# Patient Record
Sex: FEMALE | Race: WHITE | Employment: OTHER | URBAN - METROPOLITAN AREA
[De-identification: names, ages, dates, MRNs, and addresses within clinical notes are randomized per-mention and may not be internally consistent; named-entity substitution may affect disease eponyms.]

---

## 2017-01-26 ENCOUNTER — TRANSFERRED RECORDS (OUTPATIENT)
Dept: HEALTH INFORMATION MANAGEMENT | Facility: CLINIC | Age: 77
End: 2017-01-26

## 2017-02-06 ENCOUNTER — OFFICE VISIT (OUTPATIENT)
Dept: FAMILY MEDICINE | Facility: CLINIC | Age: 77
End: 2017-02-06
Payer: MEDICARE

## 2017-02-06 VITALS
TEMPERATURE: 97.8 F | BODY MASS INDEX: 27.99 KG/M2 | DIASTOLIC BLOOD PRESSURE: 66 MMHG | HEIGHT: 65 IN | WEIGHT: 168 LBS | SYSTOLIC BLOOD PRESSURE: 118 MMHG | HEART RATE: 60 BPM

## 2017-02-06 DIAGNOSIS — J31.0 CHRONIC RHINITIS: ICD-10-CM

## 2017-02-06 DIAGNOSIS — M17.11 PRIMARY LOCALIZED OSTEOARTHROSIS, LOWER LEG, RIGHT: ICD-10-CM

## 2017-02-06 DIAGNOSIS — Z01.818 PREOP GENERAL PHYSICAL EXAM: Primary | ICD-10-CM

## 2017-02-06 LAB
ALBUMIN SERPL-MCNC: 3.8 G/DL (ref 3.4–5)
ALP SERPL-CCNC: 92 U/L (ref 40–150)
ALT SERPL W P-5'-P-CCNC: 17 U/L (ref 0–50)
ANION GAP SERPL CALCULATED.3IONS-SCNC: 5 MMOL/L (ref 3–14)
AST SERPL W P-5'-P-CCNC: 19 U/L (ref 0–45)
BILIRUB SERPL-MCNC: 0.9 MG/DL (ref 0.2–1.3)
BUN SERPL-MCNC: 13 MG/DL (ref 7–30)
CALCIUM SERPL-MCNC: 9.2 MG/DL (ref 8.5–10.1)
CHLORIDE SERPL-SCNC: 103 MMOL/L (ref 94–109)
CO2 SERPL-SCNC: 31 MMOL/L (ref 20–32)
CREAT SERPL-MCNC: 0.77 MG/DL (ref 0.52–1.04)
ERYTHROCYTE [DISTWIDTH] IN BLOOD BY AUTOMATED COUNT: 13.1 % (ref 10–15)
GFR SERPL CREATININE-BSD FRML MDRD: 73 ML/MIN/1.7M2
GLUCOSE SERPL-MCNC: 93 MG/DL (ref 70–99)
HCT VFR BLD AUTO: 40.4 % (ref 35–47)
HGB BLD-MCNC: 12.9 G/DL (ref 11.7–15.7)
MCH RBC QN AUTO: 31.6 PG (ref 26.5–33)
MCHC RBC AUTO-ENTMCNC: 31.9 G/DL (ref 31.5–36.5)
MCV RBC AUTO: 99 FL (ref 78–100)
PLATELET # BLD AUTO: 235 10E9/L (ref 150–450)
POTASSIUM SERPL-SCNC: 4.3 MMOL/L (ref 3.4–5.3)
PROT SERPL-MCNC: 7.3 G/DL (ref 6.8–8.8)
RBC # BLD AUTO: 4.08 10E12/L (ref 3.8–5.2)
SODIUM SERPL-SCNC: 139 MMOL/L (ref 133–144)
WBC # BLD AUTO: 6.2 10E9/L (ref 4–11)

## 2017-02-06 PROCEDURE — 80053 COMPREHEN METABOLIC PANEL: CPT | Performed by: NURSE PRACTITIONER

## 2017-02-06 PROCEDURE — 93000 ELECTROCARDIOGRAM COMPLETE: CPT | Performed by: NURSE PRACTITIONER

## 2017-02-06 PROCEDURE — 36415 COLL VENOUS BLD VENIPUNCTURE: CPT | Performed by: NURSE PRACTITIONER

## 2017-02-06 PROCEDURE — 85027 COMPLETE CBC AUTOMATED: CPT | Performed by: NURSE PRACTITIONER

## 2017-02-06 PROCEDURE — 99214 OFFICE O/P EST MOD 30 MIN: CPT | Performed by: NURSE PRACTITIONER

## 2017-02-06 RX ORDER — FLUTICASONE PROPIONATE 50 MCG
2 SPRAY, SUSPENSION (ML) NASAL DAILY
Qty: 16 G | Refills: 3 | Status: SHIPPED | OUTPATIENT
Start: 2017-02-06 | End: 2018-02-23

## 2017-02-06 NOTE — MR AVS SNAPSHOT
After Visit Summary   2/6/2017    Lavonne Tidwell    MRN: [id number]           Patient Information     Date Of Birth          1940        Visit Information        Provider Department      2/6/2017 9:20 AM Simin Gaines APRN CNP [facility]        Today's Diagnoses     Preop general physical exam    -  1     Chronic rhinitis           Care Instructions      Before Your Surgery      Call your surgeon if there is any change in your health. This includes signs of a cold or flu (such as a sore throat, runny nose, cough, rash or fever).    Do not smoke, drink alcohol or take over the counter medicine (unless your surgeon or primary care doctor tells you to) for the 24 hours before and after surgery.    If you take prescribed drugs: Follow your doctor s orders about which medicines to take and which to stop until after surgery.    Eating and drinking prior to surgery: follow the instructions from your surgeon    Take a shower or bath the night before surgery. Use the soap your surgeon gave you to gently clean your skin. If you do not have soap from your surgeon, use your regular soap. Do not shave or scrub the surgery site.  Wear clean pajamas and have clean sheets on your bed.         Follow-ups after your visit        Your next 10 appointments already scheduled     Feb 23, 2017   Procedure with Wilfredo Thompson MD   [facility] (--)    [address] [address] [phone]           The medical center is located at [address] (between [address] in [place], [place] north of [place]).              Who to contact     If you have questions or need follow up information about today's clinic visit or your schedule please contact [facility] directly at [phone].  Normal or non-critical lab and imaging results will be communicated to you by MyChart, letter or phone within 4 business days after the  "clinic has received the results. If you do not hear from us within 7 days, please contact the clinic through myOrder or phone. If you have a critical or abnormal lab result, we will notify you by phone as soon as possible.  Submit refill requests through myOrder or call your pharmacy and they will forward the refill request to us. Please allow 3 business days for your refill to be completed.          Additional Information About Your Visit        [employer] Information     myOrder gives you secure access to your electronic health record. If you see a primary care provider, you can also send messages to your care team and make appointments. If you have questions, please call your primary care clinic.  If you do not have a primary care provider, please call [phone] and they will assist you.        Care EveryWhere ID     This is your Care EveryWhere ID. This could be used by other organizations to access your [facility] medical records  [id number]        Your Vitals Were     Pulse Temperature Height BMI (Body Mass Index)          60 97.8  F (36.6  C) (Tympanic) 5' 5\" (1.651 m) 27.96 kg/m2         Blood Pressure from Last 3 Encounters:   02/06/17 118/66   07/19/16 136/68   01/10/16 114/71    Weight from Last 3 Encounters:   02/06/17 168 lb (76.204 kg)   07/19/16 168 lb 6.4 oz (76.386 kg)   01/06/16 167 lb (75.751 kg)              We Performed the Following     CBC with platelets     Comprehensive metabolic panel (BMP + Alb, Alk Phos, ALT, AST, Total. Bili, TP)     EKG 12-lead complete w/read - Clinics          Where to get your medicines      These medications were sent to [facility] [place] [id number]     Phone:  [phone]    - fluticasone 50 MCG/ACT spray       Primary Care Provider Office Phone # Fax #    Elvira Kowalski [phone]       [address] [address]      "   Thank you!     Thank you for choosing [facility]  for your care. Our goal is always to provide you with excellent care. Hearing back from our patients is one way we can continue to improve our services. Please take a few minutes to complete the written survey that you may receive in the mail after your visit with us. Thank you!             Your Updated Medication List - Protect others around you: Learn how to safely use, store and throw away your medicines at www.disposemymeds.org.          This list is accurate as of: 2/6/17  9:56 AM.  Always use your most recent med list.                   Brand Name Dispense Instructions for use    acetaminophen 325 MG tablet    TYLENOL    100 tablet    Take 3 tablets (975 mg) by mouth every 8 hours       BIOTIN PO      Take 500 mg by mouth daily       CALCIUM + D PO      2 TABLET DAILY       CENTRUM SILVER PO      1 TABLET DAILY       docusate calcium 240 MG capsule    SURFAK     Take 240 mg by mouth 2 times daily as needed for constipation       FIBER THERAPY 500 MG Tabs tablet   Generic drug:  methylcellulose      Take 500 mg by mouth 4 times daily as needed       fluticasone 50 MCG/ACT spray    FLONASE    16 g    Spray 2 sprays into both nostrils daily As needed       Garlic 1000 MG Caps      Take 1,000 mg by mouth daily       Grape Seed 50 MG Tabs      4 capsules daily       METAMUCIL 0.52 G capsule   Generic drug:  psyllium      Take 4 capsules by mouth daily as needed       vitamin E 400 UNIT capsule      Take 400 Units by mouth daily       ZANTAC 150 MG tablet   Generic drug:  ranitidine      Take 150 mg by mouth daily

## 2017-02-06 NOTE — NURSING NOTE
"Chief Complaint   Patient presents with     Pre-Op Exam     2/23/2017 - Dr. Thompson       Initial /66 mmHg  Pulse 60  Temp(Src) 97.8  F (36.6  C) (Tympanic)  Ht 5' 5\" (1.651 m)  Wt 168 lb (76.204 kg)  BMI 27.96 kg/m2 Estimated body mass index is 27.96 kg/(m^2) as calculated from the following:    Height as of this encounter: 5' 5\" (1.651 m).    Weight as of this encounter: 168 lb (76.204 kg).  Medication Reconciliation: complete    "

## 2017-02-06 NOTE — PROGRESS NOTES
[facility]  [address] [place] [address]  [phone]  Dept: [phone]    PRE-OP EVALUATION:  Today's date: 2017    Lavonne Tidwell (: 1940) presents for pre-operative evaluation assessment as requested by Dr. Thompson.  She requires evaluation and anesthesia risk assessment prior to undergoing surgery/procedure for treatment of rt knn .  Proposed procedure: Arthroplasty of Rt knee    Date of Surgery/ Procedure: 2017  Time of Surgery/ Procedure:   Hospital/Surgical Facility: [facility]    Primary Physician: Elvira Kowalski  Type of Anesthesia Anticipated: General    Patient has a Health Care Directive or Living Will:  NO    1. NO - Do you have a history of heart attack, stroke, stent, bypass or surgery on an artery in the head, neck, heart or legs?  2. NO - Do you ever have any pain or discomfort in your chest?  3. NO - Do you have a history of  Heart Failure?  4. NO - Are you troubled by shortness of breath when: walking on the level, up a slight hill or at night?  5. NO - Do you currently have a cold, bronchitis or other respiratory infection?  6. NO - Do you have a cough, shortness of breath or wheezing?  7. NO - Do you sometimes get pains in the calves of your legs when you walk?  8. YES - Do you or anyone in your family have previous history of blood clots? Dad with DVT, no known clotting disorder  9. NO - Do you or does anyone in your family have a serious bleeding problem such as prolonged bleeding following surgeries or cuts?  10. YES - Have you ever had problems with anemia or been told to take iron pills?  11. NO - Have you had any abnormal blood loss such as black, tarry or bloody stools, or abnormal vaginal bleeding?  12. YES - Have you ever had a blood transfusion? With surgery and miscarriage   13. NO - Have you or any of your relatives ever had problems with anesthesia?  14. NO - Do you have sleep apnea, excessive snoring or daytime  drowsiness?  15. NO - Do you have any prosthetic heart valves?  16. YES - Do you have prosthetic joints? Left knee  17. NO - Is there any chance that you may be pregnant?      HPI:                                                      Brief HPI related to upcoming procedure: Right knee pain with degenerative joint disease in need of arthroplasty of right knee      See problem list for active medical problems.  Problems all longstanding and stable, except as noted/documented.  See ROS for pertinent symptoms related to these conditions.                                                                                                  .    MEDICAL HISTORY:                                                      Patient Active Problem List    Diagnosis Date Noted     Status post total left knee replacement 01/06/2016     Priority: Medium     Left knee DJD 01/06/2016     Priority: Medium     Senile nuclear sclerosis 08/18/2015     Priority: Medium     Onychomycosis 01/30/2015     Priority: Medium     Advanced directives, counseling/discussion 02/10/2012     Priority: Medium     Advance Directive Problem List Overview:   Name Relationship Phone    Primary Health Care Agent            Alternative Health Care Agent          Discussed advance care planning with patient; information given to patient to review. 2/10/2012          HYPERLIPIDEMIA LDL GOAL <130 10/31/2010     Priority: Medium     Urinary incontinence 12/31/2008     Priority: Medium     On Ditropan for 2 years--modest improvement, initially; s/p TOT--good improvement in control       Atrophic vaginitis 12/31/2008     Priority: Medium     Menopausal syndrome (hot flashes) 12/02/2008     Priority: Medium     On Premarin--decreasing dose for 4 years--now takes twice a week       Esophageal reflux 11/10/2006     Priority: Medium     Injury of sigmoid colon 09/13/2005     Priority: Medium     Sigmoid polyp  Problem list name updated by automated process. Provider to review        DJD Knee 09/13/2005     Priority: Medium      Past Medical History   Diagnosis Date     Ovarian cysts 1974     s/p BSO     History of blood transfusion 1961     Miscarriage     Arthritis 2012     knes and hips     Past Surgical History   Procedure Laterality Date     Surgical history of -   07/30/1998     Colonoscopy     Hysterectomy, pauline  1974     Surgical history of -   1974     Bilateral salpingoopherectomy     Surgical history of -   3/09     TOT Midurethral sling     Abdomen surgery  1973 & 1974     Ovarian cyst/Hysterectomy     Appendectomy  1952     Biopsy       colonoscopy/polyps     Colonoscopy       every 10 years     Orthopedic surgery  2007 & 2009     [place]  both feet     Phacoemulsification with standard intraocular lens implant Left 8/20/2015     Procedure: PHACOEMULSIFICATION WITH STANDARD INTRAOCULAR LENS IMPLANT;  Surgeon: Fernando Jeffrey MD;  Location: [facility] OR     Phacoemulsification with standard intraocular lens implant Right 9/17/2015     Procedure: PHACOEMULSIFICATION WITH STANDARD INTRAOCULAR LENS IMPLANT;  Surgeon: Fernando Jeffrey MD;  Location: [facility] OR     Arthroplasty knee Left 1/6/2016     Procedure: ARTHROPLASTY KNEE;  Surgeon: Wilfredo Thompson MD;  Location: [facility] OR     Genitourinary surgery  2008     bladder     Current Outpatient Prescriptions   Medication Sig Dispense Refill     fluticasone (FLONASE) 50 MCG/ACT spray Spray 2 sprays into both nostrils daily As needed 16 g 3     acetaminophen (TYLENOL) 325 MG tablet Take 3 tablets (975 mg) by mouth every 8 hours 100 tablet 0     vitamin E 400 UNIT capsule Take 400 Units by mouth daily       Garlic 1000 MG CAPS Take 1,000 mg by mouth daily       BIOTIN PO Take 500 mg by mouth daily       ranitidine (ZANTAC) 150 MG tablet Take 150 mg by mouth daily        docusate calcium (SURFAK) 240 MG capsule Take 240 mg by mouth 2 times daily as needed for constipation       Grape Seed 50 MG TABS 4 capsules daily       methylcellulose (FIBER  "THERAPY) 500 MG TABS Take 500 mg by mouth 4 times daily as needed        psyllium (METAMUCIL) 0.52 G capsule Take 4 capsules by mouth daily as needed        CENTRUM SILVER OR 1 TABLET DAILY       CALCIUM + D OR 2 TABLET DAILY       [DISCONTINUED] fluticasone (FLONASE) 50 MCG/ACT nasal spray Spray 2 sprays into both nostrils daily As needed 1 Package 9     OTC products: None, except as noted above    Allergies   Allergen Reactions     Codeine GI Disturbance      Latex Allergy: NO    Social History   Substance Use Topics     Smoking status: Former Smoker -- 0.50 packs/day for 5 years     Types: Cigarettes     Start date: 09/20/1968     Quit date: 02/15/1972     Smokeless tobacco: Never Used      Comment: stopped in her 20's     Alcohol Use: 0.0 oz/week      Comment: Occasional     History   Drug Use No       REVIEW OF SYSTEMS:                                                    C: NEGATIVE for fever, chills, change in weight  I: NEGATIVE for worrisome rashes, moles or lesions  E: NEGATIVE for vision changes or irritation  E/M: NEGATIVE for ear, mouth and throat problems  R: NEGATIVE for significant cough or SOB  B: NEGATIVE for masses, tenderness or discharge  CV: NEGATIVE for chest pain, palpitations or peripheral edema  GI: NEGATIVE for nausea, abdominal pain, heartburn, or change in bowel habits  : NEGATIVE for frequency, dysuria, or hematuria  MUSCULOSKELETAL:POSITIVE  for joint pain right knee  N: NEGATIVE for weakness, dizziness or paresthesias  E: NEGATIVE for temperature intolerance, skin/hair changes  H: NEGATIVE for bleeding problems  P: NEGATIVE for changes in mood or affect    EXAM:                                                    /66 mmHg  Pulse 60  Temp(Src) 97.8  F (36.6  C) (Tympanic)  Ht 5' 5\" (1.651 m)  Wt 168 lb (76.204 kg)  BMI 27.96 kg/m2    GENERAL APPEARANCE: healthy, alert and no distress     EYES: EOMI,- PERRL     HENT: ear canals and TM's normal and nose and mouth without ulcers " or lesions     NECK: no adenopathy, no asymmetry, masses, or scars and thyroid normal to palpation     RESP: lungs clear to auscultation - no rales, rhonchi or wheezes     CV: regular rates and rhythm, normal S1 S2, no S3 or S4 and no murmur, click or rub -     ABDOMEN:  soft, nontender, no HSM or masses and bowel sounds normal     MS: extremities normal- no gross deformities noted, no evidence of inflammation in joints, FROM in all extremities.     SKIN: no suspicious lesions or rashes     NEURO: Normal strength and tone, sensory exam grossly normal, mentation intact and speech normal     PSYCH: mentation appears normal. and affect normal/bright    DIAGNOSTICS:                                                    EKG: appears normal, NSR, normal axis, normal intervals, no acute ST/T changes c/w ischemia, no LVH by voltage criteria  Results for orders placed or performed in visit on 02/06/17   CBC with platelets   Result Value Ref Range    WBC 6.2 4.0 - 11.0 10e9/L    RBC Count 4.08 3.8 - 5.2 10e12/L    Hemoglobin 12.9 11.7 - 15.7 g/dL    Hematocrit 40.4 35.0 - 47.0 %    MCV 99 78 - 100 fl    MCH 31.6 26.5 - 33.0 pg    MCHC 31.9 31.5 - 36.5 g/dL    RDW 13.1 10.0 - 15.0 %    Platelet Count 235 150 - 450 10e9/L   Comprehensive metabolic panel (BMP + Alb, Alk Phos, ALT, AST, Total. Bili, TP)   Result Value Ref Range    Sodium 139 133 - 144 mmol/L    Potassium 4.3 3.4 - 5.3 mmol/L    Chloride 103 94 - 109 mmol/L    Carbon Dioxide 31 20 - 32 mmol/L    Anion Gap 5 3 - 14 mmol/L    Glucose 93 70 - 99 mg/dL    Urea Nitrogen 13 7 - 30 mg/dL    Creatinine 0.77 0.52 - 1.04 mg/dL    GFR Estimate 73 >60 mL/min/1.7m2    GFR Estimate If Black 88 >60 mL/min/1.7m2    Calcium 9.2 8.5 - 10.1 mg/dL    Bilirubin Total 0.9 0.2 - 1.3 mg/dL    Albumin 3.8 3.4 - 5.0 g/dL    Protein Total 7.3 6.8 - 8.8 g/dL    Alkaline Phosphatase 92 40 - 150 U/L    ALT 17 0 - 50 U/L    AST 19 0 - 45 U/L     IMPRESSION:                                                     Reason for surgery/procedure: Right knee pain with degenerative joint disease in need of arthroplasty of right knee    The proposed surgical procedure is considered INTERMEDIATE risk.    REVISED CARDIAC RISK INDEX  The patient has the following serious cardiovascular risks for perioperative complications such as (MI, PE, VFib and 3  AV Block):  No serious cardiac risks  INTERPRETATION: 0 risks: Class I (very low risk - 0.4% complication rate)    The patient has the following additional risks for perioperative complications:  No identified additional risks      ICD-10-CM    1. Preop general physical exam Z01.818 EKG 12-lead complete w/read - Clinics     CBC with platelets     Comprehensive metabolic panel (BMP + Alb, Alk Phos, ALT, AST, Total. Bili, TP)   2. Primary localized osteoarthrosis, lower leg, right M17.11    3. Chronic rhinitis J31.0 fluticasone (FLONASE) 50 MCG/ACT spray       RECOMMENDATIONS:                                                      --Patient is to take all scheduled medications on the day of surgery EXCEPT for modifications listed below.  Stop dietary supplements until after surgery.    APPROVAL GIVEN to proceed with proposed procedure, without further diagnostic evaluation       Signed Electronically by: ENA Henning CNP    Copy of this evaluation report is provided to requesting physician.    [facility] Preop Guidelines

## 2017-02-22 ENCOUNTER — ANESTHESIA EVENT (OUTPATIENT)
Dept: SURGERY | Facility: CLINIC | Age: 77
DRG: 470 | End: 2017-02-22
Payer: MEDICARE

## 2017-02-23 ENCOUNTER — ANESTHESIA (OUTPATIENT)
Dept: SURGERY | Facility: CLINIC | Age: 77
DRG: 470 | End: 2017-02-23
Payer: MEDICARE

## 2017-02-23 ENCOUNTER — APPOINTMENT (OUTPATIENT)
Dept: GENERAL RADIOLOGY | Facility: CLINIC | Age: 77
DRG: 470 | End: 2017-02-23
Attending: ORTHOPAEDIC SURGERY
Payer: MEDICARE

## 2017-02-23 ENCOUNTER — HOSPITAL ENCOUNTER (INPATIENT)
Facility: CLINIC | Age: 77
LOS: 3 days | Discharge: NURSING, REHAB OR LONG-TERM CARE | DRG: 470 | End: 2017-02-26
Attending: ORTHOPAEDIC SURGERY | Admitting: ORTHOPAEDIC SURGERY
Payer: MEDICARE

## 2017-02-23 DIAGNOSIS — Z96.651 STATUS POST TOTAL RIGHT KNEE REPLACEMENT: Primary | ICD-10-CM

## 2017-02-23 PROBLEM — M17.10 PRIMARY LOCALIZED OSTEOARTHROSIS, LOWER LEG: Status: ACTIVE | Noted: 2017-02-23

## 2017-02-23 LAB
BASOPHILS # BLD AUTO: 0 10E9/L (ref 0–0.2)
BASOPHILS NFR BLD AUTO: 0.3 %
DIFFERENTIAL METHOD BLD: NORMAL
EOSINOPHIL # BLD AUTO: 0.1 10E9/L (ref 0–0.7)
EOSINOPHIL NFR BLD AUTO: 1 %
ERYTHROCYTE [DISTWIDTH] IN BLOOD BY AUTOMATED COUNT: 13.4 % (ref 10–15)
HCT VFR BLD AUTO: 39.9 % (ref 35–47)
HGB BLD-MCNC: 12.7 G/DL (ref 11.7–15.7)
IMM GRANULOCYTES # BLD: 0 10E9/L (ref 0–0.4)
IMM GRANULOCYTES NFR BLD: 0.2 %
INR PPP: 0.96 (ref 0.86–1.14)
LYMPHOCYTES # BLD AUTO: 2 10E9/L (ref 0.8–5.3)
LYMPHOCYTES NFR BLD AUTO: 34.2 %
MCH RBC QN AUTO: 31.1 PG (ref 26.5–33)
MCHC RBC AUTO-ENTMCNC: 31.8 G/DL (ref 31.5–36.5)
MCV RBC AUTO: 98 FL (ref 78–100)
MONOCYTES # BLD AUTO: 0.4 10E9/L (ref 0–1.3)
MONOCYTES NFR BLD AUTO: 6.8 %
NEUTROPHILS # BLD AUTO: 3.4 10E9/L (ref 1.6–8.3)
NEUTROPHILS NFR BLD AUTO: 57.5 %
PLATELET # BLD AUTO: 223 10E9/L (ref 150–450)
RBC # BLD AUTO: 4.09 10E12/L (ref 3.8–5.2)
WBC # BLD AUTO: 5.9 10E9/L (ref 4–11)

## 2017-02-23 PROCEDURE — 27810169 ZZH OR IMPLANT GENERAL: Performed by: ORTHOPAEDIC SURGERY

## 2017-02-23 PROCEDURE — 25000128 H RX IP 250 OP 636: Performed by: ORTHOPAEDIC SURGERY

## 2017-02-23 PROCEDURE — 85610 PROTHROMBIN TIME: CPT | Performed by: PHYSICIAN ASSISTANT

## 2017-02-23 PROCEDURE — 36000063 ZZH SURGERY LEVEL 4 EA 15 ADDTL MIN: Performed by: ORTHOPAEDIC SURGERY

## 2017-02-23 PROCEDURE — 36415 COLL VENOUS BLD VENIPUNCTURE: CPT | Performed by: PHYSICIAN ASSISTANT

## 2017-02-23 PROCEDURE — 25000125 ZZHC RX 250: Performed by: PHYSICIAN ASSISTANT

## 2017-02-23 PROCEDURE — 25000128 H RX IP 250 OP 636: Performed by: NURSE ANESTHETIST, CERTIFIED REGISTERED

## 2017-02-23 PROCEDURE — 27110028 ZZH OR GENERAL SUPPLY NON-STERILE: Performed by: ORTHOPAEDIC SURGERY

## 2017-02-23 PROCEDURE — 12000007 ZZH R&B INTERMEDIATE

## 2017-02-23 PROCEDURE — 27210794 ZZH OR GENERAL SUPPLY STERILE: Performed by: ORTHOPAEDIC SURGERY

## 2017-02-23 PROCEDURE — 25000125 ZZHC RX 250: Performed by: NURSE ANESTHETIST, CERTIFIED REGISTERED

## 2017-02-23 PROCEDURE — A9270 NON-COVERED ITEM OR SERVICE: HCPCS | Mod: GY | Performed by: ORTHOPAEDIC SURGERY

## 2017-02-23 PROCEDURE — 40000940 XR KNEE PORT RT 1/2 VW: Mod: TC,RT

## 2017-02-23 PROCEDURE — 25000132 ZZH RX MED GY IP 250 OP 250 PS 637: Mod: GY | Performed by: ORTHOPAEDIC SURGERY

## 2017-02-23 PROCEDURE — C1776 JOINT DEVICE (IMPLANTABLE): HCPCS | Performed by: ORTHOPAEDIC SURGERY

## 2017-02-23 PROCEDURE — 25000128 H RX IP 250 OP 636: Performed by: PHYSICIAN ASSISTANT

## 2017-02-23 PROCEDURE — 71000014 ZZH RECOVERY PHASE 1 LEVEL 2 FIRST HR: Performed by: ORTHOPAEDIC SURGERY

## 2017-02-23 PROCEDURE — 36000093 ZZH SURGERY LEVEL 4 1ST 30 MIN: Performed by: ORTHOPAEDIC SURGERY

## 2017-02-23 PROCEDURE — 85025 COMPLETE CBC W/AUTO DIFF WBC: CPT | Performed by: PHYSICIAN ASSISTANT

## 2017-02-23 PROCEDURE — 37000008 ZZH ANESTHESIA TECHNICAL FEE, 1ST 30 MIN: Performed by: ORTHOPAEDIC SURGERY

## 2017-02-23 PROCEDURE — 37000009 ZZH ANESTHESIA TECHNICAL FEE, EACH ADDTL 15 MIN: Performed by: ORTHOPAEDIC SURGERY

## 2017-02-23 PROCEDURE — 25800025 ZZH RX 258: Performed by: NURSE ANESTHETIST, CERTIFIED REGISTERED

## 2017-02-23 PROCEDURE — 0SRC0J9 REPLACEMENT OF RIGHT KNEE JOINT WITH SYNTHETIC SUBSTITUTE, CEMENTED, OPEN APPROACH: ICD-10-PCS | Performed by: ORTHOPAEDIC SURGERY

## 2017-02-23 PROCEDURE — 25800025 ZZH RX 258: Performed by: ORTHOPAEDIC SURGERY

## 2017-02-23 PROCEDURE — 40000305 ZZH STATISTIC PRE PROC ASSESS I: Performed by: ORTHOPAEDIC SURGERY

## 2017-02-23 DEVICE — IMP COMP PATELLA HOWM TRI ASYM 35X10MM 5551-G-350: Type: IMPLANTABLE DEVICE | Site: KNEE | Status: FUNCTIONAL

## 2017-02-23 DEVICE — IMP BASEPLATE TIBIAL HOWM TRI 4 5520-B-400: Type: IMPLANTABLE DEVICE | Site: KNEE | Status: FUNCTIONAL

## 2017-02-23 DEVICE — IMP INSERT TIBIAL HOWM TRI 4X11MM 5530-G-411: Type: IMPLANTABLE DEVICE | Site: KNEE | Status: FUNCTIONAL

## 2017-02-23 DEVICE — IMP COMP FEM STRK TRIATHLN CR RT 4 5510-F-402: Type: IMPLANTABLE DEVICE | Site: KNEE | Status: FUNCTIONAL

## 2017-02-23 DEVICE — BONE CEMENT PALACOS 00-1112-140-01: Type: IMPLANTABLE DEVICE | Site: KNEE | Status: FUNCTIONAL

## 2017-02-23 RX ORDER — ACETAMINOPHEN 500 MG
1000 TABLET ORAL 2 TIMES DAILY PRN
Status: DISCONTINUED | OUTPATIENT
Start: 2017-02-23 | End: 2017-02-26 | Stop reason: HOSPADM

## 2017-02-23 RX ORDER — ONDANSETRON 2 MG/ML
4 INJECTION INTRAMUSCULAR; INTRAVENOUS EVERY 6 HOURS PRN
Status: DISCONTINUED | OUTPATIENT
Start: 2017-02-23 | End: 2017-02-26 | Stop reason: HOSPADM

## 2017-02-23 RX ORDER — HYDROMORPHONE HYDROCHLORIDE 1 MG/ML
.3-.5 INJECTION, SOLUTION INTRAMUSCULAR; INTRAVENOUS; SUBCUTANEOUS
Status: DISCONTINUED | OUTPATIENT
Start: 2017-02-23 | End: 2017-02-26 | Stop reason: HOSPADM

## 2017-02-23 RX ORDER — DOCUSATE CALCIUM 240 MG
240 CAPSULE ORAL 2 TIMES DAILY PRN
Status: DISCONTINUED | OUTPATIENT
Start: 2017-02-23 | End: 2017-02-23 | Stop reason: CLARIF

## 2017-02-23 RX ORDER — BUPIVACAINE HYDROCHLORIDE 7.5 MG/ML
INJECTION, SOLUTION INTRASPINAL PRN
Status: DISCONTINUED | OUTPATIENT
Start: 2017-02-23 | End: 2017-02-23

## 2017-02-23 RX ORDER — MEPERIDINE HYDROCHLORIDE 25 MG/ML
12.5 INJECTION INTRAMUSCULAR; INTRAVENOUS; SUBCUTANEOUS EVERY 5 MIN PRN
Status: DISCONTINUED | OUTPATIENT
Start: 2017-02-23 | End: 2017-02-23 | Stop reason: HOSPADM

## 2017-02-23 RX ORDER — LIDOCAINE 40 MG/G
CREAM TOPICAL
Status: DISCONTINUED | OUTPATIENT
Start: 2017-02-23 | End: 2017-02-23 | Stop reason: HOSPADM

## 2017-02-23 RX ORDER — GUAIFENESIN 600 MG/1
1200 TABLET, EXTENDED RELEASE ORAL 2 TIMES DAILY PRN
COMMUNITY
End: 2017-03-27

## 2017-02-23 RX ORDER — ONDANSETRON 4 MG/1
4 TABLET, ORALLY DISINTEGRATING ORAL EVERY 30 MIN PRN
Status: DISCONTINUED | OUTPATIENT
Start: 2017-02-23 | End: 2017-02-23 | Stop reason: HOSPADM

## 2017-02-23 RX ORDER — ONDANSETRON 4 MG/1
4 TABLET, ORALLY DISINTEGRATING ORAL EVERY 6 HOURS PRN
Status: DISCONTINUED | OUTPATIENT
Start: 2017-02-23 | End: 2017-02-26 | Stop reason: HOSPADM

## 2017-02-23 RX ORDER — FENTANYL CITRATE 50 UG/ML
INJECTION, SOLUTION INTRAMUSCULAR; INTRAVENOUS PRN
Status: DISCONTINUED | OUTPATIENT
Start: 2017-02-23 | End: 2017-02-23

## 2017-02-23 RX ORDER — CEFAZOLIN SODIUM 1 G/3ML
1 INJECTION, POWDER, FOR SOLUTION INTRAMUSCULAR; INTRAVENOUS SEE ADMIN INSTRUCTIONS
Status: DISCONTINUED | OUTPATIENT
Start: 2017-02-23 | End: 2017-02-23 | Stop reason: HOSPADM

## 2017-02-23 RX ORDER — SODIUM CHLORIDE 9 MG/ML
INJECTION, SOLUTION INTRAVENOUS CONTINUOUS
Status: DISCONTINUED | OUTPATIENT
Start: 2017-02-23 | End: 2017-02-24

## 2017-02-23 RX ORDER — HYDROMORPHONE HYDROCHLORIDE 1 MG/ML
.3-.5 INJECTION, SOLUTION INTRAMUSCULAR; INTRAVENOUS; SUBCUTANEOUS EVERY 5 MIN PRN
Status: DISCONTINUED | OUTPATIENT
Start: 2017-02-23 | End: 2017-02-23 | Stop reason: HOSPADM

## 2017-02-23 RX ORDER — NALOXONE HYDROCHLORIDE 0.4 MG/ML
.1-.4 INJECTION, SOLUTION INTRAMUSCULAR; INTRAVENOUS; SUBCUTANEOUS
Status: DISCONTINUED | OUTPATIENT
Start: 2017-02-23 | End: 2017-02-26 | Stop reason: HOSPADM

## 2017-02-23 RX ORDER — ONDANSETRON 2 MG/ML
4 INJECTION INTRAMUSCULAR; INTRAVENOUS EVERY 30 MIN PRN
Status: DISCONTINUED | OUTPATIENT
Start: 2017-02-23 | End: 2017-02-23 | Stop reason: HOSPADM

## 2017-02-23 RX ORDER — FENTANYL CITRATE 50 UG/ML
25-50 INJECTION, SOLUTION INTRAMUSCULAR; INTRAVENOUS
Status: DISCONTINUED | OUTPATIENT
Start: 2017-02-23 | End: 2017-02-23 | Stop reason: HOSPADM

## 2017-02-23 RX ORDER — PROPOFOL 10 MG/ML
INJECTION, EMULSION INTRAVENOUS CONTINUOUS PRN
Status: DISCONTINUED | OUTPATIENT
Start: 2017-02-23 | End: 2017-02-23

## 2017-02-23 RX ORDER — HYDROCODONE BITARTRATE AND ACETAMINOPHEN 5; 325 MG/1; MG/1
1-2 TABLET ORAL EVERY 4 HOURS PRN
Status: DISCONTINUED | OUTPATIENT
Start: 2017-02-23 | End: 2017-02-26 | Stop reason: HOSPADM

## 2017-02-23 RX ORDER — CEFAZOLIN SODIUM 2 G/100ML
2 INJECTION, SOLUTION INTRAVENOUS
Status: COMPLETED | OUTPATIENT
Start: 2017-02-23 | End: 2017-02-23

## 2017-02-23 RX ORDER — ONDANSETRON 2 MG/ML
INJECTION INTRAMUSCULAR; INTRAVENOUS PRN
Status: DISCONTINUED | OUTPATIENT
Start: 2017-02-23 | End: 2017-02-23

## 2017-02-23 RX ORDER — HYDROXYZINE HYDROCHLORIDE 10 MG/1
10 TABLET, FILM COATED ORAL EVERY 6 HOURS PRN
Status: DISCONTINUED | OUTPATIENT
Start: 2017-02-23 | End: 2017-02-26 | Stop reason: HOSPADM

## 2017-02-23 RX ORDER — LIDOCAINE 40 MG/G
CREAM TOPICAL
Status: DISCONTINUED | OUTPATIENT
Start: 2017-02-23 | End: 2017-02-26 | Stop reason: HOSPADM

## 2017-02-23 RX ORDER — SODIUM CHLORIDE, SODIUM LACTATE, POTASSIUM CHLORIDE, CALCIUM CHLORIDE 600; 310; 30; 20 MG/100ML; MG/100ML; MG/100ML; MG/100ML
INJECTION, SOLUTION INTRAVENOUS CONTINUOUS
Status: DISCONTINUED | OUTPATIENT
Start: 2017-02-23 | End: 2017-02-23 | Stop reason: HOSPADM

## 2017-02-23 RX ORDER — DOCUSATE SODIUM 100 MG/1
200 CAPSULE, LIQUID FILLED ORAL 2 TIMES DAILY PRN
Status: DISCONTINUED | OUTPATIENT
Start: 2017-02-23 | End: 2017-02-26 | Stop reason: HOSPADM

## 2017-02-23 RX ORDER — FLUTICASONE PROPIONATE 50 MCG
2 SPRAY, SUSPENSION (ML) NASAL DAILY PRN
Status: DISCONTINUED | OUTPATIENT
Start: 2017-02-23 | End: 2017-02-26 | Stop reason: HOSPADM

## 2017-02-23 RX ORDER — PROCHLORPERAZINE MALEATE 5 MG
5 TABLET ORAL EVERY 6 HOURS PRN
Status: DISCONTINUED | OUTPATIENT
Start: 2017-02-23 | End: 2017-02-26 | Stop reason: HOSPADM

## 2017-02-23 RX ORDER — LIDOCAINE HYDROCHLORIDE 10 MG/ML
INJECTION, SOLUTION INFILTRATION; PERINEURAL PRN
Status: DISCONTINUED | OUTPATIENT
Start: 2017-02-23 | End: 2017-02-23

## 2017-02-23 RX ORDER — AMOXICILLIN 250 MG
1-2 CAPSULE ORAL 2 TIMES DAILY
Status: DISCONTINUED | OUTPATIENT
Start: 2017-02-23 | End: 2017-02-26 | Stop reason: HOSPADM

## 2017-02-23 RX ORDER — DEXAMETHASONE SODIUM PHOSPHATE 4 MG/ML
INJECTION, SOLUTION INTRA-ARTICULAR; INTRALESIONAL; INTRAMUSCULAR; INTRAVENOUS; SOFT TISSUE PRN
Status: DISCONTINUED | OUTPATIENT
Start: 2017-02-23 | End: 2017-02-23

## 2017-02-23 RX ORDER — GABAPENTIN 100 MG/1
100 CAPSULE ORAL 3 TIMES DAILY
Status: COMPLETED | OUTPATIENT
Start: 2017-02-23 | End: 2017-02-26

## 2017-02-23 RX ADMIN — RANITIDINE HYDROCHLORIDE 150 MG: 150 TABLET, FILM COATED ORAL at 16:40

## 2017-02-23 RX ADMIN — PSYLLIUM 1.04 G: 0.52 CAPSULE ORAL at 19:45

## 2017-02-23 RX ADMIN — SODIUM CHLORIDE, POTASSIUM CHLORIDE, SODIUM LACTATE AND CALCIUM CHLORIDE: 600; 310; 30; 20 INJECTION, SOLUTION INTRAVENOUS at 13:04

## 2017-02-23 RX ADMIN — CEFAZOLIN SODIUM 1 G: 1 INJECTION, SOLUTION INTRAVENOUS at 19:45

## 2017-02-23 RX ADMIN — PROPOFOL 75 MCG/KG/MIN: 10 INJECTION, EMULSION INTRAVENOUS at 12:26

## 2017-02-23 RX ADMIN — CEFAZOLIN SODIUM 2 G: 2 INJECTION, SOLUTION INTRAVENOUS at 12:10

## 2017-02-23 RX ADMIN — FENTANYL CITRATE 50 MCG: 50 INJECTION, SOLUTION INTRAMUSCULAR; INTRAVENOUS at 13:56

## 2017-02-23 RX ADMIN — SODIUM CHLORIDE: 9 INJECTION, SOLUTION INTRAVENOUS at 15:55

## 2017-02-23 RX ADMIN — GABAPENTIN 100 MG: 100 CAPSULE ORAL at 16:40

## 2017-02-23 RX ADMIN — FENTANYL CITRATE 100 MCG: 50 INJECTION, SOLUTION INTRAMUSCULAR; INTRAVENOUS at 12:14

## 2017-02-23 RX ADMIN — SENNOSIDES AND DOCUSATE SODIUM 2 TABLET: 8.6; 5 TABLET ORAL at 19:45

## 2017-02-23 RX ADMIN — HYDROMORPHONE HYDROCHLORIDE 0.5 MG: 1 INJECTION, SOLUTION INTRAMUSCULAR; INTRAVENOUS; SUBCUTANEOUS at 14:31

## 2017-02-23 RX ADMIN — SODIUM CHLORIDE, POTASSIUM CHLORIDE, SODIUM LACTATE AND CALCIUM CHLORIDE: 600; 310; 30; 20 INJECTION, SOLUTION INTRAVENOUS at 09:24

## 2017-02-23 RX ADMIN — FENTANYL CITRATE 50 MCG: 50 INJECTION, SOLUTION INTRAMUSCULAR; INTRAVENOUS at 12:49

## 2017-02-23 RX ADMIN — ONDANSETRON 4 MG: 2 INJECTION INTRAMUSCULAR; INTRAVENOUS at 13:06

## 2017-02-23 RX ADMIN — DEXAMETHASONE SODIUM PHOSPHATE 4 MG: 4 INJECTION, SOLUTION INTRAMUSCULAR; INTRAVENOUS at 12:35

## 2017-02-23 RX ADMIN — BUPIVACAINE HYDROCHLORIDE IN DEXTROSE 1.6 ML: 7.5 INJECTION, SOLUTION SUBARACHNOID at 12:20

## 2017-02-23 RX ADMIN — LIDOCAINE HYDROCHLORIDE 3 ML: 10 INJECTION, SOLUTION INFILTRATION; PERINEURAL at 12:18

## 2017-02-23 RX ADMIN — LIDOCAINE HYDROCHLORIDE 1 ML: 10 INJECTION, SOLUTION INFILTRATION; PERINEURAL at 09:23

## 2017-02-23 RX ADMIN — TRANEXAMIC ACID 1 G: 100 INJECTION, SOLUTION INTRAVENOUS at 12:27

## 2017-02-23 RX ADMIN — GABAPENTIN 100 MG: 100 CAPSULE ORAL at 22:25

## 2017-02-23 RX ADMIN — MIDAZOLAM HYDROCHLORIDE 2 MG: 1 INJECTION, SOLUTION INTRAMUSCULAR; INTRAVENOUS at 12:12

## 2017-02-23 RX ADMIN — EPINEPHRINE 0.1 MG: 1 INJECTION, SOLUTION INTRAMUSCULAR; SUBCUTANEOUS at 12:20

## 2017-02-23 ASSESSMENT — LIFESTYLE VARIABLES: TOBACCO_USE: 1

## 2017-02-23 NOTE — H&P (VIEW-ONLY)
[facility]  [address] [place] [address]  [phone]  Dept: [phone]    PRE-OP EVALUATION:  Today's date: 2017    Lavonne Tidwell (: 1940) presents for pre-operative evaluation assessment as requested by Dr. Thompson.  She requires evaluation and anesthesia risk assessment prior to undergoing surgery/procedure for treatment of rt knn .  Proposed procedure: Arthroplasty of Rt knee    Date of Surgery/ Procedure: 2017  Time of Surgery/ Procedure:   Hospital/Surgical Facility: [facility]    Primary Physician: Elvira Kowalski  Type of Anesthesia Anticipated: General    Patient has a Health Care Directive or Living Will:  NO    1. NO - Do you have a history of heart attack, stroke, stent, bypass or surgery on an artery in the head, neck, heart or legs?  2. NO - Do you ever have any pain or discomfort in your chest?  3. NO - Do you have a history of  Heart Failure?  4. NO - Are you troubled by shortness of breath when: walking on the level, up a slight hill or at night?  5. NO - Do you currently have a cold, bronchitis or other respiratory infection?  6. NO - Do you have a cough, shortness of breath or wheezing?  7. NO - Do you sometimes get pains in the calves of your legs when you walk?  8. YES - Do you or anyone in your family have previous history of blood clots? Dad with DVT, no known clotting disorder  9. NO - Do you or does anyone in your family have a serious bleeding problem such as prolonged bleeding following surgeries or cuts?  10. YES - Have you ever had problems with anemia or been told to take iron pills?  11. NO - Have you had any abnormal blood loss such as black, tarry or bloody stools, or abnormal vaginal bleeding?  12. YES - Have you ever had a blood transfusion? With surgery and miscarriage   13. NO - Have you or any of your relatives ever had problems with anesthesia?  14. NO - Do you have sleep apnea, excessive snoring or daytime  drowsiness?  15. NO - Do you have any prosthetic heart valves?  16. YES - Do you have prosthetic joints? Left knee  17. NO - Is there any chance that you may be pregnant?      HPI:                                                      Brief HPI related to upcoming procedure: Right knee pain with degenerative joint disease in need of arthroplasty of right knee      See problem list for active medical problems.  Problems all longstanding and stable, except as noted/documented.  See ROS for pertinent symptoms related to these conditions.                                                                                                  .    MEDICAL HISTORY:                                                      Patient Active Problem List    Diagnosis Date Noted     Status post total left knee replacement 01/06/2016     Priority: Medium     Left knee DJD 01/06/2016     Priority: Medium     Senile nuclear sclerosis 08/18/2015     Priority: Medium     Onychomycosis 01/30/2015     Priority: Medium     Advanced directives, counseling/discussion 02/10/2012     Priority: Medium     Advance Directive Problem List Overview:   Name Relationship Phone    Primary Health Care Agent            Alternative Health Care Agent          Discussed advance care planning with patient; information given to patient to review. 2/10/2012          HYPERLIPIDEMIA LDL GOAL <130 10/31/2010     Priority: Medium     Urinary incontinence 12/31/2008     Priority: Medium     On Ditropan for 2 years--modest improvement, initially; s/p TOT--good improvement in control       Atrophic vaginitis 12/31/2008     Priority: Medium     Menopausal syndrome (hot flashes) 12/02/2008     Priority: Medium     On Premarin--decreasing dose for 4 years--now takes twice a week       Esophageal reflux 11/10/2006     Priority: Medium     Injury of sigmoid colon 09/13/2005     Priority: Medium     Sigmoid polyp  Problem list name updated by automated process. Provider to review        DJD Knee 09/13/2005     Priority: Medium      Past Medical History   Diagnosis Date     Ovarian cysts 1974     s/p BSO     History of blood transfusion 1961     Miscarriage     Arthritis 2012     knes and hips     Past Surgical History   Procedure Laterality Date     Surgical history of -   07/30/1998     Colonoscopy     Hysterectomy, pauline  1974     Surgical history of -   1974     Bilateral salpingoopherectomy     Surgical history of -   3/09     TOT Midurethral sling     Abdomen surgery  1973 & 1974     Ovarian cyst/Hysterectomy     Appendectomy  1952     Biopsy       colonoscopy/polyps     Colonoscopy       every 10 years     Orthopedic surgery  2007 & 2009     [place]  both feet     Phacoemulsification with standard intraocular lens implant Left 8/20/2015     Procedure: PHACOEMULSIFICATION WITH STANDARD INTRAOCULAR LENS IMPLANT;  Surgeon: Fernando Jeffrey MD;  Location: [facility] OR     Phacoemulsification with standard intraocular lens implant Right 9/17/2015     Procedure: PHACOEMULSIFICATION WITH STANDARD INTRAOCULAR LENS IMPLANT;  Surgeon: Fernando Jeffrey MD;  Location: [facility] OR     Arthroplasty knee Left 1/6/2016     Procedure: ARTHROPLASTY KNEE;  Surgeon: Wilfredo Thompson MD;  Location: [facility] OR     Genitourinary surgery  2008     bladder     Current Outpatient Prescriptions   Medication Sig Dispense Refill     fluticasone (FLONASE) 50 MCG/ACT spray Spray 2 sprays into both nostrils daily As needed 16 g 3     acetaminophen (TYLENOL) 325 MG tablet Take 3 tablets (975 mg) by mouth every 8 hours 100 tablet 0     vitamin E 400 UNIT capsule Take 400 Units by mouth daily       Garlic 1000 MG CAPS Take 1,000 mg by mouth daily       BIOTIN PO Take 500 mg by mouth daily       ranitidine (ZANTAC) 150 MG tablet Take 150 mg by mouth daily        docusate calcium (SURFAK) 240 MG capsule Take 240 mg by mouth 2 times daily as needed for constipation       Grape Seed 50 MG TABS 4 capsules daily       methylcellulose (FIBER  "THERAPY) 500 MG TABS Take 500 mg by mouth 4 times daily as needed        psyllium (METAMUCIL) 0.52 G capsule Take 4 capsules by mouth daily as needed        CENTRUM SILVER OR 1 TABLET DAILY       CALCIUM + D OR 2 TABLET DAILY       [DISCONTINUED] fluticasone (FLONASE) 50 MCG/ACT nasal spray Spray 2 sprays into both nostrils daily As needed 1 Package 9     OTC products: None, except as noted above    Allergies   Allergen Reactions     Codeine GI Disturbance      Latex Allergy: NO    Social History   Substance Use Topics     Smoking status: Former Smoker -- 0.50 packs/day for 5 years     Types: Cigarettes     Start date: 09/20/1968     Quit date: 02/15/1972     Smokeless tobacco: Never Used      Comment: stopped in her 20's     Alcohol Use: 0.0 oz/week      Comment: Occasional     History   Drug Use No       REVIEW OF SYSTEMS:                                                    C: NEGATIVE for fever, chills, change in weight  I: NEGATIVE for worrisome rashes, moles or lesions  E: NEGATIVE for vision changes or irritation  E/M: NEGATIVE for ear, mouth and throat problems  R: NEGATIVE for significant cough or SOB  B: NEGATIVE for masses, tenderness or discharge  CV: NEGATIVE for chest pain, palpitations or peripheral edema  GI: NEGATIVE for nausea, abdominal pain, heartburn, or change in bowel habits  : NEGATIVE for frequency, dysuria, or hematuria  MUSCULOSKELETAL:POSITIVE  for joint pain right knee  N: NEGATIVE for weakness, dizziness or paresthesias  E: NEGATIVE for temperature intolerance, skin/hair changes  H: NEGATIVE for bleeding problems  P: NEGATIVE for changes in mood or affect    EXAM:                                                    /66 mmHg  Pulse 60  Temp(Src) 97.8  F (36.6  C) (Tympanic)  Ht 5' 5\" (1.651 m)  Wt 168 lb (76.204 kg)  BMI 27.96 kg/m2    GENERAL APPEARANCE: healthy, alert and no distress     EYES: EOMI,- PERRL     HENT: ear canals and TM's normal and nose and mouth without ulcers " or lesions     NECK: no adenopathy, no asymmetry, masses, or scars and thyroid normal to palpation     RESP: lungs clear to auscultation - no rales, rhonchi or wheezes     CV: regular rates and rhythm, normal S1 S2, no S3 or S4 and no murmur, click or rub -     ABDOMEN:  soft, nontender, no HSM or masses and bowel sounds normal     MS: extremities normal- no gross deformities noted, no evidence of inflammation in joints, FROM in all extremities.     SKIN: no suspicious lesions or rashes     NEURO: Normal strength and tone, sensory exam grossly normal, mentation intact and speech normal     PSYCH: mentation appears normal. and affect normal/bright    DIAGNOSTICS:                                                    EKG: appears normal, NSR, normal axis, normal intervals, no acute ST/T changes c/w ischemia, no LVH by voltage criteria  Results for orders placed or performed in visit on 02/06/17   CBC with platelets   Result Value Ref Range    WBC 6.2 4.0 - 11.0 10e9/L    RBC Count 4.08 3.8 - 5.2 10e12/L    Hemoglobin 12.9 11.7 - 15.7 g/dL    Hematocrit 40.4 35.0 - 47.0 %    MCV 99 78 - 100 fl    MCH 31.6 26.5 - 33.0 pg    MCHC 31.9 31.5 - 36.5 g/dL    RDW 13.1 10.0 - 15.0 %    Platelet Count 235 150 - 450 10e9/L   Comprehensive metabolic panel (BMP + Alb, Alk Phos, ALT, AST, Total. Bili, TP)   Result Value Ref Range    Sodium 139 133 - 144 mmol/L    Potassium 4.3 3.4 - 5.3 mmol/L    Chloride 103 94 - 109 mmol/L    Carbon Dioxide 31 20 - 32 mmol/L    Anion Gap 5 3 - 14 mmol/L    Glucose 93 70 - 99 mg/dL    Urea Nitrogen 13 7 - 30 mg/dL    Creatinine 0.77 0.52 - 1.04 mg/dL    GFR Estimate 73 >60 mL/min/1.7m2    GFR Estimate If Black 88 >60 mL/min/1.7m2    Calcium 9.2 8.5 - 10.1 mg/dL    Bilirubin Total 0.9 0.2 - 1.3 mg/dL    Albumin 3.8 3.4 - 5.0 g/dL    Protein Total 7.3 6.8 - 8.8 g/dL    Alkaline Phosphatase 92 40 - 150 U/L    ALT 17 0 - 50 U/L    AST 19 0 - 45 U/L     IMPRESSION:                                                     Reason for surgery/procedure: Right knee pain with degenerative joint disease in need of arthroplasty of right knee    The proposed surgical procedure is considered INTERMEDIATE risk.    REVISED CARDIAC RISK INDEX  The patient has the following serious cardiovascular risks for perioperative complications such as (MI, PE, VFib and 3  AV Block):  No serious cardiac risks  INTERPRETATION: 0 risks: Class I (very low risk - 0.4% complication rate)    The patient has the following additional risks for perioperative complications:  No identified additional risks      ICD-10-CM    1. Preop general physical exam Z01.818 EKG 12-lead complete w/read - Clinics     CBC with platelets     Comprehensive metabolic panel (BMP + Alb, Alk Phos, ALT, AST, Total. Bili, TP)   2. Primary localized osteoarthrosis, lower leg, right M17.11    3. Chronic rhinitis J31.0 fluticasone (FLONASE) 50 MCG/ACT spray       RECOMMENDATIONS:                                                      --Patient is to take all scheduled medications on the day of surgery EXCEPT for modifications listed below.  Stop dietary supplements until after surgery.    APPROVAL GIVEN to proceed with proposed procedure, without further diagnostic evaluation       Signed Electronically by: ENA Henning CNP    Copy of this evaluation report is provided to requesting physician.    [facility] Preop Guidelines

## 2017-02-23 NOTE — ANESTHESIA PROCEDURE NOTES
Peripheral nerve/Neuraxial procedure note : intrathecal  Pre-Procedure  Performed by  JAHAIRA WAITE   Location: OR      Pre-Anesthestic Checklist: patient identified, IV checked, site marked, risks and benefits discussed, informed consent, monitors and equipment checked, pre-op evaluation and at physician/surgeon's request    Timeout  Correct Patient: Yes   Correct Procedure: Yes   Correct Site: Yes   Correct Laterality: N/A   Correct Position: Yes   Site Marked: N/A   .   Procedure Documentation  ASA 2  .    Procedure:    Intrathecal.  Insertion Site:L3-4  (midline approach)      Patient Prep;mask, sterile gloves, povidone-iodine 7.5% surgical scrub, patient draped.  .  Needle: (). # of attempts: 1. # of redirects:. Spinal Needle: Triston tip 25 G. 3.5 in.  Introducer used. Introducer: 20 G. .     Assessment/Narrative  Paresthesias: No.  .  .  clear CSF fluid removed . Time Injected: 12:20  Sensory Level: T6 Comments:  VAS pain score prior to injection:    VAS pain score after injection:    FHR stable, pt. Tolerated well.

## 2017-02-23 NOTE — IP AVS SNAPSHOT
` ` Patient Information     Patient Name Sex     Lavonne Viera ([id number]) Female 1940       Room Bed    2400 [address]1258      Patient Demographics     Address Phone E-mail Address    [address] [facility] [address] [phone] (Home) *Preferred*  NONE (Work)  NONE (Mobile) [email or web address]      Patient Ethnicity & Race     Ethnic Group Patient Race    American White      Emergency Contact(s)     Name Relation Home Work Mobile    DINAH VIERA/DERIC Son [phone]    PAMELA VIERA Daughter [phone] none [phone]      Documents on File        Status Date Received Description       Documents for the Patient    Privacy Notice - [place] Received 11     Face Sheet  () 08     Consent Form  10/18/07     Consent Form  08     Consent Form  08     Consent Form  08     Consent Form  07/10/08     Face Sheet Received () 09     Face Sheet Received () 07/22/10     External Medication Information Consent Accepted () 11     Patient ID       Consent for Services - Hospital/Clinic Received () 11     Consent for Services - Hospital/Clinic Received () 11     Consent for Services - Hospital/Clinic Received () 12     Consent for Services - Hospital/Clinic  ()      External Medication Information Consent Accepted 12     Other Patient Refused 13 Patient Bill of Rights    Physical Therapy Certification Received 13 Medicare cert (3/5/13-13) signed electronically    Consent for EHR Access  13 Copied from existing Consent for services - C/HOD collected on 2012    [facility] Specified Other       Physical Therapy Certification Received 13 Medicare recert (13-13) signed electronically by MD    Consent for Services - Hospital/Clinic Received () 14     Consent for Services - Hospital/Clinic Received () 05/19/15      Insurance Card Received 16 MEDICARE    Insurance Card Received 16 AARP    Consent for Services - Geriatrics Received 16     Consent for Services/Privacy Notice - Hospital/Clinic Received () 16     Consent for Services/Privacy Notice - Hospital/Clinic Received 17     Insurance Card  (Deleted) 05     Insurance Card  (Deleted) 07     Insurance Card  (Deleted) 08     Insurance Card Received (Deleted) 13     Insurance Card  (Deleted)      Consent for Services - Hospital/Clinic  (Deleted)      Insurance Card Received (Deleted) 04/10/14 MEDICARE    Insurance Card Received (Deleted) 04/10/14 AARP    Patient Photo   Photo of Patient       Documents for the Encounter    CMS IM for Patient Signature Received 17       Admission Information     Attending Provider Admitting Provider Admission Type Admission Date/Time    Wilfredo Thompson MD Comfort, Thomas K, MD Elective 17  0833    Discharge Date Hospital Service Auth/Cert Status Service Area     Hospitalist [facility] SERVICES    Unit Room/Bed Admission Status       [facility] 2400/2400 Admission (Confirmed)       Admission     Complaint    degenerative joint disease, Primary localized osteoarthrosis, lower leg      Hospital Account     Name Acct ID Class Status Primary Coverage    Lavonne Tidwell [id number] Inpatient Open MEDICARE - MEDICARE            Guarantor Account (for Hospital Account #[id number])     Name Relation to Pt Service Area Active? Acct Type    Lavonne Tidwell  FCS Yes Personal/Family    Address Phone          [address] [place] [address] [phone](H)              Coverage Information (for Hospital Account #[id number])     1. MEDICARE/MEDICARE     F/O Payor/Plan Precert #    MEDICARE/MEDICARE     Subscriber Subscriber #    Lavonne Tidwell [id number]    Address Phone    ATT[employer] CLAIMS  [address]  [facility] [place] [address] [phone]           2. COMMERCIAL/AARP     F/O Payor/Plan Precert #    COMMERCIAL/AARP     Subscriber Subscriber #    Lavonne Tidwell [id number]    Address Phone    [facility] CLAIM DIV  [address]  [place] [address] [phone]

## 2017-02-23 NOTE — IP AVS SNAPSHOT
MRN:[id number]                      After Visit Summary   2/23/2017    Lavonne Tidwell    MRN: [id number]           Thank you!     Thank you for choosing [facility] for your care. Our goal is always to provide you with excellent care. Hearing back from our patients is one way we can continue to improve our services. Please take a few minutes to complete the written survey that you may receive in the mail after you visit with us. Thank you!        Patient Information     Date Of Birth          1940        About your hospital stay     You were admitted on:  February 23, 2017 You last received care in the:  [facility]    You were discharged on:  February 26, 2017        Reason for your hospital stay       Right total knee replacement                  Who to Call     For medical emergencies, please call 911.  For non-urgent questions about your medical care, please call your primary care provider or clinic, [phone]  For questions related to your surgery, please call your surgery clinic        Attending Provider     Provider Wilfredo Herzog MD Orthopedics       Primary Care Provider Office Phone # Fax #    Elvira Kowalksi [phone]       [facility] [address] [place] [id number]        After Care Instructions     Activity - Ambulate in hallway       Every shift            Activity - Up ad mitchel           Activity - Up with assistive device           Advance Diet as Tolerated       Follow this diet upon discharge: Orders Placed This Encounter      Advance Diet as Tolerated: Regular Diet Adult            General info for SNF       Length of Stay Estimate: Short Term Care: Estimated # of Days <30  Condition at Discharge: Improving  Level of care:skilled   Rehabilitation Potential: Good  Admission H&P remains valid and up-to-date: Yes  Recent Chemotherapy: N/A  Use Nursing Home Standing Orders: Yes            Nilsa  "instructions       Give two-step Mantoux (PPD) Per Facility Policy Yes            Weight bearing status       WBAT            Wound care (specify)       Site:   Right knee  Instructions:  Dermabond glue in place, May shower, may get wet, dressings as needed, may leave open to air                  Follow-up Appointments     Follow Up and recommended labs and tests       Follow up with Dr. Thompson in 2 weeks.                  Your next 10 appointments already scheduled     Mar 06, 2017 10:00 AM CST   Evaluation with Francheska Reilly PT   [facility] ([facility])    [address] [place] [id number]   [phone]              Additional Services     Occupational Therapy Adult Consult       Evaluate and treat as clinically indicated.    Reason:  Right total knee replacement            Physical Therapy Adult Consult       Evaluate and treat as clinically indicated.    Reason:  Right total knee replacement                  Pending Results     No orders found from 2/21/2017 to 2/24/2017.            Statement of Approval     Ordered          02/26/17 5453  I have reviewed and agree with all the recommendations and orders detailed in this document.  EFFECTIVE NOW     Approved and electronically signed by:  Jerry Ya MD           02/26/17 1916  I have reviewed and agree with all the recommendations and orders detailed in this document.  EFFECTIVE NOW     Approved and electronically signed by:  Wilfredo Thompson MD             Admission Information     Date & Time Provider Department Dept. Phone    2/23/2017 Wilfredo Thompson MD [facility] [phone]      Your Vitals Were     Blood Pressure Pulse Temperature Respirations Height Weight    126/76 (BP Location: Right arm) 81 97.8  F (36.6  C) (Oral) 16 1.651 m (5' 5\") 78 kg (171 lb 15.3 oz)    Pulse Oximetry BMI (Body Mass Index)                97% 28.62 kg/m2          MyChart Information     " [facility] gives you secure access to your electronic health record. If you see a primary care provider, you can also send messages to your care team and make appointments. If you have questions, please call your primary care clinic.  If you do not have a primary care provider, please call [phone] and they will assist you.        Care EveryWhere ID     This is your Care EveryWhere ID. This could be used by other organizations to access your [facility] medical records  [id number]           Review of your medicines      START taking        Dose / Directions    HYDROcodone-acetaminophen 5-325 MG per tablet   Commonly known as:  NORCO        Dose:  1-2 tablet   Take 1-2 tablets by mouth every 4 hours as needed for moderate to severe pain   Quantity:  50 tablet   Refills:  0       hydrOXYzine 10 MG tablet   Commonly known as:  ATARAX        Dose:  10 mg   Take 1 tablet (10 mg) by mouth every 6 hours as needed for itching   Quantity:  120 tablet   Refills:  0       rivaroxaban ANTICOAGULANT 10 MG Tabs tablet   Commonly known as:  XARELTO        Dose:  10 mg   Take 1 tablet (10 mg) by mouth daily (with dinner)   Quantity:  11 tablet   Refills:  0       senna-docusate 8.6-50 MG per tablet   Commonly known as:  SENOKOT-S;PERICOLACE        Dose:  1-2 tablet   Take 1-2 tablets by mouth 2 times daily   Quantity:  100 tablet   Refills:  0         CONTINUE these medicines which have NOT CHANGED        Dose / Directions    BIOTIN PO        Dose:  500 mg   Take 500 mg by mouth daily   Refills:  0       CALCIUM + D PO        2 TABLET DAILY   Refills:  0       CENTRUM SILVER PO   Used for:  Routine general medical examination at a health care facility        1 TABLET DAILY   Refills:  0       docusate calcium 240 MG capsule   Commonly known as:  SURFAK        Dose:  240 mg   Take 240 mg by mouth 2 times daily as needed for constipation   Refills:  0       fluticasone 50 MCG/ACT spray   Commonly known as:  FLONASE   Used for:   Chronic rhinitis        Dose:  2 spray   Spray 2 sprays into both nostrils daily As needed   Quantity:  16 g   Refills:  3       Garlic 1000 MG Caps        Dose:  1000 mg   Take 1,000 mg by mouth daily   Refills:  0       Grape Seed 50 MG Tabs        4 capsules daily   Refills:  0       guaiFENesin 600 MG 12 hr tablet   Commonly known as:  MUCINEX        Dose:  1200 mg   Take 1,200 mg by mouth 2 times daily as needed for congestion   Refills:  0       METAMUCIL 0.52 G capsule   Generic drug:  psyllium        Dose:  2 capsule   Take 2 capsules by mouth 2 times daily   Refills:  0       TYLENOL PO        Dose:  1000 mg   Take 1,000 mg by mouth 2 times daily as needed for mild pain or fever   Refills:  0       vitamin E 400 UNIT capsule        Dose:  400 Units   Take 400 Units by mouth daily   Refills:  0       ZANTAC 150 MG tablet   Generic drug:  ranitidine        Dose:  150 mg   Take 150 mg by mouth daily   Refills:  0            Where to get your medicines      Some of these will need a paper prescription and others can be bought over the counter. Ask your nurse if you have questions.     You don't need a prescription for these medications     hydrOXYzine 10 MG tablet    rivaroxaban ANTICOAGULANT 10 MG Tabs tablet    senna-docusate 8.6-50 MG per tablet         Information about where to get these medications is not yet available     ! Ask your nurse or doctor about these medications     HYDROcodone-acetaminophen 5-325 MG per tablet                Protect others around you: Learn how to safely use, store and throw away your medicines at www.disposemymeds.org.             Medication List: This is a list of all your medications and when to take them. Check marks below indicate your daily home schedule. Keep this list as a reference.      Medications           Morning Afternoon Evening Bedtime As Needed    BIOTIN PO   Take 500 mg by mouth daily                                CALCIUM + D PO   2 TABLET DAILY                                 CENTRUM SILVER PO   1 TABLET DAILY                                docusate calcium 240 MG capsule   Commonly known as:  SURFAK   Take 240 mg by mouth 2 times daily as needed for constipation                                fluticasone 50 MCG/ACT spray   Commonly known as:  FLONASE   Spray 2 sprays into both nostrils daily As needed                                Garlic 1000 MG Caps   Take 1,000 mg by mouth daily                                Grape Seed 50 MG Tabs   4 capsules daily                                guaiFENesin 600 MG 12 hr tablet   Commonly known as:  MUCINEX   Take 1,200 mg by mouth 2 times daily as needed for congestion                                HYDROcodone-acetaminophen 5-325 MG per tablet   Commonly known as:  NORCO   Take 1-2 tablets by mouth every 4 hours as needed for moderate to severe pain   Last time this was given:  2 tablets on 2/24/2017  4:55 PM                                hydrOXYzine 10 MG tablet   Commonly known as:  ATARAX   Take 1 tablet (10 mg) by mouth every 6 hours as needed for itching   Last time this was given:  10 mg on 2/24/2017 10:26 AM                                METAMUCIL 0.52 G capsule   Take 2 capsules by mouth 2 times daily   Last time this was given:  1.04 g on 2/26/2017  7:28 AM   Generic drug:  psyllium                                rivaroxaban ANTICOAGULANT 10 MG Tabs tablet   Commonly known as:  XARELTO   Take 1 tablet (10 mg) by mouth daily (with dinner)                                senna-docusate 8.6-50 MG per tablet   Commonly known as:  SENOKOT-S;PERICOLACE   Take 1-2 tablets by mouth 2 times daily   Last time this was given:  1 tablet on 2/26/2017  7:28 AM                                TYLENOL PO   Take 1,000 mg by mouth 2 times daily as needed for mild pain or fever   Last time this was given:  1,000 mg on 2/25/2017  2:39 PM                                vitamin E 400 UNIT capsule   Take 400 Units by mouth daily                                 ZANTAC 150 MG tablet   Take 150 mg by mouth daily   Last time this was given:  150 mg on 2/26/2017  7:28 AM   Generic drug:  ranitidine

## 2017-02-23 NOTE — IP AVS SNAPSHOT
` `     [facility] SURGICAL: [phone]                 INTERAGENCY TRANSFER FORM - NOTES (H&P, Discharge Summary, Consults, Procedures, Therapies)   2017                    Hospital Admission Date: 2017  LAVONNE K MAXIMILIANO   : 1940  Sex: Female        Patient PCP Information     Provider PCP Type    Elvira Kowalski MD General         History & Physicals      Interval H&P Note by Praveen Yan APRN CRNA at 2017  9:39 AM     Author:  Praveen Yan APRN CRNA Service:  Anesthesiology Author Type:  Nurse Anesthetist    Filed:  2017  9:40 AM Date of Service:  2017  9:39 AM Note Created:  2017  9:39 AM    Related:  Original note: H&P (View-Only) by Simin Gaines APRN CNP filed at 2017  1:21 PM Status:  Signed :  Praveen Yan APRN CRNA (Nurse Anesthetist)         This H&P has been reviewed and there are no clinically significant changes in the patient s condition.  The Patient is approved for surgery.[CP1.1]         Revision History        User Key Date/Time User Provider Type Action    > CP1.1 2017  9:40 AM Praveen Yan APRN CRNA Nurse Anesthetist Sign            H&P (View-Only) by Simin aGines APRN CNP at 2017  9:29 AM     Author:  Simin Gaines APRN CNP Service:  (none) Author Type:  Nurse Practitioner    Filed:  2017  1:21 PM Date of Service:  2017  9:29 AM Note Created:  2017  9:40 AM    Status:  Signed :  Simin Gaines APRN CNP (Nurse Practitioner)           [place] [id number] [place] [id number]  [phone]  Dept: [phone]    PRE-OP EVALUATION:  Today's date: 2017    Lavonne Tidwell (: 1940) presents for pre-operative evaluation assessment as requested by Dr. Thompson.  She requires evaluation and anesthesia risk assessment prior to undergoing surgery/procedure for treatment of rt knn .  Proposed procedure:  Arthroplasty of Rt knee    Date of Surgery/ Procedure: 2/23/2017  Time of Surgery/ Procedure:   Hospital/Surgical Facility[facility]    Primary Physician: Elvira Kowalski  Type of Anesthesia Anticipated: General    Patient has a Health Care Directive or Living Will:  NO    1. NO - Do you have a history of heart attack, stroke, stent, bypass or surgery on an artery in the head, neck, heart or legs?  2. NO - Do you ever have any pain or discomfort in your chest?  3. NO - Do you have a history of  Heart Failure?  4. NO - Are you troubled by shortness of breath when: walking on the level, up a slight hill or at night?  5. NO - Do you currently have a cold, bronchitis or other respiratory infection?  6. NO - Do you have a cough, shortness of breath or wheezing?  7. NO - Do you sometimes get pains in the calves of your legs when you walk?  8. YES - Do you or anyone in your family have previous history of blood clots? Dad with DVT, no known clotting disorder  9. NO - Do you or does anyone in your family have a serious bleeding problem such as prolonged bleeding following surgeries or cuts?  10. YES - Have you ever had problems with anemia or been told to take iron pills?  11. NO - Have you had any abnormal blood loss such as black, tarry or bloody stools, or abnormal vaginal bleeding?  12. YES - Have you ever had a blood transfusion? With surgery and miscarriage   13. NO - Have you or any of your relatives ever had problems with anesthesia?  14. NO - Do you have sleep apnea, excessive snoring or daytime drowsiness?  15. NO - Do you have any prosthetic heart valves?  16. YES - Do you have prosthetic joints? Left knee  17. NO - Is there any chance that you may be pregnant?      HPI:                                                      Brief HPI related to upcoming procedure: Right knee pain with degenerative joint disease in need of arthroplasty of right knee      See problem list for active medical problems.  Problems  all longstanding and stable, except as noted/documented.  See ROS for pertinent symptoms related to these conditions.                                                                                                  .    MEDICAL HISTORY:                                                      Patient Active Problem List    Diagnosis Date Noted     Status post total left knee replacement 01/06/2016     Priority: Medium     Left knee DJD 01/06/2016     Priority: Medium     Senile nuclear sclerosis 08/18/2015     Priority: Medium     Onychomycosis 01/30/2015     Priority: Medium     Advanced directives, counseling/discussion 02/10/2012     Priority: Medium     Advance Directive Problem List Overview:   Name Relationship Phone    Primary Health Care Agent            Alternative Health Care Agent          Discussed advance care planning with patient; information given to patient to review. 2/10/2012          HYPERLIPIDEMIA LDL GOAL <130 10/31/2010     Priority: Medium     Urinary incontinence 12/31/2008     Priority: Medium     On Ditropan for 2 years--modest improvement, initially; s/p TOT--good improvement in control       Atrophic vaginitis 12/31/2008     Priority: Medium     Menopausal syndrome (hot flashes) 12/02/2008     Priority: Medium     On Premarin--decreasing dose for 4 years--now takes twice a week       Esophageal reflux 11/10/2006     Priority: Medium     Injury of sigmoid colon 09/13/2005     Priority: Medium     Sigmoid polyp  Problem list name updated by automated process. Provider to review       DJD Knee 09/13/2005     Priority: Medium      Past Medical History   Diagnosis Date     Ovarian cysts 1974     s/p BSO     History of blood transfusion 1961     Miscarriage     Arthritis 2012     knes and hips     Past Surgical History   Procedure Laterality Date     Surgical history of -   07/30/1998     Colonoscopy     Hysterectomy, pauline  1974     Surgical history of -   1974     Bilateral salpingoopherectomy      Surgical history of -   3/09     TOT Midurethral sling     Abdomen surgery  1973 & 1974     Ovarian cyst/Hysterectomy     Appendectomy  1952     Biopsy       colonoscopy/polyps     Colonoscopy       every 10 years     Orthopedic surgery  2007 & 2009     [place]  both feet     Phacoemulsification with standard intraocular lens implant Left 8/20/2015     Procedure: PHACOEMULSIFICATION WITH STANDARD INTRAOCULAR LENS IMPLANT;  Surgeon: Fernando Jeffrey MD;  Location: [facility]     Phacoemulsification with standard intraocular lens implant Right 9/17/2015     Procedure: PHACOEMULSIFICATION WITH STANDARD INTRAOCULAR LENS IMPLANT;  Surgeon: Fernando Jeffrey MD;  Location: [facility] OR     Arthroplasty knee Left 1/6/2016     Procedure: ARTHROPLASTY KNEE;  Surgeon: Wilfredo Thompson MD;  Location: [facility] OR     Genitourinary surgery  2008     bladder     Current Outpatient Prescriptions   Medication Sig Dispense Refill     fluticasone (FLONASE) 50 MCG/ACT spray Spray 2 sprays into both nostrils daily As needed 16 g 3     acetaminophen (TYLENOL) 325 MG tablet Take 3 tablets (975 mg) by mouth every 8 hours 100 tablet 0     vitamin E 400 UNIT capsule Take 400 Units by mouth daily       Garlic 1000 MG CAPS Take 1,000 mg by mouth daily       BIOTIN PO Take 500 mg by mouth daily       ranitidine (ZANTAC) 150 MG tablet Take 150 mg by mouth daily        docusate calcium (SURFAK) 240 MG capsule Take 240 mg by mouth 2 times daily as needed for constipation       Grape Seed 50 MG TABS 4 capsules daily       methylcellulose (FIBER THERAPY) 500 MG TABS Take 500 mg by mouth 4 times daily as needed        psyllium (METAMUCIL) 0.52 G capsule Take 4 capsules by mouth daily as needed        CENTRUM SILVER OR 1 TABLET DAILY       CALCIUM + D OR 2 TABLET DAILY       [DISCONTINUED] fluticasone (FLONASE) 50 MCG/ACT nasal spray Spray 2 sprays into both nostrils daily As needed 1 Package 9     OTC products: None, except as noted above    Allergies  "  Allergen Reactions     Codeine GI Disturbance      Latex Allergy: NO    Social History   Substance Use Topics     Smoking status: Former Smoker -- 0.50 packs/day for 5 years     Types: Cigarettes     Start date: 09/20/1968     Quit date: 02/15/1972     Smokeless tobacco: Never Used      Comment: stopped in her 20's     Alcohol Use: 0.0 oz/week      Comment: Occasional     History   Drug Use No       REVIEW OF SYSTEMS:                                                    C: NEGATIVE for fever, chills, change in weight  I: NEGATIVE for worrisome rashes, moles or lesions  E: NEGATIVE for vision changes or irritation  E/M: NEGATIVE for ear, mouth and throat problems  R: NEGATIVE for significant cough or SOB  B: NEGATIVE for masses, tenderness or discharge  CV: NEGATIVE for chest pain, palpitations or peripheral edema  GI: NEGATIVE for nausea, abdominal pain, heartburn, or change in bowel habits  : NEGATIVE for frequency, dysuria, or hematuria  MUSCULOSKELETAL:POSITIVE  for joint pain right knee  N: NEGATIVE for weakness, dizziness or paresthesias  E: NEGATIVE for temperature intolerance, skin/hair changes  H: NEGATIVE for bleeding problems  P: NEGATIVE for changes in mood or affect    EXAM:                                                    /66 mmHg  Pulse 60  Temp(Src) 97.8  F (36.6  C) (Tympanic)  Ht 5' 5\" (1.651 m)  Wt 168 lb (76.204 kg)  BMI 27.96 kg/m2    GENERAL APPEARANCE: healthy, alert and no distress     EYES: EOMI,- PERRL     HENT: ear canals and TM's normal and nose and mouth without ulcers or lesions     NECK: no adenopathy, no asymmetry, masses, or scars and thyroid normal to palpation     RESP: lungs clear to auscultation - no rales, rhonchi or wheezes     CV: regular rates and rhythm, normal S1 S2, no S3 or S4 and no murmur, click or rub -     ABDOMEN:  soft, nontender, no HSM or masses and bowel sounds normal     MS: extremities normal- no gross deformities noted, no evidence of " inflammation in joints, FROM in all extremities.     SKIN: no suspicious lesions or rashes     NEURO: Normal strength and tone, sensory exam grossly normal, mentation intact and speech normal     PSYCH: mentation appears normal. and affect normal/bright    DIAGNOSTICS:                                                    EKG: appears normal, NSR, normal axis, normal intervals, no acute ST/T changes c/w ischemia, no LVH by voltage criteria  Results for orders placed or performed in visit on 02/06/17   CBC with platelets   Result Value Ref Range    WBC 6.2 4.0 - 11.0 10e9/L    RBC Count 4.08 3.8 - 5.2 10e12/L    Hemoglobin 12.9 11.7 - 15.7 g/dL    Hematocrit 40.4 35.0 - 47.0 %    MCV 99 78 - 100 fl    MCH 31.6 26.5 - 33.0 pg    MCHC 31.9 31.5 - 36.5 g/dL    RDW 13.1 10.0 - 15.0 %    Platelet Count 235 150 - 450 10e9/L   Comprehensive metabolic panel (BMP + Alb, Alk Phos, ALT, AST, Total. Bili, TP)   Result Value Ref Range    Sodium 139 133 - 144 mmol/L    Potassium 4.3 3.4 - 5.3 mmol/L    Chloride 103 94 - 109 mmol/L    Carbon Dioxide 31 20 - 32 mmol/L    Anion Gap 5 3 - 14 mmol/L    Glucose 93 70 - 99 mg/dL    Urea Nitrogen 13 7 - 30 mg/dL    Creatinine 0.77 0.52 - 1.04 mg/dL    GFR Estimate 73 >60 mL/min/1.7m2    GFR Estimate If Black 88 >60 mL/min/1.7m2    Calcium 9.2 8.5 - 10.1 mg/dL    Bilirubin Total 0.9 0.2 - 1.3 mg/dL    Albumin 3.8 3.4 - 5.0 g/dL    Protein Total 7.3 6.8 - 8.8 g/dL    Alkaline Phosphatase 92 40 - 150 U/L    ALT 17 0 - 50 U/L    AST 19 0 - 45 U/L     IMPRESSION:                                                    Reason for surgery/procedure: Right knee pain with degenerative joint disease in need of arthroplasty of right knee    The proposed surgical procedure is considered INTERMEDIATE risk.    REVISED CARDIAC RISK INDEX  The patient has the following serious cardiovascular risks for perioperative complications such as (MI, PE, VFib and 3  AV Block):  No serious cardiac risks  INTERPRETATION: 0  risks: Class I (very low risk - 0.4% complication rate)    The patient has the following additional risks for perioperative complications:  No identified additional risks      ICD-10-CM    1. Preop general physical exam Z01.818 EKG 12-lead complete w/read - Clinics     CBC with platelets     Comprehensive metabolic panel (BMP + Alb, Alk Phos, ALT, AST, Total. Bili, TP)   2. Primary localized osteoarthrosis, lower leg, right M17.11    3. Chronic rhinitis J31.0 fluticasone (FLONASE) 50 MCG/ACT spray       RECOMMENDATIONS:                                                      --Patient is to take all scheduled medications on the day of surgery EXCEPT for modifications listed below.  Stop dietary supplements until after surgery.    APPROVAL GIVEN to proceed with proposed procedure, without further diagnostic evaluation       Signed Electronically by: ENA Henning CNP    Copy of this evaluation report is provided to requesting physician.    [place] Preop Guidelines     Revision History        User Key Date/Time User Provider Type Action    > [N/A] 2/23/2017  9:40 AM Simin Gaines APRN CNP Nurse Practitioner Sign                     Discharge Summaries      Discharge Summaries by Zander Marquez PA-C at 2/26/2017 10:56 AM     Author:  Zander Marquez PA-C Service:  (none) Author Type:  Physician Assistant    Filed:  2/26/2017 10:56 AM Date of Service:  2/26/2017 10:56 AM Note Created:  2/26/2017 10:55 AM    Status:  Cosign Needed :  Zander Marquez PA-C (Physician Assistant)    Cosign Required:  Yes               [facility] Discharge Summary                                  [facility]     BHUPENDRA VIERA [id number]   Age: 76 year old  PCP: Elvira Kowalski, [phone] 1940     Date of Admission:  2/23/2017  Date of Discharge::  2/26/2017  Discharge Physician:  Zander Marquez    Code status:  Prior    Admission Information:  Admission Diagnosis:   degenerative joint disease  Primary localized osteoarthrosis, lower leg    Post-Operative Day: 3 Days Post-Op     Reason for admission:  The patient was admitted for the following:Procedure(s) (LRB):  ARTHROPLASTY KNEE (Right)    Principal Problem:    Status post total right knee replacement  Active Problems:    Primary localized osteoarthrosis, lower leg      Allergies:  Codeine    Following the procedure noted above the patient was transferred to the post-op floor and started on:    Therapy:  physical therapy and occupational therapy  Anticoagulation Plan: Lovenox inpatient and then Xarelto at discharge  for 14 days  Pain Management: norco, tylenol and vistaril  Weight bearing status: Weight bearing as tolerated     The patient was followed and co-managed by the hospitalist service during the inpatient treatment course  Complications:  None  Consultations:  None     Pertinent Labs   Lab Results: personally reviewed.     Recent Labs   Lab Test  02/26/17   0702  02/25/17   0630  02/24/17   0610  02/23/17   0855  02/06/17   0953  07/19/16   1011   12/22/15   0944   INR   --    --    --   0.96   --    --    --    --    HGB   --   10.6*  10.8*  12.7  12.9  13.3   < >  12.0   HCT   --    --    --   39.9  40.4  40.9   --   38.3   MCV   --    --    --   98  99  97   --   100   PLT  199   --    --   223  235  217   < >  349   NA   --    --    --    --   139  141   --   140    < > = values in this interval not displayed.          Discharge Information:  Condition at discharge: Stable  Discharge destination:  Discharged to rehabilitation facility     Medications at discharge:  Current Discharge Medication List      START taking these medications    Details   hydrOXYzine (ATARAX) 10 MG tablet Take 1 tablet (10 mg) by mouth every 6 hours as needed for itching  Qty: 120 tablet    Associated Diagnoses: Status post total right knee replacement      HYDROcodone-acetaminophen (NORCO) 5-325 MG per tablet Take 1-2 tablets by mouth  every 4 hours as needed for moderate to severe pain  Qty: 50 tablet, Refills: 0    Associated Diagnoses: Status post total right knee replacement      senna-docusate (SENOKOT-S;PERICOLACE) 8.6-50 MG per tablet Take 1-2 tablets by mouth 2 times daily  Qty: 100 tablet    Associated Diagnoses: Status post total right knee replacement      rivaroxaban ANTICOAGULANT (XARELTO) 10 MG TABS tablet Take 1 tablet (10 mg) by mouth daily (with dinner)  Qty: 11 tablet    Associated Diagnoses: Status post total right knee replacement         CONTINUE these medications which have NOT CHANGED    Details   Acetaminophen (TYLENOL PO) Take 1,000 mg by mouth 2 times daily as needed for mild pain or fever      guaiFENesin (MUCINEX) 600 MG 12 hr tablet Take 1,200 mg by mouth 2 times daily as needed for congestion      fluticasone (FLONASE) 50 MCG/ACT spray Spray 2 sprays into both nostrils daily As needed  Qty: 16 g, Refills: 3    Associated Diagnoses: Chronic rhinitis      vitamin E 400 UNIT capsule Take 400 Units by mouth daily      Garlic 1000 MG CAPS Take 1,000 mg by mouth daily      BIOTIN PO Take 500 mg by mouth daily      ranitidine (ZANTAC) 150 MG tablet Take 150 mg by mouth daily       docusate calcium (SURFAK) 240 MG capsule Take 240 mg by mouth 2 times daily as needed for constipation      Grape Seed 50 MG TABS 4 capsules daily      psyllium (METAMUCIL) 0.52 G capsule Take 2 capsules by mouth 2 times daily       CENTRUM SILVER OR 1 TABLET DAILY    Associated Diagnoses: Routine general medical examination at a health care facility      CALCIUM + D OR 2 TABLET DAILY                        Follow-Up Care:  Patient should be seen in the office in 14 days by the Orthopedic Surgeon/Physician Assistant.  Call [phone] for appointment or questions.    Zander Marquez[SL1.1]     Revision History        User Key Date/Time User Provider Type Action    > SL1.1 2/26/2017 10:56 AM Zander Marquez PA-C Physician Assistant Sign                      Consult Notes      Consults by Ashanti Duncan LICSW at 2/24/2017  2:01 PM     Author:  Ashanti Duncan LICSW Service:  (none) Author Type:      Filed:  2/24/2017  2:01 PM Date of Service:  2/24/2017  2:01 PM Note Created:  2/24/2017  1:55 PM    Status:  Signed :  Ashanti Duncan LICSW ()     Consult Orders:    1. Care Transition RN/SW IP Consult [[id number]] ordered by Wilfredo Thompson MD at 02/24/17 0903                    Reason for Referral: DC planning    Presenting Problem: TKA    Cognitive Behavioral Status: awake, alert and oriented    Support system: family    Current Living Situation:   [place]   Living Situation: Alone   Function Status / Assistive Device: no assistive devices    Employment/ Financial/ Insurance Concerns: retired          No Insurance issues identified      Housing Concerns: No    Health Care Directives:  Copy in Chart      Assessment: This writer met with pt introduced self and role. Pt is a [facility] pt and is managed by care coordinator Bharti Martino [phone].  At this time pt has been accepted at [facility] (Phone: [phone] Fax: [phone]) for as early as Saturday-if she leaves Saturday she will be private pay, if she leaves Sunday she will be covered under Medicare. Bharti did contact pt and discussed home with home care vs TCU. [facility] (phone: [phone] Fax: [phone]) would be able to see pt daily starting Monday. Bharti to follow up with pt on Saturday and will let CTS know the plan.       Plan: ant Duncan MSW, MELANIE, [phone][AK1.1]                       Revision History        User Key Date/Time User Provider Type Action    > AK1.1 2/24/2017  2:01 PM Ashanti Duncan LICSW  Sign                     Progress Notes - Physician (Notes from 02/23/17 through 02/26/17)      Progress Notes by Zander Marquez PA-C at  "2/26/2017 10:54 AM     Author:  Zander Marquez PA-C Service:  (none) Author Type:  Physician Assistant    Filed:  2/26/2017 10:55 AM Date of Service:  2/26/2017 10:54 AM Note Created:  2/26/2017 10:54 AM    Status:  Signed :  Zander Marquez PA-C (Physician Assistant)         [facility] Orthopa[facility]ics Progress Note      Post-operative Day: 3 Days Post-Op    Procedure(s):  Right Total Knee Arthroplasty - Wound Class: I-Clean      Subjective:    Pain: minimal  Chest pain, SOB:  No      Objective:  Blood pressure 132/75, pulse 81, temperature 97.8  F (36.6  C), temperature source Oral, resp. rate 16, height 1.651 m (5' 5\"), weight 78 kg (171 lb 15.3 oz), SpO2 95 %, not currently breastfeeding.    Patient Vitals for the past 24 hrs:   BP Temp Temp src Pulse Heart Rate Resp SpO2   02/26/17 0727 132/75 97.8  F (36.6  C) Oral - 71 16 95 %   02/25/17 2348 134/66 99.5  F (37.5  C) Oral 81 - 16 92 %   02/25/17 1955 111/62 98.5  F (36.9  C) Oral 78 - 18 92 %   02/25/17 1537 135/71 99.4  F (37.4  C) Oral 73 - 16 94 %   02/25/17 1055 127/67 98.7  F (37.1  C) Oral 76 - 16 93 %       Wt Readings from Last 4 Encounters:   02/23/17 78 kg (171 lb 15.3 oz)   02/06/17 76.2 kg (168 lb)   07/19/16 76.4 kg (168 lb 6.4 oz)   01/06/16 75.8 kg (167 lb)         Motor function, sensation, and circulation intact   Yes  Wound status: incisions are clean dry and intact. Yes  Calf tenderness: Bilateral  No    Pertinent Labs   Lab Results: personally reviewed.     Recent Labs   Lab Test  02/26/17   0702  02/25/17   0630  02/24/17   0610  02/23/17   0855  02/06/17   0953  07/19/16   1011   12/22/15   0944   INR   --    --    --   0.96   --    --    --    --    HGB   --   10.6*  10.8*  12.7  12.9  13.3   < >  12.0   HCT   --    --    --   39.9  40.4  40.9   --   38.3   MCV   --    --    --   98  99  97   --   100   PLT  199   --    --   223  235  217   < >  349   NA   --    --    --    --   139  141   --   140    < > = values in this " interval not displayed.       Plan: Anticoagulation protocol: Lovenox inpatient and then Xarelto at discharge  x 14  days            Pain medications:  norco, tylenol and vistaril            Weight bearing status:  WBAT            Disposition:  TCU today             Continue cares and rehabilitation     Report completed by:  Zander Marquez PA-C  Date: 2/26/2017  Time: 10:54 AM[SL1.1]     Revision History        User Key Date/Time User Provider Type Action    > SL1.1 2/26/2017 10:55 AM Zander Marquez PA-C Physician Assistant Sign            Progress Notes by Ashanti Duncan LICSW at 2/26/2017 10:34 AM     Author:  Ashanti Duncan LICSW Service:  (none) Author Type:      Filed:  2/26/2017 10:40 AM Date of Service:  2/26/2017 10:34 AM Note Created:  2/26/2017 10:34 AM    Status:  Signed :  Ashanti Duncan LICSW ()         Name: Lavonne Tidwell    MRN#: [id number]    Reason for Hospitalization: degenerative joint disease  Primary localized osteoarthrosis, lower leg    Discharge Date: 2/26/2017    Patient / Family response to discharge plan: [facility] been [facility] (Phone: [phone] Fax: [phone]). Pt will need transport on dc.     PAS-RR    Per DHS regulation, CTS team completed and submitted PAS-RR to [place] Board on Aging Direct Connect via the Senior LinkAge Line. CTS team advised SNF and they are aware a PAS-RR has been submitted.     CTS team reviewed with pt or health care agent that they may be contacted for a follow up appointment within 10 days of hospital discharge if SNF stay is <30 days. Contact information for [facility] LinkAge Line was also provided.     Pt or health care agent verbalized understanding.     PAS-RR #  [id number]    Follow-Up Appt: Future Appointments  Date Time Provider Department Center   2/26/2017 2:30 PM Sara Cruz OT [facility]   3/6/2017 10:00 AM Francheska Reilly [facility]       Other  "Providers (Care Coordinator, County Services, PCA services etc): No    Discharge Disposition: transitional care unit    Ashanti Dakota MSW, MELANIE, [facility] [phone][AK1.1]           Revision History        User Key Date/Time User Provider Type Action    > AK1.1 2/26/2017 10:40 AM Ashanti Duncan LICSW  Sign            Progress Notes by Abhi Luis PA-C at 2/25/2017  8:28 AM     Author:  Abhi Luis PA-C Service:  Orthopedics Author Type:  Physician Assistant - RADHA    Filed:  2/25/2017  4:59 PM Date of Service:  2/25/2017  8:28 AM Note Created:  2/25/2017  8:28 AM    Status:  Addendum :  Abhi Luis PA-C (Physician Assistant - RADHA)         [facility] Progress Note      Post-operative Day: 2 Days Post-Op    Procedure(s):  Right Total Knee Arthroplasty - Wound Class: I-Clean      Subjective:    Pain: minimal  Chest pain, SOB:  No      Objective:  Blood pressure 132/52, pulse 81, temperature 98.4  F (36.9  C), temperature source Oral, resp. rate 16, height 1.651 m (5' 5\"), weight 78 kg (171 lb 15.3 oz), SpO2 94 %, not currently breastfeeding.    Patient Vitals for the past 24 hrs:   BP Temp Temp src Pulse Heart Rate Resp SpO2   02/25/17 0700 132/52 98.4  F (36.9  C) Oral - 72 16 94 %   02/25/17 0600 - 98.4  F (36.9  C) Oral - - - -   02/24/17 2322 130/69 99.6  F (37.6  C) Oral 81 - 16 95 %   02/24/17 1914 135/65 98.3  F (36.8  C) Oral 83 - 18 92 %   02/24/17 1532 121/57 99.5  F (37.5  C) Oral 79 - 18 94 %   02/24/17 1117 115/59 98.2  F (36.8  C) Oral 76 - 18 94 %       Wt Readings from Last 4 Encounters:   02/23/17 78 kg (171 lb 15.3 oz)   02/06/17 76.2 kg (168 lb)   07/19/16 76.4 kg (168 lb 6.4 oz)   01/06/16 75.8 kg (167 lb)         Motor function, sensation, and circulation intact   Yes  Wound status: incisions are clean dry and intact. Yes  Calf tenderness: Right  No    Pertinent Labs   Lab Results: personally reviewed.     Recent Labs   Lab Test  02/25/17   0630  " 02/24/17   0610  02/23/17   0855  02/06/17   0953  07/19/16   1011   12/22/15   0944   INR   --    --   0.96   --    --    --    --    HGB  10.6*  10.8*  12.7  12.9  13.3   < >  12.0   HCT   --    --   39.9  40.4  40.9   --   38.3   MCV   --    --   98  99  97   --   100   PLT   --    --   223  235  217   < >  349   NA   --    --    --   139  141   --   140    < > = values in this interval not displayed.       Plan: Anticoagulation protocol:[NW1.1] Lovenox inpatient and then Xarelto at discharge x 14 days[DL1.1]            Pain medications:[NW1.1]  Tylenol or norco for breakthrough pain[DL1.1]            Weight bearing status:  WBAT            Disposition:  TCU[NW1.1] ([facility]) tomorrow.  Patient is BPCI[DL1.1]            Continue cares and rehabilitation     Report completed by:  Joseph Chen MD  Date: 2/25/2017  Time: 8:28 AM[NW1.1]     Revision History        User Key Date/Time User Provider Type Action    > DL1.1 2/25/2017  4:59 PM Abhi Luis PA-C Physician Assistant - C Addend     NW1.1 2/25/2017  8:30 AM Joseph Chen MD Physician Sign            Progress Notes by Jerry Ya MD at 2/25/2017 10:48 AM     Author:  Jerry Ya MD Service:  Hospitalist Author Type:  Physician    Filed:  2/25/2017 10:55 AM Date of Service:  2/25/2017 10:48 AM Note Created:  2/25/2017 10:48 AM    Status:  Signed :  Jerry Ya MD (Physician)         [facility]      Subjective:[JE1.1]  Less pain  Feels good  Wants to go to [facility][JE1.2]    Review of Systems:[JE1.1]  C: NEGATIVE for fever, chills, change in weight  E/M: NEGATIVE for ear, mouth and throat problems  R: NEGATIVE for significant cough or SOB  CV: NEGATIVE for chest pain, palpitations or peripheral edema    P[JE1.2]hysical Exam:  Vitals Were Reviewed    Patient Vitals for the past 16 hrs:   BP Temp Temp src Pulse Heart Rate Resp SpO2   02/25/17 0700 132/52 98.4  F (36.9  C) Oral - 72 16 94 %    02/25/17 0600 - 98.4  F (36.9  C) Oral - - - -   02/24/17 2322 130/69 99.6  F (37.6  C) Oral 81 - 16 95 %   02/24/17 1914 135/65 98.3  F (36.8  C) Oral 83 - 18 92 %         Intake/Output Summary (Last 24 hours) at 02/25/17 1048  Last data filed at 02/25/17 0800   Gross per 24 hour   Intake              900 ml   Output              105 ml   Net              795 ml[JE1.1]       GENERAL APPEARANCE: healthy, alert and no distress  EYES: conjunctiva clear, eyes grossly normal  RESP: lungs clear to auscultation - no rales, rhonchi or wheezes  CV: regular rate and rhythm, normal S1 S2, no S3 or S4 and no murmur, click or rub   ABDOMEN: soft, nontender, no HSM or masses and bowel sounds normal  MS: no clubbing, cyanosis; no edema  SKIN: clear without significant rashes or lesions    Lab:[JE1.2]  Recent Labs   Lab Test  02/24/17   0610  02/06/17   0953  07/19/16   1011   NA   --   139  141   POTASSIUM   --   4.3  4.4   CHLORIDE   --   103  105   CO2   --   31  28   ANIONGAP   --   5  8   GLC   --   93  89   BUN   --   13  14   CR  0.73  0.77  0.73   MANI   --   9.2  9.4     CBC RESULTS:   Recent Labs   Lab Test  02/25/17   0630  02/24/17   0610  02/23/17   0855  02/06/17   0953   WBC   --    --   5.9  6.2   RBC   --    --   4.09  4.08   HGB  10.6*  10.8*  12.7  12.9   HCT   --    --   39.9  40.4   PLT   --    --   223  235       Results for orders placed or performed during the hospital encounter of 02/23/17 (from the past 24 hour(s))   Hemoglobin   Result Value Ref Range    Hemoglobin 10.6 (L) 11.7 - 15.7 g/dL       Assessment and Plan:[JE1.1]        S/p Procedure(s):  ARTHROPLASTY KNEE  2nd Day Post-Op        GERD  Stable.   -continue PTA Zantac     Allergic Rhinitis  Stable. Retains some sinus congestions  -continue PTA Flonase and Mucinex     Urinary Incontinence  s/p bladder sling.   -continue to monitor clinically     Symptomatic Menopause  Has hotflashes, controlled on Premarin. No longer taking.     DVT Prophylaxis:  as per orthopedic surgery service   Code Status: Full Code     Lines: peripheral IV       Disposition  To [facility] TCU[JE1.3] upon dc[JE1.2]     Revision History        User Key Date/Time User Provider Type Action    > JE1.2 2/25/2017 10:55 AM Jerry Ya MD Physician Sign     JE1.3 2/25/2017 10:49 AM Jerry Ya MD Physician      JE1.1 2/25/2017 10:48 AM Jerry Ya MD Physician             Progress Notes by Capo Rubalcava at 2/24/2017 12:50 PM     Author:  Capo Rubalcava Service:  Spiritual Health Author Type:      Filed:  2/24/2017 12:55 PM Date of Service:  2/24/2017 12:50 PM Note Created:  2/24/2017 12:50 PM    Status:  Signed :  Capo Rbualcava ()         SPIRITUAL HEALTH SERVICES  SPIRITUAL ASSESSMENT [place]  [facility] [place]    PRIMARY FOCUS:     Emotional/spiritual/Hoahaoism distress    Support for coping    ILLNESS CIRCUMSTANCES:   Reviewed documentation. Reflective conversation shared with Lavonne which integrated elements of illness and family narratives.     Context of Serious Illness/Symptom(s) - Lavonne stated she had her first TKA a year ago; now this is her second    Resources for Support -   In previous note from first TKA Lavonne stated her family is supportive but busy.    DISTRESS:     Emotional/Existential/Relational Distress - None identified    Spiritual/Mandaeism Distress - None    Social/Cultural/Economic Distress - Lavonne is hoping to go to TCU at [facility] again if possible    SPIRITUAL/Baptism COPING:     Sabianism/Tonya - Janie Ryder in [place]    Spiritual Practice(s) - Prayer was requested    Emotional/Existential/Relational Connections - Lavonne's cousin was visiting who stated she had been the flowergirl in Lavonne's wedding.  She brought her flowers today.     GOALS OF CARE:    Goals of Care - Lavonne stated she is hoping for TCU on discharge before going home    Meaning/Sense-Making - Family, friends and tonya were all  identified as important in this shortened visit    PLAN: No follow visit planned at this time    Capo Rubalcava M.A, [facility]  Staff   Pager [phone][MC1.1]       Revision History        User Key Date/Time User Provider Type Action    > MC1.1 2/24/2017 12:55 PM Capo Rubalcava Sign            Progress Notes by Reanna Bedoya OT at 2/24/2017 12:11 PM     Author:  Reanna Bedoya OT Service:  (none) Author Type:  Occupational Therapist    Filed:  2/24/2017 12:11 PM Date of Service:  2/24/2017 12:11 PM Note Created:  2/24/2017 12:11 PM    Status:  Signed :  Reanna Bedoya OT (Occupational Therapist)          02/24/17 1000   Quick Adds   Type of Visit Initial Occupational Therapy Evaluation   Living Environment   Lives With alone   Home Accessibility stairs to enter home   Number of Stairs to Enter Home 1   Stair Railings at Home outside, present on right side   Functional Level Prior   Ambulation 0-->independent   Transferring 0-->independent   Toileting 0-->independent   Bathing 0-->independent   Dressing 0-->independent   Eating 0-->independent   Communication 0-->understands/communicates without difficulty   Swallowing 0-->swallows foods/liquids without difficulty   Cognition 0 - no cognition issues reported   Fall history within last six months yes   Number of times patient has fallen within last six months 1  (tripped in dark room)   General Information   Onset of Illness/Injury or Date of Surgery - Date 02/23/17   Referring Physician Wilfredo Thompson MD   Patient/Family Goals Statement Pt wishes to go to TCU   Additional Occupational Profile Info/Pertinent History of Current Problem Lavonne Tidwell is a 76 year old female who presented on 2/23/2017 for scheduled Procedure(s):   Precautions/Limitations other (see comments)  (knee precautions)   Cognitive Status Examination   Orientation orientation to person, place and time   Level of Consciousness alert   Able to Follow Commands  WNL/WFL   Personal Safety (Cognitive) WNL/WFL   Attention No deficits were identified   Pain Assessment   Patient Currently in Pain No   Range of Motion (ROM)   ROM Quick Adds No deficits were identified   Transfer Skill: Sit to Stand   Level of Gregory: Sit/Stand stand-by assist   Physical Assist/Nonphysical Assist: Sit/Stand supervision   Transfer Skill: Sit to Stand full weight-bearing   Assistive Device for Transfer: Sit/Stand standard walker   Transfer Skill: Toilet Transfer   Level of Gregory: Toilet stand-by assist   Physical Assist/Nonphysical Assist: Toilet supervision   Assistive Device standard walker   Bathing   Level of Gregory stand-by assist   Upper Body Dressing   Level of Gregory: Dress Upper Body independent   Lower Body Dressing   Level of Gregory: Dress Lower Body stand-by assist   Toileting   Level of Gregory: Toilet independent   Assistive Device (Pt reports she has a raised toilet seat at home)   Grooming   Level of Gregory: Grooming stand-by assist   Eating/Self Feeding   Level of Gregory: Eating independent   Instrumental Activities of Daily Living (IADL)   Previous Responsibilities meal prep;housekeeping;laundry;shopping;medication management;finances;driving   Activities of Daily Living Analysis   Impairments Contributing to Impaired Activities of Daily Living pain;post surgical precautions   General Therapy Interventions   Planned Therapy Interventions ADL retraining   Clinical Impression   Criteria for Skilled Therapeutic Interventions Met yes, treatment indicated   OT Diagnosis impaired ADL- dressing   Influenced by the following impairments R TKA- percautions and pain tolerance and    Assessment of Occupational Performance 1-3 Performance Deficits   Identified Performance Deficits LB dressing, activity tolerance   Clinical Decision Making (Complexity) Low complexity   Therapy Frequency daily   Predicted Duration of Therapy Intervention (days/wks)  "1-2 days   Anticipated Equipment Needs at Discharge shower chair   Anticipated Discharge Disposition Home with Assist   Risks and Benefits of Treatment have been explained. Yes   Patient, Family & other staff in agreement with plan of care Yes   [facility] AM-PAC  \"6 Clicks\" Daily Activity Inpatient Short Form   1. Putting on and taking off regular lower body clothing? 3 - A Little   2. Bathing (including washing, rinsing, drying)? 3 - A Little   3. Toileting, which includes using toilet, bedpan or urinal? 3 - A Little   4. Putting on and taking off regular upper body clothing? 4 - None   5. Taking care of personal grooming such as brushing teeth? 4 - None   6. Eating meals? 4 - None   Daily Activity Raw Score (Score out of 24.Lower scores equate to lower levels of function) 21   Total Evaluation Time   Total Evaluation Time (Minutes) 20[LC1.1]          Revision History        User Key Date/Time User Provider Type Action    > LC1.1 2/24/2017 12:11 PM Reanna Bedoya OT Occupational Therapist Sign            Progress Notes by Janay Galaviz PT at 2/24/2017 12:06 PM     Author:  Janay Galaviz PT Service:  (none) Author Type:  Physical Therapist    Filed:  2/24/2017 12:10 PM Date of Service:  2/24/2017 12:06 PM Note Created:  2/24/2017 12:06 PM    Status:  Signed :  Janay Galaviz PT (Physical Therapist)          02/24/17 0800   Quick Adds   Type of Visit Initial PT Evaluation   Living Environment   Lives With alone   Living Arrangements other (see comments)  ([facility])   Home Accessibility stairs to enter home   Number of Stairs to Enter Home 1   Stair Railings at Home outside, present on right side   Self-Care   Regular Exercise yes   Functional Level Prior   Ambulation 0-->independent   Transferring 0-->independent   Toileting 0-->independent   Bathing 0-->independent   Dressing 0-->independent   Eating 0-->independent   Communication 0-->understands/communicates without difficulty   Swallowing " 0-->swallows foods/liquids without difficulty   Cognition 0 - no cognition issues reported   Fall history within last six months yes   Number of times patient has fallen within last six months 1   Prior Functional Level Comment PLOf-  Pt indep. with ambulation w/ no device.  R knee pain.Tripped over chair in dark room    General Information   Onset of Illness/Injury or Date of Surgery - Date 02/23/17   Referring Physician Comfort   Patient/Family Goals Statement S-   Pt  w/ eventula goal of Dc to home; planning on a TCU stay first   Pertinent History of Current Problem (include personal factors and/or comorbidities that impact the POC) degenerative joint disease.  s/p Total knee arthoplasty (Right)   Weight-Bearing Status - RLE weight-bearing as tolerated   General Observations Pt alert, reporting pain t 3-4/10   Cognitive Status Examination   Orientation orientation to person, place and time   Level of Consciousness alert   Follows Commands and Answers Questions able to follow multistep instructions   Personal Safety and Judgment intact   Pain Assessment   Patient Currently in Pain Yes, see Vital Sign flowsheet   Integumentary/Edema   Integumentary/Edema Comments NT- compression wrap in place   Right Knee Extension/Flexion ROM   Right Knee Extension/Flexion AROM - degrees (15-   degrees)   Strength   Strength Comments Pt able to perform SLR on Right indep   MMT: Knee, Rehab Eval   Knee Flexion - Right Side (3/5) fair, right   Knee Extension - Right Side (3/5) fair, right   Bed Mobility   Bed Mobility Comments SBA supine> sitting   Transfer Skills   Transfer Comments SBA sit ,<> stand w/ walker    Gait   Gait Comments P[CS1.1]t[CS1.2] instructed on WBAT, gait sequence.   Amb.  60  feet x1 with RW, SBA. steady gait    Gait Skills   Level of Cole: Gait stand-by assist   Physical Assist/Nonphysical Assist: Gait verbal cues;1 person assist   Weight-Bearing Restrictions: Gait weight-bearing as tolerated  "  Assistive Device for Transfer: Gait rolling walker   Gait Distance (60 feet)   Gait Analysis   Gait Pattern Used swing-to gait   Gait Deviations Noted decreased step length   Impairments Contributing to Gait Deviations pain;decreased strength   Balance   Balance Comments Good dynamic standing balance    Sensory Examination   Sensory Perception no deficits were identified   Modality Interventions   Planned Modality Interventions Cryotherapy   General Therapy Interventions   Planned Therapy Interventions gait training;ROM;strengthening;progressive activity/exercise;home program guidelines   Clinical Impression   Criteria for Skilled Therapeutic Intervention yes, treatment indicated   PT Diagnosis Right   TKA   Influenced by the following impairments Pain, decreased ROM/ strength   Functional limitations due to impairments Altered mobility   Clinical Presentation Stable/Uncomplicated   Clinical Presentation Rationale Pt a good rehab. canidate; appears motived to return to PLOF   Clinical Decision Making (Complexity) Low complexity   Therapy Frequency` 2 times/day   Predicted Duration of Therapy Intervention (days/wks) 2 days   Anticipated Equipment Needs at Discharge (Pt has a walker, SE[CS1.1]C[CS1.2] for home use)   Anticipated Discharge Disposition Transitional Care Facility;Home with Home Therapy   Risk & Benefits of therapy have been explained Yes   Patient, Family & other staff in agreement with plan of care Yes   [facility] AM-PAC  \"6 Clicks\" V.2 Basic Mobility Inpatient Short Form   1. Turning from your back to your side while in a flat bed without using bedrails? 4 - None   2. Moving from lying on your back to sitting on the side of a flat bed without using bedrails? 3 - A Little   3. Moving to and from a bed to a chair (including a wheelchair)? 3 - A Little   4. Standing up from a chair using your arms (e.g., wheelchair, or bedside chair)? 3 - A Little   5. To walk in hospital room? 3 - A Little   6. " "Climbing 3-5 steps with a railing? 3 - A Little   Basic Mobility Raw Score (Score out of 24.Lower scores equate to lower levels of function) 19[CS1.1]        Revision History        User Key Date/Time User Provider Type Action    > CS1.1 2/24/2017 12:10 PM Janay Galaviz, PT Physical Therapist Sign     CS1.2 2/24/2017 12:06 PM Janay Galaviz, PT Physical Therapist             Progress Notes by Dulce Maria Toth PA-C at 2/24/2017  8:02 AM     Author:  Dulce Maria Toth PA-C Service:  Orthopedics Author Type:  Physician Assistant    Filed:  2/24/2017  8:52 AM Date of Service:  2/24/2017  8:02 AM Note Created:  2/24/2017  8:02 AM    Status:  Signed :  Dulce Maria Toth PA-C (Physician Assistant)         [facility] Orthopaedics Progress Note      Post-operative Day: 1 Day Post-Op    Procedure(s):  Right Total Knee Arthroplasty - Wound Class: I-Clean      Subjective:    Pain:[AG1.1] minimal[AG1.2]  Chest pain, SOB:[AG1.1]  No[AG1.2]      Objective:  Blood pressure 115/62, pulse 69, temperature 98.1  F (36.7  C), temperature source Oral, resp. rate 16, height 1.651 m (5' 5\"), weight 78 kg (171 lb 15.3 oz), SpO2 95 %, not currently breastfeeding.    Patient Vitals for the past 24 hrs:   BP Temp Temp src Pulse Heart Rate Resp SpO2 Height Weight   02/24/17 0709 - - - - - - 95 % - -   02/24/17 0705 - - - - - - 90 % - -   02/24/17 0623 - - - - - - 97 % - -   02/24/17 0352 115/62 98.1  F (36.7  C) Oral 69 - 16 97 % - -   02/24/17 0157 - - - - - - 95 % - -   02/23/17 2335 120/51 98.6  F (37  C) Oral 75 - 18 97 % - -   02/23/17 2045 - - - - - - 94 % - -   02/23/17 1952 - - - - - - 96 % - -   02/23/17 1922 104/49 98.3  F (36.8  C) Oral 67 - 18 96 % - -   02/23/17 1757 122/69 - - 80 - - 95 % - -   02/23/17 1737 138/70 - - 80 - - - - -   02/23/17 1707 141/66 - - 74 - - - - -   02/23/17 1637 140/77 - - 85 - - 98 % - -   02/23/17 1607 137/70 - - 85 - 18 97 % - -   02/23/17 1540 - - - - - - 94 % - -   02/23/17 " "1537 142/80 - - 81 - 16 93 % - -   02/23/17 1507 145/77 97.6  F (36.4  C) Oral 88 - 18 93 % 1.651 m (5' 5\") 78 kg (171 lb 15.3 oz)   02/23/17 1430 127/64 - - 89 - 18 95 % - -   02/23/17 1415 115/62 - - 89 - 18 98 % - -   02/23/17 1406 124/61 97.6  F (36.4  C) Oral - 90 20 100 % - -   02/23/17 0849 148/75 97.8  F (36.6  C) Oral - 75 18 98 % 1.651 m (5' 5\") 76.2 kg (168 lb)       Wt Readings from Last 4 Encounters:   02/23/17 78 kg (171 lb 15.3 oz)   02/06/17 76.2 kg (168 lb)   07/19/16 76.4 kg (168 lb 6.4 oz)   01/06/16 75.8 kg (167 lb)         Motor function, sensation, and circulation intact[AG1.1]   Yes[AG1.2]  Wound status: incisions are clean dry and intact.[AG1.1] Yes[AG1.2]  Calf tenderness:[AG1.1] Bilateral  No[AG1.2]    Pertinent Labs   Lab Results: personally reviewed.     Recent Labs   Lab Test  02/24/17   0610  02/23/17   0855  02/06/17   0953  07/19/16   1011   12/22/15   0944   INR   --   0.96   --    --    --    --    HGB  10.8*  12.7  12.9  13.3   < >  12.0   HCT   --   39.9  40.4  40.9   --   38.3   MCV   --   98  99  97   --   100   PLT   --   223  235  217   < >  349   NA   --    --   139  141   --   140    < > = values in this interval not displayed.       Plan: Anticoagulation protocol:[AG1.1] Lovenox inpatient and then Xarelto at discharge[AG1.2]  x[AG1.1] 14[AG1.2]  days            Pain medications:[AG1.1]  oxycodone and tylenol[AG1.2]            Weight bearing status:[AG1.1]  WBAT[AG1.2]            Disposition:[AG1.1]  TCU ([facility] in 1-2 days[AG1.2]             Continue cares and rehabilitation     Report completed by:  Dulce Maria Toth PA-C, KAJAL  Date: 2/24/2017  Time: 8:02 AM[AG1.1]       Revision History        User Key Date/Time User Provider Type Action    > AG1.2 2/24/2017  8:52 AM Dulce Maria Toth PA-C Physician Assistant Sign     AG1.1 2/24/2017  8:02 AM Dulce Maria Toth PA-C Physician Assistant             Progress Notes by Ameena Hartmann PA-C " at 2/24/2017  8:30 AM     Author:  Ameena Hartmann PA-C Service:  Internal Medicine Author Type:  Physician Assistant - C    Filed:  2/24/2017  8:51 AM Date of Service:  2/24/2017  8:30 AM Note Created:  2/24/2017  8:30 AM    Status:  Signed :  Ameena Hartmann PA-C (Physician Assistant - C)         [facility]    Hospital Medicine Progress Note  Date of Service: 02/24/2017    Assessment & Plan   Lavonne Tidwell is a 76 year old female who presented on 2/23/2017 for scheduled Procedure(s):  ARTHROPLASTY KNEE by Wilfredo Thompson MD and is being followed by the hospital medicine service for co-management of acute and/or chronic perioperative medical problems.      S/p Procedure(s):  ARTHROPLASTY KNEE   1 Day Post-Op    Pain[CL1.1] tolerable[CL1.2]. Hg 10.8, 12.9 prior to surgery   - pain control, wound cares, physical therapy, occupational therapy and DVT prophylaxis per orthopedic surgery service  - Hg in AM    GERD   Stable.   -continue PTA Zantac    Allergic Rhinitis   Stable.[CL1.1] Retains some sinus congestions[CL1.2]  -continue PTA Flonase[CL1.1] and Mucinex[CL1.2]    Urinary Incontinence[CL1.1]   s/p bladder sling.   -continue to monitor clinically[CL1.2]    Symptomatic Menopause   Has hotflashes, controlled on Premari[CL1.1]n. No longer taking.[CL1.2]    DVT Prophylaxis: as per orthopedic surgery service - Enoxaparin (Lovenox) SQ as IP with Xarelto on discharge  Code Status: Full Code    Lines: peripheral IV  Preciado catheter: none, d/c'd last night     Discussion: Medically,[CL1.1] monitoring Hg in AM and needs to void[CL1.2]. VSS[CL1.1].[CL1.2]    Disposition: Anticipate discharge[CL1.1] 1-2 days pending Hg and urination per medicine and pending pain control and PT per ortho.[CL1.2]    Attestation:  I have reviewed today's vital signs, notes, medications, labs and imaging.  Total time: 30 minutes    I have discussed patient with Dr. Ya. Assessment and  plan as above.    Ameena Hartmann PA-C      Interval History   Patient was seen[CL1.1] in her room this AM. Pain is tolerable, only on Tylenol currently. Discussed taking strong med prior to therapy. Will work with therapy today. Eating and sleeping well. No BM and without flatus. Ilana was d/c'd without void yet.    Previously went to TCU following left knee replacement.[CL1.2]    Denies fever, chills, CP, palpitations, SOB, cough, wheezes, abdominal pain, N/V/D, numbness or tingling in[CL1.1] lower extremities. Denies calf pain.[CL1.2]    Physical Exam   Temp:  [97.6  F (36.4  C)-98.6  F (37  C)] 98.2  F (36.8  C)  Pulse:  [67-89] 77  Heart Rate:  [75-90] 90  Resp:  [16-20] 16  BP: (104-148)/(49-80) 108/52  SpO2:  [90 %-100 %] 93 %    Weights:   Vitals:    02/23/17 0849 02/23/17 1507   Weight: 76.2 kg (168 lb) 78 kg (171 lb 15.3 oz)    Body mass index is 28.62 kg/(m^2).    General: Appears[CL1.1] comfortable sitting up in bed eating breakfast[CL1.2]. Alert and orientated. Pleasant and cooperative. NAD. Non-toxic. Appears stated age.   CV: RRR. Radial pulses are 2+ bilaterally. Distal pulses[CL1.1] intact bilaterally[CL1.2].[CL1.1] No l[CL1.2]ower extremity edema  Respiratory: No accessory muscle usage. CTA bilaterally without wheezes, crackles or rhonchi.   GI: Soft, non-tender, non-distended. Bowel sounds are normoactive  Skin: Warm, dry, intact.[CL1.1] Lower extremities are[CL1.2] warm to the touch without skin mottling. Did not assess incision, dressing appear clean and dry.  Musculoskeletal: Muscle tone is appropriate.[CL1.1] SCDs in place with right lower extremity wrapped.[CL1.2]  Neuro: Sensation to light touch of[CL1.1] lower extremities[CL1.2] is grossly intact.     Data     Recent Labs  Lab 02/24/17  0610 02/23/17  0855   WBC  --  5.9   HGB 10.8* 12.7   MCV  --  98   PLT  --  223   INR  --  0.96   CR 0.73  --        Recent Labs  Lab 02/24/17  0633   BGM 78      Unresulted Labs Ordered in the Past  "30 Days of this Admission     No orders found for last 61 day(s).         Imaging  Recent Results (from the past 24 hour(s))   XR Knee Port Right 1/2 Views    Narrative    This exam was marked as non-reportable because it will not be read by a   radiologist or a [place] non-radiologist provider.                I reviewed all new labs and imaging results over the last 24 hours. I personally reviewed no images or EKG's today.    Medications     NaCl 100 mL/hr at 02/24/17 0224       psyllium  2 capsule Oral BID     ranitidine  150 mg Oral Daily     sodium chloride (PF)  3 mL Intracatheter Q8H     enoxaparin  40 mg Subcutaneous Q24H     gabapentin  100 mg Oral TID     senna-docusate  1-2 tablet Oral BID     I have discussed patient with Dr. Ya. Assessment and plan as above.    Ameena Hartmann PA-C[CL1.1]            Revision History        User Key Date/Time User Provider Type Action    > CL1.2 2/24/2017  8:51 AM Ameena Hartmann PA-C Physician Assistant - [facility] Sign     CL1.1 2/24/2017  8:30 AM Ameena Hartmann PA-C Physician Assistant - C             Progress Notes by Merissa Weiner RN at 2/24/2017  4:30 AM     Author:  Merissa Weiner RN Service:  (none) Author Type:  Registered Nurse    Filed:  2/24/2017  5:15 AM Date of Service:  2/24/2017  4:30 AM Note Created:  2/24/2017  5:13 AM    Status:  Signed :  Merissa Weiner RN (Registered Nurse)         IV saline locked, tolerating PO intake well. Urine clear yellow, hernandez catheter dc'd. Patient able to stand at bedside with walker, briefs placed as patient states she has incontinence. CPM off. Ice over knee, dressing CDI. Patient does not have pain, does not want to take strong pain medications as they make her unsteady and \"foggy\" but does agree to take Tylenol this morning.[BO1.1]      Revision History        User Key Date/Time User Provider Type Action    > BO1.1 2/24/2017  5:15 AM Merissa Weiner RN Registered Nurse Sign            Progress " Notes by Merissa Weiner RN at 2/23/2017 10:30 PM     Author:  Merissa Weiner RN Service:  (none) Author Type:  Registered Nurse    Filed:  2/24/2017  1:53 AM Date of Service:  2/23/2017 10:30 PM Note Created:  2/24/2017  1:52 AM    Status:  Signed :  Merissa Weiner RN (Registered Nurse)         Patient denies tingling/numbness in RLE, assisted to dangle and was able to stand at bedside with walker and assist x1. Returned to bed, ice pack to knee, CPM initiated.[BO1.1]     Revision History        User Key Date/Time User Provider Type Action    > BO1.1 2/24/2017  1:53 AM Merissa Weiner RN Registered Nurse Sign                  Procedure Notes     No notes of this type exist for this encounter.         Progress Notes - Therapies (Notes from 02/23/17 through 02/26/17)      Progress Notes by Reanna Bedoya OT at 2/24/2017 12:11 PM     Author:  Reanna Bedoya OT Service:  (none) Author Type:  Occupational Therapist    Filed:  2/24/2017 12:11 PM Date of Service:  2/24/2017 12:11 PM Note Created:  2/24/2017 12:11 PM    Status:  Signed :  Reanna Bedoya OT (Occupational Therapist)          02/24/17 1000   Quick Adds   Type of Visit Initial Occupational Therapy Evaluation   Living Environment   Lives With alone   Home Accessibility stairs to enter home   Number of Stairs to Enter Home 1   Stair Railings at Home outside, present on right side   Functional Level Prior   Ambulation 0-->independent   Transferring 0-->independent   Toileting 0-->independent   Bathing 0-->independent   Dressing 0-->independent   Eating 0-->independent   Communication 0-->understands/communicates without difficulty   Swallowing 0-->swallows foods/liquids without difficulty   Cognition 0 - no cognition issues reported   Fall history within last six months yes   Number of times patient has fallen within last six months 1  (tripped in dark room)   General Information   Onset of Illness/Injury or Date of Surgery - Date 02/23/17    Referring Physician Wilfredo hTompson MD   Patient/Family Goals Statement Pt wishes to go to TCU   Additional Occupational Profile Info/Pertinent History of Current Problem Lavonne Tidwell is a 76 year old female who presented on 2/23/2017 for scheduled Procedure(s):   Precautions/Limitations other (see comments)  (knee precautions)   Cognitive Status Examination   Orientation orientation to person, place and time   Level of Consciousness alert   Able to Follow Commands WNL/WFL   Personal Safety (Cognitive) WNL/WFL   Attention No deficits were identified   Pain Assessment   Patient Currently in Pain No   Range of Motion (ROM)   ROM Quick Adds No deficits were identified   Transfer Skill: Sit to Stand   Level of [place]: Sit/Stand stand-by assist   Physical Assist/Nonphysical Assist: Sit/Stand supervision   Transfer Skill: Sit to Stand full weight-bearing   Assistive Device for Transfer: Sit/Stand standard walker   Transfer Skill: Toilet Transfer   Level of [place]: Toilet stand-by assist   Physical Assist/Nonphysical Assist: Toilet supervision   Assistive Device standard walker   Bathing   Level of [place] stand-by assist   Upper Body Dressing   Level of [place]: Dress Upper Body independent   Lower Body Dressing   Level of [place]: Dress Lower Body stand-by assist   Toileting   Level of [place]: Toilet independent   Assistive Device (Pt reports she has a raised toilet seat at home)   Grooming   Level of [place]: Grooming stand-by assist   Eating/Self Feeding   Level of [place]: Eating independent   Instrumental Activities of Daily Living (IADL)   Previous Responsibilities meal prep;housekeeping;laundry;shopping;medication management;finances;driving   Activities of Daily Living Analysis   Impairments Contributing to Impaired Activities of Daily Living pain;post surgical precautions   General Therapy Interventions   Planned Therapy Interventions ADL retraining   Clinical  "Impression   Criteria for Skilled Therapeutic Interventions Met yes, treatment indicated   OT Diagnosis impaired ADL- dressing   Influenced by the following impairments R TKA- percautions and pain tolerance and    Assessment of Occupational Performance 1-3 Performance Deficits   Identified Performance Deficits LB dressing, activity tolerance   Clinical Decision Making (Complexity) Low complexity   Therapy Frequency daily   Predicted Duration of Therapy Intervention (days/wks) 1-2 days   Anticipated Equipment Needs at Discharge shower chair   Anticipated Discharge Disposition Home with Assist   Risks and Benefits of Treatment have been explained. Yes   Patient, Family & other staff in agreement with plan of care Yes   [facility] AM-PAC  \"6 Clicks\" Daily Activity Inpatient Short Form   1. Putting on and taking off regular lower body clothing? 3 - A Little   2. Bathing (including washing, rinsing, drying)? 3 - A Little   3. Toileting, which includes using toilet, bedpan or urinal? 3 - A Little   4. Putting on and taking off regular upper body clothing? 4 - None   5. Taking care of personal grooming such as brushing teeth? 4 - None   6. Eating meals? 4 - None   Daily Activity Raw Score (Score out of 24.Lower scores equate to lower levels of function) 21   Total Evaluation Time   Total Evaluation Time (Minutes) 20[LC1.1]          Revision History        User Key Date/Time User Provider Type Action    > LC1.1 2/24/2017 12:11 PM Reanna Bedoya OT Occupational Therapist Sign            Progress Notes by Janay Galaviz PT at 2/24/2017 12:06 PM     Author:  Janay Galaviz PT Service:  (none) Author Type:  Physical Therapist    Filed:  2/24/2017 12:10 PM Date of Service:  2/24/2017 12:06 PM Note Created:  2/24/2017 12:06 PM    Status:  Signed :  Janay Galaviz PT (Physical Therapist)          02/24/17 0800   Quick Adds   Type of Visit Initial PT Evaluation   Living Environment   Lives With alone   Living " Arrangements other (see comments)  ([facility])   Home Accessibility stairs to enter home   Number of Stairs to Enter Home 1   Stair Railings at Home outside, present on right side   Self-Care   Regular Exercise yes   Functional Level Prior   Ambulation 0-->independent   Transferring 0-->independent   Toileting 0-->independent   Bathing 0-->independent   Dressing 0-->independent   Eating 0-->independent   Communication 0-->understands/communicates without difficulty   Swallowing 0-->swallows foods/liquids without difficulty   Cognition 0 - no cognition issues reported   Fall history within last six months yes   Number of times patient has fallen within last six months 1   Prior Functional Level Comment PLOf-  Pt indep. with ambulation w/ no device.  R knee pain.Tripped over chair in dark room    General Information   Onset of Illness/Injury or Date of Surgery - Date 02/23/17   Referring Physician Comfort   Patient/Family Goals Statement S-   Pt  w/ event[place] goal of Dc to home; planning on a TCU stay first   Pertinent History of Current Problem (include personal factors and/or comorbidities that impact the POC) degenerative joint disease.  s/p Total knee arthoplasty (Right)   Weight-Bearing Status - RLE weight-bearing as tolerated   General Observations Pt alert, reporting pain t 3-4/10   Cognitive Status Examination   Orientation orientation to person, place and time   Level of Consciousness alert   Follows Commands and Answers Questions able to follow multistep instructions   Personal Safety and Judgment intact   Pain Assessment   Patient Currently in Pain Yes, see Vital Sign flowsheet   Integumentary/Edema   Integumentary/Edema Comments NT- compression wrap in place   Right Knee Extension/Flexion ROM   Right Knee Extension/Flexion AROM - degrees (15-   degrees)   Strength   Strength Comments Pt able to perform SLR on Right indep   MMT: Knee, Rehab Eval   Knee Flexion - Right Side (3/5) fair, right   Knee Extension -  "Right Side (3/5) fair, right   Bed Mobility   Bed Mobility Comments SBA supine> sitting   Transfer Skills   Transfer Comments SBA sit ,<> stand w/ walker    Gait   Gait Comments P[CS1.1]t[CS1.2] instructed on WBAT, gait sequence.   Amb.  60  feet x1 with RW, SBA. steady gait    Gait Skills   Level of [place]: Gait stand-by assist   Physical Assist/Nonphysical Assist: Gait verbal cues;1 person assist   Weight-Bearing Restrictions: Gait weight-bearing as tolerated   Assistive Device for Transfer: Gait rolling walker   Gait Distance (60 feet)   Gait Analysis   Gait Pattern Used swing-to gait   Gait Deviations Noted decreased step length   Impairments Contributing to Gait Deviations pain;decreased strength   Balance   Balance Comments Good dynamic standing balance    Sensory Examination   Sensory Perception no deficits were identified   Modality Interventions   Planned Modality Interventions Cryotherapy   General Therapy Interventions   Planned Therapy Interventions gait training;ROM;strengthening;progressive activity/exercise;home program guidelines   Clinical Impression   Criteria for Skilled Therapeutic Intervention yes, treatment indicated   PT Diagnosis Right   TKA   Influenced by the following impairments Pain, decreased ROM/ strength   Functional limitations due to impairments Altered mobility   Clinical Presentation Stable/Uncomplicated   Clinical Presentation Rationale Pt a good rehab. canidate; appears motived to return to [facility]   Clinical Decision Making (Complexity) Low complexity   Therapy Frequency` 2 times/day   Predicted Duration of Therapy Intervention (days/wks) 2 days   Anticipated Equipment Needs at Discharge (Pt has a walker, SE[CS1.1]C[CS1.2] for home use)   Anticipated Discharge Disposition Transitional Care Facility;Home with Home Therapy   Risk & Benefits of therapy have been explained Yes   Patient, Family & other staff in agreement with plan of care Yes   [facility] AM-PAC  \"6 " "Clicks\" V.2 Basic Mobility Inpatient Short Form   1. Turning from your back to your side while in a flat bed without using bedrails? 4 - None   2. Moving from lying on your back to sitting on the side of a flat bed without using bedrails? 3 - A Little   3. Moving to and from a bed to a chair (including a wheelchair)? 3 - A Little   4. Standing up from a chair using your arms (e.g., wheelchair, or bedside chair)? 3 - A Little   5. To walk in hospital room? 3 - A Little   6. Climbing 3-5 steps with a railing? 3 - A Little   Basic Mobility Raw Score (Score out of 24.Lower scores equate to lower levels of function) 19[CS1.1]        Revision History        User Key Date/Time User Provider Type Action    > CS1.1 2/24/2017 12:10 PM Janay Galaviz, PT Physical Therapist Sign     CS1.2 2/24/2017 12:06 PM Janay Galaviz, PT Physical Therapist             "

## 2017-02-23 NOTE — PLAN OF CARE
"Problem: Goal Outcome Summary  Goal: Goal Outcome Summary  Outcome: No Change  [place] [facility] ADMISSION NOTE     Patient admitted to room 2400 at approximately 1500 via cart from surgery. Patient was accompanied by transport tech.      Verbal SBAR report received from Lilian MATHIAS prior to patient arrival.      Patient trasferred to bed via air enoch. Patient alert and oriented X 3. The patient is not having any pain.  . Admission vital signs: Blood pressure 145/77, pulse 88, temperature 97.6  F (36.4  C), temperature source Oral, resp. rate 18, height 1.651 m (5' 5\"), weight 78 kg (171 lb 15.3 oz), SpO2 94 %, not currently breastfeeding. Patient was oriented to plan of care, call light, bed controls, tv, telephone, bathroom and visiting hours.      The following safety risks were identified during admission: fall. Yellow risk band applied: YES.      Bonita Padilla          "

## 2017-02-23 NOTE — IP AVS SNAPSHOT
"` `           [facility] SURGICAL: [phone]                                              INTERAGENCY TRANSFER FORM - NURSING   2017                    Hospital Admission Date: 2017  BHUPENDRA VIERA   : 1940  Sex: Female        Attending Provider: Wilfredo Thompson MD     Allergies:  Codeine    Infection:  None   Service:  HOSPITALIST    Ht:  1.651 m (5' 5\")   Wt:  78 kg (171 lb 15.3 oz)   Admission Wt:  76.2 kg (168 lb)    BMI:  28.62 kg/m 2   BSA:  1.89 m 2            Patient PCP Information     Provider PCP Type    Elvira Kowalski MD General      Current Code Status     Date Active Code Status Order ID Comments User Context       Prior      Code Status History     Date Active Date Inactive Code Status Order ID Comments User Context    2017 10:53 AM  Full Code [id number]  Zander Marquez PA-C Outpatient    2017  3:12 PM 2017 10:53 AM Full Code [id number]  Wilfredo Thompson MD Inpatient    2016  9:09 AM 2017  3:12 PM Full Code [id number]  Wilfredo Thompson MD Outpatient    2016  4:30 PM 2016  9:09 AM Full Code [id number]  Wilfredo Thompson MD Inpatient      Advance Directives        Does patient have a scanned Advance Directive/ACP document in EPIC?           No        Hospital Problems as of 2017              Priority Class Noted POA    * (Principal)Status post total right knee replacement Medium  2017 Yes    Primary localized osteoarthrosis, lower leg Medium  2017 Yes      Non-Hospital Problems as of 2017              Priority Class Noted    Injury of sigmoid colon   2005    Esophageal reflux   11/10/2006    Menopausal syndrome (hot flashes)   2008    Urinary incontinence   2008    Atrophic vaginitis   2008    Hyperlipidemia LDL goal <130   10/31/2010    Advanced directives, counseling/discussion   2/10/2012    Onychomycosis   2015    Senile nuclear sclerosis Medium  2015    Status post " total left knee replacement Medium  1/6/2016      Immunizations     Name Date      HERPES ZOSTER 11/03/16     Hepatitis B 10/14/92     Hepatitis B 05/11/92     Hepatitis B 04/07/92     Influenza (H1N1) 12/28/09     Influenza (High Dose) 3 valent vaccine 11/03/16     Influenza (High Dose) 3 valent vaccine 09/25/15     Influenza (High Dose) 3 valent vaccine 12/12/14     Influenza (High Dose) 3 valent vaccine 12/03/13     Influenza (IIV3) 12/07/12     Influenza (IIV3) 12/02/11     Influenza (IIV3) 01/07/11     Influenza (IIV3) 12/28/09     Influenza (IIV3) 12/02/08     Influenza (IIV3) 11/02/07     Influenza (IIV3) 11/10/06     Influenza (IIV3) 11/12/02     Pneumococcal (PCV 13) 05/19/15     Pneumococcal 23 valent 10/21/05     TD (ADULT, 7+) 12/02/08          END      ASSESSMENT     Discharge Profile Flowsheet     EXPECTED DISCHARGE     Patient's communication style  spoken language (English or Bilingual) 02/20/17 1935    Expected Discharge Date  02/26/17 02/24/17 1355   SKIN      DISCHARGE NEEDS ASSESSMENT     Inspection  Full 02/26/17 0902    Patient/family verbalizes understanding of discharge plan recommendations?  Yes 02/24/17 1355   Procedural focused assessment (identify areas inspected)   Occiput;Nose;Cheek, left;Cheek, right;Ear, left;Ear, right;Scapula, left;Scapula, right;Elbow, left;Elbow, right;Spine;Knee, left;Knee, right;Ankle, left;Ankle, right;Heel, left;Heel, right;Foot, left;Foot, right 02/23/17 0936    Medical Team notified of plan?  yes 02/24/17 1355   Skin WDL  WDL 02/26/17 0902    Readmission Within The Last 30 Days  no previous admission in last 30 days 02/24/17 1355   Skin Color/Characteristics  bruised (ecchymotic) 02/25/17 0107    Transportation Available  family or friend will provide 01/07/16 1221   Skin Temperature  warm 02/25/17 0107    # of Referrals Placed by CTS  Post Acute Facilities 01/07/16 1221   Skin Moisture  dry 02/25/17 0107    GASTROINTESTINAL (ADULT,PEDIATRIC,OB)     Skin  "Integrity  drain/device(s);incision(s) 02/25/17 0107    GI WDL  WDL 02/26/17 0902   Additional Documentation  Incision (LDA) 02/23/17 1626    All Quadrants Bowel Sounds  audible and active in all quadrants 02/26/17 0902   SAFETY      Last Bowel Movement  02/21/17 02/24/17 1014   Safety WDL  WDL 02/26/17 0902    Passing flatus  yes 02/25/17 0107   Safety Factors  call light in reach;ID band on;wheels locked;bed in low position 02/25/17 0107    COMMUNICATION ASSESSMENT                        Assessment WDL (Within Defined Limits) Definitions           Safety WDL     Effective: 09/28/15    Row Information: <b>WDL Definition:</b> Bed in low position, wheels locked; call light in reach; upper side rails up x 2; ID band on<br> <font color=\"gray\"><i>Item=AS safety wdl>>List=AS safety wdl>>Version=F14</i></font>      Skin WDL     Effective: 09/28/15    Row Information: <b>WDL Definition:</b> Warm; dry; intact; elastic; without discoloration; pressure points without redness<br> <font color=\"gray\"><i>Item=AS skin wdl>>List=AS skin wdl>>Version=F14</i></font>      Vitals     Vital Signs Flowsheet     VITAL SIGNS     Change in Pain  getting better 02/26/17 0425    Temp  97.8  F (36.6  C) 02/26/17 1109   Pain Control  partially effective 02/26/17 0425    Temp src  Oral 02/26/17 1109   Functioning  can do most things, but pain gets in the way of some 02/26/17 0425    Resp  16 02/26/17 1109   Sleep  normal sleep 02/26/17 0425    Pulse  81 02/25/17 2349   ANALGESIA SIDE EFFECTS MONITORING      Heart Rate  72 02/26/17 1109   Side Effects Monitoring: Respiratory Quality  R 02/24/17 0229    Pulse/Heart Rate Source  Monitor 02/25/17 2349   Side Effects Monitoring: Respiratory Depth  N 02/24/17 0229    BP  126/76 02/26/17 1109   Side Effects Monitoring: Sedation Level  1 02/24/17 0229    BP Location  Right arm 02/26/17 1109   HEIGHT AND WEIGHT      Patient Position  Lying 02/23/17 1435   Height  1.651 m (5' 5\") 02/23/17 1509    OXYGEN " THERAPY     Weight  78 kg (171 lb 15.3 oz) 02/23/17 1509    SpO2  97 % 02/26/17 1109   BSA (Calculated - sq m)  1.89 02/23/17 1509    O2 Device  None (Room air) 02/26/17 1109   BMI (Calculated)  28.68 02/23/17 1509    Oxygen Delivery  1/2 LPM 02/24/17 0806   POSITIONING      RESPIRATORY MONITORING     Body Position  independently positioning 02/26/17 0902    Respiratory Monitoring (EtCO2)  40 mmHg 02/24/17 0709   Head of Bed (HOB)  HOB at 20-30 degrees 02/26/17 0902    Integrated Pulmonary Index (IPI)  8-9 02/24/17 0709   Chair  Upright in chair 02/25/17 1316    PAIN/COMFORT     DAILY CARE      Patient Currently in Pain  denies 02/26/17 0425   Activity Type  ambulated to bathroom 02/26/17 0902    Preferred Pain Scale  CAPA (Clinically Aligned Pain Assessment) ([facility] Adults Only) 02/25/17 1612   Activity Level of Assistance  assistance, stand-by 02/26/17 0902    Pain Location  Knee 02/25/17 1612   Activity Assistive Device  walker 02/26/17 0902    Pain Orientation  Right 02/25/17 1612   Ambulation Distance (Feet)  75 02/25/17 1317    Pain Descriptors  Aching 02/25/17 1612   ECG      Pain Intervention(s)  Medication (See eMAR) 02/25/17 0108   ECG Rhythm  Normal sinus rhythm 02/23/17 1435    Additional Documentation  CAPA (Group) 02/23/17 0922   Ectopy  None 02/23/17 1435    CLINICALLY ALIGNED PAIN ASSESSMENT (CAPA) ([facility] ADULTS ONLY)     Lead Monitored  Lead II 02/23/17 1435    Comfort  comfortably manageable 02/26/17 0425                 Patient Lines/Drains/Airways Status    Active LINES/DRAINS/AIRWAYS     Name: Placement date: Placement time: Site: Days: Last dressing change:    Peripheral IV 01/06/16 Right Hand 01/06/16   1007   Hand   417     Incision/Surgical Site 09/17/15 Right Eye 09/17/15   0748    528     Incision/Surgical Site 01/06/16 Left Knee 01/06/16   1334    416     Incision/Surgical Site 02/23/17 Right Knee 02/23/17   1257    2             Patient  Lines/Drains/Airways Status    Active PICC/CVC     None            Intake/Output Detail Report     Date Intake     Output   Net    Shift P.O. I.V. IV Piggyback Total Urine Drains Total       Noc 02/24/17 2300 - 02/25/17 0659 -- -- -- -- -- 30 30 -30    Day 02/25/17 0700 - 02/25/17 1459 820 -- -- 820 -- 15 15 805    Jennifer 02/25/17 1500 - 02/25/17 2259 -- -- -- -- -- -- -- 0    Noc 02/25/17 2300 - 02/26/17 0659 -- -- -- -- -- -- -- 0    Day 02/26/17 0700 - 02/26/17 1459 460 -- -- 460 -- -- -- 460      Last Void/BM       Most Recent Value    Urine Occurrence 1 at 02/26/2017 0800    Stool Occurrence 1 at 02/26/2017 0900      Case Management/Discharge Planning     Case Management/Discharge Planning Flowsheet     REFERRAL INFORMATION     EXPECTED DISCHARGE      Did the Initial Social Work Assessment result in a Social Work Case?  Yes 02/24/17 1355   Expected Discharge Date  02/26/17 02/24/17 1355    Admission Type  inpatient 02/24/17 1355   DISCHARGE PLANNING      Arrived From  home or self-care 02/24/17 1355   Patient/family verbalizes understanding of discharge plan recommendations?  Yes 02/24/17 1355    Referral Source  nursing 02/24/17 1355   Medical Team notified of plan?  yes 02/24/17 1355    # of Referrals Placed by [facility]  Post Acute Facilities 01/07/16 1221   Readmission Within The Last 30 Days  no previous admission in last 30 days 02/24/17 1355    Primary Care Clinic Name  -- ([facility]) 02/24/17 1355   Transportation Available  family or friend will provide 01/07/16 1221    Primary Care MD Name  -- ([facility]) 02/24/17 1355   ABUSE RISK SCREEN      LIVING ENVIRONMENT     QUESTION TO PATIENT:  Has a member of your family or a partner(now or in the past) intimidated, hurt, manipulated, or controlled you in any way?  no 02/23/17 0919    Lives With  alone 02/24/17 1209   QUESTION TO PATIENT: Do you feel safe going back to the place where you are living?  yes 02/23/17 0919    Living Arrangements  other (see comments)  ([facility]) 02/24/17 0910   OBSERVATION: Is there reason to believe there has been maltreatment of a vulnerable adult (ie. Physical/Sexual/Emotional abuse, self neglect, lack of adequate food, shelter, medical care, or financial exploitation)?  no 02/23/17 0919    Provides Primary Care For  no one 02/23/17 1527   (R) MENTAL HEALTH SUICIDE RISK      COPING/STRESS     Are you depressed or being treated for depression?  No 02/23/17 1527    Major Change/Loss/Stressor  surgery/procedure 02/20/17 1935   HOMICIDE RISK      Patient Personal Strengths  expressive of needs;expressive of emotions;positive attitude 01/07/16 1221   Homicidal Ideation  no 02/23/17 1527

## 2017-02-23 NOTE — ANESTHESIA PREPROCEDURE EVALUATION
Anesthesia Evaluation     . Pt has had prior anesthetic. Type: General, MAC and Regional      ROS/MED HX    ENT/Pulmonary:     (+)tobacco use, Past use , . .    Neurologic:  - neg neurologic ROS     Cardiovascular:     (+) Dyslipidemia, ----. : . . . :. .       METS/Exercise Tolerance:     Hematologic:  - neg hematologic  ROS       Musculoskeletal:   (+) arthritis, , , -       GI/Hepatic:     (+) GERD Asymptomatic on medication,       Renal/Genitourinary:  - ROS Renal section negative       Endo:  - neg endo ROS       Psychiatric:  - neg psychiatric ROS       Infectious Disease:  - neg infectious disease ROS       Malignancy:      - no malignancy   Other:    - neg other ROS           Physical Exam  Normal systems: cardiovascular and pulmonary    Airway   Mallampati: II  TM distance: >3 FB  Neck ROM: full    Dental     Cardiovascular       Pulmonary                     Anesthesia Plan      History & Physical Review  History and physical reviewed and following examination; no interval change.    ASA Status:  2 .    NPO Status:  > 6 hours    Plan for Spinal   PONV prophylaxis:  Ondansetron (or other 5HT-3) and Dexamethasone or Solumedrol       Postoperative Care  Postoperative pain management:  IV analgesics and Oral pain medications.      Consents  Anesthetic plan, risks, benefits and alternatives discussed with:  Patient..                          .

## 2017-02-23 NOTE — ANESTHESIA POSTPROCEDURE EVALUATION
Patient: Lavonne Tidwell    Procedure(s):  Right Total Knee Arthroplasty - Wound Class: I-Clean    Diagnosis:degenerative joint disease  Diagnosis Additional Information: No value filed.    Anesthesia Type:  Spinal    Note:  Anesthesia Post Evaluation    Patient location during evaluation: PACU  Patient participation: Able to fully participate in evaluation  Level of consciousness: awake and alert  Pain management: satisfactory to patient  Airway patency: patent  Cardiovascular status: acceptable and hemodynamically stable  Respiratory status: acceptable, spontaneous ventilation and nasal cannula  Hydration status: acceptable  PONV: none     Anesthetic complications: None          Last vitals:  Vitals:    02/23/17 1430 02/23/17 1507 02/23/17 1540   BP: 127/64 145/77    Pulse: 89 88    Resp: 18 18    Temp:  36.4  C (97.6  F)    SpO2: 95% 93% 94%         Electronically Signed By: ENA Lagunas CRNA  February 23, 2017  4:13 PM

## 2017-02-23 NOTE — ANESTHESIA CARE TRANSFER NOTE
Patient: Lavonne Tidwell    Procedure(s):  Right Total Knee Arthroplasty - Wound Class: I-Clean    Diagnosis: degenerative joint disease  Diagnosis Additional Information: No value filed.    Anesthesia Type:   Spinal     Note:  Airway :Face Mask  Patient transferred to:PACU        Vitals: (Last set prior to Anesthesia Care Transfer)    CRNA VITALS  2/23/2017 1337 - 2/23/2017 1412      2/23/2017             Pulse: 88    SpO2: 96 %                Electronically Signed By: ENA Moon CRNA  February 23, 2017  2:12 PM

## 2017-02-23 NOTE — IP AVS SNAPSHOT
` `     [facility] SURGICAL: [phone]            Medication Administration Report for Lavonne Tidwell as of 02/26/17 1119   Legend:    Given Hold Not Given Due Canceled Entry Other Actions    Time Time (Time) Time  Time-Action       Inactive    Active    Linked        Medications 02/20/17 02/21/17 02/22/17 02/23/17 02/24/17 02/25/17 02/26/17    acetaminophen (TYLENOL) tablet 1,000 mg  Dose: 1,000 mg Freq: 2 TIMES DAILY PRN Route: PO  PRN Reasons: mild pain,fever  Start: 02/23/17 1512   Admin Instructions: Maximum acetaminophen dose from all sources = 75 mg/kg/day not to exceed 4 gram         0621 (1,000 mg)-Given        0017 (1,000 mg)-Given       1439 (1,000 mg)-Given            benzocaine-menthol (CEPACOL) 15-3.6 MG lozenge 1-2 lozenge  Dose: 1-2 lozenge Freq: EVERY 1 HOUR PRN Route: BU  PRN Reason: sore throat  PRN Comment: sore throat without fever  Start: 02/23/17 1511              docusate sodium (COLACE) capsule 200 mg  Dose: 200 mg Freq: 2 TIMES DAILY PRN Route: PO  PRN Reason: constipation  Start: 02/23/17 1522   Admin Instructions: Formulary substitution for Surfak               enoxaparin (LOVENOX) injection 40 mg  Dose: 40 mg Freq: EVERY 24 HOURS Route: SC  Start: 02/24/17 1215   Admin Instructions: Check to make sure start date/time is 12-24 hours post op unless documented complication, AND no sooner than 22 hours post op if spinal anesthesia used.   Continue until discharge to home. HOLD if platelet count falls below 50% of baseline or less than 100,000/ L and notify provider.         1253 (40 mg)-Given        1203 (40 mg)-Given        [ ] 1215           fluticasone (FLONASE) 50 MCG/ACT spray 2 spray  Dose: 2 spray Freq: DAILY PRN Route: BOTH NOSTRIL  PRN Reason: allergies  Start: 02/23/17 1527              guaiFENesin (MUCINEX) 12 hr tablet 600 mg  Dose: 600 mg Freq: 2 TIMES DAILY PRN Route: PO  PRN Reason: congestion  Start: 02/24/17 0843   Admin Instructions: DO NOT CRUSH.           "     HYDROcodone-acetaminophen (NORCO) 5-325 MG per tablet 1-2 tablet  Dose: 1-2 tablet Freq: EVERY 4 HOURS PRN Route: PO  PRN Reason: moderate to severe pain  Start: 02/23/17 1511   Admin Instructions: Hold while on PCA or with regular IV opioid dosing.  Maximum acetaminophen dose from all sources= 75 mg/kg/day not to exceed 4 grams         1258 (1 tablet)-Given       1655 (2 tablet)-Given             HYDROmorphone (PF) (DILAUDID) injection 0.3-0.5 mg  Dose: 0.3-0.5 mg Freq: EVERY 2 HOURS PRN Route: IV  PRN Reason: severe pain  PRN Comment: or if patient unable to take PO  Start: 02/23/17 1511   Admin Instructions: Hold while on PCA.               hydrOXYzine (ATARAX) tablet 10 mg  Dose: 10 mg Freq: EVERY 6 HOURS PRN Route: PO  PRN Reason: itching  Start: 02/23/17 1511   Admin Instructions: Caution to be used when administering multiple CNS depressing meds within a short time frame.         1026 (10 mg)-Given             lidocaine (LMX4) kit  Freq: EVERY 1 HOUR PRN Route: Top  PRN Reason: pain  PRN Comment: with VAD insertion or accessing implanted port.  Start: 02/23/17 1511   Admin Instructions: Do NOT give if patient has a history of allergy to any local anesthetic or any \"tessie\" product.   Apply 30 minutes prior to VAD insertion or port access.  MAX Dose:  2.5 g (  of 5 g tube)               lidocaine 1 % 1 mL  Dose: 1 mL Freq: EVERY 1 HOUR PRN Route: OTHER  PRN Comment: mild pain with VAD insertion or accessing implanted port  Start: 02/23/17 1511   Admin Instructions: Do NOT give if patient has a history of allergy to any local anesthetic or any \"tessie\" product. MAX dose 1 mL subcutaneous OR intradermal in divided doses.               naloxone (NARCAN) injection 0.1-0.4 mg  Dose: 0.1-0.4 mg Freq: EVERY 2 MIN PRN Route: IV  PRN Reason: opioid reversal  Start: 02/23/17 1511   Admin Instructions: For respiratory rate LESS than or EQUAL to 8.  Partial reversal dose:  0.1 mg titrated q 2 minutes for Analgesia Side " Effects Monitoring Sedation Level of 3 (frequently drowsy, arousable, drifts to sleep during conversation).Full reversal dose:  0.4 mg bolus for Analgesia Side Effects Monitoring Sedation Level of 4 (somnolent, minimal or no response to stimulation).               ondansetron (ZOFRAN-ODT) ODT tab 4 mg  Dose: 4 mg Freq: EVERY 6 HOURS PRN Route: PO  PRN Reason: nausea  Start: 02/23/17 1511   Admin Instructions: This is Step 1 of nausea and vomiting management.  If nausea not resolved in 15 minutes, go to Step 2 prochlorperazine (COMPAZINE). Do not push through foil backing. Peel back foil and gently remove. Place on tongue immediately. Administration with liquid unnecessary              Or  ondansetron (ZOFRAN) injection 4 mg  Dose: 4 mg Freq: EVERY 6 HOURS PRN Route: IV  PRN Reasons: nausea,vomiting  Start: 02/23/17 1511   Admin Instructions: This is Step 1 of nausea and vomiting management.  If nausea not resolved in 15 minutes, go to Step 2 prochlorperazine (COMPAZINE).  Irritant.               prochlorperazine (COMPAZINE) injection 5 mg  Dose: 5 mg Freq: EVERY 6 HOURS PRN Route: IV  PRN Reasons: nausea,vomiting  Start: 02/23/17 1511   Admin Instructions: This is Step 2 of nausea and vomiting management.   If nausea not resolved in 15 minutes, give metoclopramide (REGLAN) if ordered (step 3 of nausea and vomiting management)              Or  prochlorperazine (COMPAZINE) tablet 5 mg  Dose: 5 mg Freq: EVERY 6 HOURS PRN Route: PO  PRN Reasons: nausea,vomiting  Start: 02/23/17 1511   Admin Instructions: This is Step 2 of nausea and vomiting management.   If nausea not resolved in 15 minutes, give metoclopramide (REGLAN) if ordered (step 3 of nausea and vomiting management)               psyllium capsule 1.04 g  Dose: 2 capsule Freq: 2 TIMES DAILY Route: PO  Start: 02/23/17 2000   Admin Instructions: Each capsule should be administered individually with 8 ounces of fluid.        1945 (1.04 g)-Given        0831 (1.04  g)-Given       1933 (1.04 g)-Given        0802 (1.04 g)-Given       1933 (1.04 g)-Given        0728 (1.04 g)-Given       [ ] 2000           ranitidine (ZANTAC) tablet 150 mg  Dose: 150 mg Freq: DAILY Route: PO  Start: 02/23/17 1515       1640 (150 mg)-Given        0831 (150 mg)-Given        0755 (150 mg)-Given        0728 (150 mg)-Given           senna-docusate (SENOKOT-S;PERICOLACE) 8.6-50 MG per tablet 1-2 tablet  Dose: 1-2 tablet Freq: 2 TIMES DAILY Route: PO  Start: 02/23/17 2000   Admin Instructions: Start with 1 tablet PO BID, If no bowel movement in 24 hours, increase to 2 tablets PO BID.  Hold for loose stools.        1945 (2 tablet)-Given        0831 (1 tablet)-Given       1933 (1 tablet)-Given        0755 (2 tablet)-Given       1933 (2 tablet)-Given        0728 (1 tablet)-Given       [ ] 2000           sodium chloride (PF) 0.9% PF flush 3 mL  Dose: 3 mL Freq: EVERY 8 HOURS Route: IK  Start: 02/23/17 1515   Admin Instructions: And Q1H PRN, to lock peripheral IV dormant line.        (1541)-Not Given       (2237)-Not Given        0832 (3 mL)-Given       1633 (3 mL)-Given        0306 (3 mL)-Given       (0921)-Not Given       (1608)-Not Given        (0152)-Not Given       0729-Hold       [ ] 1600           sodium chloride (PF) 0.9% PF flush 3 mL  Dose: 3 mL Freq: EVERY 1 HOUR PRN Route: IK  PRN Reason: line flush  PRN Comment: for peripheral IV flush post IV meds  Start: 02/23/17 1511              zolpidem (AMBIEN) half-tab 2.5 mg  Dose: 2.5 mg Freq: AT BEDTIME PRN Route: PO  PRN Reason: sleep  Start: 02/24/17 2000   Admin Instructions: POD 1.  Do not give unless at least 6 hours of uninterrupted sleep is expected.              Completed Medications  Medications 02/20/17 02/21/17 02/22/17 02/23/17 02/24/17 02/25/17 02/26/17         Dose: 1 g Freq: EVERY 8 HOURS Route: IV  Indications of Use: SURGICAL PROPHYLAXIS  Start: 02/23/17 2000   End: 02/24/17 0416   Admin Instructions: First post-op dose due 8 hours  after intra-op dose, see eMAR.        1945 (1 g)-New Bag        0346 (1 g)-New Bag               Dose: 2 g Freq: PRE-OP/PRE-PROCEDURE Route: IV  Indications of Use: SURGICAL PROPHYLAXIS  Start: 02/23/17 0841   End: 02/23/17 1210   Admin Instructions: Give first dose within 1 hour PRIOR to incision. If patient weight is greater than or equal to 120 kg increase dose to 3 g.        1210 (2 g)-Given                Dose: 100 mg Freq: 3 TIMES DAILY Route: PO  Start: 02/23/17 1515   End: 02/26/17 0727       1640 (100 mg)-Given       2225 (100 mg)-Given [C]        0831 (100 mg)-Given       1425 (100 mg)-Given       1933 (100 mg)-Given        0755 (100 mg)-Given       1440 (100 mg)-Given       1933 (100 mg)-Given        0727 (100 mg)-Given             Dose: 1 g Freq: ONCE Route: IV  Start: 02/23/17 0845   End: 02/23/17 1227   Admin Instructions: Each 1 gram to be infused over 10 minutes.        1227 (1 g)-New Bag             Discontinued Medications  Medications 02/20/17 02/21/17 02/22/17 02/23/17 02/24/17 02/25/17 02/26/17         Rate: 100 mL/hr Freq: CONTINUOUS Route: IV  Start: 02/23/17 1515   End: 02/24/17 0843   Admin Instructions: Change to saline lock when PO well tolerated.        1555 ( )-New Bag        0224 ( )-New Bag       0843-Med Discontinued           Dose: 1 g Freq: SEE ADMIN INSTRUCTIONS Route: IV  Indications of Use: SURGICAL PROPHYLAXIS  Start: 02/23/17 0841   End: 02/23/17 1417   Admin Instructions: Intra-Op Dose.  Give every 2 hours while patient in surgery, starting 2 hours after pre-op dose.  DO NOT GIVE intra-op dose if CrCl less than 10 mL/min (on dialysis).  If CrCL less than 50 mL/min, double the time interval between doses.        1417-Med Discontinued            Dose: 240 mg Freq: 2 TIMES DAILY PRN Route: PO  PRN Reason: constipation  Start: 02/23/17 1512   End: 02/23/17 1522       1522-Med Discontinued            Dose: 25-50 mcg Freq: EVERY 2 MIN PRN Route: IV  PRN Reason: other  PRN Comment:  "acute pain  Start: 02/23/17 1418   End: 02/23/17 1458   Admin Instructions: MAX cumulative dose = 250 mcg.    Use Fentanyl initially, as a short acting agent for acute pain control.  If insufficient, or a longer acting agent is needed, begin Morphine or Hydromorphone if ordered.        1458-Med Discontinued            Dose: 0.3-0.5 mg Freq: EVERY 5 MIN PRN Route: IV  PRN Reason: other  PRN Comment: acute pain.  May administer if Respiratory Rate is greater than 10  Start: 02/23/17 1418   End: 02/23/17 1458   Admin Instructions: If fentanyl is also ordered, use HYDROmorphone if pain control insufficient with fentanyl or a longer acting agent is needed.   Max cumulative dose = 2 mg        1431 (0.5 mg)-Given       1458-Med Discontinued            Rate: 100 mL/hr Freq: CONTINUOUS Route: IV  Start: 02/23/17 1430   End: 02/23/17 1458   Admin Instructions: Continue until IV catheter is weaned               1458-Med Discontinued            Rate: 10 mL/hr Freq: CONTINUOUS Route: IV  Start: 02/23/17 0845   End: 02/23/17 1417       0924 ( )-New Bag       1304 ( )-New Bag       1406 ( )-Anesthesia Volume Adjustment       1417-Med Discontinued            Freq: EVERY 1 HOUR PRN Route: Top  PRN Reason: mild pain  PRN Comment: with VAD insertion or accessing implanted port,  Start: 02/23/17 0841   End: 02/23/17 1417   Admin Instructions: Do NOT give if patient has a history of allergy to any local anesthetic or any \"tessie\" product.   Apply 30 min prior to VAD insertion or port access.  MAX Dose:  2.5 gm (  of 5 gm tube)        1417-Med Discontinued            Dose: 12.5 mg Freq: EVERY 5 MIN PRN Route: IV  PRN Comment: post anesthesia shivering if Respiratory Rate greater than 10  Start: 02/23/17 1418   End: 02/23/17 1458       1458-Med Discontinued            Dose: 4 mg Freq: EVERY 30 MIN PRN Route: PO  PRN Reason: nausea  Start: 02/23/17 1418   End: 02/23/17 1458   Admin Instructions: MAX total dose = 8 mg, including OR dosing. " If not resolved in 15 minutes, then go to step 2 (Prochlorperazine if ordered).        1458-Med Discontinued         Or    Dose: 4 mg Freq: EVERY 30 MIN PRN Route: IV  PRN Reason: nausea  Start: 02/23/17 1418   End: 02/23/17 1458   Admin Instructions: MAX total dose = 8 mg, including OR dosing. If not resolved in 15 minutes, then go to step 2 (Prochlorperazine if ordered).  Irritant.        1458-Med Discontinued            Dose: 5 mg Freq: EVERY 6 HOURS PRN Route: IV  PRN Reasons: nausea,vomiting  Start: 02/23/17 1418   End: 02/23/17 1458   Admin Instructions: This is Step 2 of the nausea and vomiting protocol.   If nausea not resolved in 15 minutes, give Metoclopramide if ordered (step 3 of nausea and vomiting protocol)          1458-Med Discontinued            Dose: 150 mg Freq: 2 TIMES DAILY Route: PO  Start: 02/23/17 2000   End: 02/23/17 1528   Admin Instructions: May also be given IV        1528-Med Discontinued            Freq: PRN  Start: 02/23/17 1351   End: 02/23/17 1458       1351 (100 mL)-Given       1458-Med Discontinued            Dose: 3 mL Freq: EVERY 8 HOURS Route: IK  Start: 02/23/17 0841   End: 02/23/17 1417   Admin Instructions: And Q1H PRN, to lock peripheral IV dormant line.               1417-Med Discontinued            Dose: 3 mL Freq: EVERY 1 HOUR PRN Route: IK  PRN Reasons: line flush,post meds or blood draw  Start: 02/23/17 0841   End: 02/23/17 1417   Admin Instructions: for peripheral IV flush post IV meds        1417-Med Discontinued            Freq: PRN  Start: 02/23/17 1352   End: 02/23/17 1458       1352 (1,100 mL)-Given       1458-Med Discontinued       Medications 02/20/17 02/21/17 02/22/17 02/23/17 02/24/17 02/25/17 02/26/17

## 2017-02-23 NOTE — IP AVS SNAPSHOT
"    [facility] SURGICAL: [phone]                                              INTERAGENCY TRANSFER FORM - PHYSICIAN ORDERS   2017                    Hospital Admission Date: 2017  BHUPENDRA VIERA   : 1940  Sex: Female        Attending Provider: Wilfredo Thompson MD     Allergies:  Codeine    Infection:  None   Service:  HOSPITALIST    Ht:  1.651 m (5' 5\")   Wt:  78 kg (171 lb 15.3 oz)   Admission Wt:  76.2 kg (168 lb)    BMI:  28.62 kg/m 2   BSA:  1.89 m 2            Patient PCP Information     Provider PCP Type    Elvira Kowalski MD General      ED Clinical Impression     Diagnosis Description Comment Added By Time Added    Status post total right knee replacement [Z96.651] Status post total right knee replacement [Z96.651]  Zander Marquez PA-C 2017 10:46 AM      Hospital Problems as of 2017              Priority Class Noted POA    * (Principal)Status post total right knee replacement Medium  2017 Yes    Primary localized osteoarthrosis, lower leg Medium  2017 Yes      Non-Hospital Problems as of 2017              Priority Class Noted    Injury of sigmoid colon   2005    Esophageal reflux   11/10/2006    Menopausal syndrome (hot flashes)   2008    Urinary incontinence   2008    Atrophic vaginitis   2008    Hyperlipidemia LDL goal <130   10/31/2010    Advanced directives, counseling/discussion   2/10/2012    Onychomycosis   2015    Senile nuclear sclerosis Medium  2015    Status post total left knee replacement Medium  2016      Code Status History     Date Active Date Inactive Code Status Order ID Comments User Context    2017 10:53 AM  Full Code [id number]  Zander Marquez PA-C Outpatient    2017  3:12 PM 2017 10:53 AM Full Code [id number]  Wilfredo Thompson MD Inpatient    2016  9:09 AM 2017  3:12 PM Full Code [id number]  Wilfredo Thompson MD Outpatient    2016  4:30 PM " 1/9/2016  9:09 AM Full Code [id number]  Wilfredo Thompson MD Inpatient         Medication Review      START taking        Dose / Directions Comments    HYDROcodone-acetaminophen 5-325 MG per tablet   Commonly known as:  NORCO        Dose:  1-2 tablet   Take 1-2 tablets by mouth every 4 hours as needed for moderate to severe pain   Quantity:  50 tablet   Refills:  0        hydrOXYzine 10 MG tablet   Commonly known as:  ATARAX        Dose:  10 mg   Take 1 tablet (10 mg) by mouth every 6 hours as needed for itching   Quantity:  120 tablet   Refills:  0        rivaroxaban ANTICOAGULANT 10 MG Tabs tablet   Commonly known as:  XARELTO        Dose:  10 mg   Take 1 tablet (10 mg) by mouth daily (with dinner)   Quantity:  11 tablet   Refills:  0        senna-docusate 8.6-50 MG per tablet   Commonly known as:  SENOKOT-S;PERICOLACE        Dose:  1-2 tablet   Take 1-2 tablets by mouth 2 times daily   Quantity:  100 tablet   Refills:  0          CONTINUE these medications which have NOT CHANGED        Dose / Directions Comments    BIOTIN PO        Dose:  500 mg   Take 500 mg by mouth daily   Refills:  0        CALCIUM + D PO        2 TABLET DAILY   Refills:  0        CENTRUM SILVER PO   Used for:  Routine general medical examination at a health care facility        1 TABLET DAILY   Refills:  0        docusate calcium 240 MG capsule   Commonly known as:  SURFAK        Dose:  240 mg   Take 240 mg by mouth 2 times daily as needed for constipation   Refills:  0        fluticasone 50 MCG/ACT spray   Commonly known as:  FLONASE   Used for:  Chronic rhinitis        Dose:  2 spray   Spray 2 sprays into both nostrils daily As needed   Quantity:  16 g   Refills:  3        Garlic 1000 MG Caps        Dose:  1000 mg   Take 1,000 mg by mouth daily   Refills:  0        Grape Seed 50 MG Tabs        4 capsules daily   Refills:  0        guaiFENesin 600 MG 12 hr tablet   Commonly known as:  MUCINEX        Dose:  1200 mg   Take 1,200 mg by mouth 2  times daily as needed for congestion   Refills:  0        METAMUCIL 0.52 G capsule   Generic drug:  psyllium        Dose:  2 capsule   Take 2 capsules by mouth 2 times daily   Refills:  0        TYLENOL PO        Dose:  1000 mg   Take 1,000 mg by mouth 2 times daily as needed for mild pain or fever   Refills:  0        vitamin E 400 UNIT capsule        Dose:  400 Units   Take 400 Units by mouth daily   Refills:  0        ZANTAC 150 MG tablet   Generic drug:  ranitidine        Dose:  150 mg   Take 150 mg by mouth daily   Refills:  0                Summary of Visit     Reason for your hospital stay       Right total knee replacement             After Care     Activity - Ambulate in hallway       Every shift       Activity - Up ad mitchel           Activity - Up with assistive device           Advance Diet as Tolerated       Follow this diet upon discharge: Orders Placed This Encounter      Advance Diet as Tolerated: Regular Diet Adult       General info for SNF       Length of Stay Estimate: Short Term Care: Estimated # of Days <30  Condition at Discharge: Improving  Level of care:skilled   Rehabilitation Potential: Good  Admission H&P remains valid and up-to-date: Yes  Recent Chemotherapy: N/A  Use Nursing Home Standing Orders: Yes       Mantoux instructions       Give two-step Mantoux (PPD) Per Facility Policy Yes       Weight bearing status       WBAT       Wound care (specify)       Site:   Right knee  Instructions:  Dermabond glue in place, May shower, may get wet, dressings as needed, may leave open to air             Referrals     Occupational Therapy Adult Consult       Evaluate and treat as clinically indicated.    Reason:  Right total knee replacement       Physical Therapy Adult Consult       Evaluate and treat as clinically indicated.    Reason:  Right total knee replacement             Your next 10 appointments already scheduled     Mar 06, 2017 10:00 AM CST   Evaluation with Francheska Reilly PT   [facility]  [facility] ([facility])    [address] [place] [id number] [phone]              Follow-Up Appointment Instructions     Future Labs/Procedures    Follow Up and recommended labs and tests     Comments:    Follow up with Dr. Thompson in 2 weeks.      Follow-Up Appointment Instructions     Follow Up and recommended labs and tests       Follow up with Dr. Thompson in 2 weeks.             Statement of Approval     Ordered          02/26/17 1108  I have reviewed and agree with all the recommendations and orders detailed in this document.  EFFECTIVE NOW     Approved and electronically signed by:  Jerry Ya MD           02/26/17 8912  I have reviewed and agree with all the recommendations and orders detailed in this document.  EFFECTIVE NOW     Approved and electronically signed by:  Wilfredo Thompson MD

## 2017-02-23 NOTE — IP AVS SNAPSHOT
[facility]    [address] [address]    Phone:  [phone]    Fax:  [phone]                                       After Visit Summary   2/23/2017    Lavonne Tidwell    MRN: [id number]           After Visit Summary Signature Page     I have received my discharge instructions, and my questions have been answered. I have discussed any challenges I see with this plan with the nurse or doctor.    ..........................................................................................................................................  Patient/Patient Representative Signature      ..........................................................................................................................................  Patient Representative Print Name and Relationship to Patient    ..................................................               ................................................  Date                                            Time    ..........................................................................................................................................  Reviewed by Signature/Title    ...................................................              ..............................................  Date                                                            Time

## 2017-02-23 NOTE — OP NOTE
Total Knee Arthroplasty Operative Note        PLAN:  Weight bearing status: Weight bearing as tolerated   Activity: Activity as tolerated  Patient may move about with assist as indicated or with supervision   Anticoagulation plan:                 Lovenox inpatient and then Xarelto at discharge  for 14 days  Follow up plan                           Follow up in 2 week(s)        Name: Lavonne Tidwell    PCP: Elvira Kowalski    Procedure Date: 2/23/2017    Pre-operative diagnosis: degenerative joint disease   Post-operative diagnosis: Same   Procedure: Total knee arthoplasty (Right)   Surgeon: Wilfredo Thompson MD     Assistant(s): Abhi Luis PA-C   Anesthesia: spinal   Estimated blood loss: Less than 50 ml   Drains: Hemovac   Specimens: None       Findings: See full dictated operative note for details   Complications: None       Comments: See dictated operative report for full details         Procedure and Findings:    After being informed of risks, benefits, alternatives to the procedure, patient desired to proceed, brought to the operating suite where they were placed under spinal anesthetic. Patient received 2 grams of intravenous Ancef.  Abhi Luis PA-C was present for the entire length of the case for the purposes of proper patient positioning, surgical exposure, and patient safety. A time-out verification step was completed.    Attention was directed towards the patella. A midline incision, vastus splitting minimally invasive approach was utilized. The patella was everted. It was measured at 35 mm. It was resected, remeasured and a 35 mm asymmetric patella was positioned. A protector plate was placed on the patella. Attention was directed toward the distal femur. IM guider silas was placed. An 8 mm 5 degree distal femoral cut was completed. The IM guide silas was then placed in the tibia. External guide silas and tower were utilized and 9 mm button stylus was referenced off the lateral tibial plateau and cuts  were completed. The tibia was sized to a 4. Attention was directed towards the distal femur. It was sized to a 4. The 4-in-1 cutting block was placed along the epicondylar axis. It was secured with 2 pins, anterior, posterior and chamfer cuts were completed. Soft tissue balancing was then carried out. Medial release was completed. Ultimately a 11 mm insert gave excellent range of motion and stability throughout the range of motion. Patella was noted to track symmetrically throughout the range to motion. The tibial tray rotation was marked, size was verified, it was secured with 2 pins and punched. Trial components were then removed. The cancellous surfaces were pulsatile lavaged. One batch of Palacos cement was mixed under vacuum. The tibia, femur and patella were sequentially cemented in place. The knee was held in extension with axial compression until the cement had hardened. The 11 mm insert was ultimately selected and secured Care was taken to remove any excess cement. Joint capsular injection with anesthetic solution was performed. Excellent range of motion and stability was demonstrated. A Hemovac drain was placed. The joint was copiously irrigated. Joint capsules were closed with interrupted number 1 running Stratafix. Subcutaneous tissues were closed with 2-0 Vicryl and skin was closed with 3-0 running Stratifix and Prineo wound closure. A sterile dressing was applied. Patient tolerated the procedure well without complication and returned to the PAR in stable condition.      Wilfredo Thompson    Date: 2/23/2017 Time: 1:59 PM  ?    CONFIDENTIALITY NOTICE This message and any included attachments are from [facility] and are intended only for the addressee. The information contained in this message is confidential and may constitute inside or non-public information under international, federal, or state securities laws. Unauthorized forwarding, printing, copying, distribution, or use of such  information is strictly prohibited and may be unlawful. If you are not the addressee, please promptly delete this message and notify the sender of the delivery error by e-mail.

## 2017-02-24 ENCOUNTER — APPOINTMENT (OUTPATIENT)
Dept: PHYSICAL THERAPY | Facility: CLINIC | Age: 77
DRG: 470 | End: 2017-02-24
Attending: ORTHOPAEDIC SURGERY
Payer: MEDICARE

## 2017-02-24 ENCOUNTER — APPOINTMENT (OUTPATIENT)
Dept: OCCUPATIONAL THERAPY | Facility: CLINIC | Age: 77
DRG: 470 | End: 2017-02-24
Attending: ORTHOPAEDIC SURGERY
Payer: MEDICARE

## 2017-02-24 LAB
CREAT SERPL-MCNC: 0.73 MG/DL (ref 0.52–1.04)
GFR SERPL CREATININE-BSD FRML MDRD: 77 ML/MIN/1.7M2
GLUCOSE BLDC GLUCOMTR-MCNC: 78 MG/DL (ref 70–99)
HGB BLD-MCNC: 10.8 G/DL (ref 11.7–15.7)

## 2017-02-24 PROCEDURE — 40000133 ZZH STATISTIC OT WARD VISIT

## 2017-02-24 PROCEDURE — 00000146 ZZHCL STATISTIC GLUCOSE BY METER IP

## 2017-02-24 PROCEDURE — 82565 ASSAY OF CREATININE: CPT | Performed by: ORTHOPAEDIC SURGERY

## 2017-02-24 PROCEDURE — 40000193 ZZH STATISTIC PT WARD VISIT: Performed by: PHYSICAL THERAPIST

## 2017-02-24 PROCEDURE — 25000132 ZZH RX MED GY IP 250 OP 250 PS 637: Mod: GY | Performed by: ORTHOPAEDIC SURGERY

## 2017-02-24 PROCEDURE — 99231 SBSQ HOSP IP/OBS SF/LOW 25: CPT | Performed by: PHYSICIAN ASSISTANT

## 2017-02-24 PROCEDURE — 36415 COLL VENOUS BLD VENIPUNCTURE: CPT | Performed by: ORTHOPAEDIC SURGERY

## 2017-02-24 PROCEDURE — 97116 GAIT TRAINING THERAPY: CPT | Mod: GP | Performed by: PHYSICAL THERAPIST

## 2017-02-24 PROCEDURE — 12000000 ZZH R&B MED SURG/OB

## 2017-02-24 PROCEDURE — A9270 NON-COVERED ITEM OR SERVICE: HCPCS | Mod: GY | Performed by: ORTHOPAEDIC SURGERY

## 2017-02-24 PROCEDURE — 25000128 H RX IP 250 OP 636: Performed by: ORTHOPAEDIC SURGERY

## 2017-02-24 PROCEDURE — 97165 OT EVAL LOW COMPLEX 30 MIN: CPT | Mod: GO

## 2017-02-24 PROCEDURE — 97110 THERAPEUTIC EXERCISES: CPT | Mod: GP | Performed by: PHYSICAL THERAPIST

## 2017-02-24 PROCEDURE — 85018 HEMOGLOBIN: CPT | Performed by: ORTHOPAEDIC SURGERY

## 2017-02-24 RX ORDER — GUAIFENESIN 600 MG/1
600 TABLET, EXTENDED RELEASE ORAL 2 TIMES DAILY PRN
Status: DISCONTINUED | OUTPATIENT
Start: 2017-02-24 | End: 2017-02-26 | Stop reason: HOSPADM

## 2017-02-24 RX ADMIN — SENNOSIDES AND DOCUSATE SODIUM 1 TABLET: 8.6; 5 TABLET ORAL at 08:31

## 2017-02-24 RX ADMIN — PSYLLIUM 1.04 G: 0.52 CAPSULE ORAL at 08:31

## 2017-02-24 RX ADMIN — SODIUM CHLORIDE: 9 INJECTION, SOLUTION INTRAVENOUS at 02:24

## 2017-02-24 RX ADMIN — GABAPENTIN 100 MG: 100 CAPSULE ORAL at 14:25

## 2017-02-24 RX ADMIN — GABAPENTIN 100 MG: 100 CAPSULE ORAL at 08:31

## 2017-02-24 RX ADMIN — ENOXAPARIN SODIUM 40 MG: 40 INJECTION SUBCUTANEOUS at 12:53

## 2017-02-24 RX ADMIN — PSYLLIUM 1.04 G: 0.52 CAPSULE ORAL at 19:33

## 2017-02-24 RX ADMIN — GABAPENTIN 100 MG: 100 CAPSULE ORAL at 19:33

## 2017-02-24 RX ADMIN — CEFAZOLIN SODIUM 1 G: 1 INJECTION, SOLUTION INTRAVENOUS at 03:46

## 2017-02-24 RX ADMIN — RANITIDINE HYDROCHLORIDE 150 MG: 150 TABLET, FILM COATED ORAL at 08:31

## 2017-02-24 RX ADMIN — ACETAMINOPHEN 1000 MG: 500 TABLET, FILM COATED ORAL at 06:21

## 2017-02-24 RX ADMIN — SENNOSIDES AND DOCUSATE SODIUM 1 TABLET: 8.6; 5 TABLET ORAL at 19:33

## 2017-02-24 RX ADMIN — HYDROCODONE BITARTRATE AND ACETAMINOPHEN 2 TABLET: 5; 325 TABLET ORAL at 16:55

## 2017-02-24 RX ADMIN — HYDROCODONE BITARTRATE AND ACETAMINOPHEN 1 TABLET: 5; 325 TABLET ORAL at 12:58

## 2017-02-24 RX ADMIN — HYDROXYZINE HYDROCHLORIDE 10 MG: 10 TABLET ORAL at 10:26

## 2017-02-24 ASSESSMENT — ACTIVITIES OF DAILY LIVING (ADL): PREVIOUS_RESPONSIBILITIES: MEAL PREP;HOUSEKEEPING;LAUNDRY;SHOPPING;MEDICATION MANAGEMENT;FINANCES;DRIVING

## 2017-02-24 NOTE — PLAN OF CARE
"Problem: Goal Outcome Summary  Goal: Goal Outcome Summary  Outcome: Improving  Patient VSS. IV saline locked, tolerating PO intake well. Preciado catheter dc'd, patient knows to call out for assist to bathroom. Patient able to stand at bedside with walker. CPM off. Ice over knee, dressing CDI. Patient does not have pain, does not want to take strong pain medications as they make her unsteady and \"foggy\" but does agree to take Tylenol this morning. Hemovac remains in place. Capnography reading well throughout night, alarms when patient has episodes of 3 second pause before next breath- patient does not say anything about sleep apnea. Doing well.          "

## 2017-02-24 NOTE — PLAN OF CARE
Problem: Goal Outcome Summary  Goal: Goal Outcome Summary  PT-  Evaluation completed, RX initiated.  Pt reporting minimal pain. Pt required SBA w/ mobility- Pt instructed on WBAT, gait sequence. Amb. 60 feet x1 with RW, SBA. steady gait      REc- TCU vs home w/ home care.   Pt lives alone and requesting TCU stay     Addendum-  Pt w/ limited ambulation this PM due to dizziness; fatigue.   VSS when returned to supine-   Pt may benefit from TCU stay depending on progress

## 2017-02-24 NOTE — PROGRESS NOTES
Patient denies tingling/numbness in RLE, assisted to dangle and was able to stand at bedside with walker and assist x1. Returned to bed, ice pack to knee, CPM initiated.

## 2017-02-24 NOTE — PLAN OF CARE
Problem: Goal Outcome Summary  Goal: Goal Outcome Summary  Outcome: Improving  Patient up with assist of 1 and walker.  Complaining of pain in right knee.  Hesitant to take narcotic pain medication as she doesn't like how they make her feel.  Given atarax with little relief.  Administered 1 tab Norco with some relief.  Ice to knee.  Dressing C, D, I.  Using call light appropriately.

## 2017-02-24 NOTE — CONSULTS
Reason for Referral: DC planning    Presenting Problem: TKA    Cognitive Behavioral Status: awake, alert and oriented    Support system: family    Current Living Situation:   Home Setting[place]   Living Situation: Alone   Function Status / Assistive Device: no assistive devices    Employment/ Financial/ Insurance Concerns: retired          No Insurance issues identified      Housing Concerns: No    Health Care Directives:  Copy in Chart      Assessment: This writer met with pt introduced self and role. Pt is a [facility] pt and is managed by care coordinator Bharti Martino [phone].  At this time pt has been accepted at [facility] (Phone: [phone] Fax: [phone]) for as early as Saturday-if she leaves Saturday she will be private pay, if she leaves Sunday she will be covered under Medicare. Bharti did contact pt and discussed home with home care vs TCU. [facility] (phone: [phone] Fax: [phone]) would be able to see pt daily starting Monday. Bharti to follow up with pt on Saturday and will let CTS know the plan.       Plan: ant ALONZO, [facility], [phone]

## 2017-02-24 NOTE — PROGRESS NOTES
"[facility] Progress Note      Post-operative Day: 1 Day Post-Op    Procedure(s):  Right Total Knee Arthroplasty - Wound Class: I-Clean      Subjective:    Pain: minimal  Chest pain, SOB:  No      Objective:  Blood pressure 115/62, pulse 69, temperature 98.1  F (36.7  C), temperature source Oral, resp. rate 16, height 1.651 m (5' 5\"), weight 78 kg (171 lb 15.3 oz), SpO2 95 %, not currently breastfeeding.    Patient Vitals for the past 24 hrs:   BP Temp Temp src Pulse Heart Rate Resp SpO2 Height Weight   02/24/17 0709 - - - - - - 95 % - -   02/24/17 0705 - - - - - - 90 % - -   02/24/17 0623 - - - - - - 97 % - -   02/24/17 0352 115/62 98.1  F (36.7  C) Oral 69 - 16 97 % - -   02/24/17 0157 - - - - - - 95 % - -   02/23/17 2335 120/51 98.6  F (37  C) Oral 75 - 18 97 % - -   02/23/17 2045 - - - - - - 94 % - -   02/23/17 1952 - - - - - - 96 % - -   02/23/17 1922 104/49 98.3  F (36.8  C) Oral 67 - 18 96 % - -   02/23/17 1757 122/69 - - 80 - - 95 % - -   02/23/17 1737 138/70 - - 80 - - - - -   02/23/17 1707 141/66 - - 74 - - - - -   02/23/17 1637 140/77 - - 85 - - 98 % - -   02/23/17 1607 137/70 - - 85 - 18 97 % - -   02/23/17 1540 - - - - - - 94 % - -   02/23/17 1537 142/80 - - 81 - 16 93 % - -   02/23/17 1507 145/77 97.6  F (36.4  C) Oral 88 - 18 93 % 1.651 m (5' 5\") 78 kg (171 lb 15.3 oz)   02/23/17 1430 127/64 - - 89 - 18 95 % - -   02/23/17 1415 115/62 - - 89 - 18 98 % - -   02/23/17 1406 124/61 97.6  F (36.4  C) Oral - 90 20 100 % - -   02/23/17 0849 148/75 97.8  F (36.6  C) Oral - 75 18 98 % 1.651 m (5' 5\") 76.2 kg (168 lb)       Wt Readings from Last 4 Encounters:   02/23/17 78 kg (171 lb 15.3 oz)   02/06/17 76.2 kg (168 lb)   07/19/16 76.4 kg (168 lb 6.4 oz)   01/06/16 75.8 kg (167 lb)         Motor function, sensation, and circulation intact   Yes  Wound status: incisions are clean dry and intact. Yes  Calf tenderness: Bilateral  No    Pertinent Labs   Lab Results: personally reviewed.     Recent Labs "   Lab Test  02/24/17   0610  02/23/17   0855  02/06/17   0953  07/19/16   1011   12/22/15   0944   INR   --   0.96   --    --    --    --    HGB  10.8*  12.7  12.9  13.3   < >  12.0   HCT   --   39.9  40.4  40.9   --   38.3   MCV   --   98  99  97   --   100   PLT   --   223  235  217   < >  349   NA   --    --   139  141   --   140    < > = values in this interval not displayed.       Plan: Anticoagulation protocol: Lovenox inpatient and then Xarelto at discharge  x 14  days            Pain medications:  oxycodone and tylenol            Weight bearing status:  WBAT            Disposition:  TCU ([facility]) in 1-2 days             Continue cares and rehabilitation     Report completed by:  Dulce Maria Toth PA-C, KAJAL  Date: 2/24/2017  Time: 8:02 AM

## 2017-02-24 NOTE — PROGRESS NOTES
[facility] Medicine Progress Note  Date of Service: 02/24/2017    Assessment & Plan   Lavonne Tidwell is a 76 year old female who presented on 2/23/2017 for scheduled Procedure(s):  ARTHROPLASTY KNEE by Wilfredo Thompson MD and is being followed by the hospital medicine service for co-management of acute and/or chronic perioperative medical problems.      S/p Procedure(s):  ARTHROPLASTY KNEE   1 Day Post-Op    Pain tolerable. Hg 10.8, 12.9 prior to surgery   - pain control, wound cares, physical therapy, occupational therapy and DVT prophylaxis per orthopedic surgery service  - Hg in AM    GERD   Stable.   -continue PTA Zantac    Allergic Rhinitis   Stable. Retains some sinus congestions  -continue PTA Flonase and Mucinex    Urinary Incontinence   s/p bladder sling.   -continue to monitor clinically    Symptomatic Menopause   Has hotflashes, controlled on Premarin. No longer taking.    DVT Prophylaxis: as per orthopedic surgery service - Enoxaparin (Lovenox) SQ as IP with Xarelto on discharge  Code Status: Full Code    Lines: peripheral IV  Preciado catheter: none, d/c'd last night     Discussion: Medically, monitoring Hg in AM and needs to void. VSS.    Disposition: Anticipate discharge 1-2 days pending Hg and urination per medicine and pending pain control and PT per ortho.    Attestation:  I have reviewed today's vital signs, notes, medications, labs and imaging.  Total time: 30 minutes    I have discussed patient with Dr. Ya. Assessment and plan as above.    Ameena Hartmann PA-C      Interval History   Patient was seen in her room this AM. Pain is tolerable, only on Tylenol currently. Discussed taking strong med prior to therapy. Will work with therapy today. Eating and sleeping well. No BM and without flatus. Preciado was d/c'd without void yet.    Previously went to TCU following left knee replacement.    Denies fever, chills, CP, palpitations, SOB, cough, wheezes,  abdominal pain, N/V/D, numbness or tingling in lower extremities. Denies calf pain.    Physical Exam   Temp:  [97.6  F (36.4  C)-98.6  F (37  C)] 98.2  F (36.8  C)  Pulse:  [67-89] 77  Heart Rate:  [75-90] 90  Resp:  [16-20] 16  BP: (104-148)/(49-80) 108/52  SpO2:  [90 %-100 %] 93 %    Weights:   Vitals:    02/23/17 0849 02/23/17 1507   Weight: 76.2 kg (168 lb) 78 kg (171 lb 15.3 oz)    Body mass index is 28.62 kg/(m^2).    General: Appears comfortable sitting up in bed eating breakfast. Alert and orientated. Pleasant and cooperative. NAD. Non-toxic. Appears stated age.   CV: RRR. Radial pulses are 2+ bilaterally. Distal pulses intact bilaterally. No lower extremity edema  Respiratory: No accessory muscle usage. CTA bilaterally without wheezes, crackles or rhonchi.   GI: Soft, non-tender, non-distended. Bowel sounds are normoactive  Skin: Warm, dry, intact. Lower extremities are warm to the touch without skin mottling. Did not assess incision, dressing appear clean and dry.  Musculoskeletal: Muscle tone is appropriate. SCDs in place with right lower extremity wrapped.  Neuro: Sensation to light touch of lower extremities is grossly intact.     Data     Recent Labs  Lab 02/24/17  0610 02/23/17  0855   WBC  --  5.9   HGB 10.8* 12.7   MCV  --  98   PLT  --  223   INR  --  0.96   CR 0.73  --        Recent Labs  Lab 02/24/17  0633   BGM 78      Unresulted Labs Ordered in the Past 30 Days of this Admission     No orders found for last 61 day(s).         Imaging  Recent Results (from the past 24 hour(s))   XR Knee Port Right 1/2 Views    Narrative    This exam was marked as non-reportable because it will not be read by a   radiologist or a [facility] non-radiologist provider.                I reviewed all new labs and imaging results over the last 24 hours. I personally reviewed no images or EKG's today.    Medications     NaCl 100 mL/hr at 02/24/17 0224       psyllium  2 capsule Oral BID     ranitidine  150 mg Oral Daily      sodium chloride (PF)  3 mL Intracatheter Q8H     enoxaparin  40 mg Subcutaneous Q24H     gabapentin  100 mg Oral TID     senna-docusate  1-2 tablet Oral BID     I have discussed patient with Dr. Ya. Assessment and plan as above.    Ameena Hartmann PA-C

## 2017-02-24 NOTE — PROGRESS NOTES
SPIRITUAL HEALTH SERVICES  SPIRITUAL ASSESSMENT Progress Note  [facility] - Med/Surg    PRIMARY FOCUS:     Emotional/spiritual/[place] distress    Support for coping    ILLNESS CIRCUMSTANCES:   Reviewed documentation. Reflective conversation shared with Lavonne which integrated elements of illness and family narratives.     Context of Serious Illness/Symptom(s) - Lavonne stated she had her first TKA a year ago; now this is her second    Resources for Support -   In previous note from first TKA Lavonne stated her family is supportive but busy.    DISTRESS:     Emotional/Existential/Relational Distress - None identified    Spiritual/Zoroastrianism Distress - None    Social/Cultural/Economic Distress - Lavonne is hoping to go to TCU at [facility] again if possible    SPIRITUAL/Shinto COPING:     Sikh/Tonya - Janie Ryder in [place]    Spiritual Practice(s) - Prayer was requested    Emotional/Existential/Relational Connections - Lavonne's cousin was visiting who stated she had been the flowergirl in Lavonne's wedding.  She brought her flowers today.     GOALS OF CARE:    Goals of Care - Lavonne stated she is hoping for TCU on discharge before going home    Meaning/Sense-Making - Family, friends and tonya were all identified as important in this shortened visit    PLAN: No follow visit planned at this time    Capo Rubalcava M.A, [facility]  Staff   Pager [phone]

## 2017-02-24 NOTE — PROGRESS NOTES
02/24/17 0800   Quick Adds   Type of Visit Initial PT Evaluation   Living Environment   Lives With alone   Living Arrangements other (see comments)  ([employer])   Home Accessibility stairs to enter home   Number of Stairs to Enter Home 1   Stair Railings at Home outside, present on right side   Self-Care   Regular Exercise yes   Functional Level Prior   Ambulation 0-->independent   Transferring 0-->independent   Toileting 0-->independent   Bathing 0-->independent   Dressing 0-->independent   Eating 0-->independent   Communication 0-->understands/communicates without difficulty   Swallowing 0-->swallows foods/liquids without difficulty   Cognition 0 - no cognition issues reported   Fall history within last six months yes   Number of times patient has fallen within last six months 1   Prior Functional Level Comment PLOf-  Pt indep. with ambulation w/ no device.  R knee pain.Tripped over chair in dark room    General Information   Onset of Illness/Injury or Date of Surgery - Date 02/23/17   Referring Physician Comfort   Patient/Family Goals Statement S-   Pt  w/ eventula goal of Dc to home; planning on a TCU stay first   Pertinent History of Current Problem (include personal factors and/or comorbidities that impact the POC) degenerative joint disease.  s/p Total knee arthoplasty (Right)   Weight-Bearing Status - RLE weight-bearing as tolerated   General Observations Pt alert, reporting pain t 3-4/10   Cognitive Status Examination   Orientation orientation to person, place and time   Level of Consciousness alert   Follows Commands and Answers Questions able to follow multistep instructions   Personal Safety and Judgment intact   Pain Assessment   Patient Currently in Pain Yes, see Vital Sign flowsheet   Integumentary/Edema   Integumentary/Edema Comments NT- compression wrap in place   Right Knee Extension/Flexion ROM   Right Knee Extension/Flexion AROM - degrees (15-   degrees)   Strength   Strength Comments Pt able to  perform SLR on Right indep   MMT: Knee, Rehab Eval   Knee Flexion - Right Side (3/5) fair, right   Knee Extension - Right Side (3/5) fair, right   Bed Mobility   Bed Mobility Comments SBA supine> sitting   Transfer Skills   Transfer Comments SBA sit ,<> stand w/ walker    Gait   Gait Comments Pt instructed on WBAT, gait sequence.   Amb.  60  feet x1 with RW, SBA. steady gait    Gait Skills   Level of [place]: Gait stand-by assist   Physical Assist/Nonphysical Assist: Gait verbal cues;1 person assist   Weight-Bearing Restrictions: Gait weight-bearing as tolerated   Assistive Device for Transfer: Gait rolling walker   Gait Distance (60 feet)   Gait Analysis   Gait Pattern Used swing-to gait   Gait Deviations Noted decreased step length   Impairments Contributing to Gait Deviations pain;decreased strength   Balance   Balance Comments Good dynamic standing balance    Sensory Examination   Sensory Perception no deficits were identified   Modality Interventions   Planned Modality Interventions Cryotherapy   General Therapy Interventions   Planned Therapy Interventions gait training;ROM;strengthening;progressive activity/exercise;home program guidelines   Clinical Impression   Criteria for Skilled Therapeutic Intervention yes, treatment indicated   PT Diagnosis Right   TKA   Influenced by the following impairments Pain, decreased ROM/ strength   Functional limitations due to impairments Altered mobility   Clinical Presentation Stable/Uncomplicated   Clinical Presentation Rationale Pt a good rehab. canidate; appears motived to return to PLOF   Clinical Decision Making (Complexity) Low complexity   Therapy Frequency` 2 times/day   Predicted Duration of Therapy Intervention (days/wks) 2 days   Anticipated Equipment Needs at Discharge (Pt has a walker, SEC for home use)   Anticipated Discharge Disposition Transitional Care Facility;Home with Home Therapy   Risk & Benefits of therapy have been explained Yes   Patient,  "Family & other staff in agreement with plan of care Yes   [facility] AM-PAC  \"6 Clicks\" V.2 Basic Mobility Inpatient Short Form   1. Turning from your back to your side while in a flat bed without using bedrails? 4 - None   2. Moving from lying on your back to sitting on the side of a flat bed without using bedrails? 3 - A Little   3. Moving to and from a bed to a chair (including a wheelchair)? 3 - A Little   4. Standing up from a chair using your arms (e.g., wheelchair, or bedside chair)? 3 - A Little   5. To walk in hospital room? 3 - A Little   6. Climbing 3-5 steps with a railing? 3 - A Little   Basic Mobility Raw Score (Score out of 24.Lower scores equate to lower levels of function) 19     "

## 2017-02-24 NOTE — PLAN OF CARE
Problem: Goal Outcome Summary  Goal: Goal Outcome Summary  OT: Bertha completed. Pt donned socks while seated requiring SBA with no AE. Pt functionally ambulated in room SBA with use of FWW and following precautions.      OT REC: D/C home as pt's home set-up is ideal for her to return to PLOF and level of [place] is improving, however pt reports wanting to D/C to TCU

## 2017-02-24 NOTE — PROGRESS NOTES
"IV saline locked, tolerating PO intake well. Urine clear yellow, hernandez catheter dc'd. Patient able to stand at bedside with walker, briefs placed as patient states she has incontinence. CPM off. Ice over knee, dressing CDI. Patient does not have pain, does not want to take strong pain medications as they make her unsteady and \"foggy\" but does agree to take Tylenol this morning.   "

## 2017-02-24 NOTE — PROGRESS NOTES
02/24/17 1000   Quick Adds   Type of Visit Initial Occupational Therapy Evaluation   Living Environment   Lives With alone   Home Accessibility stairs to enter home   Number of Stairs to Enter Home 1   Stair Railings at Home outside, present on right side   Functional Level Prior   Ambulation 0-->independent   Transferring 0-->independent   Toileting 0-->independent   Bathing 0-->independent   Dressing 0-->independent   Eating 0-->independent   Communication 0-->understands/communicates without difficulty   Swallowing 0-->swallows foods/liquids without difficulty   Cognition 0 - no cognition issues reported   Fall history within last six months yes   Number of times patient has fallen within last six months 1  (tripped in dark room)   General Information   Onset of Illness/Injury or Date of Surgery - Date 02/23/17   Referring Physician Wilfredo Thompson MD   Patient/Family Goals Statement Pt wishes to go to TCU   Additional Occupational Profile Info/Pertinent History of Current Problem Lavonne Tidwell is a 76 year old female who presented on 2/23/2017 for scheduled Procedure(s):   Precautions/Limitations other (see comments)  (knee precautions)   Cognitive Status Examination   Orientation orientation to person, place and time   Level of Consciousness alert   Able to Follow Commands WNL/WFL   Personal Safety (Cognitive) WNL/WFL   Attention No deficits were identified   Pain Assessment   Patient Currently in Pain No   Range of Motion (ROM)   ROM Quick Adds No deficits were identified   Transfer Skill: Sit to Stand   Level of [place]: Sit/Stand stand-by assist   Physical Assist/Nonphysical Assist: Sit/Stand supervision   Transfer Skill: Sit to Stand full weight-bearing   Assistive Device for Transfer: Sit/Stand standard walker   Transfer Skill: Toilet Transfer   Level of [place]: Toilet stand-by assist   Physical Assist/Nonphysical Assist: Toilet supervision   Assistive Device standard walker   Bathing  "  Level of [place] stand-by assist   Upper Body Dressing   Level of [place]: Dress Upper Body independent   Lower Body Dressing   Level of [place]: Dress Lower Body stand-by assist   Toileting   Level of [place]: Toilet independent   Assistive Device (Pt reports she has a raised toilet seat at home)   Grooming   Level of [place]: Grooming stand-by assist   Eating/Self Feeding   Level of [place]: Eating independent   Instrumental Activities of Daily Living (IADL)   Previous Responsibilities meal prep;housekeeping;laundry;shopping;medication management;finances;driving   Activities of Daily Living Analysis   Impairments Contributing to Impaired Activities of Daily Living pain;post surgical precautions   General Therapy Interventions   Planned Therapy Interventions ADL retraining   Clinical Impression   Criteria for Skilled Therapeutic Interventions Met yes, treatment indicated   OT Diagnosis impaired ADL- dressing   Influenced by the following impairments R TKA- percautions and pain tolerance and    Assessment of Occupational Performance 1-3 Performance Deficits   Identified Performance Deficits LB dressing, activity tolerance   Clinical Decision Making (Complexity) Low complexity   Therapy Frequency daily   Predicted Duration of Therapy Intervention (days/wks) 1-2 days   Anticipated Equipment Needs at Discharge shower chair   Anticipated Discharge Disposition Home with Assist   Risks and Benefits of Treatment have been explained. Yes   Patient, Family & other staff in agreement with plan of care Yes   [facility] AM-PAC  \"6 Clicks\" Daily Activity Inpatient Short Form   1. Putting on and taking off regular lower body clothing? 3 - A Little   2. Bathing (including washing, rinsing, drying)? 3 - A Little   3. Toileting, which includes using toilet, bedpan or urinal? 3 - A Little   4. Putting on and taking off regular upper body clothing? 4 - None   5. Taking care of personal grooming " such as brushing teeth? 4 - None   6. Eating meals? 4 - None   Daily Activity Raw Score (Score out of 24.Lower scores equate to lower levels of function) 21   Total Evaluation Time   Total Evaluation Time (Minutes) 20

## 2017-02-25 ENCOUNTER — APPOINTMENT (OUTPATIENT)
Dept: PHYSICAL THERAPY | Facility: CLINIC | Age: 77
DRG: 470 | End: 2017-02-25
Attending: ORTHOPAEDIC SURGERY
Payer: MEDICARE

## 2017-02-25 ENCOUNTER — APPOINTMENT (OUTPATIENT)
Dept: OCCUPATIONAL THERAPY | Facility: CLINIC | Age: 77
DRG: 470 | End: 2017-02-25
Attending: ORTHOPAEDIC SURGERY
Payer: MEDICARE

## 2017-02-25 LAB — HGB BLD-MCNC: 10.6 G/DL (ref 11.7–15.7)

## 2017-02-25 PROCEDURE — 85018 HEMOGLOBIN: CPT | Performed by: ORTHOPAEDIC SURGERY

## 2017-02-25 PROCEDURE — 12000000 ZZH R&B MED SURG/OB

## 2017-02-25 PROCEDURE — 40000193 ZZH STATISTIC PT WARD VISIT

## 2017-02-25 PROCEDURE — 97535 SELF CARE MNGMENT TRAINING: CPT | Mod: GO

## 2017-02-25 PROCEDURE — 25000128 H RX IP 250 OP 636: Performed by: ORTHOPAEDIC SURGERY

## 2017-02-25 PROCEDURE — 40000133 ZZH STATISTIC OT WARD VISIT

## 2017-02-25 PROCEDURE — 97110 THERAPEUTIC EXERCISES: CPT | Mod: GP

## 2017-02-25 PROCEDURE — A9270 NON-COVERED ITEM OR SERVICE: HCPCS | Mod: GY | Performed by: ORTHOPAEDIC SURGERY

## 2017-02-25 PROCEDURE — 99232 SBSQ HOSP IP/OBS MODERATE 35: CPT | Performed by: FAMILY MEDICINE

## 2017-02-25 PROCEDURE — 97116 GAIT TRAINING THERAPY: CPT | Mod: GP

## 2017-02-25 PROCEDURE — 25000132 ZZH RX MED GY IP 250 OP 250 PS 637: Mod: GY | Performed by: ORTHOPAEDIC SURGERY

## 2017-02-25 PROCEDURE — 36415 COLL VENOUS BLD VENIPUNCTURE: CPT | Performed by: ORTHOPAEDIC SURGERY

## 2017-02-25 RX ADMIN — GABAPENTIN 100 MG: 100 CAPSULE ORAL at 07:55

## 2017-02-25 RX ADMIN — PSYLLIUM 1.04 G: 0.52 CAPSULE ORAL at 19:33

## 2017-02-25 RX ADMIN — GABAPENTIN 100 MG: 100 CAPSULE ORAL at 19:33

## 2017-02-25 RX ADMIN — RANITIDINE HYDROCHLORIDE 150 MG: 150 TABLET, FILM COATED ORAL at 07:55

## 2017-02-25 RX ADMIN — ENOXAPARIN SODIUM 40 MG: 40 INJECTION SUBCUTANEOUS at 12:03

## 2017-02-25 RX ADMIN — SENNOSIDES AND DOCUSATE SODIUM 2 TABLET: 8.6; 5 TABLET ORAL at 19:33

## 2017-02-25 RX ADMIN — GABAPENTIN 100 MG: 100 CAPSULE ORAL at 14:40

## 2017-02-25 RX ADMIN — SENNOSIDES AND DOCUSATE SODIUM 2 TABLET: 8.6; 5 TABLET ORAL at 07:55

## 2017-02-25 RX ADMIN — ACETAMINOPHEN 1000 MG: 500 TABLET, FILM COATED ORAL at 00:17

## 2017-02-25 RX ADMIN — PSYLLIUM 1.04 G: 0.52 CAPSULE ORAL at 08:02

## 2017-02-25 RX ADMIN — ACETAMINOPHEN 1000 MG: 500 TABLET, FILM COATED ORAL at 14:39

## 2017-02-25 NOTE — PLAN OF CARE
Problem: Knee Replacement, Total (Adult)  Goal: Signs and Symptoms of Listed Potential Problems Will be Absent or Manageable (Knee Replacement, Total)  Signs and symptoms of listed potential problems will be absent or manageable by discharge/transition of care (reference Knee Replacement, Total (Adult) CPG).   Outcome: Improving  Pain controlled with oral medications. Patient feeling less loopy with pain medications, willing to continue with norco. Ice to knee. Plan to reapply CPM tonight. Up with assist. Appetite good. Will continue to monitor.

## 2017-02-25 NOTE — PLAN OF CARE
Problem: Goal Outcome Summary  Goal: Goal Outcome Summary  Outcome: Improving  Dressing change done this am, no signs/symptoms of problems. Hemovac removed. Pressure dressing applied. Patient using only tylenol for pain and reports adequate relief of pain with this.

## 2017-02-25 NOTE — PROGRESS NOTES
[facility]      Subjective:  Less pain  Feels good  Wants to go to [facility]    Review of Systems:  C: NEGATIVE for fever, chills, change in weight  E/M: NEGATIVE for ear, mouth and throat problems  R: NEGATIVE for significant cough or SOB  CV: NEGATIVE for chest pain, palpitations or peripheral edema    Physical Exam:  Vitals Were Reviewed    Patient Vitals for the past 16 hrs:   BP Temp Temp src Pulse Heart Rate Resp SpO2   02/25/17 0700 132/52 98.4  F (36.9  C) Oral - 72 16 94 %   02/25/17 0600 - 98.4  F (36.9  C) Oral - - - -   02/24/17 2322 130/69 99.6  F (37.6  C) Oral 81 - 16 95 %   02/24/17 1914 135/65 98.3  F (36.8  C) Oral 83 - 18 92 %         Intake/Output Summary (Last 24 hours) at 02/25/17 1048  Last data filed at 02/25/17 0800   Gross per 24 hour   Intake              900 ml   Output              105 ml   Net              795 ml       GENERAL APPEARANCE: healthy, alert and no distress  EYES: conjunctiva clear, eyes grossly normal  RESP: lungs clear to auscultation - no rales, rhonchi or wheezes  CV: regular rate and rhythm, normal S1 S2, no S3 or S4 and no murmur, click or rub   ABDOMEN: soft, nontender, no HSM or masses and bowel sounds normal  MS: no clubbing, cyanosis; no edema  SKIN: clear without significant rashes or lesions    Lab:  Recent Labs   Lab Test  02/24/17   0610  02/06/17   0953  07/19/16   1011   NA   --   139  141   POTASSIUM   --   4.3  4.4   CHLORIDE   --   103  105   CO2   --   31  28   ANIONGAP   --   5  8   GLC   --   93  89   BUN   --   13  14   CR  0.73  0.77  0.73   MANI   --   9.2  9.4     CBC RESULTS:   Recent Labs   Lab Test  02/25/17   0630  02/24/17   0610  02/23/17   0855  02/06/17   0953   WBC   --    --   5.9  6.2   RBC   --    --   4.09  4.08   HGB  10.6*  10.8*  12.7  12.9   HCT   --    --   39.9  40.4   PLT   --    --   223  235       Results for orders placed or performed during the hospital encounter of 02/23/17 (from the past 24 hour(s))    Hemoglobin   Result Value Ref Range    Hemoglobin 10.6 (L) 11.7 - 15.7 g/dL       Assessment and Plan:        S/p Procedure(s):  ARTHROPLASTY KNEE  2nd Day Post-Op        GERD  Stable.   -continue PTA Zantac     Allergic Rhinitis  Stable. Retains some sinus congestions  -continue PTA Flonase and Mucinex     Urinary Incontinence  s/p bladder sling.   -continue to monitor clinically     Symptomatic Menopause  Has hotflashes, controlled on Premarin. No longer taking.     DVT Prophylaxis: as per orthopedic surgery service   Code Status: Full Code     Lines: peripheral IV       Disposition  To [facility] TCU upon dc

## 2017-02-25 NOTE — PLAN OF CARE
Problem: Goal Outcome Summary  Goal: Goal Outcome Summary  Outcome: Improving  Pt amb with ' with CGA. Transfers sit <> supine with min A of 1. Performed LE exs x 10.     REC: TCU

## 2017-02-25 NOTE — PLAN OF CARE
Problem: Goal Outcome Summary  Goal: Goal Outcome Summary  Pt seen for ADLs today.  Pt requiring min A to hold RLE while she scoots her hips forward in reclining chair. SBA and 2ww for all other short-distance mobility.   Tolerates standing at sink for 9 mins to complete ADLs.   Pt is able to ind doff/don socks on BLE, requires extra time with RLE.   Pt reporting she is concerned about dc home d/t experiencing increased pain/stiffness today. She is considering TCU at dc.      Recommenation: Home vs TCU, depending on progress.

## 2017-02-25 NOTE — PLAN OF CARE
"Problem: Goal Outcome Summary  Goal: Goal Outcome Summary  Outcome: Improving  Pt given PRN 1000 mg Tylenol x1 this shift. Right knee CMS is intact, dressing c/d/i; Hemovac is intact. She is using the CPM tonight, up to 45 degrees; pt is tolerating well, using cold pack to knee. Pt is ambulating with SBA using a walker. Writer spoke with pt about discharge plan, pt stated feeling unsure of discharging home and would prefer discharge to TCU instead \"I don't know if I am ready yet, I would prefer to go to that other place first before going home\". Call light is within reach, pt is able to make her needs be known.       "

## 2017-02-26 ENCOUNTER — APPOINTMENT (OUTPATIENT)
Dept: PHYSICAL THERAPY | Facility: CLINIC | Age: 77
DRG: 470 | End: 2017-02-26
Attending: ORTHOPAEDIC SURGERY
Payer: MEDICARE

## 2017-02-26 ENCOUNTER — APPOINTMENT (OUTPATIENT)
Dept: OCCUPATIONAL THERAPY | Facility: CLINIC | Age: 77
DRG: 470 | End: 2017-02-26
Attending: ORTHOPAEDIC SURGERY
Payer: MEDICARE

## 2017-02-26 VITALS
DIASTOLIC BLOOD PRESSURE: 72 MMHG | SYSTOLIC BLOOD PRESSURE: 111 MMHG | RESPIRATION RATE: 20 BRPM | TEMPERATURE: 98.6 F | WEIGHT: 175 LBS | BODY MASS INDEX: 29.12 KG/M2 | HEART RATE: 85 BPM

## 2017-02-26 VITALS
HEIGHT: 65 IN | SYSTOLIC BLOOD PRESSURE: 126 MMHG | OXYGEN SATURATION: 97 % | RESPIRATION RATE: 16 BRPM | DIASTOLIC BLOOD PRESSURE: 76 MMHG | BODY MASS INDEX: 28.65 KG/M2 | TEMPERATURE: 97.8 F | WEIGHT: 171.96 LBS | HEART RATE: 81 BPM

## 2017-02-26 LAB — PLATELET # BLD AUTO: 199 10E9/L (ref 150–450)

## 2017-02-26 PROCEDURE — 40000193 ZZH STATISTIC PT WARD VISIT

## 2017-02-26 PROCEDURE — 97110 THERAPEUTIC EXERCISES: CPT | Mod: GP

## 2017-02-26 PROCEDURE — 97116 GAIT TRAINING THERAPY: CPT | Mod: GP

## 2017-02-26 PROCEDURE — 85049 AUTOMATED PLATELET COUNT: CPT | Performed by: ORTHOPAEDIC SURGERY

## 2017-02-26 PROCEDURE — 25000128 H RX IP 250 OP 636: Performed by: ORTHOPAEDIC SURGERY

## 2017-02-26 PROCEDURE — 97535 SELF CARE MNGMENT TRAINING: CPT | Mod: GO

## 2017-02-26 PROCEDURE — 36415 COLL VENOUS BLD VENIPUNCTURE: CPT | Performed by: ORTHOPAEDIC SURGERY

## 2017-02-26 PROCEDURE — 40000133 ZZH STATISTIC OT WARD VISIT

## 2017-02-26 PROCEDURE — A9270 NON-COVERED ITEM OR SERVICE: HCPCS | Mod: GY | Performed by: ORTHOPAEDIC SURGERY

## 2017-02-26 PROCEDURE — 25000132 ZZH RX MED GY IP 250 OP 250 PS 637: Mod: GY | Performed by: ORTHOPAEDIC SURGERY

## 2017-02-26 RX ORDER — AMOXICILLIN 250 MG
1-2 CAPSULE ORAL 2 TIMES DAILY
Qty: 100 TABLET | DISCHARGE
Start: 2017-02-26 | End: 2017-03-27

## 2017-02-26 RX ORDER — HYDROXYZINE HYDROCHLORIDE 10 MG/1
10 TABLET, FILM COATED ORAL EVERY 6 HOURS PRN
Qty: 120 TABLET | DISCHARGE
Start: 2017-02-26 | End: 2017-03-01

## 2017-02-26 RX ORDER — HYDROCODONE BITARTRATE AND ACETAMINOPHEN 5; 325 MG/1; MG/1
1-2 TABLET ORAL EVERY 4 HOURS PRN
Qty: 50 TABLET | Refills: 0 | Status: SHIPPED | DISCHARGE
Start: 2017-02-26 | End: 2017-03-27

## 2017-02-26 RX ADMIN — ENOXAPARIN SODIUM 40 MG: 40 INJECTION SUBCUTANEOUS at 11:59

## 2017-02-26 RX ADMIN — GABAPENTIN 100 MG: 100 CAPSULE ORAL at 07:27

## 2017-02-26 RX ADMIN — RANITIDINE HYDROCHLORIDE 150 MG: 150 TABLET, FILM COATED ORAL at 07:28

## 2017-02-26 RX ADMIN — PSYLLIUM 1.04 G: 0.52 CAPSULE ORAL at 07:28

## 2017-02-26 RX ADMIN — SENNOSIDES AND DOCUSATE SODIUM 1 TABLET: 8.6; 5 TABLET ORAL at 07:28

## 2017-02-26 NOTE — PROGRESS NOTES
Name: Lavonne Tidwell    MRN#: [id number]    Reason for Hospitalization: degenerative joint disease  Primary localized osteoarthrosis, lower leg    Discharge Date: 2/26/2017    Patient / Family response to discharge plan: [facility] (Phone: [phone] Fax: [phone]). Pt will need transport on dc.     PAS-RR    Per DHS regulation, CTS team completed and submitted PAS-RR to [place] [employer]ging Direct Connect via the Senior [employer] Line. CTS team advised SNF and they are aware a PAS-RR has been submitted.     CTS team reviewed with pt or health care agent that they may be contacted for a follow up appointment within 10 days of hospital discharge if SNF stay is <30 days. Contact information for [employer] was also provided.     Pt or health care agent verbalized understanding.     PAS-RR #  [id number]    Follow-Up Appt: Future Appointments  Date Time Provider Department Center   2/26/2017 2:30 PM [facility]   3/6/2017 10:00 AM Francheska Reilly, PT [facility]       Other Providers (Care Coordinator, County Services, PCA services etc): No    Discharge Disposition: transitional care unit    Ashanti ALONZO, LICSW, [facility] [phone]

## 2017-02-26 NOTE — PLAN OF CARE
Problem: Goal Outcome Summary  Goal: Goal Outcome Summary  Outcome: Improving  Pt denies pain this shift, using cold ice pack to knee. CMS intact, dressing c/d/i. Ambulates with SBA using a walker. CPM on for 4 hours tonight, up to 50 degrees. Pt is calm and pleasant, able to make her needs be know.

## 2017-02-26 NOTE — PHARMACY
Discharge medication review for this patient is complete. Pharmacist assisted with medication reconciliation of discharge medications with prior to admission medications.         Patient's Discharge Medication List  - medications as listed on After Visit Summary (AVS)     Review of your medicines      START taking       Dose / Directions    HYDROcodone-acetaminophen 5-325 MG per tablet   Commonly known as:  NORCO        Dose:  1-2 tablet   Take 1-2 tablets by mouth every 4 hours as needed for moderate to severe pain   Quantity:  50 tablet   Refills:  0       hydrOXYzine 10 MG tablet   Commonly known as:  ATARAX        Dose:  10 mg   Take 1 tablet (10 mg) by mouth every 6 hours as needed for itching   Quantity:  120 tablet   Refills:  0       rivaroxaban ANTICOAGULANT 10 MG Tabs tablet   Commonly known as:  XARELTO        Dose:  10 mg   Take 1 tablet (10 mg) by mouth daily (with dinner)   Quantity:  11 tablet   Refills:  0       senna-docusate 8.6-50 MG per tablet   Commonly known as:  SENOKOT-S;PERICOLACE        Dose:  1-2 tablet   Take 1-2 tablets by mouth 2 times daily   Quantity:  100 tablet   Refills:  0         CONTINUE these medicines which have NOT CHANGED       Dose / Directions    BIOTIN PO        Dose:  500 mg   Take 500 mg by mouth daily   Refills:  0       CALCIUM + D PO        2 TABLET DAILY   Refills:  0       CENTRUM SILVER PO   Used for:  Routine general medical examination at a health care facility        1 TABLET DAILY   Refills:  0       docusate calcium 240 MG capsule   Commonly known as:  SURFAK        Dose:  240 mg   Take 240 mg by mouth 2 times daily as needed for constipation   Refills:  0       fluticasone 50 MCG/ACT spray   Commonly known as:  FLONASE   Used for:  Chronic rhinitis        Dose:  2 spray   Spray 2 sprays into both nostrils daily As needed   Quantity:  16 g   Refills:  3       Garlic 1000 MG Caps        Dose:  1000 mg   Take 1,000 mg by mouth daily   Refills:  0       Grape  Seed 50 MG Tabs        4 capsules daily   Refills:  0       guaiFENesin 600 MG 12 hr tablet   Commonly known as:  MUCINEX        Dose:  1200 mg   Take 1,200 mg by mouth 2 times daily as needed for congestion   Refills:  0       METAMUCIL 0.52 G capsule   Generic drug:  psyllium        Dose:  2 capsule   Take 2 capsules by mouth 2 times daily   Refills:  0       TYLENOL PO        Dose:  1000 mg   Take 1,000 mg by mouth 2 times daily as needed for mild pain or fever   Refills:  0       vitamin E 400 UNIT capsule        Dose:  400 Units   Take 400 Units by mouth daily   Refills:  0       ZANTAC 150 MG tablet   Generic drug:  ranitidine        Dose:  150 mg   Take 150 mg by mouth daily   Refills:  0            Where to get your medicines      Some of these will need a paper prescription and others can be bought over the counter. Ask your nurse if you have questions.     You don't need a prescription for these medications      hydrOXYzine 10 MG tablet     rivaroxaban ANTICOAGULANT 10 MG Tabs tablet     senna-docusate 8.6-50 MG per tablet         Information about where to get these medications is not yet available     ! Ask your nurse or doctor about these medications      HYDROcodone-acetaminophen 5-325 MG per tablet

## 2017-02-26 NOTE — PLAN OF CARE
Problem: Goal Outcome Summary  Goal: Goal Outcome Summary  Outcome: Adequate for Discharge Date Met:  02/26/17  Physical Therapy Discharge Summary     Reason for therapy discharge:    Discharged to transitional care facility.     Progress towards therapy goal(s). See goals on Care Plan in [facility] electronic health record for goal details.  Goals met     Therapy recommendation(s):    Continued therapy is recommended.  Rationale/Recommendations:  R knee ROM and strengthening, gait training and bed mobility.      Marian Summers PT

## 2017-02-26 NOTE — PLAN OF CARE
Problem: Goal Outcome Summary  Goal: Goal Outcome Summary  Occupational Therapy Discharge Summary     Reason for therapy discharge:    Discharged to transitional care facility.     Progress towards therapy goal(s). See goals on Care Plan in [facility] electronic health record for goal details.  Goals met     Therapy recommendation(s):    Continued therapy is recommended.  Rationale/Recommendations:  Pt to cont OT at TCU to maximize safety and independence with ADLs.

## 2017-02-26 NOTE — DISCHARGE SUMMARY
[facility] Orthopedics Discharge Summary                                  [facility]     BHUPENDRA VIERA [id number]   Age: 76 year old  PCP: Elvira Kowalski, [phone] 1940     Date of Admission:  2/23/2017  Date of Discharge::  2/26/2017  Discharge Physician:  Zander Marquez    Code status:  Prior    Admission Information:  Admission Diagnosis:  degenerative joint disease  Primary localized osteoarthrosis, lower leg    Post-Operative Day: 3 Days Post-Op     Reason for admission:  The patient was admitted for the following:Procedure(s) (LRB):  ARTHROPLASTY KNEE (Right)    Principal Problem:    Status post total right knee replacement  Active Problems:    Primary localized osteoarthrosis, lower leg      Allergies:  Codeine    Following the procedure noted above the patient was transferred to the post-op floor and started on:    Therapy:  physical therapy and occupational therapy  Anticoagulation Plan: Lovenox inpatient and then Xarelto at discharge  for 14 days  Pain Management: norco, tylenol and vistaril  Weight bearing status: Weight bearing as tolerated     The patient was followed and co-managed by the hospitalist service during the inpatient treatment course  Complications:  None  Consultations:  None     Pertinent Labs   Lab Results: personally reviewed.     Recent Labs   Lab Test  02/26/17   0702  02/25/17   0630  02/24/17   0610  02/23/17   0855  02/06/17   0953  07/19/16   1011   12/22/15   0944   INR   --    --    --   0.96   --    --    --    --    HGB   --   10.6*  10.8*  12.7  12.9  13.3   < >  12.0   HCT   --    --    --   39.9  40.4  40.9   --   38.3   MCV   --    --    --   98  99  97   --   100   PLT  199   --    --   223  235  217   < >  349   NA   --    --    --    --   139  141   --   140    < > = values in this interval not displayed.          Discharge Information:  Condition at discharge: Stable  Discharge destination:  Discharged to rehabilitation facility      Medications at discharge:  Current Discharge Medication List      START taking these medications    Details   hydrOXYzine (ATARAX) 10 MG tablet Take 1 tablet (10 mg) by mouth every 6 hours as needed for itching  Qty: 120 tablet    Associated Diagnoses: Status post total right knee replacement      HYDROcodone-acetaminophen (NORCO) 5-325 MG per tablet Take 1-2 tablets by mouth every 4 hours as needed for moderate to severe pain  Qty: 50 tablet, Refills: 0    Associated Diagnoses: Status post total right knee replacement      senna-docusate (SENOKOT-S;PERICOLACE) 8.6-50 MG per tablet Take 1-2 tablets by mouth 2 times daily  Qty: 100 tablet    Associated Diagnoses: Status post total right knee replacement      rivaroxaban ANTICOAGULANT (XARELTO) 10 MG TABS tablet Take 1 tablet (10 mg) by mouth daily (with dinner)  Qty: 11 tablet    Associated Diagnoses: Status post total right knee replacement         CONTINUE these medications which have NOT CHANGED    Details   Acetaminophen (TYLENOL PO) Take 1,000 mg by mouth 2 times daily as needed for mild pain or fever      guaiFENesin (MUCINEX) 600 MG 12 hr tablet Take 1,200 mg by mouth 2 times daily as needed for congestion      fluticasone (FLONASE) 50 MCG/ACT spray Spray 2 sprays into both nostrils daily As needed  Qty: 16 g, Refills: 3    Associated Diagnoses: Chronic rhinitis      vitamin E 400 UNIT capsule Take 400 Units by mouth daily      Garlic 1000 MG CAPS Take 1,000 mg by mouth daily      BIOTIN PO Take 500 mg by mouth daily      ranitidine (ZANTAC) 150 MG tablet Take 150 mg by mouth daily       docusate calcium (SURFAK) 240 MG capsule Take 240 mg by mouth 2 times daily as needed for constipation      Grape Seed 50 MG TABS 4 capsules daily      psyllium (METAMUCIL) 0.52 G capsule Take 2 capsules by mouth 2 times daily       CENTRUM SILVER OR 1 TABLET DAILY    Associated Diagnoses: Routine general medical examination at a health care facility      CALCIUM + D OR 2  TABLET DAILY                        Follow-Up Care:  Patient should be seen in the office in 14 days by the Orthopedic Surgeon/Physician Assistant.  Call [phone] for appointment or questions.    Zander Marquez

## 2017-02-26 NOTE — PLAN OF CARE
[facility] DISCHARGE NOTE    Patient discharged to transitional care unit at 12:05 PM via wheel chair. Accompanied by significant other and staff. Discharge instructions reviewed with patient, opportunity offered to ask questions. Prescriptions sent with patient to fill . All belongings sent with patient.    Ghanshyam Kent

## 2017-02-26 NOTE — PROGRESS NOTES
"[facility] Orthopaedics Progress Note      Post-operative Day: 3 Days Post-Op    Procedure(s):  Right Total Knee Arthroplasty - Wound Class: I-Clean      Subjective:    Pain: minimal  Chest pain, SOB:  No      Objective:  Blood pressure 132/75, pulse 81, temperature 97.8  F (36.6  C), temperature source Oral, resp. rate 16, height 1.651 m (5' 5\"), weight 78 kg (171 lb 15.3 oz), SpO2 95 %, not currently breastfeeding.    Patient Vitals for the past 24 hrs:   BP Temp Temp src Pulse Heart Rate Resp SpO2   02/26/17 0727 132/75 97.8  F (36.6  C) Oral - 71 16 95 %   02/25/17 2348 134/66 99.5  F (37.5  C) Oral 81 - 16 92 %   02/25/17 1955 111/62 98.5  F (36.9  C) Oral 78 - 18 92 %   02/25/17 1537 135/71 99.4  F (37.4  C) Oral 73 - 16 94 %   02/25/17 1055 127/67 98.7  F (37.1  C) Oral 76 - 16 93 %       Wt Readings from Last 4 Encounters:   02/23/17 78 kg (171 lb 15.3 oz)   02/06/17 76.2 kg (168 lb)   07/19/16 76.4 kg (168 lb 6.4 oz)   01/06/16 75.8 kg (167 lb)         Motor function, sensation, and circulation intact   Yes  Wound status: incisions are clean dry and intact. Yes  Calf tenderness: Bilateral  No    Pertinent Labs   Lab Results: personally reviewed.     Recent Labs   Lab Test  02/26/17   0702  02/25/17   0630  02/24/17   0610  02/23/17   0855  02/06/17   0953  07/19/16   1011   12/22/15   0944   INR   --    --    --   0.96   --    --    --    --    HGB   --   10.6*  10.8*  12.7  12.9  13.3   < >  12.0   HCT   --    --    --   39.9  40.4  40.9   --   38.3   MCV   --    --    --   98  99  97   --   100   PLT  199   --    --   223  235  217   < >  349   NA   --    --    --    --   139  141   --   140    < > = values in this interval not displayed.       Plan: Anticoagulation protocol: Lovenox inpatient and then Xarelto at discharge  x 14  days            Pain medications:  norco, tylenol and vistaril            Weight bearing status:  WBAT            Disposition:  TCU today             Continue cares and " rehabilitation     Report completed by:  Zander Marquez PA-C  Date: 2/26/2017  Time: 10:54 AM

## 2017-02-26 NOTE — PLAN OF CARE
Problem: Knee Replacement, Total (Adult)  Goal: Signs and Symptoms of Listed Potential Problems Will be Absent or Manageable (Knee Replacement, Total)  Signs and symptoms of listed potential problems will be absent or manageable by discharge/transition of care (reference Knee Replacement, Total (Adult) CPG).   Outcome: Improving  Pain controlled with tylenol, no stronger medications needed. CMS intact. Dressing CD&I. Voiding. Appetite good. Running low grade temps occasionally, encouraged patient to use IS, cough and deep breathe. Will continue to monitor.

## 2017-02-27 NOTE — PROGRESS NOTES
[place] [facility]  PRIMARY CARE PROVIDER AND CLINIC:  Elvira Kowalski [facility] [id number]  / [facility] 5*  Chief Complaint   Patient presents with     Hospital F/U       HPI:   Obtained from the patient, medical record and from the medial staffs.     Lavonne Tidwell is a 76 year old  (1940),admitted to the [facility] from [facility].  Hospital stay 2/23/17 through 2/26/17.  Admitted to this facility for  rehab, medical management and nursing care.     Current issues are:      Aftercare following right knee joint replacement surgery  Status post total right knee replacement  Osteoarthritis of right knee, unspecified osteoarthritis type  - Started on OT/PT, reports making a progress  - Reports pain is 3/10 when severe, aching, aggravated with movements, better with rest and meds    Anemia due to blood loss, acute    - No apparent bleeding  - denies fatigue     Advanced directives, counseling/discussion  - Physician Orders for Life-Sustaining Treatment (POLST)    Treatment options when the patient is not breathing and has no pulse   Resuscitate      Treatment options when the patient has a pulse and/or is breathing   Aggressive treatment      Antibiotics   Aggressive treatment      Artificially administered fluids and nutrition   Long-term feeding tube and/or IV fluids      Discussed with the patient      Name of agent or guardian: Laura (daughter in Law)        CODE STATUS/ADVANCE DIRECTIVES DISCUSSION:   CPR/Full code   Patient's living condition: lives alone    ALLERGIES:Codeine  PAST MEDICAL HISTORY:  has a past medical history of Arthritis (2012); History of blood transfusion (1961); and Ovarian cysts (1974). She also has no past medical history of Cancer (H); Cerebral infarction (H); Congestive heart failure, unspecified; COPD (chronic obstructive pulmonary disease) (H); Coronary artery disease; CVA (cerebral infarction);  Depressive disorder; Diabetes (H); Difficult intubation; Heart disease; Hypertension; Malignant hyperthermia; PONV (postoperative nausea and vomiting); Thyroid disease; or Uncomplicated asthma.  PAST SURGICAL HISTORY:  has a past surgical history that includes surgical history of - (07/30/1998); hysterectomy, pauline (1974); surgical history of - (1974); surgical history of - (3/09); Abdomen surgery (1973 & 1974); appendectomy (1952); biopsy; colonoscopy; orthopedic surgery (2007 & 2009); Phacoemulsification with standard intraocular lens implant (Left, 8/20/2015); Phacoemulsification with standard intraocular lens implant (Right, 9/17/2015); Arthroplasty knee (Left, 1/6/2016); Abdomen surgery (2008); and Arthroplasty knee (Right, 2/23/2017).  FAMILY HISTORY: family history includes C.A.D. in her father; CANCER in her maternal grandmother and mother; CEREBROVASCULAR DISEASE in her father and maternal grandfather; Colon Cancer in her maternal grandfather; Coronary Artery Disease in her father; DIABETES in an other family member; HEART DISEASE in her father; Other Cancer in her maternal grandmother and mother. There is no history of Breast Cancer.  SOCIAL HISTORY:  reports that she quit smoking about 45 years ago. Her smoking use included Cigarettes. She started smoking about 48 years ago. She has a 2.50 pack-year smoking history. She has never used smokeless tobacco. She reports that she drinks alcohol. She reports that she does not use illicit drugs.    Post Discharge Medication Reconciliation Status: discharge medications reconciled and changed, per note/orders (see AVS).  Current Outpatient Prescriptions   Medication Sig Dispense Refill     hydrOXYzine (ATARAX) 10 MG tablet Take 1 tablet (10 mg) by mouth every 6 hours as needed for itching 120 tablet      HYDROcodone-acetaminophen (NORCO) 5-325 MG per tablet Take 1-2 tablets by mouth every 4 hours as needed for moderate to severe pain 50 tablet 0     senna-docusate  (SENOKOT-S;PERICOLACE) 8.6-50 MG per tablet Take 1-2 tablets by mouth 2 times daily 100 tablet      rivaroxaban ANTICOAGULANT (XARELTO) 10 MG TABS tablet Take 1 tablet (10 mg) by mouth daily (with dinner) 11 tablet      Acetaminophen (TYLENOL PO) Take 1,000 mg by mouth 2 times daily as needed for mild pain or fever       guaiFENesin (MUCINEX) 600 MG 12 hr tablet Take 1,200 mg by mouth 2 times daily as needed for congestion       fluticasone (FLONASE) 50 MCG/ACT spray Spray 2 sprays into both nostrils daily As needed 16 g 3     ranitidine (ZANTAC) 150 MG tablet Take 150 mg by mouth daily        docusate calcium (SURFAK) 240 MG capsule Take 240 mg by mouth 2 times daily as needed for constipation       psyllium (METAMUCIL) 0.52 G capsule Take 2 capsules by mouth 2 times daily        CENTRUM SILVER OR 1 TABLET DAILY       CALCIUM + D OR 2 TABLET DAILY         ROS:  10 point ROS of systems including Constitutional, Eyes, Respiratory, Cardiovascular, Gastroenterology, Genitourinary, Integumentary, Muscularskeletal, Psychiatric were all negative except for pertinent positives noted in my HPI.    Exam:  /72  Pulse 85  Temp 98.6  F (37  C)  Resp 20  Wt 175 lb (79.4 kg)  BMI 29.12 kg/m2  GENERAL APPEARANCE:  Alert, in no distress  ENT:  Mouth and posterior oropharynx normal, moist mucous membranes, normal hearing acuity  EYES:  EOM, conjunctivae, lids, pupils and irises normal  NECK:  No adenopathy,masses or thyromegaly  RESP:  respiratory effort and palpation of chest normal, lungs clear to auscultation , no respiratory distress  CV:  Palpation and auscultation of heart done , regular rate and rhythm, no murmur, rub, or gallop  ABDOMEN:  normal bowel sounds, soft, nontender, no hepatosplenomegaly or other masses  M/S:   Gait and station abnormal use a walker, right knee jt ROM decreased due to surgery  SKIN:  Inspection of skin and subcutaneous tissue baseline, Palpation of skin and subcutaneous tissue baseline, wound  healing well, no signs of infection right knee joint.   NEURO:   Cranial nerves 2-12 are normal tested and grossly at patient's baseline, no purposeful movement in upper and lower extremities  PSYCH:  oriented X 3, normal insight, judgement and memory    Lab/Diagnostic data:     WBC   Date Value Ref Range Status   02/23/2017 5.9 4.0 - 11.0 10e9/L Final   ]  Hemoglobin   Date Value Ref Range Status   02/25/2017 10.6 (L) 11.7 - 15.7 g/dL Final   02/24/2017 10.8 (L) 11.7 - 15.7 g/dL Final   ]  Last Basic Metabolic Panel:  Lab Results   Component Value Date     02/06/2017      Lab Results   Component Value Date    POTASSIUM 4.3 02/06/2017     Lab Results   Component Value Date    MANI 9.2 02/06/2017     Lab Results   Component Value Date    CO2 31 02/06/2017     Lab Results   Component Value Date    BUN 13 02/06/2017     Lab Results   Component Value Date    CR 0.73 02/24/2017     Lab Results   Component Value Date    GLC 93 02/06/2017       ASSESSMENT/PLAN:  Aftercare following right knee joint replacement surgery  Status post total right knee replacement  Osteoarthritis of right knee, unspecified osteoarthritis type  - Physical function improving with OT/PT, continue.  - Analgesia optimal  - Continue DVT Prophylaxis according to Orthopedist's recommendations  - Follow on the surgeon's recommendations      Anemia due to blood loss, acute  -clinically stable  - HH stable  - no need to recheck.       Advanced directives, counseling/discussion  - see above.     Chronic constipation:  - dc fiber and docusate, start senna 8.6 mg po 1-2 tab bid prn    Orders:  - DC Atarax  - DC Docusate ( stool softener, have minimal to no effect in elderly and should generally be avoided).   - dc psyllium  - Discontinue Multivitamin ( studies show it increase mortality rate in elders). Pt in agreement.   - start senna 8.6 mg po 1-2 tab bid prn  - Pt in agreement with the above orders.         Information reviewed:  Medications, vital  signs, orders, nursing notes, problem list, hospital information. Total time spent with patient visit was 45 min including patient visit and review of past records.       Electronically signed by:  Yue Hogan MD

## 2017-02-28 ENCOUNTER — NURSING HOME VISIT (OUTPATIENT)
Dept: GERIATRICS | Facility: CLINIC | Age: 77
End: 2017-02-28
Payer: MEDICARE

## 2017-02-28 DIAGNOSIS — Z47.1 AFTERCARE FOLLOWING RIGHT KNEE JOINT REPLACEMENT SURGERY: Primary | ICD-10-CM

## 2017-02-28 DIAGNOSIS — Z96.651 AFTERCARE FOLLOWING RIGHT KNEE JOINT REPLACEMENT SURGERY: Primary | ICD-10-CM

## 2017-02-28 DIAGNOSIS — Z96.651 STATUS POST TOTAL RIGHT KNEE REPLACEMENT: ICD-10-CM

## 2017-02-28 DIAGNOSIS — K59.04 CHRONIC IDIOPATHIC CONSTIPATION: ICD-10-CM

## 2017-02-28 DIAGNOSIS — Z71.89 ADVANCED DIRECTIVES, COUNSELING/DISCUSSION: ICD-10-CM

## 2017-02-28 DIAGNOSIS — D62 ANEMIA DUE TO BLOOD LOSS, ACUTE: ICD-10-CM

## 2017-02-28 DIAGNOSIS — M17.11 OSTEOARTHRITIS OF RIGHT KNEE, UNSPECIFIED OSTEOARTHRITIS TYPE: ICD-10-CM

## 2017-02-28 PROCEDURE — 99306 1ST NF CARE HIGH MDM 50: CPT | Performed by: FAMILY MEDICINE

## 2017-02-28 PROCEDURE — 99207 ZZC CDG-CORRECTLY CODED, REVIEWED AND AGREE: CPT | Performed by: FAMILY MEDICINE

## 2017-03-01 ENCOUNTER — DISCHARGE SUMMARY NURSING HOME (OUTPATIENT)
Dept: GERIATRICS | Facility: CLINIC | Age: 77
End: 2017-03-01
Payer: MEDICARE

## 2017-03-01 VITALS
RESPIRATION RATE: 18 BRPM | DIASTOLIC BLOOD PRESSURE: 72 MMHG | HEART RATE: 74 BPM | TEMPERATURE: 98.2 F | SYSTOLIC BLOOD PRESSURE: 110 MMHG | WEIGHT: 173 LBS | BODY MASS INDEX: 28.79 KG/M2

## 2017-03-01 DIAGNOSIS — Z47.1 AFTERCARE FOLLOWING RIGHT KNEE JOINT REPLACEMENT SURGERY: Primary | ICD-10-CM

## 2017-03-01 DIAGNOSIS — Z96.651 AFTERCARE FOLLOWING RIGHT KNEE JOINT REPLACEMENT SURGERY: Primary | ICD-10-CM

## 2017-03-01 DIAGNOSIS — D62 ANEMIA DUE TO BLOOD LOSS, ACUTE: ICD-10-CM

## 2017-03-01 DIAGNOSIS — I48.91 ATRIAL FIBRILLATION WITH RAPID VENTRICULAR RESPONSE (H): ICD-10-CM

## 2017-03-01 PROCEDURE — 99316 NF DSCHRG MGMT 30 MIN+: CPT | Performed by: NURSE PRACTITIONER

## 2017-03-01 PROCEDURE — 99207 ZZC CDG-CORRECTLY CODED, REVIEWED AND AGREE: CPT | Performed by: NURSE PRACTITIONER

## 2017-03-01 NOTE — PROGRESS NOTES
[place] [facility] DISCHARGE SUMMARY    PATIENT'S NAME: Lavonne Tidwell  YOB: 1940  MEDICAL RECORD NUMBER:  [id number]    PRIMARY CARE PROVIDER AND CLINIC RESPONSIBLE AFTER TRANSFER: Elvira Kowalski [facility] [address]  / [place]     CODE STATUS/ADVANCE DIRECTIVES DISCUSSION:   CPR/Full code        Allergies   Allergen Reactions     Codeine GI Disturbance       TRANSFERRING PROVIDERS: Modesta Corral CNP, Yue Hogan MD  DATE OF SNF ADMISSION:  February / 26 / 2017  DATE OF SNF (anticipated) DISCHARGE: March / 01 / 2017  DISCHARGE DISPOSITION: [facility] Provider   [facility] 2[facility].     Condition on Discharge:  Improving.  Function:  Is independent with her walker.  Cognitive Scores: She is intact.  Will need to contact facility if results are wanted.   Equipment: walker    DISCHARGE DIAGNOSIS:   1. Aftercare following right knee joint replacement surgery    2. Anemia due to blood loss, acute    3. Atrial fibrillation with rapid ventricular response (H)      HPI Nursing Facility Course:  The patient is ready for discharge.  She is having little pain using Tylenol for pain and feels this is working well.  She is independent with a walker with her ambulation.  Flexion is at 107 degrees.  Her incision is clean, with no swelling, no redness or drainage.  The wound is not feeling warm.    Last hgb on 2/24/17 was 10.8.  This is adequate as she is not having any symptoms of dizziness, lightheadedness or feelings of near syncope.  She has not had any other issues with AF or RVR.      PAST MEDICAL HISTORY:  has a past medical history of Arthritis (2012); History of blood transfusion (1961); and Ovarian cysts (1974). She also has no past medical history of Cancer (H); Cerebral infarction (H); Congestive heart failure, unspecified; COPD (chronic obstructive pulmonary disease) (H);  Coronary artery disease; CVA (cerebral infarction); Depressive disorder; Diabetes (H); Difficult intubation; Heart disease; Hypertension; Malignant hyperthermia; PONV (postoperative nausea and vomiting); Thyroid disease; or Uncomplicated asthma.    DISCHARGE MEDICATIONS:  Current Outpatient Prescriptions   Medication Sig Dispense Refill     HYDROcodone-acetaminophen (NORCO) 5-325 MG per tablet Take 1-2 tablets by mouth every 4 hours as needed for moderate to severe pain 50 tablet 0     senna-docusate (SENOKOT-S;PERICOLACE) 8.6-50 MG per tablet Take 1-2 tablets by mouth 2 times daily 100 tablet      rivaroxaban ANTICOAGULANT (XARELTO) 10 MG TABS tablet Take 1 tablet (10 mg) by mouth daily (with dinner) 11 tablet      Acetaminophen (TYLENOL PO) Take 1,000 mg by mouth 2 times daily as needed for mild pain or fever       guaiFENesin (MUCINEX) 600 MG 12 hr tablet Take 1,200 mg by mouth 2 times daily as needed for congestion       fluticasone (FLONASE) 50 MCG/ACT spray Spray 2 sprays into both nostrils daily As needed 16 g 3     ranitidine (ZANTAC) 150 MG tablet Take 150 mg by mouth daily        CALCIUM + D OR 2 TABLET DAILY         MEDICATION CHANGES/RATIONALE:   No changes.  Controlled medications sent with patient: not applicable/none     ROS:    10 point ROS of systems including Constitutional, Eyes, Respiratory, Cardiovascular, Gastroenterology, Genitourinary, Integumentary, Muscularskeletal, Psychiatric were all negative except for pertinent positives noted in my HPI.    Physical Exam:   Vitals: /72  Pulse 74  Temp 98.2  F (36.8  C)  Resp 18  Wt 173 lb (78.5 kg)  BMI 28.79 kg/m2  BMI= Body mass index is 28.79 kg/(m^2).    GENERAL APPEARANCE:  Alert, in no distress, appears healthy  ENT:  normal hearing acuity, oral mucous membranes moist  EYES:  EOM normal, Conjunctiva and lids normal  NECK:  no adenopathy, no thyromegaly  RESP:  lungs clear to auscultation , no respiratory distress  CV:  peripheral edema  scant bilaterally+ in both legs, rate-normal  ABDOMEN:  no guarding or rebound, bowel sounds normal  M/S:   Gait and station normal  Digits and nails normal  SKIN:  Inspection of skin and subcutaneous tissue baseline, Palpation of skin and subcutaneous tissue baseline  NEURO:   Cranial nerves 2-12 are normal tested and grossly at patient's baseline, Examination of sensation by touch normal  PSYCH:  oriented X 3, normal insight, judgement and memory    DISCHARGE PLAN:    1.  Will discharge patient to home.  She does not wish home care but OP.  2.  May need to have hgb as a follow up.  3.  Patient instructed to follow-up with:  PCP in 7 days, she will make appointments with Dr. Burroughs.  4.  Current Adger scheduled appointments:    [facility] labs   Admission on 02/23/2017, Discharged on 02/26/2017   Component Date Value Ref Range Status     WBC 02/23/2017 5.9  4.0 - 11.0 10e9/L Final     RBC Count 02/23/2017 4.09  3.8 - 5.2 10e12/L Final     Hemoglobin 02/23/2017 12.7  11.7 - 15.7 g/dL Final     Hematocrit 02/23/2017 39.9  35.0 - 47.0 % Final     MCV 02/23/2017 98  78 - 100 fl Final     MCH 02/23/2017 31.1  26.5 - 33.0 pg Final     MCHC 02/23/2017 31.8  31.5 - 36.5 g/dL Final     RDW 02/23/2017 13.4  10.0 - 15.0 % Final     Platelet Count 02/23/2017 223  150 - 450 10e9/L Final     Diff Method 02/23/2017 Automated Method   Final     % Neutrophils 02/23/2017 57.5  % Final     % Lymphocytes 02/23/2017 34.2  % Final     % Monocytes 02/23/2017 6.8  % Final     % Eosinophils 02/23/2017 1.0  % Final     % Basophils 02/23/2017 0.3  % Final     % Immature Granulocytes 02/23/2017 0.2  % Final     Absolute Neutrophil 02/23/2017 3.4  1.6 - 8.3 10e9/L Final     Absolute Lymphocytes 02/23/2017 2.0  0.8 - 5.3 10e9/L Final     Absolute Monocytes 02/23/2017 0.4  0.0 - 1.3 10e9/L Final     Absolute Eosinophils 02/23/2017 0.1  0.0 - 0.7 10e9/L Final     Absolute Basophils 02/23/2017 0.0  0.0 - 0.2 10e9/L Final     Abs Immature Granulocytes  02/23/2017 0.0  0 - 0.4 10e9/L Final     INR 02/23/2017 0.96  0.86 - 1.14 Final     Hemoglobin 02/24/2017 10.8* 11.7 - 15.7 g/dL Final     Creatinine 02/24/2017 0.73  0.52 - 1.04 mg/dL Final     GFR Estimate 02/24/2017 77  >60 mL/min/1.7m2 Final    Non  GFR Calc     GFR Estimate If Black 02/24/2017   >60 mL/min/1.7m2 Final                    Value:>90   GFR Calc       Glucose 02/24/2017 78  70 - 99 mg/dL Final     Hemoglobin 02/25/2017 10.6* 11.7 - 15.7 g/dL Final     Platelet Count 02/26/2017 199  150 - 450 10e9/L Final       Discharge Treatments:  Discharge to home with all current medications, treatments and no narcotics.  2.  Out patient therapy for her right knee.  3.  Follow up with Dr. Duran in about a week.    TOTAL DISCHARGE TIME:   Greater than 30 minutes to discuss discharge planning, out patient care needs and follow up care.    Electronically signed by:  Modesta Tijerina, CNP, APRN

## 2017-03-06 ENCOUNTER — TELEPHONE (OUTPATIENT)
Dept: FAMILY MEDICINE | Facility: CLINIC | Age: 77
End: 2017-03-06

## 2017-03-06 ENCOUNTER — CARE COORDINATION (OUTPATIENT)
Dept: CARE COORDINATION | Facility: CLINIC | Age: 77
End: 2017-03-06

## 2017-03-06 ENCOUNTER — HOSPITAL ENCOUNTER (OUTPATIENT)
Dept: PHYSICAL THERAPY | Facility: CLINIC | Age: 77
Setting detail: THERAPIES SERIES
End: 2017-03-06
Attending: ORTHOPAEDIC SURGERY
Payer: MEDICARE

## 2017-03-06 PROCEDURE — 40000718 ZZHC STATISTIC PT DEPARTMENT ORTHO VISIT: Performed by: PHYSICAL THERAPIST

## 2017-03-06 PROCEDURE — G8979 MOBILITY GOAL STATUS: HCPCS | Mod: GP,CI | Performed by: PHYSICAL THERAPIST

## 2017-03-06 PROCEDURE — 97161 PT EVAL LOW COMPLEX 20 MIN: CPT | Mod: GP | Performed by: PHYSICAL THERAPIST

## 2017-03-06 PROCEDURE — 97140 MANUAL THERAPY 1/> REGIONS: CPT | Mod: GP | Performed by: PHYSICAL THERAPIST

## 2017-03-06 PROCEDURE — 97110 THERAPEUTIC EXERCISES: CPT | Mod: GP | Performed by: PHYSICAL THERAPIST

## 2017-03-06 PROCEDURE — G8978 MOBILITY CURRENT STATUS: HCPCS | Mod: GP,CL | Performed by: PHYSICAL THERAPIST

## 2017-03-06 NOTE — PROGRESS NOTES
17 0900   General Information   Type of Visit Initial OP Ortho PT Evaluation   Start of Care Date 17   Referring Physician Dr. Donna MD   Patient/Family Goals Statement [facility] (); to be able to walk and move   Orders Evaluate and Treat   Date of Order 17   Insurance Type Medicare;Other   Insurance Comments/Visits Authorized Medicare/AARP ($1980 remaining in PT/SLP cap; no iontophoresis)   Medical Diagnosis Right total knee replacement   Surgical/Medical history reviewed Yes   Precautions/Limitations no known precautions/limitations   General Information Comments PMHX/Co-morbidities: Injury of sigmoid colon; urinary incontinence; hyperlipidemia; senile nuclear sclerosis; status post left TKA       Present No   Body Part(s)   Body Part(s) Knee   Presentation and Etiology   Pertinent history of current problem (include personal factors and/or comorbidities that impact the POC) Patient underwent a right TKA 17 performed by Dr. Donna MD.  Patient reports she went to the [facility] [place] days following.  Not driving yet.  The main issue is difficulty sleeping.     Impairments C. Swelling;E. Decreased flexibility   Functional Limitations perform activities of daily living;perform desired leisure / sports activities   Symptom Location Right global knee   How/Where did it occur From Degenerative Joint Disease   Onset date of current episode/exacerbation 17   Chronicity New   Pain rating (0-10 point scale) Best (/10);Worst (/10)   Best (/10) 0   Worst (/10) 4   Pain quality C. Aching   Frequency of pain/symptoms B. Intermittent   Pain/symptoms are: The same all the time   Pain/symptoms exacerbated by A. Sitting   Pain/symptoms eased by H. Cold   Progression of symptoms since onset: Improved   Current / Previous Interventions   Diagnostic Tests: X-ray   X-ray Results Results   X-ray results [id number] Right standing  radiograph: severe degenerative changes in the lateral compartment of the right knee; Minimal degenerative changes in the patellofemoral and medial compartments; small suprapatellar right knee joint effusion; no fractures   Prior Level of Function   Prior Level of Function-Mobility Independent   Prior Level of Function-ADLs Independent   Current Level of Function   Current Community Support Family/friend caregiver   Patient role/employment history F. Retired   Living environment House/townhome   Home/community accessibility Currently not driving   Current equipment-Gait/Locomotion Front wheeled walker   Fall Risk Screen   Fall screen completed by PT   Per patient - Fall 2 or more times in past year? No   Per patient - Fall with injury in past year? No   Is patient a fall risk? No   Functional Scales   Functional Scales Other   Other Scales  Lower Extremity Functional Scale=42/80 47.5% disability   Knee Objective Findings   Side (if bilateral, select both right and left) Right   Integumentary  Superior patella circumference for edema right=48 cm; left=42.5 cm   Gait/Locomotion With front wheeled walker: decreased stance time right   Accessory Motion/Joint Mobility Hypomobile patellar glide inferior and superior   Right Knee Extension AROM Left= 5 deg hyper; Right=lacking 6 seg   Right Knee Flexion AROM Left=125 deg; Right=107 deg   Right Knee Flexion Strength Left=5/5; Right=4/5   Right Knee Extension Strength Left=5/5; Right=4/5   Right Hip Abduction Strength Rocky=4/5   Planned Therapy Interventions   Planned Therapy Interventions ADL retraining;balance training;gait training;joint mobilization;manual therapy;motor coordination training;neuromuscular re-education;ROM;strengthening;stretching   Planned Modality Interventions   Planned Modality Interventions Cryotherapy;Electrical stimulation   Clinical Impression   Criteria for Skilled Therapeutic Interventions Met yes, treatment indicated   PT Diagnosis s/p right TKA  performed 2/23/17    Influenced by the following impairments Pain; weakness; impaired ROM; impaired gait; impaired balance   Functional limitations due to impairments Pain and difficulty walking; unable to drive; difficulty climbing stairs; difficulty preparing a meal   Clinical Presentation Stable/Uncomplicated   Clinical Presentation Rationale (+) previous success with PT; overall health; motivation   Clinical Decision Making (Complexity) Low complexity   Therapy Frequency 2 times/Week  (2x/week for 5 weeks; 1x/week for 7 weeks)   Predicted Duration of Therapy Intervention (days/wks) 12 weeks   Risk & Benefits of therapy have been explained Yes   Patient, Family & other staff in agreement with plan of care Yes   Clinical Impression Comments Lavonne is a pleasant 77 yo female who presents today 2 weeks s/p right TKA performed by Dr. Thompson.  She will benefit from skilled physical therapy to address impairments and functional limitations.    Education Assessment   Preferred Learning Style Listening;Demonstration;Pictures/video   Barriers to Learning No barriers   ORTHO GOALS   PT Ortho Eval Goals 1;2;3   Ortho Goal 1   Goal Description Following PT interventions, patient will have >=5-0-125 degrees of right knee range of motion to allow for normalized gait mechanics.   Target Date 03/27/17   Ortho Goal 2   Goal Description Following PT interventions, patient will be able to climb 12 stairs with reciprocal step pattern to allow for normalized community mobility.   Target Date 05/29/17   Ortho Goal 3   Goal Description Following PT interventions, patient will have >=4/5 right hip abduction strength to reduce dynamic valgus at the knee.   Target Date 05/29/17   Total Evaluation Time   Total Evaluation Time 15 min   1x Eval Only-Outpatient/Observation Medicare G-Code   G-code Mobility: Walking & Moving Around   Mobility: Walking & Moving Around   Mobility Comments Based on LEFS, subjective and objective evaluation, and  physical therapist clinical judgement.   Therapy Certification   Certification date from 03/06/17   Certification date to 05/29/17   Medical Diagnosis Right total knee replacement       Francheska Reilly PT, DPT  Doctor of Physical Therapy #[id number]  [facility]  [phone]  [email or web address]

## 2017-03-06 NOTE — PROGRESS NOTES
17 0900   General Information   Type of Visit Initial OP Ortho PT Evaluation   Start of Care Date 17   Referring Physician Dr. Donna MD   Patient/Family Goals Statement [facility] to be able to walk and move   Orders Evaluate and Treat   Date of Order 17   Insurance Type Medicare;Other   Insurance Comments/Visits Authorized Medicare/AARP ($1980 remaining in PT/SLP cap; no iontophoresis)   Medical Diagnosis Right total knee replacement   Surgical/Medical history reviewed Yes   Precautions/Limitations no known precautions/limitations   General Information Comments PMHX/Co-morbidities: Injury of sigmoid colon; urinary incontinence; hyperlipidemia; senile nuclear sclerosis; status post left TKA       Present No   Body Part(s)   Body Part(s) Knee   Presentation and Etiology   Pertinent history of current problem (include personal factors and/or comorbidities that impact the POC) Patient underwent a right TKA 17 performed by Dr. Donna MD.  Patient reports she went to [place] [place] following.  Not driving yet.  The main issue is difficulty sleeping.     Impairments C. Swelling;E. Decreased flexibility   Functional Limitations perform activities of daily living;perform desired leisure / sports activities   Symptom Location Right global knee   How/Where did it occur From Degenerative Joint Disease   Onset date of current episode/exacerbation 17   Chronicity New   Pain rating (0-10 point scale) Best (/10);Worst (/10)   Best (/10) 0   Worst (/10) 4   Current / Previous Interventions   Diagnostic Tests: X-ray   X-ray Results Results   X-ray results [id number] Right standing radiograph: severe degenerative changes in the lateral compartment of the right knee; Minimal degenerative changes in the patellofemoral and medial compartments; small suprapatellar right knee joint effusion; no fractures   Prior Level of  Function   Prior Level of Function-Mobility Independent   Prior Level of Function-ADLs Independent   Current Level of Function   Current Community Support Family/friend caregiver   Patient role/employment history F. Retired   Living environment House/townhome   Home/community accessibility Currently not driving   Current equipment-Gait/Locomotion Front wheeled walker   Fall Risk Screen   Fall screen completed by PT   Per patient - Fall 2 or more times in past year? No   Per patient - Fall with injury in past year? No   Is patient a fall risk? No   Functional Scales   Functional Scales Other   Other Scales  Lower Extremity Functional Scale=42/80 47.5% disability   Knee Objective Findings   Side (if bilateral, select both right and left) Right   Integumentary  Superior patella circumference for edema right=48 cm; left=42.5 cm   Gait/Locomotion With front wheeled walker: decreased stance time right   Accessory Motion/Joint Mobility Hypomobile patellar glide inferior and superior   Right Knee Extension AROM Left= 5 deg hyper; Right=lacking 6 seg   Right Knee Flexion AROM Left=125 deg; Right=107 deg   Right Knee Flexion Strength Left=5/5; Right=4/5   Right Knee Extension Strength Left=5/5; Right=4/5   Right Hip Abduction Strength Rocky=4/5   Planned Therapy Interventions   Planned Therapy Interventions ADL retraining;balance training;gait training;joint mobilization;manual therapy;motor coordination training;neuromuscular re-education;ROM;strengthening;stretching   Planned Modality Interventions   Planned Modality Interventions Cryotherapy;Electrical stimulation   Clinical Impression   Criteria for Skilled Therapeutic Interventions Met yes, treatment indicated   PT Diagnosis s/p right TKA performed 2/23/17    Influenced by the following impairments Pain; weakness; impaired ROM; impaired gait; impaired balance   Functional limitations due to impairments Pain and difficulty walking; unable to drive; difficulty climbing  stairs; difficulty preparing a meal   Clinical Presentation Stable/Uncomplicated   Clinical Presentation Rationale (+) previous success with PT; overall health; motivation   Clinical Decision Making (Complexity) Low complexity   Therapy Frequency 2 times/Week  (2x/week for 5 weeks; 1x/week for 7 weeks)   Predicted Duration of Therapy Intervention (days/wks) 12 weeks   Risk & Benefits of therapy have been explained Yes   Patient, Family & other staff in agreement with plan of care Yes   Clinical Impression Comments Lavonne is a pleasant 75 yo female who presents today 2 weeks s/p right TKA performed by Dr. Thompson.  She will benefit from skilled physical therapy to address impairments and functional limitations.    Education Assessment   Preferred Learning Style Listening;Demonstration;Pictures/video   Barriers to Learning No barriers   ORTHO GOALS   PT Ortho Eval Goals 1;2;3   Ortho Goal 1   Goal Description Following PT interventions, patient will have >=5-0-125 degrees of right knee range of motion to allow for normalized gait mechanics.   Target Date 03/27/17   Ortho Goal 2   Goal Description Following PT interventions, patient will be able to climb 12 stairs with reciprocal step pattern to allow for normalized community mobility.   Target Date 05/29/17   Ortho Goal 3   Goal Description Following PT interventions, patient will have >=4/5 right hip abduction strength to reduce dynamic valgus at the knee.   Target Date 05/29/17   Total Evaluation Time   Total Evaluation Time 15 min   1x Eval Only-Outpatient/Observation Medicare G-Code   G-code Mobility: Walking & Moving Around   Mobility: Walking & Moving Around   Mobility Comments Based on LEFS, subjective and objective evaluation, and physical therapist clinical judgement.   Therapy Certification   Certification date from 03/06/17   Certification date to 05/29/17   Medical Diagnosis Right total knee replacement       Francheska Reilly, PT, DPT  Doctor of Physical Therapy  #[id number]  [facility]  [phone]  [email or web address]

## 2017-03-06 NOTE — PROGRESS NOTES
Clinic Care Coordination Contact  OUTREACH    Referral Information:  Referral Source: IP/TCU Report  Reason for Contact: Patient discharged from [facility] [place] [facility] 3/4/17 after having right total knee replacement. She went home with outpatient therapy. RN CC met with her in the clinic as she was having her first outpatient PT appointment. RN CC introduced self and role in the clinic. She denies having any questions or concerns regarding her discharge instructions. She states she wishes she could sleep is the only the problem she is having.   Care Conference: No     Universal Utilization:      Hospital visits in last year: 1  Last PCP appointment: 02/06/17        Multiple Providers or Specialists: Orthpedic    Clinical Concerns:  Current Medical Concerns: Problems sleeping per patient.    Current Behavioral Concerns: Denies having any concerns.    Education Provided to patient: RN CC educated patient on Care Coordination services.   Clinical Pathway Name: None  Clinical Pathway: None    Medication Management:  Patient is independent in medication management. Pt. verbalized adherence and understanding of medication regimen, side effects, purposes. Pt. reports taking  medication out of the bottles as prescribed and states this routine is working well.  She states the only thing she is taking is vitamins.       Functional Status:  Mobility Status: Independent w/Device  Equipment Currently Used at Home: walker, standard  Transportation: Normally drives but family is right now.           Psychosocial:  Current living arrangement:: I live alone  Financial/Insurance: No concerns per patient.       Resources and Interventions:  Current Resources: RN CC gave her contact information, explanation of CC services and patient access plan.           Advanced Care Plans/Directives on file:: No        Goals:   n/a              Barriers: None at this time.  Strengths: great family support.  Patient/Caregiver understanding:  Patient verbalized understanding of her discharge instructions.           Plan: Patient will continue to follow treatment plan as directed and follow up with PCP with concerns ongoing.    No further Care Coordination needs identified at this time. Patient may be referred to Care Coordination in the future if additional needs arise.         Tatum Stinson RN, [facility]  RN Care Coordinator  [facility]  Phone # [phone]  3/6/2017 11:04 AM

## 2017-03-06 NOTE — PROGRESS NOTES
Clinic Care Coordination Contact  Care Team Conversations    Patty Day     Note      ED/UC/IP follow up phone call: TCU Discharge 3/1/17 Aftercare following right knee joint replacement surgery     RN please call to follow up.     Number of ED visits in past 12 months = 0

## 2017-03-06 NOTE — TELEPHONE ENCOUNTER
See Care Coordination encounter 3/6/2017        Tatum Stinson RN, [facility]  RN Care Coordinator  [facility]  Phone # [phone]  3/6/2017 11:04 AM

## 2017-03-06 NOTE — TELEPHONE ENCOUNTER
ED/UC/IP follow up phone call:  TCU Discharge 3/1/17  Aftercare following right knee joint replacement surgery    RN please call to follow up.    Number of ED visits in past 12 months = 0

## 2017-03-06 NOTE — PROGRESS NOTES
[facility]          OUTPATIENT PHYSICAL THERAPY ORTHOPEDIC EVALUATION  PLAN OF TREATMENT FOR OUTPATIENT REHABILITATION  (COMPLETE FOR INITIAL CLAIMS ONLY)  Patient's Last Name, First Name, M.I.  YOB: 1940  Lavonne Tidwell    Provider s Name:  [facility]   Medical Record No.  [id number]   Start of Care Date:  03/06/17   Onset Date:  02/23/17   Type:     _X__PT   ___OT   ___SLP Medical Diagnosis:  Right total knee replacement     PT Diagnosis:  s/p right TKA performed 2/23/17    Visits from SOC:  1      _________________________________________________________________________________  Plan of Treatment/Functional Goals:  ADL retraining, balance training, gait training, joint mobilization, manual therapy, motor coordination training, neuromuscular re-education, ROM, strengthening, stretching     Cryotherapy, Electrical stimulation     Goals     Goal Description: Following PT interventions, patient will have >=5-0-125 degrees of right knee range of motion to allow for normalized gait mechanics.  Target Date: 03/27/17       Goal Description: Following PT interventions, patient will be able to climb 12 stairs with reciprocal step pattern to allow for normalized community mobility.  Target Date: 05/29/17       Goal Description: Following PT interventions, patient will have >=4/5 right hip abduction strength to reduce dynamic valgus at the knee.  Target Date: 05/29/17      Therapy Frequency:  2 times/Week (2x/week for 5 weeks; 1x/week for 7 weeks)  Predicted Duration of Therapy Intervention:  12 weeks    Francheska Reilly, PT                 I CERTIFY THE NEED FOR THESE SERVICES FURNISHED UNDER        THIS PLAN OF TREATMENT AND WHILE UNDER MY CARE     (Physician co-signature of this document indicates review and certification of the therapy plan).                       Certification Date From:  03/06/17   Certification Date To:  05/29/17    Referring Provider:    MD Donna    Initial Assessment        See Epic Evaluation Start of Care Date: 03/06/17

## 2017-03-10 ENCOUNTER — HOSPITAL ENCOUNTER (OUTPATIENT)
Dept: PHYSICAL THERAPY | Facility: CLINIC | Age: 77
Setting detail: THERAPIES SERIES
End: 2017-03-10
Attending: ORTHOPAEDIC SURGERY
Payer: MEDICARE

## 2017-03-10 PROCEDURE — 97140 MANUAL THERAPY 1/> REGIONS: CPT | Mod: GP | Performed by: PHYSICAL THERAPIST

## 2017-03-10 PROCEDURE — 97110 THERAPEUTIC EXERCISES: CPT | Mod: GP | Performed by: PHYSICAL THERAPIST

## 2017-03-10 PROCEDURE — 40000718 ZZHC STATISTIC PT DEPARTMENT ORTHO VISIT: Performed by: PHYSICAL THERAPIST

## 2017-03-13 ENCOUNTER — HOSPITAL ENCOUNTER (OUTPATIENT)
Dept: PHYSICAL THERAPY | Facility: CLINIC | Age: 77
Setting detail: THERAPIES SERIES
End: 2017-03-13
Attending: ORTHOPAEDIC SURGERY
Payer: MEDICARE

## 2017-03-13 PROCEDURE — 97140 MANUAL THERAPY 1/> REGIONS: CPT | Mod: GP | Performed by: PHYSICAL THERAPIST

## 2017-03-13 PROCEDURE — 40000718 ZZHC STATISTIC PT DEPARTMENT ORTHO VISIT: Performed by: PHYSICAL THERAPIST

## 2017-03-13 PROCEDURE — 97110 THERAPEUTIC EXERCISES: CPT | Mod: GP | Performed by: PHYSICAL THERAPIST

## 2017-03-15 ENCOUNTER — HOSPITAL ENCOUNTER (OUTPATIENT)
Dept: PHYSICAL THERAPY | Facility: CLINIC | Age: 77
Setting detail: THERAPIES SERIES
End: 2017-03-15
Attending: ORTHOPAEDIC SURGERY
Payer: MEDICARE

## 2017-03-15 PROCEDURE — 97140 MANUAL THERAPY 1/> REGIONS: CPT | Mod: GP | Performed by: PHYSICAL THERAPIST

## 2017-03-15 PROCEDURE — 40000718 ZZHC STATISTIC PT DEPARTMENT ORTHO VISIT: Performed by: PHYSICAL THERAPIST

## 2017-03-15 PROCEDURE — 97110 THERAPEUTIC EXERCISES: CPT | Mod: GP | Performed by: PHYSICAL THERAPIST

## 2017-03-20 ENCOUNTER — HOSPITAL ENCOUNTER (OUTPATIENT)
Dept: PHYSICAL THERAPY | Facility: CLINIC | Age: 77
Setting detail: THERAPIES SERIES
End: 2017-03-20
Attending: ORTHOPAEDIC SURGERY
Payer: MEDICARE

## 2017-03-20 PROCEDURE — 97140 MANUAL THERAPY 1/> REGIONS: CPT | Mod: GP | Performed by: PHYSICAL THERAPIST

## 2017-03-20 PROCEDURE — 97110 THERAPEUTIC EXERCISES: CPT | Mod: GP | Performed by: PHYSICAL THERAPIST

## 2017-03-20 PROCEDURE — 40000718 ZZHC STATISTIC PT DEPARTMENT ORTHO VISIT: Performed by: PHYSICAL THERAPIST

## 2017-03-22 ENCOUNTER — HOSPITAL ENCOUNTER (OUTPATIENT)
Dept: PHYSICAL THERAPY | Facility: CLINIC | Age: 77
Setting detail: THERAPIES SERIES
End: 2017-03-22
Attending: ORTHOPAEDIC SURGERY
Payer: MEDICARE

## 2017-03-22 PROCEDURE — 97140 MANUAL THERAPY 1/> REGIONS: CPT | Mod: GP | Performed by: PHYSICAL THERAPIST

## 2017-03-22 PROCEDURE — 97110 THERAPEUTIC EXERCISES: CPT | Mod: GP | Performed by: PHYSICAL THERAPIST

## 2017-03-22 PROCEDURE — 40000718 ZZHC STATISTIC PT DEPARTMENT ORTHO VISIT: Performed by: PHYSICAL THERAPIST

## 2017-03-27 ENCOUNTER — HOSPITAL ENCOUNTER (OUTPATIENT)
Dept: PHYSICAL THERAPY | Facility: CLINIC | Age: 77
Setting detail: THERAPIES SERIES
End: 2017-03-27
Attending: ORTHOPAEDIC SURGERY
Payer: MEDICARE

## 2017-03-27 ENCOUNTER — OFFICE VISIT (OUTPATIENT)
Dept: FAMILY MEDICINE | Facility: CLINIC | Age: 77
End: 2017-03-27
Payer: MEDICARE

## 2017-03-27 VITALS
SYSTOLIC BLOOD PRESSURE: 110 MMHG | DIASTOLIC BLOOD PRESSURE: 66 MMHG | HEART RATE: 72 BPM | BODY MASS INDEX: 28.39 KG/M2 | WEIGHT: 170.4 LBS | TEMPERATURE: 98.5 F | HEIGHT: 65 IN

## 2017-03-27 DIAGNOSIS — E55.9 VITAMIN D DEFICIENCY: ICD-10-CM

## 2017-03-27 DIAGNOSIS — Z96.651 STATUS POST TOTAL RIGHT KNEE REPLACEMENT: Primary | ICD-10-CM

## 2017-03-27 LAB
BASOPHILS # BLD AUTO: 0 10E9/L (ref 0–0.2)
BASOPHILS NFR BLD AUTO: 0.5 %
DIFFERENTIAL METHOD BLD: ABNORMAL
EOSINOPHIL # BLD AUTO: 0.2 10E9/L (ref 0–0.7)
EOSINOPHIL NFR BLD AUTO: 2.6 %
ERYTHROCYTE [DISTWIDTH] IN BLOOD BY AUTOMATED COUNT: 13.7 % (ref 10–15)
HCT VFR BLD AUTO: 37 % (ref 35–47)
HGB BLD-MCNC: 11.6 G/DL (ref 11.7–15.7)
LYMPHOCYTES # BLD AUTO: 2.5 10E9/L (ref 0.8–5.3)
LYMPHOCYTES NFR BLD AUTO: 43.5 %
MCH RBC QN AUTO: 31.2 PG (ref 26.5–33)
MCHC RBC AUTO-ENTMCNC: 31.4 G/DL (ref 31.5–36.5)
MCV RBC AUTO: 100 FL (ref 78–100)
MONOCYTES # BLD AUTO: 0.5 10E9/L (ref 0–1.3)
MONOCYTES NFR BLD AUTO: 8.7 %
NEUTROPHILS # BLD AUTO: 2.6 10E9/L (ref 1.6–8.3)
NEUTROPHILS NFR BLD AUTO: 44.7 %
PLATELET # BLD AUTO: 233 10E9/L (ref 150–450)
RBC # BLD AUTO: 3.72 10E12/L (ref 3.8–5.2)
WBC # BLD AUTO: 5.8 10E9/L (ref 4–11)

## 2017-03-27 PROCEDURE — 82306 VITAMIN D 25 HYDROXY: CPT | Performed by: FAMILY MEDICINE

## 2017-03-27 PROCEDURE — 36415 COLL VENOUS BLD VENIPUNCTURE: CPT | Performed by: FAMILY MEDICINE

## 2017-03-27 PROCEDURE — 40000718 ZZHC STATISTIC PT DEPARTMENT ORTHO VISIT: Performed by: PHYSICAL THERAPIST

## 2017-03-27 PROCEDURE — 97112 NEUROMUSCULAR REEDUCATION: CPT | Mod: GP | Performed by: PHYSICAL THERAPIST

## 2017-03-27 PROCEDURE — 85025 COMPLETE CBC W/AUTO DIFF WBC: CPT | Performed by: FAMILY MEDICINE

## 2017-03-27 PROCEDURE — 99214 OFFICE O/P EST MOD 30 MIN: CPT | Performed by: FAMILY MEDICINE

## 2017-03-27 PROCEDURE — 97140 MANUAL THERAPY 1/> REGIONS: CPT | Mod: GP | Performed by: PHYSICAL THERAPIST

## 2017-03-27 RX ORDER — VITAMIN E 268 MG
400 CAPSULE ORAL DAILY
COMMUNITY
End: 2023-06-12

## 2017-03-27 RX ORDER — MULTIVIT WITH MINERALS/LUTEIN
1 TABLET ORAL DAILY
COMMUNITY
End: 2024-06-19

## 2017-03-27 NOTE — PROGRESS NOTES
SUBJECTIVE:                                                    Lavonne Tidwell is a 76 year old female who presents to clinic today for the following health issues:          Hospital Follow-up Visit:    Hospital/Nursing Home/IP Rehab Facility: [facility]  Date of Admission: 2/23/2017  Date of Discharge: 3/1/2017  Reason(s) for Admission: right knee joint replacement             Problems taking medications regularly:  None       Medication changes since discharge: discontinued norco, mucinex, xarelto, senna       Problems adhering to non-medication therapy:  None    Summary of hospitalization:  [facility] discharge summary reviewed  Diagnostic Tests/Treatments reviewed.  Follow up needed: none  Other Healthcare Providers Involved in Patient s Care:         None  Update since discharge: improved.     Post Discharge Medication Reconciliation: discharge medications reconciled, continue medications without change.  Plan of care communicated with patient     Coding guidelines for this visit:  Type of Medical   Decision Making Face-to-Face Visit       within 7 Days of discharge Face-to-Face Visit        within 14 days of discharge   Moderate Complexity [id number]   High Complexity [id number]                Problem list and histories reviewed & adjusted, as indicated.  Additional history: as documented    Labs reviewed in EPIC    Reviewed and updated as needed this visit by clinical staff  Tobacco  Allergies  Meds  Problems  Med Hx  Surg Hx  Fam Hx  Soc Hx        Reviewed and updated as needed this visit by Provider  Allergies  Meds  Problems         ROS:  Constitutional, HEENT, cardiovascular, pulmonary, gi and gu systems are negative, except as otherwise noted.    OBJECTIVE:                                                    /66 (BP Location: Right arm, Patient Position: Chair, Cuff Size: Adult Large)  Pulse 72  Temp 98.5  F (36.9  C) (Tympanic)  Ht  "5' 5\" (1.651 m)  Wt 170 lb 6.4 oz (77.3 kg)  BMI 28.36 kg/m2  Body mass index is 28.36 kg/(m^2).  GENERAL APPEARANCE: healthy, alert and no distress  RESP: lungs clear to auscultation - no rales, rhonchi or wheezes  CV: regular rates and rhythm, normal S1 S2, no S3 or S4 and no murmur, click or rub  ABDOMEN: soft, nontender, without hepatosplenomegaly or masses and bowel sounds normal  MS: extremities normal- no gross deformities noted  PSYCH: mentation appears normal and affect normal/bright         ASSESSMENT/PLAN:                                                    1. Status post total right knee replacement  Doing well   - CBC with platelets differential    2. Vitamin D deficiency    - Vitamin D Deficiency      Patient Instructions   We will check a blood count today     Continue PT and be active your endurance is going to get better     Risks, benefits, side effects and rationale for treatment plan fully discussed with the patient and understanding expressed.     Elvira Kowalski MD  [facility]    "

## 2017-03-27 NOTE — MR AVS SNAPSHOT
After Visit Summary   3/27/2017    Lavonne Tidwell    MRN: [id number]           Patient Information     Date Of Birth          1940        Visit Information        Provider Department      3/27/2017 1:40 PM Elvira Kowalski MD [facility]        Today's Diagnoses     Status post total right knee replacement    -  1    Vitamin D deficiency          Care Instructions    We will check a blood count today     Continue PT and be active your endurance is going to get better         Follow-ups after your visit        Your next 10 appointments already scheduled     Mar 29, 2017  9:30 AM CDT   Treatment with Francheska Reilly PT   [facility] ([facility])    [address] [place] [address]   [phone]            Apr 03, 2017 12:30 PM CDT   Treatment with Francheska Reilly PT   [facility] ([facility])    [address] [place] [address]   [phone]              Who to contact     If you have questions or need follow up information about today's clinic visit or your schedule please contact [facility] directly at [phone].  Normal or non-critical lab and imaging results will be communicated to you by Crzyfishhart, letter or phone within 4 business days after the clinic has received the results. If you do not hear from us within 7 days, please contact the clinic through Crzyfishhart or phone. If you have a critical or abnormal lab result, we will notify you by phone as soon as possible.  Submit refill requests through Team Robot or call your pharmacy and they will forward the refill request to us. Please allow 3 business days for your refill to be completed.          Additional Information About Your Visit        MyChart Information     Team Robot gives you secure access to your electronic health record. If you see a primary care provider, you can also send messages to your  "care team and make appointments. If you have questions, please call your primary care clinic.  If you do not have a primary care provider, please call [phone] and they will assist you.        Care EveryWhere ID     This is your Care EveryWhere ID. This could be used by other organizations to access your [place] medical records  [id number]        Your Vitals Were     Pulse Temperature Height BMI (Body Mass Index)          72 98.5  F (36.9  C) (Tympanic) 5' 5\" (1.651 m) 28.36 kg/m2         Blood Pressure from Last 3 Encounters:   03/27/17 110/66   03/01/17 110/72   02/26/17 111/72    Weight from Last 3 Encounters:   03/27/17 170 lb 6.4 oz (77.3 kg)   03/01/17 173 lb (78.5 kg)   02/26/17 175 lb (79.4 kg)              We Performed the Following     CBC with platelets differential     Vitamin D Deficiency        Primary Care Provider Office Phone # Fax #    Elvira Kowalski [phone]       [facility] [address] [address]        Thank you!     Thank you for choosing [facility]  for your care. Our goal is always to provide you with excellent care. Hearing back from our patients is one way we can continue to improve our services. Please take a few minutes to complete the written survey that you may receive in the mail after your visit with us. Thank you!             Your Updated Medication List - Protect others around you: Learn how to safely use, store and throw away your medicines at www.disposemymeds.org.          This list is accurate as of: 3/27/17  2:16 PM.  Always use your most recent med list.                   Brand Name Dispense Instructions for use    Biotin 10 MG Caps          CALCIUM + D PO      2 TABLET DAILY       CENTRUM SILVER per tablet      Take 1 tablet by mouth daily       FIBER THERAPY 500 MG Tabs tablet   Generic drug:  methylcellulose      Take 500 mg by mouth daily       fluticasone 50 MCG/ACT spray    FLONASE    16 g "    Spray 2 sprays into both nostrils daily As needed       garlic 150 MG Tabs tablet      Take 150 mg by mouth daily       grape seed 50 MG Caps      Take 50 mg by mouth daily       STOOL SOFTENER PO          TYLENOL PO      Take 1,000 mg by mouth 2 times daily as needed for mild pain or fever       VITAMIN E COMPLEX PO          ZANTAC 150 MG tablet   Generic drug:  ranitidine      Take 150 mg by mouth daily       ZINC 15 PO

## 2017-03-27 NOTE — PATIENT INSTRUCTIONS
We will check a blood count today     Continue PT and be active your endurance is going to get better

## 2017-03-27 NOTE — NURSING NOTE
"Chief Complaint   Patient presents with     Hospital F/U       Initial /66 (BP Location: Right arm, Patient Position: Chair, Cuff Size: Adult Large)  Pulse 72  Temp 98.5  F (36.9  C) (Tympanic)  Ht 5' 5\" (1.651 m)  Wt 170 lb 6.4 oz (77.3 kg)  BMI 28.36 kg/m2 Estimated body mass index is 28.36 kg/(m^2) as calculated from the following:    Height as of this encounter: 5' 5\" (1.651 m).    Weight as of this encounter: 170 lb 6.4 oz (77.3 kg).  Medication Reconciliation: complete        "

## 2017-03-28 LAB — DEPRECATED CALCIDIOL+CALCIFEROL SERPL-MC: 44 UG/L (ref 20–75)

## 2017-03-29 ENCOUNTER — HOSPITAL ENCOUNTER (OUTPATIENT)
Dept: PHYSICAL THERAPY | Facility: CLINIC | Age: 77
Setting detail: THERAPIES SERIES
End: 2017-03-29
Attending: ORTHOPAEDIC SURGERY
Payer: MEDICARE

## 2017-03-29 PROCEDURE — 40000718 ZZHC STATISTIC PT DEPARTMENT ORTHO VISIT: Performed by: PHYSICAL THERAPIST

## 2017-03-29 PROCEDURE — 97140 MANUAL THERAPY 1/> REGIONS: CPT | Mod: GP | Performed by: PHYSICAL THERAPIST

## 2017-03-29 PROCEDURE — 97110 THERAPEUTIC EXERCISES: CPT | Mod: GP | Performed by: PHYSICAL THERAPIST

## 2017-04-03 ENCOUNTER — HOSPITAL ENCOUNTER (OUTPATIENT)
Dept: PHYSICAL THERAPY | Facility: CLINIC | Age: 77
Setting detail: THERAPIES SERIES
End: 2017-04-03
Attending: ORTHOPAEDIC SURGERY
Payer: MEDICARE

## 2017-04-03 PROCEDURE — 97110 THERAPEUTIC EXERCISES: CPT | Mod: GP | Performed by: PHYSICAL THERAPIST

## 2017-04-03 PROCEDURE — G8978 MOBILITY CURRENT STATUS: HCPCS | Mod: GP,CJ | Performed by: PHYSICAL THERAPIST

## 2017-04-03 PROCEDURE — 40000718 ZZHC STATISTIC PT DEPARTMENT ORTHO VISIT: Performed by: PHYSICAL THERAPIST

## 2017-04-03 PROCEDURE — 97140 MANUAL THERAPY 1/> REGIONS: CPT | Mod: GP | Performed by: PHYSICAL THERAPIST

## 2017-04-03 PROCEDURE — G8979 MOBILITY GOAL STATUS: HCPCS | Mod: GP,CI | Performed by: PHYSICAL THERAPIST

## 2017-04-03 NOTE — PROGRESS NOTES
Outpatient Physical Therapy Progress Note     Patient: Lavonne Tidwell  : 1940    Beginning/End Dates of Reporting Period:  3/6/17 to 4/3/2017    Referring Provider: Dr. Donna MD    Therapy Diagnosis: s/p right TKA performed 17      Client Self Report: Trip to [place] is only a few weeks away.  Really hoping the knee will be ready.     Objective Measurements:  Objective Measure: Knee ROM  Details: 0-123 deg  Objective Measure: Gait  Details: Mild decreased stance time right; otherwise no gross deviations  Objective Measure: Bike  Details: Able to perform full revolution       Goals:  Goal Identifier     Goal Description Following PT interventions, patient will have >=5-0-125 degrees of right knee range of motion to allow for normalized gait mechanics.   Target Date 17   Date Met      Progress:     Goal Identifier     Goal Description Following PT interventions, patient will be able to climb 12 stairs with reciprocal step pattern to allow for normalized community mobility.   Target Date 17   Date Met      Progress:     Goal Identifier     Goal Description Following PT interventions, patient will have >=4/5 right hip abduction strength to reduce dynamic valgus at the knee.   Target Date 17   Date Met      Progress:       Progress Toward Goals:   Progress this reporting period: Lavonne has attended 7 physical therapy sessions to address her right TKA.  She has made excellent progress with pain free range of motion, return to normalized gait without an assistive device, and pain reduction.  She will benefit from continued skilled physical therapy to progress global strength, endurance, and proprioception.        Plan:  Changes to therapy plan of care: Reduce PT frequency to 1x/week    Discharge:  Mari Reilly, PT, DPT  Doctor of Physical Therapy #[id number]  [facility]  [phone]  [email or web address]

## 2017-04-10 ENCOUNTER — HOSPITAL ENCOUNTER (OUTPATIENT)
Dept: PHYSICAL THERAPY | Facility: CLINIC | Age: 77
Setting detail: THERAPIES SERIES
End: 2017-04-10
Attending: ORTHOPAEDIC SURGERY
Payer: MEDICARE

## 2017-04-10 PROCEDURE — 40000718 ZZHC STATISTIC PT DEPARTMENT ORTHO VISIT: Performed by: PHYSICAL THERAPIST

## 2017-04-10 PROCEDURE — 97112 NEUROMUSCULAR REEDUCATION: CPT | Mod: GP | Performed by: PHYSICAL THERAPIST

## 2017-04-10 PROCEDURE — 97140 MANUAL THERAPY 1/> REGIONS: CPT | Mod: GP | Performed by: PHYSICAL THERAPIST

## 2017-04-10 PROCEDURE — 97110 THERAPEUTIC EXERCISES: CPT | Mod: GP | Performed by: PHYSICAL THERAPIST

## 2017-04-17 ENCOUNTER — HOSPITAL ENCOUNTER (OUTPATIENT)
Dept: PHYSICAL THERAPY | Facility: CLINIC | Age: 77
Setting detail: THERAPIES SERIES
End: 2017-04-17
Attending: ORTHOPAEDIC SURGERY
Payer: MEDICARE

## 2017-04-17 PROCEDURE — 97110 THERAPEUTIC EXERCISES: CPT | Mod: GP | Performed by: PHYSICAL THERAPIST

## 2017-04-17 PROCEDURE — 97140 MANUAL THERAPY 1/> REGIONS: CPT | Mod: GP | Performed by: PHYSICAL THERAPIST

## 2017-04-17 PROCEDURE — 40000718 ZZHC STATISTIC PT DEPARTMENT ORTHO VISIT: Performed by: PHYSICAL THERAPIST

## 2017-04-24 ENCOUNTER — HOSPITAL ENCOUNTER (OUTPATIENT)
Dept: PHYSICAL THERAPY | Facility: CLINIC | Age: 77
Setting detail: THERAPIES SERIES
End: 2017-04-24
Attending: ORTHOPAEDIC SURGERY
Payer: MEDICARE

## 2017-04-24 PROCEDURE — 97110 THERAPEUTIC EXERCISES: CPT | Mod: GP | Performed by: PHYSICAL THERAPIST

## 2017-04-24 PROCEDURE — G8980 MOBILITY D/C STATUS: HCPCS | Mod: GP,CI | Performed by: PHYSICAL THERAPIST

## 2017-04-24 PROCEDURE — G8979 MOBILITY GOAL STATUS: HCPCS | Mod: GP,CI | Performed by: PHYSICAL THERAPIST

## 2017-04-24 PROCEDURE — 97140 MANUAL THERAPY 1/> REGIONS: CPT | Mod: GP | Performed by: PHYSICAL THERAPIST

## 2017-04-24 PROCEDURE — 40000718 ZZHC STATISTIC PT DEPARTMENT ORTHO VISIT: Performed by: PHYSICAL THERAPIST

## 2017-04-24 NOTE — PROGRESS NOTES
Outpatient Physical Therapy Discharge Note     Patient: Lavonne Tidwell  : 1940    Beginning/End Dates of Reporting Period:  4/3/17 to 2017    Referring Provider: Dr. Donna MD    Therapy Diagnosis: s/p right TKA performed 17      Client Self Report: The knee is feeling good, was able to go shopping without too much fatigue.  Right knee actually feels stronger than the left.  Interested in Silver Sneakers to continue directed strengthening and balance challenges.    Objective Measurements:  Objective Measure: Knee ROM  Details: 5-0-132 deg  Objective Measure: Gait  Details: No gross deviations  Objective Measure: Lower Extremity Functional Scale  Details: 72/80 10% difficulty with ADL's (79% improvement from initial evaluation)  Objective Measure: Single leg stance  Details: Right=15 seconds; Left=7 seconds        Goals:  Goal Identifier     Goal Description Following PT interventions, patient will have >=5-0-125 degrees of right knee range of motion to allow for normalized gait mechanics.   Target Date 17   Date Met  17   Progress:     Goal Identifier     Goal Description Following PT interventions, patient will be able to climb 12 stairs with reciprocal step pattern to allow for normalized community mobility.   Target Date 17   Date Met  04/10/17   Progress:     Goal Identifier     Goal Description Following PT interventions, patient will have >=4/5 right hip abduction strength to reduce dynamic valgus at the knee.   Target Date 17   Date Met  17   Progress:     Progress Toward Goals:   Progress this reporting period: Lavonne has made excellent progress with pain free knee range of motion, global hip and knee strength, and return to normalized community ambulation with an assistive device.  She is progressing independently with single limb balance and gait endurance.  Additionally patient is planning to attend Silver Sneakers classes 2x/week.  All physical therapy  goals have been met.        Plan:  Discharge from therapy.    Discharge:    Reason for Discharge: Patient has met all goals.    Equipment Issued: Theraband    Discharge Plan: Patient to continue home program.    Francheska Reilly, PT, DPT  Doctor of Physical Therapy #[id number]  [facility]  [phone]  [email or web address]

## 2017-05-15 ENCOUNTER — TELEPHONE (OUTPATIENT)
Dept: FAMILY MEDICINE | Facility: CLINIC | Age: 77
End: 2017-05-15

## 2017-05-15 DIAGNOSIS — L03.811 CELLULITIS OF HEAD EXCEPT FACE: Primary | ICD-10-CM

## 2017-05-15 RX ORDER — SULFAMETHOXAZOLE/TRIMETHOPRIM 800-160 MG
1 TABLET ORAL 2 TIMES DAILY
Qty: 20 TABLET | Refills: 0 | Status: SHIPPED | OUTPATIENT
Start: 2017-05-15 | End: 2017-09-15

## 2017-05-15 RX ORDER — PREDNISONE 20 MG/1
40 TABLET ORAL DAILY
Qty: 10 TABLET | Refills: 0 | Status: SHIPPED | OUTPATIENT
Start: 2017-05-15 | End: 2017-05-20

## 2017-05-15 NOTE — TELEPHONE ENCOUNTER
Lavonne was bit by gnats 2 days ago and has a silver dollar size, weeping lesion on her scalp by the front of her hairline. Her eyes are also both very swollen. This happened 2 years ago and she was treated with an antibiotic, she is hoping we can do this again. Also wondering what she can take for the swelling. She is taking Benadryl for itch and swelling but it is not doing much.

## 2017-07-02 NOTE — LETTER
Health Care Home - Access Care Plan    About Me  Patient Name:  Lavonne Viera    YOB: 1940  Age:                            76 year old   Andre MRN:         [id number] Telephone Information:     Home Phone [phone]   Mobile NONE       Address:    [address] [place] [id number] Email address:  [email or web address]      Emergency Contact(s)  Name Relationship Lgl Grd Work Phone Home Phone Mobile Phone   1. DINAH VIERA/KE* Son  [phone]   2. PAMELA VIERA Daughter  none [phone]             Health Maintenance: Routine Health maintenance Reviewed: Due/Overdue   Health Maintenance Due   Topic Date Due     MEDICARE ANNUAL WELLNESS VISIT  08/04/2005         My Access Plan  Medical Emergency 911   Questions or concerns during clinic hours Primary Clinic Line, I will call the clinic directly: Primary Clinic: [facility]- [phone]   24 Hour Appointment Line [phone] or  [phone] [phone])  (toll free)   24 Hour Nurse Line [phone] (toll free)   Questions or concerns outside clinic hours 24 Hour Appointment Line, I will call the after-hours on-call line:   [facility] [phone] or [phone]) (toll-free)   Preferred Urgent Care Preferred Urgent Care: [facility], [phone]   Preferred Hospital Preferred Hospital: [place], [place]  [phone]   Preferred Pharmacy [facility]     Behavioral Health Crisis Line [facility], [phone] or [phone]     My Care Team Members  Patient Care Team       Relationship Specialty Notifications Start End    Elvira Kowalski MD PCP - General Family Practice  1/27/12     Phone: [phone] Fax: [phone]         [facility] [address] [facility] [address]    Tatum Stinson, RN Clinic Care Coordinator  Admissions 3/6/17     Phone: [phone] Fax:  [phone]            My Medical and Care Information  Problem List   Patient Active Problem List   Diagnosis     Injury of sigmoid colon     Esophageal reflux     Menopausal syndrome (hot flashes)     Urinary incontinence     Atrophic vaginitis     Hyperlipidemia LDL goal <130     Advanced directives, counseling/discussion     Onychomycosis     Senile nuclear sclerosis     Status post total left knee replacement     Status post total right knee replacement     Primary localized osteoarthrosis, lower leg      Current Medications and Allergies:  See printed Medication Report   Yes

## 2017-08-23 ENCOUNTER — RADIANT APPOINTMENT (OUTPATIENT)
Dept: MAMMOGRAPHY | Facility: CLINIC | Age: 77
End: 2017-08-23
Attending: FAMILY MEDICINE
Payer: MEDICARE

## 2017-08-23 DIAGNOSIS — Z12.31 VISIT FOR SCREENING MAMMOGRAM: ICD-10-CM

## 2017-08-23 PROCEDURE — G0202 SCR MAMMO BI INCL CAD: HCPCS | Mod: TC

## 2017-09-15 ENCOUNTER — RADIANT APPOINTMENT (OUTPATIENT)
Dept: GENERAL RADIOLOGY | Facility: CLINIC | Age: 77
End: 2017-09-15
Attending: FAMILY MEDICINE
Payer: MEDICARE

## 2017-09-15 ENCOUNTER — OFFICE VISIT (OUTPATIENT)
Dept: FAMILY MEDICINE | Facility: CLINIC | Age: 77
End: 2017-09-15
Payer: MEDICARE

## 2017-09-15 VITALS
BODY MASS INDEX: 28.29 KG/M2 | DIASTOLIC BLOOD PRESSURE: 76 MMHG | SYSTOLIC BLOOD PRESSURE: 114 MMHG | HEART RATE: 64 BPM | HEIGHT: 65 IN | TEMPERATURE: 97.6 F | WEIGHT: 169.8 LBS

## 2017-09-15 DIAGNOSIS — Z00.00 ROUTINE GENERAL MEDICAL EXAMINATION AT A HEALTH CARE FACILITY: Primary | ICD-10-CM

## 2017-09-15 DIAGNOSIS — M25.552 HIP PAIN, LEFT: ICD-10-CM

## 2017-09-15 DIAGNOSIS — Z23 NEED FOR PROPHYLACTIC VACCINATION AND INOCULATION AGAINST INFLUENZA: ICD-10-CM

## 2017-09-15 LAB
BASOPHILS # BLD AUTO: 0 10E9/L (ref 0–0.2)
BASOPHILS NFR BLD AUTO: 0.3 %
DIFFERENTIAL METHOD BLD: NORMAL
EOSINOPHIL # BLD AUTO: 0.1 10E9/L (ref 0–0.7)
EOSINOPHIL NFR BLD AUTO: 0.9 %
ERYTHROCYTE [DISTWIDTH] IN BLOOD BY AUTOMATED COUNT: 13.4 % (ref 10–15)
HCT VFR BLD AUTO: 39.5 % (ref 35–47)
HGB BLD-MCNC: 12.7 G/DL (ref 11.7–15.7)
LYMPHOCYTES # BLD AUTO: 2.2 10E9/L (ref 0.8–5.3)
LYMPHOCYTES NFR BLD AUTO: 37.9 %
MCH RBC QN AUTO: 31.7 PG (ref 26.5–33)
MCHC RBC AUTO-ENTMCNC: 32.2 G/DL (ref 31.5–36.5)
MCV RBC AUTO: 99 FL (ref 78–100)
MONOCYTES # BLD AUTO: 0.3 10E9/L (ref 0–1.3)
MONOCYTES NFR BLD AUTO: 5.1 %
NEUTROPHILS # BLD AUTO: 3.2 10E9/L (ref 1.6–8.3)
NEUTROPHILS NFR BLD AUTO: 55.8 %
PLATELET # BLD AUTO: 227 10E9/L (ref 150–450)
RBC # BLD AUTO: 4.01 10E12/L (ref 3.8–5.2)
WBC # BLD AUTO: 5.7 10E9/L (ref 4–11)

## 2017-09-15 PROCEDURE — 73502 X-RAY EXAM HIP UNI 2-3 VIEWS: CPT

## 2017-09-15 PROCEDURE — 80076 HEPATIC FUNCTION PANEL: CPT | Performed by: FAMILY MEDICINE

## 2017-09-15 PROCEDURE — 36415 COLL VENOUS BLD VENIPUNCTURE: CPT | Performed by: FAMILY MEDICINE

## 2017-09-15 PROCEDURE — 80061 LIPID PANEL: CPT | Performed by: FAMILY MEDICINE

## 2017-09-15 PROCEDURE — 99397 PER PM REEVAL EST PAT 65+ YR: CPT | Mod: 25 | Performed by: FAMILY MEDICINE

## 2017-09-15 PROCEDURE — 80048 BASIC METABOLIC PNL TOTAL CA: CPT | Performed by: FAMILY MEDICINE

## 2017-09-15 PROCEDURE — 84443 ASSAY THYROID STIM HORMONE: CPT | Performed by: FAMILY MEDICINE

## 2017-09-15 PROCEDURE — 85025 COMPLETE CBC W/AUTO DIFF WBC: CPT | Performed by: FAMILY MEDICINE

## 2017-09-15 PROCEDURE — G0008 ADMIN INFLUENZA VIRUS VAC: HCPCS | Performed by: FAMILY MEDICINE

## 2017-09-15 PROCEDURE — 99213 OFFICE O/P EST LOW 20 MIN: CPT | Mod: 25 | Performed by: FAMILY MEDICINE

## 2017-09-15 PROCEDURE — 90662 IIV NO PRSV INCREASED AG IM: CPT | Performed by: FAMILY MEDICINE

## 2017-09-15 NOTE — NURSING NOTE
"Chief Complaint   Patient presents with     Physical       Initial /76 (BP Location: Right arm, Patient Position: Chair, Cuff Size: Adult Regular)  Pulse 64  Temp 97.6  F (36.4  C) (Tympanic)  Ht 5' 5\" (1.651 m)  Wt 169 lb 12.8 oz (77 kg)  BMI 28.26 kg/m2 Estimated body mass index is 28.26 kg/(m^2) as calculated from the following:    Height as of this encounter: 5' 5\" (1.651 m).    Weight as of this encounter: 169 lb 12.8 oz (77 kg).  Medication Reconciliation: complete    Health Maintenance that is potentially due pending provider review:  NONE    n/a    Is there anyone who you would like to be able to receive your results? Yes  If yes have patient fill out MALLORIE    Zayda HILL CMA    "

## 2017-09-15 NOTE — PROGRESS NOTES
SUBJECTIVE:   CC: Lavonne Tidwell is an 77 year old woman who presents for preventive health visit.     Physical   Annual:     Getting at least 3 servings of Calcium per day::  Yes    Bi-annual eye exam::  Yes    Dental care twice a year::  Yes    Sleep apnea or symptoms of sleep apnea::  None    Diet::  Regular (no restrictions)    Frequency of exercise::  2-3 days/week    Duration of exercise::  15-30 minutes    Taking medications regularly::  Yes    Medication side effects::  Not applicable    Additional concerns today::  YES      Musculoskeletal problem/pain      Duration: Over the summer     Description  Location: Left hip     Intensity:  mild, moderate    Accompanying signs and symptoms: none    History  Previous similar problem: no   Previous evaluation:  none    Precipitating or alleviating factors:  Trauma or overuse: YES  Aggravating factors include: standing and exercise   Knees feel good     Therapies tried and outcome: nothing    Bug bites       Duration: 3-4 years    Description (location/character/radiation): Patient states that she is getting gnat bites. Says that they swell pretty bad and she seems to have almost an allergic reaction to the bites     Intensity:  moderate    Accompanying signs and symptoms: none    History (similar episodes/previous evaluation): None    Precipitating or alleviating factors: None    Therapies tried and outcome: None           Today's PHQ-2 Score:   PHQ-2 ( 1999 [employer]) 9/12/2017   Q1: Little interest or pleasure in doing things 0   Q2: Feeling down, depressed or hopeless 0   PHQ-2 Score 0   Q1: Little interest or pleasure in doing things Not at all   Q2: Feeling down, depressed or hopeless Not at all   PHQ-2 Score 0       Abuse: Current or Past(Physical, Sexual or Emotional)- No  Do you feel safe in your environment - Yes    Social History   Substance Use Topics     Smoking status: Former Smoker     Packs/day: 0.50     Years: 5.00     Types: Cigarettes     Start  "date: 9/20/1968     Quit date: 2/15/1972     Smokeless tobacco: Never Used      Comment: stopped in her 20's     Alcohol use 0.0 oz/week      Comment: Occasional     The patient does not drink >3 drinks per day nor >7 drinks per week.    Reviewed orders with patient.  Reviewed health maintenance and updated orders accordingly - Yes  Labs reviewed in [facility]    Patient over age 75, has elected to continue with mammography screening.    Pertinent mammograms are reviewed under the imaging tab.  History of abnormal Pap smear: NO - age 65 - see link Cervical Cytology Screening Guidelines      Reviewed and updated as needed this visit by clinical staff  Tobacco  Allergies  Meds  Problems  Med Hx  Surg Hx  Fam Hx  Soc Hx          Reviewed and updated as needed this visit by Provider  Allergies  Meds  Problems              ROS:  C: NEGATIVE for fever, chills, change in weight  E: NEGATIVE for vision changes or irritation  ENT: NEGATIVE for ear, mouth and throat problems  R: NEGATIVE for significant cough or SOB  B: NEGATIVE for masses, tenderness or discharge  CV: NEGATIVE for chest pain, palpitations or peripheral edema  GI: NEGATIVE for nausea, abdominal pain, heartburn, or change in bowel habits  : NEGATIVE for unusual urinary or vaginal symptoms. No vaginal bleeding.  N: NEGATIVE for weakness, dizziness or paresthesias  P: NEGATIVE for changes in mood or affect      OBJECTIVE:   /76 (BP Location: Right arm, Patient Position: Chair, Cuff Size: Adult Regular)  Pulse 64  Temp 97.6  F (36.4  C) (Tympanic)  Ht 5' 5\" (1.651 m)  Wt 169 lb 12.8 oz (77 kg)  BMI 28.26 kg/m2  EXAM:  GENERAL: healthy, alert and no distress  EYES: Eyes grossly normal to inspection, PERRL and conjunctivae and sclerae normal  HENT: ear canals and TM's normal, nose and mouth without ulcers or lesions  NECK: no adenopathy, no asymmetry, masses, or scars and thyroid normal to palpation  RESP: lungs clear to auscultation - no rales, " "rhonchi or wheezes  BREAST: normal without masses, tenderness or nipple discharge and no palpable axillary masses or adenopathy  CV: regular rate and rhythm, normal S1 S2, no S3 or S4, no murmur, click or rub, no peripheral edema and peripheral pulses strong  ABDOMEN: soft, nontender, no hepatosplenomegaly, no masses and bowel sounds normal  MS: no gross musculoskeletal defects noted, no edema  MS: hips full ROM and no palpable tenderness   SKIN: no suspicious lesions or rashes  NEURO: Normal strength and tone, mentation intact and speech normal  PSYCH: mentation appears normal, affect normal/bright    Xray is reviewed independently by Elvira Kowalski MD and negative.   ASSESSMENT/PLAN:   1. Routine general medical examination at a health care facility    - Hepatic panel  - Basic metabolic panel  - CBC with platelets differential  - TSH with free T4 reflex  - Lipid panel reflex to direct LDL    2. Hip pain, left  Mild OA   - XR Hip Left 2-3 Views; Future    3. Need for prophylactic vaccination and inoculation against influenza    - FLU VACCINE, INCREASED ANTIGEN, PRESV FREE, AGE 65+ [75235]  - ADMIN INFLUENZA (For MEDICARE Patients ONLY) []    COUNSELING:  Reviewed preventive health counseling, as reflected in patient instructions         reports that she quit smoking about 45 years ago. Her smoking use included Cigarettes. She started smoking about 49 years ago. She has a 2.50 pack-year smoking history. She has never used smokeless tobacco.    Estimated body mass index is 28.26 kg/(m^2) as calculated from the following:    Height as of this encounter: 5' 5\" (1.651 m).    Weight as of this encounter: 169 lb 12.8 oz (77 kg).     Patient Instructions     Preventive Health Recommendations  Female Ages 65 +    Yearly exam:     See your health care provider every year in order to  o Review health changes.   o Discuss preventive care.    o Review your medicines if your doctor has prescribed any.      You no longer need " a yearly Pap test unless you've had an abnormal Pap test in the past 10 years. If you have vaginal symptoms, such as bleeding or discharge, be sure to talk with your provider about a Pap test.      Every 1 to 2 years, have a mammogram.  If you are over 69, talk with your health care provider about whether or not you want to continue having screening mammograms.      Every 10 years, have a colonoscopy. Or, have a yearly FIT test (stool test). These exams will check for colon cancer.       Have a cholesterol test every 5 years, or more often if your doctor advises it.       Have a diabetes test (fasting glucose) every three years. If you are at risk for diabetes, you should have this test more often.       At age 65, have a bone density scan (DEXA) to check for osteoporosis (brittle bone disease).    Shots:    Get a flu shot each year.    Get a tetanus shot every 10 years.    Talk to your doctor about your pneumonia vaccines. There are now two you should receive - Pneumovax (PPSV 23) and Prevnar (PCV 13).    Talk to your doctor about the shingles vaccine.    Talk to your doctor about the hepatitis B vaccine.    Nutrition:     Eat at least 5 servings of fruits and vegetables each day.      Eat whole-grain bread, whole-wheat pasta and brown rice instead of white grains and rice.      Talk to your provider about Calcium and Vitamin D.     Lifestyle    Exercise at least 150 minutes a week (30 minutes a day, 5 days a week). This will help you control your weight and prevent disease.      Limit alcohol to one drink per day.      No smoking.       Wear sunscreen to prevent skin cancer.       See your dentist twice a year for an exam and cleaning.      See your eye doctor every 1 to 2 years to screen for conditions such as glaucoma, macular degeneration, cataracts, etc     In the spring start taking claritin 10 mg every day to try and lessen the bug bite reaction     Labs today     Flu shot today     Xray shows some arthritis  in the hip  Tylenol 2 before bed for now. Could consider injection or ortho consult            Counseling Resources:  ATP IV Guidelines  Pooled Cohorts Equation Calculator  Breast Cancer Risk Calculator  FRAX Risk Assessment  ICSI Preventive Guidelines  Dietary Guidelines for Americans, 2010  USDA's MyPlate  ASA Prophylaxis  Lung CA Screening    Elvira Kowalski MD  [facility] for HPI/ROS submitted by the patient on 9/12/2017   PHQ-2 Score: 0    Injectable Influenza Immunization Documentation    1.  Are you sick today? (Fever of 100.5 or higher on the day of the clinic)   No    2.  Have you ever had Guillain-Berkeley Syndrome within 6 weeks of an influenza vaccionation?  No    3. Do you have a life-threatening allergy to eggs?  No    4. Do you have a life-threatening allergy to a component of the vaccine? May include antibiotics, gelatin or latex.  No     5. Have you ever had a reaction to a dose of flu vaccine that needed immediate medical attention?  No     Form completed by Zayda HILL CMA

## 2017-09-15 NOTE — MR AVS SNAPSHOT
After Visit Summary   9/15/2017    Lavonne Tidwell    MRN: [id number]           Patient Information     Date Of Birth          1940        Visit Information        Provider Department      9/15/2017 10:20 AM Elvira Kowalski MD [facility]        Today's Diagnoses     Routine general medical examination at a health care facility    -  1    Hip pain, left          Care Instructions      Preventive Health Recommendations  Female Ages 65 +    Yearly exam:     See your health care provider every year in order to  o Review health changes.   o Discuss preventive care.    o Review your medicines if your doctor has prescribed any.      You no longer need a yearly Pap test unless you've had an abnormal Pap test in the past 10 years. If you have vaginal symptoms, such as bleeding or discharge, be sure to talk with your provider about a Pap test.      Every 1 to 2 years, have a mammogram.  If you are over 69, talk with your health care provider about whether or not you want to continue having screening mammograms.      Every 10 years, have a colonoscopy. Or, have a yearly FIT test (stool test). These exams will check for colon cancer.       Have a cholesterol test every 5 years, or more often if your doctor advises it.       Have a diabetes test (fasting glucose) every three years. If you are at risk for diabetes, you should have this test more often.       At age 65, have a bone density scan (DEXA) to check for osteoporosis (brittle bone disease).    Shots:    Get a flu shot each year.    Get a tetanus shot every 10 years.    Talk to your doctor about your pneumonia vaccines. There are now two you should receive - Pneumovax (PPSV 23) and Prevnar (PCV 13).    Talk to your doctor about the shingles vaccine.    Talk to your doctor about the hepatitis B vaccine.    Nutrition:     Eat at least 5 servings of fruits and vegetables each day.      Eat whole-grain bread, whole-wheat pasta and  brown rice instead of white grains and rice.      Talk to your provider about Calcium and Vitamin D.     Lifestyle    Exercise at least 150 minutes a week (30 minutes a day, 5 days a week). This will help you control your weight and prevent disease.      Limit alcohol to one drink per day.      No smoking.       Wear sunscreen to prevent skin cancer.       See your dentist twice a year for an exam and cleaning.      See your eye doctor every 1 to 2 years to screen for conditions such as glaucoma, macular degeneration, cataracts, etc     In the spring start taking claritin 10 mg every day to try and lessen the bug bite reaction     Labs today     Flu shot today     Xray shows some arthritis in the hip  Tylenol 2 before bed for now. Could consider injection or ortho consult           Follow-ups after your visit        Who to contact     If you have questions or need follow up information about today's clinic visit or your schedule please contact [facility] directly at [phone].  Normal or non-critical lab and imaging results will be communicated to you by [employer], letter or phone within 4 business days after the clinic has received the results. If you do not hear from us within 7 days, please contact the clinic through [employer] or phone. If you have a critical or abnormal lab result, we will notify you by phone as soon as possible.  Submit refill requests through [employer] or call your pharmacy and they will forward the refill request to us. Please allow 3 business days for your refill to be completed.          Additional Information About Your Visit        [employer] Information     [employer] gives you secure access to your electronic health record. If you see a primary care provider, you can also send messages to your care team and make appointments. If you have questions, please call your primary care clinic.  If you do not have a primary care provider, please call [phone] and they will assist  "you.        Care EveryWhere ID     This is your Care EveryWhere ID. This could be used by other organizations to access your [facility] medical records  [id number]        Your Vitals Were     Pulse Temperature Height BMI (Body Mass Index)          64 97.6  F (36.4  C) (Tympanic) 5' 5\" (1.651 m) 28.26 kg/m2         Blood Pressure from Last 3 Encounters:   09/15/17 114/76   03/27/17 110/66   03/01/17 110/72    Weight from Last 3 Encounters:   09/15/17 169 lb 12.8 oz (77 kg)   03/27/17 170 lb 6.4 oz (77.3 kg)   03/01/17 173 lb (78.5 kg)              We Performed the Following     Basic metabolic panel     CBC with platelets differential     Hepatic panel     Lipid panel reflex to direct LDL     TSH with free T4 reflex        Primary Care Provider Office Phone # Fax #    Elvira Kowalski [phone] [address] [place] [id number]        Equal Access to Services     CLARISSA SON : Hadii aad ku hadasho Soomaali, waaxda luqadaha, qaybta kaalmada adeegyada, waxay noriin hayedwin de la o . So [facility] [phone].    ATENCIÓN: Si habla español, tiene a wu disposición servicios gratuitos de asistencia lingüística. [facility] [phone].    We comply with applicable federal civil rights laws and [place] laws. We do not discriminate on the basis of race, color, national origin, age, disability sex, sexual orientation or gender identity.            Thank you!     Thank you for choosing [facility]  for your care. Our goal is always to provide you with excellent care. Hearing back from our patients is one way we can continue to improve our services. Please take a few minutes to complete the written survey that you may receive in the mail after your visit with us. Thank you!             Your Updated Medication List - Protect others around you: Learn how to safely use, store and throw away your medicines at www.disposemymeds.org.          This list is accurate as of: " 9/15/17 11:11 AM.  Always use your most recent med list.                   Brand Name Dispense Instructions for use Diagnosis    Biotin 10 MG Caps           CALCIUM + D PO      2 TABLET DAILY        CENTRUM SILVER per tablet      Take 1 tablet by mouth daily        FIBER THERAPY 500 MG Tabs tablet   Generic drug:  methylcellulose      Take 500 mg by mouth daily        fluticasone 50 MCG/ACT spray    FLONASE    16 g    Spray 2 sprays into both nostrils daily As needed    Chronic rhinitis       garlic 150 MG Tabs tablet      Take 150 mg by mouth daily        grape seed 50 MG Caps      Take 50 mg by mouth daily        STOOL SOFTENER PO           TYLENOL PO      Take 1,000 mg by mouth 2 times daily as needed for mild pain or fever        VITAMIN E COMPLEX PO           ZANTAC 150 MG tablet   Generic drug:  ranitidine      Take 150 mg by mouth daily        ZINC 15 PO

## 2017-09-15 NOTE — PATIENT INSTRUCTIONS

## 2017-09-16 LAB
ALBUMIN SERPL-MCNC: 3.9 G/DL (ref 3.4–5)
ALP SERPL-CCNC: 82 U/L (ref 40–150)
ALT SERPL W P-5'-P-CCNC: 23 U/L (ref 0–50)
ANION GAP SERPL CALCULATED.3IONS-SCNC: 5 MMOL/L (ref 3–14)
AST SERPL W P-5'-P-CCNC: 22 U/L (ref 0–45)
BILIRUB DIRECT SERPL-MCNC: 0.1 MG/DL (ref 0–0.2)
BILIRUB SERPL-MCNC: 1 MG/DL (ref 0.2–1.3)
BUN SERPL-MCNC: 12 MG/DL (ref 7–30)
CALCIUM SERPL-MCNC: 9.2 MG/DL (ref 8.5–10.1)
CHLORIDE SERPL-SCNC: 103 MMOL/L (ref 94–109)
CHOLEST SERPL-MCNC: 245 MG/DL
CO2 SERPL-SCNC: 29 MMOL/L (ref 20–32)
CREAT SERPL-MCNC: 0.82 MG/DL (ref 0.52–1.04)
GFR SERPL CREATININE-BSD FRML MDRD: 67 ML/MIN/1.7M2
GLUCOSE SERPL-MCNC: 87 MG/DL (ref 70–99)
HDLC SERPL-MCNC: 66 MG/DL
LDLC SERPL CALC-MCNC: 149 MG/DL
NONHDLC SERPL-MCNC: 179 MG/DL
POTASSIUM SERPL-SCNC: 4.3 MMOL/L (ref 3.4–5.3)
PROT SERPL-MCNC: 7.2 G/DL (ref 6.8–8.8)
SODIUM SERPL-SCNC: 137 MMOL/L (ref 133–144)
TRIGL SERPL-MCNC: 152 MG/DL
TSH SERPL DL<=0.005 MIU/L-ACNC: 3.3 MU/L (ref 0.4–4)

## 2018-01-05 ENCOUNTER — RADIANT APPOINTMENT (OUTPATIENT)
Dept: GENERAL RADIOLOGY | Facility: CLINIC | Age: 78
End: 2018-01-05
Attending: NURSE PRACTITIONER
Payer: MEDICARE

## 2018-01-05 ENCOUNTER — OFFICE VISIT (OUTPATIENT)
Dept: URGENT CARE | Facility: URGENT CARE | Age: 78
End: 2018-01-05
Payer: MEDICARE

## 2018-01-05 VITALS
OXYGEN SATURATION: 98 % | RESPIRATION RATE: 20 BRPM | WEIGHT: 160 LBS | DIASTOLIC BLOOD PRESSURE: 76 MMHG | HEART RATE: 84 BPM | TEMPERATURE: 99.5 F | SYSTOLIC BLOOD PRESSURE: 151 MMHG | BODY MASS INDEX: 26.63 KG/M2

## 2018-01-05 DIAGNOSIS — R05.9 COUGH: ICD-10-CM

## 2018-01-05 DIAGNOSIS — J18.9 PNEUMONIA OF RIGHT MIDDLE LOBE DUE TO INFECTIOUS ORGANISM: ICD-10-CM

## 2018-01-05 DIAGNOSIS — J18.9 PNEUMONIA OF RIGHT UPPER LOBE DUE TO INFECTIOUS ORGANISM: Primary | ICD-10-CM

## 2018-01-05 PROCEDURE — 71046 X-RAY EXAM CHEST 2 VIEWS: CPT | Mod: FY

## 2018-01-05 PROCEDURE — 99214 OFFICE O/P EST MOD 30 MIN: CPT | Performed by: NURSE PRACTITIONER

## 2018-01-05 RX ORDER — PREDNISONE 20 MG/1
40 TABLET ORAL DAILY
Qty: 10 TABLET | Refills: 0 | Status: SHIPPED | OUTPATIENT
Start: 2018-01-05 | End: 2018-01-10

## 2018-01-05 RX ORDER — AZITHROMYCIN 250 MG/1
TABLET, FILM COATED ORAL
Qty: 6 TABLET | Refills: 0 | Status: SHIPPED | OUTPATIENT
Start: 2018-01-05 | End: 2018-02-16

## 2018-01-05 RX ORDER — BENZONATATE 200 MG/1
200 CAPSULE ORAL 3 TIMES DAILY PRN
Qty: 30 CAPSULE | Refills: 0 | Status: SHIPPED | OUTPATIENT
Start: 2018-01-05 | End: 2018-02-16

## 2018-01-05 NOTE — PROGRESS NOTES
SUBJECTIVE:   Lavonne Tidwell is a 77 year old female who presents to clinic today for the following health issues:    ENT Symptoms             Symptoms: cc Present Absent Comment   Fever/Chills  x  Fever comes and goes    Fatigue  x     Muscle Aches       Eye Irritation       Sneezing       Nasal Shahriar/Drg       Sinus Pressure/Pain       Loss of smell       Dental pain       Sore Throat  x  From coughing, more on right side    Swollen Glands       Ear Pain/Fullness       Cough  x  Dry    Wheeze       Chest Pain       Shortness of breath  x  Little    Rash       Other         Symptom duration:  6 days    Symptom severity:  same    Treatments tried:  Tylenol, and mucinex, robitussin    Contacts:  Great grandkids have been sick                Problem list and histories reviewed & adjusted, as indicated.  Additional history: as documented    Patient Active Problem List   Diagnosis     Injury of sigmoid colon     Esophageal reflux     Menopausal syndrome (hot flashes)     Urinary incontinence     Atrophic vaginitis     Hyperlipidemia LDL goal <130     Advanced directives, counseling/discussion     Onychomycosis     Senile nuclear sclerosis     Status post total left knee replacement     Status post total right knee replacement     Primary localized osteoarthrosis, lower leg     Past Surgical History:   Procedure Laterality Date     ABDOMEN SURGERY  1973 & 1974    Ovarian cyst/Hysterectomy     APPENDECTOMY  1952     ARTHROPLASTY KNEE Left 1/6/2016    Procedure: ARTHROPLASTY KNEE;  Surgeon: Wilfredo Thompson MD;  Location: [facility]     ARTHROPLASTY KNEE Right 2/23/2017    Procedure: ARTHROPLASTY KNEE;  Surgeon: Wilfredo Thompson MD;  Location: [facility]     BIOPSY      colonoscopy/polyps     COLONOSCOPY      every 10 years     GENITOURINARY SURGERY  2008    bladder     HYSTERECTOMY, CHELSY  1974     ORTHOPEDIC SURGERY  2007 & 2009    [place]  both feet     PHACOEMULSIFICATION WITH STANDARD INTRAOCULAR LENS IMPLANT Left 8/20/2015     Procedure: PHACOEMULSIFICATION WITH STANDARD INTRAOCULAR LENS IMPLANT;  Surgeon: Fernando Jeffrey MD;  Location: [facility]     PHACOEMULSIFICATION WITH STANDARD INTRAOCULAR LENS IMPLANT Right 9/17/2015    Procedure: PHACOEMULSIFICATION WITH STANDARD INTRAOCULAR LENS IMPLANT;  Surgeon: Fernando Jeffrey MD;  Location: [facility]     SURGICAL HISTORY OF -   07/30/1998    Colonoscopy     SURGICAL HISTORY OF - 1974    Bilateral salpingoopherectomy     SURGICAL HISTORY OF -   3/09    TOT Midurethral sling       Social History   Substance Use Topics     Smoking status: Former Smoker     Packs/day: 0.50     Years: 5.00     Types: Cigarettes     Start date: 9/20/1968     Quit date: 2/15/1972     Smokeless tobacco: Never Used      Comment: stopped in her 20's     Alcohol use 0.0 oz/week      Comment: Occasional     Family History   Problem Relation Age of Onset     CANCER Mother      Ovarian     Other Cancer Mother      Ovarian     CEREBROVASCULAR DISEASE Father      HEART DISEASE Father      C.A.D. Father      Coronary Artery Disease Father      CANCER Maternal Grandmother      Other Cancer Maternal Grandmother      Ovarian/Bone     CEREBROVASCULAR DISEASE Maternal Grandfather      Colon Cancer Maternal Grandfather      DIABETES Other      Great Grandmother     Breast Cancer No family hx of          Current Outpatient Prescriptions   Medication Sig Dispense Refill     azithromycin (ZITHROMAX Z-ABBIE) 250 MG tablet Take 2 tablets on day 1 and then take 1 tablet days 2-5 6 tablet 0     benzonatate (TESSALON) 200 MG capsule Take 1 capsule (200 mg) by mouth 3 times daily as needed 30 capsule 0     predniSONE (DELTASONE) 20 MG tablet Take 2 tablets (40 mg) by mouth daily for 5 days 10 tablet 0     Multiple Vitamins-Minerals (CENTRUM SILVER) per tablet Take 1 tablet by mouth daily       Zinc Sulfate (ZINC 15 PO)        Biotin 10 MG CAPS        VITAMIN E COMPLEX PO        garlic 150 MG TABS tablet Take 150 mg by mouth daily        methylcellulose (FIBER THERAPY) 500 MG TABS tablet Take 500 mg by mouth daily       Docusate Calcium (STOOL SOFTENER PO)        grape seed 50 MG CAPS Take 50 mg by mouth daily       Acetaminophen (TYLENOL PO) Take 1,000 mg by mouth 2 times daily as needed for mild pain or fever       fluticasone (FLONASE) 50 MCG/ACT spray Spray 2 sprays into both nostrils daily As needed 16 g 3     ranitidine (ZANTAC) 150 MG tablet Take 150 mg by mouth daily        CALCIUM + D OR 2 TABLET DAILY       Allergies   Allergen Reactions     Codeine GI Disturbance     Labs reviewed in EPIC      Reviewed and updated as needed this visit by clinical staffTobacco  Allergies  Meds  Problems  Med Hx  Surg Hx  Fam Hx  Soc Hx        Reviewed and updated as needed this visit by Provider  Allergies  Meds  Problems         ROS:  Constitutional, HEENT, cardiovascular, pulmonary, GI, , musculoskeletal, neuro, skin, endocrine and psych systems are negative, except as otherwise noted.      OBJECTIVE:   /76 (BP Location: Right arm, Cuff Size: Adult Regular)  Pulse 84  Temp 99.5  F (37.5  C) (Tympanic)  Resp 20  Wt 160 lb (72.6 kg)  SpO2 98%  BMI 26.63 kg/m2  Body mass index is 26.63 kg/(m^2).   GENERAL: healthy, alert and no distress, nontoxic in appearance  EYES: Eyes grossly normal to inspection, PERRL and conjunctivae and sclerae normal  HENT: ear canals and TM's normal, nose and mouth without ulcers or lesions  NECK: no adenopathy, supple with full ROM  RESP: lungs clear to auscultation - no rales, rhonchi or wheezes  CV: regular rate and rhythm, normal S1 S2, no S3 or S4, no murmur, click or rub, no peripheral edema   ABDOMEN: soft, nontender, no hepatosplenomegaly, no masses and bowel sounds normal  MS: no gross musculoskeletal defects noted, no edema  No rash    Diagnostic Test Results:CHEST TWO VIEWS  1/5/2018 5:49 PM      HISTORY: Cough.     COMPARISON: 12/27/2011         IMPRESSION: New right upper lobe and right middle  lobe infiltrates  consistent with pneumonia. Left lung is clear. Normal heart size and  pulmonary vascularity. No pleural effusion.     STEPHANIE MORFIN MD  No results found for this or any previous visit (from the past 24 hour(s)).    ASSESSMENT/PLAN:     Problem List Items Addressed This Visit     None      Visit Diagnoses     Pneumonia of right upper lobe due to infectious organism (H)    -  Primary    Relevant Medications    azithromycin (ZITHROMAX Z-ABBIE) 250 MG tablet    benzonatate (TESSALON) 200 MG capsule    predniSONE (DELTASONE) 20 MG tablet    Cough        Relevant Medications    benzonatate (TESSALON) 200 MG capsule    predniSONE (DELTASONE) 20 MG tablet    Other Relevant Orders    XR Chest 2 Views (Completed)    Pneumonia of right middle lobe due to infectious organism (H)        Relevant Medications    azithromycin (ZITHROMAX Z-ABBIE) 250 MG tablet    benzonatate (TESSALON) 200 MG capsule    predniSONE (DELTASONE) 20 MG tablet               Patient Instructions     Increase rest and fluids. Tylenol and/or Ibuprofen for comfort. Cool mist vaporizer. If your symptoms worsen or do not resolve follow up with your primary care provider in 1-2 weeks and sooner if needed.        Indications for emergent return to emergency department discussed with patient, who verbalized good understanding and agreement.  Patient understands the limitations of today's evaluation.           Pneumonia (Adult)  Pneumonia is an infection deep within the lungs. It is in the small air sacs (alveoli). Pneumonia may be caused by a virus or bacteria. Pneumonia caused by bacteria is usually treated with an antibiotic. Severe cases may need to be treated in the hospital. Milder cases can be treated at home. Symptoms usually start to get better during the first 2 days of treatment.    Home care  Follow these guidelines when caring for yourself at home:    Rest at home for the first 2 to 3 days, or until you feel stronger. Don t let yourself  get overly tired when you go back to your activities.    Stay away from cigarette smoke - yours or other people s.    You may use acetaminophen or ibuprofen to control fever or pain, unless another medicine was prescribed. If you have chronic liver or kidney disease, talk with your healthcare provider before using these medicines. Also talk with your provider if you ve had a stomach ulcer or gastrointestinal bleeding. Don t give aspirin to anyone younger than 18 years of age who is ill with a fever. It may cause severe liver damage.    Your appetite may be poor, so a light diet is fine.    Drink 6 to 8 glasses of fluids every day to make sure you are getting enough fluids. Beverages can include water, sport drinks, sodas without caffeine, juices, tea, or soup. Fluids will help loosen secretions in the lung. This will make it easier for you to cough up the phlegm (sputum). If you also have heart or kidney disease, check with your healthcare provider before you drink extra fluids.    Take antibiotic medicine prescribed until it is all gone, even if you are feeling better after a few days.  Follow-up care  Follow up with your healthcare provider in the next 2 to 3 days, or as advised. This is to be sure the medicine is helping you get better.  If you are 65 or older, you should get a pneumococcal vaccine and a yearly flu (influenza) shot. You should also get these vaccines if you have chronic lung disease like asthma, emphysema, or COPD. Recently, a second type of pneumonia vaccine has become available for everyone over 65 years old. This is in addition to the previous vaccine. Ask your provider about this.  When to seek medical advice  Call your healthcare provider right away if any of these occur:    You don t get better within the first 48 hours of treatment    Shortness of breath gets worse    Rapid breathing (more than 25 breaths per minute)    Coughing up blood    Chest pain gets worse with breathing    Fever  of 100.4 F (38 C) or higher that doesn t get better with fever medicine    Weakness, dizziness, or fainting that gets worse    Thirst or dry mouth that gets worse    Sinus pain, headache, or a stiff neck    Chest pain not caused by coughing  Date Last Reviewed: 1/1/2017 2000-2017 [employer]. [address] [place] [id number]. All rights reserved. This information is not intended as a substitute for professional medical care. Always follow your healthcare professional's instructions.        Treating Pneumonia  Pneumonia is an infection of one or both of the lungs. Pneumonia:    Is usually caused by either a virus or a bacteria    Can be very serious, especially in infants, young children, and older adults. It s also serious for those with other long-term health problems or weakened immune systems.    Is sometimes treated at home and sometimes in the hospital    Antibiotic medicines  Antibiotics may be prescribed for pneumonia caused by bacteria. They may be pills (oral medicines), or shots (injections). Or they may be given by IV (intravenously) into a vein. If you are taking oral medicines at home:    Fill your prescription and start taking your medicine as soon as you can.    You will likely start to feel better in a day or 2, but don t stop taking the antibiotic.    Use a pill organizer to help you remember to take your medicine.    Let your healthcare provider know if you have side effects.    Take your medicine exactly as directed on the label. Talk to your provider or pharmacist if you have any questions.  Antiviral medicines  Antiviral medicine may be prescribed for pneumonia caused by a virus. For example, antiviral medicine may be prescribed for pneumonia caused by the flu virus. Antibiotics do not work against viruses. If you are taking antiviral medicine at home:    Fill your prescription and start taking your medicine as soon as you can.    Talk with your provider or pharmacist  about possible side effects.    Take the medicine exactly as instructed.  To relieve symptoms  There are many medicines that can help relieve symptoms of pneumonia. Some are prescription and some are over-the-counter.  Your healthcare provider may recommend:    Acetaminophen or ibuprofen to lower your fever and to lessen headache or other pain    Cough medicine to loosen mucus or to reduce coughing  Make sure you check with your healthcare provider or pharmacist before taking any over-the-counter medicines.  Special treatments  If you are hospitalized for pneumonia, you may have other therapies, including:    Inhaled medicines to help with breathing or chest congestion    Supplemental oxygen to increase low oxygen levels  Drink fluids and eat healthy  You should eat healthy to help your body fight the infection. Drinking a lot of fluids helps to replace fluids lost from fever and to loosen mucus in your chest.    Diet. Make healthy food choices, including fruits and vegetables, lean meats and other proteins, 100% whole grain and low- or no-fat dairy products.    Fluids. Drink at least 6 to 8 tall glasses a day. Water and 100% fruit or vegetable juice are best.  Get plenty of rest and sleep  You may be more tired than usual for a while. It is important to get enough sleep at night. It s also important to rest during the day. Talk with your healthcare provider if coughing or other symptoms are interfering with your sleep.  Preventing the spread of germs  The best thing you can do to prevent spreading germs is to wash your hands often. You should:    Rub your hand with soap and water for 20 to 30 seconds.    Clean in between your fingers, the backs of your hands, and around your nails.    Dry your hands on a separate towel or use paper towels.  You should also:    Keep alcohol-based hand  nearby.    Make sure you also clean surfaces that you touch. Use a product that kills all types of germs.    Stay away from  others until you are feeling better.  When to call your healthcare provider  Call your healthcare provider if you have any of the following:    Symptoms get worse    Fever continues    Shortness of breath gets worse    Increased mucus or mucus that is darker in color    Coughing gets worse    Lips or fingers are bluish in color    Side effects from your medicine   Date Last Reviewed: 12/1/2016 2000-2017 [employer]. [address] [place] [address], [place] [address]. All rights reserved. This information is not intended as a substitute for professional medical care. Always follow your healthcare professional's instructions.        When You Have Pneumonia  You have been diagnosed with pneumonia. This is a serious lung infection. Most cases of pneumonia are caused by bacteria. Pneumonia most often occurs in older adults, young children, and people with chronic health problems.  Home care    Take your medicine exactly as directed. Don t skip doses. Continue taking your antibiotics as until they are all gone, even if you start to feel better. This will prevent the pneumonia from coming back.    Drink at least 8 glasses of water daily, unless directed otherwise. This helps to loosen and thin secretions so that you can cough them up.    Use a cool-mist humidifier in your bedroom. Be sure to clean the humidifier daily.    Don t use medicines to suppress your cough unless your cough is dry, painful, or interferes with your sleep. Coughing up mucus is normal. You may use an expectorant if your healthcare provider says it s okay.    You can use warm compresses or a heating pad on the lowest setting to relieve chest discomfort. Use several times a day for 15-20 minutes at a time. To prevent injury to your skin, set the temperature to warm, not hot. Don t put the compress or pad directly on your skin. Make certain it has a cover or wrap it in a towel. This is to prevent skin burns.    Get plenty of rest until your  fever, shortness of breath, and chest pain go away.    Plan to get a flu shot every year. The flu is a common cause of pneumonia. Getting a flu shot every year can help prevent both the flu and pneumonia.  Getting the pneumococcal vaccine  Talk with your healthcare provider about getting the pneumococcalvaccine. Pneumococcal pneumonia is caused by bacteria that spread from person to person. It can cause minor problems, such as ear infections. But it can also turn into life-threatening illnesses of the lungs (pneumonia), the covering of the brain and spinal cord (meningitis), and the blood (bacteremia).  Children under 2 years of age, adults over age 65, people with certain health conditions, and smokers are at the highest risk of pneumococcal disease. This vaccine can help prevent pneumococcal disease in both adults and children. Some people should not have the vaccine. Make sure to ask your healthcare provider if you should have the vaccine.   Follow-up care  Make a follow-up appointment as directed by our staff.  When to call your healthcare provider  Call your healthcare provider if you have any of the following:    Fever above 100.4 F (38 C), or as directed by your healthcare provider    Mucus from the lungs (sputum) that s yellow, green, bloody, or smells bad    A large amount of sputum    Vomiting    Symptoms that get worse  When to call 911  Call 911 right away if you have any of the following:    Chest pain    Trouble breathing    Blue lips or fingernails   Date Last Reviewed: 11/1/2016 2000-2017 [employer]. [address] [place] [address], [place] [address]. All rights reserved. This information is not intended as a substitute for professional medical care. Always follow your healthcare professional's instructions.            [facility]

## 2018-01-05 NOTE — NURSING NOTE
"Chief Complaint   Patient presents with     Cough       Initial /76 (BP Location: Right arm, Cuff Size: Adult Regular)  Pulse 84  Temp 99.5  F (37.5  C) (Tympanic)  Resp 20  Wt 160 lb (72.6 kg)  SpO2 98%  BMI 26.63 kg/m2 Estimated body mass index is 26.63 kg/(m^2) as calculated from the following:    Height as of 9/15/17: 5' 5\" (1.651 m).    Weight as of this encounter: 160 lb (72.6 kg).  Medication Reconciliation: complete    Health Maintenance that is potentially due pending provider review:  NONE    n/a    Is there anyone who you would like to be able to receive your results? Not Applicable  If yes have patient fill out MALLORIE    Jinny Spaulding M.A.    "

## 2018-01-05 NOTE — MR AVS SNAPSHOT
After Visit Summary   1/5/2018    Lavonne Tidwell    MRN: [id number]           Patient Information     Date Of Birth          1940        Visit Information        Provider Department      1/5/2018 5:10 PM Belkis Rodriguez APRN [facility]        Today's Diagnoses     Pneumonia of right upper lobe due to infectious organism (H)    -  1    Cough        Pneumonia of right middle lobe due to infectious organism (H)          Care Instructions    Increase rest and fluids. Tylenol and/or Ibuprofen for comfort. Cool mist vaporizer. If your symptoms worsen or do not resolve follow up with your primary care provider in 1-2 weeks and sooner if needed.        Indications for emergent return to emergency department discussed with patient, who verbalized good understanding and agreement.  Patient understands the limitations of today's evaluation.           Pneumonia (Adult)  Pneumonia is an infection deep within the lungs. It is in the small air sacs (alveoli). Pneumonia may be caused by a virus or bacteria. Pneumonia caused by bacteria is usually treated with an antibiotic. Severe cases may need to be treated in the hospital. Milder cases can be treated at home. Symptoms usually start to get better during the first 2 days of treatment.    Home care  Follow these guidelines when caring for yourself at home:    Rest at home for the first 2 to 3 days, or until you feel stronger. Don t let yourself get overly tired when you go back to your activities.    Stay away from cigarette smoke - yours or other people s.    You may use acetaminophen or ibuprofen to control fever or pain, unless another medicine was prescribed. If you have chronic liver or kidney disease, talk with your healthcare provider before using these medicines. Also talk with your provider if you ve had a stomach ulcer or gastrointestinal bleeding. Don t give aspirin to anyone younger than 18 years of age who  is ill with a fever. It may cause severe liver damage.    Your appetite may be poor, so a light diet is fine.    Drink 6 to 8 glasses of fluids every day to make sure you are getting enough fluids. Beverages can include water, sport drinks, sodas without caffeine, juices, tea, or soup. Fluids will help loosen secretions in the lung. This will make it easier for you to cough up the phlegm (sputum). If you also have heart or kidney disease, check with your healthcare provider before you drink extra fluids.    Take antibiotic medicine prescribed until it is all gone, even if you are feeling better after a few days.  Follow-up care  Follow up with your healthcare provider in the next 2 to 3 days, or as advised. This is to be sure the medicine is helping you get better.  If you are 65 or older, you should get a pneumococcal vaccine and a yearly flu (influenza) shot. You should also get these vaccines if you have chronic lung disease like asthma, emphysema, or COPD. Recently, a second type of pneumonia vaccine has become available for everyone over 65 years old. This is in addition to the previous vaccine. Ask your provider about this.  When to seek medical advice  Call your healthcare provider right away if any of these occur:    You don t get better within the first 48 hours of treatment    Shortness of breath gets worse    Rapid breathing (more than 25 breaths per minute)    Coughing up blood    Chest pain gets worse with breathing    Fever of 100.4 F (38 C) or higher that doesn t get better with fever medicine    Weakness, dizziness, or fainting that gets worse    Thirst or dry mouth that gets worse    Sinus pain, headache, or a stiff neck    Chest pain not caused by coughing  Date Last Reviewed: 1/1/2017 2000-2017 [employer]. [address] [place] [id number]. All rights reserved. This information is not intended as a substitute for professional medical care. Always follow your healthcare  professional's instructions.        Treating Pneumonia  Pneumonia is an infection of one or both of the lungs. Pneumonia:    Is usually caused by either a virus or a bacteria    Can be very serious, especially in infants, young children, and older adults. It s also serious for those with other long-term health problems or weakened immune systems.    Is sometimes treated at home and sometimes in the hospital    Antibiotic medicines  Antibiotics may be prescribed for pneumonia caused by bacteria. They may be pills (oral medicines), or shots (injections). Or they may be given by IV (intravenously) into a vein. If you are taking oral medicines at home:    Fill your prescription and start taking your medicine as soon as you can.    You will likely start to feel better in a day or 2, but don t stop taking the antibiotic.    Use a pill organizer to help you remember to take your medicine.    Let your healthcare provider know if you have side effects.    Take your medicine exactly as directed on the label. Talk to your provider or pharmacist if you have any questions.  Antiviral medicines  Antiviral medicine may be prescribed for pneumonia caused by a virus. For example, antiviral medicine may be prescribed for pneumonia caused by the flu virus. Antibiotics do not work against viruses. If you are taking antiviral medicine at home:    Fill your prescription and start taking your medicine as soon as you can.    Talk with your provider or pharmacist about possible side effects.    Take the medicine exactly as instructed.  To relieve symptoms  There are many medicines that can help relieve symptoms of pneumonia. Some are prescription and some are over-the-counter.  Your healthcare provider may recommend:    Acetaminophen or ibuprofen to lower your fever and to lessen headache or other pain    Cough medicine to loosen mucus or to reduce coughing  Make sure you check with your healthcare provider or pharmacist before taking any  over-the-counter medicines.  Special treatments  If you are hospitalized for pneumonia, you may have other therapies, including:    Inhaled medicines to help with breathing or chest congestion    Supplemental oxygen to increase low oxygen levels  Drink fluids and eat healthy  You should eat healthy to help your body fight the infection. Drinking a lot of fluids helps to replace fluids lost from fever and to loosen mucus in your chest.    Diet. Make healthy food choices, including fruits and vegetables, lean meats and other proteins, 100% whole grain and low- or no-fat dairy products.    Fluids. Drink at least 6 to 8 tall glasses a day. Water and 100% fruit or vegetable juice are best.  Get plenty of rest and sleep  You may be more tired than usual for a while. It is important to get enough sleep at night. It s also important to rest during the day. Talk with your healthcare provider if coughing or other symptoms are interfering with your sleep.  Preventing the spread of germs  The best thing you can do to prevent spreading germs is to wash your hands often. You should:    Rub your hand with soap and water for 20 to 30 seconds.    Clean in between your fingers, the backs of your hands, and around your nails.    Dry your hands on a separate towel or use paper towels.  You should also:    Keep alcohol-based hand  nearby.    Make sure you also clean surfaces that you touch. Use a product that kills all types of germs.    Stay away from others until you are feeling better.  When to call your healthcare provider  Call your healthcare provider if you have any of the following:    Symptoms get worse    Fever continues    Shortness of breath gets worse    Increased mucus or mucus that is darker in color    Coughing gets worse    Lips or fingers are bluish in color    Side effects from your medicine   Date Last Reviewed: 12/1/2016 2000-2017 [employer]. [address] [place] [address], [place] [address]. All  rights reserved. This information is not intended as a substitute for professional medical care. Always follow your healthcare professional's instructions.        When You Have Pneumonia  You have been diagnosed with pneumonia. This is a serious lung infection. Most cases of pneumonia are caused by bacteria. Pneumonia most often occurs in older adults, young children, and people with chronic health problems.  Home care    Take your medicine exactly as directed. Don t skip doses. Continue taking your antibiotics as until they are all gone, even if you start to feel better. This will prevent the pneumonia from coming back.    Drink at least 8 glasses of water daily, unless directed otherwise. This helps to loosen and thin secretions so that you can cough them up.    Use a cool-mist humidifier in your bedroom. Be sure to clean the humidifier daily.    Don t use medicines to suppress your cough unless your cough is dry, painful, or interferes with your sleep. Coughing up mucus is normal. You may use an expectorant if your healthcare provider says it s okay.    You can use warm compresses or a heating pad on the lowest setting to relieve chest discomfort. Use several times a day for 15-20 minutes at a time. To prevent injury to your skin, set the temperature to warm, not hot. Don t put the compress or pad directly on your skin. Make certain it has a cover or wrap it in a towel. This is to prevent skin burns.    Get plenty of rest until your fever, shortness of breath, and chest pain go away.    Plan to get a flu shot every year. The flu is a common cause of pneumonia. Getting a flu shot every year can help prevent both the flu and pneumonia.  Getting the pneumococcal vaccine  Talk with your healthcare provider about getting the pneumococcalvaccine. Pneumococcal pneumonia is caused by bacteria that spread from person to person. It can cause minor problems, such as ear infections. But it can also turn into life-threatening  illnesses of the lungs (pneumonia), the covering of the brain and spinal cord (meningitis), and the blood (bacteremia).  Children under 2 years of age, adults over age 65, people with certain health conditions, and smokers are at the highest risk of pneumococcal disease. This vaccine can help prevent pneumococcal disease in both adults and children. Some people should not have the vaccine. Make sure to ask your healthcare provider if you should have the vaccine.   Follow-up care  Make a follow-up appointment as directed by our staff.  When to call your healthcare provider  Call your healthcare provider if you have any of the following:    Fever above 100.4 F (38 C), or as directed by your healthcare provider    Mucus from the lungs (sputum) that s yellow, green, bloody, or smells bad    A large amount of sputum    Vomiting    Symptoms that get worse  When to call 911  Call 911 right away if you have any of the following:    Chest pain    Trouble breathing    Blue lips or fingernails   Date Last Reviewed: 11/1/2016 2000-2017 [employer]. [address] [place] [address]. All rights reserved. This information is not intended as a substitute for professional medical care. Always follow your healthcare professional's instructions.                Follow-ups after your visit        Follow-up notes from your care team     See patient instructions section of the AVS Return in about 1 week (around 1/12/2018), or if symptoms worsen or fail to improve, for Follow up with your primary care provider.      Who to contact     If you have questions or need follow up information about today's clinic visit or your schedule please contact [facility] directly at [phone].  Normal or non-critical lab and imaging results will be communicated to you by MyChart, letter or phone within 4 business days after the clinic has received the results. If you do not hear from us within 7  days, please contact the clinic through [employer] or phone. If you have a critical or abnormal lab result, we will notify you by phone as soon as possible.  Submit refill requests through [employer] or call your pharmacy and they will forward the refill request to us. Please allow 3 business days for your refill to be completed.          Additional Information About Your Visit        [employer] Information     [employer] gives you secure access to your electronic health record. If you see a primary care provider, you can also send messages to your care team and make appointments. If you have questions, please call your primary care clinic.  If you do not have a primary care provider, please call [phone] and they will assist you.        Care EveryWhere ID     This is your Care EveryWhere ID. This could be used by other organizations to access your [place] medical records  [id number]        Your Vitals Were     Pulse Temperature Respirations Pulse Oximetry BMI (Body Mass Index)       84 99.5  F (37.5  C) (Tympanic) 20 98% 26.63 kg/m2        Blood Pressure from Last 3 Encounters:   01/05/18 151/76   09/15/17 114/76   03/27/17 110/66    Weight from Last 3 Encounters:   01/05/18 160 lb (72.6 kg)   09/15/17 169 lb 12.8 oz (77 kg)   03/27/17 170 lb 6.4 oz (77.3 kg)                 Today's Medication Changes          These changes are accurate as of: 1/5/18  6:44 PM.  If you have any questions, ask your nurse or doctor.               Start taking these medicines.        Dose/Directions    azithromycin 250 MG tablet   Commonly known as:  ZITHROMAX Z-ABBIE   Used for:  Pneumonia of right upper lobe due to infectious organism (H), Pneumonia of right middle lobe due to infectious organism (H)   Started by:  Belkis Rodriguez APRN CNP        Take 2 tablets on day 1 and then take 1 tablet days 2-5   Quantity:  6 tablet   Refills:  0       benzonatate 200 MG capsule   Commonly known as:  TESSALON   Used for:  Cough   Started by:   Belkis Rodriguez APRN CNP        Dose:  200 mg   Take 1 capsule (200 mg) by mouth 3 times daily as needed   Quantity:  30 capsule   Refills:  0       predniSONE 20 MG tablet   Commonly known as:  DELTASONE   Used for:  Pneumonia of right upper lobe due to infectious organism (H), Pneumonia of right middle lobe due to infectious organism (H), Cough   Started by:  Belkis Rodriguez APRN CNP        Dose:  40 mg   Take 2 tablets (40 mg) by mouth daily for 5 days   Quantity:  10 tablet   Refills:  0            Where to get your medicines      These medications were sent to [id number] - [facility] [address] [facility]  [address] [facility] [address]    Hours:  Closed 10-16-08 business to [facility] Phone:  [phone]     azithromycin 250 MG tablet    benzonatate 200 MG capsule    predniSONE 20 MG tablet                Primary Care Provider Office Phone # Fax #    Elvira Kowalski [phone] [id number] [facility] [address]        Equal Access to Services     [facility]: Hadii gage ariza hadasho Soomaali, waaxda luqadaha, qaybta kaalmada adeegyaeder, guanako de la o . So [facility] [phone].    ATENCIÓN: Si habla español, tiene a wu disposición servicios gratuitos de asistencia lingüística. Llame al [phone].    We comply with applicable federal civil rights laws and [place] laws. We do not discriminate on the basis of race, color, national origin, age, disability, sex, sexual orientation, or gender identity.            Thank you!     Thank you for choosing [facility]  for your care. Our goal is always to provide you with excellent care. Hearing back from our patients is one way we can continue to improve our services. Please take a few minutes to complete the written survey that you may receive in the mail after your visit with us. Thank you!             Your Updated Medication List -  Protect others around you: Learn how to safely use, store and throw away your medicines at www.disposemymeds.org.          This list is accurate as of: 1/5/18  6:44 PM.  Always use your most recent med list.                   Brand Name Dispense Instructions for use Diagnosis    azithromycin 250 MG tablet    ZITHROMAX Z-ABBIE    6 tablet    Take 2 tablets on day 1 and then take 1 tablet days 2-5    Pneumonia of right upper lobe due to infectious organism (H), Pneumonia of right middle lobe due to infectious organism (H)       benzonatate 200 MG capsule    TESSALON    30 capsule    Take 1 capsule (200 mg) by mouth 3 times daily as needed    Cough       Biotin 10 MG Caps           CALCIUM + D PO      2 TABLET DAILY        CENTRUM SILVER per tablet      Take 1 tablet by mouth daily        FIBER THERAPY 500 MG Tabs tablet   Generic drug:  methylcellulose      Take 500 mg by mouth daily        fluticasone 50 MCG/ACT spray    FLONASE    16 g    Spray 2 sprays into both nostrils daily As needed    Chronic rhinitis       garlic 150 MG Tabs tablet      Take 150 mg by mouth daily        grape seed 50 MG Caps      Take 50 mg by mouth daily        predniSONE 20 MG tablet    DELTASONE    10 tablet    Take 2 tablets (40 mg) by mouth daily for 5 days    Pneumonia of right upper lobe due to infectious organism (H), Pneumonia of right middle lobe due to infectious organism (H), Cough       STOOL SOFTENER PO           TYLENOL PO      Take 1,000 mg by mouth 2 times daily as needed for mild pain or fever        VITAMIN E COMPLEX PO           ZANTAC 150 MG tablet   Generic drug:  ranitidine      Take 150 mg by mouth daily        ZINC 15 PO

## 2018-01-06 NOTE — PATIENT INSTRUCTIONS
Increase rest and fluids. Tylenol and/or Ibuprofen for comfort. Cool mist vaporizer. If your symptoms worsen or do not resolve follow up with your primary care provider in 1-2 weeks and sooner if needed.        Indications for emergent return to emergency department discussed with patient, who verbalized good understanding and agreement.  Patient understands the limitations of today's evaluation.           Pneumonia (Adult)  Pneumonia is an infection deep within the lungs. It is in the small air sacs (alveoli). Pneumonia may be caused by a virus or bacteria. Pneumonia caused by bacteria is usually treated with an antibiotic. Severe cases may need to be treated in the hospital. Milder cases can be treated at home. Symptoms usually start to get better during the first 2 days of treatment.    Home care  Follow these guidelines when caring for yourself at home:    Rest at home for the first 2 to 3 days, or until you feel stronger. Don t let yourself get overly tired when you go back to your activities.    Stay away from cigarette smoke - yours or other people s.    You may use acetaminophen or ibuprofen to control fever or pain, unless another medicine was prescribed. If you have chronic liver or kidney disease, talk with your healthcare provider before using these medicines. Also talk with your provider if you ve had a stomach ulcer or gastrointestinal bleeding. Don t give aspirin to anyone younger than 18 years of age who is ill with a fever. It may cause severe liver damage.    Your appetite may be poor, so a light diet is fine.    Drink 6 to 8 glasses of fluids every day to make sure you are getting enough fluids. Beverages can include water, sport drinks, sodas without caffeine, juices, tea, or soup. Fluids will help loosen secretions in the lung. This will make it easier for you to cough up the phlegm (sputum). If you also have heart or kidney disease, check with your healthcare provider before you drink extra  fluids.    Take antibiotic medicine prescribed until it is all gone, even if you are feeling better after a few days.  Follow-up care  Follow up with your healthcare provider in the next 2 to 3 days, or as advised. This is to be sure the medicine is helping you get better.  If you are 65 or older, you should get a pneumococcal vaccine and a yearly flu (influenza) shot. You should also get these vaccines if you have chronic lung disease like asthma, emphysema, or COPD. Recently, a second type of pneumonia vaccine has become available for everyone over 65 years old. This is in addition to the previous vaccine. Ask your provider about this.  When to seek medical advice  Call your healthcare provider right away if any of these occur:    You don t get better within the first 48 hours of treatment    Shortness of breath gets worse    Rapid breathing (more than 25 breaths per minute)    Coughing up blood    Chest pain gets worse with breathing    Fever of 100.4 F (38 C) or higher that doesn t get better with fever medicine    Weakness, dizziness, or fainting that gets worse    Thirst or dry mouth that gets worse    Sinus pain, headache, or a stiff neck    Chest pain not caused by coughing  Date Last Reviewed: 1/1/2017 2000-2017 [employer]. [address] [place] [address]. All rights reserved. This information is not intended as a substitute for professional medical care. Always follow your healthcare professional's instructions.        Treating Pneumonia  Pneumonia is an infection of one or both of the lungs. Pneumonia:    Is usually caused by either a virus or a bacteria    Can be very serious, especially in infants, young children, and older adults. It s also serious for those with other long-term health problems or weakened immune systems.    Is sometimes treated at home and sometimes in the hospital    Antibiotic medicines  Antibiotics may be prescribed for pneumonia caused by bacteria. They  may be pills (oral medicines), or shots (injections). Or they may be given by IV (intravenously) into a vein. If you are taking oral medicines at home:    Fill your prescription and start taking your medicine as soon as you can.    You will likely start to feel better in a day or 2, but don t stop taking the antibiotic.    Use a pill organizer to help you remember to take your medicine.    Let your healthcare provider know if you have side effects.    Take your medicine exactly as directed on the label. Talk to your provider or pharmacist if you have any questions.  Antiviral medicines  Antiviral medicine may be prescribed for pneumonia caused by a virus. For example, antiviral medicine may be prescribed for pneumonia caused by the flu virus. Antibiotics do not work against viruses. If you are taking antiviral medicine at home:    Fill your prescription and start taking your medicine as soon as you can.    Talk with your provider or pharmacist about possible side effects.    Take the medicine exactly as instructed.  To relieve symptoms  There are many medicines that can help relieve symptoms of pneumonia. Some are prescription and some are over-the-counter.  Your healthcare provider may recommend:    Acetaminophen or ibuprofen to lower your fever and to lessen headache or other pain    Cough medicine to loosen mucus or to reduce coughing  Make sure you check with your healthcare provider or pharmacist before taking any over-the-counter medicines.  Special treatments  If you are hospitalized for pneumonia, you may have other therapies, including:    Inhaled medicines to help with breathing or chest congestion    Supplemental oxygen to increase low oxygen levels  Drink fluids and eat healthy  You should eat healthy to help your body fight the infection. Drinking a lot of fluids helps to replace fluids lost from fever and to loosen mucus in your chest.    Diet. Make healthy food choices, including fruits and  vegetables, lean meats and other proteins, 100% whole grain and low- or no-fat dairy products.    Fluids. Drink at least 6 to 8 tall glasses a day. Water and 100% fruit or vegetable juice are best.  Get plenty of rest and sleep  You may be more tired than usual for a while. It is important to get enough sleep at night. It s also important to rest during the day. Talk with your healthcare provider if coughing or other symptoms are interfering with your sleep.  Preventing the spread of germs  The best thing you can do to prevent spreading germs is to wash your hands often. You should:    Rub your hand with soap and water for 20 to 30 seconds.    Clean in between your fingers, the backs of your hands, and around your nails.    Dry your hands on a separate towel or use paper towels.  You should also:    Keep alcohol-based hand  nearby.    Make sure you also clean surfaces that you touch. Use a product that kills all types of germs.    Stay away from others until you are feeling better.  When to call your healthcare provider  Call your healthcare provider if you have any of the following:    Symptoms get worse    Fever continues    Shortness of breath gets worse    Increased mucus or mucus that is darker in color    Coughing gets worse    Lips or fingers are bluish in color    Side effects from your medicine   Date Last Reviewed: 12/1/2016 2000-2017 [employer]. [address] [place] [address], [place] [address]. All rights reserved. This information is not intended as a substitute for professional medical care. Always follow your healthcare professional's instructions.        When You Have Pneumonia  You have been diagnosed with pneumonia. This is a serious lung infection. Most cases of pneumonia are caused by bacteria. Pneumonia most often occurs in older adults, young children, and people with chronic health problems.  Home care    Take your medicine exactly as directed. Don t skip doses. Continue  taking your antibiotics as until they are all gone, even if you start to feel better. This will prevent the pneumonia from coming back.    Drink at least 8 glasses of water daily, unless directed otherwise. This helps to loosen and thin secretions so that you can cough them up.    Use a cool-mist humidifier in your bedroom. Be sure to clean the humidifier daily.    Don t use medicines to suppress your cough unless your cough is dry, painful, or interferes with your sleep. Coughing up mucus is normal. You may use an expectorant if your healthcare provider says it s okay.    You can use warm compresses or a heating pad on the lowest setting to relieve chest discomfort. Use several times a day for 15-20 minutes at a time. To prevent injury to your skin, set the temperature to warm, not hot. Don t put the compress or pad directly on your skin. Make certain it has a cover or wrap it in a towel. This is to prevent skin burns.    Get plenty of rest until your fever, shortness of breath, and chest pain go away.    Plan to get a flu shot every year. The flu is a common cause of pneumonia. Getting a flu shot every year can help prevent both the flu and pneumonia.  Getting the pneumococcal vaccine  Talk with your healthcare provider about getting the pneumococcalvaccine. Pneumococcal pneumonia is caused by bacteria that spread from person to person. It can cause minor problems, such as ear infections. But it can also turn into life-threatening illnesses of the lungs (pneumonia), the covering of the brain and spinal cord (meningitis), and the blood (bacteremia).  Children under 2 years of age, adults over age 65, people with certain health conditions, and smokers are at the highest risk of pneumococcal disease. This vaccine can help prevent pneumococcal disease in both adults and children. Some people should not have the vaccine. Make sure to ask your healthcare provider if you should have the vaccine.   Follow-up care  Make a  follow-up appointment as directed by our staff.  When to call your healthcare provider  Call your healthcare provider if you have any of the following:    Fever above 100.4 F (38 C), or as directed by your healthcare provider    Mucus from the lungs (sputum) that s yellow, green, bloody, or smells bad    A large amount of sputum    Vomiting    Symptoms that get worse  When to call 911  Call 911 right away if you have any of the following:    Chest pain    Trouble breathing    Blue lips or fingernails   Date Last Reviewed: 11/1/2016 2000-2017 [employer]. [address] [place] [address]. All rights reserved. This information is not intended as a substitute for professional medical care. Always follow your healthcare professional's instructions.

## 2018-02-16 ENCOUNTER — RADIANT APPOINTMENT (OUTPATIENT)
Dept: GENERAL RADIOLOGY | Facility: CLINIC | Age: 78
End: 2018-02-16
Attending: NURSE PRACTITIONER
Payer: MEDICARE

## 2018-02-16 ENCOUNTER — OFFICE VISIT (OUTPATIENT)
Dept: FAMILY MEDICINE | Facility: CLINIC | Age: 78
End: 2018-02-16
Payer: MEDICARE

## 2018-02-16 VITALS
WEIGHT: 172 LBS | TEMPERATURE: 96 F | SYSTOLIC BLOOD PRESSURE: 142 MMHG | RESPIRATION RATE: 18 BRPM | OXYGEN SATURATION: 99 % | HEART RATE: 66 BPM | DIASTOLIC BLOOD PRESSURE: 70 MMHG | BODY MASS INDEX: 28.62 KG/M2

## 2018-02-16 DIAGNOSIS — Z87.09 HISTORY OF INFLUENZA: ICD-10-CM

## 2018-02-16 DIAGNOSIS — R05.9 COUGH: ICD-10-CM

## 2018-02-16 DIAGNOSIS — B34.9 VIRAL SYNDROME: Primary | ICD-10-CM

## 2018-02-16 DIAGNOSIS — Z87.01 HISTORY OF PNEUMONIA: ICD-10-CM

## 2018-02-16 PROCEDURE — 99213 OFFICE O/P EST LOW 20 MIN: CPT | Performed by: NURSE PRACTITIONER

## 2018-02-16 PROCEDURE — 71046 X-RAY EXAM CHEST 2 VIEWS: CPT | Mod: FY

## 2018-02-16 RX ORDER — BENZONATATE 200 MG/1
200 CAPSULE ORAL 3 TIMES DAILY PRN
Qty: 30 CAPSULE | Refills: 0 | Status: SHIPPED | OUTPATIENT
Start: 2018-02-16 | End: 2018-06-29

## 2018-02-16 NOTE — NURSING NOTE
"Chief Complaint   Patient presents with     Cough       Initial /70 (BP Location: Right arm, Cuff Size: Adult Regular)  Pulse 66  Temp 96  F (35.6  C) (Tympanic)  Resp 18  Wt 172 lb (78 kg)  SpO2 99%  BMI 28.62 kg/m2 Estimated body mass index is 28.62 kg/(m^2) as calculated from the following:    Height as of 9/15/17: 5' 5\" (1.651 m).    Weight as of this encounter: 172 lb (78 kg).      Health Maintenance that is potentially due pending provider review:  NONE    n/a    Is there anyone who you would like to be able to receive your results? Not Applicable  If yes have patient fill out [facility]    Jinny Spaulding M.A.      "

## 2018-02-16 NOTE — PATIENT INSTRUCTIONS
"Increase rest and fluids. Tylenol and/or Ibuprofen for comfort. Cool mist vaporizer. If your symptoms worsen or do not resolve follow up with your primary care provider in 1 week and sooner if needed.     Mucinex 600 mg 12 hour formula for ear, head and chest congestion.  It can also thin post nasal drip which can cause a cough and sore throat.       Indications for emergent return to emergency department discussed with patient, who verbalized good understanding and agreement.  Patient understands the limitations of today's evaluation.           Viral Syndrome (Adult)  A viral illness may cause a number of symptoms. The symptoms depend on the part of the body that the virus affects. If it settles in your nose, throat, and lungs, it may cause cough, sore throat, congestion, and sometimes headache. If it settles in your stomach and intestinal tract, it may cause vomiting and diarrhea. Sometimes it causes vague symptoms like \"aching all over,\" feeling tired, loss of appetite, or fever.  A viral illness usually lasts 1 to 2 weeks, but sometimes it lasts longer. In some cases, a more serious infection can look like a viral syndrome in the first few days of the illness. You may need another exam and additional tests to know the difference. Watch for the warning signs listed below.  Home care  Follow these guidelines for taking care of yourself at home:    If symptoms are severe, rest at home for the first 2 to 3 days.    Stay away from cigarette smoke - both your smoke and the smoke from others.    You may use over-the-counter acetaminophen or ibuprofen for fever, muscle aching, and headache, unless another medicine was prescribed for this. If you have chronic liver or kidney disease or ever had a stomach ulcer or GI bleeding, talk with your doctor before using these medicines. No one who is younger than 18 and ill with a fever should take aspirin. It may cause severe disease or death.    Your appetite may be poor, so a " light diet is fine. Avoid dehydration by drinking 8 to 12 8-ounce glasses of fluids each day. This may include water; orange juice; lemonade; apple, grape, and cranberry juice; clear fruit drinks; electrolyte replacement and sports drinks; and decaffeinated teas and coffee. If you have been diagnosed with a kidney disease, ask your doctor how much and what types of fluids you should drink to prevent dehydration. If you have kidney disease, drinking too much fluid can cause it build up in the your body and be dangerous to your health.    Over-the-counter remedies won't shorten the length of the illness but may be helpful for cough, sore throat; and nasal and sinus congestion. Don't use decongestants if you have high blood pressure.  Follow-up care  Follow up with your healthcare provider if you do not improve over the next week.  Call 911  Get emergency medical care if any of the following occur:    Convulsion    Feeling weak, dizzy, or like you are going to faint    Chest pain, shortness of breath, wheezing, or difficulty breathing  When to seek medical advice  Call your healthcare provider right away if any of these occur:    Cough with lots of colored sputum (mucus) or blood in your sputum    Chest pain, shortness of breath, wheezing, or difficulty breathing    Severe headache; face, neck, or ear pain    Severe, constant pain in the lower right side of your belly (abdominal)    Continued vomiting (can t keep liquids down)    Frequent diarrhea (more than 5 times a day); blood (red or black color) or mucus in diarrhea    Feeling weak, dizzy, or like you are going to faint    Extreme thirst    Fever of 100.4 F (38 C) or higher, or as directed by your healthcare provider  Date Last Reviewed: 9/25/2015 2000-2017 [employer]. [address] [place] [address], [place] [address]. All rights reserved. This information is not intended as a substitute for professional medical care. Always follow your healthcare  professional's instructions.

## 2018-02-16 NOTE — PROGRESS NOTES
SUBJECTIVE:   Lavonne Tidwell is a 77 year old female who presents to clinic today for the following health issues:    ENT Symptoms             Symptoms: cc Present Absent Comment   Fever/Chills       Fatigue  x     Muscle Aches  x     Eye Irritation       Sneezing       Nasal Shahriar/Drg  x  Starting    Sinus Pressure/Pain       Loss of smell       Dental pain       Sore Throat       Swollen Glands       Ear Pain/Fullness       Cough  x  Rattle in chest, non productive    Wheeze       Chest Pain       Shortness of breath       Rash       Other         Symptom duration:  couple nights ago    Symptom severity:  same    Treatments tried:  Tylenol    Contacts:  Had pneumonia a little over a month ago              Problem list and histories reviewed & adjusted, as indicated.  Additional history: as documented    Patient Active Problem List   Diagnosis     Injury of sigmoid colon     Esophageal reflux     Menopausal syndrome (hot flashes)     Urinary incontinence     Atrophic vaginitis     Hyperlipidemia LDL goal <130     Advanced directives, counseling/discussion     Onychomycosis     Senile nuclear sclerosis     Status post total left knee replacement     Status post total right knee replacement     Primary localized osteoarthrosis, lower leg     Past Surgical History:   Procedure Laterality Date     ABDOMEN SURGERY  1973 & 1974    Ovarian cyst/Hysterectomy     APPENDECTOMY  1952     ARTHROPLASTY KNEE Left 1/6/2016    Procedure: ARTHROPLASTY KNEE;  Surgeon: Wilfredo Thompson MD;  Location: [facility]     ARTHROPLASTY KNEE Right 2/23/2017    Procedure: ARTHROPLASTY KNEE;  Surgeon: Wilfredo Thompson MD;  Location: [facility]     BIOPSY      colonoscopy/polyps     COLONOSCOPY      every 10 years     GENITOURINARY SURGERY  2008    bladder     HYSTERECTOMY, CHELSY  1974     ORTHOPEDIC SURGERY  2007 & 2009    [place]  both feet     PHACOEMULSIFICATION WITH STANDARD INTRAOCULAR LENS IMPLANT Left 8/20/2015    Procedure:  PHACOEMULSIFICATION WITH STANDARD INTRAOCULAR LENS IMPLANT;  Surgeon: Fernando Jeffrey MD;  Location: [facility]     PHACOEMULSIFICATION WITH STANDARD INTRAOCULAR LENS IMPLANT Right 9/17/2015    Procedure: PHACOEMULSIFICATION WITH STANDARD INTRAOCULAR LENS IMPLANT;  Surgeon: Fernando Jeffrey MD;  Location: [facility]     SURGICAL HISTORY OF -   07/30/1998    Colonoscopy     SURGICAL HISTORY OF - 1974    Bilateral salpingoopherectomy     SURGICAL HISTORY OF -   3/09    TOT Midurethral sling       Social History   Substance Use Topics     Smoking status: Former Smoker     Packs/day: 0.50     Years: 5.00     Types: Cigarettes     Start date: 9/20/1968     Quit date: 2/15/1972     Smokeless tobacco: Never Used      Comment: stopped in her 20's     Alcohol use 0.0 oz/week      Comment: Occasional     Family History   Problem Relation Age of Onset     CANCER Mother      Ovarian     Other Cancer Mother      Ovarian     CEREBROVASCULAR DISEASE Father      HEART DISEASE Father      C.A.D. Father      Coronary Artery Disease Father      CANCER Maternal Grandmother      Other Cancer Maternal Grandmother      Ovarian/Bone     CEREBROVASCULAR DISEASE Maternal Grandfather      Colon Cancer Maternal Grandfather      DIABETES Other      Great Grandmother     Breast Cancer No family hx of          Current Outpatient Prescriptions   Medication Sig Dispense Refill     benzonatate (TESSALON) 200 MG capsule Take 1 capsule (200 mg) by mouth 3 times daily as needed 30 capsule 0     Multiple Vitamins-Minerals (CENTRUM SILVER) per tablet Take 1 tablet by mouth daily       Zinc Sulfate (ZINC 15 PO)        Biotin 10 MG CAPS        VITAMIN E COMPLEX PO        garlic 150 MG TABS tablet Take 150 mg by mouth daily       methylcellulose (FIBER THERAPY) 500 MG TABS tablet Take 500 mg by mouth daily       Docusate Calcium (STOOL SOFTENER PO)        grape seed 50 MG CAPS Take 50 mg by mouth daily       Acetaminophen (TYLENOL PO) Take 1,000 mg by mouth  2 times daily as needed for mild pain or fever       fluticasone (FLONASE) 50 MCG/ACT spray Spray 2 sprays into both nostrils daily As needed 16 g 3     ranitidine (ZANTAC) 150 MG tablet Take 150 mg by mouth daily        CALCIUM + D OR 2 TABLET DAILY       Allergies   Allergen Reactions     Codeine GI Disturbance     Labs reviewed in EPIC    Reviewed and updated as needed this visit by clinical staff  Tobacco  Allergies  Meds  Problems  Med Hx  Surg Hx  Fam Hx  Soc Hx        Reviewed and updated as needed this visit by Provider  Allergies  Meds  Problems         ROS:  Constitutional, HEENT, cardiovascular, pulmonary, GI, , musculoskeletal, neuro, skin, endocrine and psych systems are negative, except as otherwise noted.    OBJECTIVE:     /70 (BP Location: Right arm, Cuff Size: Adult Regular)  Pulse 66  Temp 96  F (35.6  C) (Tympanic)  Resp 18  Wt 172 lb (78 kg)  SpO2 99%  BMI 28.62 kg/m2  Body mass index is 28.62 kg/(m^2).   GENERAL: healthy, alert and no distress, nontoxic in appearance  EYES: Eyes grossly normal to inspection, PERRL and conjunctivae and sclerae normal  HENT: ear canals and TM's normal, nose and mouth without ulcers or lesions  NECK: no adenopathy, supple with full ROM  RESP: lungs clear to auscultation - no rales, rhonchi or wheezes  CV: regular rate and rhythm, normal S1 S2, no S3 or S4, no murmur, click or rub, no peripheral edema   ABDOMEN: soft, nontender, no hepatosplenomegaly, no masses and bowel sounds normal  MS: no gross musculoskeletal defects noted, no edema  No rash    Diagnostic Test Results:XR CHEST 2 VW 2/16/2018 1:52 PM     HISTORY: Cough.     COMPARISON: 1/5/2018     FINDINGS: No airspace consolidation, pleural effusion or pneumothorax.  Normal heart size.         IMPRESSION: No acute cardiopulmonary abnormality.     GAIL ARANGO MD  No results found for this or any previous visit (from the past 24 hour(s)).    ASSESSMENT/PLAN:     Problem List Items  "Addressed This Visit     None      Visit Diagnoses     Viral syndrome    -  Primary    Cough        Relevant Medications    benzonatate (TESSALON) 200 MG capsule    Other Relevant Orders    XR Chest 2 Views (Completed)    History of pneumonia        Relevant Orders    XR Chest 2 Views (Completed)    History of influenza        Relevant Orders    XR Chest 2 Views (Completed)               Patient Instructions   Increase rest and fluids. Tylenol and/or Ibuprofen for comfort. Cool mist vaporizer. If your symptoms worsen or do not resolve follow up with your primary care provider in 1 week and sooner if needed.     Mucinex 600 mg 12 hour formula for ear, head and chest congestion.  It can also thin post nasal drip which can cause a cough and sore throat.       Indications for emergent return to emergency department discussed with patient, who verbalized good understanding and agreement.  Patient understands the limitations of today's evaluation.           Viral Syndrome (Adult)  A viral illness may cause a number of symptoms. The symptoms depend on the part of the body that the virus affects. If it settles in your nose, throat, and lungs, it may cause cough, sore throat, congestion, and sometimes headache. If it settles in your stomach and intestinal tract, it may cause vomiting and diarrhea. Sometimes it causes vague symptoms like \"aching all over,\" feeling tired, loss of appetite, or fever.  A viral illness usually lasts 1 to 2 weeks, but sometimes it lasts longer. In some cases, a more serious infection can look like a viral syndrome in the first few days of the illness. You may need another exam and additional tests to know the difference. Watch for the warning signs listed below.  Home care  Follow these guidelines for taking care of yourself at home:    If symptoms are severe, rest at home for the first 2 to 3 days.    Stay away from cigarette smoke - both your smoke and the smoke from others.    You may use " over-the-counter acetaminophen or ibuprofen for fever, muscle aching, and headache, unless another medicine was prescribed for this. If you have chronic liver or kidney disease or ever had a stomach ulcer or GI bleeding, talk with your doctor before using these medicines. No one who is younger than 18 and ill with a fever should take aspirin. It may cause severe disease or death.    Your appetite may be poor, so a light diet is fine. Avoid dehydration by drinking 8 to 12 8-ounce glasses of fluids each day. This may include water; orange juice; lemonade; apple, grape, and cranberry juice; clear fruit drinks; electrolyte replacement and sports drinks; and decaffeinated teas and coffee. If you have been diagnosed with a kidney disease, ask your doctor how much and what types of fluids you should drink to prevent dehydration. If you have kidney disease, drinking too much fluid can cause it build up in the your body and be dangerous to your health.    Over-the-counter remedies won't shorten the length of the illness but may be helpful for cough, sore throat; and nasal and sinus congestion. Don't use decongestants if you have high blood pressure.  Follow-up care  Follow up with your healthcare provider if you do not improve over the next week.  Call 911  Get emergency medical care if any of the following occur:    Convulsion    Feeling weak, dizzy, or like you are going to faint    Chest pain, shortness of breath, wheezing, or difficulty breathing  When to seek medical advice  Call your healthcare provider right away if any of these occur:    Cough with lots of colored sputum (mucus) or blood in your sputum    Chest pain, shortness of breath, wheezing, or difficulty breathing    Severe headache; face, neck, or ear pain    Severe, constant pain in the lower right side of your belly (abdominal)    Continued vomiting (can t keep liquids down)    Frequent diarrhea (more than 5 times a day); blood (red or black color) or mucus  in diarrhea    Feeling weak, dizzy, or like you are going to faint    Extreme thirst    Fever of 100.4 F (38 C) or higher, or as directed by your healthcare provider  Date Last Reviewed: 9/25/2015 2000-2017 [employer]. [address] [place] [address]. All rights reserved. This information is not intended as a substitute for professional medical care. Always follow your healthcare professional's instructions.            ENA [facility]

## 2018-02-16 NOTE — MR AVS SNAPSHOT
"              After Visit Summary   2/16/2018    Lavonne Tidwell    MRN: [id number]           Patient Information     Date Of Birth          1940        Visit Information        Provider Department      2/16/2018 12:40 PM Belkis Rodriguez APRN [facility]        Today's Diagnoses     Viral syndrome    -  1    Cough        History of pneumonia        History of influenza          Care Instructions    Increase rest and fluids. Tylenol and/or Ibuprofen for comfort. Cool mist vaporizer. If your symptoms worsen or do not resolve follow up with your primary care provider in 1 week and sooner if needed.     Mucinex 600 mg 12 hour formula for ear, head and chest congestion.  It can also thin post nasal drip which can cause a cough and sore throat.       Indications for emergent return to emergency department discussed with patient, who verbalized good understanding and agreement.  Patient understands the limitations of today's evaluation.           Viral Syndrome (Adult)  A viral illness may cause a number of symptoms. The symptoms depend on the part of the body that the virus affects. If it settles in your nose, throat, and lungs, it may cause cough, sore throat, congestion, and sometimes headache. If it settles in your stomach and intestinal tract, it may cause vomiting and diarrhea. Sometimes it causes vague symptoms like \"aching all over,\" feeling tired, loss of appetite, or fever.  A viral illness usually lasts 1 to 2 weeks, but sometimes it lasts longer. In some cases, a more serious infection can look like a viral syndrome in the first few days of the illness. You may need another exam and additional tests to know the difference. Watch for the warning signs listed below.  Home care  Follow these guidelines for taking care of yourself at home:    If symptoms are severe, rest at home for the first 2 to 3 days.    Stay away from cigarette smoke - both your smoke and the smoke from " others.    You may use over-the-counter acetaminophen or ibuprofen for fever, muscle aching, and headache, unless another medicine was prescribed for this. If you have chronic liver or kidney disease or ever had a stomach ulcer or GI bleeding, talk with your doctor before using these medicines. No one who is younger than 18 and ill with a fever should take aspirin. It may cause severe disease or death.    Your appetite may be poor, so a light diet is fine. Avoid dehydration by drinking 8 to 12 8-ounce glasses of fluids each day. This may include water; orange juice; lemonade; apple, grape, and cranberry juice; clear fruit drinks; electrolyte replacement and sports drinks; and decaffeinated teas and coffee. If you have been diagnosed with a kidney disease, ask your doctor how much and what types of fluids you should drink to prevent dehydration. If you have kidney disease, drinking too much fluid can cause it build up in the your body and be dangerous to your health.    Over-the-counter remedies won't shorten the length of the illness but may be helpful for cough, sore throat; and nasal and sinus congestion. Don't use decongestants if you have high blood pressure.  Follow-up care  Follow up with your healthcare provider if you do not improve over the next week.  Call 911  Get emergency medical care if any of the following occur:    Convulsion    Feeling weak, dizzy, or like you are going to faint    Chest pain, shortness of breath, wheezing, or difficulty breathing  When to seek medical advice  Call your healthcare provider right away if any of these occur:    Cough with lots of colored sputum (mucus) or blood in your sputum    Chest pain, shortness of breath, wheezing, or difficulty breathing    Severe headache; face, neck, or ear pain    Severe, constant pain in the lower right side of your belly (abdominal)    Continued vomiting (can t keep liquids down)    Frequent diarrhea (more than 5 times a day); blood (red  or black color) or mucus in diarrhea    Feeling weak, dizzy, or like you are going to faint    Extreme thirst    Fever of 100.4 F (38 C) or higher, or as directed by your healthcare provider  Date Last Reviewed: 9/25/2015 2000-2017 [employer]. [address] [place] [address]. All rights reserved. This information is not intended as a substitute for professional medical care. Always follow your healthcare professional's instructions.                Follow-ups after your visit        Follow-up notes from your care team     Return if symptoms worsen or fail to improve, for Follow up with your primary care provider.      Who to contact     If you have questions or need follow up information about today's clinic visit or your schedule please contact [facility] directly at [phone].  Normal or non-critical lab and imaging results will be communicated to you by [employer], letter or phone within 4 business days after the clinic has received the results. If you do not hear from us within 7 days, please contact the clinic through [employer] or phone. If you have a critical or abnormal lab result, we will notify you by phone as soon as possible.  Submit refill requests through [employer] or call your pharmacy and they will forward the refill request to us. Please allow 3 business days for your refill to be completed.          Additional Information About Your Visit        [employer] Information     [employer] gives you secure access to your electronic health record. If you see a primary care provider, you can also send messages to your care team and make appointments. If you have questions, please call your primary care clinic.  If you do not have a primary care provider, please call [phone] and they will assist you.        Care EveryWhere ID     This is your Care EveryWhere ID. This could be used by other organizations to access your [facility] medical records  [id number]        Your  Vitals Were     Pulse Temperature Respirations Pulse Oximetry BMI (Body Mass Index)       66 96  F (35.6  C) (Tympanic) 18 99% 28.62 kg/m2        Blood Pressure from Last 3 Encounters:   02/16/18 142/70   01/05/18 151/76   09/15/17 114/76    Weight from Last 3 Encounters:   02/16/18 172 lb (78 kg)   01/05/18 160 lb (72.6 kg)   09/15/17 169 lb 12.8 oz (77 kg)                 Today's Medication Changes          These changes are accurate as of 2/16/18  2:21 PM.  If you have any questions, ask your nurse or doctor.               Start taking these medicines.        Dose/Directions    benzonatate 200 MG capsule   Commonly known as:  TESSALON   Used for:  Cough   Started by:  Belkis Rodriguez APRN CNP        Dose:  200 mg   Take 1 capsule (200 mg) by mouth 3 times daily as needed   Quantity:  30 capsule   Refills:  0            Where to get your medicines      These medications were sent to [facility] [place] [address]     Phone:  [phone]     benzonatate 200 MG capsule                Primary Care Provider Office Phone # Fax #    Elvira Kowalski [phone]       [address] [place] [address]        Equal Access to Services     CLARISSA SON AH: Hadii gage ariza hadasho Soomaali, waaxda luqadaha, qaybta kaalmada adeegyada, guanako adhikari. So [facility] [phone].    ATENCIÓN: Si habla español, tiene a wu disposición servicios gratuitos de asistencia lingüística. Llame al [phone].    We comply with applicable federal civil rights laws and [place] laws. We do not discriminate on the basis of race, color, national origin, age, disability, sex, sexual orientation, or gender identity.            Thank you!     Thank you for choosing [facility]  for your care. Our goal is always to provide you with excellent care. Hearing back from our patients is one way we can  continue to improve our services. Please take a few minutes to complete the written survey that you may receive in the mail after your visit with us. Thank you!             Your Updated Medication List - Protect others around you: Learn how to safely use, store and throw away your medicines at www.disposemymeds.org.          This list is accurate as of 2/16/18  2:21 PM.  Always use your most recent med list.                   Brand Name Dispense Instructions for use Diagnosis    benzonatate 200 MG capsule    TESSALON    30 capsule    Take 1 capsule (200 mg) by mouth 3 times daily as needed    Cough       Biotin 10 MG Caps           CALCIUM + D PO      2 TABLET DAILY        CENTRUM SILVER per tablet      Take 1 tablet by mouth daily        FIBER THERAPY 500 MG Tabs tablet   Generic drug:  methylcellulose      Take 500 mg by mouth daily        fluticasone 50 MCG/ACT spray    FLONASE    16 g    Spray 2 sprays into both nostrils daily As needed    Chronic rhinitis       garlic 150 MG Tabs tablet      Take 150 mg by mouth daily        grape seed 50 MG Caps      Take 50 mg by mouth daily        STOOL SOFTENER PO           TYLENOL PO      Take 1,000 mg by mouth 2 times daily as needed for mild pain or fever        VITAMIN E COMPLEX PO           ZANTAC 150 MG tablet   Generic drug:  ranitidine      Take 150 mg by mouth daily        ZINC 15 PO

## 2018-02-23 DIAGNOSIS — J31.0 CHRONIC RHINITIS: ICD-10-CM

## 2018-02-23 RX ORDER — FLUTICASONE PROPIONATE 50 MCG
SPRAY, SUSPENSION (ML) NASAL
Qty: 16 G | Refills: 3 | Status: SHIPPED | OUTPATIENT
Start: 2018-02-23 | End: 2019-04-08

## 2018-02-23 NOTE — TELEPHONE ENCOUNTER
"Requested Prescriptions   Pending Prescriptions Disp Refills     fluticasone (FLONASE) 50 MCG/ACT spray [Pharmacy Med Name: FLUTICASONE 50MCG NASAL SPRAY] 16 g 3     Sig: USE 2 SPRAYS INTO BOTH NOSTRILS DAILY AS NEEDED    Inhaled Steroids Protocol Passed    2/23/2018  2:29 PM       Passed - Patient is age 12 or older       Passed - Recent or future visit with authorizing provider's specialty    Patient had office visit in the last year or has a visit in the next 30 days with authorizing provider.  See \"Patient Info\" tab in inbasket, or \"Choose Columns\" in Meds & Orders section of the refill encounter.             fluticasone (FLONASE) 50 MCG/ACT spray  Last Written Prescription Date:  02/06/2017  Last Fill Quantity: 16g,  # refills: 3   Last office visit: 2/16/2018 with prescribing provider:  IRA BURR   Future Office Visit:      "

## 2018-06-29 ENCOUNTER — OFFICE VISIT (OUTPATIENT)
Dept: FAMILY MEDICINE | Facility: CLINIC | Age: 78
End: 2018-06-29
Payer: MEDICARE

## 2018-06-29 VITALS — DIASTOLIC BLOOD PRESSURE: 74 MMHG | TEMPERATURE: 98 F | HEART RATE: 64 BPM | SYSTOLIC BLOOD PRESSURE: 128 MMHG

## 2018-06-29 DIAGNOSIS — N39.0 ACUTE UTI: Primary | ICD-10-CM

## 2018-06-29 DIAGNOSIS — R82.90 NONSPECIFIC FINDING ON EXAMINATION OF URINE: ICD-10-CM

## 2018-06-29 DIAGNOSIS — R39.9 UTI SYMPTOMS: ICD-10-CM

## 2018-06-29 LAB
ALBUMIN UR-MCNC: ABNORMAL MG/DL
APPEARANCE UR: ABNORMAL
BACTERIA #/AREA URNS HPF: ABNORMAL /HPF
BILIRUB UR QL STRIP: NEGATIVE
COLOR UR AUTO: YELLOW
GLUCOSE UR STRIP-MCNC: NEGATIVE MG/DL
HGB UR QL STRIP: ABNORMAL
KETONES UR STRIP-MCNC: NEGATIVE MG/DL
LEUKOCYTE ESTERASE UR QL STRIP: ABNORMAL
NITRATE UR QL: NEGATIVE
PH UR STRIP: 6 PH (ref 5–7)
RBC #/AREA URNS AUTO: ABNORMAL /HPF
SOURCE: ABNORMAL
SP GR UR STRIP: 1.01 (ref 1–1.03)
UROBILINOGEN UR STRIP-ACNC: 0.2 EU/DL (ref 0.2–1)
WBC #/AREA URNS AUTO: >100 /HPF

## 2018-06-29 PROCEDURE — 81001 URINALYSIS AUTO W/SCOPE: CPT | Performed by: NURSE PRACTITIONER

## 2018-06-29 PROCEDURE — 99213 OFFICE O/P EST LOW 20 MIN: CPT | Performed by: NURSE PRACTITIONER

## 2018-06-29 PROCEDURE — 87088 URINE BACTERIA CULTURE: CPT | Performed by: NURSE PRACTITIONER

## 2018-06-29 PROCEDURE — 87186 SC STD MICRODIL/AGAR DIL: CPT | Performed by: NURSE PRACTITIONER

## 2018-06-29 PROCEDURE — 87086 URINE CULTURE/COLONY COUNT: CPT | Performed by: NURSE PRACTITIONER

## 2018-06-29 RX ORDER — LORATADINE 10 MG/1
10 TABLET ORAL DAILY
COMMUNITY

## 2018-06-29 RX ORDER — CIPROFLOXACIN 250 MG/1
250 TABLET, FILM COATED ORAL 2 TIMES DAILY
Qty: 6 TABLET | Refills: 0 | Status: SHIPPED | OUTPATIENT
Start: 2018-06-29 | End: 2019-01-21

## 2018-06-29 ASSESSMENT — PAIN SCALES - GENERAL: PAINLEVEL: MILD PAIN (2)

## 2018-06-29 NOTE — PROGRESS NOTES
SUBJECTIVE:   Lavonne Tidwell is a 77 year old female who presents to clinic today for the following health issues:      URINARY TRACT SYMPTOMS  Onset: 1 week ago    Description:   Painful urination (Dysuria): YES- Burning feeling  Blood in urine (Hematuria): no   Delay in urine (Hesitency): no     Intensity: moderate    Progression of Symptoms:  same    Accompanying Signs & Symptoms:  Fever/chills: YES- has some chills when urinating  Flank pain no   Nausea and vomiting: no   Any vaginal symptoms: none  Abdominal/Pelvic Pain: no     History:   History of frequent UTI's: YES- 1 1/2 yrs ago had a UTI otherwise when in HS had one.  History of kidney stones: no   Sexually Active: no   Possibility of pregnancy: No    Precipitating factors:   NA    Therapies Tried and outcome: OTC advil or tylenol     Some urgency. No back or flank pain.  No abdominal pain.      Problem list and histories reviewed & adjusted, as indicated.  Additional history: as documented    Patient Active Problem List   Diagnosis     Injury of sigmoid colon     Esophageal reflux     Menopausal syndrome (hot flashes)     Urinary incontinence     Atrophic vaginitis     Hyperlipidemia LDL goal <130     Advanced directives, counseling/discussion     Onychomycosis     Senile nuclear sclerosis     Status post total left knee replacement     Status post total right knee replacement     Primary localized osteoarthrosis, lower leg     Past Surgical History:   Procedure Laterality Date     ABDOMEN SURGERY  1973 & 1974    Ovarian cyst/Hysterectomy     APPENDECTOMY  1952     ARTHROPLASTY KNEE Left 1/6/2016    Procedure: ARTHROPLASTY KNEE;  Surgeon: Wilfredo Thompson MD;  Location: [facility]     ARTHROPLASTY KNEE Right 2/23/2017    Procedure: ARTHROPLASTY KNEE;  Surgeon: Wilfredo Thompson MD;  Location: [facility]     BIOPSY      colonoscopy/polyps     COLONOSCOPY      every 10 years     GENITOURINARY SURGERY  2008    bladder     HYSTERECTOMY, CHELSY  1974     ORTHOPEDIC  SURGERY  2007 & 2009    [place]  both feet     PHACOEMULSIFICATION WITH STANDARD INTRAOCULAR LENS IMPLANT Left 8/20/2015    Procedure: PHACOEMULSIFICATION WITH STANDARD INTRAOCULAR LENS IMPLANT;  Surgeon: Fernando Jeffrey MD;  Location: [facility]     PHACOEMULSIFICATION WITH STANDARD INTRAOCULAR LENS IMPLANT Right 9/17/2015    Procedure: PHACOEMULSIFICATION WITH STANDARD INTRAOCULAR LENS IMPLANT;  Surgeon: Fernando Jeffrey MD;  Location: [facility]     SURGICAL HISTORY OF -   07/30/1998    Colonoscopy     SURGICAL HISTORY OF - 1974    Bilateral salpingoopherectomy     SURGICAL HISTORY OF -   3/09    TOT Midurethral sling       Social History   Substance Use Topics     Smoking status: Former Smoker     Packs/day: 0.50     Years: 5.00     Types: Cigarettes     Start date: 9/20/1968     Quit date: 2/15/1972     Smokeless tobacco: Never Used      Comment: stopped in her 20's     Alcohol use 0.0 oz/week      Comment: Occasional     Family History   Problem Relation Age of Onset     Cancer Mother      Ovarian     Other Cancer Mother      Ovarian     Cerebrovascular Disease Father      HEART DISEASE Father      C.A.D. Father      Coronary Artery Disease Father      Cancer Maternal Grandmother      Other Cancer Maternal Grandmother      Ovarian/Bone     Cerebrovascular Disease Maternal Grandfather      Colon Cancer Maternal Grandfather      Diabetes Other      Great Grandmother     Breast Cancer No family hx of          Current Outpatient Prescriptions   Medication Sig Dispense Refill     Acetaminophen (TYLENOL PO) Take 1,000 mg by mouth 2 times daily as needed for mild pain or fever       Biotin 10 MG CAPS        CALCIUM + D OR 2 TABLET DAILY       Docusate Calcium (STOOL SOFTENER PO)        fluticasone (FLONASE) 50 MCG/ACT spray USE 2 SPRAYS INTO BOTH NOSTRILS DAILY AS NEEDED 16 g 3     garlic 150 MG TABS tablet Take 150 mg by mouth daily       grape seed 50 MG CAPS Take 50 mg by mouth daily       loratadine (CLARITIN) 10  MG tablet Take 10 mg by mouth daily       methylcellulose (FIBER THERAPY) 500 MG TABS tablet Take 500 mg by mouth daily       Multiple Vitamins-Minerals (CENTRUM SILVER) per tablet Take 1 tablet by mouth daily       ranitidine (ZANTAC) 150 MG tablet Take 150 mg by mouth daily        VITAMIN E COMPLEX PO        Zinc Sulfate (ZINC 15 PO)        Allergies   Allergen Reactions     Codeine GI Disturbance       Reviewed and updated as needed this visit by clinical staff  Tobacco  Allergies  Meds  Problems  Med Hx  Surg Hx  Fam Hx  Soc Hx        Reviewed and updated as needed this visit by Provider  Allergies  Meds  Problems         ROS:  CONSTITUTIONAL:chillswith urination and fever low grade  RESP: NEGATIVE for significant cough or SOB  CV: NEGATIVE for chest pain, palpitations or peripheral edema  GI: NEGATIVE for nausea, abdominal pain, heartburn, or change in bowel habits  : dysuria and urgency  PSYCHIATRIC: NEGATIVE for changes in mood or affect  ROS otherwise negative    OBJECTIVE:     /74 (BP Location: Right arm, Patient Position: Sitting, Cuff Size: Adult Regular)  Pulse 64  Temp 98  F (36.7  C) (Tympanic)  Wt (P) 168 lb 6.4 oz (76.4 kg)  BMI (P) 28.02 kg/m2  Body mass index is 28.02 kg/(m^2) (pended).  GENERAL: healthy, alert and no distress  RESP: lungs clear to auscultation - no rales, rhonchi or wheezes  CV: regular rate and rhythm, normal S1 S2, no S3 or S4, no murmur, click or rub, no peripheral edema and peripheral pulses strong  ABDOMEN: soft, nontender, no hepatosplenomegaly, no masses and bowel sounds normal  ABDOMEN: mild discomfort over bladder but no tenderness  MS: no gross musculoskeletal defects noted, no edema    Diagnostic Test Results:  none     ASSESSMENT/PLAN:     1. Acute UTI  UA positive, culture pending.  Treating with cipro.  Last BMP shows normal kidney function which was checked for dosing.  I will call patient when culture returns to recheck symptoms and change  medication if not sensitive to bacteria.  - ciprofloxacin (CIPRO) 250 MG tablet; Take 1 tablet (250 mg) by mouth 2 times daily  Dispense: 6 tablet; Refill: 0    2. UTI symptoms  - *UA reflex to Microscopic and Culture ([facility] (except [place] and [place])  - Urine Microscopic    3. Nonspecific finding on examination of urine  - Urine Culture Aerobic Bacterial    See Patient Instructions    Muriel Louise NP  [facility]

## 2018-06-29 NOTE — MR AVS SNAPSHOT
After Visit Summary   6/29/2018    Lavonne Tidwell    MRN: [id number]           Patient Information     Date Of Birth          1940        Visit Information        Provider Department      6/29/2018 1:20 PM Muriel Louise NP [facility]        Today's Diagnoses     UTI symptoms    -  1    Nonspecific finding on examination of urine        Acute UTI          Care Instructions    1.  Take antibiotic as prescribed.  2.  Push fluids.  3.  Follow-up in clinic if persistent or worsening symptoms despite treatment.  Urinary Tract Infections in Women    Urinary tract infections (UTIs) are most often caused by bacteria. These bacteria enter the urinary tract. The bacteria may come from outside the body. Or they may travel from the skin outside the rectum or vagina into the urethra. Female anatomy makes it easy for bacteria from the bowel to enter a woman s urinary tract, which is the most common source of UTI. This means women develop UTIs more often than men. Pain in or around the urinary tract is a common UTI symptom. But the only way to know for sure if you have a UTI for the healthcare provider to test your urine. The two tests that may be done are the urinalysis and urine culture.  Types of UTIs    Cystitis. A bladder infection (cystitis) is the most common UTI in women. You may have urgent or frequent urination. You may also have pain, burning when you urinate, and bloody urine.    Urethritis. This is an inflamed urethra, which is the tube that carries urine from the bladder to outside the body. You may have lower stomach or back pain. You may also have urgent or frequent urination.    Pyelonephritis. This is a kidney infection. If not treated, it can be serious and damage your kidneys. In severe cases, you may need to stay in the hospital. You may have a fever and lower back pain.  Medicines to treat a UTI  Most UTIs are treated with antibiotics. These kill the bacteria.  The length of time you need to take them depends on the type of infection. It may be as short as 3 days. If you have repeated UTIs, you may need a low-dose antibiotic for several months. Take antibiotics exactly as directed. Don t stop taking them until all of the medicine is gone. If you stop taking the antibiotic too soon, the infection may not go away. You may also develop a resistance to the antibiotic. This can make it much harder to treat.  Lifestyle changes to treat and prevent UTIs  The lifestyle changes below will help get rid of your UTI. They may also help prevent future UTIs.    Drink plenty of fluids. This includes water, juice, or other caffeine-free drinks. Fluids help flush bacteria out of your body.    Empty your bladder. Always empty your bladder when you feel the urge to urinate. And always urinate before going to sleep. Urine that stays in your bladder can lead to infection. Try to urinate before and after sex as well.    Practice good personal hygiene. Wipe yourself from front to back after using the toilet. This helps keep bacteria from getting into the urethra.    Use condoms during sex. These help prevent UTIs caused by sexually transmitted bacteria. Also don't use spermicides during sex. These can increase the risk for UTIs. Choose other forms of birth control instead. For women who tend to get UTIs after sex, a low-dose of a preventive antibiotic may be used. Be sure to discuss this option with your healthcare provider.    Follow up with your healthcare provider as directed. He or she may test to make sure the infection has cleared. If needed, more treatment may be started.  Date Last Reviewed: 1/1/2017 2000-2017 [employer]. [address] [place] [address], [place] [address]. All rights reserved. This information is not intended as a substitute for professional medical care. Always follow your healthcare professional's instructions.                Follow-ups after your visit         Who to contact     If you have questions or need follow up information about today's clinic visit or your schedule please contact [facility] directly at [phone].  Normal or non-critical lab and imaging results will be communicated to you by [facility], letter or phone within 4 business days after the clinic has received the results. If you do not hear from us within 7 days, please contact the clinic through [facility] or phone. If you have a critical or abnormal lab result, we will notify you by phone as soon as possible.  Submit refill requests through [employer] or call your pharmacy and they will forward the refill request to us. Please allow 3 business days for your refill to be completed.          Additional Information About Your Visit        Stypi Information     [employer] gives you secure access to your electronic health record. If you see a primary care provider, you can also send messages to your care team and make appointments. If you have questions, please call your primary care clinic.  If you do not have a primary care provider, please call [phone] and they will assist you.        Care EveryWhere ID     This is your Care EveryWhere ID. This could be used by other organizations to access your [place] medical records  [id number]        Your Vitals Were     Pulse Temperature                64 98  F (36.7  C) (Tympanic)           Blood Pressure from Last 3 Encounters:   06/29/18 128/74   02/16/18 142/70   01/05/18 151/76    Weight from Last 3 Encounters:   06/29/18 (P) 168 lb 6.4 oz (76.4 kg)   02/16/18 172 lb (78 kg)   01/05/18 160 lb (72.6 kg)              We Performed the Following     *UA reflex to Microscopic and Culture ([facility] (except [place] and [place])     Urine Culture Aerobic Bacterial     Urine Microscopic          Today's Medication Changes          These changes are accurate as of 6/29/18  2:34 PM.  If you have any questions, ask your nurse or  doctor.               Start taking these medicines.        Dose/Directions    ciprofloxacin 250 MG tablet   Commonly known as:  CIPRO   Used for:  UTI symptoms   Started by:  Muriel Louise NP        Dose:  250 mg   Take 1 tablet (250 mg) by mouth 2 times daily   Quantity:  6 tablet   Refills:  0            Where to get your medicines      These medications were sent to [facility] [address] [place] [address]  [address] [place] [address]     Phone:  [phone]     ciprofloxacin 250 MG tablet                Primary Care Provider Office Phone # Fax #    Elvira Kowalski [phone] [address] [place] [address]        Equal Access to Services     CLARISSA SON : Madi Barksdale, waaxda malou, qaybta kaalmada hermilo, guanako adhikari. So [facility] [place] [phone].    ATENCIÓN: Si habla español, tiene a wu disposición servicios gratuitos de asistencia lingüística. Llame al [phone].    We comply with applicable federal civil rights laws and [place] laws. We do not discriminate on the basis of race, color, national origin, age, disability, sex, sexual orientation, or gender identity.            Thank you!     Thank you for choosing [facility]  for your care. Our goal is always to provide you with excellent care. Hearing back from our patients is one way we can continue to improve our services. Please take a few minutes to complete the written survey that you may receive in the mail after your visit with us. Thank you!             Your Updated Medication List - Protect others around you: Learn how to safely use, store and throw away your medicines at www.disposemymeds.org.          This list is accurate as of 6/29/18  2:34 PM.  Always use your most recent med list.                   Brand Name Dispense Instructions for use Diagnosis    Biotin 10 MG Caps           CALCIUM + D  PO      2 TABLET DAILY        CENTRUM SILVER per tablet      Take 1 tablet by mouth daily        ciprofloxacin 250 MG tablet    CIPRO    6 tablet    Take 1 tablet (250 mg) by mouth 2 times daily    UTI symptoms       FIBER THERAPY 500 MG Tabs tablet   Generic drug:  methylcellulose      Take 500 mg by mouth daily        fluticasone 50 MCG/ACT spray    FLONASE    16 g    USE 2 SPRAYS INTO BOTH NOSTRILS DAILY AS NEEDED    Chronic rhinitis       garlic 150 MG Tabs tablet      Take 150 mg by mouth daily        grape seed 50 MG Caps      Take 50 mg by mouth daily        loratadine 10 MG tablet    CLARITIN     Take 10 mg by mouth daily        STOOL SOFTENER PO           TYLENOL PO      Take 1,000 mg by mouth 2 times daily as needed for mild pain or fever        VITAMIN E COMPLEX PO           ZANTAC 150 MG tablet   Generic drug:  ranitidine      Take 150 mg by mouth daily        ZINC 15 PO

## 2018-06-29 NOTE — PATIENT INSTRUCTIONS
1.  Take antibiotic as prescribed.  2.  Push fluids.  3.  Follow-up in clinic if persistent or worsening symptoms despite treatment.  Urinary Tract Infections in Women    Urinary tract infections (UTIs) are most often caused by bacteria. These bacteria enter the urinary tract. The bacteria may come from outside the body. Or they may travel from the skin outside the rectum or vagina into the urethra. Female anatomy makes it easy for bacteria from the bowel to enter a woman s urinary tract, which is the most common source of UTI. This means women develop UTIs more often than men. Pain in or around the urinary tract is a common UTI symptom. But the only way to know for sure if you have a UTI for the healthcare provider to test your urine. The two tests that may be done are the urinalysis and urine culture.  Types of UTIs    Cystitis. A bladder infection (cystitis) is the most common UTI in women. You may have urgent or frequent urination. You may also have pain, burning when you urinate, and bloody urine.    Urethritis. This is an inflamed urethra, which is the tube that carries urine from the bladder to outside the body. You may have lower stomach or back pain. You may also have urgent or frequent urination.    Pyelonephritis. This is a kidney infection. If not treated, it can be serious and damage your kidneys. In severe cases, you may need to stay in the hospital. You may have a fever and lower back pain.  Medicines to treat a UTI  Most UTIs are treated with antibiotics. These kill the bacteria. The length of time you need to take them depends on the type of infection. It may be as short as 3 days. If you have repeated UTIs, you may need a low-dose antibiotic for several months. Take antibiotics exactly as directed. Don t stop taking them until all of the medicine is gone. If you stop taking the antibiotic too soon, the infection may not go away. You may also develop a resistance to the antibiotic. This can make it  much harder to treat.  Lifestyle changes to treat and prevent UTIs  The lifestyle changes below will help get rid of your UTI. They may also help prevent future UTIs.    Drink plenty of fluids. This includes water, juice, or other caffeine-free drinks. Fluids help flush bacteria out of your body.    Empty your bladder. Always empty your bladder when you feel the urge to urinate. And always urinate before going to sleep. Urine that stays in your bladder can lead to infection. Try to urinate before and after sex as well.    Practice good personal hygiene. Wipe yourself from front to back after using the toilet. This helps keep bacteria from getting into the urethra.    Use condoms during sex. These help prevent UTIs caused by sexually transmitted bacteria. Also don't use spermicides during sex. These can increase the risk for UTIs. Choose other forms of birth control instead. For women who tend to get UTIs after sex, a low-dose of a preventive antibiotic may be used. Be sure to discuss this option with your healthcare provider.    Follow up with your healthcare provider as directed. He or she may test to make sure the infection has cleared. If needed, more treatment may be started.  Date Last Reviewed: 1/1/2017 2000-2017 [employer]. [address] [place] [address], [place] [address]. All rights reserved. This information is not intended as a substitute for professional medical care. Always follow your healthcare professional's instructions.

## 2018-07-01 LAB
BACTERIA SPEC CULT: ABNORMAL
BACTERIA SPEC CULT: ABNORMAL
Lab: ABNORMAL
SPECIMEN SOURCE: ABNORMAL

## 2018-10-17 ENCOUNTER — RADIANT APPOINTMENT (OUTPATIENT)
Dept: MAMMOGRAPHY | Facility: CLINIC | Age: 78
End: 2018-10-17
Attending: FAMILY MEDICINE
Payer: MEDICARE

## 2018-10-17 ENCOUNTER — ALLIED HEALTH/NURSE VISIT (OUTPATIENT)
Dept: FAMILY MEDICINE | Facility: CLINIC | Age: 78
End: 2018-10-17
Payer: MEDICARE

## 2018-10-17 DIAGNOSIS — Z23 NEED FOR PROPHYLACTIC VACCINATION AND INOCULATION AGAINST INFLUENZA: Primary | ICD-10-CM

## 2018-10-17 DIAGNOSIS — Z12.31 VISIT FOR SCREENING MAMMOGRAM: ICD-10-CM

## 2018-10-17 PROCEDURE — G0008 ADMIN INFLUENZA VIRUS VAC: HCPCS

## 2018-10-17 PROCEDURE — 90662 IIV NO PRSV INCREASED AG IM: CPT

## 2018-10-17 PROCEDURE — 77067 SCR MAMMO BI INCL CAD: CPT | Mod: TC

## 2018-10-17 NOTE — MR AVS SNAPSHOT
After Visit Summary   10/17/2018    Lavonne Tidwell    MRN: [id number]           Patient Information     Date Of Birth          1940        Visit Information        Provider Department      10/17/2018 10:00 AM FL BAUTISTA HOUGH/LPN [facility]        Today's Diagnoses     Need for prophylactic vaccination and inoculation against influenza    -  1       Follow-ups after your visit        Who to contact     If you have questions or need follow up information about today's clinic visit or your schedule please contact [facility] directly at [phone].  Normal or non-critical lab and imaging results will be communicated to you by [facility], letter or phone within 4 business days after the clinic has received the results. If you do not hear from us within 7 days, please contact the clinic through [facility] or phone. If you have a critical or abnormal lab result, we will notify you by phone as soon as possible.  Submit refill requests through [employer] or call your pharmacy and they will forward the refill request to us. Please allow 3 business days for your refill to be completed.          Additional Information About Your Visit        MyChart Information     [employer] gives you secure access to your electronic health record. If you see a primary care provider, you can also send messages to your care team and make appointments. If you have questions, please call your primary care clinic.  If you do not have a primary care provider, please call [phone] and they will assist you.        Care EveryWhere ID     This is your Care EveryWhere ID. This could be used by other organizations to access your [place] medical records  [id number]         Blood Pressure from Last 3 Encounters:   06/29/18 128/74   02/16/18 142/70   01/05/18 151/76    Weight from Last 3 Encounters:   06/29/18 (P) 168 lb 6.4 oz (76.4 kg)   02/16/18 172 lb (78 kg)   01/05/18 160 lb (72.6 kg)              We  Performed the Following     FLU VACCINE, INCREASED ANTIGEN, PRESV FREE, AGE 65+ [[address]]     Vaccine Administration, Initial [[address]]        Primary Care Provider Office Phone # Fax #    Elvira Kowalski [phone] [address] [facility] [address]        Equal Access to Services     CLARISSA SON : Hadii gage ku hadabrahamo Soomaali, waaxda luqadaha, qaybta kaalmada enocstephaneder, guanako lozanotrinamaksim de la o . So [facility] [phone].    ATENCIÓN: Si habla español, tiene a wu disposición servicios gratuitos de asistencia lingüística. Llame al [phone].    We comply with applicable federal civil rights laws and [place] laws. We do not discriminate on the basis of race, color, national origin, age, disability, sex, sexual orientation, or gender identity.            Thank you!     Thank you for choosing [facility]  for your care. Our goal is always to provide you with excellent care. Hearing back from our patients is one way we can continue to improve our services. Please take a few minutes to complete the written survey that you may receive in the mail after your visit with us. Thank you!             Your Updated Medication List - Protect others around you: Learn how to safely use, store and throw away your medicines at www.disposemymeds.org.          This list is accurate as of 10/17/18 10:07 AM.  Always use your most recent med list.                   Brand Name Dispense Instructions for use Diagnosis    Biotin 10 MG Caps           CALCIUM + D PO      2 TABLET DAILY        CENTRUM SILVER per tablet      Take 1 tablet by mouth daily        ciprofloxacin 250 MG tablet    CIPRO    6 tablet    Take 1 tablet (250 mg) by mouth 2 times daily    UTI symptoms       FIBER THERAPY 500 MG Tabs tablet   Generic drug:  methylcellulose      Take 500 mg by mouth daily        fluticasone 50 MCG/ACT spray    FLONASE    16 g    USE 2 SPRAYS INTO BOTH NOSTRILS DAILY AS NEEDED     Chronic rhinitis       garlic 150 MG Tabs tablet      Take 150 mg by mouth daily        grape seed 50 MG Caps      Take 50 mg by mouth daily        loratadine 10 MG tablet    CLARITIN     Take 10 mg by mouth daily        STOOL SOFTENER PO           TYLENOL PO      Take 1,000 mg by mouth 2 times daily as needed for mild pain or fever        VITAMIN E COMPLEX PO           ZANTAC 150 MG tablet   Generic drug:  ranitidine      Take 150 mg by mouth daily        ZINC 15 PO

## 2018-10-17 NOTE — PROGRESS NOTES

## 2018-12-11 ENCOUNTER — OFFICE VISIT (OUTPATIENT)
Dept: FAMILY MEDICINE | Facility: CLINIC | Age: 78
End: 2018-12-11
Payer: MEDICARE

## 2018-12-11 VITALS
SYSTOLIC BLOOD PRESSURE: 142 MMHG | DIASTOLIC BLOOD PRESSURE: 74 MMHG | HEIGHT: 65 IN | RESPIRATION RATE: 18 BRPM | OXYGEN SATURATION: 99 % | HEART RATE: 65 BPM | WEIGHT: 171 LBS | BODY MASS INDEX: 28.49 KG/M2 | TEMPERATURE: 98.2 F

## 2018-12-11 DIAGNOSIS — H81.10 BENIGN PAROXYSMAL POSITIONAL VERTIGO, UNSPECIFIED LATERALITY: Primary | ICD-10-CM

## 2018-12-11 PROCEDURE — 99213 OFFICE O/P EST LOW 20 MIN: CPT | Performed by: FAMILY MEDICINE

## 2018-12-11 ASSESSMENT — MIFFLIN-ST. JEOR: SCORE: 1251.53

## 2018-12-11 NOTE — NURSING NOTE
"Chief Complaint   Patient presents with     Dizziness       Initial /74 (Cuff Size: Adult Regular)   Pulse 65   Temp 98.2  F (36.8  C) (Tympanic)   Resp 18   Ht 1.651 m (5' 5\")   Wt 77.6 kg (171 lb)   SpO2 99%   Breastfeeding? No   BMI 28.46 kg/m   Estimated body mass index is 28.46 kg/m  as calculated from the following:    Height as of this encounter: 1.651 m (5' 5\").    Weight as of this encounter: 77.6 kg (171 lb).    Patient presents to the clinic using No DME    Health Maintenance that is potentially due pending provider review:  NONE    n/a    Is there anyone who you would like to be able to receive your results? Not Applicable  If yes have patient fill out MALLORIE    "

## 2018-12-11 NOTE — PATIENT INSTRUCTIONS
1. This looks like positional vertigo which is a inner ear upset.  Usually resolves with time.    2. No signs today of stroke    3. Ear noise is pulse.     4. bp up but not dangerous.    5. Need to be careful about falling for now    6. If not better by next week lets do head scan.

## 2018-12-11 NOTE — PROGRESS NOTES
SUBJECTIVE:   Lavonne Tidwell is a 78 year old female who presents to clinic today for the following health issues:      Dizziness  She comes with several days of mild vertigo.  Has awareness of slow pulse in left ear.  No other neuro symptoms       Duration: little over a week- on Saturday she got up and was wobbly on her feet. Mu morning was really bad for dizziness.  Today hasn't been so bad- little off is she steps a weird way    Description   Feeling faint:  YES- just once on Mu morning   Feeling like the surroundings are moving: no   Loss of consciousness or falls: no     Intensity:  moderate    Accompanying signs and symptoms: got really dizzy while looking at the paper.  Been really tired as well.   Nausea/vomitting: YES  Palpitations: not sure?   Weakness in arms or legs: no   Vision or speech changes: no   Ringing in ears (Tinnitus): YES- Left ear- kind of like a swooshing sound  Hearing loss related to dizziness: no   Other (fevers/chills/sweating/dyspnea): no     History (similar episodes/head trauma/previous evaluation/recent bleeding): None    Precipitating or alleviating factors (new meds/chemicals): None  Worse with activity/head movement: YES    Therapies tried and outcome: some OTC motion sickness- didn't seem to help          Problem list and histories reviewed & adjusted, as indicated.  Additional history: as documented    Patient Active Problem List   Diagnosis     Injury of sigmoid colon     Esophageal reflux     Menopausal syndrome (hot flashes)     Urinary incontinence     Atrophic vaginitis     Hyperlipidemia LDL goal <130     Advanced directives, counseling/discussion     Onychomycosis     Senile nuclear sclerosis     Status post total left knee replacement     Status post total right knee replacement     Primary localized osteoarthrosis, lower leg     Past Surgical History:   Procedure Laterality Date     ABDOMEN SURGERY  1973 & 1974    Ovarian cyst/Hysterectomy     APPENDECTOMY        ARTHROPLASTY KNEE Left 2016    Procedure: ARTHROPLASTY KNEE;  Surgeon: Wilfredo Thompson MD;  Location: [facility]     ARTHROPLASTY KNEE Right 2017    Procedure: ARTHROPLASTY KNEE;  Surgeon: Wilfredo Thompson MD;  Location: [facility]     BIOPSY      colonoscopy/polyps     COLONOSCOPY      every 10 years     GENITOURINARY SURGERY  2008    bladder     HYSTERECTOMY, CHELSY  1974     ORTHOPEDIC SURGERY   &     [place]  both feet     PHACOEMULSIFICATION WITH STANDARD INTRAOCULAR LENS IMPLANT Left 2015    Procedure: PHACOEMULSIFICATION WITH STANDARD INTRAOCULAR LENS IMPLANT;  Surgeon: Fernando Jeffrey MD;  Location: [facility]     PHACOEMULSIFICATION WITH STANDARD INTRAOCULAR LENS IMPLANT Right 2015    Procedure: PHACOEMULSIFICATION WITH STANDARD INTRAOCULAR LENS IMPLANT;  Surgeon: Fernando Jeffrey MD;  Location: [facility]     SURGICAL HISTORY OF -   1998    Colonoscopy     SURGICAL HISTORY OF -       Bilateral salpingoopherectomy     SURGICAL HISTORY OF -   3/09    TOT Midurethral sling       Social History     Tobacco Use     Smoking status: Former Smoker     Packs/day: 0.50     Years: 5.00     Pack years: 2.50     Types: Cigarettes     Start date: 1968     Last attempt to quit: 2/15/1972     Years since quittin.8     Smokeless tobacco: Never Used     Tobacco comment: stopped in her 20's   Substance Use Topics     Alcohol use: Yes     Alcohol/week: 0.0 oz     Comment: Occasional     Family History   Problem Relation Age of Onset     Cancer Mother         Ovarian     Other Cancer Mother         Ovarian     Cerebrovascular Disease Father      Heart Disease Father      C.A.D. Father      Coronary Artery Disease Father      Cancer Maternal Grandmother      Other Cancer Maternal Grandmother         Ovarian/Bone     Cerebrovascular Disease Maternal Grandfather      Colon Cancer Maternal Grandfather      Diabetes Other         Great Grandmother     Breast Cancer No family hx of       "    Current Outpatient Medications   Medication Sig Dispense Refill     Acetaminophen (TYLENOL PO) Take 1,000 mg by mouth 2 times daily as needed for mild pain or fever       Biotin 10 MG CAPS        CALCIUM + D OR 2 TABLET DAILY       ciprofloxacin (CIPRO) 250 MG tablet Take 1 tablet (250 mg) by mouth 2 times daily 6 tablet 0     Docusate Calcium (STOOL SOFTENER PO)        fluticasone (FLONASE) 50 MCG/ACT spray USE 2 SPRAYS INTO BOTH NOSTRILS DAILY AS NEEDED 16 g 3     garlic 150 MG TABS tablet Take 150 mg by mouth daily       grape seed 50 MG CAPS Take 50 mg by mouth daily       loratadine (CLARITIN) 10 MG tablet Take 10 mg by mouth daily       methylcellulose (FIBER THERAPY) 500 MG TABS tablet Take 500 mg by mouth daily       Multiple Vitamins-Minerals (CENTRUM SILVER) per tablet Take 1 tablet by mouth daily       ranitidine (ZANTAC) 150 MG tablet Take 150 mg by mouth daily        VITAMIN E COMPLEX PO        Zinc Sulfate (ZINC 15 PO)        Allergies   Allergen Reactions     Codeine GI Disturbance       Reviewed and updated as needed this visit by clinical staff       Reviewed and updated as needed this visit by Provider         ROS:  CONSTITUTIONAL: NEGATIVE for fever, chills, change in weight  ENT/MOUTH: NEGATIVE for ear, mouth and throat problems  RESP: NEGATIVE for significant cough or SOB  CV: NEGATIVE for chest pain, palpitations or peripheral edema    OBJECTIVE:     /74 (Cuff Size: Adult Regular)   Pulse 65   Temp 98.2  F (36.8  C) (Tympanic)   Resp 18   Ht 1.651 m (5' 5\")   Wt 77.6 kg (171 lb)   SpO2 99%   Breastfeeding? No   BMI 28.46 kg/m    Body mass index is 28.46 kg/m .  GENERAL: healthy, alert and no distress  NECK: no adenopathy, no asymmetry, masses, or scars and thyroid normal to palpation  RESP: lungs clear to auscultation - no rales, rhonchi or wheezes  CV: regular rate and rhythm, normal S1 S2, no S3 or S4, no murmur, click or rub, no peripheral edema and peripheral pulses " strong  ABDOMEN: soft, nontender, no hepatosplenomegaly, no masses and bowel sounds normal  MS: no gross musculoskeletal defects noted, no edema  NEURO  IS INTACT. FINGER TO NOSE AND RHOMBERG ARE GOOD.   NO WEAKNESS.     ASSESSMENT/PLAN:       Benign paroxysmal positional vertigo, unspecified laterality  Patient Instructions   1. This looks like positional vertigo which is a inner ear upset.  Usually resolves with time.    2. No signs today of stroke    3. Ear noise is pulse.     4. bp up but not dangerous.    5. Need to be careful about falling for now    6. If not better by next week lets do head scan.           There are no diagnoses linked to this encounter.        Wilfredo Prasad MD  [facility]

## 2019-01-21 ENCOUNTER — OFFICE VISIT (OUTPATIENT)
Dept: URGENT CARE | Facility: URGENT CARE | Age: 79
End: 2019-01-21
Payer: MEDICARE

## 2019-01-21 VITALS
OXYGEN SATURATION: 98 % | SYSTOLIC BLOOD PRESSURE: 147 MMHG | DIASTOLIC BLOOD PRESSURE: 78 MMHG | RESPIRATION RATE: 16 BRPM | HEART RATE: 78 BPM | WEIGHT: 173 LBS | TEMPERATURE: 98 F | BODY MASS INDEX: 28.79 KG/M2

## 2019-01-21 DIAGNOSIS — J01.90 ACUTE SINUSITIS WITH SYMPTOMS > 10 DAYS: Primary | ICD-10-CM

## 2019-01-21 PROCEDURE — 99213 OFFICE O/P EST LOW 20 MIN: CPT | Performed by: FAMILY MEDICINE

## 2019-01-21 RX ORDER — AMOXICILLIN 500 MG/1
500 CAPSULE ORAL 2 TIMES DAILY
Qty: 20 CAPSULE | Refills: 0 | Status: SHIPPED | OUTPATIENT
Start: 2019-01-21 | End: 2019-02-01

## 2019-01-21 NOTE — PATIENT INSTRUCTIONS
Fluticasone/flonase nasal spray once a day    Don't use the sinus cold medication after 5pm    Try the amoxicillin - three times a day for 10 days

## 2019-01-31 ENCOUNTER — TELEPHONE (OUTPATIENT)
Dept: FAMILY MEDICINE | Facility: CLINIC | Age: 79
End: 2019-01-31

## 2019-01-31 NOTE — TELEPHONE ENCOUNTER
Lavonne was called and an appointment was set up for her.  [facility]

## 2019-01-31 NOTE — TELEPHONE ENCOUNTER
Lavonne was seen 10 days ago in urgent care for a sinus infection and was given amoxicillin.  She is calling today because she has a small lump on her jaw line towards her ear which is a little sore.  She also has some swelling on that side of her face.  Please call and advise.  [facility]  [facility] [place]

## 2019-01-31 NOTE — TELEPHONE ENCOUNTER
She needs to be seen to assess the swelling in her face, please help her schedule appointment, okay for tomorrow

## 2019-02-01 ENCOUNTER — OFFICE VISIT (OUTPATIENT)
Dept: FAMILY MEDICINE | Facility: CLINIC | Age: 79
End: 2019-02-01
Payer: MEDICARE

## 2019-02-01 VITALS
WEIGHT: 173 LBS | SYSTOLIC BLOOD PRESSURE: 100 MMHG | TEMPERATURE: 97.7 F | RESPIRATION RATE: 18 BRPM | HEIGHT: 65 IN | DIASTOLIC BLOOD PRESSURE: 60 MMHG | HEART RATE: 84 BPM | BODY MASS INDEX: 28.82 KG/M2

## 2019-02-01 DIAGNOSIS — R60.9 PAROTID SWELLING: Primary | ICD-10-CM

## 2019-02-01 DIAGNOSIS — J01.90 ACUTE SINUSITIS TREATED WITH ANTIBIOTICS IN THE PAST 60 DAYS: ICD-10-CM

## 2019-02-01 PROCEDURE — 99213 OFFICE O/P EST LOW 20 MIN: CPT | Performed by: NURSE PRACTITIONER

## 2019-02-01 ASSESSMENT — MIFFLIN-ST. JEOR: SCORE: 1260.6

## 2019-02-01 NOTE — PROGRESS NOTES
SUBJECTIVE:   Lavonne Tidwell is a 78 year old female who presents to clinic today for the following health issues:      ED/UC Followup:    Facility:  [facility]  Date of visit: 1/21/2019  Reason for visit: Acute sinusitis   Current Status: Pt reports lump in right jaw with swelling that stared 5 days ago.      Swelling right side of face  Ongoing sinus symptoms despite antibiotics  afebrile    Problem list and histories reviewed & adjusted, as indicated.  Additional history: as documented    Patient Active Problem List   Diagnosis     Injury of sigmoid colon     Esophageal reflux     Menopausal syndrome (hot flashes)     Urinary incontinence     Atrophic vaginitis     Hyperlipidemia LDL goal <130     Advanced directives, counseling/discussion     Onychomycosis     Senile nuclear sclerosis     Status post total left knee replacement     Status post total right knee replacement     Primary localized osteoarthrosis, lower leg     Past Surgical History:   Procedure Laterality Date     ABDOMEN SURGERY  1973 & 1974    Ovarian cyst/Hysterectomy     APPENDECTOMY  1952     ARTHROPLASTY KNEE Left 1/6/2016    Procedure: ARTHROPLASTY KNEE;  Surgeon: Wilfredo Thompson MD;  Location: [facility]     ARTHROPLASTY KNEE Right 2/23/2017    Procedure: ARTHROPLASTY KNEE;  Surgeon: Wilfredo Thompson MD;  Location: [facility]     BIOPSY      colonoscopy/polyps     COLONOSCOPY      every 10 years     GENITOURINARY SURGERY  2008    bladder     HYSTERECTOMY, CHELSY  1974     ORTHOPEDIC SURGERY  2007 & 2009    [place]  both feet     PHACOEMULSIFICATION WITH STANDARD INTRAOCULAR LENS IMPLANT Left 8/20/2015    Procedure: PHACOEMULSIFICATION WITH STANDARD INTRAOCULAR LENS IMPLANT;  Surgeon: Fernando Jeffrey MD;  Location: [facility]     PHACOEMULSIFICATION WITH STANDARD INTRAOCULAR LENS IMPLANT Right 9/17/2015    Procedure: PHACOEMULSIFICATION WITH STANDARD INTRAOCULAR LENS IMPLANT;  Surgeon: Fernando Jeffrey MD;  Location: [facility]     SURGICAL HISTORY OF -    1998    Colonoscopy     SURGICAL HISTORY OF -       Bilateral salpingoopherectomy     SURGICAL HISTORY OF -   3/09    TOT Midurethral sling       Social History     Tobacco Use     Smoking status: Former Smoker     Packs/day: 0.50     Years: 5.00     Pack years: 2.50     Types: Cigarettes     Start date: 1968     Last attempt to quit: 2/15/1972     Years since quittin.9     Smokeless tobacco: Never Used     Tobacco comment: stopped in her 20's   Substance Use Topics     Alcohol use: Yes     Alcohol/week: 0.0 oz     Comment: Occasional     Family History   Problem Relation Age of Onset     Cancer Mother         Ovarian     Other Cancer Mother         Ovarian     Cerebrovascular Disease Father      Heart Disease Father      C.A.D. Father      Coronary Artery Disease Father      Cancer Maternal Grandmother      Other Cancer Maternal Grandmother         Ovarian/Bone     Cerebrovascular Disease Maternal Grandfather      Colon Cancer Maternal Grandfather      Diabetes Other         Great Grandmother     Breast Cancer No family hx of          Current Outpatient Medications   Medication Sig Dispense Refill     Acetaminophen (TYLENOL PO) Take 1,000 mg by mouth 2 times daily as needed for mild pain or fever       amoxicillin-clavulanate (AUGMENTIN) 875-125 MG tablet Take 1 tablet by mouth 2 times daily for 10 days 20 tablet 0     Biotin 10 MG CAPS        CALCIUM + D OR 2 TABLET DAILY       Docusate Calcium (STOOL SOFTENER PO)        fluticasone (FLONASE) 50 MCG/ACT spray USE 2 SPRAYS INTO BOTH NOSTRILS DAILY AS NEEDED 16 g 3     garlic 150 MG TABS tablet Take 150 mg by mouth daily       grape seed 50 MG CAPS Take 50 mg by mouth daily       loratadine (CLARITIN) 10 MG tablet Take 10 mg by mouth daily       methylcellulose (FIBER THERAPY) 500 MG TABS tablet Take 500 mg by mouth daily       Multiple Vitamins-Minerals (CENTRUM SILVER) per tablet Take 1 tablet by mouth daily       ranitidine (ZANTAC) 150 MG  "tablet Take 150 mg by mouth daily        VITAMIN E COMPLEX PO        Zinc Sulfate (ZINC 15 PO)        Allergies   Allergen Reactions     Codeine GI Disturbance     Labs reviewed in EPIC    Reviewed and updated as needed this visit by clinical staff       Reviewed and updated as needed this visit by Provider         ROS:  Constitutional, HEENT, cardiovascular, pulmonary, gi and gu systems are negative, except as otherwise noted.    OBJECTIVE:     /60 (Cuff Size: Adult Regular)   Pulse 84   Temp 97.7  F (36.5  C) (Tympanic)   Resp 18   Ht 1.651 m (5' 5\")   Wt 78.5 kg (173 lb)   BMI 28.79 kg/m    Body mass index is 28.79 kg/m .  GENERAL: healthy, alert and no distress  HENT: normal cephalic/atraumatic, both ears: clear effusion, nose and mouth without ulcers or lesions, oropharynx clear, oral mucous membranes moist, sinuses: maxillary tenderness on bilateral and mildly tender mass right submandibular area  NECK: no adenopathy  RESP: lungs clear to auscultation - no rales, rhonchi or wheezes  CV: regular rate and rhythm, normal S1 S2, no S3 or S4, no murmur  PSYCH: mentation appears normal, affect normal/bright    Diagnostic Test Results:  none     ASSESSMENT/PLAN:     1. Parotid swelling  No acute distress.  With suspected parotid symptoms recommend warm packs, antibiotics for recurrent sinus symptoms and ENT referral if ongoing symptoms.  - OTOLARYNGOLOGY REFERRAL    2. Acute sinusitis treated with antibiotics in the past 60 days  Per above. Symptomatic care and follow up discussed.  - amoxicillin-clavulanate (AUGMENTIN) 875-125 MG tablet; Take 1 tablet by mouth 2 times daily for 10 days  Dispense: 20 tablet; Refill: 0    Home care instructions were reviewed with the patient. The risks, benefits and treatment options of prescribed medications or other treatments have been discussed with the patient. The patient verbalized their understanding and should call or follow up if no improvement or if they develop " further problems.    Patient Instructions   ENT referral placed    Augmentin sent to the pharmacy for ongoing sinus symptoms    Warm packs     Follow up if symptoms do not improve or worsen.        ENA Henning [facility]

## 2019-02-01 NOTE — PATIENT INSTRUCTIONS
ENT referral placed    Augmentin sent to the pharmacy for ongoing sinus symptoms    Warm packs     Follow up if symptoms do not improve or worsen.

## 2019-03-06 ENCOUNTER — OFFICE VISIT (OUTPATIENT)
Dept: OTOLARYNGOLOGY | Facility: CLINIC | Age: 79
End: 2019-03-06
Payer: MEDICARE

## 2019-03-06 VITALS
BODY MASS INDEX: 28.79 KG/M2 | DIASTOLIC BLOOD PRESSURE: 72 MMHG | SYSTOLIC BLOOD PRESSURE: 127 MMHG | HEART RATE: 68 BPM | WEIGHT: 173 LBS | TEMPERATURE: 98 F

## 2019-03-06 DIAGNOSIS — R22.0 FACIAL MASS: Primary | ICD-10-CM

## 2019-03-06 PROCEDURE — 99203 OFFICE O/P NEW LOW 30 MIN: CPT | Performed by: OTOLARYNGOLOGY

## 2019-03-06 ASSESSMENT — PAIN SCALES - GENERAL: PAINLEVEL: NO PAIN (0)

## 2019-03-06 NOTE — PROGRESS NOTES
History of Present Illness - Lavonne Tidwell is a 78 year old female who presents due to concern for a right facial mass. She has no imaging workup related to this. She had a sinus infection recently and began to notice a nodule on the right jaw at that time. The sinus symptoms have improved but the nodule is unchanged. She denies tenderness in the area, and denies any associated skin color changes.    Past Medical History -   Patient Active Problem List   Diagnosis     Injury of sigmoid colon     Esophageal reflux     Menopausal syndrome (hot flashes)     Urinary incontinence     Atrophic vaginitis     Hyperlipidemia LDL goal <130     Advanced directives, counseling/discussion     Onychomycosis     Senile nuclear sclerosis     Status post total left knee replacement     Status post total right knee replacement     Primary localized osteoarthrosis, lower leg       Current Medications -   Current Outpatient Medications:      Acetaminophen (TYLENOL PO), Take 1,000 mg by mouth 2 times daily as needed for mild pain or fever, Disp: , Rfl:      Biotin 10 MG CAPS, , Disp: , Rfl:      CALCIUM + D OR, 2 TABLET DAILY, Disp: , Rfl:      Docusate Calcium (STOOL SOFTENER PO), , Disp: , Rfl:      fluticasone (FLONASE) 50 MCG/ACT spray, USE 2 SPRAYS INTO BOTH NOSTRILS DAILY AS NEEDED, Disp: 16 g, Rfl: 3     garlic 150 MG TABS tablet, Take 150 mg by mouth daily, Disp: , Rfl:      grape seed 50 MG CAPS, Take 50 mg by mouth daily, Disp: , Rfl:      loratadine (CLARITIN) 10 MG tablet, Take 10 mg by mouth daily, Disp: , Rfl:      methylcellulose (FIBER THERAPY) 500 MG TABS tablet, Take 500 mg by mouth daily, Disp: , Rfl:      Multiple Vitamins-Minerals (CENTRUM SILVER) per tablet, Take 1 tablet by mouth daily, Disp: , Rfl:      ranitidine (ZANTAC) 150 MG tablet, Take 150 mg by mouth daily , Disp: , Rfl:      VITAMIN E COMPLEX PO, , Disp: , Rfl:      Zinc Sulfate (ZINC 15 PO), , Disp: , Rfl:     Allergies -   Allergies   Allergen  Reactions     Codeine GI Disturbance       Social History -   Social History     Socioeconomic History     Marital status:      Spouse name: Not on file     Number of children: Not on file     Years of education: Not on file     Highest education level: Not on file   Occupational History     Not on file   Social Needs     Financial resource strain: Not on file     Food insecurity:     Worry: Not on file     Inability: Not on file     Transportation needs:     Medical: Not on file     Non-medical: Not on file   Tobacco Use     Smoking status: Former Smoker     Packs/day: 0.50     Years: 5.00     Pack years: 2.50     Types: Cigarettes     Start date: 1968     Last attempt to quit: 2/15/1972     Years since quittin.0     Smokeless tobacco: Never Used     Tobacco comment: stopped in her 20's   Substance and Sexual Activity     Alcohol use: Yes     Alcohol/week: 0.0 oz     Comment: Occasional     Drug use: No     Sexual activity: No   Lifestyle     Physical activity:     Days per week: Not on file     Minutes per session: Not on file     Stress: Not on file   Relationships     Social connections:     Talks on phone: Not on file     Gets together: Not on file     Attends Sabianist service: Not on file     Active member of club or organization: Not on file     Attends meetings of clubs or organizations: Not on file     Relationship status: Not on file     Intimate partner violence:     Fear of current or ex partner: Not on file     Emotionally abused: Not on file     Physically abused: Not on file     Forced sexual activity: Not on file   Other Topics Concern     Parent/sibling w/ CABG, MI or angioplasty before 65F 55M? Yes   Social History Narrative     Not on file       Family History -   Family History   Problem Relation Age of Onset     Cancer Mother         Ovarian     Other Cancer Mother         Ovarian     Cerebrovascular Disease Father      Heart Disease Father      C.A.D. Father      Coronary  Artery Disease Father      Cancer Maternal Grandmother      Other Cancer Maternal Grandmother         Ovarian/Bone     Cerebrovascular Disease Maternal Grandfather      Colon Cancer Maternal Grandfather      Diabetes Other         Great Grandmother     Breast Cancer No family hx of        Review of Systems - As per HPI and PMHx, otherwise 7 system review of the head and neck negative. 10+ system review negative.    Physical Exam  There were no vitals taken for this visit.  General - The patient is well nourished and well developed, and appears to have good nutritional status.  Alert and oriented to person and place, answers questions and cooperates with examination appropriately.   Head and Face - Normocephalic and atraumatic, with no gross asymmetry noted of the contour of the facial features.  The facial nerve is intact, with strong symmetric movements.  Voice and Breathing - The patient was breathing comfortably without the use of accessory muscles. There was no wheezing, stridor, or stertor.  The patients voice was clear and strong, and had appropriate pitch and quality.  Ears - Bilateral pinna and EACs with normal appearing overlying skin. Tympanic membrane intact with good mobility on pneumatic otoscopy bilaterally. Bony landmarks of the ossicular chain are normal. The tympanic membranes are normal in appearance. No retraction, perforation, or masses.  No fluid or purulence was seen in the external canal or the middle ear.   Eyes - Extraocular movements intact.  Sclera were not icteric or injected, conjunctiva were pink and moist.  Mouth - Examination of the oral cavity showed pink, healthy oral mucosa. No lesions or ulcerations noted.  The tongue was mobile and midline, and the dentition were in good condition.    Throat - The walls of the oropharynx were smooth, pink, moist, symmetric, and had no lesions or ulcerations.  The tonsillar pillars and soft palate were symmetric.  The uvula was midline on  elevation.  Neck - Normal midline excursion of the laryngotracheal complex during swallowing.  Full range of motion on passive movement.  Palpation of the occipital, submental, submandibular, internal jugular chain, and supraclavicular nodes did not demonstrate any abnormal lymph nodes or masses.  The carotid pulse was palpable bilaterally.  Palpation of the thyroid was soft and smooth, with no nodules or goiter appreciated.  The trachea was mobile and midline. The area in question in the midbody of the mandible corresponds to the path of the right facial artery.  Nose - External contour is symmetric, no gross deflection or scars.  Nasal mucosa is pink and moist with no abnormal mucus.  The septum was midline and non-obstructive, turbinates of normal size and position.  No polyps, masses, or purulence noted on examination.          Assessment - Lavonne Tidwell is a 78 year old female with a palpable mass in the right face, which I believe corresponds to her right facial artery. I reassured her that this is normal anatomy,and is unrelated to any sinus condition. She was also reassured this is not a neoplasm. I asked her to return if a mass develops in the face that is more prominent, in which case an US may be helpful in ensuring the presence and nature of any mass.       Dr. Elieser Duron MD  Otolaryngology  [facility]

## 2019-03-06 NOTE — LETTER
3/6/2019         RE: Lavonne Tidwell  [address] [facility] [id number]        Dear Colleague,    Thank you for referring your patient, Lavonne Tidwell, to the [facility]. Please see a copy of my visit note below.        History of Present Illness - Lavonne Tidwell is a 78 year old female who presents due to concern for a right facial mass. She has no imaging workup related to this. She had a sinus infection recently and began to notice a nodule on the right jaw at that time. The sinus symptoms have improved but the nodule is unchanged. She denies tenderness in the area, and denies any associated skin color changes.    Past Medical History -   Patient Active Problem List   Diagnosis     Injury of sigmoid colon     Esophageal reflux     Menopausal syndrome (hot flashes)     Urinary incontinence     Atrophic vaginitis     Hyperlipidemia LDL goal <130     Advanced directives, counseling/discussion     Onychomycosis     Senile nuclear sclerosis     Status post total left knee replacement     Status post total right knee replacement     Primary localized osteoarthrosis, lower leg       Current Medications -   Current Outpatient Medications:      Acetaminophen (TYLENOL PO), Take 1,000 mg by mouth 2 times daily as needed for mild pain or fever, Disp: , Rfl:      Biotin 10 MG CAPS, , Disp: , Rfl:      CALCIUM + D OR, 2 TABLET DAILY, Disp: , Rfl:      Docusate Calcium (STOOL SOFTENER PO), , Disp: , Rfl:      fluticasone (FLONASE) 50 MCG/ACT spray, USE 2 SPRAYS INTO BOTH NOSTRILS DAILY AS NEEDED, Disp: 16 g, Rfl: 3     garlic 150 MG TABS tablet, Take 150 mg by mouth daily, Disp: , Rfl:      grape seed 50 MG CAPS, Take 50 mg by mouth daily, Disp: , Rfl:      loratadine (CLARITIN) 10 MG tablet, Take 10 mg by mouth daily, Disp: , Rfl:      methylcellulose (FIBER THERAPY) 500 MG TABS tablet, Take 500 mg by mouth daily, Disp: , Rfl:      Multiple Vitamins-Minerals (CENTRUM SILVER) per tablet, Take 1  tablet by mouth daily, Disp: , Rfl:      ranitidine (ZANTAC) 150 MG tablet, Take 150 mg by mouth daily , Disp: , Rfl:      VITAMIN E COMPLEX PO, , Disp: , Rfl:      Zinc Sulfate (ZINC 15 PO), , Disp: , Rfl:     Allergies -   Allergies   Allergen Reactions     Codeine GI Disturbance       Social History -   Social History     Socioeconomic History     Marital status:      Spouse name: Not on file     Number of children: Not on file     Years of education: Not on file     Highest education level: Not on file   Occupational History     Not on file   Social Needs     Financial resource strain: Not on file     Food insecurity:     Worry: Not on file     Inability: Not on file     Transportation needs:     Medical: Not on file     Non-medical: Not on file   Tobacco Use     Smoking status: Former Smoker     Packs/day: 0.50     Years: 5.00     Pack years: 2.50     Types: Cigarettes     Start date: 1968     Last attempt to quit: 2/15/1972     Years since quittin.0     Smokeless tobacco: Never Used     Tobacco comment: stopped in her 20's   Substance and Sexual Activity     Alcohol use: Yes     Alcohol/week: 0.0 oz     Comment: Occasional     Drug use: No     Sexual activity: No   Lifestyle     Physical activity:     Days per week: Not on file     Minutes per session: Not on file     Stress: Not on file   Relationships     Social connections:     Talks on phone: Not on file     Gets together: Not on file     Attends Cheondo[employer] service: Not on file     Active member of club or organization: Not on file     Attends meetings of clubs or organizations: Not on file     Relationship status: Not on file     Intimate partner violence:     Fear of current or ex partner: Not on file     Emotionally abused: Not on file     Physically abused: Not on file     Forced sexual activity: Not on file   Other Topics Concern     Parent/sibling w/ CABG, MI or angioplasty before 65F 55M? Yes   Social History Narrative     Not on  file       Family History -   Family History   Problem Relation Age of Onset     Cancer Mother         Ovarian     Other Cancer Mother         Ovarian     Cerebrovascular Disease Father      Heart Disease Father      C.A.D. Father      Coronary Artery Disease Father      Cancer Maternal Grandmother      Other Cancer Maternal Grandmother         Ovarian/Bone     Cerebrovascular Disease Maternal Grandfather      Colon Cancer Maternal Grandfather      Diabetes Other         Great Grandmother     Breast Cancer No family hx of        Review of Systems - As per HPI and PMHx, otherwise 7 system review of the head and neck negative. 10+ system review negative.    Physical Exam  There were no vitals taken for this visit.  General - The patient is well nourished and well developed, and appears to have good nutritional status.  Alert and oriented to person and place, answers questions and cooperates with examination appropriately.   Head and Face - Normocephalic and atraumatic, with no gross asymmetry noted of the contour of the facial features.  The facial nerve is intact, with strong symmetric movements.  Voice and Breathing - The patient was breathing comfortably without the use of accessory muscles. There was no wheezing, stridor, or stertor.  The patients voice was clear and strong, and had appropriate pitch and quality.  Ears - Bilateral pinna and EACs with normal appearing overlying skin. Tympanic membrane intact with good mobility on pneumatic otoscopy bilaterally. Bony landmarks of the ossicular chain are normal. The tympanic membranes are normal in appearance. No retraction, perforation, or masses.  No fluid or purulence was seen in the external canal or the middle ear.   Eyes - Extraocular movements intact.  Sclera were not icteric or injected, conjunctiva were pink and moist.  Mouth - Examination of the oral cavity showed pink, healthy oral mucosa. No lesions or ulcerations noted.  The tongue was mobile and  midline, and the dentition were in good condition.    Throat - The walls of the oropharynx were smooth, pink, moist, symmetric, and had no lesions or ulcerations.  The tonsillar pillars and soft palate were symmetric.  The uvula was midline on elevation.  Neck - Normal midline excursion of the laryngotracheal complex during swallowing.  Full range of motion on passive movement.  Palpation of the occipital, submental, submandibular, internal jugular chain, and supraclavicular nodes did not demonstrate any abnormal lymph nodes or masses.  The carotid pulse was palpable bilaterally.  Palpation of the thyroid was soft and smooth, with no nodules or goiter appreciated.  The trachea was mobile and midline. The area in question in the midbody of the mandible corresponds to the path of the right facial artery.  Nose - External contour is symmetric, no gross deflection or scars.  Nasal mucosa is pink and moist with no abnormal mucus.  The septum was midline and non-obstructive, turbinates of normal size and position.  No polyps, masses, or purulence noted on examination.          Assessment - Lavonne Tidwell is a 78 year old female with a palpable mass in the right face, which I believe corresponds to her right facial artery. I reassured her that this is normal anatomy,and is unrelated to any sinus condition. She was also reassured this is not a neoplasm. I asked her to return if a mass develops in the face that is more prominent, in which case an US may be helpful in ensuring the presence and nature of any mass.       Dr. Elieser Duron MD  Otolaryngology  [facility]        Again, thank you for allowing me to participate in the care of your patient.        Sincerely,        Elieser Duron MD

## 2019-04-08 ENCOUNTER — OFFICE VISIT (OUTPATIENT)
Dept: FAMILY MEDICINE | Facility: CLINIC | Age: 79
End: 2019-04-08
Payer: MEDICARE

## 2019-04-08 VITALS
HEIGHT: 65 IN | HEART RATE: 59 BPM | DIASTOLIC BLOOD PRESSURE: 70 MMHG | WEIGHT: 171.4 LBS | BODY MASS INDEX: 28.56 KG/M2 | TEMPERATURE: 97.4 F | SYSTOLIC BLOOD PRESSURE: 110 MMHG | RESPIRATION RATE: 16 BRPM | OXYGEN SATURATION: 98 %

## 2019-04-08 DIAGNOSIS — R32 URINARY INCONTINENCE, UNSPECIFIED TYPE: ICD-10-CM

## 2019-04-08 DIAGNOSIS — Z12.11 SCREEN FOR COLON CANCER: ICD-10-CM

## 2019-04-08 DIAGNOSIS — Z00.00 ENCOUNTER FOR WELL ADULT EXAM WITHOUT ABNORMAL FINDINGS: Primary | ICD-10-CM

## 2019-04-08 DIAGNOSIS — J31.0 CHRONIC RHINITIS: ICD-10-CM

## 2019-04-08 DIAGNOSIS — E78.5 HYPERLIPIDEMIA LDL GOAL <130: ICD-10-CM

## 2019-04-08 LAB
ALBUMIN SERPL-MCNC: 4 G/DL (ref 3.4–5)
ALP SERPL-CCNC: 87 U/L (ref 40–150)
ALT SERPL W P-5'-P-CCNC: 22 U/L (ref 0–50)
ANION GAP SERPL CALCULATED.3IONS-SCNC: 3 MMOL/L (ref 3–14)
AST SERPL W P-5'-P-CCNC: 21 U/L (ref 0–45)
BASOPHILS # BLD AUTO: 0 10E9/L (ref 0–0.2)
BASOPHILS NFR BLD AUTO: 0.4 %
BILIRUB DIRECT SERPL-MCNC: 0.2 MG/DL (ref 0–0.2)
BILIRUB SERPL-MCNC: 1 MG/DL (ref 0.2–1.3)
BUN SERPL-MCNC: 14 MG/DL (ref 7–30)
CALCIUM SERPL-MCNC: 9.3 MG/DL (ref 8.5–10.1)
CHLORIDE SERPL-SCNC: 102 MMOL/L (ref 94–109)
CHOLEST SERPL-MCNC: 244 MG/DL
CO2 SERPL-SCNC: 31 MMOL/L (ref 20–32)
CREAT SERPL-MCNC: 0.72 MG/DL (ref 0.52–1.04)
DIFFERENTIAL METHOD BLD: ABNORMAL
EOSINOPHIL # BLD AUTO: 0.1 10E9/L (ref 0–0.7)
EOSINOPHIL NFR BLD AUTO: 1.3 %
ERYTHROCYTE [DISTWIDTH] IN BLOOD BY AUTOMATED COUNT: 13.4 % (ref 10–15)
GFR SERPL CREATININE-BSD FRML MDRD: 79 ML/MIN/{1.73_M2}
GLUCOSE SERPL-MCNC: 94 MG/DL (ref 70–99)
HCT VFR BLD AUTO: 40.6 % (ref 35–47)
HDLC SERPL-MCNC: 63 MG/DL
HGB BLD-MCNC: 13 G/DL (ref 11.7–15.7)
LDLC SERPL CALC-MCNC: 141 MG/DL
LYMPHOCYTES # BLD AUTO: 2.5 10E9/L (ref 0.8–5.3)
LYMPHOCYTES NFR BLD AUTO: 46.1 %
MCH RBC QN AUTO: 32.2 PG (ref 26.5–33)
MCHC RBC AUTO-ENTMCNC: 32 G/DL (ref 31.5–36.5)
MCV RBC AUTO: 101 FL (ref 78–100)
MONOCYTES # BLD AUTO: 0.3 10E9/L (ref 0–1.3)
MONOCYTES NFR BLD AUTO: 5 %
NEUTROPHILS # BLD AUTO: 2.6 10E9/L (ref 1.6–8.3)
NEUTROPHILS NFR BLD AUTO: 47.2 %
NONHDLC SERPL-MCNC: 181 MG/DL
PLATELET # BLD AUTO: 231 10E9/L (ref 150–450)
POTASSIUM SERPL-SCNC: 4.1 MMOL/L (ref 3.4–5.3)
PROT SERPL-MCNC: 7.9 G/DL (ref 6.8–8.8)
RBC # BLD AUTO: 4.04 10E12/L (ref 3.8–5.2)
SODIUM SERPL-SCNC: 136 MMOL/L (ref 133–144)
TRIGL SERPL-MCNC: 200 MG/DL
TSH SERPL DL<=0.005 MIU/L-ACNC: 3.86 MU/L (ref 0.4–4)
WBC # BLD AUTO: 5.4 10E9/L (ref 4–11)

## 2019-04-08 PROCEDURE — 84443 ASSAY THYROID STIM HORMONE: CPT | Performed by: FAMILY MEDICINE

## 2019-04-08 PROCEDURE — 36415 COLL VENOUS BLD VENIPUNCTURE: CPT | Performed by: FAMILY MEDICINE

## 2019-04-08 PROCEDURE — G0439 PPPS, SUBSEQ VISIT: HCPCS | Performed by: FAMILY MEDICINE

## 2019-04-08 PROCEDURE — 80048 BASIC METABOLIC PNL TOTAL CA: CPT | Performed by: FAMILY MEDICINE

## 2019-04-08 PROCEDURE — 85025 COMPLETE CBC W/AUTO DIFF WBC: CPT | Performed by: FAMILY MEDICINE

## 2019-04-08 PROCEDURE — 80076 HEPATIC FUNCTION PANEL: CPT | Performed by: FAMILY MEDICINE

## 2019-04-08 PROCEDURE — 80061 LIPID PANEL: CPT | Performed by: FAMILY MEDICINE

## 2019-04-08 RX ORDER — FLUTICASONE PROPIONATE 50 MCG
SPRAY, SUSPENSION (ML) NASAL
Qty: 16 G | Refills: 3 | Status: SHIPPED | OUTPATIENT
Start: 2019-04-08 | End: 2020-04-20

## 2019-04-08 RX ORDER — AMOXICILLIN 500 MG/1
2000 TABLET, FILM COATED ORAL
COMMUNITY
End: 2019-11-21

## 2019-04-08 ASSESSMENT — ENCOUNTER SYMPTOMS
DIARRHEA: 0
NERVOUS/ANXIOUS: 0
JOINT SWELLING: 0
COUGH: 0
BREAST MASS: 0
HEADACHES: 0
CHILLS: 0
ARTHRALGIAS: 0
PALPITATIONS: 0
CONSTIPATION: 0
DYSURIA: 0
HEMATURIA: 0
HEMATOCHEZIA: 0
HEARTBURN: 0
PARESTHESIAS: 0
WEAKNESS: 0
FREQUENCY: 0
FEVER: 0
MYALGIAS: 0
SHORTNESS OF BREATH: 0
NAUSEA: 0
EYE PAIN: 0
SORE THROAT: 0
ABDOMINAL PAIN: 0
DIZZINESS: 0

## 2019-04-08 ASSESSMENT — MIFFLIN-ST. JEOR: SCORE: 1253.35

## 2019-04-08 ASSESSMENT — ACTIVITIES OF DAILY LIVING (ADL): CURRENT_FUNCTION: NO ASSISTANCE NEEDED

## 2019-04-08 NOTE — PROGRESS NOTES
"SUBJECTIVE:   Lavonne Tidwell is a 78 year old female who presents for Preventive Visit.      Are you in the first 12 months of your Medicare coverage?  No    Healthy Habits:     In general, how would you rate your overall health?  Good    Frequency of exercise:  2-3 days/week    Duration of exercise:  15-30 minutes    Do you usually eat at least 4 servings of fruit and vegetables a day, include whole grains    & fiber and avoid regularly eating high fat or \"junk\" foods?  Yes    Medication side effects:  None    Ability to successfully perform activities of daily living:  No assistance needed    Home Safety:  No safety concerns identified    Hearing Impairment:  No hearing concerns    In the past 6 months, have you been bothered by leaking of urine? Yes    In general, how would you rate your overall mental or emotional health?  Good      PHQ-2 Total Score: 0    Additional concerns today:  No    Do you feel safe in your environment? Yes    Do you have a Health Care Directive? No: Advance care planning reviewed with patient; information given to patient to review.      Fall risk  Fallen 2 or more times in the past year?: No  Any fall with injury in the past year?: No    Cognitive Screening   1) Repeat 3 items (Leader, Season, Table)    2) Clock draw: NORMAL  3) 3 item recall: Recalls 3 objects  Results: 3 items recalled: COGNITIVE IMPAIRMENT LESS LIKELY    Mini-CogTM Copyright ROHIT Arvizu. Licensed by the author for use in [facility]; reprinted with permission (brittani@.[email or web address]). All rights reserved.      Do you have sleep apnea, excessive snoring or daytime drowsiness?: no    Reviewed and updated as needed this visit by clinical staff         Reviewed and updated as needed this visit by Provider  Tobacco  Allergies  Meds  Problems  Med Hx  Surg Hx  Fam Hx        Social History     Tobacco Use     Smoking status: Former Smoker     Packs/day: 0.50     Years: 5.00     Pack years: 2.50     Types: Cigarettes "     Start date: 1968     Last attempt to quit: 2/15/1972     Years since quittin.1     Smokeless tobacco: Never Used     Tobacco comment: stopped in her 20's   Substance Use Topics     Alcohol use: Yes     Alcohol/week: 0.0 oz     Comment: Occasional         Alcohol Use 2019   Prescreen: >3 drinks/day or >7 drinks/week? No   Prescreen: >3 drinks/day or >7 drinks/week? -   No flowsheet data found.            Current providers sharing in care for this patient include:   Patient Care Team:  Elvira Kowalski MD as PCP - General (Family Practice)  Simin Gaines APRN CNP as Assigned PCP    The following health maintenance items are reviewed in Epic and correct as of today:  Health Maintenance   Topic Date Due     ZOSTER IMMUNIZATION (2 of 3) 2016     MEDICARE ANNUAL WELLNESS VISIT  09/15/2018     DTAP/TDAP/TD IMMUNIZATION (2 - Td) 2018     COLONOSCOPY Q5 YR  04/10/2019     FALL RISK ASSESSMENT  2020     PHQ-2 Q1 YR  2020     ADVANCE DIRECTIVE PLANNING Q5 YRS  2022     LIPID SCREEN Q5 YR FEMALE (SYSTEM ASSIGNED)  09/15/2022     DEXA SCAN SCREENING (SYSTEM ASSIGNED)  Completed     INFLUENZA VACCINE  Completed     PNEUMOCOCCAL IMMUNIZATION 65+ LOW/MEDIUM RISK  Completed     IPV IMMUNIZATION  Aged Out     MENINGITIS IMMUNIZATION  Aged Out     Labs reviewed in EPIC  Pneumonia Vaccine:Adults age 65+ who received Pneumovax (PPSV23) at 65 years or older: Should be given PCV13 > 1 year after their most recent PPSV23  Mammogram Screening: Patient over age 75, has elected to continue with mammography screening.    Review of Systems   Constitutional: Negative for chills and fever.   HENT: Negative for congestion, ear pain, hearing loss and sore throat.    Eyes: Negative for pain and visual disturbance.   Respiratory: Negative for cough and shortness of breath.    Cardiovascular: Negative for chest pain, palpitations and peripheral edema.   Gastrointestinal: Negative for  "abdominal pain, constipation, diarrhea, heartburn, hematochezia and nausea.   Breasts:  Negative for tenderness, breast mass and discharge.   Genitourinary: Negative for dysuria, frequency, genital sores, hematuria, pelvic pain, urgency, vaginal bleeding and vaginal discharge.   Musculoskeletal: Negative for arthralgias, joint swelling and myalgias.   Skin: Negative for rash.   Neurological: Negative for dizziness, weakness, headaches and paresthesias.   Psychiatric/Behavioral: Negative for mood changes. The patient is not nervous/anxious.          OBJECTIVE:   /70 (BP Location: Right arm, Patient Position: Chair, Cuff Size: Adult Large)   Pulse 59   Temp 97.4  F (36.3  C) (Tympanic)   Resp 16   Ht 1.651 m (5' 5\")   Wt 77.7 kg (171 lb 6.4 oz)   SpO2 98%   BMI 28.52 kg/m   Estimated body mass index is 28.52 kg/m  as calculated from the following:    Height as of this encounter: 1.651 m (5' 5\").    Weight as of this encounter: 77.7 kg (171 lb 6.4 oz).  Physical Exam  GENERAL APPEARANCE: healthy, alert and no distress  EYES: Eyes grossly normal to inspection, PERRL and conjunctivae and sclerae normal  HENT: ear canals and TM's normal, nose and mouth without ulcers or lesions, oropharynx clear and oral mucous membranes moist  NECK: no adenopathy, no asymmetry, masses, or scars and thyroid normal to palpation  RESP: lungs clear to auscultation - no rales, rhonchi or wheezes  BREAST: normal without masses, tenderness or nipple discharge and no palpable axillary masses or adenopathy  CV: regular rate and rhythm, normal S1 S2, no S3 or S4, no murmur, click or rub, no peripheral edema and peripheral pulses strong  ABDOMEN: soft, nontender, no hepatosplenomegaly, no masses and bowel sounds normal  MS: no musculoskeletal defects are noted and gait is age appropriate without ataxia  SKIN: no suspicious lesions or rashes  NEURO: Normal strength and tone, sensory exam grossly normal, mentation intact and speech " "normal  PSYCH: mentation appears normal and affect normal/bright    Diagnostic Test Results:  none     ASSESSMENT / PLAN:   1. Encounter for well adult exam without abnormal findings    - TSH with free T4 reflex  - Hepatic panel  - Basic metabolic panel  - CBC with platelets differential    2. Screen for colon cancer    - GASTROENTEROLOGY ADULT REF PROCEDURE ONLY    3. Chronic rhinitis    - fluticasone (FLONASE) 50 MCG/ACT nasal spray; USE 2 SPRAYS INTO BOTH NOSTRILS DAILY AS NEEDED  Dispense: 16 g; Refill: 3    4. Hyperlipidemia LDL goal <130    - Lipid panel reflex to direct LDL Fasting    5. Urinary incontinence, unspecified type        End of Life Planning:  Patient currently has an advanced directive: Yes.  Practitioner is supportive of decision.    COUNSELING:  Reviewed preventive health counseling, as reflected in patient instructions    BP Readings from Last 1 Encounters:   04/08/19 110/70     Estimated body mass index is 28.52 kg/m  as calculated from the following:    Height as of this encounter: 1.651 m (5' 5\").    Weight as of this encounter: 77.7 kg (171 lb 6.4 oz).           reports that she quit smoking about 47 years ago. Her smoking use included cigarettes. She started smoking about 50 years ago. She has a 2.50 pack-year smoking history. She has never used smokeless tobacco.      Appropriate preventive services were discussed with this patient, including applicable screening as appropriate for cardiovascular disease, diabetes, osteopenia/osteoporosis, and glaucoma.  As appropriate for age/gender, discussed screening for colorectal cancer, prostate cancer, breast cancer, and cervical cancer. Checklist reviewing preventive services available has been given to the patient.    Reviewed patients plan of care and provided an AVS. The Basic Care Plan (routine screening as documented in Health Maintenance) for Lavonne meets the Care Plan requirement. This Care Plan has been established and reviewed with the " Patient.    Counseling Resources:  ATP IV Guidelines  Pooled Cohorts Equation Calculator  Breast Cancer Risk Calculator  FRAX Risk Assessment  ICSI Preventive Guidelines  Dietary Guidelines for Americans, 2010  [employer]'s MyPlate  ASA Prophylaxis  Lung CA Screening    Elvira Kowalski MD  [facility]    Identified Health Risks:    Information on urinary incontinence and treatment options given to patient.

## 2019-04-08 NOTE — PATIENT INSTRUCTIONS
Patient Education   Personalized Prevention Plan  You are due for the preventive services outlined below.  Your care team is available to assist you in scheduling these services.  If you have already completed any of these items, please share that information with your care team to update in your medical record.  Health Maintenance Due   Topic Date Due     Zoster (Shingles) Vaccine (2 of 3) 12/29/2016     Annual Wellness Visit  09/15/2018     Diptheria Tetanus Pertussis (DTAP/TDAP/TD) Vaccine (2 - Td) 12/02/2018     Colonoscopy - every 5 years  04/10/2019       Urinary Incontinence, Female (Adult)  Urinary incontinence means loss of control of the bladder. This problem affects many women, especially as they get older. If you have incontinence, you may be embarrassed to ask for help. But know that this problem can be treated.  Types of Incontinence  There are different types of incontinence. Two of the main types are described here. You can have more than one type.    Stress incontinence. With this type, urine leaks when pressure (stress) is put on the bladder. This may happen when you cough, sneeze, or laugh. Stress incontinence most often occurs because the pelvic floor muscles that support the bladder and urethra are weak. This can happen after pregnancy and vaginal childbirth or a hysterectomy. It can also be due to excess body weight or hormone changes.    Urge incontinence (also called overactive bladder). With this type, a sudden urge to urinate is felt often. This may happen even though there may not be much urine in the bladder. The need to urinate often during the night is common. Urge incontinence most often occurs because of bladder spasms. This may be due to bladder irritation or infection. Damage to bladder nerves or pelvic muscles, constipation, and certain medicines can also lead to urge incontinence.  Treatment of urinary incontinence depends on the cause. Further evaluation is needed to find the  type you have. This will likely include an exam and certain tests. Based on the results, you and your healthcare provider can then plan treatment. Until a diagnosis is made, the home care tips below can help relieve symptoms.  Home care    Do pelvic floor muscle exercises, if they are prescribed. The pelvic floor muscles help support the bladder and urethra. Many women find that their symptoms improve when doing special exercises that strengthen these muscles. To do the exercises contract the muscles you would use to stop your stream of urine, but do this when you re not urinating. Hold for 10 seconds, then relax. Repeat 10 to 20 times in a row, at least 3 times a day. Your provider may give you other instructions for how to do the exercises and how often.    Keep a bladder diary. This helps track how often and how much you urinate over a set period of time. Bring this diary with you to your next visit with the provider. The information can help your provider learn more about your bladder problem.    Lose weight, if advised to by your provider. Excess weight puts pressure on the bladder. Your provider can help you create a weight-loss plan that s right for you. This may include exercising more and making certain diet changes.    Don't consume foods and drinks that may irritate the bladder. These can include alcohol and caffeinated drinks.    Quit smoking. Smoking and other tobacco use can lead to chronic cough that strains the pelvic floor muscles. Smoking may also damage the bladder and urethra. Talk with your provider about treatments or methods you can use to quit smoking.    If drinking large amounts of fluid causes you to have symptoms, you may be advised to limit your fluid intake. You may also be advised to drink most of your fluids during the day and to limit fluids at night.    If you re worried about urine leakage or accidents, you may wear absorbent pads to catch urine. Change the pads often. This helps  reduce discomfort. It may also reduce the risk of skin or bladder infections.  Follow-up care  Follow up with your healthcare provider, or as directed. It may take some to find the right treatment for your problem. Your treatment plan may include special therapies or medicines. Certain procedures or surgery may also be options. Be sure to discuss any questions you have with your provider.  When to seek medical advice  Call the healthcare provider right away if any of these occur:    Fever of 100.4 F (38 C) or higher, or as directed by your provider    Bladder pain or fullness    Abdominal swelling    Nausea or vomiting    Back pain    Weakness, dizziness or fainting  Date Last Reviewed: 10/1/2017    1179-7032 [employer]. [address] [place] [address], [place] [address]. All rights reserved. This information is not intended as a substitute for professional medical care. Always follow your healthcare professional's instructions.      Labs today     Get immunizations in the pharmacy shingles and tetanus           Patient Education   Personalized Prevention Plan  You are due for the preventive services outlined below.  Your care team is available to assist you in scheduling these services.  If you have already completed any of these items, please share that information with your care team to update in your medical record.  Health Maintenance Due   Topic Date Due     Zoster (Shingles) Vaccine (2 of 3) 12/29/2016     Annual Wellness Visit  09/15/2018     Diptheria Tetanus Pertussis (DTAP/TDAP/TD) Vaccine (2 - Td) 12/02/2018     Colonoscopy - every 5 years  04/10/2019       Urinary Incontinence, Female (Adult)  Urinary incontinence means loss of control of the bladder. This problem affects many women, especially as they get older. If you have incontinence, you may be embarrassed to ask for help. But know that this problem can be treated.  Types of Incontinence  There are different types of incontinence. Two of the  main types are described here. You can have more than one type.    Stress incontinence. With this type, urine leaks when pressure (stress) is put on the bladder. This may happen when you cough, sneeze, or laugh. Stress incontinence most often occurs because the pelvic floor muscles that support the bladder and urethra are weak. This can happen after pregnancy and vaginal childbirth or a hysterectomy. It can also be due to excess body weight or hormone changes.    Urge incontinence (also called overactive bladder). With this type, a sudden urge to urinate is felt often. This may happen even though there may not be much urine in the bladder. The need to urinate often during the night is common. Urge incontinence most often occurs because of bladder spasms. This may be due to bladder irritation or infection. Damage to bladder nerves or pelvic muscles, constipation, and certain medicines can also lead to urge incontinence.  Treatment of urinary incontinence depends on the cause. Further evaluation is needed to find the type you have. This will likely include an exam and certain tests. Based on the results, you and your healthcare provider can then plan treatment. Until a diagnosis is made, the home care tips below can help relieve symptoms.  Home care    Do pelvic floor muscle exercises, if they are prescribed. The pelvic floor muscles help support the bladder and urethra. Many women find that their symptoms improve when doing special exercises that strengthen these muscles. To do the exercises contract the muscles you would use to stop your stream of urine, but do this when you re not urinating. Hold for 10 seconds, then relax. Repeat 10 to 20 times in a row, at least 3 times a day. Your provider may give you other instructions for how to do the exercises and how often.    Keep a bladder diary. This helps track how often and how much you urinate over a set period of time. Bring this diary with you to your next visit  with the provider. The information can help your provider learn more about your bladder problem.    Lose weight, if advised to by your provider. Excess weight puts pressure on the bladder. Your provider can help you create a weight-loss plan that s right for you. This may include exercising more and making certain diet changes.    Don't consume foods and drinks that may irritate the bladder. These can include alcohol and caffeinated drinks.    Quit smoking. Smoking and other tobacco use can lead to chronic cough that strains the pelvic floor muscles. Smoking may also damage the bladder and urethra. Talk with your provider about treatments or methods you can use to quit smoking.    If drinking large amounts of fluid causes you to have symptoms, you may be advised to limit your fluid intake. You may also be advised to drink most of your fluids during the day and to limit fluids at night.    If you re worried about urine leakage or accidents, you may wear absorbent pads to catch urine. Change the pads often. This helps reduce discomfort. It may also reduce the risk of skin or bladder infections.  Follow-up care  Follow up with your healthcare provider, or as directed. It may take some to find the right treatment for your problem. Your treatment plan may include special therapies or medicines. Certain procedures or surgery may also be options. Be sure to discuss any questions you have with your provider.  When to seek medical advice  Call the healthcare provider right away if any of these occur:    Fever of 100.4 F (38 C) or higher, or as directed by your provider    Bladder pain or fullness    Abdominal swelling    Nausea or vomiting    Back pain    Weakness, dizziness or fainting  Date Last Reviewed: 10/1/2017    [id number] [employer]. [address] [place] [address], [place] [address]. All rights reserved. This information is not intended as a substitute for professional medical care. Always follow your  healthcare professional's instructions.

## 2019-04-08 NOTE — NURSING NOTE
"Chief Complaint   Patient presents with     Physical       Initial /70 (BP Location: Right arm, Patient Position: Chair, Cuff Size: Adult Large)   Pulse 59   Temp 97.4  F (36.3  C) (Tympanic)   Resp 16   Ht 1.651 m (5' 5\")   Wt 77.7 kg (171 lb 6.4 oz)   SpO2 98%   BMI 28.52 kg/m   Estimated body mass index is 28.52 kg/m  as calculated from the following:    Height as of this encounter: 1.651 m (5' 5\").    Weight as of this encounter: 77.7 kg (171 lb 6.4 oz).    Patient presents to the clinic using No DME    Health Maintenance that is potentially due pending provider review:  Colonoscopy/FIT    Gave pt phone number/pended order to schedule mammo and/or colonoscopy(or FIT)    Is there anyone who you would like to be able to receive your results? No  If yes have patient fill out MALLORIE    "

## 2019-04-16 ENCOUNTER — HOSPITAL ENCOUNTER (OUTPATIENT)
Dept: OUTPATIENT PROCEDURES | Facility: CLINIC | Age: 79
Discharge: HOME | End: 2019-04-16
Attending: OPHTHALMOLOGY | Admitting: OPHTHALMOLOGY
Payer: MEDICARE

## 2019-04-16 PROCEDURE — 66821 AFTER CATARACT LASER SURGERY: CPT | Mod: 50 | Performed by: OPHTHALMOLOGY

## 2019-04-17 ENCOUNTER — HOSPITAL ENCOUNTER (EMERGENCY)
Facility: CLINIC | Age: 79
End: 2019-04-17
Payer: MEDICARE

## 2019-05-06 ENCOUNTER — OFFICE VISIT (OUTPATIENT)
Dept: FAMILY MEDICINE | Facility: CLINIC | Age: 79
End: 2019-05-06
Payer: MEDICARE

## 2019-05-06 VITALS
BODY MASS INDEX: 28.82 KG/M2 | HEIGHT: 65 IN | HEART RATE: 84 BPM | RESPIRATION RATE: 16 BRPM | DIASTOLIC BLOOD PRESSURE: 82 MMHG | OXYGEN SATURATION: 100 % | WEIGHT: 173 LBS | SYSTOLIC BLOOD PRESSURE: 156 MMHG | TEMPERATURE: 97 F

## 2019-05-06 DIAGNOSIS — R30.0 DYSURIA: ICD-10-CM

## 2019-05-06 DIAGNOSIS — R82.90 NONSPECIFIC FINDING ON EXAMINATION OF URINE: ICD-10-CM

## 2019-05-06 DIAGNOSIS — N30.01 ACUTE CYSTITIS WITH HEMATURIA: Primary | ICD-10-CM

## 2019-05-06 LAB
ALBUMIN UR-MCNC: NEGATIVE MG/DL
APPEARANCE UR: ABNORMAL
BACTERIA #/AREA URNS HPF: ABNORMAL /HPF
BILIRUB UR QL STRIP: NEGATIVE
COLOR UR AUTO: YELLOW
GLUCOSE UR STRIP-MCNC: NEGATIVE MG/DL
HGB UR QL STRIP: ABNORMAL
KETONES UR STRIP-MCNC: NEGATIVE MG/DL
LEUKOCYTE ESTERASE UR QL STRIP: ABNORMAL
NITRATE UR QL: NEGATIVE
PH UR STRIP: 6 PH (ref 5–7)
RBC #/AREA URNS AUTO: ABNORMAL /HPF
SOURCE: ABNORMAL
SP GR UR STRIP: 1.01 (ref 1–1.03)
UROBILINOGEN UR STRIP-ACNC: 0.2 EU/DL (ref 0.2–1)
WBC #/AREA URNS AUTO: ABNORMAL /HPF
WBC CLUMPS #/AREA URNS HPF: PRESENT /HPF

## 2019-05-06 PROCEDURE — 81001 URINALYSIS AUTO W/SCOPE: CPT | Performed by: PHYSICIAN ASSISTANT

## 2019-05-06 PROCEDURE — 99213 OFFICE O/P EST LOW 20 MIN: CPT | Performed by: PHYSICIAN ASSISTANT

## 2019-05-06 PROCEDURE — 87086 URINE CULTURE/COLONY COUNT: CPT | Performed by: PHYSICIAN ASSISTANT

## 2019-05-06 RX ORDER — CIPROFLOXACIN 250 MG/1
250 TABLET, FILM COATED ORAL 2 TIMES DAILY
Qty: 10 TABLET | Refills: 0 | Status: SHIPPED | OUTPATIENT
Start: 2019-05-06 | End: 2019-05-23

## 2019-05-06 ASSESSMENT — ENCOUNTER SYMPTOMS
BLURRED VISION: 0
HEADACHES: 0
VOMITING: 0
EYE REDNESS: 0
NAUSEA: 0
SHORTNESS OF BREATH: 0
PALPITATIONS: 0
WHEEZING: 0
FLANK PAIN: 0
SORE THROAT: 0
FEVER: 0
DIARRHEA: 0
EYE DISCHARGE: 0
HEMATURIA: 0
MYALGIAS: 0
ABDOMINAL PAIN: 0
CHILLS: 0
DYSURIA: 1
FREQUENCY: 1
COUGH: 0

## 2019-05-06 ASSESSMENT — MIFFLIN-ST. JEOR: SCORE: 1260.6

## 2019-05-06 NOTE — NURSING NOTE
"Chief Complaint   Patient presents with     UTI       Initial /82   Pulse 84   Temp 97  F (36.1  C) (Tympanic)   Resp 16   Ht 1.651 m (5' 5\")   Wt 78.5 kg (173 lb)   SpO2 100%   BMI 28.79 kg/m   Estimated body mass index is 28.79 kg/m  as calculated from the following:    Height as of this encounter: 1.651 m (5' 5\").    Weight as of this encounter: 78.5 kg (173 lb).    Patient presents to the clinic using No DME    Health Maintenance that is potentially due pending provider review:  NONE    n/a    Is there anyone who you would like to be able to receive your results? No  If yes have patient fill out MALLORIE      "

## 2019-05-06 NOTE — PROGRESS NOTES
SUBJECTIVE:   Lavonne Tidwell is a 78 year old female who presents to clinic today for the following   health issues:      URINARY TRACT SYMPTOMS      Duration: overnight     Description  dysuria, frequency, urgency and hematuria    Intensity:  moderate    Accompanying signs and symptoms:  Fever/chills: YES- just chills   Flank pain no   Nausea and vomiting: no   Vaginal symptoms: none  Abdominal/Pelvic Pain: YES- pressure    History  History of frequent UTI's: YES- the last few years has had it a few times.   History of kidney stones: no   Sexually Active: no   Possibility of pregnancy: No    Precipitating or alleviating factors: None    Therapies tried and outcome: increase fluid intake           Additional history: as documented    Reviewed  and updated as needed this visit by clinical staff  Tobacco  Allergies  Meds  Problems  Med Hx  Surg Hx  Fam Hx  Soc Hx          Reviewed and updated as needed this visit by Provider  Tobacco  Allergies  Meds  Problems  Med Hx  Surg Hx  Fam Hx         Patient Active Problem List   Diagnosis     Injury of sigmoid colon     Esophageal reflux     Menopausal syndrome (hot flashes)     Urinary incontinence     Atrophic vaginitis     Hyperlipidemia LDL goal <130     Advanced directives, counseling/discussion     Onychomycosis     Senile nuclear sclerosis     Status post total left knee replacement     Status post total right knee replacement     Primary localized osteoarthrosis, lower leg     Past Surgical History:   Procedure Laterality Date     ABDOMEN SURGERY  1973 & 1974    Ovarian cyst/Hysterectomy     APPENDECTOMY  1952     ARTHROPLASTY KNEE Left 1/6/2016    Procedure: ARTHROPLASTY KNEE;  Surgeon: Wilfredo Thompson MD;  Location: [facility]     ARTHROPLASTY KNEE Right 2/23/2017    Procedure: ARTHROPLASTY KNEE;  Surgeon: Wilfredo Thompson MD;  Location: [facility]     BIOPSY      colonoscopy/polyps     COLONOSCOPY      every 10 years     GENITOURINARY SURGERY  2008     bladder     HYSTERECTOMY, CHELSY  1974     ORTHOPEDIC SURGERY   &     [place]  both feet     PHACOEMULSIFICATION WITH STANDARD INTRAOCULAR LENS IMPLANT Left 2015    Procedure: PHACOEMULSIFICATION WITH STANDARD INTRAOCULAR LENS IMPLANT;  Surgeon: Fernando Jeffrey MD;  Location: [facility]     PHACOEMULSIFICATION WITH STANDARD INTRAOCULAR LENS IMPLANT Right 2015    Procedure: PHACOEMULSIFICATION WITH STANDARD INTRAOCULAR LENS IMPLANT;  Surgeon: Fernando Jeffrey MD;  Location: [facility]     SURGICAL HISTORY OF -   1998    Colonoscopy     SURGICAL HISTORY OF -       Bilateral salpingoopherectomy     SURGICAL HISTORY OF -   3/09    TOT Midurethral sling       Social History     Tobacco Use     Smoking status: Former Smoker     Packs/day: 0.50     Years: 5.00     Pack years: 2.50     Types: Cigarettes     Start date: 1968     Last attempt to quit: 2/15/1972     Years since quittin.2     Smokeless tobacco: Never Used     Tobacco comment: stopped in her 20's   Substance Use Topics     Alcohol use: Yes     Alcohol/week: 0.0 oz     Comment: Occasional     Family History   Problem Relation Age of Onset     Cancer Mother         Ovarian     Other Cancer Mother         Ovarian     Cerebrovascular Disease Father      Heart Disease Father      C.A.D. Father      Coronary Artery Disease Father      Cancer Maternal Grandmother      Other Cancer Maternal Grandmother         Ovarian/Bone     Cerebrovascular Disease Maternal Grandfather      Colon Cancer Maternal Grandfather      Diabetes Other         Great Grandmother     Breast Cancer No family hx of          Current Outpatient Medications   Medication Sig Dispense Refill     Acetaminophen (TYLENOL PO) Take 1,000 mg by mouth 2 times daily as needed for mild pain or fever       Biotin 10 MG CAPS        CALCIUM + D OR 2 TABLET DAILY       ciprofloxacin (CIPRO) 250 MG tablet Take 1 tablet (250 mg) by mouth 2 times daily for 5 days 10 tablet 0     Docusate  "Calcium (STOOL SOFTENER PO)        fluticasone (FLONASE) 50 MCG/ACT nasal spray USE 2 SPRAYS INTO BOTH NOSTRILS DAILY AS NEEDED 16 g 3     garlic 150 MG TABS tablet Take 150 mg by mouth daily       grape seed 50 MG CAPS Take 50 mg by mouth daily       loratadine (CLARITIN) 10 MG tablet Take 10 mg by mouth daily       methylcellulose (FIBER THERAPY) 500 MG TABS tablet Take 500 mg by mouth daily       Multiple Vitamins-Minerals (CENTRUM SILVER) per tablet Take 1 tablet by mouth daily       ranitidine (ZANTAC) 150 MG tablet Take 150 mg by mouth daily        VITAMIN E COMPLEX PO        Zinc Sulfate (ZINC 15 PO)        amoxicillin (AMOXIL) 500 MG tablet Take 2,000 mg by mouth       Allergies   Allergen Reactions     Codeine GI Disturbance     Labs reviewed in EPIC    ROS:  Review of Systems   Constitutional: Negative for chills, fever and malaise/fatigue.   HENT: Negative for congestion, ear pain and sore throat.    Eyes: Negative for blurred vision, discharge and redness.   Respiratory: Negative for cough, shortness of breath and wheezing.    Cardiovascular: Negative for chest pain and palpitations.   Gastrointestinal: Negative for abdominal pain, diarrhea, nausea and vomiting.   Genitourinary: Positive for dysuria, frequency and urgency. Negative for flank pain and hematuria.   Musculoskeletal: Negative for joint pain and myalgias.   Skin: Negative for rash.   Neurological: Negative for headaches.         OBJECTIVE:     /82   Pulse 84   Temp 97  F (36.1  C) (Tympanic)   Resp 16   Ht 1.651 m (5' 5\")   Wt 78.5 kg (173 lb)   SpO2 100%   BMI 28.79 kg/m    Body mass index is 28.79 kg/m .    Physical Exam   Constitutional: She appears well-developed and well-nourished. No distress.   Abdominal: There is no CVA tenderness.   Psychiatric: She has a normal mood and affect. Her behavior is normal.         Diagnostic Test Results:  Urinalysis - positive for UTI    ASSESSMENT/PLAN:       1. Acute cystitis with " hematuria  Culture pending. Will treat with cipro two times daily x 5 days. Push fluids. Return to clinic if symptoms worsen or do not improve; otherwise follow up as needed      - ciprofloxacin (CIPRO) 250 MG tablet; Take 1 tablet (250 mg) by mouth 2 times daily for 5 days  Dispense: 10 tablet; Refill: 0    2. Dysuria    - *UA reflex to Microscopic and Culture ([facility] (except [place] and [place])  - Urine Culture Aerobic Bacterial  - Urine Microscopic    3. Nonspecific finding on examination of urine    - Urine Culture Aerobic Bacterial       Katie Jacobs PA-C  [facility] [place]

## 2019-05-07 LAB
BACTERIA SPEC CULT: NORMAL
Lab: NORMAL
SPECIMEN SOURCE: NORMAL

## 2019-05-16 ENCOUNTER — ANESTHESIA EVENT (OUTPATIENT)
Dept: GASTROENTEROLOGY | Facility: CLINIC | Age: 79
End: 2019-05-16
Payer: MEDICARE

## 2019-05-16 ASSESSMENT — LIFESTYLE VARIABLES: TOBACCO_USE: 1

## 2019-05-16 NOTE — ANESTHESIA PREPROCEDURE EVALUATION
Anesthesia Pre-Procedure Evaluation    Patient: Lavonne Tidwell   MRN: [id number] : 1940          Preoperative Diagnosis: screening    Procedure(s):  COLONOSCOPY    Past Medical History:   Diagnosis Date     Arthritis     knes and hips     History of blood transfusion     Miscarriage     Ovarian cysts     s/p BSO     Past Surgical History:   Procedure Laterality Date     ABDOMEN SURGERY   &     Ovarian cyst/Hysterectomy     APPENDECTOMY       ARTHROPLASTY KNEE Left 2016    Procedure: ARTHROPLASTY KNEE;  Surgeon: Wilfredo Thompson MD;  Location: [facility]     ARTHROPLASTY KNEE Right 2017    Procedure: ARTHROPLASTY KNEE;  Surgeon: Wilfredo Thompson MD;  Location: [facility]     BIOPSY      colonoscopy/polyps     COLONOSCOPY      every 10 years     GENITOURINARY SURGERY  2008    bladder     HYSTERECTOMY, CHELSY  1974     ORTHOPEDIC SURGERY   &     [place]  both feet     PHACOEMULSIFICATION WITH STANDARD INTRAOCULAR LENS IMPLANT Left 2015    Procedure: PHACOEMULSIFICATION WITH STANDARD INTRAOCULAR LENS IMPLANT;  Surgeon: Fernando Jeffrey MD;  Location: [facility]     PHACOEMULSIFICATION WITH STANDARD INTRAOCULAR LENS IMPLANT Right 2015    Procedure: PHACOEMULSIFICATION WITH STANDARD INTRAOCULAR LENS IMPLANT;  Surgeon: Fernando Jeffrey MD;  Location: [facility]     SURGICAL HISTORY OF -   1998    Colonoscopy     SURGICAL HISTORY OF -       Bilateral salpingoopherectomy     SURGICAL HISTORY OF -   3/09    TOT Midurethral sling       Anesthesia Evaluation     . Pt has had prior anesthetic. Type: General, MAC and Regional           ROS/MED HX    ENT/Pulmonary:     (+)tobacco use, Past use , . .    Neurologic:  - neg neurologic ROS     Cardiovascular:     (+) Dyslipidemia, ----. : . . . :. . Previous cardiac testing date:results:date: results:ECG reviewed date: results:Sinus Rhythm   WITHIN NORMAL LIMITS date: results:          METS/Exercise Tolerance:    "  Hematologic:         Musculoskeletal: Comment: osteoarthrosis  (+) arthritis,  -       GI/Hepatic: Comment: Sigmoid colon injury    (+) GERD       Renal/Genitourinary: Comment: Urinary incontinence - ROS Renal section negative       Endo:  - neg endo ROS       Psychiatric:  - neg psychiatric ROS       Infectious Disease:  - neg infectious disease ROS       Malignancy:      - no malignancy   Other: Comment: Atrophic vaginitis  onychomycosis   - neg other ROS                      Physical Exam  Normal systems: cardiovascular, pulmonary and dental    Airway   Mallampati: II  TM distance: >3 FB  Neck ROM: full    Dental     Cardiovascular       Pulmonary             Lab Results   Component Value Date    WBC 5.4 04/08/2019    HGB 13.0 04/08/2019    HCT 40.6 04/08/2019     04/08/2019     04/08/2019    POTASSIUM 4.1 04/08/2019    CHLORIDE 102 04/08/2019    CO2 31 04/08/2019    BUN 14 04/08/2019    CR 0.72 04/08/2019    GLC 94 04/08/2019    MANI 9.3 04/08/2019    ALBUMIN 4.0 04/08/2019    PROTTOTAL 7.9 04/08/2019    ALT 22 04/08/2019    AST 21 04/08/2019    ALKPHOS 87 04/08/2019    BILITOTAL 1.0 04/08/2019    INR 0.96 02/23/2017    TSH 3.86 04/08/2019       Preop Vitals  BP Readings from Last 3 Encounters:   05/06/19 156/82   04/08/19 110/70   03/06/19 127/72    Pulse Readings from Last 3 Encounters:   05/06/19 84   04/08/19 59   03/06/19 68      Resp Readings from Last 3 Encounters:   05/06/19 16   04/08/19 16   02/01/19 18    SpO2 Readings from Last 3 Encounters:   05/06/19 100%   04/08/19 98%   01/21/19 98%      Temp Readings from Last 1 Encounters:   05/06/19 36.1  C (97  F) (Tympanic)    Ht Readings from Last 1 Encounters:   05/06/19 1.651 m (5' 5\")      Wt Readings from Last 1 Encounters:   05/06/19 78.5 kg (173 lb)    Estimated body mass index is 28.79 kg/m  as calculated from the following:    Height as of 5/6/19: 1.651 m (5' 5\").    Weight as of 5/6/19: 78.5 kg (173 lb).       Anesthesia " Plan      History & Physical Review  History and physical reviewed and following examination; no interval change.    ASA Status:  2 .    NPO Status:  > 6 hours    Plan for MAC with Intravenous and Propofol induction.   PONV prophylaxis:  Ondansetron (or other 5HT-3) and Dexamethasone or Solumedrol       Postoperative Care  Postoperative pain management:  IV analgesics and Oral pain medications.      Consents  Anesthetic plan, risks, benefits and alternatives discussed with:  Patient..                 ENA Lagunas CRNA

## 2019-05-29 ENCOUNTER — ANESTHESIA (OUTPATIENT)
Dept: GASTROENTEROLOGY | Facility: CLINIC | Age: 79
End: 2019-05-29
Payer: MEDICARE

## 2019-05-29 ENCOUNTER — HOSPITAL ENCOUNTER (OUTPATIENT)
Facility: CLINIC | Age: 79
Discharge: HOME | End: 2019-05-29
Attending: SURGERY | Admitting: SURGERY
Payer: MEDICARE

## 2019-05-29 VITALS
BODY MASS INDEX: 28.82 KG/M2 | WEIGHT: 173 LBS | SYSTOLIC BLOOD PRESSURE: 124 MMHG | HEART RATE: 58 BPM | TEMPERATURE: 97.6 F | OXYGEN SATURATION: 99 % | HEIGHT: 65 IN | RESPIRATION RATE: 16 BRPM | DIASTOLIC BLOOD PRESSURE: 75 MMHG

## 2019-05-29 LAB — COLONOSCOPY: NORMAL

## 2019-05-29 PROCEDURE — 88305 TISSUE EXAM BY PATHOLOGIST: CPT | Mod: 26 | Performed by: SURGERY

## 2019-05-29 PROCEDURE — 25000128 H RX IP 250 OP 636: Performed by: NURSE ANESTHETIST, CERTIFIED REGISTERED

## 2019-05-29 PROCEDURE — 88305 TISSUE EXAM BY PATHOLOGIST: CPT | Performed by: SURGERY

## 2019-05-29 PROCEDURE — 37000008 ZZH ANESTHESIA TECHNICAL FEE, 1ST 30 MIN: Performed by: SURGERY

## 2019-05-29 PROCEDURE — 45385 COLONOSCOPY W/LESION REMOVAL: CPT | Mod: PT | Performed by: SURGERY

## 2019-05-29 PROCEDURE — 45385 COLONOSCOPY W/LESION REMOVAL: CPT | Performed by: SURGERY

## 2019-05-29 PROCEDURE — 25800030 ZZH RX IP 258 OP 636: Performed by: SURGERY

## 2019-05-29 PROCEDURE — 25000125 ZZHC RX 250: Performed by: SURGERY

## 2019-05-29 RX ORDER — ONDANSETRON 2 MG/ML
4 INJECTION INTRAMUSCULAR; INTRAVENOUS
Status: DISCONTINUED | OUTPATIENT
Start: 2019-05-29 | End: 2019-05-29 | Stop reason: HOSPADM

## 2019-05-29 RX ORDER — NALOXONE HYDROCHLORIDE 0.4 MG/ML
.1-.4 INJECTION, SOLUTION INTRAMUSCULAR; INTRAVENOUS; SUBCUTANEOUS
Status: CANCELLED | OUTPATIENT
Start: 2019-05-29 | End: 2019-05-30

## 2019-05-29 RX ORDER — LIDOCAINE 40 MG/G
CREAM TOPICAL
Status: DISCONTINUED | OUTPATIENT
Start: 2019-05-29 | End: 2019-05-29 | Stop reason: HOSPADM

## 2019-05-29 RX ORDER — FLUMAZENIL 0.1 MG/ML
0.2 INJECTION, SOLUTION INTRAVENOUS
Status: CANCELLED | OUTPATIENT
Start: 2019-05-29 | End: 2019-05-29

## 2019-05-29 RX ORDER — PROPOFOL 10 MG/ML
INJECTION, EMULSION INTRAVENOUS PRN
Status: DISCONTINUED | OUTPATIENT
Start: 2019-05-29 | End: 2019-05-29

## 2019-05-29 RX ORDER — SODIUM CHLORIDE, SODIUM LACTATE, POTASSIUM CHLORIDE, CALCIUM CHLORIDE 600; 310; 30; 20 MG/100ML; MG/100ML; MG/100ML; MG/100ML
INJECTION, SOLUTION INTRAVENOUS CONTINUOUS
Status: DISCONTINUED | OUTPATIENT
Start: 2019-05-29 | End: 2019-05-29 | Stop reason: HOSPADM

## 2019-05-29 RX ORDER — PROPOFOL 10 MG/ML
INJECTION, EMULSION INTRAVENOUS CONTINUOUS PRN
Status: DISCONTINUED | OUTPATIENT
Start: 2019-05-29 | End: 2019-05-29

## 2019-05-29 RX ADMIN — LIDOCAINE HYDROCHLORIDE 0.1 ML: 10 INJECTION, SOLUTION EPIDURAL; INFILTRATION; INTRACAUDAL; PERINEURAL at 08:52

## 2019-05-29 RX ADMIN — SODIUM CHLORIDE, POTASSIUM CHLORIDE, SODIUM LACTATE AND CALCIUM CHLORIDE: 600; 310; 30; 20 INJECTION, SOLUTION INTRAVENOUS at 08:52

## 2019-05-29 RX ADMIN — PROPOFOL 40 MG: 10 INJECTION, EMULSION INTRAVENOUS at 09:39

## 2019-05-29 RX ADMIN — PROPOFOL 200 MCG/KG/MIN: 10 INJECTION, EMULSION INTRAVENOUS at 09:39

## 2019-05-29 ASSESSMENT — MIFFLIN-ST. JEOR: SCORE: 1260.6

## 2019-05-29 NOTE — ANESTHESIA POSTPROCEDURE EVALUATION
Patient: Lavonne Tidwell    Procedure(s):  COLONOSCOPY, FLEXIBLE, WITH LESION REMOVAL USING SNARE    Diagnosis:screening  Diagnosis Additional Information: No value filed.    Anesthesia Type:  MAC    Note:  Anesthesia Post Evaluation    Patient location during evaluation: Bedside  Patient participation: Able to fully participate in evaluation  Level of consciousness: awake  Pain management: adequate  Airway patency: patent  Cardiovascular status: acceptable  Respiratory status: acceptable  Hydration status: stable  PONV: none     Anesthetic complications: None          Last vitals:  Vitals:    05/29/19 0820   BP: 139/69   Resp: 16   Temp: 36.4  C (97.6  F)   SpO2: 97%         Electronically Signed By: ENA Maldonado CRNA  May 29, 2019  10:04 AM

## 2019-05-29 NOTE — H&P
78 year old year old female here for colonoscopy for personal history of polyps.  Last colonoscopy was 5 years ago, tubular adenoma found.  Patient denies any changes in stool caliber or blood in stool.    Patient Active Problem List   Diagnosis     Injury of sigmoid colon     Esophageal reflux     Menopausal syndrome (hot flashes)     Urinary incontinence     Atrophic vaginitis     Hyperlipidemia LDL goal <130     Advanced directives, counseling/discussion     Onychomycosis     Senile nuclear sclerosis     Status post total left knee replacement     Status post total right knee replacement     Primary localized osteoarthrosis, lower leg       Past Medical History:   Diagnosis Date     Arthritis 2012    knes and hips     History of blood transfusion 1961    Miscarriage     Ovarian cysts 1974    s/p BSO       Past Surgical History:   Procedure Laterality Date     ABDOMEN SURGERY  1973 & 1974    Ovarian cyst/Hysterectomy     APPENDECTOMY  1952     ARTHROPLASTY KNEE Left 1/6/2016    Procedure: ARTHROPLASTY KNEE;  Surgeon: Wilfredo Thompson MD;  Location: [facility]     ARTHROPLASTY KNEE Right 2/23/2017    Procedure: ARTHROPLASTY KNEE;  Surgeon: Wilfredo Thompson MD;  Location: [facility]     BIOPSY      colonoscopy/polyps     COLONOSCOPY      every 10 years     GENITOURINARY SURGERY  2008    bladder     HYSTERECTOMY, CHELSY  1974     ORTHOPEDIC SURGERY  2007 & 2009    [place]  both feet     PHACOEMULSIFICATION WITH STANDARD INTRAOCULAR LENS IMPLANT Left 8/20/2015    Procedure: PHACOEMULSIFICATION WITH STANDARD INTRAOCULAR LENS IMPLANT;  Surgeon: Fernando Jeffrey MD;  Location: [facility]     PHACOEMULSIFICATION WITH STANDARD INTRAOCULAR LENS IMPLANT Right 9/17/2015    Procedure: PHACOEMULSIFICATION WITH STANDARD INTRAOCULAR LENS IMPLANT;  Surgeon: Fernando Jeffrey MD;  Location: [facility]     SURGICAL HISTORY OF -   07/30/1998    Colonoscopy     SURGICAL HISTORY OF -   1974    Bilateral salpingoopherectomy     SURGICAL HISTORY OF -    "3/09    TOT Midurethral sling       Family History   Problem Relation Age of Onset     Cancer Mother         Ovarian     Other Cancer Mother         Ovarian     Cerebrovascular Disease Father      Heart Disease Father      C.A.D. Father      Coronary Artery Disease Father      Cancer Maternal Grandmother      Other Cancer Maternal Grandmother         Ovarian/Bone     Cerebrovascular Disease Maternal Grandfather      Colon Cancer Maternal Grandfather      Diabetes Other         Great Grandmother     Breast Cancer No family hx of        No current outpatient medications on file.       Allergies   Allergen Reactions     Codeine GI Disturbance       Pt reports that she quit smoking about 47 years ago. Her smoking use included cigarettes. She started smoking about 50 years ago. She has a 2.50 pack-year smoking history. She has never used smokeless tobacco. She reports that she drinks alcohol. She reports that she does not use drugs.    Exam:  /69   Temp 97.6  F (36.4  C) (Oral)   Resp 16   Ht 1.651 m (5' 5\")   Wt 78.5 kg (173 lb)   SpO2 97%   BMI 28.79 kg/m      Awake, Alert OX3  Lungs - CTA bilaterally  CV - RRR, no murmurs, distal pulses intact  Abd - soft, non-distended, non-tender, +BS  Extr - No cyanosis or edema    A/P 78 year old year old female in need of colonoscopy for personal history of polyps. Risks, benefits, alternatives, and complications were discussed including the possibility of perforation, bleeding, missed lesion and the patient agreed to proceed.    Yong Yeboah, DO on 5/29/2019 at 9:37 AM      "

## 2019-05-29 NOTE — ANESTHESIA CARE TRANSFER NOTE
Patient: Lavonne Tidwell    Procedure(s):  COLONOSCOPY, FLEXIBLE, WITH LESION REMOVAL USING SNARE    Diagnosis: screening  Diagnosis Additional Information: No value filed.    Anesthesia Type:   MAC     Note:  Airway :Room Air  Patient transferred to:Phase II  Handoff Report: Identifed the Patient, Identified the Reponsible Provider, Reviewed the pertinent medical history, Discussed the surgical course, Reviewed Intra-OP anesthesia mangement and issues during anesthesia, Set expectations for post-procedure period and Allowed opportunity for questions and acknowledgement of understanding      Vitals: (Last set prior to Anesthesia Care Transfer)    CRNA VITALS  5/29/2019 0933 - 5/29/2019 1003      5/29/2019             Pulse:  61    Ht Rate:  60    SpO2:  99 %                Electronically Signed By: ENA Maldonado CRNA  May 29, 2019  10:03 AM

## 2019-05-30 PROBLEM — D12.6 TUBULOVILLOUS ADENOMA POLYP OF COLON: Status: ACTIVE | Noted: 2019-05-30

## 2019-05-30 LAB — COPATH REPORT: NORMAL

## 2019-10-10 ENCOUNTER — IMMUNIZATION (OUTPATIENT)
Dept: FAMILY MEDICINE | Facility: CLINIC | Age: 79
End: 2019-10-10
Payer: MEDICARE

## 2019-10-10 DIAGNOSIS — Z23 NEED FOR PROPHYLACTIC VACCINATION AND INOCULATION AGAINST INFLUENZA: Primary | ICD-10-CM

## 2019-10-10 PROCEDURE — 90662 IIV NO PRSV INCREASED AG IM: CPT

## 2019-10-10 PROCEDURE — G0008 ADMIN INFLUENZA VIRUS VAC: HCPCS

## 2019-10-10 PROCEDURE — 99207 ZZC NO CHARGE NURSE ONLY: CPT

## 2019-11-13 ENCOUNTER — ANCILLARY PROCEDURE (OUTPATIENT)
Dept: MAMMOGRAPHY | Facility: CLINIC | Age: 79
End: 2019-11-13
Attending: FAMILY MEDICINE
Payer: MEDICARE

## 2019-11-13 DIAGNOSIS — Z12.31 VISIT FOR SCREENING MAMMOGRAM: ICD-10-CM

## 2019-11-13 PROCEDURE — 77063 BREAST TOMOSYNTHESIS BI: CPT | Mod: TC

## 2019-11-13 PROCEDURE — 77067 SCR MAMMO BI INCL CAD: CPT | Mod: TC

## 2019-11-14 ENCOUNTER — OFFICE VISIT (OUTPATIENT)
Dept: FAMILY MEDICINE | Facility: CLINIC | Age: 79
End: 2019-11-14
Payer: MEDICARE

## 2019-11-14 VITALS
BODY MASS INDEX: 28.46 KG/M2 | WEIGHT: 171 LBS | HEART RATE: 60 BPM | DIASTOLIC BLOOD PRESSURE: 56 MMHG | TEMPERATURE: 98.3 F | RESPIRATION RATE: 16 BRPM | SYSTOLIC BLOOD PRESSURE: 120 MMHG

## 2019-11-14 DIAGNOSIS — H61.22 IMPACTED CERUMEN OF LEFT EAR: ICD-10-CM

## 2019-11-14 DIAGNOSIS — H66.002 NON-RECURRENT ACUTE SUPPURATIVE OTITIS MEDIA OF LEFT EAR WITHOUT SPONTANEOUS RUPTURE OF TYMPANIC MEMBRANE: Primary | ICD-10-CM

## 2019-11-14 PROCEDURE — 69209 REMOVE IMPACTED EAR WAX UNI: CPT | Mod: LT | Performed by: NURSE PRACTITIONER

## 2019-11-14 PROCEDURE — 99213 OFFICE O/P EST LOW 20 MIN: CPT | Mod: 25 | Performed by: NURSE PRACTITIONER

## 2019-11-14 RX ORDER — AMOXICILLIN 875 MG
875 TABLET ORAL 2 TIMES DAILY
Qty: 14 TABLET | Refills: 0 | Status: SHIPPED | OUTPATIENT
Start: 2019-11-14 | End: 2019-11-21

## 2019-11-14 ASSESSMENT — ENCOUNTER SYMPTOMS
DYSURIA: 0
ACTIVITY CHANGE: 1
FEVER: 0
VOMITING: 0
FATIGUE: 1
SHORTNESS OF BREATH: 0
DIARRHEA: 0
RHINORRHEA: 1
APPETITE CHANGE: 0
WHEEZING: 0
CHILLS: 1
COUGH: 0

## 2019-11-14 NOTE — PROGRESS NOTES
SUBJECTIVE:   Lavonne Tidwell is a 79 year old female presenting with a chief complaint of   Chief Complaint   Patient presents with     Ear Problem     Started about a week ago.  Left ear.  Feels clogged/ hollow and is just bugging her        She is an established patient of [facility].    URI Adult    Onset of symptoms was 1 week ago.  Course of illness is same  Current and Associated symptoms: ear fullness/plugged and mild rhinorrhea  Treatment measures tried include none tried.  Predisposing factors include None.      Review of Systems   Constitutional: Positive for activity change, chills and fatigue. Negative for appetite change and fever.   HENT: Positive for ear pain and rhinorrhea.    Respiratory: Negative for cough, shortness of breath and wheezing.    Gastrointestinal: Negative for diarrhea and vomiting.   Genitourinary: Negative for dysuria.       Past Medical History:   Diagnosis Date     Arthritis 2012    knes and hips     History of blood transfusion 1961    Miscarriage     Ovarian cysts 1974    s/p BSO     Family History   Problem Relation Age of Onset     Cancer Mother         Ovarian     Other Cancer Mother         Ovarian     Cerebrovascular Disease Father      Heart Disease Father      C.A.D. Father      Coronary Artery Disease Father      Cancer Maternal Grandmother      Other Cancer Maternal Grandmother         Ovarian/Bone     Cerebrovascular Disease Maternal Grandfather      Colon Cancer Maternal Grandfather      Diabetes Other         Great Grandmother     Breast Cancer No family hx of      Current Outpatient Medications   Medication Sig Dispense Refill     Acetaminophen (TYLENOL PO) Take 1,000 mg by mouth 2 times daily as needed for mild pain or fever       amoxicillin (AMOXIL) 500 MG tablet Take 2,000 mg by mouth       amoxicillin (AMOXIL) 875 MG tablet Take 1 tablet (875 mg) by mouth 2 times daily for 7 days 14 tablet 0     Biotin 10 MG CAPS        CALCIUM + D OR 2 TABLET DAILY        Docusate Calcium (STOOL SOFTENER PO)        fluticasone (FLONASE) 50 MCG/ACT nasal spray USE 2 SPRAYS INTO BOTH NOSTRILS DAILY AS NEEDED 16 g 3     garlic 150 MG TABS tablet Take 150 mg by mouth daily       grape seed 50 MG CAPS Take 50 mg by mouth daily       methylcellulose (FIBER THERAPY) 500 MG TABS tablet Take 500 mg by mouth daily       Multiple Vitamins-Minerals (CENTRUM SILVER) per tablet Take 1 tablet by mouth daily       VITAMIN E COMPLEX PO        Zinc Sulfate (ZINC 15 PO)        loratadine (CLARITIN) 10 MG tablet Take 10 mg by mouth daily       Social History     Tobacco Use     Smoking status: Former Smoker     Packs/day: 0.50     Years: 5.00     Pack years: 2.50     Types: Cigarettes     Start date: 1968     Last attempt to quit: 2/15/1972     Years since quittin.7     Smokeless tobacco: Never Used     Tobacco comment: stopped in her 20's   Substance Use Topics     Alcohol use: Yes     Alcohol/week: 0.0 standard drinks     Comment: Occasional       OBJECTIVE  /56 (BP Location: Right arm, Patient Position: Chair, Cuff Size: Adult Regular)   Pulse 60   Temp 98.3  F (36.8  C) (Tympanic)   Resp 16   Wt 77.6 kg (171 lb)   BMI 28.46 kg/m      Physical Exam  Constitutional:       General: She is not in acute distress.     Appearance: She is not ill-appearing or toxic-appearing.   HENT:      Right Ear: Tympanic membrane, ear canal and external ear normal. There is no impacted cerumen.      Left Ear: There is impacted cerumen.      Ears:      Comments: LEFT TM not visualized due to cerumen. Irrigated by MA    POST Irrigation: LEFT TM with fluid, erythema, landmarks not visualized     Nose: Congestion present.      Mouth/Throat:      Mouth: Mucous membranes are moist.      Pharynx: No oropharyngeal exudate or posterior oropharyngeal erythema.   Neck:      Musculoskeletal: Neck supple. No muscular tenderness.   Cardiovascular:      Rate and Rhythm: Normal rate and regular rhythm.      Pulses:  Normal pulses.      Heart sounds: Normal heart sounds. No murmur. No friction rub. No gallop.    Pulmonary:      Effort: Pulmonary effort is normal.      Breath sounds: Normal breath sounds. No wheezing or rhonchi.   Lymphadenopathy:      Cervical: Cervical adenopathy present.   Skin:     General: Skin is warm.      Capillary Refill: Capillary refill takes less than 2 seconds.      Findings: No rash.   Neurological:      General: No focal deficit present.      Mental Status: She is alert and oriented to person, place, and time. Mental status is at baseline.   Psychiatric:         Mood and Affect: Mood normal.         Behavior: Behavior normal.         Labs:  No results found for this or any previous visit (from the past 24 hour(s)).    ASSESSMENT:    ICD-10-CM    1. Non-recurrent acute suppurative otitis media of left ear without spontaneous rupture of tympanic membrane H66.002 amoxicillin (AMOXIL) 875 MG tablet   2. Impacted cerumen of left ear H61.22 REMOVE IMPACTED CERUMEN        Medical Decision Making:    Differential Diagnosis:  URI Adult/Peds:  Acute left otitis media, Viral upper respiratory illness and cerumen impaction    Serious Comorbid Conditions:  Adult:  None    PLAN: Discussed ear infection diagnosis with cerumen irrigation. Reviewed plan and treatment with antibiotics, advised completion of full course and follow up if symptoms do not improve.     Symptomatic cares with OTC tylenol or NSAIDs as needed for pain or fever over next 3 days. Follow up with PCP in 1 week, as needed. Patient agreed to the plan of care.     ENA Ray, CNP    Patient Instructions     Patient Education     Otitis Media (Middle-Ear Infection) in Adults  Otitis media is another name for a middle-ear infection. It means an infection behind your eardrum. This kind of ear infection can happen after any condition that keeps fluid from draining from the middle ear. These conditions include allergies, a cold, a sore  throat, or a respiratory infection.  Middle-ear infections are common in children, but they can also happen in adults. An ear infection in an adult may mean a more serious problem than in a child. So you may need additional tests. If you have an ear infection, you should see your health care provider for treatment.  What are the types of middle-ear infections?  Infections can affect the middle ear in several ways. They are:    Acute otitis media. This middle-ear infection occurs suddenly. It causes swelling and redness. Fluid and mucus become trapped inside the ear. You can have a fever and ear pain.    Otitis media with effusion. Fluid (effusion) and mucus build up in the middle ear after the infection goes away. You may feel like your middle ear is full. This can continue for months and may affect your hearing.    Chronic otitis media with effusion. Fluid (effusion) remains in the middle ear for a long time. Or it builds up again and again, even though there is no infection. This type of middle-ear infection may be hard to treat. It may also affect your hearing.  Who is more likely to get a middle-ear infection?  You are more likely to get an ear infection if you:    Smoke or are around someone who smokes    Have seasonal or year-round allergy symptoms    Have a cold or other upper respiratory infection  What causes a middle-ear infection?  The middle ear connects to the throat by a canal called the eustachian tube. This tube helps even out the pressure between the outer ear and the inner ear. A cold or allergy can irritate the tube or cause the area around it to swell. This can keep fluid from draining from the middle ear. The fluid builds up behind the eardrum. Bacteria and viruses can grow in this fluid. The bacteria and viruses cause the middle-ear infection.  What are the symptoms of a middle-ear infection?  Common symptoms of a middle-ear infection in adults are:    Pain in 1 or both ears    Drainage from  the ear    Muffled hearing    Sore throat   You may also have a fever. Rarely, your balance can be affected.  These symptoms may be the same as for other conditions. It s important to talk with your health care provider if you think you have a middle-ear infection. If you have a high fever, severe pain behind your ear, or paralysis in your face, see your provider as soon as you can.  How is a middle-ear infection diagnosed?  Your health care provider will take a medical history and do a physical exam. He or she will look at the outer ear and eardrum with an otoscope. The otoscope is a lighted tool that lets your provider see inside the ear. A pneumatic otoscope blows a puff of air into the ear to check how well your eardrum moves. If you eardrum doesn t move well, it may mean you have fluid behind it.  Your provider may also do a test called tympanometry. This test tells how well the middle ear is working. It can find any changes in pressure in the middle ear. Your provider may test your hearing with a tuning fork.  How is a middle-ear infection treated?  A middle-ear infection may be treated with:    Antibiotics, taken by mouth or as ear drops    Medication for pain    Decongestants, antihistamines, or nasal steroids  Your health care provider may also have you try autoinsufflation. This helps adjust the air pressure in your ear. For this, you pinch your nose and gently exhale. This forces air back through the eustachian tube.  The exact treatment for your ear infection will depend on the type of infection you have. In general, if your symptoms don t get better in 48 to 72 hours, contact your health care provider.  Middle-ear infections can cause long-term problems if not treated. They can lead to:    Infection in other parts of the head    Permanent hearing loss    Paralysis of a nerve in your face  If you have a middle-ear infection that doesn t get better, you may need to see an ear, nose, and throat specialist  (otolaryngologist). You may need a CT scan or MRI to check for head and neck cancer.  Ear tubes  Sometimes fluid stays in the middle ear even after you take antibiotics and the infection goes away. In this case, your health care provider may suggest that a small tube be placed in your ear. The tube is put at the opening of the eardrum. The tube keeps fluid from building up and relieves pressure in the middle ear. It can also help you hear better. This surgery is called myringotomy. It is not often done in adults.  The tubes usually fall out on their own after 6 months to a year.    [id number] [employer]. [address] [place] [address], [place] [address]. All rights reserved. This information is not intended as a substitute for professional medical care. Always follow your healthcare professional's instructions.

## 2019-11-14 NOTE — PATIENT INSTRUCTIONS
Patient Education     Otitis Media (Middle-Ear Infection) in Adults  Otitis media is another name for a middle-ear infection. It means an infection behind your eardrum. This kind of ear infection can happen after any condition that keeps fluid from draining from the middle ear. These conditions include allergies, a cold, a sore throat, or a respiratory infection.  Middle-ear infections are common in children, but they can also happen in adults. An ear infection in an adult may mean a more serious problem than in a child. So you may need additional tests. If you have an ear infection, you should see your health care provider for treatment.  What are the types of middle-ear infections?  Infections can affect the middle ear in several ways. They are:    Acute otitis media. This middle-ear infection occurs suddenly. It causes swelling and redness. Fluid and mucus become trapped inside the ear. You can have a fever and ear pain.    Otitis media with effusion. Fluid (effusion) and mucus build up in the middle ear after the infection goes away. You may feel like your middle ear is full. This can continue for months and may affect your hearing.    Chronic otitis media with effusion. Fluid (effusion) remains in the middle ear for a long time. Or it builds up again and again, even though there is no infection. This type of middle-ear infection may be hard to treat. It may also affect your hearing.  Who is more likely to get a middle-ear infection?  You are more likely to get an ear infection if you:    Smoke or are around someone who smokes    Have seasonal or year-round allergy symptoms    Have a cold or other upper respiratory infection  What causes a middle-ear infection?  The middle ear connects to the throat by a canal called the eustachian tube. This tube helps even out the pressure between the outer ear and the inner ear. A cold or allergy can irritate the tube or cause the area around it to swell. This can keep  fluid from draining from the middle ear. The fluid builds up behind the eardrum. Bacteria and viruses can grow in this fluid. The bacteria and viruses cause the middle-ear infection.  What are the symptoms of a middle-ear infection?  Common symptoms of a middle-ear infection in adults are:    Pain in 1 or both ears    Drainage from the ear    Muffled hearing    Sore throat   You may also have a fever. Rarely, your balance can be affected.  These symptoms may be the same as for other conditions. It s important to talk with your health care provider if you think you have a middle-ear infection. If you have a high fever, severe pain behind your ear, or paralysis in your face, see your provider as soon as you can.  How is a middle-ear infection diagnosed?  Your health care provider will take a medical history and do a physical exam. He or she will look at the outer ear and eardrum with an otoscope. The otoscope is a lighted tool that lets your provider see inside the ear. A pneumatic otoscope blows a puff of air into the ear to check how well your eardrum moves. If you eardrum doesn t move well, it may mean you have fluid behind it.  Your provider may also do a test called tympanometry. This test tells how well the middle ear is working. It can find any changes in pressure in the middle ear. Your provider may test your hearing with a tuning fork.  How is a middle-ear infection treated?  A middle-ear infection may be treated with:    Antibiotics, taken by mouth or as ear drops    Medication for pain    Decongestants, antihistamines, or nasal steroids  Your health care provider may also have you try autoinsufflation. This helps adjust the air pressure in your ear. For this, you pinch your nose and gently exhale. This forces air back through the eustachian tube.  The exact treatment for your ear infection will depend on the type of infection you have. In general, if your symptoms don t get better in 48 to 72 hours, contact  your health care provider.  Middle-ear infections can cause long-term problems if not treated. They can lead to:    Infection in other parts of the head    Permanent hearing loss    Paralysis of a nerve in your face  If you have a middle-ear infection that doesn t get better, you may need to see an ear, nose, and throat specialist (otolaryngologist). You may need a CT scan or MRI to check for head and neck cancer.  Ear tubes  Sometimes fluid stays in the middle ear even after you take antibiotics and the infection goes away. In this case, your health care provider may suggest that a small tube be placed in your ear. The tube is put at the opening of the eardrum. The tube keeps fluid from building up and relieves pressure in the middle ear. It can also help you hear better. This surgery is called myringotomy. It is not often done in adults.  The tubes usually fall out on their own after 6 months to a year.    [id number] [employer]. [address] [place] [address], [place] [address]. All rights reserved. This information is not intended as a substitute for professional medical care. Always follow your healthcare professional's instructions.

## 2019-11-14 NOTE — NURSING NOTE
"Chief Complaint   Patient presents with     Ear Problem     Started about a week ago.  Left ear.  Feels clogged/ hollow and is just bugging her        Initial /56 (BP Location: Right arm, Patient Position: Chair, Cuff Size: Adult Regular)   Pulse 60   Temp 98.3  F (36.8  C) (Tympanic)   Resp 16   Wt 77.6 kg (171 lb)   BMI 28.46 kg/m   Estimated body mass index is 28.46 kg/m  as calculated from the following:    Height as of 5/29/19: 1.651 m (5' 5\").    Weight as of this encounter: 77.6 kg (171 lb).    Patient presents to the clinic using No DME    Health Maintenance that is potentially due pending provider review:  NONE    n/a    Is there anyone who you would like to be able to receive your results? No  If yes have patient fill out MALLORIE  Jinny Spaulding M.A.          "

## 2019-11-21 ENCOUNTER — OFFICE VISIT (OUTPATIENT)
Dept: FAMILY MEDICINE | Facility: CLINIC | Age: 79
End: 2019-11-21
Payer: MEDICARE

## 2019-11-21 VITALS
RESPIRATION RATE: 16 BRPM | DIASTOLIC BLOOD PRESSURE: 68 MMHG | BODY MASS INDEX: 28.49 KG/M2 | SYSTOLIC BLOOD PRESSURE: 136 MMHG | OXYGEN SATURATION: 98 % | HEIGHT: 65 IN | WEIGHT: 171 LBS | HEART RATE: 68 BPM | TEMPERATURE: 97.8 F

## 2019-11-21 DIAGNOSIS — H66.005 RECURRENT ACUTE SUPPURATIVE OTITIS MEDIA WITHOUT SPONTANEOUS RUPTURE OF LEFT TYMPANIC MEMBRANE: Primary | ICD-10-CM

## 2019-11-21 PROCEDURE — 99213 OFFICE O/P EST LOW 20 MIN: CPT | Performed by: PHYSICIAN ASSISTANT

## 2019-11-21 ASSESSMENT — MIFFLIN-ST. JEOR: SCORE: 1246.53

## 2019-11-21 NOTE — PROGRESS NOTES
"Subjective     Lavonne Tidwell is a 79 year old female who presents to clinic today for the following health issues:    HPI   Ear SYMPTOMS      Duration: Recheck 11/14/2019    Description  nasal congestion and ear pain left    Severity: mild to moderate    Accompanying signs and symptoms: None    History (predisposing factors):  Non-recurrent acute suppurative otitis media of left ear without spontaneous rupture of tympanic membrane +1 more     Precipitating or alleviating factors: None    Therapies tried and outcome:  Took her last dose of Amoxicillin 875 twice daily for 7 days this morning       About 2 wks of symptoms now.  Had been just trouble hearing and slight pressure but now last few days is actually getting more painful, when it hadn't been prior.  Still trouble hearing.    BP Readings from Last 3 Encounters:   11/21/19 136/68   11/14/19 120/56   05/29/19 124/75    Wt Readings from Last 3 Encounters:   11/21/19 77.6 kg (171 lb)   11/14/19 77.6 kg (171 lb)   05/29/19 78.5 kg (173 lb)           Reviewed and updated as needed this visit by Provider  Tobacco  Allergies  Meds  Problems  Med Hx  Surg Hx  Fam Hx         Review of Systems   ROS COMP: Constitutional, HEENT, cardiovascular, pulmonary, neuro systems are negative, except as otherwise noted.      Objective    /68 (BP Location: Right arm, Patient Position: Sitting, Cuff Size: Adult Large)   Pulse 68   Temp 97.8  F (36.6  C) (Tympanic)   Resp 16   Ht 1.651 m (5' 5\")   Wt 77.6 kg (171 lb)   SpO2 98%   BMI 28.46 kg/m    Body mass index is 28.46 kg/m .  Physical Exam   GENERAL: healthy, alert and no distress  HENT: ear canals and TM's normal R, L TM dark red purulent          Assessment & Plan       ICD-10-CM    1. Recurrent acute suppurative otitis media without spontaneous rupture of left tympanic membrane H66.005 amoxicillin-clavulanate (AUGMENTIN) 875-125 MG tablet     Patient Instructions   Different antibiotic  Be seen if not " improving/resolving.  Expect pain to be improving within a few days.      Return if symptoms worsen or fail to improve.    Alisa Phillips PA-C  [facility]

## 2019-11-21 NOTE — PATIENT INSTRUCTIONS
Different antibiotic  Be seen if not improving/resolving.  Expect pain to be improving within a few days.

## 2019-11-21 NOTE — NURSING NOTE
"Chief Complaint   Patient presents with     Ear Problem       Initial /68 (BP Location: Right arm, Patient Position: Sitting, Cuff Size: Adult Large)   Pulse 68   Temp 97.8  F (36.6  C) (Tympanic)   Resp 16   Ht 1.651 m (5' 5\")   Wt 77.6 kg (171 lb)   SpO2 98%   BMI 28.46 kg/m   Estimated body mass index is 28.46 kg/m  as calculated from the following:    Height as of this encounter: 1.651 m (5' 5\").    Weight as of this encounter: 77.6 kg (171 lb).    Patient presents to the clinic using No DME    Health Maintenance that is potentially due pending provider review:  NONE    n/a    Is there anyone who you would like to be able to receive your results? No  If yes have patient fill out MALLORIE    "

## 2019-12-31 ENCOUNTER — OFFICE VISIT (OUTPATIENT)
Dept: FAMILY MEDICINE | Facility: CLINIC | Age: 79
End: 2019-12-31
Payer: MEDICARE

## 2019-12-31 VITALS
TEMPERATURE: 97.7 F | DIASTOLIC BLOOD PRESSURE: 68 MMHG | HEART RATE: 66 BPM | SYSTOLIC BLOOD PRESSURE: 120 MMHG | WEIGHT: 171 LBS | BODY MASS INDEX: 28.49 KG/M2 | HEIGHT: 65 IN

## 2019-12-31 DIAGNOSIS — H66.005 RECURRENT ACUTE SUPPURATIVE OTITIS MEDIA WITHOUT SPONTANEOUS RUPTURE OF LEFT TYMPANIC MEMBRANE: Primary | ICD-10-CM

## 2019-12-31 PROCEDURE — 99213 OFFICE O/P EST LOW 20 MIN: CPT | Performed by: FAMILY MEDICINE

## 2019-12-31 RX ORDER — CEFUROXIME AXETIL 250 MG/1
250 TABLET ORAL 2 TIMES DAILY
Qty: 28 TABLET | Refills: 0 | Status: SHIPPED | OUTPATIENT
Start: 2019-12-31 | End: 2020-01-17

## 2019-12-31 ASSESSMENT — MIFFLIN-ST. JEOR: SCORE: 1246.53

## 2019-12-31 ASSESSMENT — PAIN SCALES - GENERAL: PAINLEVEL: NO PAIN (0)

## 2019-12-31 NOTE — PROGRESS NOTES
"Subjective     Lavonne Tidwell is a 79 year old female who presents to clinic today for the following health issues:    HPI   ENT Symptoms             Symptoms: cc Present Absent Comment   Fever/Chills   x    Fatigue   x    Muscle Aches   x    Eye Irritation   x    Sneezing   x occ   Nasal Shahriar/Drg  x     Sinus Pressure/Pain   x    Loss of smell       Dental pain       Sore Throat   x    Swollen Glands   x    Ear Pain/Fullness x x  Pulse sensation and pressure in ear   Cough   x    Wheeze   x    Chest Pain   x    Shortness of breath   x    Rash       Other         Symptom duration: Almost 2 months   Symptom severity:  mild    Treatments tried:  Amoxil and Augmentin   Contacts:       Pt treated with amox for OM in mid novemver seemed to get some better but then sxs are back and she is seen a few weeks later and on augmentin for 2 weeks again sxs better but now back   Can not hear well out of the ear when on the meds she is better but never normal     No fever and no URI sxs she has pain and fullness in the left ear           Reviewed and updated as needed this visit by Provider  Tobacco  Allergies  Meds  Problems  Med Hx  Surg Hx  Fam Hx         Review of Systems   ROS COMP: Constitutional, HEENT, cardiovascular, pulmonary, gi and gu systems are negative, except as otherwise noted.      Objective    /68   Pulse 66   Temp 97.7  F (36.5  C) (Tympanic)   Ht 1.651 m (5' 5\")   Wt 77.6 kg (171 lb)   BMI 28.46 kg/m    Body mass index is 28.46 kg/m .  Physical Exam   GENERAL APPEARANCE: healthy, alert and no distress  EYES: Eyes grossly normal to inspection, PERRL and conjunctivae and sclerae normal  HENT: nose and mouth without ulcers or lesions and TM congested/bulging left  NECK: no adenopathy, no asymmetry, masses, or scars and thyroid normal to palpation  RESP: lungs clear to auscultation - no rales, rhonchi or wheezes    Diagnostic Test Results:  Labs reviewed in Epic        Assessment & Plan     1. " "Recurrent acute suppurative otitis media without spontaneous rupture of left tympanic membrane  This may be something else causing the inflammation needs ENT evaluation   - cefuroxime (CEFTIN) 250 MG tablet; Take 1 tablet (250 mg) by mouth 2 times daily  Dispense: 28 tablet; Refill: 0  - OTOLARYNGOLOGY REFERRAL     BMI:   Estimated body mass index is 28.46 kg/m  as calculated from the following:    Height as of this encounter: 1.651 m (5' 5\").    Weight as of this encounter: 77.6 kg (171 lb).   Weight management plan: Discussed healthy diet and exercise guidelines        Patient Instructions   Start new antibiotic for infection     Set up ENT appt       Return in about 1 week (around 1/7/2020), or if symptoms worsen or fail to improve.      Risks, benefits, side effects and rationale for treatment plan fully discussed with the patient and understanding expressed.     Elvira Kowalski MD  [facility]      "

## 2020-01-16 NOTE — PROGRESS NOTES
Chief Complaint   Patient presents with     Ear Problem     has had 3 ear infections in left ear since 11/2019- on 3 courses of oral antibiotics with minimal relief- today has pressure in left ear and with yawning it pops - echo/audio     History of Present Illness  Lavonne Tidwell is a 79 year old female who presents to me today for ear evaluation.  I am seeing this patient in consultation for recurrent acute otitis media without spontaneous rupture of the left tympanic membrane at the request of the provider Dr. Kowalski.  The patient reports hearing loss or a plugged feeling in left ear.  It has been present and noticeable for approximately 2 months since November.  Her symptoms did occur around the time of an upper respiratory/sinus infection.  She was diagnosed and treated with acute otitis media several times and placed on antibiotics.  The symptoms have not resolved with treatment.  She had a fairly significant discomfort in the left ear and then decreased hearing and fullness.  She has had some popping and crackling in that ear.  It has been slowly improving but is still present.  There is no history of recent head trauma, chronic ear disease or ear surgery. The patient denies vertigo, otorrhea, or otalgia.     Past Medical History  Patient Active Problem List   Diagnosis     Injury of sigmoid colon     Esophageal reflux     Menopausal syndrome (hot flashes)     Urinary incontinence     Atrophic vaginitis     Hyperlipidemia LDL goal <130     Advanced directives, counseling/discussion     Onychomycosis     Senile nuclear sclerosis     Status post total left knee replacement     Status post total right knee replacement     Primary localized osteoarthrosis, lower leg     Tubulovillous adenoma polyp of colon     Current Medications     Current Outpatient Medications:      Acetaminophen (TYLENOL PO), Take 1,000 mg by mouth 2 times daily as needed for mild pain or fever, Disp: , Rfl:      Biotin 10 MG CAPS, , Disp:  , Rfl:      CALCIUM + D OR, 2 TABLET DAILY, Disp: , Rfl:      Docusate Calcium (STOOL SOFTENER PO), , Disp: , Rfl:      garlic 150 MG TABS tablet, Take 150 mg by mouth daily, Disp: , Rfl:      grape seed 50 MG CAPS, Take 50 mg by mouth daily, Disp: , Rfl:      loratadine (CLARITIN) 10 MG tablet, Take 10 mg by mouth daily, Disp: , Rfl:      methylcellulose (FIBER THERAPY) 500 MG TABS tablet, Take 500 mg by mouth daily, Disp: , Rfl:      Multiple Vitamins-Minerals (CENTRUM SILVER) per tablet, Take 1 tablet by mouth daily, Disp: , Rfl:      VITAMIN E COMPLEX PO, , Disp: , Rfl:      Zinc Sulfate (ZINC 15 PO), , Disp: , Rfl:      fluticasone (FLONASE) 50 MCG/ACT nasal spray, USE 2 SPRAYS INTO BOTH NOSTRILS DAILY AS NEEDED (Patient not taking: Reported on 2020), Disp: 16 g, Rfl: 3    Allergies  Allergies   Allergen Reactions     Codeine GI Disturbance       Social History   Social History     Socioeconomic History     Marital status:      Spouse name: Not on file     Number of children: Not on file     Years of education: Not on file     Highest education level: Not on file   Occupational History     Not on file   Social Needs     Financial resource strain: Not on file     Food insecurity:     Worry: Not on file     Inability: Not on file     Transportation needs:     Medical: Not on file     Non-medical: Not on file   Tobacco Use     Smoking status: Former Smoker     Packs/day: 0.50     Years: 5.00     Pack years: 2.50     Types: Cigarettes     Start date: 1968     Last attempt to quit: 2/15/1972     Years since quittin.9     Smokeless tobacco: Never Used     Tobacco comment: stopped in her 20's   Substance and Sexual Activity     Alcohol use: Yes     Alcohol/week: 0.0 standard drinks     Comment: Occasional     Drug use: No     Sexual activity: Never   Lifestyle     Physical activity:     Days per week: Not on file     Minutes per session: Not on file     Stress: Not on file   Relationships      Social connections:     Talks on phone: Not on file     Gets together: Not on file     Attends [employer] service: Not on file     Active member of club or organization: Not on file     Attends meetings of clubs or organizations: Not on file     Relationship status: Not on file     Intimate partner violence:     Fear of current or ex partner: Not on file     Emotionally abused: Not on file     Physically abused: Not on file     Forced sexual activity: Not on file   Other Topics Concern     Parent/sibling w/ CABG, MI or angioplasty before 65F 55M? Yes   Social History Narrative     Not on file       Family History  Family History   Problem Relation Age of Onset     Cancer Mother         Ovarian     Other Cancer Mother         Ovarian     Cerebrovascular Disease Father      Heart Disease Father      C.A.D. Father      Coronary Artery Disease Father      Cancer Maternal Grandmother      Other Cancer Maternal Grandmother         Ovarian/Bone     Cerebrovascular Disease Maternal Grandfather      Colon Cancer Maternal Grandfather      Diabetes Other         Great Grandmother     Breast Cancer No family hx of        Review of Systems  As per HPI and PMHx, otherwise 10+ comprehensive system review is negative.    Physical Exam  /51 (BP Location: Right arm, Patient Position: Chair, Cuff Size: Adult Regular)   Pulse 63   Temp 98  F (36.7  C) (Oral)   Wt 77.6 kg (171 lb)   BMI 28.46 kg/m    GENERAL: Patient is a pleasant, cooperative 79 year old female in no acute distress.  HEAD: Normocephalic, atraumatic.  Hair and scalp are normal.  EYES: Pupils are equal, round, reactive to light and accommodation.  Extraocular movements are intact.  The sclera nonicteric without injection.  The extraocular structures are normal.  EARS: See below.  NOSE: Nares are patent.  Nasal mucosa is pink and moist.  Negative anterior rhinoscopy.  NEUROLOGIC: Cranial nerves II through XII are grossly intact.  Voice is strong.  Patient is  House-Brackman I/VI bilaterally.  CARDIOVASCULAR: Extremities are warm and well-perfused.  No significant peripheral edema.  RESPIRATORY: Patient has nonlabored breathing without cough, wheeze, stridor.  PSYCHIATRIC: Patient is alert and oriented.  Mood and affect appear normal.  SKIN: Warm and dry.  No scalp, face, or neck lesions noted.    Physical Exam and Procedure    EARS: Normal shape and symmetry.  No tenderness when palpating the mastoid or tragal areas bilaterally.  The ears were then examined under the otic binocular microscope to perform cerumen removal.  Otoscopic exam on the right reveals impacted cerumen down to the level of the tympanic membrane.  The cerumen was removed with an alligator forceps.  The right tympanic membrane was round, intact without evidence of effusion.  No retraction, granulation, drainage, or evidence of cholesteatoma.      Attention was turned to the left ear.  Otoscopic exam on the left reveals impacted cerumen down to the level of the tympanic membrane.  There is a thick crust overlying the tympanic membrane and deep into the anterior sulcus.  I had to place a lidocaine soaked cotton overlying the tympanic membrane to soften up the crust.  I was unable to remove the cerumen using an alligator forceps.  The left tympanic membrane was round and intact.  I did not see karine evidence of middle ear effusion. No retraction, granulation, drainage, or evidence of cholesteatoma.      Audiogram  The patient underwent an audiogram performed today.  My review of the audiogram shows normal hearing sloping to mild high-frequency sensorineural hearing loss in the right ear and mild sloping to moderately severe mixed hearing loss in the left ear.  Pure-tone average is 17 dB on the right and 30 dB on the left.  Speech reception threshhold is 15 dB on the right and 25 dB on the left.  The patient had 100% word recognition on the right and 100% word recognition on the left.  The patient had a  type A tympanogram on the right and either a A shallow or low volume type B tympanogram on the left.     Assessment and Plan     ICD-10-CM    1. Impacted cerumen of left ear H61.22 AUDIOLOGY ADULT REFERRAL     Remove Cerumen   2. Mixed conductive and sensorineural hearing loss of left ear with restricted hearing of right ear H90.A32 AUDIOLOGY ADULT REFERRAL     Remove Cerumen   3. Sensorineural hearing loss (SNHL) of right ear with restricted hearing of left ear H90.A21 AUDIOLOGY ADULT REFERRAL     Remove Cerumen     It was my pleasure seeing Lavonne Tidwell today in clinic.  The patient presents to me today with a mixed hearing loss in the left ear.  She had a significant amount of cerumen overlying the left tympanic membrane that was successfully removed today in office.  I do think this is in large part causing her symptoms.  We will have the patient return in 3 to 4 weeks with a hearing test.  She knows to contact me sooner with problems or concerns.    Rob Harrsi MD  Department of Otolarygology-Head and Neck Surgery  [facility]

## 2020-01-17 ENCOUNTER — OFFICE VISIT (OUTPATIENT)
Dept: AUDIOLOGY | Facility: CLINIC | Age: 80
End: 2020-01-17
Payer: MEDICARE

## 2020-01-17 ENCOUNTER — OFFICE VISIT (OUTPATIENT)
Dept: OTOLARYNGOLOGY | Facility: CLINIC | Age: 80
End: 2020-01-17
Payer: MEDICARE

## 2020-01-17 VITALS
HEART RATE: 63 BPM | SYSTOLIC BLOOD PRESSURE: 107 MMHG | BODY MASS INDEX: 28.46 KG/M2 | WEIGHT: 171 LBS | DIASTOLIC BLOOD PRESSURE: 51 MMHG | TEMPERATURE: 98 F

## 2020-01-17 DIAGNOSIS — H90.A21 SENSORINEURAL HEARING LOSS (SNHL) OF RIGHT EAR WITH RESTRICTED HEARING OF LEFT EAR: ICD-10-CM

## 2020-01-17 DIAGNOSIS — H90.A32 MIXED CONDUCTIVE AND SENSORINEURAL HEARING LOSS OF LEFT EAR WITH RESTRICTED HEARING OF RIGHT EAR: Primary | ICD-10-CM

## 2020-01-17 DIAGNOSIS — H61.22 IMPACTED CERUMEN OF LEFT EAR: Primary | ICD-10-CM

## 2020-01-17 DIAGNOSIS — H90.A32 MIXED CONDUCTIVE AND SENSORINEURAL HEARING LOSS OF LEFT EAR WITH RESTRICTED HEARING OF RIGHT EAR: ICD-10-CM

## 2020-01-17 PROCEDURE — 99213 OFFICE O/P EST LOW 20 MIN: CPT | Mod: 25 | Performed by: OTOLARYNGOLOGY

## 2020-01-17 PROCEDURE — 92504 EAR MICROSCOPY EXAMINATION: CPT | Performed by: OTOLARYNGOLOGY

## 2020-01-17 PROCEDURE — 99207 ZZC NO CHARGE LOS: CPT | Performed by: AUDIOLOGIST

## 2020-01-17 ASSESSMENT — PAIN SCALES - GENERAL: PAINLEVEL: NO PAIN (0)

## 2020-01-17 NOTE — PROGRESS NOTES
AUDIOLOGY REPORT    SUMMARY: Audiology visit completed. See audiogram for results.      RECOMMENDATIONS: Follow-up with ENT.    Heri Rogers.  Clinical Audiologist, [place] #[id number]

## 2020-01-17 NOTE — LETTER
1/17/2020         RE: Lavonne Tidwell  [address] [facility] [id number]        Dear Colleague,    Thank you for referring your patient, Lavonne Tidwell, to the [facility]. Please see a copy of my visit note below.    Chief Complaint   Patient presents with     Ear Problem     has had 3 ear infections in left ear since 11/2019- on 3 courses of oral antibiotics with minimal relief- today has pressure in left ear and with yawning it pops - echo/audio     History of Present Illness  Lavonne Tidwell is a 79 year old female who presents to me today for ear evaluation.  I am seeing this patient in consultation for recurrent acute otitis media without spontaneous rupture of the left tympanic membrane at the request of the provider Dr. Kowalski.  The patient reports hearing loss or a plugged feeling in left ear.  It has been present and noticeable for approximately 2 months since November.  Her symptoms did occur around the time of an upper respiratory/sinus infection.  She was diagnosed and treated with acute otitis media several times and placed on antibiotics.  The symptoms have not resolved with treatment.  She had a fairly significant discomfort in the left ear and then decreased hearing and fullness.  She has had some popping and crackling in that ear.  It has been slowly improving but is still present.  There is no history of recent head trauma, chronic ear disease or ear surgery. The patient denies vertigo, otorrhea, or otalgia.     Past Medical History  Patient Active Problem List   Diagnosis     Injury of sigmoid colon     Esophageal reflux     Menopausal syndrome (hot flashes)     Urinary incontinence     Atrophic vaginitis     Hyperlipidemia LDL goal <130     Advanced directives, counseling/discussion     Onychomycosis     Senile nuclear sclerosis     Status post total left knee replacement     Status post total right knee replacement     Primary localized osteoarthrosis, lower leg      Tubulovillous adenoma polyp of colon     Current Medications     Current Outpatient Medications:      Acetaminophen (TYLENOL PO), Take 1,000 mg by mouth 2 times daily as needed for mild pain or fever, Disp: , Rfl:      Biotin 10 MG CAPS, , Disp: , Rfl:      CALCIUM + D OR, 2 TABLET DAILY, Disp: , Rfl:      Docusate Calcium (STOOL SOFTENER PO), , Disp: , Rfl:      garlic 150 MG TABS tablet, Take 150 mg by mouth daily, Disp: , Rfl:      grape seed 50 MG CAPS, Take 50 mg by mouth daily, Disp: , Rfl:      loratadine (CLARITIN) 10 MG tablet, Take 10 mg by mouth daily, Disp: , Rfl:      methylcellulose (FIBER THERAPY) 500 MG TABS tablet, Take 500 mg by mouth daily, Disp: , Rfl:      Multiple Vitamins-Minerals (CENTRUM SILVER) per tablet, Take 1 tablet by mouth daily, Disp: , Rfl:      VITAMIN E COMPLEX PO, , Disp: , Rfl:      Zinc Sulfate (ZINC 15 PO), , Disp: , Rfl:      fluticasone (FLONASE) 50 MCG/ACT nasal spray, USE 2 SPRAYS INTO BOTH NOSTRILS DAILY AS NEEDED (Patient not taking: Reported on 2020), Disp: 16 g, Rfl: 3    Allergies  Allergies   Allergen Reactions     Codeine GI Disturbance       Social History   Social History     Socioeconomic History     Marital status:      Spouse name: Not on file     Number of children: Not on file     Years of education: Not on file     Highest education level: Not on file   Occupational History     Not on file   Social Needs     Financial resource strain: Not on file     Food insecurity:     Worry: Not on file     Inability: Not on file     Transportation needs:     Medical: Not on file     Non-medical: Not on file   Tobacco Use     Smoking status: Former Smoker     Packs/day: 0.50     Years: 5.00     Pack years: 2.50     Types: Cigarettes     Start date: 1968     Last attempt to quit: 2/15/1972     Years since quittin.9     Smokeless tobacco: Never Used     Tobacco comment: stopped in her 20's   Substance and Sexual Activity     Alcohol use: Yes      Alcohol/week: 0.0 standard drinks     Comment: Occasional     Drug use: No     Sexual activity: Never   Lifestyle     Physical activity:     Days per week: Not on file     Minutes per session: Not on file     Stress: Not on file   Relationships     Social connections:     Talks on phone: Not on file     Gets together: Not on file     Attends Confucianism service: Not on file     Active member of club or organization: Not on file     Attends meetings of clubs or organizations: Not on file     Relationship status: Not on file     Intimate partner violence:     Fear of current or ex partner: Not on file     Emotionally abused: Not on file     Physically abused: Not on file     Forced sexual activity: Not on file   Other Topics Concern     Parent/sibling w/ CABG, MI or angioplasty before 65F 55M? Yes   Social History Narrative     Not on file       Family History  Family History   Problem Relation Age of Onset     Cancer Mother         Ovarian     Other Cancer Mother         Ovarian     Cerebrovascular Disease Father      Heart Disease Father      C.A.D. Father      Coronary Artery Disease Father      Cancer Maternal Grandmother      Other Cancer Maternal Grandmother         Ovarian/Bone     Cerebrovascular Disease Maternal Grandfather      Colon Cancer Maternal Grandfather      Diabetes Other         Great Grandmother     Breast Cancer No family hx of        Review of Systems  As per HPI and PMHx, otherwise 10+ comprehensive system review is negative.    Physical Exam  /51 (BP Location: Right arm, Patient Position: Chair, Cuff Size: Adult Regular)   Pulse 63   Temp 98  F (36.7  C) (Oral)   Wt 77.6 kg (171 lb)   BMI 28.46 kg/m     GENERAL: Patient is a pleasant, cooperative 79 year old female in no acute distress.  HEAD: Normocephalic, atraumatic.  Hair and scalp are normal.  EYES: Pupils are equal, round, reactive to light and accommodation.  Extraocular movements are intact.  The sclera nonicteric without  injection.  The extraocular structures are normal.  EARS: See below.  NOSE: Nares are patent.  Nasal mucosa is pink and moist.  Negative anterior rhinoscopy.  NEUROLOGIC: Cranial nerves II through XII are grossly intact.  Voice is strong.  Patient is House-Brackman I/VI bilaterally.  CARDIOVASCULAR: Extremities are warm and well-perfused.  No significant peripheral edema.  RESPIRATORY: Patient has nonlabored breathing without cough, wheeze, stridor.  PSYCHIATRIC: Patient is alert and oriented.  Mood and affect appear normal.  SKIN: Warm and dry.  No scalp, face, or neck lesions noted.    Physical Exam and Procedure    EARS: Normal shape and symmetry.  No tenderness when palpating the mastoid or tragal areas bilaterally.  The ears were then examined under the otic binocular microscope to perform cerumen removal.  Otoscopic exam on the right reveals impacted cerumen down to the level of the tympanic membrane.  The cerumen was removed with an alligator forceps.  The right tympanic membrane was round, intact without evidence of effusion.  No retraction, granulation, drainage, or evidence of cholesteatoma.      Attention was turned to the left ear.  Otoscopic exam on the left reveals impacted cerumen down to the level of the tympanic membrane.  There is a thick crust overlying the tympanic membrane and deep into the anterior sulcus.  I had to place a lidocaine soaked cotton overlying the tympanic membrane to soften up the crust.  I was unable to remove the cerumen using an alligator forceps.  The left tympanic membrane was round and intact.  I did not see karine evidence of middle ear effusion. No retraction, granulation, drainage, or evidence of cholesteatoma.      Audiogram  The patient underwent an audiogram performed today.  My review of the audiogram shows normal hearing sloping to mild high-frequency sensorineural hearing loss in the right ear and mild sloping to moderately severe mixed hearing loss in the left ear.   Pure-tone average is 17 dB on the right and 30 dB on the left.  Speech reception threshhold is 15 dB on the right and 25 dB on the left.  The patient had 100% word recognition on the right and 100% word recognition on the left.  The patient had a type A tympanogram on the right and either a A shallow or low volume type B tympanogram on the left.     Assessment and Plan     ICD-10-CM    1. Impacted cerumen of left ear H61.22 AUDIOLOGY ADULT REFERRAL     Remove Cerumen   2. Mixed conductive and sensorineural hearing loss of left ear with restricted hearing of right ear H90.A32 AUDIOLOGY ADULT REFERRAL     Remove Cerumen   3. Sensorineural hearing loss (SNHL) of right ear with restricted hearing of left ear H90.A21 AUDIOLOGY ADULT REFERRAL     Remove Cerumen     It was my pleasure seeing Lavonne Tidwell today in clinic.  The patient presents to me today with a mixed hearing loss in the left ear.  She had a significant amount of cerumen overlying the left tympanic membrane that was successfully removed today in office.  I do think this is in large part causing her symptoms.  We will have the patient return in 3 to 4 weeks with a hearing test.  She knows to contact me sooner with problems or concerns.    Rob Harris MD  Department of Otolarygology-Head and Neck Surgery  [facility]       Again, thank you for allowing me to participate in the care of your patient.        Sincerely,        Rob Harris MD

## 2020-01-17 NOTE — NURSING NOTE
"Initial /51 (BP Location: Right arm, Patient Position: Chair, Cuff Size: Adult Regular)   Pulse 63   Temp 98  F (36.7  C) (Oral)   Wt 77.6 kg (171 lb)   BMI 28.46 kg/m   Estimated body mass index is 28.46 kg/m  as calculated from the following:    Height as of 12/31/19: 1.651 m (5' 5\").    Weight as of this encounter: 77.6 kg (171 lb). .    Indigo Pastor LPN    "

## 2020-02-06 NOTE — PROGRESS NOTES
Chief Complaint   Patient presents with     RECHECK     recheck left ear after cerumen removed and mixed hearing loss noted/audio     History of Present Illness  Lavonne Tidwell is a 79 year old female who presents today for ear follow-up.  I evaluated the patient on 1/17/2020.  She had significant debris overlying her eardrum that we debrided in office.  I felt like this was likely the cause of her ear discomfort and we decided to observe the patient have her return in a couple weeks for repeat ear examination with audiogram.  The patient returns today for follow-up.    Since last seeing the patient in debriding her ear, the patient reports having an improvement in her left ear symptoms.  She feels like her ear is back to baseline.  She denies any otalgia, otorrhea, bloody otorrhea.  She does not have a history of ear surgery.    Past Medical History  Patient Active Problem List   Diagnosis     Injury of sigmoid colon     Esophageal reflux     Menopausal syndrome (hot flashes)     Urinary incontinence     Atrophic vaginitis     Hyperlipidemia LDL goal <130     Advanced directives, counseling/discussion     Onychomycosis     Senile nuclear sclerosis     Status post total left knee replacement     Status post total right knee replacement     Primary localized osteoarthrosis, lower leg     Tubulovillous adenoma polyp of colon     Current Medications    Current Outpatient Medications:      Acetaminophen (TYLENOL PO), Take 1,000 mg by mouth 2 times daily as needed for mild pain or fever, Disp: , Rfl:      Biotin 10 MG CAPS, , Disp: , Rfl:      CALCIUM + D OR, 2 TABLET DAILY, Disp: , Rfl:      Docusate Calcium (STOOL SOFTENER PO), , Disp: , Rfl:      garlic 150 MG TABS tablet, Take 150 mg by mouth daily, Disp: , Rfl:      grape seed 50 MG CAPS, Take 50 mg by mouth daily, Disp: , Rfl:      loratadine (CLARITIN) 10 MG tablet, Take 10 mg by mouth daily, Disp: , Rfl:      methylcellulose (FIBER THERAPY) 500 MG TABS tablet,  Take 500 mg by mouth daily, Disp: , Rfl:      Multiple Vitamins-Minerals (CENTRUM SILVER) per tablet, Take 1 tablet by mouth daily, Disp: , Rfl:      VITAMIN E COMPLEX PO, , Disp: , Rfl:      Zinc Sulfate (ZINC 15 PO), , Disp: , Rfl:      fluticasone (FLONASE) 50 MCG/ACT nasal spray, USE 2 SPRAYS INTO BOTH NOSTRILS DAILY AS NEEDED (Patient not taking: Reported on 2020), Disp: 16 g, Rfl: 3    Allergies  Allergies   Allergen Reactions     Codeine GI Disturbance       Social History  Social History     Socioeconomic History     Marital status:      Spouse name: Not on file     Number of children: Not on file     Years of education: Not on file     Highest education level: Not on file   Occupational History     Not on file   Social Needs     Financial resource strain: Not on file     Food insecurity:     Worry: Not on file     Inability: Not on file     Transportation needs:     Medical: Not on file     Non-medical: Not on file   Tobacco Use     Smoking status: Former Smoker     Packs/day: 0.50     Years: 5.00     Pack years: 2.50     Types: Cigarettes     Start date: 1968     Last attempt to quit: 2/15/1972     Years since quittin.0     Smokeless tobacco: Never Used     Tobacco comment: stopped in her 20's   Substance and Sexual Activity     Alcohol use: Yes     Alcohol/week: 0.0 standard drinks     Comment: Occasional     Drug use: No     Sexual activity: Never   Lifestyle     Physical activity:     Days per week: Not on file     Minutes per session: Not on file     Stress: Not on file   Relationships     Social connections:     Talks on phone: Not on file     Gets together: Not on file     Attends [employer]ism service: Not on file     Active member of club or organization: Not on file     Attends meetings of clubs or organizations: Not on file     Relationship status: Not on file     Intimate partner violence:     Fear of current or ex partner: Not on file     Emotionally abused: Not on file      Physically abused: Not on file     Forced sexual activity: Not on file   Other Topics Concern     Parent/sibling w/ CABG, MI or angioplasty before 65F 55M? Yes   Social History Narrative     Not on file       Family History  Family History   Problem Relation Age of Onset     Cancer Mother         Ovarian     Other Cancer Mother         Ovarian     Cerebrovascular Disease Father      Heart Disease Father      C.A.D. Father      Coronary Artery Disease Father      Cancer Maternal Grandmother      Other Cancer Maternal Grandmother         Ovarian/Bone     Cerebrovascular Disease Maternal Grandfather      Colon Cancer Maternal Grandfather      Diabetes Other         Great Grandmother     Breast Cancer No family hx of        Review of Systems  As per HPI and PMHx, otherwise 10 system review including the head and neck, constitutional, eyes, respiratory, GI, skin, neurologic, lymphatic, endocrine, and allergy systems is negative.    Physical Exam  /58 (BP Location: Right arm, Patient Position: Chair, Cuff Size: Adult Regular)   Pulse 64   Temp 97.5  F (36.4  C) (Oral)   Wt 77.6 kg (171 lb)   BMI 28.46 kg/m    GENERAL: Patient is a pleasant, cooperative 79 year old female in no acute distress.  HEAD: Normocephalic, atraumatic.  Hair and scalp are normal.  EYES: Pupils are equal, round, reactive to light and accommodation.  Extraocular movements are intact.  The sclera nonicteric without injection.  The extraocular structures are normal.  EARS: Normal shape and symmetry.  No tenderness in palpating the mastoid or tragal areas bilaterally.  Otoscopic exam reveals clear canals bilaterally.  The bilateral tympanic membranes are intact without evidence of effusion.  There is a mild amount of retraction on the left.  No granulation or drainage.    NOSE: Nares are patent.  Nasal mucosa is pink and moist.  Negative anterior rhinoscopy.  NEUROLOGIC: Cranial nerves II through XII are grossly intact.  Voice is strong.   Patient is House-Brackman I/VI bilaterally.  CARDIOVASCULAR: Extremities are warm and well-perfused.  No significant peripheral edema.  RESPIRATORY: Patient has nonlabored breathing without cough, wheeze, stridor.  PSYCHIATRIC: Patient is alert and oriented.  Mood and affect appear normal.  SKIN: Warm and dry.  No scalp, face, or neck lesions noted.    Audiogram  The patient underwent an audiogram performed today.  My review of the audiogram shows normal sloping to mild sensorineural loss in the high tones in the right and normal sloping to moderate sensorineural hearing loss on the left.  Pure-tone average is 13 dB on the right and 18 dB on the left.  Speech reception threshhold is 15 dB on the right and 20 dB on the left.  The patient had 100% word recognition on the right and 100% word recognition on the left.  The patient had a type A tympanogram on the right and a type A shallow tympanogram on the left.     Assessment and Plan     ICD-10-CM    1. Sensorineural hearing loss, bilateral H90.3 AUDIOLOGY ADULT REFERRAL   2. 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  Patient has had subjective improvement in her left ear after debridement.  She does have some asymmetry in the high tones in the left ear.  Even with that, her averages are less than 10 dB different.  I think given this, will defer any imaging and I recommend observation.  The patient will return to clinic with any problems or concerns in the future.    Rob Harris MD  Department of Otolarygology-Head and Neck Surgery  [facility]

## 2020-02-07 ENCOUNTER — OFFICE VISIT (OUTPATIENT)
Dept: OTOLARYNGOLOGY | Facility: CLINIC | Age: 80
End: 2020-02-07
Payer: MEDICARE

## 2020-02-07 ENCOUNTER — OFFICE VISIT (OUTPATIENT)
Dept: AUDIOLOGY | Facility: CLINIC | Age: 80
End: 2020-02-07
Payer: MEDICARE

## 2020-02-07 VITALS
WEIGHT: 171 LBS | SYSTOLIC BLOOD PRESSURE: 118 MMHG | TEMPERATURE: 97.5 F | BODY MASS INDEX: 28.46 KG/M2 | DIASTOLIC BLOOD PRESSURE: 58 MMHG | HEART RATE: 64 BPM

## 2020-02-07 DIAGNOSIS — H90.3 SENSORINEURAL HEARING LOSS (SNHL) OF BOTH EARS: Primary | ICD-10-CM

## 2020-02-07 DIAGNOSIS — H90.3 SENSORINEURAL HEARING LOSS, BILATERAL: Primary | ICD-10-CM

## 2020-02-07 PROCEDURE — 99213 OFFICE O/P EST LOW 20 MIN: CPT | Performed by: OTOLARYNGOLOGY

## 2020-02-07 PROCEDURE — 92567 TYMPANOMETRY: CPT | Performed by: AUDIOLOGIST

## 2020-02-07 PROCEDURE — 99207 ZZC NO CHARGE LOS: CPT | Performed by: AUDIOLOGIST

## 2020-02-07 PROCEDURE — 92557 COMPREHENSIVE HEARING TEST: CPT | Performed by: AUDIOLOGIST

## 2020-02-07 ASSESSMENT — PAIN SCALES - GENERAL: PAINLEVEL: NO PAIN (0)

## 2020-02-07 NOTE — LETTER
2/7/2020         RE: Lavonne Tidwell  [address] [facility] [id number]        Dear Colleague,    Thank you for referring your patient, Lavonne Tidwell, to the [facility]. Please see a copy of my visit note below.    Chief Complaint   Patient presents with     RECHECK     recheck left ear after cerumen removed and mixed hearing loss noted/audio     History of Present Illness  Lavonne Tidwell is a 79 year old female who presents today for ear follow-up.  I evaluated the patient on 1/17/2020.  She had significant debris overlying her eardrum that we debrided in office.  I felt like this was likely the cause of her ear discomfort and we decided to observe the patient have her return in a couple weeks for repeat ear examination with audiogram.  The patient returns today for follow-up.    Since last seeing the patient in debriding her ear, the patient reports having an improvement in her left ear symptoms.  She feels like her ear is back to baseline.  She denies any otalgia, otorrhea, bloody otorrhea.  She does not have a history of ear surgery.    Past Medical History  Patient Active Problem List   Diagnosis     Injury of sigmoid colon     Esophageal reflux     Menopausal syndrome (hot flashes)     Urinary incontinence     Atrophic vaginitis     Hyperlipidemia LDL goal <130     Advanced directives, counseling/discussion     Onychomycosis     Senile nuclear sclerosis     Status post total left knee replacement     Status post total right knee replacement     Primary localized osteoarthrosis, lower leg     Tubulovillous adenoma polyp of colon     Current Medications    Current Outpatient Medications:      Acetaminophen (TYLENOL PO), Take 1,000 mg by mouth 2 times daily as needed for mild pain or fever, Disp: , Rfl:      Biotin 10 MG CAPS, , Disp: , Rfl:      CALCIUM + D OR, 2 TABLET DAILY, Disp: , Rfl:      Docusate Calcium (STOOL SOFTENER PO), , Disp: , Rfl:      garlic 150 MG TABS tablet,  Take 150 mg by mouth daily, Disp: , Rfl:      grape seed 50 MG CAPS, Take 50 mg by mouth daily, Disp: , Rfl:      loratadine (CLARITIN) 10 MG tablet, Take 10 mg by mouth daily, Disp: , Rfl:      methylcellulose (FIBER THERAPY) 500 MG TABS tablet, Take 500 mg by mouth daily, Disp: , Rfl:      Multiple Vitamins-Minerals (CENTRUM SILVER) per tablet, Take 1 tablet by mouth daily, Disp: , Rfl:      VITAMIN E COMPLEX PO, , Disp: , Rfl:      Zinc Sulfate (ZINC 15 PO), , Disp: , Rfl:      fluticasone (FLONASE) 50 MCG/ACT nasal spray, USE 2 SPRAYS INTO BOTH NOSTRILS DAILY AS NEEDED (Patient not taking: Reported on 2020), Disp: 16 g, Rfl: 3    Allergies  Allergies   Allergen Reactions     Codeine GI Disturbance       Social History  Social History     Socioeconomic History     Marital status:      Spouse name: Not on file     Number of children: Not on file     Years of education: Not on file     Highest education level: Not on file   Occupational History     Not on file   Social Needs     Financial resource strain: Not on file     Food insecurity:     Worry: Not on file     Inability: Not on file     Transportation needs:     Medical: Not on file     Non-medical: Not on file   Tobacco Use     Smoking status: Former Smoker     Packs/day: 0.50     Years: 5.00     Pack years: 2.50     Types: Cigarettes     Start date: 1968     Last attempt to quit: 2/15/1972     Years since quittin.0     Smokeless tobacco: Never Used     Tobacco comment: stopped in her 20's   Substance and Sexual Activity     Alcohol use: Yes     Alcohol/week: 0.0 standard drinks     Comment: Occasional     Drug use: No     Sexual activity: Never   Lifestyle     Physical activity:     Days per week: Not on file     Minutes per session: Not on file     Stress: Not on file   Relationships     Social connections:     Talks on phone: Not on file     Gets together: Not on file     Attends Pentecostal service: Not on file     Active member of club  or organization: Not on file     Attends meetings of clubs or organizations: Not on file     Relationship status: Not on file     Intimate partner violence:     Fear of current or ex partner: Not on file     Emotionally abused: Not on file     Physically abused: Not on file     Forced sexual activity: Not on file   Other Topics Concern     Parent/sibling w/ CABG, MI or angioplasty before 65F 55M? Yes   Social History Narrative     Not on file       Family History  Family History   Problem Relation Age of Onset     Cancer Mother         Ovarian     Other Cancer Mother         Ovarian     Cerebrovascular Disease Father      Heart Disease Father      C.A.D. Father      Coronary Artery Disease Father      Cancer Maternal Grandmother      Other Cancer Maternal Grandmother         Ovarian/Bone     Cerebrovascular Disease Maternal Grandfather      Colon Cancer Maternal Grandfather      Diabetes Other         Great Grandmother     Breast Cancer No family hx of        Review of Systems  As per HPI and PMHx, otherwise 10 system review including the head and neck, constitutional, eyes, respiratory, GI, skin, neurologic, lymphatic, endocrine, and allergy systems is negative.    Physical Exam  /58 (BP Location: Right arm, Patient Position: Chair, Cuff Size: Adult Regular)   Pulse 64   Temp 97.5  F (36.4  C) (Oral)   Wt 77.6 kg (171 lb)   BMI 28.46 kg/m     GENERAL: Patient is a pleasant, cooperative 79 year old female in no acute distress.  HEAD: Normocephalic, atraumatic.  Hair and scalp are normal.  EYES: Pupils are equal, round, reactive to light and accommodation.  Extraocular movements are intact.  The sclera nonicteric without injection.  The extraocular structures are normal.  EARS: Normal shape and symmetry.  No tenderness in palpating the mastoid or tragal areas bilaterally.  Otoscopic exam reveals clear canals bilaterally.  The bilateral tympanic membranes are intact without evidence of effusion.  There is a  mild amount of retraction on the left.  No granulation or drainage.    NOSE: Nares are patent.  Nasal mucosa is pink and moist.  Negative anterior rhinoscopy.  NEUROLOGIC: Cranial nerves II through XII are grossly intact.  Voice is strong.  Patient is House-Brackman I/VI bilaterally.  CARDIOVASCULAR: Extremities are warm and well-perfused.  No significant peripheral edema.  RESPIRATORY: Patient has nonlabored breathing without cough, wheeze, stridor.  PSYCHIATRIC: Patient is alert and oriented.  Mood and affect appear normal.  SKIN: Warm and dry.  No scalp, face, or neck lesions noted.    Audiogram  The patient underwent an audiogram performed today.  My review of the audiogram shows normal sloping to mild sensorineural loss in the high tones in the right and normal sloping to moderate sensorineural hearing loss on the left.  Pure-tone average is 13 dB on the right and 18 dB on the left.  Speech reception threshhold is 15 dB on the right and 20 dB on the left.  The patient had 100% word recognition on the right and 100% word recognition on the left.  The patient had a type A tympanogram on the right and a type A shallow tympanogram on the left.     Assessment and Plan     ICD-10-CM    1. Sensorineural hearing loss, bilateral H90.3 AUDIOLOGY ADULT REFERRAL   2. 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  Patient has had subjective improvement in her left ear after debridement.  She does have some asymmetry in the high tones in the left ear.  Even with that, her averages are less than 10 dB different.  I think given this, will defer any imaging and I recommend observation.  The patient will return to clinic with any problems or concerns in the future.    Rob Harris MD  Department of Otolarygology-Head and Neck Surgery  [facility]     Again, thank you for allowing me to participate in the care of your patient.         Sincerely,        Rob Harris MD

## 2020-02-20 ENCOUNTER — TELEPHONE (OUTPATIENT)
Dept: FAMILY MEDICINE | Facility: CLINIC | Age: 80
End: 2020-02-20

## 2020-02-20 NOTE — TELEPHONE ENCOUNTER
Pt states she was lifting an urn a few days ago and now having pain in shoulder and upper back. Hurts with movement. Advised to continue with ice and tylenol. Appointment made for tomorrow. Bharti Gr RN

## 2020-02-20 NOTE — TELEPHONE ENCOUNTER
Lavonne is calling because she has had pain between her neck and shoulder since Tuesday.  She has tried icing it and taking Tylenol.  Please call and advise to see if she should come in to be seen.    Mara [facility]

## 2020-02-21 ENCOUNTER — OFFICE VISIT (OUTPATIENT)
Dept: FAMILY MEDICINE | Facility: CLINIC | Age: 80
End: 2020-02-21
Payer: MEDICARE

## 2020-02-21 VITALS
RESPIRATION RATE: 18 BRPM | HEART RATE: 83 BPM | WEIGHT: 170 LBS | BODY MASS INDEX: 28.29 KG/M2 | SYSTOLIC BLOOD PRESSURE: 120 MMHG | TEMPERATURE: 97.4 F | DIASTOLIC BLOOD PRESSURE: 68 MMHG | OXYGEN SATURATION: 99 %

## 2020-02-21 DIAGNOSIS — M54.2 NECK PAIN: Primary | ICD-10-CM

## 2020-02-21 PROCEDURE — 99213 OFFICE O/P EST LOW 20 MIN: CPT | Performed by: FAMILY MEDICINE

## 2020-02-21 ASSESSMENT — PAIN SCALES - GENERAL: PAINLEVEL: EXTREME PAIN (9)

## 2020-02-21 NOTE — PROGRESS NOTES
"Subjective     Lavonne Tidwell is a 79 year old female who presents to clinic today for the following health issues:    HPI   Musculoskeletal problem/pain      Duration: 2 years intermittent but constant pain x 4 days     Description  Location: left neck/shouolder    Intensity:  severe, 9/10    Accompanying signs and symptoms: radiation of pain to upper left arm    History  Previous similar problem: YES- but not severe  Previous evaluation:  none    Precipitating or alleviating factors:  Trauma or overuse: no recent but goes to [employer] sne[employer] exercise class twice weekly.  Aggravating factors include: overuse and hard to find comfortable position due to pain    Therapies tried and outcome: ice and acetaminophen              Reviewed and updated as needed this visit by Provider  Tobacco  Allergies  Meds  Problems  Med Hx  Surg Hx  Fam Hx         Review of Systems   ROS COMP: Constitutional, HEENT, cardiovascular, pulmonary, gi and gu systems are negative, except as otherwise noted.      Objective    /68 (BP Location: Right arm, Patient Position: Chair, Cuff Size: Adult Regular)   Pulse 83   Temp 97.4  F (36.3  C) (Tympanic)   Resp 18   Wt 77.1 kg (170 lb)   .8 cm (64.5\")   SpO2 99%   Breastfeeding No   BMI 28.29 kg/m    Body mass index is 28.29 kg/m .  Physical Exam   GENERAL APPEARANCE: healthy, alert and no distress  ORTHO: Cervical Spine Exam: Inspection: normal cervical lordosis  Tender:  medial border of scapula, left paracervical muscles, left trapezius muscles  Non-tender:    Range of Motion:  Full ROM of cervical spine  Strength: Full strength of all neck muscles  Special tests:          PSYCH: mentation appears normal and affect normal/bright    Diagnostic Test Results:  Labs reviewed in Epic        Assessment & Plan     1. Neck pain    - PHYSICAL THERAPY REFERRAL; Future       Patient Instructions   Biofreeze 3-4 times per day     Ice alternating with heat for muscle spasm      " 20 minutes ice, 20 minutes heat, 20 minutes ice 3 times daily       Set up PT       Return in about 1 week (around 2/28/2020), or if symptoms worsen or fail to improve.    Elvira Kowalski MD  [facility] [address]

## 2020-02-21 NOTE — NURSING NOTE
"Chief Complaint   Patient presents with     Shoulder Pain     Left neck and shoulder pain- gradually getting worse x 2 years.       Initial /68 (BP Location: Right arm, Patient Position: Chair, Cuff Size: Adult Regular)   Pulse 83   Temp 97.4  F (36.3  C) (Tympanic)   Resp 18   Wt 77.1 kg (170 lb)   .8 cm (64.5\")   SpO2 99%   Breastfeeding No   BMI 28.29 kg/m   Estimated body mass index is 28.29 kg/m  as calculated from the following:    Height as of 12/31/19: 1.651 m (5' 5\").    Weight as of this encounter: 77.1 kg (170 lb).    Patient presents to the clinic using No DME    Health Maintenance that is potentially due pending provider review:  NONE    n/a    Is there anyone who you would like to be able to receive your results? Yes  If yes have patient fill out [place]  Danielle Powers CMA  '  "

## 2020-02-21 NOTE — PATIENT INSTRUCTIONS
Biofreeze 3-4 times per day     Ice alternating with heat for muscle spasm      20 minutes ice, 20 minutes heat, 20 minutes ice 3 times daily       Set up PT

## 2020-02-25 ENCOUNTER — HOSPITAL ENCOUNTER (OUTPATIENT)
Dept: PHYSICAL THERAPY | Facility: CLINIC | Age: 80
Setting detail: THERAPIES SERIES
End: 2020-02-25
Attending: FAMILY MEDICINE
Payer: MEDICARE

## 2020-02-25 DIAGNOSIS — M54.2 NECK PAIN: ICD-10-CM

## 2020-02-25 PROCEDURE — 97161 PT EVAL LOW COMPLEX 20 MIN: CPT | Mod: GP

## 2020-02-25 PROCEDURE — 97140 MANUAL THERAPY 1/> REGIONS: CPT | Mod: GP

## 2020-02-25 PROCEDURE — 97535 SELF CARE MNGMENT TRAINING: CPT | Mod: GP

## 2020-02-25 PROCEDURE — 97110 THERAPEUTIC EXERCISES: CPT | Mod: GP

## 2020-02-25 NOTE — PROGRESS NOTES
[facility]          OUTPATIENT PHYSICAL THERAPY ORTHOPEDIC EVALUATION  PLAN OF TREATMENT FOR OUTPATIENT REHABILITATION  (COMPLETE FOR INITIAL CLAIMS ONLY)  Patient's Last Name, First Name, M.I.  YOB: 1940  Lavonne Tidwell    Provider s Name:  [facility]   Medical Record No.  [id number]   Start of Care Date:  02/25/20   Onset Date:  02/18/20   Type:     _X__PT   ___OT   ___SLP Medical Diagnosis:  Neck pain     PT Diagnosis:  Neck pain.   Visits from SOC:  1      _________________________________________________________________________________  Plan of Treatment/Functional Goals:  ROM, stretching, strengthening(posture exs)           Goals  Goal Identifier: 1  Goal Description: Pt will be able to work on computer with < 2/10 pain.  Target Date: 03/10/20    Goal Identifier: 2  Goal Description: Pt will be able to complete household tasks with < 2/10 pain.  Target Date: 03/17/20    Goal Identifier: 3  Goal Description: Pt will be able to drive with < 2/10 pain.  Target Date: 03/24/20    Goal Identifier: 4  Goal Description: Pt will be independent with HEP for optimal functional recovery.  Target Date: 03/24/20                                                Therapy Frequency:  1 time/week  Predicted Duration of Therapy Intervention:  4 weeks    THOMAS DENG, PT                 I CERTIFY THE NEED FOR THESE SERVICES FURNISHED UNDER        THIS PLAN OF TREATMENT AND WHILE UNDER MY CARE     (Physician co-signature of this document indicates review and certification of the therapy plan).                       Certification Date From:  02/25/20   Certification Date To:  04/07/20    Referring Provider:  Dr Kowalski    Initial Assessment        See Epic Evaluation Start of Care Date: 02/25/20

## 2020-02-25 NOTE — PROGRESS NOTES
02/25/20 1500   General Information   Type of Visit Initial OP Ortho PT Evaluation   Start of Care Date 02/25/20   Referring Physician Dr Kowalski   Patient/Family Goals Statement get rid of pain   Orders Evaluate and Treat   Date of Order 02/21/20   Certification Required? Yes   Medical Diagnosis Neck pain   Surgical/Medical history reviewed Yes  (arthritis)   Body Part(s)   Body Part(s) Cervical Spine   Presentation and Etiology   Pertinent history of current problem (include personal factors and/or comorbidities that impact the POC) Pt reports her neck and L shld started hurting a week ago. She woke up with it. It progressively worsened. She has had this happen before (she thinks it might have happened from lifting up an urn), but it usually goes away with ibuporfen and heat. She goes to [employer] 2x/week.   Impairments A. Pain;E. Decreased flexibility   Functional Limitations perform activities of daily living;perform desired leisure / sports activities   Symptom Location L neck and upper trap   How/Where did it occur From insidious onset   Onset date of current episode/exacerbation 02/18/20   Chronicity New   Pain rating (0-10 point scale) Best (/10);Worst (/10)  (currently 4/10)   Best (/10) 5   Worst (/10) 9   Pain quality C. Aching   Frequency of pain/symptoms A. Constant   Pain/symptoms are: Worse in the morning   Pain/symptoms exacerbated by G. Certain positions;K. Home tasks  (computer work, making bed, driving)   Pain/symptoms eased by F. Certain positions;G. Heat;H. Cold   Progression of symptoms since onset: Improved   Prior Level of Function   Functional Level Prior Comment independent   Current Level of Function   Current Community Support Family/friend caregiver   Patient role/employment history F. Retired   Living environment House/townhome   Home/community accessibility drives   Current equipment-Gait/Locomotion None   Fall Risk Screen   Fall screen completed by PT   Have you fallen 2 or  "more times in the past year? No   Have you fallen and had an injury in the past year? No   Is patient a fall risk? No   Abuse Screen (yes response referral indicated)   Feels Unsafe at Home or Work/School no   Feels Threatened by Someone no   Does Anyone Try to Keep You From Having Contact with Others or Doing Things Outside Your Home? no   Physical Signs of Abuse Present no   Functional Scales   Functional Scales Other   Other Scales  NDI 27.5%   Cervical Spine   Posture fwd head, rounded shlds   Cervical Flexion ROM 40*   Cervical Extension ROM 27* ++L neck and UT   Cervical Right Side Bending ROM 22*   Cervical Left Side Bending ROM 20* \"stiff\" L neck   Cervical Right Rotation ROM 52*   Cervical Left Rotation ROM 46* \"tight\" L neck   Shoulder AROM Screen WFL   Shoulder/Wrist/Hand Strength Comments 5/5 B   Upper Trapezius Flexibility normal   Levator Scapula Flexibility normal   Scalene Flexibility tight L   Vertebral Artery Test -   Alar Ligament Test -   Transverse Ligament Test -   Spurling Test -   Cervical Distraction Test -   Cervical Rotation/Lateral Flexion Test -   Neer Impingement Test -   Aguilar Impingement Test -   Palpation tender L scalene near C4. nontender SCM, cervical paraspinals, UT   Planned Therapy Interventions   Planned Therapy Interventions ROM;stretching;strengthening  (posture exs)   Clinical Impression   Criteria for Skilled Therapeutic Interventions Met yes, treatment indicated   PT Diagnosis Neck pain.   Influenced by the following impairments pain   Functional limitations due to impairments computer work, household tasks, driving   Clinical Presentation Stable/Uncomplicated   Clinical Presentation Rationale clinical judgement   Clinical Decision Making (Complexity) Low complexity   Therapy Frequency 1 time/week   Predicted Duration of Therapy Intervention (days/wks) 4 weeks   Risk & Benefits of therapy have been explained Yes   Patient, Family & other staff in agreement with plan of " care Yes   Education Assessment   Preferred Learning Style Listening;Demonstration;Pictures/video   Barriers to Learning No barriers   ORTHO GOALS   PT Ortho Eval Goals 1;2;3;4   Ortho Goal 1   Goal Identifier 1   Goal Description Pt will be able to work on computer with < 2/10 pain.   Target Date 03/10/20   Ortho Goal 2   Goal Identifier 2   Goal Description Pt will be able to complete household tasks with < 2/10 pain.   Target Date 03/17/20   Ortho Goal 3   Goal Identifier 3   Goal Description Pt will be able to drive with < 2/10 pain.   Target Date 03/24/20   Ortho Goal 4   Goal Identifier 4   Goal Description Pt will be independent with HEP for optimal functional recovery.   Target Date 03/24/20   Total Evaluation Time   PT Eval, Low Complexity Minutes (91962) 30   Therapy Certification   Certification date from 02/25/20   Certification date to 04/07/20   Medical Diagnosis Neck pain     Marian Summers PT

## 2020-03-03 ENCOUNTER — HOSPITAL ENCOUNTER (OUTPATIENT)
Dept: PHYSICAL THERAPY | Facility: CLINIC | Age: 80
Setting detail: THERAPIES SERIES
End: 2020-03-03
Attending: FAMILY MEDICINE
Payer: MEDICARE

## 2020-03-03 PROCEDURE — 97110 THERAPEUTIC EXERCISES: CPT | Mod: GP

## 2020-03-03 PROCEDURE — 97140 MANUAL THERAPY 1/> REGIONS: CPT | Mod: GP

## 2020-04-27 ENCOUNTER — TELEPHONE (OUTPATIENT)
Dept: OTOLARYNGOLOGY | Facility: CLINIC | Age: 80
End: 2020-04-27

## 2020-04-27 DIAGNOSIS — H92.10 OTORRHEA, UNSPECIFIED LATERALITY: Primary | ICD-10-CM

## 2020-04-27 RX ORDER — CIPROFLOXACIN AND DEXAMETHASONE 3; 1 MG/ML; MG/ML
4 SUSPENSION/ DROPS AURICULAR (OTIC) 2 TIMES DAILY
Status: CANCELLED | OUTPATIENT
Start: 2020-04-27

## 2020-04-27 RX ORDER — CIPROFLOXACIN AND DEXAMETHASONE 3; 1 MG/ML; MG/ML
SUSPENSION/ DROPS AURICULAR (OTIC)
Qty: 10 ML | Refills: 3 | Status: SHIPPED | OUTPATIENT
Start: 2020-04-27 | End: 2020-05-08

## 2020-04-27 NOTE — TELEPHONE ENCOUNTER
Patient contacted. Scheduled.  Please send drops [facility].    Genesis ACKERMAN   [facility]

## 2020-04-27 NOTE — TELEPHONE ENCOUNTER
Reason for Call:  Other     Detailed comments: Pt was seen 01/2020 and 02/2020 for hearing loss. Has been doing well until last week. States her lt ear starting draining again last week and has decreased hearing again - Pt would like to discuss with care team - Please contact pt     Phone Number Patient can be reached at: Home number on file [phone] (home)    Best Time: Any    Can we leave a detailed message on this number? YES    Call taken on 4/27/2020 at 10:26 AM by Denise Behrendt

## 2020-04-27 NOTE — TELEPHONE ENCOUNTER
If he has drops, she can start them. If she needs drops, I can send a prescription. I would like to see her to clean her ear Wednesday morning. No audio

## 2020-04-28 NOTE — PROGRESS NOTES
Pt has not returned after 2 treatment sessions with PT. Called pt 4/28/2020 and pt reports no problems with her neck. Will discharge PT.     Marian Summers PT

## 2020-04-29 ENCOUNTER — OFFICE VISIT (OUTPATIENT)
Dept: OTOLARYNGOLOGY | Facility: CLINIC | Age: 80
End: 2020-04-29
Payer: MEDICARE

## 2020-04-29 VITALS
HEART RATE: 82 BPM | TEMPERATURE: 97.8 F | RESPIRATION RATE: 16 BRPM | DIASTOLIC BLOOD PRESSURE: 64 MMHG | SYSTOLIC BLOOD PRESSURE: 127 MMHG

## 2020-04-29 DIAGNOSIS — H65.92 OME (OTITIS MEDIA WITH EFFUSION), LEFT: Primary | ICD-10-CM

## 2020-04-29 DIAGNOSIS — H90.A32 MIXED CONDUCTIVE AND SENSORINEURAL HEARING LOSS OF LEFT EAR WITH RESTRICTED HEARING OF RIGHT EAR: ICD-10-CM

## 2020-04-29 DIAGNOSIS — H90.A21 SENSORINEURAL HEARING LOSS (SNHL) OF RIGHT EAR WITH RESTRICTED HEARING OF LEFT EAR: ICD-10-CM

## 2020-04-29 DIAGNOSIS — H61.22 IMPACTED CERUMEN OF LEFT EAR: ICD-10-CM

## 2020-04-29 PROCEDURE — 99213 OFFICE O/P EST LOW 20 MIN: CPT | Mod: 25 | Performed by: OTOLARYNGOLOGY

## 2020-04-29 PROCEDURE — 69210 REMOVE IMPACTED EAR WAX UNI: CPT | Performed by: OTOLARYNGOLOGY

## 2020-04-29 NOTE — LETTER
4/29/2020         RE: Lavonne Tidwell  [id number] [facility] [address]        Dear Colleague,    Thank you for referring your patient, Lavonne Tidwell, to the [facility]. Please see a copy of my visit note below.    Chief Complaint   Patient presents with     RECHECK     Left Ear drainage, decreased hearing      History of Present Illness  Lavonne Tidwell is a 79 year old female who presents today for follow-up.  I saw the patient initially on 1/17/2020 with a significant left cerumen impaction.  She return for follow-up on 2/7/2020 and underwent audiometric assessment.  After debridement, the patient felt like her ear symptoms have resolved.      She contacted me with new symptoms a left sided otorrhea and decreased hearing.  We called in a prescription for drops and she presents today for evaluation.    Patient reports decreased hearing on the left-hand side.  She denies any otalgia, current otorrhea, bloody otorrhea, dizziness, vertigo.  She is not had previous ear surgery.  She does report some intermittent buzzing or tinnitus in the left ear.      My review of the patient's audiogram performed 2/7/2020 shows normal sloping to mild sensorineural loss in the high tones in the right and normal sloping to moderate sensorineural hearing loss on the left.  Pure-tone average was 13 dB on the right and 18 dB on the left.  Speech reception threshhold was 15 dB on the right and 20 dB on the left.  The patient had 100% word recognition on the right and 100% word recognition on the left.  The patient had a type A tympanogram on the right and a type A shallow tympanogram on the left.     Past Medical History  Patient Active Problem List   Diagnosis     Injury of sigmoid colon     Esophageal reflux     Menopausal syndrome (hot flashes)     Urinary incontinence     Atrophic vaginitis     Hyperlipidemia LDL goal <130     Advanced directives, counseling/discussion     Onychomycosis     Senile  nuclear sclerosis     Status post total left knee replacement     Status post total right knee replacement     Primary localized osteoarthrosis, lower leg     Tubulovillous adenoma polyp of colon     Current Medications    Current Outpatient Medications:      Acetaminophen (TYLENOL PO), Take 1,000 mg by mouth 2 times daily as needed for mild pain or fever, Disp: , Rfl:      Biotin 10 MG CAPS, , Disp: , Rfl:      CALCIUM + D OR, 2 TABLET DAILY, Disp: , Rfl:      ciprofloxacin-dexamethasone (CIPRODEX) 0.3-0.1 % otic suspension, Place 5 drops in draining ear twice daily for 7 days.  Call if drainage persistent past 7 days., Disp: 10 mL, Rfl: 3     Docusate Calcium (STOOL SOFTENER PO), , Disp: , Rfl:      fluticasone (FLONASE) 50 MCG/ACT nasal spray, USE TWO SPRAYS IN EACH NOSTRIL ONCE DAILY AS NEEDED, Disp: 16 g, Rfl: 3     garlic 150 MG TABS tablet, Take 150 mg by mouth daily, Disp: , Rfl:      grape seed 50 MG CAPS, Take 50 mg by mouth daily, Disp: , Rfl:      loratadine (CLARITIN) 10 MG tablet, Take 10 mg by mouth daily, Disp: , Rfl:      methylcellulose (FIBER THERAPY) 500 MG TABS tablet, Take 500 mg by mouth daily, Disp: , Rfl:      Multiple Vitamins-Minerals (CENTRUM SILVER) per tablet, Take 1 tablet by mouth daily, Disp: , Rfl:      VITAMIN E COMPLEX PO, , Disp: , Rfl:      Zinc Sulfate (ZINC 15 PO), , Disp: , Rfl:     Allergies  Allergies   Allergen Reactions     Codeine GI Disturbance       Social History  Social History     Socioeconomic History     Marital status:      Spouse name: None     Number of children: None     Years of education: None     Highest education level: None   Occupational History     None   Social Needs     Financial resource strain: None     Food insecurity     Worry: None     Inability: None     Transportation needs     Medical: None     Non-medical: None   Tobacco Use     Smoking status: Former Smoker     Packs/day: 0.50     Years: 5.00     Pack years: 2.50     Types: Cigarettes      Start date: 1968     Last attempt to quit: 2/15/1972     Years since quittin.2     Smokeless tobacco: Never Used     Tobacco comment: stopped in her 20's   Substance and Sexual Activity     Alcohol use: Yes     Alcohol/week: 0.0 standard drinks     Comment: Occasional     Drug use: No     Sexual activity: Not Currently   Lifestyle     Physical activity     Days per week: None     Minutes per session: None     Stress: None   Relationships     Social connections     Talks on phone: None     Gets together: None     Attends Zoroastrianism service: None     Active member of club or organization: None     Attends meetings of clubs or organizations: None     Relationship status: None     Intimate partner violence     Fear of current or ex partner: None     Emotionally abused: None     Physically abused: None     Forced sexual activity: None   Other Topics Concern     Parent/sibling w/ CABG, MI or angioplasty before 65F 55M? Yes   Social History Narrative     None       Family History  Family History   Problem Relation Age of Onset     Cancer Mother         Ovarian     Other Cancer Mother         Ovarian     Cerebrovascular Disease Father      Heart Disease Father      C.A.D. Father      Coronary Artery Disease Father      Cancer Maternal Grandmother      Other Cancer Maternal Grandmother         Ovarian/Bone     Cerebrovascular Disease Maternal Grandfather      Colon Cancer Maternal Grandfather      Diabetes Other         Great Grandmother     Breast Cancer No family hx of        Review of Systems  As per HPI and PMHx, otherwise 10 system review including the head and neck, constitutional, eyes, respiratory, GI, skin, neurologic, lymphatic, endocrine, and allergy systems is negative.    Physical Exam  /64 (BP Location: Right arm, Patient Position: Chair, Cuff Size: Adult Regular)   Pulse 82   Temp 97.8  F (36.6  C) (Tympanic)   Resp 16   GENERAL: Patient is a pleasant, cooperative 79 year old female in no  acute distress.  HEAD: Normocephalic, atraumatic.  Hair and scalp are normal.  EYES: Pupils are equal, round, reactive to light and accommodation.  Extraocular movements are intact.  The sclera nonicteric without injection.  The extraocular structures are normal.  EARS: See below.  NOSE: Nares are patent.  Nasal mucosa is pink and moist.  Negative anterior rhinoscopy.  NEUROLOGIC: Cranial nerves II through XII are grossly intact.  Voice is strong.  Patient is House-Brackman I/VI bilaterally.  CARDIOVASCULAR: Extremities are warm and well-perfused.  No significant peripheral edema.  RESPIRATORY: Patient has nonlabored breathing without cough, wheeze, stridor.  PSYCHIATRIC: Patient is alert and oriented.  Mood and affect appear normal.  SKIN: Warm and dry.  No scalp, face, or neck lesions noted.    Physical Exam and Procedure    EARS: Normal shape and symmetry.  No tenderness when palpating the mastoid or tragal areas bilaterally.  The ears were then examined under the otic binocular microscope to perform cerumen removal.  Otoscopic exam on the right reveals a clear canal.  The right tympanic membrane is round, intact without evidence of effusion, good landmarks.  No retraction, granulation, drainage, or evidence of cholesteatoma.      Attention was turned to the left ear.  Otoscopic exam on the left reveals moist impacted ceruminous debris down to the level of the tympanic membrane.  The cerumen was removed with a #3 and #5 suction.  There is minimal canal edema.  There is no granulation.  The left tympanic membrane was round, intact with evidence of serous middle ear effusion.  No retraction or perforation noted.  No evidence of cholesteatoma.  Tuning fork examination revealed a Dominique that lateralized to the left.  Air conduction is greater than bone conduction on the right and bone conduction is greater than air conduction on the left with Rinne testing.    Assessment and Plan     ICD-10-CM    1. OME (otitis media  with effusion), left  H65.92 Remove Cerumen   2. Mixed conductive and sensorineural hearing loss of left ear with restricted hearing of right ear  H90.A32    3. Sensorineural hearing loss (SNHL) of right ear with restricted hearing of left ear  H90.A21    4. Impacted cerumen of left ear  H61.22 Remove Cerumen      It was my pleasure seeing Lavonne Tidwell today in clinic.  The patient appears to have serous otitis media with effusion on the left.  She potentially had a small amount of otorrhea through a tiny tympanic membrane perforation which is not evident on examination today.  Her tuning fork would suggest a conductive hearing loss on the left.  We discussed close observation with good nasal hygiene and auto insufflation.  We discussed placement of an ear tube.  After some discussion, we will have the patient use Flonase 2 sprays each side once a day.  We discussed auto insufflating several times a day.    I will see the patient back in 3 to 4 weeks for recheck with a hearing test.  She knows to contact me if she begins to have drainage, pain, worsening hearing, or other concerning symptoms.    Rob Harris MD  Department of Otolarygology-Head and Neck Surgery  [facility]       Again, thank you for allowing me to participate in the care of your patient.        Sincerely,        Rob Harris MD

## 2020-04-29 NOTE — NURSING NOTE
"Chief Complaint   Patient presents with     RECHECK     Left Ear drainage, decreased hearing        Initial BP (!) 149/71 (BP Location: Left arm, Patient Position: Chair, Cuff Size: Adult Regular)   Pulse 82   Temp 97.8  F (36.6  C) (Tympanic)   Resp 16  Estimated body mass index is 28.29 kg/m  as calculated from the following:    Height as of 12/31/19: 1.651 m (5' 5\").    Weight as of 2/21/20: 77.1 kg (170 lb).  BP completed using cuff size: regular   Medications and allergies reviewed.      Drea DIAZ CMA     "

## 2020-04-29 NOTE — PATIENT INSTRUCTIONS
Per physician instructions.    If you have questions or concerns on any instructions given to you by your provider today or if you need to schedule an appointment, you can reach us at [phone].    Thank you!

## 2020-04-29 NOTE — PROGRESS NOTES
Chief Complaint   Patient presents with     RECHECK     Left Ear drainage, decreased hearing      History of Present Illness  Lavonne Tidwell is a 79 year old female who presents today for follow-up.  I saw the patient initially on 1/17/2020 with a significant left cerumen impaction.  She return for follow-up on 2/7/2020 and underwent audiometric assessment.  After debridement, the patient felt like her ear symptoms have resolved.      She contacted me with new symptoms a left sided otorrhea and decreased hearing.  We called in a prescription for drops and she presents today for evaluation.    Patient reports decreased hearing on the left-hand side.  She denies any otalgia, current otorrhea, bloody otorrhea, dizziness, vertigo.  She is not had previous ear surgery.  She does report some intermittent buzzing or tinnitus in the left ear.      My review of the patient's audiogram performed 2/7/2020 shows normal sloping to mild sensorineural loss in the high tones in the right and normal sloping to moderate sensorineural hearing loss on the left.  Pure-tone average was 13 dB on the right and 18 dB on the left.  Speech reception threshhold was 15 dB on the right and 20 dB on the left.  The patient had 100% word recognition on the right and 100% word recognition on the left.  The patient had a type A tympanogram on the right and a type A shallow tympanogram on the left.     Past Medical History  Patient Active Problem List   Diagnosis     Injury of sigmoid colon     Esophageal reflux     Menopausal syndrome (hot flashes)     Urinary incontinence     Atrophic vaginitis     Hyperlipidemia LDL goal <130     Advanced directives, counseling/discussion     Onychomycosis     Senile nuclear sclerosis     Status post total left knee replacement     Status post total right knee replacement     Primary localized osteoarthrosis, lower leg     Tubulovillous adenoma polyp of colon     Current Medications    Current Outpatient  Medications:      Acetaminophen (TYLENOL PO), Take 1,000 mg by mouth 2 times daily as needed for mild pain or fever, Disp: , Rfl:      Biotin 10 MG CAPS, , Disp: , Rfl:      CALCIUM + D OR, 2 TABLET DAILY, Disp: , Rfl:      ciprofloxacin-dexamethasone (CIPRODEX) 0.3-0.1 % otic suspension, Place 5 drops in draining ear twice daily for 7 days.  Call if drainage persistent past 7 days., Disp: 10 mL, Rfl: 3     Docusate Calcium (STOOL SOFTENER PO), , Disp: , Rfl:      fluticasone (FLONASE) 50 MCG/ACT nasal spray, USE TWO SPRAYS IN EACH NOSTRIL ONCE DAILY AS NEEDED, Disp: 16 g, Rfl: 3     garlic 150 MG TABS tablet, Take 150 mg by mouth daily, Disp: , Rfl:      grape seed 50 MG CAPS, Take 50 mg by mouth daily, Disp: , Rfl:      loratadine (CLARITIN) 10 MG tablet, Take 10 mg by mouth daily, Disp: , Rfl:      methylcellulose (FIBER THERAPY) 500 MG TABS tablet, Take 500 mg by mouth daily, Disp: , Rfl:      Multiple Vitamins-Minerals (CENTRUM SILVER) per tablet, Take 1 tablet by mouth daily, Disp: , Rfl:      VITAMIN E COMPLEX PO, , Disp: , Rfl:      Zinc Sulfate (ZINC 15 PO), , Disp: , Rfl:     Allergies  Allergies   Allergen Reactions     Codeine GI Disturbance       Social History  Social History     Socioeconomic History     Marital status:      Spouse name: None     Number of children: None     Years of education: None     Highest education level: None   Occupational History     None   Social Needs     Financial resource strain: None     Food insecurity     Worry: None     Inability: None     Transportation needs     Medical: None     Non-medical: None   Tobacco Use     Smoking status: Former Smoker     Packs/day: 0.50     Years: 5.00     Pack years: 2.50     Types: Cigarettes     Start date: 1968     Last attempt to quit: 2/15/1972     Years since quittin.2     Smokeless tobacco: Never Used     Tobacco comment: stopped in her 20's   Substance and Sexual Activity     Alcohol use: Yes     Alcohol/week:  0.0 standard drinks     Comment: Occasional     Drug use: No     Sexual activity: Not Currently   Lifestyle     Physical activity     Days per week: None     Minutes per session: None     Stress: None   Relationships     Social connections     Talks on phone: None     Gets together: None     Attends [employer] service: None     Active member of club or organization: None     Attends meetings of clubs or organizations: None     Relationship status: None     Intimate partner violence     Fear of current or ex partner: None     Emotionally abused: None     Physically abused: None     Forced sexual activity: None   Other Topics Concern     Parent/sibling w/ CABG, MI or angioplasty before 65F 55M? Yes   Social History Narrative     None       Family History  Family History   Problem Relation Age of Onset     Cancer Mother         Ovarian     Other Cancer Mother         Ovarian     Cerebrovascular Disease Father      Heart Disease Father      C.A.D. Father      Coronary Artery Disease Father      Cancer Maternal Grandmother      Other Cancer Maternal Grandmother         Ovarian/Bone     Cerebrovascular Disease Maternal Grandfather      Colon Cancer Maternal Grandfather      Diabetes Other         Great Grandmother     Breast Cancer No family hx of        Review of Systems  As per HPI and PMHx, otherwise 10 system review including the head and neck, constitutional, eyes, respiratory, GI, skin, neurologic, lymphatic, endocrine, and allergy systems is negative.    Physical Exam  /64 (BP Location: Right arm, Patient Position: Chair, Cuff Size: Adult Regular)   Pulse 82   Temp 97.8  F (36.6  C) (Tympanic)   Resp 16   GENERAL: Patient is a pleasant, cooperative 79 year old female in no acute distress.  HEAD: Normocephalic, atraumatic.  Hair and scalp are normal.  EYES: Pupils are equal, round, reactive to light and accommodation.  Extraocular movements are intact.  The sclera nonicteric without injection.  The  extraocular structures are normal.  EARS: See below.  NOSE: Nares are patent.  Nasal mucosa is pink and moist.  Negative anterior rhinoscopy.  NEUROLOGIC: Cranial nerves II through XII are grossly intact.  Voice is strong.  Patient is House-Brackman I/VI bilaterally.  CARDIOVASCULAR: Extremities are warm and well-perfused.  No significant peripheral edema.  RESPIRATORY: Patient has nonlabored breathing without cough, wheeze, stridor.  PSYCHIATRIC: Patient is alert and oriented.  Mood and affect appear normal.  SKIN: Warm and dry.  No scalp, face, or neck lesions noted.    Physical Exam and Procedure    EARS: Normal shape and symmetry.  No tenderness when palpating the mastoid or tragal areas bilaterally.  The ears were then examined under the otic binocular microscope to perform cerumen removal.  Otoscopic exam on the right reveals a clear canal.  The right tympanic membrane is round, intact without evidence of effusion, good landmarks.  No retraction, granulation, drainage, or evidence of cholesteatoma.      Attention was turned to the left ear.  Otoscopic exam on the left reveals moist impacted ceruminous debris down to the level of the tympanic membrane.  The cerumen was removed with a #3 and #5 suction.  There is minimal canal edema.  There is no granulation.  The left tympanic membrane was round, intact with evidence of serous middle ear effusion.  No retraction or perforation noted.  No evidence of cholesteatoma.  Tuning fork examination revealed a Dominique that lateralized to the left.  Air conduction is greater than bone conduction on the right and bone conduction is greater than air conduction on the left with Rinne testing.    Assessment and Plan     ICD-10-CM    1. OME (otitis media with effusion), left  H65.92 Remove Cerumen   2. Mixed conductive and sensorineural hearing loss of left ear with restricted hearing of right ear  H90.A32    3. Sensorineural hearing loss (SNHL) of right ear with restricted  hearing of left ear  H90.A21    4. Impacted cerumen of left ear  H61.22 Remove Cerumen      It was my pleasure seeing Lavonne Tidwell today in clinic.  The patient appears to have serous otitis media with effusion on the left.  She potentially had a small amount of otorrhea through a tiny tympanic membrane perforation which is not evident on examination today.  Her tuning fork would suggest a conductive hearing loss on the left.  We discussed close observation with good nasal hygiene and auto insufflation.  We discussed placement of an ear tube.  After some discussion, we will have the patient use Flonase 2 sprays each side once a day.  We discussed auto insufflating several times a day.    I will see the patient back in 3 to 4 weeks for recheck with a hearing test.  She knows to contact me if she begins to have drainage, pain, worsening hearing, or other concerning symptoms.    Rob Harris MD  Department of Otolarygology-Head and Neck Surgery  [facility]

## 2020-05-08 ENCOUNTER — OFFICE VISIT (OUTPATIENT)
Dept: FAMILY MEDICINE | Facility: CLINIC | Age: 80
End: 2020-05-08
Payer: MEDICARE

## 2020-05-08 VITALS
OXYGEN SATURATION: 97 % | HEIGHT: 65 IN | HEART RATE: 69 BPM | DIASTOLIC BLOOD PRESSURE: 74 MMHG | TEMPERATURE: 97.7 F | WEIGHT: 172 LBS | SYSTOLIC BLOOD PRESSURE: 124 MMHG | RESPIRATION RATE: 16 BRPM | BODY MASS INDEX: 28.66 KG/M2

## 2020-05-08 DIAGNOSIS — H65.92 OME (OTITIS MEDIA WITH EFFUSION), LEFT: Primary | ICD-10-CM

## 2020-05-08 PROCEDURE — 99213 OFFICE O/P EST LOW 20 MIN: CPT | Performed by: FAMILY MEDICINE

## 2020-05-08 ASSESSMENT — MIFFLIN-ST. JEOR: SCORE: 1251.07

## 2020-05-08 ASSESSMENT — PAIN SCALES - GENERAL: PAINLEVEL: SEVERE PAIN (7)

## 2020-05-08 NOTE — PROGRESS NOTES
"Subjective     Lavonne Tidwell is a 79 year old female who presents to clinic today for the following health issues:    HPI   Chief Complaint   Patient presents with     Ear Problem     Patient sees a ENT specialist and has wax removed. Patient was recently seen and was told she had fluid behind in ear. Patient notied 3 days ago her right ear was becoming more uncomfortable. Patient rates ear pain at a 7/10. Patient noticed walking in the cold air also caused discomfort.     Eye Problem     Patient has constant watery eyes. Patient has tried refresh eye drops, warm wash cloths and it is improving but still wanted to mention it.      Ear is more sensitive the past three days.  No fever.  Has been taking ibuprofen.  Is also using claritin and flonase and practicing auto-insufflation regularly.   The wind and cold seem to bother it more.     Reviewed and updated as needed this visit by Provider         Review of Systems   Constitutional, HEENT, cardiovascular, pulmonary, gi and gu systems are negative, except as otherwise noted.      Objective    /74   Pulse 69   Temp 97.7  F (36.5  C) (Tympanic)   Resp 16   Ht 1.651 m (5' 5\")   Wt 78 kg (172 lb)   SpO2 97%   BMI 28.62 kg/m    Body mass index is 28.62 kg/m .  Physical Exam   GENERAL: healthy, alert and no distress  HENT: normal cephalic/atraumatic, right ear: normal: no effusions, no erythema, normal landmarks, left ear: clear effusion and bulging membrane, nose and mouth without ulcers or lesions, oropharynx clear and oral mucous membranes moist            Assessment & Plan       ICD-10-CM    1. OME (otitis media with effusion), left  H65.92       continue with current plan.  Does have follow up with Dr. Harris in 2 weeks.  Encouraged her to call ENT early next week if the pain is still significant.     Warm packs, we put a 1/2 cotton ball at the opening of her ear canal to protect her from the wind/sound that is bothering her.     No antibiotic " indicated at this time.        No follow-ups on file.    Sarahi Lazcano MD  [facility]

## 2020-05-08 NOTE — TELEPHONE ENCOUNTER
Pt states the lt ear is no longer draining, but now she is having pain - aching on the whole side of the face for the past 2-3 days. Taking Tylenol, which helps some, but needs to take every 4 hours. Does not think she has a fever    PHARMACY:  [facility]    Please contact pt @:  [phone] (ok to leave message)    Denise Behrendt  Specialty [facility]

## 2020-05-08 NOTE — TELEPHONE ENCOUNTER
Kimberly not in clinic today,  Advised pt that due to entire side of face it could be something else and should be evaluated.  Will contact PCP or consider Urgent care.    Genesis ACKERMAN   [facility]

## 2020-05-19 NOTE — PROGRESS NOTES
Chief Complaint   Patient presents with     Ear Problem     recheck plugged ear in left ear - feels it is better - this weekend while yawning it started popping some and could hear a little better     History of Present Illness  Lavonne Tidwell is a 79 year old female presents today for follow-up.  I evaluated the patient on 4/29/2020 with onset of otorrhea and decreased hearing in the left ear.  The patient appeared to have serous otitis media on examination.  Her tuning fork suggested a conductive hearing loss in the left ear.  We discussed placing ear tube versus good nasal regimen and auto insufflation for a few weeks.  The patient elected for conservative management and returns today for ear follow-up and audiometric assessment.    Since seeing the patient at the end of April, the patient reports that she starting to have improved hearing on the left.  She is now able to use her phone the left.  Over the past several days, she is having more popping and crackling in the ear when she auto insufflate's, yawns, or blows her nose.  No otalgia or otorrhea.    Past Medical History  Patient Active Problem List   Diagnosis     Injury of sigmoid colon     Esophageal reflux     Menopausal syndrome (hot flashes)     Urinary incontinence     Atrophic vaginitis     Hyperlipidemia LDL goal <130     Advanced directives, counseling/discussion     Onychomycosis     Senile nuclear sclerosis     Status post total left knee replacement     Status post total right knee replacement     Primary localized osteoarthrosis, lower leg     Tubulovillous adenoma polyp of colon     Current Medications    Current Outpatient Medications:      Acetaminophen (TYLENOL PO), Take 1,000 mg by mouth 2 times daily as needed for mild pain or fever, Disp: , Rfl:      Biotin 10 MG CAPS, , Disp: , Rfl:      CALCIUM + D OR, 2 TABLET DAILY, Disp: , Rfl:      Docusate Calcium (STOOL SOFTENER PO), , Disp: , Rfl:      fluticasone (FLONASE) 50 MCG/ACT nasal  spray, USE TWO SPRAYS IN EACH NOSTRIL ONCE DAILY AS NEEDED, Disp: 16 g, Rfl: 3     garlic 150 MG TABS tablet, Take 150 mg by mouth daily, Disp: , Rfl:      grape seed 50 MG CAPS, Take 50 mg by mouth daily, Disp: , Rfl:      loratadine (CLARITIN) 10 MG tablet, Take 10 mg by mouth daily, Disp: , Rfl:      methylcellulose (FIBER THERAPY) 500 MG TABS tablet, Take 500 mg by mouth daily, Disp: , Rfl:      Multiple Vitamins-Minerals (CENTRUM SILVER) per tablet, Take 1 tablet by mouth daily, Disp: , Rfl:      VITAMIN E COMPLEX PO, , Disp: , Rfl:      Zinc Sulfate (ZINC 15 PO), , Disp: , Rfl:     Allergies  Allergies   Allergen Reactions     Codeine GI Disturbance       Social History  Social History     Socioeconomic History     Marital status:      Spouse name: Not on file     Number of children: Not on file     Years of education: Not on file     Highest education level: Not on file   Occupational History     Not on file   Social Needs     Financial resource strain: Not on file     Food insecurity     Worry: Not on file     Inability: Not on file     Transportation needs     Medical: Not on file     Non-medical: Not on file   Tobacco Use     Smoking status: Former Smoker     Packs/day: 0.50     Years: 5.00     Pack years: 2.50     Types: Cigarettes     Start date: 1968     Last attempt to quit: 2/15/1972     Years since quittin.2     Smokeless tobacco: Never Used     Tobacco comment: stopped in her 20's   Substance and Sexual Activity     Alcohol use: Yes     Alcohol/week: 0.0 standard drinks     Comment: Occasional     Drug use: No     Sexual activity: Not Currently   Lifestyle     Physical activity     Days per week: Not on file     Minutes per session: Not on file     Stress: Not on file   Relationships     Social connections     Talks on phone: Not on file     Gets together: Not on file     Attends [employer] service: Not on file     Active member of club or organization: Not on file     Attends  "meetings of clubs or organizations: Not on file     Relationship status: Not on file     Intimate partner violence     Fear of current or ex partner: Not on file     Emotionally abused: Not on file     Physically abused: Not on file     Forced sexual activity: Not on file   Other Topics Concern     Parent/sibling w/ CABG, MI or angioplasty before 65F 55M? Yes   Social History Narrative     Not on file       Family History  Family History   Problem Relation Age of Onset     Cancer Mother         Ovarian     Other Cancer Mother         Ovarian     Cerebrovascular Disease Father      Heart Disease Father      C.A.D. Father      Coronary Artery Disease Father      Cancer Maternal Grandmother      Other Cancer Maternal Grandmother         Ovarian/Bone     Cerebrovascular Disease Maternal Grandfather      Colon Cancer Maternal Grandfather      Diabetes Other         Great Grandmother     Breast Cancer No family hx of        Review of Systems  As per HPI and PMHx, otherwise 10 system review including the head and neck, constitutional, eyes, respiratory, GI, skin, neurologic, lymphatic, endocrine, and allergy systems is negative.    Physical Exam  /66 (BP Location: Right arm, Patient Position: Chair, Cuff Size: Adult Regular)   Pulse 76   Temp 97.6  F (36.4  C) (Tympanic)   Resp 16   Ht 1.651 m (5' 5\")   Wt 78 kg (172 lb)   BMI 28.62 kg/m    GENERAL: Patient is a pleasant, cooperative 79 year old female in no acute distress.  HEAD: Normocephalic, atraumatic.  Hair and scalp are normal.  EYES: Pupils are equal, round, reactive to light and accommodation.  Extraocular movements are intact.  The sclera nonicteric without injection.  The extraocular structures are normal.  EARS: See below.  NEUROLOGIC: Cranial nerves II through XII are grossly intact.  Voice is strong.  Facial nerve examination is limited due to wearing a mask.    CARDIOVASCULAR: Extremities are warm and well-perfused.  No significant peripheral " edema.  RESPIRATORY: Patient has nonlabored breathing without cough, wheeze, stridor.  PSYCHIATRIC: Patient is alert and oriented.  Mood and affect appear normal.  SKIN: Warm and dry.  No scalp, face, or neck lesions noted.     Physical Exam and Procedure     EARS: Normal shape and symmetry.  No tenderness when palpating the mastoid or tragal areas bilaterally.  The ears were then examined under the otic binocular microscope.  Otoscopic exam on the right reveals a clear canal.  The right tympanic membrane is round, intact without evidence of effusion, good landmarks.  No retraction, granulation, drainage, or evidence of cholesteatoma.       Attention was turned to the left ear.  Otoscopic exam on the left reveals a clear canal. The left tympanic membrane was round, intact with evidence of serous middle ear effusion.  No retraction or perforation noted.  No evidence of cholesteatoma.      Audiogram  The patient underwent an audiogram performed today.  My review of the audiogram shows normal sloping to mild sensorineural hearing loss in the right ear and mild sloping to moderately severe mixed hearing loss in the left ear.  Pure-tone average is 16 dB on the right and 35 dB on the left.  Speech reception threshhold is 15 dB on the right and 25 dB on the left.  The patient had 100% word recognition on the right and 96% word recognition on the left.  The patient had a type A tympanogram on the right and a low volume type B tympanogram on the left.     Assessment and Plan     ICD-10-CM    1. OME (otitis media with effusion), left  H65.92 AUDIOLOGY ADULT REFERRAL     Microscopy, Binocular   2. Mixed conductive and sensorineural hearing loss of left ear with restricted hearing of right ear  H90.A32 AUDIOLOGY ADULT REFERRAL     Microscopy, Binocular   3. Sensorineural hearing loss (SNHL) of right ear with restricted hearing of left ear  H90.A21 AUDIOLOGY ADULT REFERRAL     Microscopy, Binocular      It was my pleasure seeing  Lavonne Tidwell today in clinic.  She does appear to have middle ear effusions still on the left-hand side.  She feels like her hearing is slowly improving and she is getting more popping and crackling with auto insufflation and yawning.  We discussed careful observation versus placement of an ear tube.  Since the patient feels like she is doing well, she would like to continue observation.  I will see her back in 6 weeks for recheck with audiogram.  She knows to contact me if she is having any pain, decreased hearing, drainage, etc.    Rob Harris MD  Department of Otolarygology-Head and Neck Surgery  [facility]

## 2020-05-20 ENCOUNTER — OFFICE VISIT (OUTPATIENT)
Dept: AUDIOLOGY | Facility: CLINIC | Age: 80
End: 2020-05-20
Payer: MEDICARE

## 2020-05-20 ENCOUNTER — OFFICE VISIT (OUTPATIENT)
Dept: OTOLARYNGOLOGY | Facility: CLINIC | Age: 80
End: 2020-05-20
Payer: MEDICARE

## 2020-05-20 VITALS
BODY MASS INDEX: 28.66 KG/M2 | SYSTOLIC BLOOD PRESSURE: 131 MMHG | DIASTOLIC BLOOD PRESSURE: 66 MMHG | RESPIRATION RATE: 16 BRPM | TEMPERATURE: 97.6 F | HEIGHT: 65 IN | WEIGHT: 172 LBS | HEART RATE: 76 BPM

## 2020-05-20 DIAGNOSIS — H90.A21 SENSORINEURAL HEARING LOSS (SNHL) OF RIGHT EAR WITH RESTRICTED HEARING OF LEFT EAR: ICD-10-CM

## 2020-05-20 DIAGNOSIS — H90.A32 MIXED CONDUCTIVE AND SENSORINEURAL HEARING LOSS OF LEFT EAR WITH RESTRICTED HEARING OF RIGHT EAR: Primary | ICD-10-CM

## 2020-05-20 DIAGNOSIS — H90.A32 MIXED CONDUCTIVE AND SENSORINEURAL HEARING LOSS OF LEFT EAR WITH RESTRICTED HEARING OF RIGHT EAR: ICD-10-CM

## 2020-05-20 DIAGNOSIS — H65.92 OME (OTITIS MEDIA WITH EFFUSION), LEFT: Primary | ICD-10-CM

## 2020-05-20 PROCEDURE — 99207 ZZC NO CHARGE LOS: CPT | Performed by: AUDIOLOGIST

## 2020-05-20 PROCEDURE — 99213 OFFICE O/P EST LOW 20 MIN: CPT | Mod: 25 | Performed by: OTOLARYNGOLOGY

## 2020-05-20 PROCEDURE — 92504 EAR MICROSCOPY EXAMINATION: CPT | Performed by: OTOLARYNGOLOGY

## 2020-05-20 PROCEDURE — 92550 TYMPANOMETRY & REFLEX THRESH: CPT | Performed by: AUDIOLOGIST

## 2020-05-20 PROCEDURE — 92557 COMPREHENSIVE HEARING TEST: CPT | Performed by: AUDIOLOGIST

## 2020-05-20 ASSESSMENT — MIFFLIN-ST. JEOR: SCORE: 1251.07

## 2020-05-20 NOTE — PATIENT INSTRUCTIONS
Per physician instructions.    If you have questions or concerns on any instructions given to you by your provider today or if you need to schedule an appointment, you can reach us at [phone].    Thank you!

## 2020-05-20 NOTE — NURSING NOTE
"Chief Complaint   Patient presents with     Ear Problem     recheck plugged ear in left ear - feels it is better - this weekend while yawning it started popping some and could hear a little better       Initial /66 (BP Location: Right arm, Patient Position: Chair, Cuff Size: Adult Regular)   Pulse 76   Temp 97.6  F (36.4  C) (Tympanic)   Resp 16   Ht 1.651 m (5' 5\")   Wt 78 kg (172 lb)   BMI 28.62 kg/m   Estimated body mass index is 28.62 kg/m  as calculated from the following:    Height as of this encounter: 1.651 m (5' 5\").    Weight as of this encounter: 78 kg (172 lb).  Medications and allergies reviewed.    Farzad CHAMBERLAIN CMA (AAMA)    "

## 2020-05-20 NOTE — LETTER
5/20/2020         RE: Lavonne Tidwell  [address] [facility] [address]        Dear Colleague,    Thank you for referring your patient, Lavonne Tidwell, to the [facility]. Please see a copy of my visit note below.    Chief Complaint   Patient presents with     Ear Problem     recheck plugged ear in left ear - feels it is better - this weekend while yawning it started popping some and could hear a little better     History of Present Illness  Lavonne Tidwell is a 79 year old female presents today for follow-up.  I evaluated the patient on 4/29/2020 with onset of otorrhea and decreased hearing in the left ear.  The patient appeared to have serous otitis media on examination.  Her tuning fork suggested a conductive hearing loss in the left ear.  We discussed placing ear tube versus good nasal regimen and auto insufflation for a few weeks.  The patient elected for conservative management and returns today for ear follow-up and audiometric assessment.    Since seeing the patient at the end of April, the patient reports that she starting to have improved hearing on the left.  She is now able to use her phone the left.  Over the past several days, she is having more popping and crackling in the ear when she auto insufflate's, yawns, or blows her nose.  No otalgia or otorrhea.    Past Medical History  Patient Active Problem List   Diagnosis     Injury of sigmoid colon     Esophageal reflux     Menopausal syndrome (hot flashes)     Urinary incontinence     Atrophic vaginitis     Hyperlipidemia LDL goal <130     Advanced directives, counseling/discussion     Onychomycosis     Senile nuclear sclerosis     Status post total left knee replacement     Status post total right knee replacement     Primary localized osteoarthrosis, lower leg     Tubulovillous adenoma polyp of colon     Current Medications    Current Outpatient Medications:      Acetaminophen (TYLENOL PO), Take 1,000 mg by mouth 2 times  daily as needed for mild pain or fever, Disp: , Rfl:      Biotin 10 MG CAPS, , Disp: , Rfl:      CALCIUM + D OR, 2 TABLET DAILY, Disp: , Rfl:      Docusate Calcium (STOOL SOFTENER PO), , Disp: , Rfl:      fluticasone (FLONASE) 50 MCG/ACT nasal spray, USE TWO SPRAYS IN EACH NOSTRIL ONCE DAILY AS NEEDED, Disp: 16 g, Rfl: 3     garlic 150 MG TABS tablet, Take 150 mg by mouth daily, Disp: , Rfl:      grape seed 50 MG CAPS, Take 50 mg by mouth daily, Disp: , Rfl:      loratadine (CLARITIN) 10 MG tablet, Take 10 mg by mouth daily, Disp: , Rfl:      methylcellulose (FIBER THERAPY) 500 MG TABS tablet, Take 500 mg by mouth daily, Disp: , Rfl:      Multiple Vitamins-Minerals (CENTRUM SILVER) per tablet, Take 1 tablet by mouth daily, Disp: , Rfl:      VITAMIN E COMPLEX PO, , Disp: , Rfl:      Zinc Sulfate (ZINC 15 PO), , Disp: , Rfl:     Allergies  Allergies   Allergen Reactions     Codeine GI Disturbance       Social History  Social History     Socioeconomic History     Marital status:      Spouse name: Not on file     Number of children: Not on file     Years of education: Not on file     Highest education level: Not on file   Occupational History     Not on file   Social Needs     Financial resource strain: Not on file     Food insecurity     Worry: Not on file     Inability: Not on file     Transportation needs     Medical: Not on file     Non-medical: Not on file   Tobacco Use     Smoking status: Former Smoker     Packs/day: 0.50     Years: 5.00     Pack years: 2.50     Types: Cigarettes     Start date: 1968     Last attempt to quit: 2/15/1972     Years since quittin.2     Smokeless tobacco: Never Used     Tobacco comment: stopped in her 20's   Substance and Sexual Activity     Alcohol use: Yes     Alcohol/week: 0.0 standard drinks     Comment: Occasional     Drug use: No     Sexual activity: Not Currently   Lifestyle     Physical activity     Days per week: Not on file     Minutes per session: Not on  "file     Stress: Not on file   Relationships     Social connections     Talks on phone: Not on file     Gets together: Not on file     Attends Judaism service: Not on file     Active member of club or organization: Not on file     Attends meetings of clubs or organizations: Not on file     Relationship status: Not on file     Intimate partner violence     Fear of current or ex partner: Not on file     Emotionally abused: Not on file     Physically abused: Not on file     Forced sexual activity: Not on file   Other Topics Concern     Parent/sibling w/ CABG, MI or angioplasty before 65F 55M? Yes   Social History Narrative     Not on file       Family History  Family History   Problem Relation Age of Onset     Cancer Mother         Ovarian     Other Cancer Mother         Ovarian     Cerebrovascular Disease Father      Heart Disease Father      C.A.D. Father      Coronary Artery Disease Father      Cancer Maternal Grandmother      Other Cancer Maternal Grandmother         Ovarian/Bone     Cerebrovascular Disease Maternal Grandfather      Colon Cancer Maternal Grandfather      Diabetes Other         Great Grandmother     Breast Cancer No family hx of        Review of Systems  As per HPI and PMHx, otherwise 10 system review including the head and neck, constitutional, eyes, respiratory, GI, skin, neurologic, lymphatic, endocrine, and allergy systems is negative.    Physical Exam  /66 (BP Location: Right arm, Patient Position: Chair, Cuff Size: Adult Regular)   Pulse 76   Temp 97.6  F (36.4  C) (Tympanic)   Resp 16   Ht 1.651 m (5' 5\")   Wt 78 kg (172 lb)   BMI 28.62 kg/m    GENERAL: Patient is a pleasant, cooperative 79 year old female in no acute distress.  HEAD: Normocephalic, atraumatic.  Hair and scalp are normal.  EYES: Pupils are equal, round, reactive to light and accommodation.  Extraocular movements are intact.  The sclera nonicteric without injection.  The extraocular structures are normal.  EARS: " See below.  NEUROLOGIC: Cranial nerves II through XII are grossly intact.  Voice is strong.  Facial nerve examination is limited due to wearing a mask.    CARDIOVASCULAR: Extremities are warm and well-perfused.  No significant peripheral edema.  RESPIRATORY: Patient has nonlabored breathing without cough, wheeze, stridor.  PSYCHIATRIC: Patient is alert and oriented.  Mood and affect appear normal.  SKIN: Warm and dry.  No scalp, face, or neck lesions noted.     Physical Exam and Procedure     EARS: Normal shape and symmetry.  No tenderness when palpating the mastoid or tragal areas bilaterally.  The ears were then examined under the otic binocular microscope.  Otoscopic exam on the right reveals a clear canal.  The right tympanic membrane is round, intact without evidence of effusion, good landmarks.  No retraction, granulation, drainage, or evidence of cholesteatoma.       Attention was turned to the left ear.  Otoscopic exam on the left reveals a clear canal. The left tympanic membrane was round, intact with evidence of serous middle ear effusion.  No retraction or perforation noted.  No evidence of cholesteatoma.      Audiogram  The patient underwent an audiogram performed today.  My review of the audiogram shows normal sloping to mild sensorineural hearing loss in the right ear and mild sloping to moderately severe mixed hearing loss in the left ear.  Pure-tone average is 16 dB on the right and 35 dB on the left.  Speech reception threshhold is 15 dB on the right and 25 dB on the left.  The patient had 100% word recognition on the right and 96% word recognition on the left.  The patient had a type A tympanogram on the right and a low volume type B tympanogram on the left.     Assessment and Plan     ICD-10-CM    1. OME (otitis media with effusion), left  H65.92 AUDIOLOGY ADULT REFERRAL     Microscopy, Binocular   2. Mixed conductive and sensorineural hearing loss of left ear with restricted hearing of right ear   H90.A32 AUDIOLOGY ADULT REFERRAL     Microscopy, Binocular   3. Sensorineural hearing loss (SNHL) of right ear with restricted hearing of left ear  H90.A21 AUDIOLOGY ADULT REFERRAL     Microscopy, Binocular      It was my pleasure seeing Lavonne Tidwell today in clinic.  She does appear to have middle ear effusions still on the left-hand side.  She feels like her hearing is slowly improving and she is getting more popping and crackling with auto insufflation and yawning.  We discussed careful observation versus placement of an ear tube.  Since the patient feels like she is doing well, she would like to continue observation.  I will see her back in 6 weeks for recheck with audiogram.  She knows to contact me if she is having any pain, decreased hearing, drainage, etc.    Rob Harris MD  Department of Otolarygology-Head and Neck Surgery  [facility]       Again, thank you for allowing me to participate in the care of your patient.        Sincerely,        Rob Harris MD

## 2020-05-20 NOTE — PROGRESS NOTES
AUDIOLOGY REPORT    SUBJECTIVE:  Lavonne Tidwell is a 79 year old female who was seen at [facility] for an audiologic evaluation, referred by Dr. Harris.  The patient has been seen previously in this clinic on 2/7/2020 for assessment and results indicated a high tone left hearing loss.  The patient reports recent decrease in left ear hearing with a plugged sensation for a few months. She reports the left ear has been popping and seems to be getting better. The patient denies bilateral otalgia and bilateral drainage.     OBJECTIVE:    Otoscopic exam indicates ears are clear of cerumen bilaterally       Pure Tone Thresholds assessed using conventional audiometry with good  reliability from 250-8000 Hz bilaterally using circumaural headphones     RIGHT:  normal and mild hearing thresholds    LEFT:    mild, moderate and severe mixed hearing loss    Tympanogram:    RIGHT: normal eardrum mobility    LEFT:   restricted eardrum mobility     Reflexes (reported by stimulus ear 1000 Hz):     RIGHT: Ipsilateral is present    RIGHT: Contralateral is CNT    LEFT: Ipsilateral is CNT    LEFT: Contralateral is present  Note: Could not maintain seal for probe left     Speech Reception Threshold:    RIGHT: 15 dB HL    LEFT:   25 dB HL  Word Recognition Score:     RIGHT: 100% at 55 dB HL using NU-6 recorded word list.    LEFT:   96% at 65 dB HL using NU-6 recorded word list.      ASSESSMENT:   Mild high tone loss of hearing in the right ear with a mild to severe mixed hearing loss in the left ear.     Compared to patient's previous audiogram dated 2/7/2020, hearing has decreased in the left ear.juvenal s results were discussed with the patient in detail.     PLAN: It is recommended that the patient be seen by Dr. Harris for medical evaluation of their ears and hearing evaluation. Patient was counseled regarding hearing loss and impact on communication.  Please call this clinic with questions regarding these  results or recommendations.        Sherri Matthew M.A. F-AAA  Clinical audiologist [place] # [id number]  5/20/2020

## 2020-06-28 NOTE — PROGRESS NOTES
Chief Complaint   Patient presents with     Otitis Media     History of Present Illness  Lavonne Tidwell is a 79 year old female presents today for follow-up.  I initially evaluated the patient on 4/29/2020 with onset of otorrhea and decreased hearing in the left ear.  The patient appeared to have serous otitis media on examination.  Her tuning fork suggested a conductive hearing loss in the left ear.  We discussed placing ear tube versus good nasal regimen and auto insufflation for a few weeks.  The patient elected for conservative management.  Repeat exam on 5/20/2020 suggested persistent but resolving middle ear effusion on the left-hand side.  She felt like her ear was improving and so we again elected for observation.  She returns today for recheck with audiogram.    Seeing the patient at end of May, the patient reports having an improvement in the left ear.  She is now able to easily use her phone the left.  Overall she feels like her ears are essentially the same.  He denies any otalgia or otorrhea.  She has no history of ear surgery.    Past Medical History  Patient Active Problem List   Diagnosis     Injury of sigmoid colon     Esophageal reflux     Menopausal syndrome (hot flashes)     Urinary incontinence     Atrophic vaginitis     Hyperlipidemia LDL goal <130     Advanced directives, counseling/discussion     Onychomycosis     Senile nuclear sclerosis     Status post total left knee replacement     Status post total right knee replacement     Primary localized osteoarthrosis, lower leg     Tubulovillous adenoma polyp of colon     Current Medications    Current Outpatient Medications:      Acetaminophen (TYLENOL PO), Take 1,000 mg by mouth 2 times daily as needed for mild pain or fever, Disp: , Rfl:      Biotin 10 MG CAPS, , Disp: , Rfl:      CALCIUM + D OR, 2 TABLET DAILY, Disp: , Rfl:      Docusate Calcium (STOOL SOFTENER PO), , Disp: , Rfl:      fluticasone (FLONASE) 50 MCG/ACT nasal spray, USE TWO  SPRAYS IN EACH NOSTRIL ONCE DAILY AS NEEDED, Disp: 16 g, Rfl: 3     garlic 150 MG TABS tablet, Take 150 mg by mouth daily, Disp: , Rfl:      grape seed 50 MG CAPS, Take 50 mg by mouth daily, Disp: , Rfl:      loratadine (CLARITIN) 10 MG tablet, Take 10 mg by mouth daily, Disp: , Rfl:      methylcellulose (FIBER THERAPY) 500 MG TABS tablet, Take 500 mg by mouth daily, Disp: , Rfl:      Multiple Vitamins-Minerals (CENTRUM SILVER) per tablet, Take 1 tablet by mouth daily, Disp: , Rfl:      VITAMIN E COMPLEX PO, , Disp: , Rfl:      Zinc Sulfate (ZINC 15 PO), , Disp: , Rfl:     Allergies  Allergies   Allergen Reactions     Codeine GI Disturbance       Social History  Social History     Socioeconomic History     Marital status:      Spouse name: Not on file     Number of children: Not on file     Years of education: Not on file     Highest education level: Not on file   Occupational History     Not on file   Social Needs     Financial resource strain: Not on file     Food insecurity     Worry: Not on file     Inability: Not on file     Transportation needs     Medical: Not on file     Non-medical: Not on file   Tobacco Use     Smoking status: Former Smoker     Packs/day: 0.50     Years: 5.00     Pack years: 2.50     Types: Cigarettes     Start date: 1968     Last attempt to quit: 2/15/1972     Years since quittin.4     Smokeless tobacco: Never Used     Tobacco comment: stopped in her 20's   Substance and Sexual Activity     Alcohol use: Yes     Alcohol/week: 0.0 standard drinks     Comment: Occasional     Drug use: No     Sexual activity: Not Currently   Lifestyle     Physical activity     Days per week: Not on file     Minutes per session: Not on file     Stress: Not on file   Relationships     Social connections     Talks on phone: Not on file     Gets together: Not on file     Attends Taoism service: Not on file     Active member of club or organization: Not on file     Attends meetings of clubs or  organizations: Not on file     Relationship status: Not on file     Intimate partner violence     Fear of current or ex partner: Not on file     Emotionally abused: Not on file     Physically abused: Not on file     Forced sexual activity: Not on file   Other Topics Concern     Parent/sibling w/ CABG, MI or angioplasty before 65F 55M? Yes   Social History Narrative     Not on file       Family History  Family History   Problem Relation Age of Onset     Cancer Mother         Ovarian     Other Cancer Mother         Ovarian     Cerebrovascular Disease Father      Heart Disease Father      C.A.D. Father      Coronary Artery Disease Father      Cancer Maternal Grandmother      Other Cancer Maternal Grandmother         Ovarian/Bone     Cerebrovascular Disease Maternal Grandfather      Colon Cancer Maternal Grandfather      Diabetes Other         Great Grandmother     Breast Cancer No family hx of        Review of Systems  As per HPI and PMHx, otherwise 10 system review including the head and neck, constitutional, eyes, respiratory, GI, skin, neurologic, lymphatic, endocrine, and allergy systems is negative.    Physical Exam  /65 (BP Location: Left arm, Patient Position: Sitting, Cuff Size: Adult Regular)   Pulse 67   Temp 98.3  F (36.8  C) (Tympanic)   Resp 20   GENERAL: Patient is a pleasant, cooperative 79 year old female in no acute distress.  HEAD: Normocephalic, atraumatic.  Hair and scalp are normal.  EYES: Pupils are equal, round, reactive to light and accommodation.  Extraocular movements are intact.  The sclera nonicteric without injection.  The extraocular structures are normal.  EARS: Normal shape and symmetry.  No tenderness when palpating the mastoid or tragal areas bilaterally.  Otoscopic exam on the right reveals a clear canal.  The right tympanic membrane is round, intact without evidence of effusion, good landmarks.  No retraction, granulation, drainage, or evidence of cholesteatoma.  Otoscopic  exam on the left reveals a clear canal. The left tympanic membrane was round, without evidence of middle ear effusion.  No retraction or perforation noted.  No evidence of cholesteatoma.   NEUROLOGIC: Cranial nerves II through XII are grossly intact.  Voice is strong.  Facial nerve examination is limited due to wearing a mask.    CARDIOVASCULAR: Extremities are warm and well-perfused.  No significant peripheral edema.  RESPIRATORY: Patient has nonlabored breathing without cough, wheeze, stridor.  PSYCHIATRIC: Patient is alert and oriented.  Mood and affect appear normal.  SKIN: Warm and dry.  No scalp, face, or neck lesions noted.     Audiogram  The patient underwent an audiogram performed today.  My review of the audiogram shows normal hearing sloping to mild sensorineural loss in the high tones in the right and normal hearing sloping to mild sloping to severe mixed hearing loss in the left ear.  Pure-tone average is 14 dB on the right and 30 dB on the left.  Speech reception threshhold is 15 dB on the right and 20 dB on the left.  The patient had 100% word recognition on the right and 100% word recognition on the left.  The patient had a type A tympanogram on the right and a type A tympanogram on the left.     Assessment and Plan     ICD-10-CM    1. OME (otitis media with effusion), left  H65.92 AUDIOLOGY ADULT REFERRAL     CANCELED: Microscopy, Binocular   2. Mixed conductive and sensorineural hearing loss of left ear with restricted hearing of right ear  H90.A32 AUDIOLOGY ADULT REFERRAL     CANCELED: Microscopy, Binocular   3. Sensorineural hearing loss (SNHL) of right ear with restricted hearing of left ear  H90.A21 AUDIOLOGY ADULT REFERRAL     CANCELED: Microscopy, Binocular      It was my pleasure seeing Lavonne Tidwell today in clinic.  The patient appears to have resolved her left middle ear effusion.  She still has a moderate to severe high tone mixed loss but her bone line does approximate the right ear.   I think given her speech reception being at 20 dB with 100% word recognition in the left ear, observation is warranted.  The patient is not having any functional deficits.  I recommend to recheck her hearing in 1 to 3 years or sooner if her symptoms warrant.    Rob Harris MD  Department of Otolarygology-Head and Neck Surgery  [facility]

## 2020-06-29 ENCOUNTER — OFFICE VISIT (OUTPATIENT)
Dept: AUDIOLOGY | Facility: CLINIC | Age: 80
End: 2020-06-29
Payer: MEDICARE

## 2020-06-29 ENCOUNTER — OFFICE VISIT (OUTPATIENT)
Dept: OTOLARYNGOLOGY | Facility: CLINIC | Age: 80
End: 2020-06-29
Payer: MEDICARE

## 2020-06-29 VITALS
RESPIRATION RATE: 20 BRPM | DIASTOLIC BLOOD PRESSURE: 65 MMHG | SYSTOLIC BLOOD PRESSURE: 132 MMHG | TEMPERATURE: 98.3 F | HEART RATE: 67 BPM

## 2020-06-29 DIAGNOSIS — H90.A32 MIXED CONDUCTIVE AND SENSORINEURAL HEARING LOSS OF LEFT EAR WITH RESTRICTED HEARING OF RIGHT EAR: Primary | ICD-10-CM

## 2020-06-29 DIAGNOSIS — H65.92 OME (OTITIS MEDIA WITH EFFUSION), LEFT: Primary | ICD-10-CM

## 2020-06-29 DIAGNOSIS — H90.A21 SENSORINEURAL HEARING LOSS (SNHL) OF RIGHT EAR WITH RESTRICTED HEARING OF LEFT EAR: ICD-10-CM

## 2020-06-29 DIAGNOSIS — H90.A32 MIXED CONDUCTIVE AND SENSORINEURAL HEARING LOSS OF LEFT EAR WITH RESTRICTED HEARING OF RIGHT EAR: ICD-10-CM

## 2020-06-29 PROCEDURE — 99213 OFFICE O/P EST LOW 20 MIN: CPT | Performed by: OTOLARYNGOLOGY

## 2020-06-29 PROCEDURE — 92550 TYMPANOMETRY & REFLEX THRESH: CPT | Performed by: AUDIOLOGIST

## 2020-06-29 PROCEDURE — 99207 ZZC NO CHARGE LOS: CPT | Performed by: AUDIOLOGIST

## 2020-06-29 PROCEDURE — 92557 COMPREHENSIVE HEARING TEST: CPT | Performed by: AUDIOLOGIST

## 2020-06-29 NOTE — PROGRESS NOTES
"Initial /65 (BP Location: Left arm, Patient Position: Sitting, Cuff Size: Adult Regular)   Pulse 67   Temp 98.3  F (36.8  C) (Tympanic)   Resp 20  Estimated body mass index is 28.62 kg/m  as calculated from the following:    Height as of 5/20/20: 1.651 m (5' 5\").    Weight as of 5/20/20: 78 kg (172 lb). .    Tena VELARDE RN   Specialty Clinics   "

## 2020-06-29 NOTE — PROGRESS NOTES
AUDIOLOGY REPORT    SUBJECTIVE:  Lavonne Tidwell is a 79 year old female who was seen at [facility] for an audiologic evaluation, referred by Dr. Harris.  The patient has been seen previously in this clinic  for assessment and results indicated a mild to severe mixed hearing loss in the left ear. The patient reports her left ear hearing seems better and feels less plugged. The patient denies bilateral otalgia and bilateral drainage.     OBJECTIVE:    Otoscopic exam indicates ears are clear of cerumen bilaterally       Pure Tone Thresholds assessed using conventional audiometry with good  reliability from 250-8000 Hz bilaterally using insert earphones and circumaural headphones     RIGHT:  normal and mild high tone hearing loss at 1661-5394 Hz    LEFT:    normal, mild, moderate and severe mixed hearing loss    Tympanogram:    RIGHT: normal eardrum mobility    LEFT:   normal eardrum mobility    Reflexes (reported by stimulus ear 1000 Hz):     RIGHT: Ipsilateral is present    RIGHT: Contralateral is absent    LEFT: Ipsilateral is present    LEFT: Contralateral is absent    Speech Reception Threshold:    RIGHT: 15 dB HL    LEFT:   20 dB HL  Word Recognition Score:     RIGHT: 100% at 55 dB HL using NU-6 recorded word list.    LEFT:   100% at 60 dB HL using NU-6 recorded word list.      ASSESSMENT:   Mild high tone hearing loss in the right ear with a normal sloping to severe mixed hearing loss in the left ear.     Compared to patient's previous audiogram dated 5/20/2020, hearing has improved in the left ear from 250-1000 Hz. Today s results were discussed with the patient in detail.     PLAN: It is recommended that the patient be seen by Dr. Harris for medical evaluation of their ears and hearing evaluation.Patient was counseled regarding hearing loss and impact on communication.  Please call this clinic with questions regarding these results or recommendations.        Sherri Matthew M.A.  F-AAA  Clinical audiologist [place] # [id number]  6/29/2020

## 2020-10-13 ENCOUNTER — IMMUNIZATION (OUTPATIENT)
Dept: FAMILY MEDICINE | Facility: CLINIC | Age: 80
End: 2020-10-13
Payer: MEDICARE

## 2020-10-13 PROCEDURE — G0008 ADMIN INFLUENZA VIRUS VAC: HCPCS

## 2020-10-13 PROCEDURE — 90662 IIV NO PRSV INCREASED AG IM: CPT

## 2020-10-14 ENCOUNTER — VIRTUAL VISIT (OUTPATIENT)
Dept: FAMILY MEDICINE | Facility: CLINIC | Age: 80
End: 2020-10-14
Payer: MEDICARE

## 2020-10-14 VITALS — TEMPERATURE: 100.8 F

## 2020-10-14 DIAGNOSIS — T50.B95A REACTION TO INFLUENZA IMMUNIZATION, INITIAL ENCOUNTER: Primary | ICD-10-CM

## 2020-10-14 PROCEDURE — 99441 PR PHYSICIAN TELEPHONE EVALUATION 5-10 MIN: CPT | Mod: 95 | Performed by: PHYSICIAN ASSISTANT

## 2020-10-14 NOTE — PROGRESS NOTES
"Lavonne Tidwell is a 80 year old female who is being evaluated via a billable telephone visit.      The patient has been notified of following:     \"This telephone visit will be conducted via a call between you and your physician/provider. We have found that certain health care needs can be provided without the need for a physical exam.  This service lets us provide the care you need with a short phone conversation.  If a prescription is necessary we can send it directly to your pharmacy.  If lab work is needed we can place an order for that and you can then stop by our lab to have the test done at a later time.    Telephone visits are billed at different rates depending on your insurance coverage. During this emergency period, for some insurers they may be billed the same as an in-person visit.  Please reach out to your insurance provider with any questions.    If during the course of the call the physician/provider feels a telephone visit is not appropriate, you will not be charged for this service.\"    Patient has given verbal consent for Telephone visit?  Yes    What phone number would you like to be contacted at? [phone]    How would you like to obtain your AVS? Dejan    Subjective     Lavonne Tidwell is a 80 year old female who presents via phone visit today for the following health issues:    HPI  Acute Illness  Acute illness concerns: chills since having the flu shot  Onset/Duration: started yesterday  Symptoms:  Fever: YES  Chills/Sweats: YES  Headache (location?): YES- pressure with cold wind  Sinus Pressure: no  Conjunctivitis:  no  Ear Pain: no  Rhinorrhea: no  Congestion: no  Sore Throat: no  Cough: no  Wheeze: no  Decreased Appetite: YES  Nausea: no  Vomiting: no  Diarrhea: no  Dysuria/Freq.: no  Dysuria or Hematuria: no  Fatigue/Achiness: YES  Sick/Strep Exposure: no  Therapies tried and outcome: Tylenol - improving.     Review of Systems   Constitutional, HEENT, cardiovascular, pulmonary, gi " and gu systems are negative, except as otherwise noted.     Objective   Vitals - Patient Reported  Temperature (Patient Reported): 100.8  F (38.2  C)  Vitals:  No vitals were obtained today due to virtual visit.  General: healthy, alert and no distress  PSYCH: Alert and oriented times 3; coherent speech, normal   rate and volume, able to articulate logical thoughts, able   to abstract reason, no tangential thoughts, no hallucinations   or delusions  Her affect is normal  RESP: No cough, no audible wheezing, able to talk in full sentences  Remainder of exam unable to be completed due to telephone visits    Assessment & Plan   Reaction to influenza immunization, initial encounter  Pt presents with 1 day of fevers (100.8), chills, muscle aches that developed approximately 12 hours after receiving a flu shot. No known sick contacts. Has been taking Covid precautions very seriously, and she lacks cough and shortness of breath. Overall her symptoms are consistent with an adverse reaction to her influenza vaccine. There is no evidence of an allergic reaction as patient denies any swelling, hives, rashes, difficulty breathing or swallowing. Symptoms have improved with Tylenol. Pt will monitor symptoms over the next 1-2 days, as this should be self-limiting. If for some reason they persist, pt will call back for further evaluation.     Return in about 3 days (around 10/17/2020), or if symptoms worsen or fail to improve, for Video Visit.    Yong Jones PA-C  [facility]    Phone call duration:  6 minutes

## 2020-11-16 ENCOUNTER — HEALTH MAINTENANCE LETTER (OUTPATIENT)
Age: 80
End: 2020-11-16

## 2020-12-18 ENCOUNTER — OFFICE VISIT (OUTPATIENT)
Dept: AUDIOLOGY | Facility: CLINIC | Age: 80
End: 2020-12-18
Payer: MEDICARE

## 2020-12-18 ENCOUNTER — OFFICE VISIT (OUTPATIENT)
Dept: OTOLARYNGOLOGY | Facility: CLINIC | Age: 80
End: 2020-12-18
Payer: MEDICARE

## 2020-12-18 VITALS
WEIGHT: 172 LBS | SYSTOLIC BLOOD PRESSURE: 125 MMHG | DIASTOLIC BLOOD PRESSURE: 83 MMHG | BODY MASS INDEX: 28.62 KG/M2 | HEART RATE: 68 BPM | TEMPERATURE: 97.8 F

## 2020-12-18 DIAGNOSIS — H65.492 CHRONIC OTITIS MEDIA OF LEFT EAR WITH EFFUSION: Primary | ICD-10-CM

## 2020-12-18 DIAGNOSIS — H90.A32 MIXED CONDUCTIVE AND SENSORINEURAL HEARING LOSS OF LEFT EAR WITH RESTRICTED HEARING OF RIGHT EAR: Primary | ICD-10-CM

## 2020-12-18 DIAGNOSIS — H90.A32 MIXED CONDUCTIVE AND SENSORINEURAL HEARING LOSS OF LEFT EAR WITH RESTRICTED HEARING OF RIGHT EAR: ICD-10-CM

## 2020-12-18 DIAGNOSIS — H90.3 SENSORINEURAL HEARING LOSS (SNHL) OF BOTH EARS: ICD-10-CM

## 2020-12-18 DIAGNOSIS — H90.A21 SENSORINEURAL HEARING LOSS (SNHL) OF RIGHT EAR WITH RESTRICTED HEARING OF LEFT EAR: ICD-10-CM

## 2020-12-18 PROCEDURE — 99214 OFFICE O/P EST MOD 30 MIN: CPT | Mod: 25 | Performed by: OTOLARYNGOLOGY

## 2020-12-18 PROCEDURE — 92567 TYMPANOMETRY: CPT | Performed by: AUDIOLOGIST

## 2020-12-18 PROCEDURE — 92557 COMPREHENSIVE HEARING TEST: CPT | Performed by: AUDIOLOGIST

## 2020-12-18 PROCEDURE — 69433 CREATE EARDRUM OPENING: CPT | Performed by: OTOLARYNGOLOGY

## 2020-12-18 PROCEDURE — 99207 PR NO CHARGE LOS: CPT | Performed by: AUDIOLOGIST

## 2020-12-18 RX ORDER — CIPROFLOXACIN AND DEXAMETHASONE 3; 1 MG/ML; MG/ML
SUSPENSION/ DROPS AURICULAR (OTIC)
Qty: 10 ML | Refills: 3 | Status: SHIPPED | OUTPATIENT
Start: 2020-12-18 | End: 2021-09-01

## 2020-12-18 ASSESSMENT — PAIN SCALES - GENERAL: PAINLEVEL: NO PAIN (0)

## 2020-12-18 NOTE — PROCEDURES
PREOPERATIVE DIAGNOSES: Left otitis media with effusion, history of acute otitis media, left mixed hearing loss.    POSTOPERATIVE DIAGNOSES: Same.     PROCEDURE PERFORMED:   1. Left myringotomy with tympanostomy tube placement     SURGEON: Rob Harris MD      ASSISTANTS: None.     BLOOD LOSS: Scant.     COMPLICATIONS: None.      SPECIMENS: None.     ANESTHESIA: Local.     GRAFTS, IMPLANTS, DRAINS: None.     INDICATIONS: Prevent complications, treat disease.    FINDINGS:   1. Left intact tympanic membrane with serous middle-ear effusion and some granulation.    OPERATIVE TECHNIQUE: The patient was brought to the procedure room and identified by name clinic number.  They were placed supinely on the procedure chair.  The patient was prepped and draped in standard fashion.  After standard surgical pause, the microscope was brought to the field and used throughout the remainder of the case.  I first examined the ear on the left.  Cerumen was cleaned with curette.  Phenol was placed in the posterior-inferior quadrant.  A radial myringotomy was made in the posterior-inferior quadrant.  The middle ear was suctioned free.  The patient noticed improvement in hearing.  A Dura-Vent ear tube was placed into the myringotomy.       This marked the end of the procedure.  The patient tolerated the procedure well.  There were no complications.  There was minimal blood loss.  All standard protocol and universal precautions were used throughout the procedure.    Rob Harris MD  Department of Otolarygology-Head and Neck Surgery  [facility]

## 2020-12-18 NOTE — PROGRESS NOTES
Chief Complaint   Patient presents with     Ear Problem     hearing decreased in left ear- c/o ear pain with popping on 12/13/20 - off balance at time- c/o echo with talking/audio     History of Present Illness  Lavonne Tidwell is a 80 year old female who presents to me today for ear evaluation.  The patient reports developing some decreased hearing, ear discomfort, pain, and drainage about 5 days ago.  The drainage has stopped but the hearing is still down and the ear feels plugged.  She currently denies any otalgia, otorrhea, bloody otorrhea.  She does feel some off-balance but no karine vertigo.  She can tell that her hearing is down when she uses her phone on the left-hand side.  The patient had problems previously this year and we observe her effusion and it eventually resolved.  I last saw her in June 2020, her hearing had improved.  The patient not had previous ear surgery.      Past Medical History  Patient Active Problem List   Diagnosis     Injury of sigmoid colon     Esophageal reflux     Menopausal syndrome (hot flashes)     Urinary incontinence     Atrophic vaginitis     Hyperlipidemia LDL goal <130     Advanced directives, counseling/discussion     Onychomycosis     Senile nuclear sclerosis     Status post total left knee replacement     Status post total right knee replacement     Primary localized osteoarthrosis, lower leg     Tubulovillous adenoma polyp of colon     Current Medications     Current Outpatient Medications:      Biotin 10 MG CAPS, , Disp: , Rfl:      CALCIUM + D OR, 2 TABLET DAILY, Disp: , Rfl:      ciprofloxacin-dexamethasone (CIPRODEX) 0.3-0.1 % otic suspension, Place 5 drops in left ear twice daily for 7days.  Call if drainage persistent past 7 days., Disp: 10 mL, Rfl: 3     Docusate Calcium (STOOL SOFTENER PO), , Disp: , Rfl:      fluticasone (FLONASE) 50 MCG/ACT nasal spray, USE TWO SPRAYS IN EACH NOSTRIL ONCE DAILY AS NEEDED, Disp: 16 g, Rfl: 3     garlic 150 MG TABS tablet, Take  150 mg by mouth daily, Disp: , Rfl:      grape seed 50 MG CAPS, Take 50 mg by mouth daily, Disp: , Rfl:      loratadine (CLARITIN) 10 MG tablet, Take 10 mg by mouth daily, Disp: , Rfl:      methylcellulose (FIBER THERAPY) 500 MG TABS tablet, Take 500 mg by mouth daily, Disp: , Rfl:      Multiple Vitamins-Minerals (CENTRUM SILVER) per tablet, Take 1 tablet by mouth daily, Disp: , Rfl:      VITAMIN E COMPLEX PO, , Disp: , Rfl:      Zinc Sulfate (ZINC 15 PO), , Disp: , Rfl:      Acetaminophen (TYLENOL PO), Take 1,000 mg by mouth 2 times daily as needed for mild pain or fever, Disp: , Rfl:     Allergies  Allergies   Allergen Reactions     Codeine GI Disturbance       Social History   Social History     Socioeconomic History     Marital status:      Spouse name: Not on file     Number of children: Not on file     Years of education: Not on file     Highest education level: Not on file   Occupational History     Not on file   Social Needs     Financial resource strain: Not on file     Food insecurity     Worry: Not on file     Inability: Not on file     Transportation needs     Medical: Not on file     Non-medical: Not on file   Tobacco Use     Smoking status: Former Smoker     Packs/day: 0.50     Years: 5.00     Pack years: 2.50     Types: Cigarettes     Start date: 1968     Quit date: 2/15/1972     Years since quittin.8     Smokeless tobacco: Never Used     Tobacco comment: stopped in her 20's   Substance and Sexual Activity     Alcohol use: Yes     Alcohol/week: 0.0 standard drinks     Comment: Occasional     Drug use: No     Sexual activity: Not Currently   Lifestyle     Physical activity     Days per week: Not on file     Minutes per session: Not on file     Stress: Not on file   Relationships     Social connections     Talks on phone: Not on file     Gets together: Not on file     Attends Judaism service: Not on file     Active member of club or organization: Not on file     Attends meetings of  clubs or organizations: Not on file     Relationship status: Not on file     Intimate partner violence     Fear of current or ex partner: Not on file     Emotionally abused: Not on file     Physically abused: Not on file     Forced sexual activity: Not on file   Other Topics Concern     Parent/sibling w/ CABG, MI or angioplasty before 65F 55M? Yes   Social History Narrative     Not on file       Family History  Family History   Problem Relation Age of Onset     Cancer Mother         Ovarian     Other Cancer Mother         Ovarian     Cerebrovascular Disease Father      Heart Disease Father      C.A.D. Father      Coronary Artery Disease Father      Cancer Maternal Grandmother      Other Cancer Maternal Grandmother         Ovarian/Bone     Cerebrovascular Disease Maternal Grandfather      Colon Cancer Maternal Grandfather      Diabetes Other         Great Grandmother     Breast Cancer No family hx of        Review of Systems  As per HPI and PMHx, otherwise 10+ comprehensive system review is negative.    Physical Exam  /83 (BP Location: Left arm, Patient Position: Chair, Cuff Size: Adult Large)   Pulse 68   Temp 97.8  F (36.6  C) (Tympanic)   Wt 78 kg (172 lb)   BMI 28.62 kg/m    GENERAL: Patient is a pleasant, cooperative 80 year old female in no acute distress.  HEAD: Normocephalic, atraumatic.  Hair and scalp are normal.  EYES: Pupils are equal, round, reactive to light and accommodation.  Extraocular movements are intact.  The sclera nonicteric without injection.  The extraocular structures are normal.  EARS: See below .  NOSE: Nares are patent.  Nasal mucosa is slightly dry.  Negative anterior rhinoscopy.  NEUROLOGIC: Cranial nerves II through XII are grossly intact.  Voice is strong.  Patient is House-Brackmann I/VI bilaterally.  CARDIOVASCULAR: Extremities are warm and well-perfused.  No significant peripheral edema.  RESPIRATORY: Patient has nonlabored breathing without cough, wheeze,  stridor.  PSYCHIATRIC: Patient is alert and oriented.  Mood and affect appear normal.  SKIN: Warm and dry.  No scalp, face, or neck lesions noted.    Procedure: Flexible Nasal Endoscopy  Indication: Unilateral middle ear effusion, recurrent middle ear effusion    To best visualize the sinonasal anatomy and due to the chief complaint and HPI, I proceeded with flexible fiberoptic nasal endoscopy.  The bilateral nasal cavities were anesthetized and decongested with a mixture of lidocaine and neosynephrine.  The bilateral nasal cavities were then examined using flexible fiberoptic nasal endoscope.  The right nasal cavity was without masses, polyps, or mucopurulence.  The right middle turbinate and middle meatus was normal in appearance.  The sphenoethmoid recess was clear.  The left nasal cavity was without masses, polyps, or mucopurulence.  The left middle turbinate and middle meatus was normal in appearance.  The sphenoethmoid recess was clear.  The sinonasal mucosa appeared normal.  The nasal septum is essentially midline.  The nasopharynx had a normal appearance with normal Eustachian tube openings and fossa of Rosenmuller bilaterally.  There is some 1+ adenoid tissue and a small amount of mucus overlying the nasopharynx.  There is no obstruction of the eustachian tube openings bilaterally or obvious nasopharyngeal mass. The scope was removed.  The patient tolerated the procedure well.    Audiogram  The patient underwent an audiogram performed today.  My review of the audiogram shows normal sloping to mild sensorineural hearing loss in the right ear and moderate sloping to moderately severe mixed hearing loss in the left ear.  Pure-tone average is 15 dB on the right and 68 dB on the left.  Speech reception threshhold is 20 dB on the right and 60 dB on the left.  The patient had 100% word recognition on the right and 44% word recognition on the left.  The patient had a type A tympanogram on the right and a low volume  type B tympanogram on the left.     Assessment and Plan     ICD-10-CM    1. Chronic otitis media of left ear with effusion  H65.492 Tympanotomy W/Tube     ciprofloxacin-dexamethasone (CIPRODEX) 0.3-0.1 % otic suspension   2. Mixed conductive and sensorineural hearing loss of left ear with restricted hearing of right ear  H90.A32 Tympanotomy W/Tube     ciprofloxacin-dexamethasone (CIPRODEX) 0.3-0.1 % otic suspension   3. Sensorineural hearing loss (SNHL) of right ear with restricted hearing of left ear  H90.A21 Tympanotomy W/Tube     ciprofloxacin-dexamethasone (CIPRODEX) 0.3-0.1 % otic suspension     It was my pleasure seeing Lavonne Tidwell today in clinic.  The patient presents to me today with recent onset of ear symptoms on the left and mixed hearing loss in the left ear with low volume tympanogram.  This is most consistent with a middle ear effusion in the left ear. This is likely due to transient eustachian tube dysfunction.  Nasal endoscopy does not reveal nasopharyngeal mass or eustachian tube obstruction.  We discussed conservative measures again versus ear tube placement on the left.  The patient would like to move forward with ear tube placement on the left.     We discussed the risks, benefits, alternatives, options of left myringotomy with tube placement including, but not limited to: Risk of bleeding, risk of infection, risk of retained tympanostomy tube, risk of tympanic membrane perforation, risk of recurrent otorrhea, tympanostomy tube failure, potential need for additional procedures including tube removal/replacement.  We discussed the postoperative course and convalescence including using eardrops.  The patient voiced understanding and is willing to proceed.  Please see the procedure note for further details.    I will place the patient on postoperative Ciprodex drops to help with inflammation and drainage.  We will place 5 drops in the left ear twice daily for 7 days.  I will see her back in  roughly 2 months with a repeat hearing test.  The patient knows to contact me if he is having any problems.    Rob Harris MD  Department of Otolarygology-Head and Neck Surgery  [facility]

## 2020-12-18 NOTE — LETTER
12/18/2020         RE: Lavonne Tidwell  [address] [facility] [address]        Dear Colleague,    Thank you for referring your patient, Lavonne Tidwell, to the [facility]. Please see a copy of my visit note below.    Chief Complaint   Patient presents with     Ear Problem     hearing decreased in left ear- c/o ear pain with popping on 12/13/20 - off balance at time- c/o echo with talking/audio     History of Present Illness  Lavonne Tidwell is a 80 year old female who presents to me today for ear evaluation.  The patient reports developing some decreased hearing, ear discomfort, pain, and drainage about 5 days ago.  The drainage has stopped but the hearing is still down and the ear feels plugged.  She currently denies any otalgia, otorrhea, bloody otorrhea.  She does feel some off-balance but no karine vertigo.  She can tell that her hearing is down when she uses her phone on the left-hand side.  The patient had problems previously this year and we observe her effusion and it eventually resolved.  I last saw her in June 2020, her hearing had improved.  The patient not had previous ear surgery.      Past Medical History  Patient Active Problem List   Diagnosis     Injury of sigmoid colon     Esophageal reflux     Menopausal syndrome (hot flashes)     Urinary incontinence     Atrophic vaginitis     Hyperlipidemia LDL goal <130     Advanced directives, counseling/discussion     Onychomycosis     Senile nuclear sclerosis     Status post total left knee replacement     Status post total right knee replacement     Primary localized osteoarthrosis, lower leg     Tubulovillous adenoma polyp of colon     Current Medications     Current Outpatient Medications:      Biotin 10 MG CAPS, , Disp: , Rfl:      CALCIUM + D OR, 2 TABLET DAILY, Disp: , Rfl:      ciprofloxacin-dexamethasone (CIPRODEX) 0.3-0.1 % otic suspension, Place 5 drops in left ear twice daily for 7days.  Call if drainage  persistent past 7 days., Disp: 10 mL, Rfl: 3     Docusate Calcium (STOOL SOFTENER PO), , Disp: , Rfl:      fluticasone (FLONASE) 50 MCG/ACT nasal spray, USE TWO SPRAYS IN EACH NOSTRIL ONCE DAILY AS NEEDED, Disp: 16 g, Rfl: 3     garlic 150 MG TABS tablet, Take 150 mg by mouth daily, Disp: , Rfl:      grape seed 50 MG CAPS, Take 50 mg by mouth daily, Disp: , Rfl:      loratadine (CLARITIN) 10 MG tablet, Take 10 mg by mouth daily, Disp: , Rfl:      methylcellulose (FIBER THERAPY) 500 MG TABS tablet, Take 500 mg by mouth daily, Disp: , Rfl:      Multiple Vitamins-Minerals (CENTRUM SILVER) per tablet, Take 1 tablet by mouth daily, Disp: , Rfl:      VITAMIN E COMPLEX PO, , Disp: , Rfl:      Zinc Sulfate (ZINC 15 PO), , Disp: , Rfl:      Acetaminophen (TYLENOL PO), Take 1,000 mg by mouth 2 times daily as needed for mild pain or fever, Disp: , Rfl:     Allergies  Allergies   Allergen Reactions     Codeine GI Disturbance       Social History   Social History     Socioeconomic History     Marital status:      Spouse name: Not on file     Number of children: Not on file     Years of education: Not on file     Highest education level: Not on file   Occupational History     Not on file   Social Needs     Financial resource strain: Not on file     Food insecurity     Worry: Not on file     Inability: Not on file     Transportation needs     Medical: Not on file     Non-medical: Not on file   Tobacco Use     Smoking status: Former Smoker     Packs/day: 0.50     Years: 5.00     Pack years: 2.50     Types: Cigarettes     Start date: 1968     Quit date: 2/15/1972     Years since quittin.8     Smokeless tobacco: Never Used     Tobacco comment: stopped in her 20's   Substance and Sexual Activity     Alcohol use: Yes     Alcohol/week: 0.0 standard drinks     Comment: Occasional     Drug use: No     Sexual activity: Not Currently   Lifestyle     Physical activity     Days per week: Not on file     Minutes per session:  Not on file     Stress: Not on file   Relationships     Social connections     Talks on phone: Not on file     Gets together: Not on file     Attends [employer] service: Not on file     Active member of club or organization: Not on file     Attends meetings of clubs or organizations: Not on file     Relationship status: Not on file     Intimate partner violence     Fear of current or ex partner: Not on file     Emotionally abused: Not on file     Physically abused: Not on file     Forced sexual activity: Not on file   Other Topics Concern     Parent/sibling w/ CABG, MI or angioplasty before 65F 55M? Yes   Social History Narrative     Not on file       Family History  Family History   Problem Relation Age of Onset     Cancer Mother         Ovarian     Other Cancer Mother         Ovarian     Cerebrovascular Disease Father      Heart Disease Father      C.A.D. Father      Coronary Artery Disease Father      Cancer Maternal Grandmother      Other Cancer Maternal Grandmother         Ovarian/Bone     Cerebrovascular Disease Maternal Grandfather      Colon Cancer Maternal Grandfather      Diabetes Other         Great Grandmother     Breast Cancer No family hx of        Review of Systems  As per HPI and PMHx, otherwise 10+ comprehensive system review is negative.    Physical Exam  /83 (BP Location: Left arm, Patient Position: Chair, Cuff Size: Adult Large)   Pulse 68   Temp 97.8  F (36.6  C) (Tympanic)   Wt 78 kg (172 lb)   BMI 28.62 kg/m    GENERAL: Patient is a pleasant, cooperative 80 year old female in no acute distress.  HEAD: Normocephalic, atraumatic.  Hair and scalp are normal.  EYES: Pupils are equal, round, reactive to light and accommodation.  Extraocular movements are intact.  The sclera nonicteric without injection.  The extraocular structures are normal.  EARS: See below .  NOSE: Nares are patent.  Nasal mucosa is slightly dry.  Negative anterior rhinoscopy.  NEUROLOGIC: Cranial nerves II through XII  are grossly intact.  Voice is strong.  Patient is House-Brackmann I/VI bilaterally.  CARDIOVASCULAR: Extremities are warm and well-perfused.  No significant peripheral edema.  RESPIRATORY: Patient has nonlabored breathing without cough, wheeze, stridor.  PSYCHIATRIC: Patient is alert and oriented.  Mood and affect appear normal.  SKIN: Warm and dry.  No scalp, face, or neck lesions noted.    Procedure: Flexible Nasal Endoscopy  Indication: Unilateral middle ear effusion, recurrent middle ear effusion    To best visualize the sinonasal anatomy and due to the chief complaint and HPI, I proceeded with flexible fiberoptic nasal endoscopy.  The bilateral nasal cavities were anesthetized and decongested with a mixture of lidocaine and neosynephrine.  The bilateral nasal cavities were then examined using flexible fiberoptic nasal endoscope.  The right nasal cavity was without masses, polyps, or mucopurulence.  The right middle turbinate and middle meatus was normal in appearance.  The sphenoethmoid recess was clear.  The left nasal cavity was without masses, polyps, or mucopurulence.  The left middle turbinate and middle meatus was normal in appearance.  The sphenoethmoid recess was clear.  The sinonasal mucosa appeared normal.  The nasal septum is essentially midline.  The nasopharynx had a normal appearance with normal Eustachian tube openings and fossa of Rosenmuller bilaterally.  There is some 1+ adenoid tissue and a small amount of mucus overlying the nasopharynx.  There is no obstruction of the eustachian tube openings bilaterally or obvious nasopharyngeal mass. The scope was removed.  The patient tolerated the procedure well.    Audiogram  The patient underwent an audiogram performed today.  My review of the audiogram shows normal sloping to mild sensorineural hearing loss in the right ear and moderate sloping to moderately severe mixed hearing loss in the left ear.  Pure-tone average is 15 dB on the right and 68 dB  on the left.  Speech reception threshhold is 20 dB on the right and 60 dB on the left.  The patient had 100% word recognition on the right and 44% word recognition on the left.  The patient had a type A tympanogram on the right and a low volume type B tympanogram on the left.     Assessment and Plan     ICD-10-CM    1. Chronic otitis media of left ear with effusion  H65.492 Tympanotomy W/Tube     ciprofloxacin-dexamethasone (CIPRODEX) 0.3-0.1 % otic suspension   2. Mixed conductive and sensorineural hearing loss of left ear with restricted hearing of right ear  H90.A32 Tympanotomy W/Tube     ciprofloxacin-dexamethasone (CIPRODEX) 0.3-0.1 % otic suspension   3. Sensorineural hearing loss (SNHL) of right ear with restricted hearing of left ear  H90.A21 Tympanotomy W/Tube     ciprofloxacin-dexamethasone (CIPRODEX) 0.3-0.1 % otic suspension     It was my pleasure seeing Lavonne Tidwell today in clinic.  The patient presents to me today with recent onset of ear symptoms on the left and mixed hearing loss in the left ear with low volume tympanogram.  This is most consistent with a middle ear effusion in the left ear. This is likely due to transient eustachian tube dysfunction.  Nasal endoscopy does not reveal nasopharyngeal mass or eustachian tube obstruction.  We discussed conservative measures again versus ear tube placement on the left.  The patient would like to move forward with ear tube placement on the left.     We discussed the risks, benefits, alternatives, options of left myringotomy with tube placement including, but not limited to: Risk of bleeding, risk of infection, risk of retained tympanostomy tube, risk of tympanic membrane perforation, risk of recurrent otorrhea, tympanostomy tube failure, potential need for additional procedures including tube removal/replacement.  We discussed the postoperative course and convalescence including using eardrops.  The patient voiced understanding and is willing to  proceed.  Please see the procedure note for further details.    I will place the patient on postoperative Ciprodex drops to help with inflammation and drainage.  We will place 5 drops in the left ear twice daily for 7 days.  I will see her back in roughly 2 months with a repeat hearing test.  The patient knows to contact me if he is having any problems.    Rob Harris MD  Department of Otolarygology-Head and Neck Surgery  [facility]         Again, thank you for allowing me to participate in the care of your patient.        Sincerely,        Rob Harris MD

## 2020-12-18 NOTE — NURSING NOTE
"Initial /83 (BP Location: Left arm, Patient Position: Chair, Cuff Size: Adult Large)   Pulse 68   Temp 97.8  F (36.6  C) (Tympanic)   Wt 78 kg (172 lb)   BMI 28.62 kg/m   Estimated body mass index is 28.62 kg/m  as calculated from the following:    Height as of 5/20/20: 1.651 m (5' 5\").    Weight as of this encounter: 78 kg (172 lb). .    Indigo Pastor LPN    "

## 2020-12-18 NOTE — PROGRESS NOTES
AUDIOLOGY REPORT     SUMMARY: Audiology visit completed. See audiogram for results.     RECOMMENDATIONS: Follow-up with ENT    Rosy Levi Licensed Audiologist #[id number]

## 2020-12-18 NOTE — NURSING NOTE
This laryngoscopy scope was used on this patient.    Flexible    [employer] Flexible #[id number] Adult

## 2020-12-19 DIAGNOSIS — J31.0 CHRONIC RHINITIS: ICD-10-CM

## 2020-12-21 ENCOUNTER — TELEPHONE (OUTPATIENT)
Dept: FAMILY MEDICINE | Facility: CLINIC | Age: 80
End: 2020-12-21

## 2020-12-21 DIAGNOSIS — J31.0 CHRONIC RHINITIS: ICD-10-CM

## 2020-12-21 RX ORDER — FLUTICASONE PROPIONATE 50 MCG
SPRAY, SUSPENSION (ML) NASAL
Qty: 16 G | Refills: 3 | Status: SHIPPED | OUTPATIENT
Start: 2020-12-21 | End: 2021-05-17

## 2020-12-21 RX ORDER — FLUTICASONE PROPIONATE 50 MCG
SPRAY, SUSPENSION (ML) NASAL
Refills: 3 | OUTPATIENT
Start: 2020-12-21

## 2020-12-21 NOTE — TELEPHONE ENCOUNTER
Reason for call:  Medication   If this is a refill request, has the caller requested the refill from the pharmacy already? No  Will the patient be using a [facility]? Yes    Name of the pharmacy and phone number for the current request:     [facility] [place] - [address] [place] [address]    Phone: [phone]    Name of the medication requested:     fluticasone (FLONASE) 50 MCG/ACT nasal spray    Other request: per patient , had a tube place in her ear Friday and was told by her ENT to get this medication refilled    Phone number to reach patient:  Home number on file [phone] (home)    Best Time:  Anytime     Can we leave a detailed message on this number?  NO    Travel screening: Not Applicable

## 2021-01-12 ENCOUNTER — ANCILLARY PROCEDURE (OUTPATIENT)
Dept: MAMMOGRAPHY | Facility: CLINIC | Age: 81
End: 2021-01-12
Attending: FAMILY MEDICINE
Payer: MEDICARE

## 2021-01-12 DIAGNOSIS — Z12.31 VISIT FOR SCREENING MAMMOGRAM: ICD-10-CM

## 2021-01-12 PROCEDURE — 77063 BREAST TOMOSYNTHESIS BI: CPT | Mod: TC | Performed by: RADIOLOGY

## 2021-01-12 PROCEDURE — 77067 SCR MAMMO BI INCL CAD: CPT | Mod: TC | Performed by: RADIOLOGY

## 2021-01-18 ENCOUNTER — HOSPITAL ENCOUNTER (OUTPATIENT)
Dept: ULTRASOUND IMAGING | Facility: CLINIC | Age: 81
Discharge: HOME | End: 2021-01-18
Attending: FAMILY MEDICINE | Admitting: FAMILY MEDICINE
Payer: MEDICARE

## 2021-01-18 DIAGNOSIS — R92.8 ABNORMAL MAMMOGRAM: ICD-10-CM

## 2021-01-18 PROCEDURE — 76642 ULTRASOUND BREAST LIMITED: CPT | Mod: RT

## 2021-02-04 ENCOUNTER — IMMUNIZATION (OUTPATIENT)
Dept: FAMILY MEDICINE | Facility: CLINIC | Age: 81
End: 2021-02-04
Payer: MEDICARE

## 2021-02-04 PROCEDURE — 0001A PR COVID VAC PFIZER DIL RECON 30 MCG/0.3 ML IM: CPT

## 2021-02-04 PROCEDURE — 91300 PR COVID VAC PFIZER DIL RECON 30 MCG/0.3 ML IM: CPT

## 2021-02-25 ENCOUNTER — IMMUNIZATION (OUTPATIENT)
Dept: FAMILY MEDICINE | Facility: CLINIC | Age: 81
End: 2021-02-25
Attending: FAMILY MEDICINE
Payer: MEDICARE

## 2021-02-25 PROCEDURE — 0002A PR COVID VAC PFIZER DIL RECON 30 MCG/0.3 ML IM: CPT

## 2021-02-25 PROCEDURE — 91300 PR COVID VAC PFIZER DIL RECON 30 MCG/0.3 ML IM: CPT

## 2021-02-26 NOTE — PROGRESS NOTES
Chief Complaint   Patient presents with     Follow Up     today she feels hearing is better- has tube in left ear drained a lot the first weeks but no drainage noted lately/audio     History of Present Illness  Lavonne Tidwell is a 80 year old female who presents today for follow up. I evaluated the patient on 12/18/2020 with left otitis media with effusion.  We placed an ear tube in the left and treated her with drops.  She returns today for follow up.     Since last seeing the patient, she reports having an improvement in her left ear hearing.  She was having some drainage from the left ear tube for a few weeks after placement but this is resolved.  She denies currently any otalgia, otorrhea, bloody otorrhea. She does feel some off-balance but no karine vertigo.  Aside from this ear tube, the patient has not had previous ear surgery.   Patient also notices intermittent nasal congestion and postnasal drainage.  She has been using Flonase with some benefit in the past.  She would like to have another prescription for Flonase.     Past Medical History  Patient Active Problem List   Diagnosis     Injury of sigmoid colon     Esophageal reflux     Menopausal syndrome (hot flashes)     Urinary incontinence     Atrophic vaginitis     Hyperlipidemia LDL goal <130     Advanced directives, counseling/discussion     Onychomycosis     Senile nuclear sclerosis     Status post total left knee replacement     Status post total right knee replacement     Primary localized osteoarthrosis, lower leg     Tubulovillous adenoma polyp of colon     Current Medications    Current Outpatient Medications:      Acetaminophen (TYLENOL PO), Take 1,000 mg by mouth 2 times daily as needed for mild pain or fever, Disp: , Rfl:      Biotin 10 MG CAPS, , Disp: , Rfl:      CALCIUM + D OR, 2 TABLET DAILY, Disp: , Rfl:      Docusate Calcium (STOOL SOFTENER PO), , Disp: , Rfl:      fluticasone (FLONASE) 50 MCG/ACT nasal spray, Spray 2 sprays into both  nostrils daily, Disp: 47.4 mL, Rfl: 3     fluticasone (FLONASE) 50 MCG/ACT nasal spray, USE TWO SPRAYS IN EACH NOSTRIL ONCE DAILY AS NEEDED, Disp: 16 g, Rfl: 3     garlic 150 MG TABS tablet, Take 150 mg by mouth daily, Disp: , Rfl:      grape seed 50 MG CAPS, Take 50 mg by mouth daily, Disp: , Rfl:      loratadine (CLARITIN) 10 MG tablet, Take 10 mg by mouth daily, Disp: , Rfl:      methylcellulose (FIBER THERAPY) 500 MG TABS tablet, Take 500 mg by mouth daily, Disp: , Rfl:      Multiple Vitamins-Minerals (CENTRUM SILVER) per tablet, Take 1 tablet by mouth daily, Disp: , Rfl:      VITAMIN E COMPLEX PO, , Disp: , Rfl:      Zinc Sulfate (ZINC 15 PO), , Disp: , Rfl:      ciprofloxacin-dexamethasone (CIPRODEX) 0.3-0.1 % otic suspension, Place 5 drops in left ear twice daily for 7days.  Call if drainage persistent past 7 days. (Patient not taking: Reported on 3/1/2021), Disp: 10 mL, Rfl: 3    Allergies  Allergies   Allergen Reactions     Codeine GI Disturbance       Social History  Social History     Socioeconomic History     Marital status:      Spouse name: Not on file     Number of children: Not on file     Years of education: Not on file     Highest education level: Not on file   Occupational History     Not on file   Social Needs     Financial resource strain: Not on file     Food insecurity     Worry: Not on file     Inability: Not on file     Transportation needs     Medical: Not on file     Non-medical: Not on file   Tobacco Use     Smoking status: Former Smoker     Packs/day: 0.50     Years: 5.00     Pack years: 2.50     Types: Cigarettes     Start date: 1968     Quit date: 2/15/1972     Years since quittin.0     Smokeless tobacco: Never Used     Tobacco comment: stopped in her 20's   Substance and Sexual Activity     Alcohol use: Yes     Alcohol/week: 0.0 standard drinks     Comment: Occasional     Drug use: No     Sexual activity: Not Currently   Lifestyle     Physical activity     Days per  week: Not on file     Minutes per session: Not on file     Stress: Not on file   Relationships     Social connections     Talks on phone: Not on file     Gets together: Not on file     Attends [employer]orationist service: Not on file     Active member of club or organization: Not on file     Attends meetings of clubs or organizations: Not on file     Relationship status: Not on file     Intimate partner violence     Fear of current or ex partner: Not on file     Emotionally abused: Not on file     Physically abused: Not on file     Forced sexual activity: Not on file   Other Topics Concern     Parent/sibling w/ CABG, MI or angioplasty before 65F 55M? Yes   Social History Narrative     Not on file       Family History  Family History   Problem Relation Age of Onset     Cancer Mother         Ovarian     Other Cancer Mother         Ovarian     Cerebrovascular Disease Father      Heart Disease Father      C.A.D. Father      Coronary Artery Disease Father      Cancer Maternal Grandmother      Other Cancer Maternal Grandmother         Ovarian/Bone     Cerebrovascular Disease Maternal Grandfather      Colon Cancer Maternal Grandfather      Diabetes Other         Great Grandmother     Breast Cancer No family hx of        Review of Systems  As per HPI and PMHx, otherwise 10 system review including the head and neck, constitutional, eyes, respiratory, GI, skin, neurologic, lymphatic, endocrine, and allergy systems is negative.    Physical Exam  BP (!) 141/66 (BP Location: Right arm, Patient Position: Chair, Cuff Size: Adult Large)   Pulse 73   Temp 98.2  F (36.8  C) (Tympanic)   Wt 78 kg (172 lb)   BMI 28.62 kg/m    GENERAL: Patient is a pleasant, cooperative 80 year old female in no acute distress.  HEAD: Normocephalic, atraumatic.  Hair and scalp are normal.  EYES: Pupils are equal, round, reactive to light and accommodation.  Extraocular movements are intact.  The sclera nonicteric without injection.  The extraocular structures  are normal.  EARS:  Normal shape and symmetry.  No tenderness to palpate at the mastoid or tragal areas bilaterally.  Otoscopic exam on the right reveals a clear canal.  The right tympanic membrane is intact without evidence of effusion.  Otoscopic exam in the left reveals a mild amount of ceruminous debris in the canal.  There is a Dura-Vent ear tube in the posterior-inferior quadrant.  The middle ear is well aerated.  No granulation or drainage.    NEUROLOGIC: Cranial nerves II through XII are grossly intact.  Voice is strong.  Facial nerve examination incomplete due to the patient wearing a mask.  CARDIOVASCULAR: Extremities are warm and well-perfused.  No significant peripheral edema.  RESPIRATORY: Patient has nonlabored breathing without cough, wheeze, stridor.  PSYCHIATRIC: Patient is alert and oriented.  Mood and affect appear normal.  SKIN: Warm and dry.  No scalp, face, or neck lesions noted.     Audiogram  The patient underwent an audiogram performed today.  My review of the audiogram shows normal sloping to mild sensorineural hearing loss in the right ear and mild sloping to moderately severe mixed hearing loss in the left ear.  Pure-tone average is 12 dB on the right and 33 dB on the left.  Speech reception threshhold is 10 dB on the right and 40 dB on the left.  The patient had 100% word recognition on the right and 92%% word recognition on the left.  The patient had a type A tympanogram on the right and a intermediate volume type B tympanogram on the left.     Assessment and Plan     ICD-10-CM    1. Chronic otitis media of left ear with effusion  H65.492    2. Mixed conductive and sensorineural hearing loss of left ear with restricted hearing of right ear  H90.A32    3. Sensorineural hearing loss (SNHL) of right ear with restricted hearing of left ear  H90.A21    4. Retained myringotomy tube in left ear  Z96.22    5. History of allergic rhinitis  Z87.09 AUDIOLOGY ADULT REFERRAL     fluticasone  (FLONASE) 50 MCG/ACT nasal spray      It was my pleasure seeing Lavonne Tidwell today in clinic.  The left ear tube is in good placement and appears functional.  She does have still some mixed hearing loss in the left ear but it significantly improved.  We discussed contacting me in the future if she needs eardrops for drainage.  I provided her with a prescription for Flonase nasal spray 2 sprays each side once daily.  I would like to recheck her ear tube in 6 months.  We also discussed allergy evaluation in the future.  The patient will let me know if she would like to pursue allergy evaluation in the future.    Lavonne to follow up with Primary Care provider regarding elevated blood pressure.    Rob Harris MD  Department of Otolarygology-Head and Neck Surgery  [facility]

## 2021-03-01 ENCOUNTER — OFFICE VISIT (OUTPATIENT)
Dept: AUDIOLOGY | Facility: CLINIC | Age: 81
End: 2021-03-01
Payer: MEDICARE

## 2021-03-01 ENCOUNTER — OFFICE VISIT (OUTPATIENT)
Dept: OTOLARYNGOLOGY | Facility: CLINIC | Age: 81
End: 2021-03-01
Payer: MEDICARE

## 2021-03-01 VITALS
HEART RATE: 73 BPM | WEIGHT: 172 LBS | DIASTOLIC BLOOD PRESSURE: 66 MMHG | TEMPERATURE: 98.2 F | BODY MASS INDEX: 28.62 KG/M2 | SYSTOLIC BLOOD PRESSURE: 141 MMHG

## 2021-03-01 DIAGNOSIS — Z87.09 HISTORY OF ALLERGIC RHINITIS: ICD-10-CM

## 2021-03-01 DIAGNOSIS — H90.A21 SENSORINEURAL HEARING LOSS (SNHL) OF RIGHT EAR WITH RESTRICTED HEARING OF LEFT EAR: ICD-10-CM

## 2021-03-01 DIAGNOSIS — H65.492 CHRONIC OTITIS MEDIA OF LEFT EAR WITH EFFUSION: Primary | ICD-10-CM

## 2021-03-01 DIAGNOSIS — Z96.22 RETAINED MYRINGOTOMY TUBE IN LEFT EAR: ICD-10-CM

## 2021-03-01 DIAGNOSIS — H90.A32 MIXED CONDUCTIVE AND SENSORINEURAL HEARING LOSS OF LEFT EAR WITH RESTRICTED HEARING OF RIGHT EAR: ICD-10-CM

## 2021-03-01 DIAGNOSIS — H90.A21 SENSORINEURAL HEARING LOSS (SNHL) OF RIGHT EAR WITH RESTRICTED HEARING OF LEFT EAR: Primary | ICD-10-CM

## 2021-03-01 PROCEDURE — 99207 PR NO CHARGE LOS: CPT | Performed by: AUDIOLOGIST

## 2021-03-01 PROCEDURE — 99213 OFFICE O/P EST LOW 20 MIN: CPT | Performed by: OTOLARYNGOLOGY

## 2021-03-01 PROCEDURE — 92567 TYMPANOMETRY: CPT | Performed by: AUDIOLOGIST

## 2021-03-01 PROCEDURE — 92557 COMPREHENSIVE HEARING TEST: CPT | Performed by: AUDIOLOGIST

## 2021-03-01 RX ORDER — FLUTICASONE PROPIONATE 50 MCG
2 SPRAY, SUSPENSION (ML) NASAL DAILY
Qty: 47.4 ML | Refills: 3 | Status: SHIPPED | OUTPATIENT
Start: 2021-03-01 | End: 2023-01-23

## 2021-03-01 ASSESSMENT — PAIN SCALES - GENERAL: PAINLEVEL: NO PAIN (0)

## 2021-03-01 NOTE — NURSING NOTE
"Initial BP (!) 141/66 (BP Location: Right arm, Patient Position: Chair, Cuff Size: Adult Large)   Pulse 73   Temp 98.2  F (36.8  C) (Tympanic)   Wt 78 kg (172 lb)   BMI 28.62 kg/m   Estimated body mass index is 28.62 kg/m  as calculated from the following:    Height as of 5/20/20: 1.651 m (5' 5\").    Weight as of this encounter: 78 kg (172 lb). .    Indigo Pastor LPN    "

## 2021-03-01 NOTE — PROGRESS NOTES
AUDIOLOGY REPORT:    Patient was referred from ENT by Dr. Harris for audiology evaluation. The patient had a tube placed in the left ear on 12/18/2020. Results from that day showed normal hearing sensitivity sloping to mild to moderate sensorineural hearing loss in the right ear and moderate to profound mixed hearing loss in the left ear. The patient returns today for follow up and reports that her left ear has been much better for around the last week. Prior to that she had been having drainage. She reports that the drainage stopped and her hearing seems to have gotten better. The patient reports that she does not have ear pain or pressure.    Testing:    Otoscopy:   Otoscopic exam indicates ears are clear of cerumen bilaterally. A PE tube is present in the left ear.    Tympanograms:    RIGHT: normal eardrum mobility     LEFT:   large ear canal volume consistent with patent PE tubes    Thresholds:   Pure Tone Thresholds assessed using conventional audiometry with good reliability from 250-8000 Hz bilaterally using insert earphones and circumaural headphones     RIGHT:  mild to moderate sensorineural hearing loss at 3000 and 9803-9552 Hz with normal hearing sensitivity at all other tested frequencies    LEFT:    mild to severe mixed hearing loss    Speech Reception Threshold:    RIGHT: 10 dB HL    LEFT:   40 dB HL  Results are in agreement with pure tone average.     Word Recognition Score:     RIGHT: 100% at 55 dB HL using NU-6 recorded word list.    LEFT:   92% at 80 dB HL using NU-6 recorded word list.    Discussed results with the patient. Compared to 12/18/2020, thresholds today are 15 dB better at 250 Hz in the right ear, 15-40 dB better at 250-6000 Hz in the left ear, and stable at all other tested frequencies. Word recognition is stable in the right ear and significantly better today in the left ear.    Patient was returned to ENT for follow up.     Rosy Lee, CCC-A  Licensed Audiologist  #[id number]  3/1/2021

## 2021-03-01 NOTE — LETTER
3/1/2021         RE: Lavonne Tidwell  [address] [facility] [address]        Dear Colleague,    Thank you for referring your patient, Lavonne Tidwell, to the [facility]. Please see a copy of my visit note below.    Chief Complaint   Patient presents with     Follow Up     today she feels hearing is better- has tube in left ear drained a lot the first weeks but no drainage noted lately/audio     History of Present Illness  Lavonne Tidwell is a 80 year old female who presents today for follow up. I evaluated the patient on 12/18/2020 with left otitis media with effusion.  We placed an ear tube in the left and treated her with drops.  She returns today for follow up.     Since last seeing the patient, she reports having an improvement in her left ear hearing.  She was having some drainage from the left ear tube for a few weeks after placement but this is resolved.  She denies currently any otalgia, otorrhea, bloody otorrhea. She does feel some off-balance but no karine vertigo.  Aside from this ear tube, the patient has not had previous ear surgery.   Patient also notices intermittent nasal congestion and postnasal drainage.  She has been using Flonase with some benefit in the past.  She would like to have another prescription for Flonase.     Past Medical History  Patient Active Problem List   Diagnosis     Injury of sigmoid colon     Esophageal reflux     Menopausal syndrome (hot flashes)     Urinary incontinence     Atrophic vaginitis     Hyperlipidemia LDL goal <130     Advanced directives, counseling/discussion     Onychomycosis     Senile nuclear sclerosis     Status post total left knee replacement     Status post total right knee replacement     Primary localized osteoarthrosis, lower leg     Tubulovillous adenoma polyp of colon     Current Medications    Current Outpatient Medications:      Acetaminophen (TYLENOL PO), Take 1,000 mg by mouth 2 times daily as needed for mild  pain or fever, Disp: , Rfl:      Biotin 10 MG CAPS, , Disp: , Rfl:      CALCIUM + D OR, 2 TABLET DAILY, Disp: , Rfl:      Docusate Calcium (STOOL SOFTENER PO), , Disp: , Rfl:      fluticasone (FLONASE) 50 MCG/ACT nasal spray, Spray 2 sprays into both nostrils daily, Disp: 47.4 mL, Rfl: 3     fluticasone (FLONASE) 50 MCG/ACT nasal spray, USE TWO SPRAYS IN EACH NOSTRIL ONCE DAILY AS NEEDED, Disp: 16 g, Rfl: 3     garlic 150 MG TABS tablet, Take 150 mg by mouth daily, Disp: , Rfl:      grape seed 50 MG CAPS, Take 50 mg by mouth daily, Disp: , Rfl:      loratadine (CLARITIN) 10 MG tablet, Take 10 mg by mouth daily, Disp: , Rfl:      methylcellulose (FIBER THERAPY) 500 MG TABS tablet, Take 500 mg by mouth daily, Disp: , Rfl:      Multiple Vitamins-Minerals (CENTRUM SILVER) per tablet, Take 1 tablet by mouth daily, Disp: , Rfl:      VITAMIN E COMPLEX PO, , Disp: , Rfl:      Zinc Sulfate (ZINC 15 PO), , Disp: , Rfl:      ciprofloxacin-dexamethasone (CIPRODEX) 0.3-0.1 % otic suspension, Place 5 drops in left ear twice daily for 7days.  Call if drainage persistent past 7 days. (Patient not taking: Reported on 3/1/2021), Disp: 10 mL, Rfl: 3    Allergies  Allergies   Allergen Reactions     Codeine GI Disturbance       Social History  Social History     Socioeconomic History     Marital status:      Spouse name: Not on file     Number of children: Not on file     Years of education: Not on file     Highest education level: Not on file   Occupational History     Not on file   Social Needs     Financial resource strain: Not on file     Food insecurity     Worry: Not on file     Inability: Not on file     Transportation needs     Medical: Not on file     Non-medical: Not on file   Tobacco Use     Smoking status: Former Smoker     Packs/day: 0.50     Years: 5.00     Pack years: 2.50     Types: Cigarettes     Start date: 1968     Quit date: 2/15/1972     Years since quittin.0     Smokeless tobacco: Never Used      Tobacco comment: stopped in her 20's   Substance and Sexual Activity     Alcohol use: Yes     Alcohol/week: 0.0 standard drinks     Comment: Occasional     Drug use: No     Sexual activity: Not Currently   Lifestyle     Physical activity     Days per week: Not on file     Minutes per session: Not on file     Stress: Not on file   Relationships     Social connections     Talks on phone: Not on file     Gets together: Not on file     Attends Scientologist service: Not on file     Active member of club or organization: Not on file     Attends meetings of clubs or organizations: Not on file     Relationship status: Not on file     Intimate partner violence     Fear of current or ex partner: Not on file     Emotionally abused: Not on file     Physically abused: Not on file     Forced sexual activity: Not on file   Other Topics Concern     Parent/sibling w/ CABG, MI or angioplasty before 65F 55M? Yes   Social History Narrative     Not on file       Family History  Family History   Problem Relation Age of Onset     Cancer Mother         Ovarian     Other Cancer Mother         Ovarian     Cerebrovascular Disease Father      Heart Disease Father      C.A.D. Father      Coronary Artery Disease Father      Cancer Maternal Grandmother      Other Cancer Maternal Grandmother         Ovarian/Bone     Cerebrovascular Disease Maternal Grandfather      Colon Cancer Maternal Grandfather      Diabetes Other         Great Grandmother     Breast Cancer No family hx of        Review of Systems  As per HPI and PMHx, otherwise 10 system review including the head and neck, constitutional, eyes, respiratory, GI, skin, neurologic, lymphatic, endocrine, and allergy systems is negative.    Physical Exam  BP (!) 141/66 (BP Location: Right arm, Patient Position: Chair, Cuff Size: Adult Large)   Pulse 73   Temp 98.2  F (36.8  C) (Tympanic)   Wt 78 kg (172 lb)   BMI 28.62 kg/m    GENERAL: Patient is a pleasant, cooperative 80 year old female in no  acute distress.  HEAD: Normocephalic, atraumatic.  Hair and scalp are normal.  EYES: Pupils are equal, round, reactive to light and accommodation.  Extraocular movements are intact.  The sclera nonicteric without injection.  The extraocular structures are normal.  EARS:  Normal shape and symmetry.  No tenderness to palpate at the mastoid or tragal areas bilaterally.  Otoscopic exam on the right reveals a clear canal.  The right tympanic membrane is intact without evidence of effusion.  Otoscopic exam in the left reveals a mild amount of ceruminous debris in the canal.  There is a Dura-Vent ear tube in the posterior-inferior quadrant.  The middle ear is well aerated.  No granulation or drainage.    NEUROLOGIC: Cranial nerves II through XII are grossly intact.  Voice is strong.  Facial nerve examination incomplete due to the patient wearing a mask.  CARDIOVASCULAR: Extremities are warm and well-perfused.  No significant peripheral edema.  RESPIRATORY: Patient has nonlabored breathing without cough, wheeze, stridor.  PSYCHIATRIC: Patient is alert and oriented.  Mood and affect appear normal.  SKIN: Warm and dry.  No scalp, face, or neck lesions noted.     Audiogram  The patient underwent an audiogram performed today.  My review of the audiogram shows normal sloping to mild sensorineural hearing loss in the right ear and mild sloping to moderately severe mixed hearing loss in the left ear.  Pure-tone average is 12 dB on the right and 33 dB on the left.  Speech reception threshhold is 10 dB on the right and 40 dB on the left.  The patient had 100% word recognition on the right and 92%% word recognition on the left.  The patient had a type A tympanogram on the right and a intermediate volume type B tympanogram on the left.     Assessment and Plan     ICD-10-CM    1. Chronic otitis media of left ear with effusion  H65.492    2. Mixed conductive and sensorineural hearing loss of left ear with restricted hearing of right ear   H90.A32    3. Sensorineural hearing loss (SNHL) of right ear with restricted hearing of left ear  H90.A21    4. Retained myringotomy tube in left ear  Z96.22    5. History of allergic rhinitis  Z87.09 AUDIOLOGY ADULT REFERRAL     fluticasone (FLONASE) 50 MCG/ACT nasal spray      It was my pleasure seeing Lavonne Tidwell today in clinic.  The left ear tube is in good placement and appears functional.  She does have still some mixed hearing loss in the left ear but it significantly improved.  We discussed contacting me in the future if she needs eardrops for drainage.  I provided her with a prescription for Flonase nasal spray 2 sprays each side once daily.  I would like to recheck her ear tube in 6 months.  We also discussed allergy evaluation in the future.  The patient will let me know if she would like to pursue allergy evaluation in the future.    Lavonne to follow up with Primary Care provider regarding elevated blood pressure.    Rob Harris MD  Department of Otolarygology-Head and Neck Surgery  [facility]         Again, thank you for allowing me to participate in the care of your patient.        Sincerely,        Rob Harris MD

## 2021-04-19 ENCOUNTER — OFFICE VISIT (OUTPATIENT)
Dept: OTOLARYNGOLOGY | Facility: CLINIC | Age: 81
End: 2021-04-19
Payer: MEDICARE

## 2021-04-19 VITALS
TEMPERATURE: 98.2 F | HEART RATE: 75 BPM | BODY MASS INDEX: 28.62 KG/M2 | DIASTOLIC BLOOD PRESSURE: 81 MMHG | WEIGHT: 172 LBS | SYSTOLIC BLOOD PRESSURE: 144 MMHG

## 2021-04-19 DIAGNOSIS — R49.0 DYSPHONIA: ICD-10-CM

## 2021-04-19 DIAGNOSIS — H65.492 CHRONIC OTITIS MEDIA OF LEFT EAR WITH EFFUSION: Primary | ICD-10-CM

## 2021-04-19 DIAGNOSIS — H90.A32 MIXED CONDUCTIVE AND SENSORINEURAL HEARING LOSS OF LEFT EAR WITH RESTRICTED HEARING OF RIGHT EAR: ICD-10-CM

## 2021-04-19 DIAGNOSIS — J34.89 NASAL OBSTRUCTION: ICD-10-CM

## 2021-04-19 DIAGNOSIS — H71.12 GRANULOMA OF TYMPANIC MEMBRANE OF LEFT EAR: ICD-10-CM

## 2021-04-19 DIAGNOSIS — H90.A21 SENSORINEURAL HEARING LOSS (SNHL) OF RIGHT EAR WITH RESTRICTED HEARING OF LEFT EAR: ICD-10-CM

## 2021-04-19 DIAGNOSIS — R09.82 POST-NASAL DRAINAGE: ICD-10-CM

## 2021-04-19 DIAGNOSIS — Z96.22 RETAINED MYRINGOTOMY TUBE IN LEFT EAR: ICD-10-CM

## 2021-04-19 DIAGNOSIS — Z87.09 HISTORY OF ALLERGIC RHINITIS: ICD-10-CM

## 2021-04-19 PROCEDURE — 99214 OFFICE O/P EST MOD 30 MIN: CPT | Mod: 25 | Performed by: OTOLARYNGOLOGY

## 2021-04-19 PROCEDURE — 92504 EAR MICROSCOPY EXAMINATION: CPT | Performed by: OTOLARYNGOLOGY

## 2021-04-19 PROCEDURE — 31231 NASAL ENDOSCOPY DX: CPT | Performed by: OTOLARYNGOLOGY

## 2021-04-19 RX ORDER — BUDESONIDE 0.5 MG/2ML
INHALANT ORAL
Qty: 180 ML | Refills: 3 | Status: SHIPPED | OUTPATIENT
Start: 2021-04-19 | End: 2022-10-18

## 2021-04-19 RX ORDER — DEXAMETHASONE SODIUM PHOSPHATE 1 MG/ML
3 SOLUTION/ DROPS OPHTHALMIC 2 TIMES DAILY
Qty: 5 ML | Refills: 1 | Status: SHIPPED | OUTPATIENT
Start: 2021-04-19 | End: 2021-04-29

## 2021-04-19 RX ORDER — TRIAMCINOLONE ACETONIDE 40 MG/ML
60 INJECTION, SUSPENSION INTRA-ARTICULAR; INTRAMUSCULAR ONCE
Status: COMPLETED | OUTPATIENT
Start: 2021-04-19 | End: 2021-04-19

## 2021-04-19 RX ORDER — OFLOXACIN 3 MG/ML
SOLUTION/ DROPS OPHTHALMIC
Qty: 10 ML | Refills: 3 | Status: SHIPPED | OUTPATIENT
Start: 2021-04-19 | End: 2022-03-04

## 2021-04-19 RX ADMIN — TRIAMCINOLONE ACETONIDE 60 MG: 40 INJECTION, SUSPENSION INTRA-ARTICULAR; INTRAMUSCULAR at 15:26

## 2021-04-19 ASSESSMENT — PAIN SCALES - GENERAL: PAINLEVEL: NO PAIN (0)

## 2021-04-19 NOTE — NURSING NOTE
"Initial BP (!) 144/81 (BP Location: Left arm, Patient Position: Chair, Cuff Size: Adult Regular)   Pulse 75   Temp 98.2  F (36.8  C) (Tympanic)   Wt 78 kg (172 lb)   BMI 28.62 kg/m   Estimated body mass index is 28.62 kg/m  as calculated from the following:    Height as of 5/20/20: 1.651 m (5' 5\").    Weight as of this encounter: 78 kg (172 lb). .    Indigo Pastor LPN    "

## 2021-04-19 NOTE — LETTER
4/19/2021         RE: Lavonne Tidwell  [address] [facility] [address]        Dear Colleague,    Thank you for referring your patient, Lavonne Tidwell, to the [facility]. Please see a copy of my visit note below.    Chief Complaint   Patient presents with     Ear Tube Follow Up     tube in left ear- drainage noted at intervals- also c/o ear feeling plugged with pressure feeling at intervals     Throat Problem     constantly clearing her throat and c/o hoarseness     History of Present Illness  Lavonne Tidwell is a 80 year old female who presents today for follow up. I last evaluated the patient on 3/1/2021.  She has a history of left otitis media with effusion.  We placed an ear tube in the left and treated her with drops on 12/18/2020.  She had hearing improvement when I saw her on 3/1/2021 but still had a mild mixed loss in the left ear.  She contacted us in the clinic that she had developed drainage in the left ear.  We sent her a prescription for eardrops and she returns today for follow up.     The patient reports developing intermittent drainage from the left ear for the past couple weeks.  The ear feels a bit plugged.  No karine otalgia.  No bloody otorrhea.  Side from her ear tube, no previous ear surgery.    The patient is also noticing issues with postnasal drainage and intermittent dysphonia.  She does have intermittent nasal congestion.  She does feel like the Flonase helps some.  She also has a history of gastroesophageal reflux with symptoms a few times a month.  She is currently not taking any medications for reflux as she came off the Zantac when it was pulled off the market.  She denies any dysphagia or odynophagia.  No hemoptysis.  No neck lumps/bumps/swelling.  She has a remote smoking history quitting in the early 1970s.      Past Medical History  Patient Active Problem List   Diagnosis     Injury of sigmoid colon     Esophageal reflux     Menopausal syndrome  (hot flashes)     Urinary incontinence     Atrophic vaginitis     Hyperlipidemia LDL goal <130     Advanced directives, counseling/discussion     Onychomycosis     Senile nuclear sclerosis     Status post total left knee replacement     Status post total right knee replacement     Primary localized osteoarthrosis, lower leg     Tubulovillous adenoma polyp of colon     Current Medications    Current Outpatient Medications:      Acetaminophen (TYLENOL PO), Take 1,000 mg by mouth 2 times daily as needed for mild pain or fever, Disp: , Rfl:      Biotin 10 MG CAPS, , Disp: , Rfl:      budesonide (PULMICORT) 0.5 MG/2ML neb solution, Place 0.5 mg/2 mL budesonide vial in 8 oz normal saline sinus rinse bottle.  Irrigate each nostril with one half of the bottle daily., Disp: 180 mL, Rfl: 3     CALCIUM + D OR, 2 TABLET DAILY, Disp: , Rfl:      dexamethasone (DECADRON) 0.1 % ophthalmic solution, Place 3 drops Into the left ear 2 times daily for 10 days 3 drops dexamethasone, 3 drops ofloxacin twice daily for 10 days, Disp: 5 mL, Rfl: 1     Docusate Calcium (STOOL SOFTENER PO), , Disp: , Rfl:      fluticasone (FLONASE) 50 MCG/ACT nasal spray, Spray 2 sprays into both nostrils daily, Disp: 47.4 mL, Rfl: 3     fluticasone (FLONASE) 50 MCG/ACT nasal spray, USE TWO SPRAYS IN EACH NOSTRIL ONCE DAILY AS NEEDED, Disp: 16 g, Rfl: 3     garlic 150 MG TABS tablet, Take 150 mg by mouth daily, Disp: , Rfl:      grape seed 50 MG CAPS, Take 50 mg by mouth daily, Disp: , Rfl:      loratadine (CLARITIN) 10 MG tablet, Take 10 mg by mouth daily, Disp: , Rfl:      methylcellulose (FIBER THERAPY) 500 MG TABS tablet, Take 500 mg by mouth daily, Disp: , Rfl:      Multiple Vitamins-Minerals (CENTRUM SILVER) per tablet, Take 1 tablet by mouth daily, Disp: , Rfl:      ofloxacin (OCUFLOX) 0.3 % ophthalmic solution, Place 3 drops ofloxacin, 3 drops dexamethasone in left ear twice daily for 10 days, Disp: 10 mL, Rfl: 3     VITAMIN E COMPLEX PO, , Disp: ,  Rfl:      Zinc Sulfate (ZINC 15 PO), , Disp: , Rfl:      ciprofloxacin-dexamethasone (CIPRODEX) 0.3-0.1 % otic suspension, Place 5 drops in left ear twice daily for 7days.  Call if drainage persistent past 7 days. (Patient not taking: Reported on 2021), Disp: 10 mL, Rfl: 3    Current Facility-Administered Medications:      triamcinolone (KENALOG-40) injection 60 mg, 60 mg, Intramuscular, Once, Rob Harris MD    Allergies  Allergies   Allergen Reactions     Codeine GI Disturbance       Social History  Social History     Socioeconomic History     Marital status:      Spouse name: Not on file     Number of children: Not on file     Years of education: Not on file     Highest education level: Not on file   Occupational History     Not on file   Social Needs     Financial resource strain: Not on file     Food insecurity     Worry: Not on file     Inability: Not on file     Transportation needs     Medical: Not on file     Non-medical: Not on file   Tobacco Use     Smoking status: Former Smoker     Packs/day: 0.50     Years: 5.00     Pack years: 2.50     Types: Cigarettes     Start date: 1968     Quit date: 2/15/1972     Years since quittin.2     Smokeless tobacco: Never Used     Tobacco comment: stopped in her 20's   Substance and Sexual Activity     Alcohol use: Yes     Alcohol/week: 0.0 standard drinks     Comment: Occasional     Drug use: No     Sexual activity: Not Currently   Lifestyle     Physical activity     Days per week: Not on file     Minutes per session: Not on file     Stress: Not on file   Relationships     Social connections     Talks on phone: Not on file     Gets together: Not on file     Attends Zoroastrian service: Not on file     Active member of club or organization: Not on file     Attends meetings of clubs or organizations: Not on file     Relationship status: Not on file     Intimate partner violence     Fear of current or ex partner: Not on file     Emotionally abused: Not  on file     Physically abused: Not on file     Forced sexual activity: Not on file   Other Topics Concern     Parent/sibling w/ CABG, MI or angioplasty before 65F 55M? Yes   Social History Narrative     Not on file       Family History  Family History   Problem Relation Age of Onset     Cancer Mother         Ovarian     Other Cancer Mother         Ovarian     Cerebrovascular Disease Father      Heart Disease Father      C.A.D. Father      Coronary Artery Disease Father      Cancer Maternal Grandmother      Other Cancer Maternal Grandmother         Ovarian/Bone     Cerebrovascular Disease Maternal Grandfather      Colon Cancer Maternal Grandfather      Diabetes Other         Great Grandmother     Breast Cancer No family hx of        Review of Systems  As per HPI and PMHx, otherwise 10 system review including the head and neck, constitutional, eyes, respiratory, GI, skin, neurologic, lymphatic, endocrine, and allergy systems is negative.    Physical Exam  BP (!) 144/81 (BP Location: Left arm, Patient Position: Chair, Cuff Size: Adult Regular)   Pulse 75   Temp 98.2  F (36.8  C) (Tympanic)   Wt 78 kg (172 lb)   BMI 28.62 kg/m    GENERAL: Patient is a pleasant, cooperative 80 year old female in no acute distress.  HEAD: Normocephalic, atraumatic.  Hair and scalp are normal.  EYES: Pupils are equal, round, reactive to light and accommodation.  Extraocular movements are intact.  The sclera nonicteric without injection.  The extraocular structures are normal.  EARS:  Normal shape and symmetry.  No tenderness to palpate at the mastoid or tragal areas bilaterally.  The ears were examined under the otic microscope. Otoscopic exam on the right reveals a clear canal.  The right tympanic membrane is intact without evidence of effusion.  Otoscopic exam in the left reveals a mild amount of ceruminous debris in the canal.  There is a Dura-Vent ear tube in the posterior-inferior quadrant.  The middle ear is well aerated.  No  granulation or drainage.    NEUROLOGIC: Cranial nerves II through XII are grossly intact.  Voice is strong.  Facial nerve examination incomplete due to the patient wearing a mask.  CARDIOVASCULAR: Extremities are warm and well-perfused.  No significant peripheral edema.  RESPIRATORY: Patient has nonlabored breathing without cough, wheeze, stridor.  PSYCHIATRIC: Patient is alert and oriented.  Mood and affect appear normal.  SKIN: Warm and dry.  No scalp, face, or neck lesions noted.    Physical Exam and Procedure    EARS: Normal shape and symmetry.  No tenderness when palpating the mastoid or tragal areas bilaterally.  The ears were then examined under the otic binocular microscope.  Otoscopic exam on the right reveals a clear canal.  The right tympanic membrane was round, intact without evidence of effusion.  No retraction, granulation, drainage, or evidence of cholesteatoma.      Attention was turned to the left ear.  Otoscopic exam on the left reveals a slight amount of moisture just inferior to the tympanostomy tube.  There is also a bit of granulation adjacent to the tube and some thickening of the eardrum.  Left tympanic membrane has a Dura-Vent ear tube in the posterior-inferior quadrant.  The middle ear does appear well aerated.  No significant retraction, active drainage, or evidence of cholesteatoma.     Procedure: Flexible Nasal Endoscopy  Indication: Nasal obstruction, nasal congestion, postnasal drainage    To best visualize the sinonasal anatomy and due to the chief complaint and HPI, I proceeded with flexible fiberoptic nasal endoscopy.  The bilateral nasal cavities were anesthetized and decongested with a mixture of lidocaine and neosynephrine.  The bilateral nasal cavities were then examined using flexible fiberoptic nasal endoscope.  The right nasal cavity was without masses, polyps, or mucopurulence.  The right middle turbinate and middle meatus was normal in appearance.  The sphenoethmoid recess  was clear.  The left nasal cavity was without masses, polyps, or mucopurulence.  The left middle turbinate and middle meatus was normal in appearance.  The sphenoethmoid recess was clear.  The sinonasal mucosa appeared normal.  The nasal septum is essentially midline.  The nasopharynx had a normal appearance with normal Eustachian tube openings and fossa of Rosenmuller bilaterally.  Minimal adenoid tissue.  Slight amount of oropharyngeal cobblestoning in the nasopharynx and some dried secretions.  The patient tolerated the procedure well.    Assessment and Plan     ICD-10-CM    1. Chronic otitis media of left ear with effusion  H65.492 Microscopy, Binocular     budesonide (PULMICORT) 0.5 MG/2ML neb solution     NASAL ENDOSCOPY, DIAGNOSTIC     ofloxacin (OCUFLOX) 0.3 % ophthalmic solution     dexamethasone (DECADRON) 0.1 % ophthalmic solution     triamcinolone (KENALOG-40) injection 60 mg   2. Mixed conductive and sensorineural hearing loss of left ear with restricted hearing of right ear  H90.A32 Microscopy, Binocular     budesonide (PULMICORT) 0.5 MG/2ML neb solution     NASAL ENDOSCOPY, DIAGNOSTIC     ofloxacin (OCUFLOX) 0.3 % ophthalmic solution     dexamethasone (DECADRON) 0.1 % ophthalmic solution     triamcinolone (KENALOG-40) injection 60 mg   3. Sensorineural hearing loss (SNHL) of right ear with restricted hearing of left ear  H90.A21 Microscopy, Binocular     budesonide (PULMICORT) 0.5 MG/2ML neb solution     NASAL ENDOSCOPY, DIAGNOSTIC     ofloxacin (OCUFLOX) 0.3 % ophthalmic solution     dexamethasone (DECADRON) 0.1 % ophthalmic solution     triamcinolone (KENALOG-40) injection 60 mg   4. Retained myringotomy tube in left ear  Z96.22 Microscopy, Binocular     budesonide (PULMICORT) 0.5 MG/2ML neb solution     NASAL ENDOSCOPY, DIAGNOSTIC     ofloxacin (OCUFLOX) 0.3 % ophthalmic solution     dexamethasone (DECADRON) 0.1 % ophthalmic solution     triamcinolone (KENALOG-40) injection 60 mg   5. Granuloma of  tympanic membrane of left ear  H71.12    6. History of allergic rhinitis  Z87.09 Microscopy, Binocular     budesonide (PULMICORT) 0.5 MG/2ML neb solution     NASAL ENDOSCOPY, DIAGNOSTIC     ofloxacin (OCUFLOX) 0.3 % ophthalmic solution     dexamethasone (DECADRON) 0.1 % ophthalmic solution     triamcinolone (KENALOG-40) injection 60 mg   7. Nasal obstruction  J34.89 Microscopy, Binocular     budesonide (PULMICORT) 0.5 MG/2ML neb solution     NASAL ENDOSCOPY, DIAGNOSTIC     ofloxacin (OCUFLOX) 0.3 % ophthalmic solution     dexamethasone (DECADRON) 0.1 % ophthalmic solution     triamcinolone (KENALOG-40) injection 60 mg   8. Post-nasal drainage  R09.82 Microscopy, Binocular     budesonide (PULMICORT) 0.5 MG/2ML neb solution     NASAL ENDOSCOPY, DIAGNOSTIC     ofloxacin (OCUFLOX) 0.3 % ophthalmic solution     dexamethasone (DECADRON) 0.1 % ophthalmic solution     triamcinolone (KENALOG-40) injection 60 mg   9. Dysphonia  R49.0 Microscopy, Binocular     budesonide (PULMICORT) 0.5 MG/2ML neb solution     NASAL ENDOSCOPY, DIAGNOSTIC     ofloxacin (OCUFLOX) 0.3 % ophthalmic solution     dexamethasone (DECADRON) 0.1 % ophthalmic solution     triamcinolone (KENALOG-40) injection 60 mg      It was my pleasure seeing Lavonne Tidwell today in clinic.  He does have some signs of rhinitis on examination.  We discussed a trial of budesonide irrigations and a Kenalog injection today in office.  I provided her with a prescription for budesonide, rinse bottle, and rinse instructions.  We discussed a 60 mg IM injection of Kenalog today in office. We discussed the risks, benefits, options, goals of a intramuscular Kenalog injection today in office including, but not limited to: Risk of pain at injection site, risk of infection, side effects of systemic steroids including blood pressure problems, insulin resistance, weight gain, bone/joint issues, mood alteration.  Patient voiced understanding and is willing to proceed.    We also  discussed placing ofloxacin and dexamethasone drops in the left ear twice daily for 10 days to help treat the granulation tissue/tympanic membrane irritation.    I would like to see the patient back in 4 weeks to recheck her symptoms.  If she does not improve on the nasal irrigations after the Kenalog injection, we would consider managing reflux to help sense of postnasal drainage/throat irritation.    Lavonne to follow up with Primary Care provider regarding elevated blood pressure.    Rob Harris MD  Department of Otolarygology-Head and Neck Surgery  [facility]         Again, thank you for allowing me to participate in the care of your patient.        Sincerely,        Rob Harris MD

## 2021-04-19 NOTE — PATIENT INSTRUCTIONS
BUDESONIDE IRRIGATION INSTRUCTIONS    You will be starting Budesonide nasal irrigations and will need to obtain the following:      - NeilMed Sinus Rinse 8 oz Kit  - Distilled or filtered water   - Normal saline salt packets  - Budesonide medication     Place filtered or distilled water into the NeilMed bottle up to the fill line (DO NOT USE TAP OR WELL WATER).  Place the pre-made salt packet in the 8 oz of saline.  Then placed the budesonide medication into the bottle.  Shake the bottle to suspend into solution.  Lean head forward over a sink or a basin.  Rinse each side of the nose with one-half of the bottle (each squeeze is about one-half of the bottle). Rinse the nose daily.     You will follow up with your ENT surgeon in 4 weeks to recheck your symptoms.  If you are having problems with your irrigations or problems obtaining the medication, please contact your ENT surgeon.    Video example: [email or web address]

## 2021-04-19 NOTE — PROGRESS NOTES
Chief Complaint   Patient presents with     Ear Tube Follow Up     tube in left ear- drainage noted at intervals- also c/o ear feeling plugged with pressure feeling at intervals     Throat Problem     constantly clearing her throat and c/o hoarseness     History of Present Illness  Lavonne Tidwell is a 80 year old female who presents today for follow up. I last evaluated the patient on 3/1/2021.  She has a history of left otitis media with effusion.  We placed an ear tube in the left and treated her with drops on 12/18/2020.  She had hearing improvement when I saw her on 3/1/2021 but still had a mild mixed loss in the left ear.  She contacted us in the clinic that she had developed drainage in the left ear.  We sent her a prescription for eardrops and she returns today for follow up.     The patient reports developing intermittent drainage from the left ear for the past couple weeks.  The ear feels a bit plugged.  No karine otalgia.  No bloody otorrhea.  Side from her ear tube, no previous ear surgery.    The patient is also noticing issues with postnasal drainage and intermittent dysphonia.  She does have intermittent nasal congestion.  She does feel like the Flonase helps some.  She also has a history of gastroesophageal reflux with symptoms a few times a month.  She is currently not taking any medications for reflux as she came off the Zantac when it was pulled off the market.  She denies any dysphagia or odynophagia.  No hemoptysis.  No neck lumps/bumps/swelling.  She has a remote smoking history quitting in the early 1970s.      Past Medical History  Patient Active Problem List   Diagnosis     Injury of sigmoid colon     Esophageal reflux     Menopausal syndrome (hot flashes)     Urinary incontinence     Atrophic vaginitis     Hyperlipidemia LDL goal <130     Advanced directives, counseling/discussion     Onychomycosis     Senile nuclear sclerosis     Status post total left knee replacement     Status post  total right knee replacement     Primary localized osteoarthrosis, lower leg     Tubulovillous adenoma polyp of colon     Current Medications    Current Outpatient Medications:      Acetaminophen (TYLENOL PO), Take 1,000 mg by mouth 2 times daily as needed for mild pain or fever, Disp: , Rfl:      Biotin 10 MG CAPS, , Disp: , Rfl:      budesonide (PULMICORT) 0.5 MG/2ML neb solution, Place 0.5 mg/2 mL budesonide vial in 8 oz normal saline sinus rinse bottle.  Irrigate each nostril with one half of the bottle daily., Disp: 180 mL, Rfl: 3     CALCIUM + D OR, 2 TABLET DAILY, Disp: , Rfl:      dexamethasone (DECADRON) 0.1 % ophthalmic solution, Place 3 drops Into the left ear 2 times daily for 10 days 3 drops dexamethasone, 3 drops ofloxacin twice daily for 10 days, Disp: 5 mL, Rfl: 1     Docusate Calcium (STOOL SOFTENER PO), , Disp: , Rfl:      fluticasone (FLONASE) 50 MCG/ACT nasal spray, Spray 2 sprays into both nostrils daily, Disp: 47.4 mL, Rfl: 3     fluticasone (FLONASE) 50 MCG/ACT nasal spray, USE TWO SPRAYS IN EACH NOSTRIL ONCE DAILY AS NEEDED, Disp: 16 g, Rfl: 3     garlic 150 MG TABS tablet, Take 150 mg by mouth daily, Disp: , Rfl:      grape seed 50 MG CAPS, Take 50 mg by mouth daily, Disp: , Rfl:      loratadine (CLARITIN) 10 MG tablet, Take 10 mg by mouth daily, Disp: , Rfl:      methylcellulose (FIBER THERAPY) 500 MG TABS tablet, Take 500 mg by mouth daily, Disp: , Rfl:      Multiple Vitamins-Minerals (CENTRUM SILVER) per tablet, Take 1 tablet by mouth daily, Disp: , Rfl:      ofloxacin (OCUFLOX) 0.3 % ophthalmic solution, Place 3 drops ofloxacin, 3 drops dexamethasone in left ear twice daily for 10 days, Disp: 10 mL, Rfl: 3     VITAMIN E COMPLEX PO, , Disp: , Rfl:      Zinc Sulfate (ZINC 15 PO), , Disp: , Rfl:      ciprofloxacin-dexamethasone (CIPRODEX) 0.3-0.1 % otic suspension, Place 5 drops in left ear twice daily for 7days.  Call if drainage persistent past 7 days. (Patient not taking: Reported on  2021), Disp: 10 mL, Rfl: 3    Current Facility-Administered Medications:      triamcinolone (KENALOG-40) injection 60 mg, 60 mg, Intramuscular, Once, Rob Harris MD    Allergies  Allergies   Allergen Reactions     Codeine GI Disturbance       Social History  Social History     Socioeconomic History     Marital status:      Spouse name: Not on file     Number of children: Not on file     Years of education: Not on file     Highest education level: Not on file   Occupational History     Not on file   Social Needs     Financial resource strain: Not on file     Food insecurity     Worry: Not on file     Inability: Not on file     Transportation needs     Medical: Not on file     Non-medical: Not on file   Tobacco Use     Smoking status: Former Smoker     Packs/day: 0.50     Years: 5.00     Pack years: 2.50     Types: Cigarettes     Start date: 1968     Quit date: 2/15/1972     Years since quittin.2     Smokeless tobacco: Never Used     Tobacco comment: stopped in her 20's   Substance and Sexual Activity     Alcohol use: Yes     Alcohol/week: 0.0 standard drinks     Comment: Occasional     Drug use: No     Sexual activity: Not Currently   Lifestyle     Physical activity     Days per week: Not on file     Minutes per session: Not on file     Stress: Not on file   Relationships     Social connections     Talks on phone: Not on file     Gets together: Not on file     Attends Buddhist service: Not on file     Active member of club or organization: Not on file     Attends meetings of clubs or organizations: Not on file     Relationship status: Not on file     Intimate partner violence     Fear of current or ex partner: Not on file     Emotionally abused: Not on file     Physically abused: Not on file     Forced sexual activity: Not on file   Other Topics Concern     Parent/sibling w/ CABG, MI or angioplasty before 65F 55M? Yes   Social History Narrative     Not on file       Family History  Family  History   Problem Relation Age of Onset     Cancer Mother         Ovarian     Other Cancer Mother         Ovarian     Cerebrovascular Disease Father      Heart Disease Father      C.A.D. Father      Coronary Artery Disease Father      Cancer Maternal Grandmother      Other Cancer Maternal Grandmother         Ovarian/Bone     Cerebrovascular Disease Maternal Grandfather      Colon Cancer Maternal Grandfather      Diabetes Other         Great Grandmother     Breast Cancer No family hx of        Review of Systems  As per HPI and PMHx, otherwise 10 system review including the head and neck, constitutional, eyes, respiratory, GI, skin, neurologic, lymphatic, endocrine, and allergy systems is negative.    Physical Exam  BP (!) 144/81 (BP Location: Left arm, Patient Position: Chair, Cuff Size: Adult Regular)   Pulse 75   Temp 98.2  F (36.8  C) (Tympanic)   Wt 78 kg (172 lb)   BMI 28.62 kg/m    GENERAL: Patient is a pleasant, cooperative 80 year old female in no acute distress.  HEAD: Normocephalic, atraumatic.  Hair and scalp are normal.  EYES: Pupils are equal, round, reactive to light and accommodation.  Extraocular movements are intact.  The sclera nonicteric without injection.  The extraocular structures are normal.  EARS:   See below.  NOSE: Nares are patent.  Nasal mucosa is boggy and inflamed with sticky, inflammatory mucus.  Nasal septum is midline.  Negative anterior rhinoscopy.  NEUROLOGIC: Cranial nerves II through XII are grossly intact.  Voice is strong.  Facial nerve examination incomplete due to the patient wearing a mask.  CARDIOVASCULAR: Extremities are warm and well-perfused.  No significant peripheral edema.  RESPIRATORY: Patient has nonlabored breathing without cough, wheeze, stridor.  PSYCHIATRIC: Patient is alert and oriented.  Mood and affect appear normal.  SKIN: Warm and dry.  No scalp, face, or neck lesions noted.    Physical Exam and Procedure    EARS: Normal shape and symmetry.  No  tenderness when palpating the mastoid or tragal areas bilaterally.  The ears were then examined under the otic binocular microscope.  Otoscopic exam on the right reveals a clear canal.  The right tympanic membrane was round, intact without evidence of effusion.  No retraction, granulation, drainage, or evidence of cholesteatoma.      Attention was turned to the left ear.  Otoscopic exam on the left reveals a slight amount of moisture just inferior to the tympanostomy tube.  There is also a bit of granulation adjacent to the tube and some thickening of the eardrum.  Left tympanic membrane has a Dura-Vent ear tube in the posterior-inferior quadrant.  The middle ear does appear well aerated.  No significant retraction, active drainage, or evidence of cholesteatoma.     Procedure: Flexible Nasal Endoscopy  Indication: Nasal obstruction, nasal congestion, postnasal drainage    To best visualize the sinonasal anatomy and due to the chief complaint and HPI, I proceeded with flexible fiberoptic nasal endoscopy.  The bilateral nasal cavities were anesthetized and decongested with a mixture of lidocaine and neosynephrine.  The bilateral nasal cavities were then examined using flexible fiberoptic nasal endoscope.  The right nasal cavity was without masses, polyps, or mucopurulence.  The right middle turbinate and middle meatus was normal in appearance.  The sphenoethmoid recess was clear.  The left nasal cavity was without masses, polyps, or mucopurulence.  The left middle turbinate and middle meatus was normal in appearance.  The sphenoethmoid recess was clear.  The sinonasal mucosa appeared normal.  The nasal septum is essentially midline.  The nasopharynx had a normal appearance with normal Eustachian tube openings and fossa of Rosenmuller bilaterally.  Minimal adenoid tissue.  Slight amount of oropharyngeal cobblestoning in the nasopharynx and some dried secretions.  The patient tolerated the procedure well.    Assessment  and Plan     ICD-10-CM    1. Chronic otitis media of left ear with effusion  H65.492 Microscopy, Binocular     budesonide (PULMICORT) 0.5 MG/2ML neb solution     NASAL ENDOSCOPY, DIAGNOSTIC     ofloxacin (OCUFLOX) 0.3 % ophthalmic solution     dexamethasone (DECADRON) 0.1 % ophthalmic solution     triamcinolone (KENALOG-40) injection 60 mg   2. Mixed conductive and sensorineural hearing loss of left ear with restricted hearing of right ear  H90.A32 Microscopy, Binocular     budesonide (PULMICORT) 0.5 MG/2ML neb solution     NASAL ENDOSCOPY, DIAGNOSTIC     ofloxacin (OCUFLOX) 0.3 % ophthalmic solution     dexamethasone (DECADRON) 0.1 % ophthalmic solution     triamcinolone (KENALOG-40) injection 60 mg   3. Sensorineural hearing loss (SNHL) of right ear with restricted hearing of left ear  H90.A21 Microscopy, Binocular     budesonide (PULMICORT) 0.5 MG/2ML neb solution     NASAL ENDOSCOPY, DIAGNOSTIC     ofloxacin (OCUFLOX) 0.3 % ophthalmic solution     dexamethasone (DECADRON) 0.1 % ophthalmic solution     triamcinolone (KENALOG-40) injection 60 mg   4. Retained myringotomy tube in left ear  Z96.22 Microscopy, Binocular     budesonide (PULMICORT) 0.5 MG/2ML neb solution     NASAL ENDOSCOPY, DIAGNOSTIC     ofloxacin (OCUFLOX) 0.3 % ophthalmic solution     dexamethasone (DECADRON) 0.1 % ophthalmic solution     triamcinolone (KENALOG-40) injection 60 mg   5. Granuloma of tympanic membrane of left ear  H71.12    6. History of allergic rhinitis  Z87.09 Microscopy, Binocular     budesonide (PULMICORT) 0.5 MG/2ML neb solution     NASAL ENDOSCOPY, DIAGNOSTIC     ofloxacin (OCUFLOX) 0.3 % ophthalmic solution     dexamethasone (DECADRON) 0.1 % ophthalmic solution     triamcinolone (KENALOG-40) injection 60 mg   7. Nasal obstruction  J34.89 Microscopy, Binocular     budesonide (PULMICORT) 0.5 MG/2ML neb solution     NASAL ENDOSCOPY, DIAGNOSTIC     ofloxacin (OCUFLOX) 0.3 % ophthalmic solution     dexamethasone (DECADRON) 0.1 %  ophthalmic solution     triamcinolone (KENALOG-40) injection 60 mg   8. Post-nasal drainage  R09.82 Microscopy, Binocular     budesonide (PULMICORT) 0.5 MG/2ML neb solution     NASAL ENDOSCOPY, DIAGNOSTIC     ofloxacin (OCUFLOX) 0.3 % ophthalmic solution     dexamethasone (DECADRON) 0.1 % ophthalmic solution     triamcinolone (KENALOG-40) injection 60 mg   9. Dysphonia  R49.0 Microscopy, Binocular     budesonide (PULMICORT) 0.5 MG/2ML neb solution     NASAL ENDOSCOPY, DIAGNOSTIC     ofloxacin (OCUFLOX) 0.3 % ophthalmic solution     dexamethasone (DECADRON) 0.1 % ophthalmic solution     triamcinolone (KENALOG-40) injection 60 mg      It was my pleasure seeing Lavonne Tidwell today in clinic.  He does have some signs of rhinitis on examination.  We discussed a trial of budesonide irrigations and a Kenalog injection today in office.  I provided her with a prescription for budesonide, rinse bottle, and rinse instructions.  We discussed a 60 mg IM injection of Kenalog today in office. We discussed the risks, benefits, options, goals of a intramuscular Kenalog injection today in office including, but not limited to: Risk of pain at injection site, risk of infection, side effects of systemic steroids including blood pressure problems, insulin resistance, weight gain, bone/joint issues, mood alteration.  Patient voiced understanding and is willing to proceed.    We also discussed placing ofloxacin and dexamethasone drops in the left ear twice daily for 10 days to help treat the granulation tissue/tympanic membrane irritation.    I would like to see the patient back in 4 weeks to recheck her symptoms.  If she does not improve on the nasal irrigations after the Kenalog injection, we would consider managing reflux to help sense of postnasal drainage/throat irritation.    Lavonne to follow up with Primary Care provider regarding elevated blood pressure.    Rob Harris MD  Department of Otolarygology-Head and Neck  Surgery  -[facility]

## 2021-05-05 ENCOUNTER — OFFICE VISIT (OUTPATIENT)
Dept: FAMILY MEDICINE | Facility: CLINIC | Age: 81
End: 2021-05-05
Payer: MEDICARE

## 2021-05-05 VITALS
DIASTOLIC BLOOD PRESSURE: 80 MMHG | WEIGHT: 171 LBS | SYSTOLIC BLOOD PRESSURE: 154 MMHG | BODY MASS INDEX: 28.49 KG/M2 | RESPIRATION RATE: 12 BRPM | HEIGHT: 65 IN | TEMPERATURE: 97.6 F | HEART RATE: 64 BPM

## 2021-05-05 DIAGNOSIS — Z00.00 MEDICARE ANNUAL WELLNESS VISIT, SUBSEQUENT: Primary | ICD-10-CM

## 2021-05-05 DIAGNOSIS — I10 ESSENTIAL HYPERTENSION: ICD-10-CM

## 2021-05-05 DIAGNOSIS — K21.9 GASTROESOPHAGEAL REFLUX DISEASE WITHOUT ESOPHAGITIS: ICD-10-CM

## 2021-05-05 LAB
ALBUMIN SERPL-MCNC: 3.8 G/DL (ref 3.4–5)
ALP SERPL-CCNC: 84 U/L (ref 40–150)
ALT SERPL W P-5'-P-CCNC: 26 U/L (ref 0–50)
ANION GAP SERPL CALCULATED.3IONS-SCNC: 2 MMOL/L (ref 3–14)
AST SERPL W P-5'-P-CCNC: 22 U/L (ref 0–45)
BASOPHILS # BLD AUTO: 0 10E9/L (ref 0–0.2)
BASOPHILS NFR BLD AUTO: 0.6 %
BILIRUB SERPL-MCNC: 0.9 MG/DL (ref 0.2–1.3)
BUN SERPL-MCNC: 16 MG/DL (ref 7–30)
CALCIUM SERPL-MCNC: 9.5 MG/DL (ref 8.5–10.1)
CHLORIDE SERPL-SCNC: 102 MMOL/L (ref 94–109)
CHOLEST SERPL-MCNC: 226 MG/DL
CO2 SERPL-SCNC: 32 MMOL/L (ref 20–32)
CREAT SERPL-MCNC: 0.74 MG/DL (ref 0.52–1.04)
DIFFERENTIAL METHOD BLD: ABNORMAL
EOSINOPHIL # BLD AUTO: 0.1 10E9/L (ref 0–0.7)
EOSINOPHIL NFR BLD AUTO: 0.8 %
ERYTHROCYTE [DISTWIDTH] IN BLOOD BY AUTOMATED COUNT: 11.8 % (ref 10–15)
GFR SERPL CREATININE-BSD FRML MDRD: 75 ML/MIN/{1.73_M2}
GLUCOSE SERPL-MCNC: 93 MG/DL (ref 70–99)
HCT VFR BLD AUTO: 41.2 % (ref 35–47)
HDLC SERPL-MCNC: 77 MG/DL
HGB BLD-MCNC: 13.2 G/DL (ref 11.7–15.7)
IMM GRANULOCYTES # BLD: 0 10E9/L (ref 0–0.4)
IMM GRANULOCYTES NFR BLD: 0.1 %
LDLC SERPL CALC-MCNC: 129 MG/DL
LYMPHOCYTES # BLD AUTO: 3.8 10E9/L (ref 0.8–5.3)
LYMPHOCYTES NFR BLD AUTO: 53 %
MCH RBC QN AUTO: 34.2 PG (ref 26.5–33)
MCHC RBC AUTO-ENTMCNC: 32 G/DL (ref 31.5–36.5)
MCV RBC AUTO: 107 FL (ref 78–100)
MONOCYTES # BLD AUTO: 0.3 10E9/L (ref 0–1.3)
MONOCYTES NFR BLD AUTO: 3.6 %
NEUTROPHILS # BLD AUTO: 3 10E9/L (ref 1.6–8.3)
NEUTROPHILS NFR BLD AUTO: 41.9 %
NONHDLC SERPL-MCNC: 149 MG/DL
NRBC # BLD AUTO: 0 10*3/UL
NRBC BLD AUTO-RTO: 0 /100
PLATELET # BLD AUTO: 236 10E9/L (ref 150–450)
POTASSIUM SERPL-SCNC: 4.7 MMOL/L (ref 3.4–5.3)
PROT SERPL-MCNC: 8.6 G/DL (ref 6.8–8.8)
RBC # BLD AUTO: 3.86 10E12/L (ref 3.8–5.2)
SODIUM SERPL-SCNC: 136 MMOL/L (ref 133–144)
TRIGL SERPL-MCNC: 100 MG/DL
TSH SERPL DL<=0.005 MIU/L-ACNC: 3.32 MU/L (ref 0.4–4)
WBC # BLD AUTO: 7.3 10E9/L (ref 4–11)

## 2021-05-05 PROCEDURE — 84443 ASSAY THYROID STIM HORMONE: CPT | Performed by: FAMILY MEDICINE

## 2021-05-05 PROCEDURE — 80053 COMPREHEN METABOLIC PANEL: CPT | Performed by: FAMILY MEDICINE

## 2021-05-05 PROCEDURE — 99214 OFFICE O/P EST MOD 30 MIN: CPT | Mod: 25 | Performed by: FAMILY MEDICINE

## 2021-05-05 PROCEDURE — 85025 COMPLETE CBC W/AUTO DIFF WBC: CPT | Performed by: FAMILY MEDICINE

## 2021-05-05 PROCEDURE — G0439 PPPS, SUBSEQ VISIT: HCPCS | Performed by: FAMILY MEDICINE

## 2021-05-05 PROCEDURE — 36415 COLL VENOUS BLD VENIPUNCTURE: CPT | Performed by: FAMILY MEDICINE

## 2021-05-05 PROCEDURE — 80061 LIPID PANEL: CPT | Performed by: FAMILY MEDICINE

## 2021-05-05 RX ORDER — AMLODIPINE BESYLATE 2.5 MG/1
2.5 TABLET ORAL DAILY
Qty: 30 TABLET | Refills: 0 | Status: SHIPPED | OUTPATIENT
Start: 2021-05-05 | End: 2021-05-12

## 2021-05-05 ASSESSMENT — ENCOUNTER SYMPTOMS
DIARRHEA: 0
ABDOMINAL PAIN: 0
HEMATURIA: 0
HEMATOCHEZIA: 0
CHILLS: 0
CONSTIPATION: 0
COUGH: 0

## 2021-05-05 ASSESSMENT — MIFFLIN-ST. JEOR: SCORE: 1241.53

## 2021-05-05 ASSESSMENT — ACTIVITIES OF DAILY LIVING (ADL): CURRENT_FUNCTION: NO ASSISTANCE NEEDED

## 2021-05-05 NOTE — PATIENT INSTRUCTIONS
Labs today     Continue to work with chiropractor on the neck     Continue to work with ENT     Start omeprazole for reflux    Start med for bp take this daily   Patient Education   Personalized Prevention Plan  You are due for the preventive services outlined below.  Your care team is available to assist you in scheduling these services.  If you have already completed any of these items, please share that information with your care team to update in your medical record.  Health Maintenance Due   Topic Date Due     FALL RISK ASSESSMENT  05/08/2021       Exercise for a Healthier Heart  You may wonder how you can improve the health of your heart. If you re thinking about exercise, you re on the right track. You don t need to become an athlete. But you do need a certain amount of brisk exercise to help strengthen your heart. If you have been diagnosed with a heart condition, your healthcare provider may advise exercise to help stabilize your condition. To help make exercise a habit, choose safe, fun activities.      Exercise with a friend. When activity is fun, you're more likely to stick with it.   Before you start  Check with your healthcare provider before starting an exercise program. This is especially important if you have not been active for a while. It's also important if you have a long-term (chronic) health problem such as heart disease, diabetes, or obesity. Or if you are at high risk for having these problems.   Why exercise?  Exercising regularly offers many healthy rewards. It can help you do all of the following:     Improve your blood cholesterol level to help prevent further heart trouble    Lower your blood pressure to help prevent a stroke or heart attack    Control diabetes, or reduce your risk of getting this disease    Improve your heart and lung function    Reach and stay at a healthy weight    Make your muscles stronger so you can stay active    Prevent falls and fractures by slowing the loss of  bone mass (osteoporosis)    Manage stress better    Reduce your blood pressure    Improve your sense of self and your body image  Exercise tips      Ease into your routine. Set small goals. Then build on them. If you are not sure what your activity level should be, talk with your healthcare provider first before starting an exercise routine.    Exercise on most days. Aim for a total of 150 minutes (2 hours and 30 minutes) or more of moderate-intensity aerobic activity each week. Or 75 minutes (1 hour and 15 minutes) or more of vigorous-intensity aerobic activity each week. Or try for a combination of both. Moderate activity means that you breathe heavier and your heart rate increases but you can still talk. Think about doing 40 minutes of moderate exercise, 3 to 4 times a week. For best results, activity should last for about 40 minutes to lower blood pressure and cholesterol. It's OK to work up to the 40-minute period over time. Examples of moderate-intensity activity are walking 1 mile in 15 minutes. Or doing 30 to 45 minutes of yard work.    Step up your daily activity level.  Along with your exercise program, try being more active the whole day. Walk instead of drive. Or park further away so that you take more steps each day. Do more household tasks or yard work. You may not be able to meet the advised mount of physical activity. But doing some moderate- or vigorous-intensity aerobic activity can help reduce your risk for heart disease. Your healthcare provider can help you figure out what is best for you.    Choose 1 or more activities you enjoy.  Walking is one of the easiest things you can do. You can also try swimming, riding a bike, dancing, or taking an exercise class.    When to call your healthcare provider  Call your healthcare provider if you have any of these:     Chest pain or feel dizzy or lightheaded    Burning, tightness, pressure, or heaviness in your chest, neck, shoulders, back, or  arms    Abnormal shortness of breath    More joint or muscle pain    A very fast or irregular heartbeat (palpitations)  [employer] last reviewed this educational content on 7/1/2019 2000-2021 [employer]. All rights reserved. This information is not intended as a substitute for professional medical care. Always follow your healthcare professional's instructions.          Urinary Incontinence, Female (Adult)   Urinary incontinence means loss of bladder control. This problem affects many women, especially as they get older. If you have incontinence, you may be embarrassed to ask for help. But know that this problem can be treated.   Types of Incontinence  There are different types of incontinence. Two of the main types are described here. You can have more than one type.     Stress incontinence. With this type, urine leaks when pressure (stress) is put on the bladder. This may happen when you cough, sneeze, or laugh. Stress incontinence most often occurs because the pelvic floor muscles that support the bladder and urethra are weak. This can happen after pregnancy and vaginal childbirth or a hysterectomy. It can also be due to excess body weight or hormone changes.    Urge incontinence (also called overactive bladder). With this type, a sudden urge to urinate is felt often. This may happen even though there may not be much urine in the bladder. The need to urinate often during the night is common. Urge incontinence most often occurs because of bladder spasms. This may be due to bladder irritation or infection. Damage to bladder nerves or pelvic muscles, constipation, and certain medicines can also lead to urge incontinence.  Treatment depends on the cause. Further evaluation is needed to find the type you have. This will likely include an exam and certain tests. Based on the results, you and your healthcare provider can then plan treatment. Until a diagnosis is made, the home care tips below can help  ease symptoms.   Home care    Do pelvic floor muscle exercises, if they are prescribed. The pelvic floor muscles help support the bladder and urethra. Many women find that their symptoms improve when doing special exercises that strengthen these muscles. To do the exercises, contract the muscles you would use to stop your stream of urine. But do this when you re not urinating. Hold for 10 seconds, then relax. Repeat 10 to 20 times in a row, at least 3 times a day. Your healthcare provider may give you other instructions for how to do the exercises and how often.    Keep a bladder diary. This helps track how often and how much you urinate over a set period of time. Bring this diary with you to your next visit with the provider. The information can help your provider learn more about your bladder problem.    Lose weight, if advised to by your provider. Extra weight puts pressure on the bladder. Your provider can help you create a weight-loss plan that s right for you. This may include exercising more and making certain diet changes.    Don't have foods and drinks that may irritate the bladder. These can include alcohol and caffeinated drinks.    Quit smoking. Smoking and other tobacco use can lead to a long-term (chronic) cough that strains the pelvic floor muscles. Smoking may also damage the bladder and urethra. Talk with your provider about treatments or methods you can use to quit smoking.    If drinking large amounts of fluid makes you have symptoms, you may be advised to limit your fluid intake. You may also be advised to drink most of your fluids during the day and to limit fluids at night.    If you re worried about urine leakage or accidents, you may wear absorbent pads to catch urine. Change the pads often. This helps reduce discomfort. It may also reduce the risk of skin or bladder infections.    Follow-up care  Follow up with your healthcare provider, or as directed. It may take some to find the right  treatment for your problem. But healthy lifestyle changes can be made right away. These include such things as exercising on a regular basis, eating a healthy diet, losing weight (if needed), and quitting smoking. Your treatment plan may include special therapies or medicines. Certain procedures or surgery may also be options. Talk about any questions you have with your provider.   When to seek medical advice  Call the healthcare provider right away if any of these occur:    Fever of 100.4 F (38 C) or higher, or as directed by your provider    Bladder pain or fullness    Belly swelling    Nausea or vomiting    Back pain    Weakness, dizziness, or fainting  [employer] last reviewed this educational content on 1/1/2020 2000-2021 [employer]. All rights reserved. This information is not intended as a substitute for professional medical care. Always follow your healthcare professional's instructions.

## 2021-05-05 NOTE — PROGRESS NOTES
"SUBJECTIVE:   Lavonne Tidwell is a 80 year old female who presents for Preventive Visit.      Patient has been advised of split billing requirements and indicates understanding: Yes   Are you in the first 12 months of your Medicare coverage?  No    Healthy Habits:     In general, how would you rate your overall health?  Good    Frequency of exercise:  1 day/week    Duration of exercise:  Less than 15 minutes    Do you usually eat at least 4 servings of fruit and vegetables a day, include whole grains    & fiber and avoid regularly eating high fat or \"junk\" foods?  Yes    Taking medications regularly:  Yes    Medication side effects:  None    Ability to successfully perform activities of daily living:  No assistance needed    Home Safety:  No safety concerns identified    Hearing Impairment:  No hearing concerns    In the past 6 months, have you been bothered by leaking of urine? Yes    In general, how would you rate your overall mental or emotional health?  Good      PHQ-2 Total Score: 0    Additional concerns today:  No    Do you feel safe in your environment? Yes    Have you ever done Advance Care Planning? (For example, a Health Directive, POLST, or a discussion with a medical provider or your loved ones about your wishes): No, advance care planning information given to patient to review.  Patient plans to discuss their wishes with loved ones or provider.         Fall risk  Fallen 2 or more times in the past year?: No  Any fall with injury in the past year?: No    Cognitive Screening   1) Repeat 3 items (Leader, Season, Table)    2) Clock draw: NORMAL  3) 3 item recall: Recalls 2 objects   Results: 3 items recalled: COGNITIVE IMPAIRMENT LESS LIKELY    Mini-CogTM Copyright ROHIT Arvizu. Licensed by the author for use in [facility]; reprinted with permission ([email or web address]@.[email or web address]). All rights reserved.      Do you have sleep apnea, excessive snoring or daytime drowsiness?: no    Reviewed and updated as needed " this visit by clinical staff  Tobacco  Allergies  Meds              Reviewed and updated as needed this visit by Provider                Social History     Tobacco Use     Smoking status: Former Smoker     Packs/day: 0.50     Years: 5.00     Pack years: 2.50     Types: Cigarettes     Start date: 1968     Quit date: 2/15/1972     Years since quittin.2     Smokeless tobacco: Never Used     Tobacco comment: stopped in her 20's   Substance Use Topics     Alcohol use: Yes     Alcohol/week: 0.0 standard drinks     Comment: Occasional     If you drink alcohol do you typically have >3 drinks per day or >7 drinks per week? No    Alcohol Use 2021   Prescreen: >3 drinks/day or >7 drinks/week? No   Prescreen: >3 drinks/day or >7 drinks/week? -           Hypertension       Do you check your blood pressure regularly outside of the clinic? No     Are you following a low salt diet? No    Are your blood pressures ever more than 140 on the top number (systolic) OR more   than 90 on the bottom number (diastolic), for example 140/90? unsure    BP has been high last several check at ENT and now here   Has not checked at home   Patient presents for evaluation of hypertension.  She indicates that she is feeling well and denies any symptoms referable to her elevated blood pressure. Specifically denies chest pain, palpitations, dyspnea, orthopnea, PND or peripheral edema.     Working with ENT on chronic PND and cough ? Of some GERD       Current providers sharing in care for this patient include:   Patient Care Team:  Elvira Kowalski MD as PCP - General (Family Practice)  Rob Harris MD as Assigned Surgical Provider  Yong Jones PA-C as Assigned PCP    The following health maintenance items are reviewed in Epic and correct as of today:  Health Maintenance Due   Topic Date Due     FALL RISK ASSESSMENT  2021     Labs reviewed in EPIC      Mammogram Screening - Patient over age 75, has elected to  "continue with screening.  Pertinent mammograms are reviewed under the imaging tab.    Review of Systems   Constitutional: Negative for chills.   HENT: Negative for congestion.    Respiratory: Negative for cough.    Cardiovascular: Negative for chest pain.   Gastrointestinal: Negative for abdominal pain, constipation, diarrhea and hematochezia.   Genitourinary: Negative for hematuria.         OBJECTIVE:   BP (!) 160/80   Pulse 64   Temp 97.6  F (36.4  C) (Tympanic)   Resp 12   Ht 1.651 m (5' 5\")   Wt 77.6 kg (171 lb)   BMI 28.46 kg/m   Estimated body mass index is 28.46 kg/m  as calculated from the following:    Height as of this encounter: 1.651 m (5' 5\").    Weight as of this encounter: 77.6 kg (171 lb).  Physical Exam  GENERAL: healthy, alert and no distress  EYES: Eyes grossly normal to inspection, PERRL and conjunctivae and sclerae normal  HENT: ear canals and TM's normal, nose and mouth without ulcers or lesions  NECK: no adenopathy, no asymmetry, masses, or scars and thyroid normal to palpation  RESP: lungs clear to auscultation - no rales, rhonchi or wheezes  CV: regular rate and rhythm, normal S1 S2, no S3 or S4, no murmur, click or rub, no peripheral edema and peripheral pulses strong  ABDOMEN: soft, nontender, no hepatosplenomegaly, no masses and bowel sounds normal  MS: no gross musculoskeletal defects noted, no edema  SKIN: no suspicious lesions or rashes  NEURO: Normal strength and tone, mentation intact and speech normal  PSYCH: mentation appears normal, affect normal/bright    Diagnostic Test Results:  Labs reviewed in Epic    ASSESSMENT / PLAN:   1. Medicare annual wellness visit, subsequent      2. Essential hypertension  New dx   Start medication and see me in one week   - Comprehensive metabolic panel  - CBC with platelets differential  - TSH with free T4 reflex  - Lipid panel reflex to direct LDL Fasting  - amLODIPine (NORVASC) 2.5 MG tablet; Take 1 tablet (2.5 mg) by mouth daily  Dispense: " "30 tablet; Refill: 0    3. Gastroesophageal reflux disease without esophagitis  Trial of this given ENT eval   - omeprazole (PRILOSEC) 20 MG DR capsule; Take 1 capsule (20 mg) by mouth daily  Dispense: 90 capsule; Refill: 1    Patient has been advised of split billing requirements and indicates understanding: Yes  COUNSELING:  Reviewed preventive health counseling, as reflected in patient instructions    Estimated body mass index is 28.46 kg/m  as calculated from the following:    Height as of this encounter: 1.651 m (5' 5\").    Weight as of this encounter: 77.6 kg (171 lb).        She reports that she quit smoking about 49 years ago. Her smoking use included cigarettes. She started smoking about 52 years ago. She has a 2.50 pack-year smoking history. She has never used smokeless tobacco.      Appropriate preventive services were discussed with this patient, including applicable screening as appropriate for cardiovascular disease, diabetes, osteopenia/osteoporosis, and glaucoma.  As appropriate for age/gender, discussed screening for colorectal cancer, prostate cancer, breast cancer, and cervical cancer. Checklist reviewing preventive services available has been given to the patient.    Reviewed patients plan of care and provided an AVS. The Basic Care Plan (routine screening as documented in Health Maintenance) for Lavonne meets the Care Plan requirement. This Care Plan has been established and reviewed with the Patient.    Counseling Resources:  ATP IV Guidelines  Pooled Cohorts Equation Calculator  Breast Cancer Risk Calculator  Breast Cancer: Medication to Reduce Risk  FRAX Risk Assessment  ICSI Preventive Guidelines  Dietary Guidelines for Americans, 2010  [employer] MyPlate  ASA Prophylaxis  Lung CA Screening    Elvira Kowalski MD  [facility]    Identified Health Risks:  "

## 2021-05-05 NOTE — NURSING NOTE
"Chief Complaint   Patient presents with     Medicare Visit     BP (!) 160/80   Pulse 64   Temp 97.6  F (36.4  C) (Tympanic)   Resp 12   Ht 1.651 m (5' 5\")   Wt 77.6 kg (171 lb)   BMI 28.46 kg/m   Estimated body mass index is 28.46 kg/m  as calculated from the following:    Height as of this encounter: 1.651 m (5' 5\").    Weight as of this encounter: 77.6 kg (171 lb).  Patient presents to the clinic using No DME      Health Maintenance that is potentially due pending provider review:    Health Maintenance Due   Topic Date Due     FALL RISK ASSESSMENT  05/08/2021        Possibly completing today per provider review.        "

## 2021-05-12 ENCOUNTER — OFFICE VISIT (OUTPATIENT)
Dept: FAMILY MEDICINE | Facility: CLINIC | Age: 81
End: 2021-05-12
Payer: MEDICARE

## 2021-05-12 VITALS — RESPIRATION RATE: 12 BRPM | SYSTOLIC BLOOD PRESSURE: 120 MMHG | DIASTOLIC BLOOD PRESSURE: 66 MMHG | HEART RATE: 80 BPM

## 2021-05-12 DIAGNOSIS — I10 ESSENTIAL HYPERTENSION: ICD-10-CM

## 2021-05-12 PROCEDURE — 99213 OFFICE O/P EST LOW 20 MIN: CPT | Performed by: FAMILY MEDICINE

## 2021-05-12 RX ORDER — AMLODIPINE BESYLATE 2.5 MG/1
2.5 TABLET ORAL DAILY
Qty: 90 TABLET | Refills: 3 | Status: SHIPPED | OUTPATIENT
Start: 2021-05-12 | End: 2022-03-11 | Stop reason: SINTOL

## 2021-05-12 NOTE — NURSING NOTE
"Chief Complaint   Patient presents with     Hypertension     /66   Pulse 80   Resp 12  Estimated body mass index is 28.46 kg/m  as calculated from the following:    Height as of 5/5/21: 1.651 m (5' 5\").    Weight as of 5/5/21: 77.6 kg (171 lb).  Patient presents to the clinic using No DME      Health Maintenance that is potentially due pending provider review:    Health Maintenance Due   Topic Date Due     ANNUAL REVIEW OF HM ORDERS  Never done        n/a        "

## 2021-05-12 NOTE — PROGRESS NOTES
"    Assessment & Plan     Essential hypertension  Stable no change in treatment plan.   - amLODIPine (NORVASC) 2.5 MG tablet; Take 1 tablet (2.5 mg) by mouth daily             BMI:   Estimated body mass index is 28.46 kg/m  as calculated from the following:    Height as of 5/5/21: 1.651 m (5' 5\").    Weight as of 5/5/21: 77.6 kg (171 lb).       Patient Instructions   Stay on current norvasc dose for BP           Return in about 6 months (around 11/12/2021) for BP Recheck, with me.    Elvira Kowalski MD  [facility]    Kirsty Banerjee is a 80 year old who presents for the following health issues     HPI     Hypertension Follow-up - started Norvasc      Do you check your blood pressure regularly outside of the clinic? No     Are you following a low salt diet? Yes    Are your blood pressures ever more than 140 on the top number (systolic) OR more   than 90 on the bottom number (diastolic), for example 140/90? Unsure - just bought a machine      doing well no side affects on the new med she feels good     Review of Systems   Constitutional, HEENT, cardiovascular, pulmonary, gi and gu systems are negative, except as otherwise noted.      Objective    /66   Pulse 80   Resp 12   There is no height or weight on file to calculate BMI.  Physical Exam   GENERAL APPEARANCE: healthy, alert and no distress  CV: regular rates and rhythm, normal S1 S2, no S3 or S4 and no murmur, click or rub  MS: extremities normal- no gross deformities noted  PSYCH: mentation appears normal and affect normal/bright                  "

## 2021-05-14 NOTE — PROGRESS NOTES
Chief Complaint   Patient presents with     Follow Up     follow up rhinitis after using budesonide rinses and kenalog injection- patient feels she is much better- left ear also better after using ear drops     History of Present Illness  Lavonne Tidwell is a 80 year old female who presents today for follow-up.  I last evaluated the patient on 4/19/2021.  She has a history of left otitis media with effusion.  We placed an ear tube in the left and treated her with drops on 12/18/2020.  She had hearing improvement when I saw her on 3/1/2021 but still had a mild mixed loss in the left ear.    She then developed drainage and we placed her on drops.  I saw her back on 4/19/2021.  She had some tympanic membrane thickening and some granulation around the tube.  She is also having issues with intermittent postnasal drainage and dysphonia.  Her endoscopic exam was consistent with chronic rhinitis.  She received a Kenalog injection we started her on budesonide irrigations.  She returns today for follow-up.    Since last seeing the patient, the patient reports that the left ear is doing well.  She denies any otalgia or otorrhea.  She feels like her hearing is at baseline.  Aside from her ear tube, no previous ear surgery.     From a nasal drainage standpoint, she feels like her nasal congestion is much improved.  Her rhinorrhea and postnasal drainage have improved.  She is no longer feeling like she is having as much hoarseness.  She denies any dysphagia or odynophagia.  No hemoptysis.  No neck lumps/bumps/swelling.  She has a remote smoking history quitting in the early 1970s. She did recently stop using the budesonide irrigations every day.  She also has Flonase that she uses.    Past Medical History  Patient Active Problem List   Diagnosis     Injury of sigmoid colon     Esophageal reflux     Menopausal syndrome (hot flashes)     Urinary incontinence     Atrophic vaginitis     Hyperlipidemia LDL goal <130     Advanced  directives, counseling/discussion     Onychomycosis     Senile nuclear sclerosis     Status post total left knee replacement     Status post total right knee replacement     Primary localized osteoarthrosis, lower leg     Tubulovillous adenoma polyp of colon     Essential hypertension     Current Medications    Current Outpatient Medications:      Acetaminophen (TYLENOL PO), Take 1,000 mg by mouth 2 times daily as needed for mild pain or fever, Disp: , Rfl:      amLODIPine (NORVASC) 2.5 MG tablet, Take 1 tablet (2.5 mg) by mouth daily, Disp: 90 tablet, Rfl: 3     Biotin 10 MG CAPS, , Disp: , Rfl:      CALCIUM + D OR, 2 TABLET DAILY, Disp: , Rfl:      Docusate Calcium (STOOL SOFTENER PO), , Disp: , Rfl:      fluticasone (FLONASE) 50 MCG/ACT nasal spray, Spray 2 sprays into both nostrils daily, Disp: 47.4 mL, Rfl: 3     garlic 150 MG TABS tablet, Take 150 mg by mouth daily, Disp: , Rfl:      grape seed 50 MG CAPS, Take 50 mg by mouth daily, Disp: , Rfl:      loratadine (CLARITIN) 10 MG tablet, Take 10 mg by mouth daily, Disp: , Rfl:      methylcellulose (FIBER THERAPY) 500 MG TABS tablet, Take 500 mg by mouth daily, Disp: , Rfl:      Multiple Vitamins-Minerals (CENTRUM SILVER) per tablet, Take 1 tablet by mouth daily, Disp: , Rfl:      omeprazole (PRILOSEC) 20 MG DR capsule, Take 1 capsule (20 mg) by mouth daily, Disp: 90 capsule, Rfl: 1     VITAMIN E COMPLEX PO, , Disp: , Rfl:      Zinc Sulfate (ZINC 15 PO), , Disp: , Rfl:      budesonide (PULMICORT) 0.5 MG/2ML neb solution, Place 0.5 mg/2 mL budesonide vial in 8 oz normal saline sinus rinse bottle.  Irrigate each nostril with one half of the bottle daily. (Patient not taking: Reported on 5/17/2021), Disp: 180 mL, Rfl: 3     ciprofloxacin-dexamethasone (CIPRODEX) 0.3-0.1 % otic suspension, Place 5 drops in left ear twice daily for 7days.  Call if drainage persistent past 7 days. (Patient not taking: Reported on 5/17/2021), Disp: 10 mL, Rfl: 3     ofloxacin (OCUFLOX)  0.3 % ophthalmic solution, Place 3 drops ofloxacin, 3 drops dexamethasone in left ear twice daily for 10 days (Patient not taking: Reported on 2021), Disp: 10 mL, Rfl: 3    Allergies  Allergies   Allergen Reactions     Codeine GI Disturbance       Social History  Social History     Socioeconomic History     Marital status:      Spouse name: Not on file     Number of children: Not on file     Years of education: Not on file     Highest education level: Not on file   Occupational History     Not on file   Social Needs     Financial resource strain: Not on file     Food insecurity     Worry: Not on file     Inability: Not on file     Transportation needs     Medical: Not on file     Non-medical: Not on file   Tobacco Use     Smoking status: Former Smoker     Packs/day: 0.50     Years: 5.00     Pack years: 2.50     Types: Cigarettes     Start date: 1968     Quit date: 2/15/1972     Years since quittin.2     Smokeless tobacco: Never Used     Tobacco comment: stopped in her 20's   Substance and Sexual Activity     Alcohol use: Yes     Alcohol/week: 0.0 standard drinks     Comment: Occasional     Drug use: No     Sexual activity: Not Currently   Lifestyle     Physical activity     Days per week: Not on file     Minutes per session: Not on file     Stress: Not on file   Relationships     Social connections     Talks on phone: Not on file     Gets together: Not on file     Attends [employer] service: Not on file     Active member of club or organization: Not on file     Attends meetings of clubs or organizations: Not on file     Relationship status: Not on file     Intimate partner violence     Fear of current or ex partner: Not on file     Emotionally abused: Not on file     Physically abused: Not on file     Forced sexual activity: Not on file   Other Topics Concern     Parent/sibling w/ CABG, MI or angioplasty before 65F 55M? Yes   Social History Narrative     Not on file       Family History  Family  History   Problem Relation Age of Onset     Cancer Mother         Ovarian     Other Cancer Mother         Ovarian     Cerebrovascular Disease Father      Heart Disease Father      C.A.D. Father      Coronary Artery Disease Father      Cancer Maternal Grandmother      Other Cancer Maternal Grandmother         Ovarian/Bone     Cerebrovascular Disease Maternal Grandfather      Colon Cancer Maternal Grandfather      Diabetes Other         Great Grandmother     Breast Cancer No family hx of        Review of Systems  As per HPI and PMHx, otherwise 10 system review including the head and neck, constitutional, eyes, respiratory, GI, skin, neurologic, lymphatic, endocrine, and allergy systems is negative.    Physical Exam  /60 (BP Location: Right arm, Patient Position: Chair, Cuff Size: Adult Regular)   Pulse 66   Temp 97.6  F (36.4  C) (Tympanic)   Wt 77.6 kg (171 lb)   BMI 28.46 kg/m    GENERAL: Patient is a pleasant, cooperative 80 year old female in no acute distress.  HEAD: Normocephalic, atraumatic.  Hair and scalp are normal.  EYES: Pupils are equal, round, reactive to light and accommodation.  Extraocular movements are intact.  The sclera nonicteric without injection.  The extraocular structures are normal.  EARS: Normal shape and symmetry.  No tenderness when palpating the mastoid or tragal areas bilaterally. Otoscopic exam on the right reveals a clear canal.  The right tympanic membrane was round, intact without evidence of effusion.  No retraction, granulation, drainage, or evidence of cholesteatoma.  Otoscopic exam on the left reveals a clear canal. Left tympanic membrane has a Dura-Vent ear tube in the posterior-inferior quadrant.  There is a slight amount of surrounding crusting.  The middle ear does appear well aerated.  No significant retraction, active drainage, or evidence of cholesteatoma.   NOSE: Nares are patent.  Nasal mucosa is pink and moist.  The inferior turbinates appear normal.  No nasal  cavity masses, polyps, or mucopurulence on anterior rhinoscopy.  NEUROLOGIC: Cranial nerves II through XII are grossly intact.  Voice is strong.  Patient is House-Brackmann I/VI bilaterally.  CARDIOVASCULAR: Extremities are warm and well-perfused.  No significant peripheral edema.  RESPIRATORY: Patient has nonlabored breathing without cough, wheeze, stridor.  PSYCHIATRIC: Patient is alert and oriented.  Mood and affect appear normal.  SKIN: Warm and dry.  No scalp, face, or neck lesions noted.     Assessment and Plan     ICD-10-CM    1. Chronic otitis media of left ear with effusion  H65.492    2. Mixed conductive and sensorineural hearing loss of left ear with restricted hearing of right ear  H90.A32    3. Sensorineural hearing loss (SNHL) of right ear with restricted hearing of left ear  H90.A21    4. Retained myringotomy tube in left ear  Z96.22    5. History of allergic rhinitis  Z87.09    6. Nasal obstruction  J34.89    7. Post-nasal drainage  R09.82       It was my pleasure seeing Lavonne Tidwell today in clinic.  The patient feels like the left ear is doing very well.  She is not having any otalgia or otorrhea.  Her hearing is stable.  I would recommend observation.  We will recheck her ear tube in a few months.    From a nose and sinus standpoint, she had significant benefit after the Kenalog injection of the budesonide irrigations.  We discussed using budesonide irrigations as needed if she is having issues with congestion or drainage.  She can use Flonase in between days that she is not using the budesonide irrigations.  We also briefly discussed allergy evaluation with consideration of allergy testing.  Given that she is doing so well at this point, I would recommend observation.  We can recheck her nasal symptoms in the future as needed.    oRb Harris MD  Department of Otolarygology-Head and Neck Surgery  [facility]

## 2021-05-17 ENCOUNTER — OFFICE VISIT (OUTPATIENT)
Dept: OTOLARYNGOLOGY | Facility: CLINIC | Age: 81
End: 2021-05-17
Payer: MEDICARE

## 2021-05-17 VITALS
DIASTOLIC BLOOD PRESSURE: 60 MMHG | BODY MASS INDEX: 28.46 KG/M2 | SYSTOLIC BLOOD PRESSURE: 116 MMHG | TEMPERATURE: 97.6 F | HEART RATE: 66 BPM | WEIGHT: 171 LBS

## 2021-05-17 DIAGNOSIS — H90.A21 SENSORINEURAL HEARING LOSS (SNHL) OF RIGHT EAR WITH RESTRICTED HEARING OF LEFT EAR: ICD-10-CM

## 2021-05-17 DIAGNOSIS — H65.492 CHRONIC OTITIS MEDIA OF LEFT EAR WITH EFFUSION: Primary | ICD-10-CM

## 2021-05-17 DIAGNOSIS — H90.A32 MIXED CONDUCTIVE AND SENSORINEURAL HEARING LOSS OF LEFT EAR WITH RESTRICTED HEARING OF RIGHT EAR: ICD-10-CM

## 2021-05-17 DIAGNOSIS — R09.82 POST-NASAL DRAINAGE: ICD-10-CM

## 2021-05-17 DIAGNOSIS — J34.89 NASAL OBSTRUCTION: ICD-10-CM

## 2021-05-17 DIAGNOSIS — Z96.22 RETAINED MYRINGOTOMY TUBE IN LEFT EAR: ICD-10-CM

## 2021-05-17 DIAGNOSIS — Z87.09 HISTORY OF ALLERGIC RHINITIS: ICD-10-CM

## 2021-05-17 PROCEDURE — 99213 OFFICE O/P EST LOW 20 MIN: CPT | Performed by: OTOLARYNGOLOGY

## 2021-05-17 ASSESSMENT — PAIN SCALES - GENERAL: PAINLEVEL: NO PAIN (0)

## 2021-05-17 NOTE — NURSING NOTE
"Initial /60 (BP Location: Right arm, Patient Position: Chair, Cuff Size: Adult Regular)   Pulse 66   Temp 97.6  F (36.4  C) (Tympanic)   Wt 77.6 kg (171 lb)   BMI 28.46 kg/m   Estimated body mass index is 28.46 kg/m  as calculated from the following:    Height as of 5/5/21: 1.651 m (5' 5\").    Weight as of this encounter: 77.6 kg (171 lb). .    Indigo Pastor LPN    "

## 2021-05-17 NOTE — LETTER
5/17/2021         RE: Lavonne Tidwell  [address] [facility] [id number]        Dear Colleague,    Thank you for referring your patient, Lavonne Tidwell, to the [facility]. Please see a copy of my visit note below.    Chief Complaint   Patient presents with     Follow Up     follow up rhinitis after using budesonide rinses and kenalog injection- patient feels she is much better- left ear also better after using ear drops     History of Present Illness  Lavonne Tidwell is a 80 year old female who presents today for follow-up.  I last evaluated the patient on 4/19/2021.  She has a history of left otitis media with effusion.  We placed an ear tube in the left and treated her with drops on 12/18/2020.  She had hearing improvement when I saw her on 3/1/2021 but still had a mild mixed loss in the left ear.    She then developed drainage and we placed her on drops.  I saw her back on 4/19/2021.  She had some tympanic membrane thickening and some granulation around the tube.  She is also having issues with intermittent postnasal drainage and dysphonia.  Her endoscopic exam was consistent with chronic rhinitis.  She received a Kenalog injection we started her on budesonide irrigations.  She returns today for follow-up.    Since last seeing the patient, the patient reports that the left ear is doing well.  She denies any otalgia or otorrhea.  She feels like her hearing is at baseline.  Aside from her ear tube, no previous ear surgery.     From a nasal drainage standpoint, she feels like her nasal congestion is much improved.  Her rhinorrhea and postnasal drainage have improved.  She is no longer feeling like she is having as much hoarseness.  She denies any dysphagia or odynophagia.  No hemoptysis.  No neck lumps/bumps/swelling.  She has a remote smoking history quitting in the early 1970s. She did recently stop using the budesonide irrigations every day.  She also has Flonase that she  uses.    Past Medical History  Patient Active Problem List   Diagnosis     Injury of sigmoid colon     Esophageal reflux     Menopausal syndrome (hot flashes)     Urinary incontinence     Atrophic vaginitis     Hyperlipidemia LDL goal <130     Advanced directives, counseling/discussion     Onychomycosis     Senile nuclear sclerosis     Status post total left knee replacement     Status post total right knee replacement     Primary localized osteoarthrosis, lower leg     Tubulovillous adenoma polyp of colon     Essential hypertension     Current Medications    Current Outpatient Medications:      Acetaminophen (TYLENOL PO), Take 1,000 mg by mouth 2 times daily as needed for mild pain or fever, Disp: , Rfl:      amLODIPine (NORVASC) 2.5 MG tablet, Take 1 tablet (2.5 mg) by mouth daily, Disp: 90 tablet, Rfl: 3     Biotin 10 MG CAPS, , Disp: , Rfl:      CALCIUM + D OR, 2 TABLET DAILY, Disp: , Rfl:      Docusate Calcium (STOOL SOFTENER PO), , Disp: , Rfl:      fluticasone (FLONASE) 50 MCG/ACT nasal spray, Spray 2 sprays into both nostrils daily, Disp: 47.4 mL, Rfl: 3     garlic 150 MG TABS tablet, Take 150 mg by mouth daily, Disp: , Rfl:      grape seed 50 MG CAPS, Take 50 mg by mouth daily, Disp: , Rfl:      loratadine (CLARITIN) 10 MG tablet, Take 10 mg by mouth daily, Disp: , Rfl:      methylcellulose (FIBER THERAPY) 500 MG TABS tablet, Take 500 mg by mouth daily, Disp: , Rfl:      Multiple Vitamins-Minerals (CENTRUM SILVER) per tablet, Take 1 tablet by mouth daily, Disp: , Rfl:      omeprazole (PRILOSEC) 20 MG DR capsule, Take 1 capsule (20 mg) by mouth daily, Disp: 90 capsule, Rfl: 1     VITAMIN E COMPLEX PO, , Disp: , Rfl:      Zinc Sulfate (ZINC 15 PO), , Disp: , Rfl:      budesonide (PULMICORT) 0.5 MG/2ML neb solution, Place 0.5 mg/2 mL budesonide vial in 8 oz normal saline sinus rinse bottle.  Irrigate each nostril with one half of the bottle daily. (Patient not taking: Reported on 5/17/2021), Disp: 180 mL, Rfl:  3     ciprofloxacin-dexamethasone (CIPRODEX) 0.3-0.1 % otic suspension, Place 5 drops in left ear twice daily for 7days.  Call if drainage persistent past 7 days. (Patient not taking: Reported on 2021), Disp: 10 mL, Rfl: 3     ofloxacin (OCUFLOX) 0.3 % ophthalmic solution, Place 3 drops ofloxacin, 3 drops dexamethasone in left ear twice daily for 10 days (Patient not taking: Reported on 2021), Disp: 10 mL, Rfl: 3    Allergies  Allergies   Allergen Reactions     Codeine GI Disturbance       Social History  Social History     Socioeconomic History     Marital status:      Spouse name: Not on file     Number of children: Not on file     Years of education: Not on file     Highest education level: Not on file   Occupational History     Not on file   Social Needs     Financial resource strain: Not on file     Food insecurity     Worry: Not on file     Inability: Not on file     Transportation needs     Medical: Not on file     Non-medical: Not on file   Tobacco Use     Smoking status: Former Smoker     Packs/day: 0.50     Years: 5.00     Pack years: 2.50     Types: Cigarettes     Start date: 1968     Quit date: 2/15/1972     Years since quittin.2     Smokeless tobacco: Never Used     Tobacco comment: stopped in her 20's   Substance and Sexual Activity     Alcohol use: Yes     Alcohol/week: 0.0 standard drinks     Comment: Occasional     Drug use: No     Sexual activity: Not Currently   Lifestyle     Physical activity     Days per week: Not on file     Minutes per session: Not on file     Stress: Not on file   Relationships     Social connections     Talks on phone: Not on file     Gets together: Not on file     Attends Synagogue service: Not on file     Active member of club or organization: Not on file     Attends meetings of clubs or organizations: Not on file     Relationship status: Not on file     Intimate partner violence     Fear of current or ex partner: Not on file     Emotionally  abused: Not on file     Physically abused: Not on file     Forced sexual activity: Not on file   Other Topics Concern     Parent/sibling w/ CABG, MI or angioplasty before 65F 55M? Yes   Social History Narrative     Not on file       Family History  Family History   Problem Relation Age of Onset     Cancer Mother         Ovarian     Other Cancer Mother         Ovarian     Cerebrovascular Disease Father      Heart Disease Father      C.A.D. Father      Coronary Artery Disease Father      Cancer Maternal Grandmother      Other Cancer Maternal Grandmother         Ovarian/Bone     Cerebrovascular Disease Maternal Grandfather      Colon Cancer Maternal Grandfather      Diabetes Other         Great Grandmother     Breast Cancer No family hx of        Review of Systems  As per HPI and PMHx, otherwise 10 system review including the head and neck, constitutional, eyes, respiratory, GI, skin, neurologic, lymphatic, endocrine, and allergy systems is negative.    Physical Exam  /60 (BP Location: Right arm, Patient Position: Chair, Cuff Size: Adult Regular)   Pulse 66   Temp 97.6  F (36.4  C) (Tympanic)   Wt 77.6 kg (171 lb)   BMI 28.46 kg/m    GENERAL: Patient is a pleasant, cooperative 80 year old female in no acute distress.  HEAD: Normocephalic, atraumatic.  Hair and scalp are normal.  EYES: Pupils are equal, round, reactive to light and accommodation.  Extraocular movements are intact.  The sclera nonicteric without injection.  The extraocular structures are normal.  EARS: Normal shape and symmetry.  No tenderness when palpating the mastoid or tragal areas bilaterally. Otoscopic exam on the right reveals a clear canal.  The right tympanic membrane was round, intact without evidence of effusion.  No retraction, granulation, drainage, or evidence of cholesteatoma.  Otoscopic exam on the left reveals a clear canal. Left tympanic membrane has a Dura-Vent ear tube in the posterior-inferior quadrant.  There is a slight  amount of surrounding crusting.  The middle ear does appear well aerated.  No significant retraction, active drainage, or evidence of cholesteatoma.   NOSE: Nares are patent.  Nasal mucosa is pink and moist.  The inferior turbinates appear normal.  No nasal cavity masses, polyps, or mucopurulence on anterior rhinoscopy.  NEUROLOGIC: Cranial nerves II through XII are grossly intact.  Voice is strong.  Patient is House-Brackmann I/VI bilaterally.  CARDIOVASCULAR: Extremities are warm and well-perfused.  No significant peripheral edema.  RESPIRATORY: Patient has nonlabored breathing without cough, wheeze, stridor.  PSYCHIATRIC: Patient is alert and oriented.  Mood and affect appear normal.  SKIN: Warm and dry.  No scalp, face, or neck lesions noted.     Assessment and Plan     ICD-10-CM    1. Chronic otitis media of left ear with effusion  H65.492    2. Mixed conductive and sensorineural hearing loss of left ear with restricted hearing of right ear  H90.A32    3. Sensorineural hearing loss (SNHL) of right ear with restricted hearing of left ear  H90.A21    4. Retained myringotomy tube in left ear  Z96.22    5. History of allergic rhinitis  Z87.09    6. Nasal obstruction  J34.89    7. Post-nasal drainage  R09.82       It was my pleasure seeing Lavonne Tidwell today in clinic.  The patient feels like the left ear is doing very well.  She is not having any otalgia or otorrhea.  Her hearing is stable.  I would recommend observation.  We will recheck her ear tube in a few months.    From a nose and sinus standpoint, she had significant benefit after the Kenalog injection of the budesonide irrigations.  We discussed using budesonide irrigations as needed if she is having issues with congestion or drainage.  She can use Flonase in between days that she is not using the budesonide irrigations.  We also briefly discussed allergy evaluation with consideration of allergy testing.  Given that she is doing so well at this point, I  would recommend observation.  We can recheck her nasal symptoms in the future as needed.    Rob Harris MD  Department of Otolarygology-Head and Neck Surgery  [facility]       Again, thank you for allowing me to participate in the care of your patient.        Sincerely,        Rob Harris MD

## 2021-05-24 ENCOUNTER — ALLIED HEALTH/NURSE VISIT (OUTPATIENT)
Dept: FAMILY MEDICINE | Facility: CLINIC | Age: 81
End: 2021-05-24
Payer: MEDICARE

## 2021-05-24 ENCOUNTER — TELEPHONE (OUTPATIENT)
Dept: FAMILY MEDICINE | Facility: CLINIC | Age: 81
End: 2021-05-24

## 2021-05-24 VITALS — SYSTOLIC BLOOD PRESSURE: 136 MMHG | DIASTOLIC BLOOD PRESSURE: 74 MMHG | HEART RATE: 72 BPM

## 2021-05-24 DIAGNOSIS — I10 ESSENTIAL HYPERTENSION: Primary | ICD-10-CM

## 2021-05-24 PROCEDURE — 99207 PR NO CHARGE NURSE ONLY: CPT

## 2021-05-24 NOTE — TELEPHONE ENCOUNTER
Reason for call:  Patient reporting a symptom    Symptom or request: Friday 5/21/21 BP was 158/78,157/82,184/85. Saturday 164/76 Sunday 149/68 Monday 150/71 pt is wondering if the med are working.    Duration (how long have symptoms been present): been on amlodipine for a month    Have you been treated for this before? Yes      Phone Number patient can be reached at:  Home number on file [phone] (home)    Best Time:  Any time    Can we leave a detailed message on this number:  YES    Call taken on 5/24/2021 at 11:49 AM by Genesis Obando

## 2021-05-24 NOTE — TELEPHONE ENCOUNTER
Lavonne Tidwell is a 80 year old year old patient who comes in today for a Blood Pressure check because of ongoing blood pressure monitoring after amlodipine prescribed 05-05-21. Brought home wrist monitor for comparison.   BP Readings from Last 6 Encounters:   05/24/21 136/74   05/17/21 116/60   05/12/21 120/66   05/05/21 (!) 154/80   04/19/21 (!) 144/81   03/01/21 (!) 141/66     Initial /74, HR 72.  BP repeated by /74  Patient is taking medication as prescribed  Patient is tolerating medications well.  Patient is monitoring Blood Pressure at home.  Average readings if yes are 150-180/ 70-80.   BP readings in clinic with wrist monitor- 150/71, recheck 130/78.  Current complaints: none  Disposition:  Forwarded to Dr. Kowalski for review, further recommendations.  FRANCISCA Bedoya RN

## 2021-05-25 NOTE — TELEPHONE ENCOUNTER
Pt informed/ advised as noted per MD.  Pt voices understanding/ agreement.  F/U HTN appt scheduled 06-04-21. Will bring BP readings with to appt.  FRANCISCA Bedoya RN

## 2021-05-25 NOTE — TELEPHONE ENCOUNTER
Please call pt and have her stay on same meds. BP is good hear   Continue to check BP maybe every other day and record  Have her see me for a visit in 1-2 weeks to recheck it all

## 2021-06-02 ENCOUNTER — ALLIED HEALTH/NURSE VISIT (OUTPATIENT)
Dept: FAMILY MEDICINE | Facility: CLINIC | Age: 81
End: 2021-06-02
Payer: MEDICARE

## 2021-06-02 ENCOUNTER — VIRTUAL VISIT (OUTPATIENT)
Dept: FAMILY MEDICINE | Facility: CLINIC | Age: 81
End: 2021-06-02
Payer: MEDICARE

## 2021-06-02 DIAGNOSIS — R50.9 FEVER, UNSPECIFIED FEVER CAUSE: ICD-10-CM

## 2021-06-02 DIAGNOSIS — R50.9 FEVER, UNSPECIFIED FEVER CAUSE: Primary | ICD-10-CM

## 2021-06-02 LAB
SARS-COV-2 RNA RESP QL NAA+PROBE: NORMAL
SPECIMEN SOURCE: NORMAL

## 2021-06-02 PROCEDURE — 99443 PR PHYSICIAN TELEPHONE EVALUATION 21-30 MIN: CPT | Mod: 95 | Performed by: FAMILY MEDICINE

## 2021-06-02 PROCEDURE — U0003 INFECTIOUS AGENT DETECTION BY NUCLEIC ACID (DNA OR RNA); SEVERE ACUTE RESPIRATORY SYNDROME CORONAVIRUS 2 (SARS-COV-2) (CORONAVIRUS DISEASE [COVID-19]), AMPLIFIED PROBE TECHNIQUE, MAKING USE OF HIGH THROUGHPUT TECHNOLOGIES AS DESCRIBED BY CMS-2020-01-R: HCPCS | Performed by: FAMILY MEDICINE

## 2021-06-02 PROCEDURE — U0005 INFEC AGEN DETEC AMPLI PROBE: HCPCS | Performed by: FAMILY MEDICINE

## 2021-06-02 PROCEDURE — 99207 PR NO CHARGE LOS: CPT

## 2021-06-02 NOTE — PATIENT INSTRUCTIONS
covid test today at 2pm    Hydrate     Tylenol 1-2 tabs po q6h prn (325mg  Tabs)    I will call with results tomorrow and we will determine next steps

## 2021-06-02 NOTE — PROGRESS NOTES
Lavonne is a 80 year old who is being evaluated via a billable telephone visit.      What phone number would you like to be contacted at? [phone]  How would you like to obtain your AVS? MyChart    Assessment & Plan     Fever, unspecified fever cause  3 days with no other sxs other than mild headache and mild cough and congestion   Has had vaccine    - Symptomatic COVID-19 Virus (Coronavirus) by PCR; Future             Patient Instructions   covid test today at 2pm    Hydrate     Tylenol 1-2 tabs po q6h prn (325mg  Tabs)    I will call with results tomorrow and we will determine next steps       Return in about 1 day (around 6/3/2021), or if symptoms worsen or fail to improve.    Elvira Kowalski MD  [facility]    Subjective   Lavonne is a 80 year old who presents for the following health issues     HPI       Concern for COVID-19  About how many days ago did these symptoms start? 3 days  Is this your first visit for this illness? Yes  In the 14 days before your symptoms started, have you had close contact with someone with COVID-19 (Coronavirus)? No  Do you have a fever or chills? Yes, the highest temperature was 101.4  Are you having new or worsening difficulty breathing? Yes   Please describe what kind of difficulty you are having breathing:Mild dyspnea (able to do ADLs without difficulty, mild shortness of breath with activities such as climbing one or two flights of stairs or walking briskly)  Do you have new or worsening cough? Yes, it's a dry cough.   Have you had any new or unexplained body aches? YES  Feels weak  Have you experienced any of the following NEW symptoms?    Headache: YES    Sore throat: No    Loss of taste or smell: No    Chest pain: No    Diarrhea: YES    Rash: No1  What treatments have you tried? tylenol  Who do you live with? self  Are you, or a household member, a healthcare worker or a ? No  Do you live in a nursing home, group home, or  shelter? No  Do you have a way to get food/medications if quarantined? Yes, I have a friend or family member who can help me.        Review of Systems   Constitutional, HEENT, cardiovascular, pulmonary, gi and gu systems are negative, except as otherwise noted.      Objective           Vitals:  No vitals were obtained today due to virtual visit.    Physical Exam   healthy, alert and no distress  PSYCH: Alert and oriented times 3; coherent speech, normal   rate and volume, able to articulate logical thoughts, able   to abstract reason, no tangential thoughts, no hallucinations   or delusions  Her affect is normal  RESP: No cough, no audible wheezing, able to talk in full sentences  Remainder of exam unable to be completed due to telephone visits                Phone call duration: 23 minutes

## 2021-06-03 LAB
LABORATORY COMMENT REPORT: NORMAL
SARS-COV-2 RNA RESP QL NAA+PROBE: NEGATIVE
SPECIMEN SOURCE: NORMAL

## 2021-06-03 NOTE — PROGRESS NOTES
Assessment & Plan     Essential hypertension  Improved on meds               Patient Instructions   Stay on current medications     Consider using compression socks at home, may get these over the counter      Return in about 3 months (around 9/4/2021) for BP Recheck, with me.    Elvira Kowalski MD  [facility]    Kirsty Banerjee is a 80 year old who presents for the following health issues     HPI     Hypertension Follow-up      Do you check your blood pressure regularly outside of the clinic? Yes     Are you following a low salt diet? Yes    Are your blood pressures ever more than 140 on the top number (systolic) OR more   than 90 on the bottom number (diastolic), for example 140/90? No      How many servings of fruits and vegetables do you eat daily?  2-3    On average, how many sweetened beverages do you drink each day (Examples: soda, juice, sweet tea, etc.  Do NOT count diet or artificially sweetened beverages)?   0    How many days per week do you exercise enough to make your heart beat faster? 3 or less    How many minutes a day do you exercise enough to make your heart beat faster? 9 or less    How many days per week do you miss taking your medication? 0    Feeling better   Fever is gone   covid was neg  She is mildly tired but otherwise back to normal     Occasionally when she sits long periods she has some mild edema     Review of Systems   Constitutional, HEENT, cardiovascular, pulmonary, gi and gu systems are negative, except as otherwise noted.      Objective    There were no vitals taken for this visit.  There is no height or weight on file to calculate BMI.  Physical Exam   GENERAL APPEARANCE: healthy, alert and no distress  RESP: lungs clear to auscultation - no rales, rhonchi or wheezes  CV: regular rates and rhythm, normal S1 S2, no S3 or S4 and no murmur, click or rub  MS: varicose veins bilateral  PSYCH: mentation appears normal and affect  normal/bright

## 2021-06-04 ENCOUNTER — OFFICE VISIT (OUTPATIENT)
Dept: FAMILY MEDICINE | Facility: CLINIC | Age: 81
End: 2021-06-04
Payer: MEDICARE

## 2021-06-04 VITALS
BODY MASS INDEX: 28.82 KG/M2 | HEART RATE: 75 BPM | OXYGEN SATURATION: 97 % | DIASTOLIC BLOOD PRESSURE: 66 MMHG | RESPIRATION RATE: 24 BRPM | SYSTOLIC BLOOD PRESSURE: 128 MMHG | WEIGHT: 173.2 LBS | TEMPERATURE: 97.5 F

## 2021-06-04 DIAGNOSIS — I10 ESSENTIAL HYPERTENSION: Primary | ICD-10-CM

## 2021-06-04 PROCEDURE — 99213 OFFICE O/P EST LOW 20 MIN: CPT | Performed by: FAMILY MEDICINE

## 2021-06-04 NOTE — PATIENT INSTRUCTIONS
Stay on current medications     Consider using compression socks at home, may get these over the counter

## 2021-06-28 ENCOUNTER — TELEPHONE (OUTPATIENT)
Dept: FAMILY MEDICINE | Facility: CLINIC | Age: 81
End: 2021-06-28

## 2021-06-28 NOTE — TELEPHONE ENCOUNTER
S-(situation): I talked with Lavonne.  Her voice is hoarse.  She is able to carry on conversation without cough or stopping to clear throat.  She says for past week or so, gets afternoon temp around 100.6-100.8.  Feels a bit weak and tired, especially in the afternoon.  Asking for appointment .    B-(background): Last few BP readings hla879/70, 111/61 and 108/64. Takes amlodipine 2.5 mg for BP.  Neck area pain/stiffness comes and goes.    Takes omeprazole for hoarse throat, states no help.   Ear problems as well  States eats OK, but probably doesn't drink as much water as she should    A-(assessment): neck aches, fever of undetermined origin daily.  Hoarse voice    R-(recommendations): first available appointment 7/13/21.  Anything sooner?  Danielle Keith RN

## 2021-06-28 NOTE — TELEPHONE ENCOUNTER
I talked with Lavonne and she will go to the urgent care if fever does not come down or is worse.    Discussed eating a prudent diet and fluids.  Call if questions  Danielle Keith RN

## 2021-06-28 NOTE — TELEPHONE ENCOUNTER
"Reason for call:  Patient reporting a symptom    Symptom or request: Pt says last Thursday she had a \"Neck thing\" going on with the muscles in her neck and shoulder. She saw the chiropractor the same day. The next day around 4:00 she got the chills and says every day around the same time she gets chills and temp goes up to 100.6 or 100.8. In the morning it's back down to 98. She says she feels weak and tired.She does have a cough. She was tested for Covid on 6/2 and it was negative.     Phone Number patient can be reached at:  Home number on file [phone] (home)    Best Time:  anytime    Can we leave a detailed message on this number:  YES    Call taken on 6/28/2021 at 9:49 AM by Patty Krishnan  "

## 2021-06-29 ENCOUNTER — OFFICE VISIT (OUTPATIENT)
Dept: URGENT CARE | Facility: URGENT CARE | Age: 81
End: 2021-06-29
Payer: MEDICARE

## 2021-06-29 ENCOUNTER — ANCILLARY PROCEDURE (OUTPATIENT)
Dept: GENERAL RADIOLOGY | Facility: CLINIC | Age: 81
End: 2021-06-29
Attending: PHYSICIAN ASSISTANT
Payer: MEDICARE

## 2021-06-29 VITALS
DIASTOLIC BLOOD PRESSURE: 66 MMHG | BODY MASS INDEX: 28.82 KG/M2 | OXYGEN SATURATION: 93 % | TEMPERATURE: 99.8 F | HEART RATE: 92 BPM | WEIGHT: 173.2 LBS | SYSTOLIC BLOOD PRESSURE: 120 MMHG | RESPIRATION RATE: 20 BRPM

## 2021-06-29 DIAGNOSIS — J18.9 ATYPICAL PNEUMONIA: Primary | ICD-10-CM

## 2021-06-29 DIAGNOSIS — R05.9 COUGH: ICD-10-CM

## 2021-06-29 LAB
BASOPHILS # BLD AUTO: 0 10E9/L (ref 0–0.2)
BASOPHILS NFR BLD AUTO: 0.1 %
DIFFERENTIAL METHOD BLD: ABNORMAL
EOSINOPHIL # BLD AUTO: 0.1 10E9/L (ref 0–0.7)
EOSINOPHIL NFR BLD AUTO: 0.9 %
ERYTHROCYTE [DISTWIDTH] IN BLOOD BY AUTOMATED COUNT: 13.5 % (ref 10–15)
HCT VFR BLD AUTO: 34.5 % (ref 35–47)
HGB BLD-MCNC: 11.1 G/DL (ref 11.7–15.7)
LYMPHOCYTES # BLD AUTO: 3 10E9/L (ref 0.8–5.3)
LYMPHOCYTES NFR BLD AUTO: 33.4 %
MCH RBC QN AUTO: 33.3 PG (ref 26.5–33)
MCHC RBC AUTO-ENTMCNC: 32.2 G/DL (ref 31.5–36.5)
MCV RBC AUTO: 104 FL (ref 78–100)
MONOCYTES # BLD AUTO: 0.2 10E9/L (ref 0–1.3)
MONOCYTES NFR BLD AUTO: 2.3 %
NEUTROPHILS # BLD AUTO: 5.8 10E9/L (ref 1.6–8.3)
NEUTROPHILS NFR BLD AUTO: 63.3 %
PLATELET # BLD AUTO: 245 10E9/L (ref 150–450)
RBC # BLD AUTO: 3.33 10E12/L (ref 3.8–5.2)
SARS-COV-2 RNA RESP QL NAA+PROBE: NORMAL
SPECIMEN SOURCE: NORMAL
WBC # BLD AUTO: 9.1 10E9/L (ref 4–11)

## 2021-06-29 PROCEDURE — 36415 COLL VENOUS BLD VENIPUNCTURE: CPT | Performed by: PHYSICIAN ASSISTANT

## 2021-06-29 PROCEDURE — 85025 COMPLETE CBC W/AUTO DIFF WBC: CPT | Performed by: PHYSICIAN ASSISTANT

## 2021-06-29 PROCEDURE — 71046 X-RAY EXAM CHEST 2 VIEWS: CPT | Performed by: RADIOLOGY

## 2021-06-29 PROCEDURE — 99214 OFFICE O/P EST MOD 30 MIN: CPT | Performed by: PHYSICIAN ASSISTANT

## 2021-06-29 PROCEDURE — U0003 INFECTIOUS AGENT DETECTION BY NUCLEIC ACID (DNA OR RNA); SEVERE ACUTE RESPIRATORY SYNDROME CORONAVIRUS 2 (SARS-COV-2) (CORONAVIRUS DISEASE [COVID-19]), AMPLIFIED PROBE TECHNIQUE, MAKING USE OF HIGH THROUGHPUT TECHNOLOGIES AS DESCRIBED BY CMS-2020-01-R: HCPCS | Performed by: PHYSICIAN ASSISTANT

## 2021-06-29 PROCEDURE — U0005 INFEC AGEN DETEC AMPLI PROBE: HCPCS | Performed by: PHYSICIAN ASSISTANT

## 2021-06-29 RX ORDER — BENZONATATE 100 MG/1
100 CAPSULE ORAL 3 TIMES DAILY PRN
Qty: 20 CAPSULE | Refills: 0 | Status: SHIPPED | OUTPATIENT
Start: 2021-06-29 | End: 2021-06-29

## 2021-06-29 RX ORDER — BENZONATATE 100 MG/1
100 CAPSULE ORAL 3 TIMES DAILY PRN
Qty: 20 CAPSULE | Refills: 0 | Status: SHIPPED | OUTPATIENT
Start: 2021-06-29 | End: 2021-09-03

## 2021-06-29 RX ORDER — AZITHROMYCIN 250 MG/1
TABLET, FILM COATED ORAL
Qty: 6 TABLET | Refills: 0 | Status: SHIPPED | OUTPATIENT
Start: 2021-06-29 | End: 2021-07-04

## 2021-06-29 ASSESSMENT — ENCOUNTER SYMPTOMS
CHILLS: 1
DYSURIA: 0
DIARRHEA: 0
GASTROINTESTINAL NEGATIVE: 1
MYALGIAS: 0
RHINORRHEA: 1
SHORTNESS OF BREATH: 1
CARDIOVASCULAR NEGATIVE: 1
NAUSEA: 0
VOMITING: 0
FATIGUE: 1
COUGH: 1
SORE THROAT: 0
FEVER: 1
HEADACHES: 0

## 2021-06-29 NOTE — PROGRESS NOTES
SUBJECTIVE:   Lavonne Tidwell is a 80 year old female presenting with a chief complaint of   Chief Complaint   Patient presents with     Fever     6/25/21 Patients cough started in the evening gets a fever, chills, had this back after memorial day also, fatigued.     Cough       She is an established patient of [facility].  Patient presents with complaints of fever, chills Tmax was 100.6 x x 4 days.  Fever comes and goes.   Non productive cough.  Clear nasal discharge.  SOB intermittently.  Vaccinated for COVID.      PMHx:  HTN, reflux  Surgeries:  Reviewed  Allergies:  Codeine  Exsmoker    Treatment with tylenol      Review of Systems   Constitutional: Positive for chills, fatigue and fever.   HENT: Positive for congestion and rhinorrhea. Negative for ear pain and sore throat.    Respiratory: Positive for cough and shortness of breath.    Cardiovascular: Negative.    Gastrointestinal: Negative.  Negative for diarrhea, nausea and vomiting.   Genitourinary: Negative for dysuria.   Musculoskeletal: Negative for myalgias.   Skin: Negative for rash.   Neurological: Negative for headaches.   All other systems reviewed and are negative.      Past Medical History:   Diagnosis Date     Arthritis 2012    knes and hips     Essential hypertension 5/5/2021     History of blood transfusion 1961    Miscarriage     Ovarian cysts 1974    s/p BSO     Family History   Problem Relation Age of Onset     Cancer Mother         Ovarian     Other Cancer Mother         Ovarian     Cerebrovascular Disease Father      Heart Disease Father      C.A.D. Father      Coronary Artery Disease Father      Cancer Maternal Grandmother      Other Cancer Maternal Grandmother         Ovarian/Bone     Cerebrovascular Disease Maternal Grandfather      Colon Cancer Maternal Grandfather      Diabetes Other         Great Grandmother     Breast Cancer No family hx of      Current Outpatient Medications   Medication Sig Dispense Refill     Acetaminophen (TYLENOL  PO) Take 1,000 mg by mouth 2 times daily as needed for mild pain or fever       amLODIPine (NORVASC) 2.5 MG tablet Take 1 tablet (2.5 mg) by mouth daily 90 tablet 3     benzonatate (TESSALON) 100 MG capsule Take 1 capsule (100 mg) by mouth 3 times daily as needed for cough 20 capsule 0     Biotin 10 MG CAPS        CALCIUM + D OR 2 TABLET DAILY       Docusate Calcium (STOOL SOFTENER PO)        fluticasone (FLONASE) 50 MCG/ACT nasal spray Spray 2 sprays into both nostrils daily 47.4 mL 3     garlic 150 MG TABS tablet Take 150 mg by mouth daily       grape seed 50 MG CAPS Take 50 mg by mouth daily       loratadine (CLARITIN) 10 MG tablet Take 10 mg by mouth daily       methylcellulose (FIBER THERAPY) 500 MG TABS tablet Take 500 mg by mouth daily       Multiple Vitamins-Minerals (CENTRUM SILVER) per tablet Take 1 tablet by mouth daily       omeprazole (PRILOSEC) 20 MG DR capsule Take 1 capsule (20 mg) by mouth daily 90 capsule 1     VITAMIN E COMPLEX PO        Zinc Sulfate (ZINC 15 PO)        budesonide (PULMICORT) 0.5 MG/2ML neb solution Place 0.5 mg/2 mL budesonide vial in 8 oz normal saline sinus rinse bottle.  Irrigate each nostril with one half of the bottle daily. (Patient not taking: Reported on 2021) 180 mL 3     ciprofloxacin-dexamethasone (CIPRODEX) 0.3-0.1 % otic suspension Place 5 drops in left ear twice daily for 7days.  Call if drainage persistent past 7 days. (Patient not taking: Reported on 2021) 10 mL 3     ofloxacin (OCUFLOX) 0.3 % ophthalmic solution Place 3 drops ofloxacin, 3 drops dexamethasone in left ear twice daily for 10 days (Patient not taking: Reported on 2021) 10 mL 3     Social History     Tobacco Use     Smoking status: Former Smoker     Packs/day: 0.50     Years: 5.00     Pack years: 2.50     Types: Cigarettes     Start date: 1968     Quit date: 2/15/1972     Years since quittin.4     Smokeless tobacco: Never Used     Tobacco comment: stopped in her 20's   Substance  Use Topics     Alcohol use: Yes     Alcohol/week: 0.0 standard drinks     Comment: Occasional       OBJECTIVE  /66 (BP Location: Right arm, Patient Position: Sitting, Cuff Size: Adult Regular)   Pulse 92   Temp 99.8  F (37.7  C) (Tympanic)   Resp 20   Wt 78.6 kg (173 lb 3.2 oz)   SpO2 93%   BMI 28.82 kg/m      Physical Exam  Vitals signs and nursing note reviewed.   Constitutional:       General: She is not in acute distress.     Appearance: Normal appearance. She is normal weight. She is not toxic-appearing.   HENT:      Head: Normocephalic and atraumatic.      Right Ear: Tympanic membrane, ear canal and external ear normal.      Left Ear: Tympanic membrane, ear canal and external ear normal.      Mouth/Throat:      Mouth: Mucous membranes are moist.      Pharynx: Oropharynx is clear.   Eyes:      Extraocular Movements: Extraocular movements intact.      Conjunctiva/sclera: Conjunctivae normal.   Neck:      Musculoskeletal: Normal range of motion and neck supple.   Cardiovascular:      Rate and Rhythm: Normal rate and regular rhythm.      Pulses: Normal pulses.      Heart sounds: Normal heart sounds.   Pulmonary:      Effort: Pulmonary effort is normal. No respiratory distress.      Breath sounds: Normal breath sounds. No stridor. No wheezing, rhonchi or rales.   Skin:     General: Skin is warm and dry.   Neurological:      General: No focal deficit present.      Mental Status: She is alert.   Psychiatric:         Mood and Affect: Mood normal.         Behavior: Behavior normal.         Labs:  Results for orders placed or performed in visit on 06/29/21 (from the past 24 hour(s))   XR Chest 2 Views    Narrative    CHEST TWO VIEWS 6/29/2021 6:50 PM     HISTORY: Cough.    COMPARISON: None.    FINDINGS: Minor areas of groundglass opacity identified in both lungs.  No discrete airspace opacity, pleural effusion, or pneumothorax. Heart  size normal.       Impression    IMPRESSION: Patchy groundglass opacities  raise the possibility of  atypical infection such as COVID 19.     ALVARADO COLLINS MD   CBC with platelets and differential   Result Value Ref Range    WBC 9.1 4.0 - 11.0 10e9/L    RBC Count 3.33 (L) 3.8 - 5.2 10e12/L    Hemoglobin 11.1 (L) 11.7 - 15.7 g/dL    Hematocrit 34.5 (L) 35.0 - 47.0 %     (H) 78 - 100 fl    MCH 33.3 (H) 26.5 - 33.0 pg    MCHC 32.2 31.5 - 36.5 g/dL    RDW 13.5 10.0 - 15.0 %    Platelet Count 245 150 - 450 10e9/L    % Neutrophils 63.3 %    % Lymphocytes 33.4 %    % Monocytes 2.3 %    % Eosinophils 0.9 %    % Basophils 0.1 %    Absolute Neutrophil 5.8 1.6 - 8.3 10e9/L    Absolute Lymphocytes 3.0 0.8 - 5.3 10e9/L    Absolute Monocytes 0.2 0.0 - 1.3 10e9/L    Absolute Eosinophils 0.1 0.0 - 0.7 10e9/L    Absolute Basophils 0.0 0.0 - 0.2 10e9/L    Diff Method Automated Method        X-Ray was done, my findings are: as above    Xrays personally viewed by myself.      ASSESSMENT:      ICD-10-CM    1. Cough  R05 XR Chest 2 Views     CBC with platelets and differential     benzonatate (TESSALON) 100 MG capsule     Symptomatic COVID-19 Virus (Coronavirus) by PCR   2. Atypical pneumonia  J18.9         Medical Decision Making:    Differential Diagnosis:  URI Adult/Peds:  Bronchitis-viral, Pneumonia and Viral pharyngitis    Serious Comorbid Conditions:  Adult:  as above    PLAN:    URI Adult:  Tylenol, Ibuprofen, Fluids, Rest and z raghav, tessalon perles.  Discussed and recommended CDC guidelines for self isolation.  COVID test pending.  Recommend a recheck in one week.  Discussed reasons to seek immediate medical attention.          Followup:    If not improving or if condition worsens, follow up with your Primary Care Provider, If not improving or if conditions worsens over the next 12-24 hours, go to the Emergency Department    Patient Instructions     Patient Education     Treating Pneumonia   Pneumonia is an infection of one or both of the lungs. Pneumonia:    Is often caused by either a virus,  fungus, or bacteria    Can be very serious, especially in babies, young children, and older adults. It s also serious for those with other long-term health problems or weakened immune systems.    Is sometimes treated at home and sometimes in the hospital    Antibiotic medicines  Antibiotics may be prescribed for pneumonia caused by bacteria. They may be pills (oral medicines) or shots (injections). Or they may be given by IV (intravenously) into a vein. If you are taking pills at home:    Fill your prescription and start taking your medicine as soon as you can.    You will likely start to feel better in a day or 2, but don t stop taking the antibiotic.    Use a pill organizer to help you remember to take your medicine.    Let your healthcare provider know if you have side effects.    Take your medicine exactly as directed on the label. Talk with your provider or pharmacist if you have any questions.  Antiviral medicines  Antiviral medicine may be prescribed for pneumonia caused by a virus. For example, antiviral medicine may be prescribed for pneumonia caused by the flu virus. Antibiotics don't work against viruses. If you are taking antiviral medicine at home:    Fill your prescription and start taking your medicine as soon as you can.    Talk with your provider or pharmacist about possible side effects.    Take the medicine exactly as instructed.  To relieve symptoms  There are many medicines that can help relieve symptoms of pneumonia. Some are prescription and some are over the counter.  Your healthcare provider may advise:    Acetaminophen or ibuprofen to lower your fever and to reduce headache or other pain    Cough medicine to loosen mucus or to reduce coughing  Check with your healthcare provider or pharmacist before taking any over-the-counter medicines. Some over-the-counter medicines should not be used if you have certain health conditions.  Special treatments  If you are hospitalized for pneumonia, you  may have other therapies, including:    Inhaled medicines to help with breathing or chest congestion    Supplemental oxygen to increase low oxygen levels  Drink fluids and eat healthy  You should eat healthy to help your body fight the infection. Drinking a lot of fluids helps to replace fluids lost from fever and to loosen mucus in your chest.    Diet. Make healthy food choices, including fruits and vegetables, lean meats and other proteins, 100% whole grain, and low-fat or no-fat dairy products.    Fluids. Drink at least 6 to 8 tall glasses a day. Water and 100% fruit or vegetable juice are best.  Get plenty of rest and sleep  You may be more tired than normal for a while. It's important to get enough sleep at night. It s also important to rest during the day. Talk with your healthcare provider if coughing or other symptoms are interfering with your sleep.  Preventing the spread of germs  The best thing you can do to prevent spreading germs is to wash your hands often. You should:    Scrub your hands with soap and warm water for 15 to 20 seconds.    Clean in between your fingers, the backs of your hands, and around your nails.    Dry your hands on a separate towel or use paper towels.  You should also:    Keep alcohol-based hand  nearby.    Make sure you also clean surfaces that you touch. Use a product that kills all types of germs.    Stay away from others until you are feeling better.  When to call your healthcare provider  Call your healthcare provider if you have any of these:    Symptoms get worse or new symptoms develop    Fever continues    Shortness of breath with normal daily activities    Side effects from your medicine    Increased mucus or mucus that is darker in color    Coughing gets worse    Chest pain when coughing or breathing  Call 911  Call 911if any of these occur:    Lips or fingers are bluish, purple, or gray in color    Trouble breathing or wheezing    Shortness of breath that gets  "worse and doesn't improve with treatment    Feeling faint or dizzy    Loss of consciousness    Trouble talking or swallowing    Feeling of doom  [employer] last reviewed this educational content on 12/1/2019 2000-2021 [employer]. All rights reserved. This information is not intended as a substitute for professional medical care. Always follow your healthcare professional's instructions.           Patient Education   After Your COVID-19 (Coronavirus) Test  You have been tested for COVID-19 (coronavirus).   If you'll have surgery in the next few days, we'll let you know ahead of time if you have the virus. Please call your surgeon's office with any questions.  For all other patients: Results are usually available in nfon within 2 to 3 days.   If you do not have a nfon account, you'll get a letter in the mail in about 7 to 10 days.   [employer] is often the fastest way to get test results. Please sign up if you do not already have a nfon account. See the handout Getting COVID-19 Test Results in nfon for help.  What if my test result is positive?  If your test is positive and you have not viewed your result in [employer], you'll get a phone call with your result. (A positive test means that you have the virus.)     Follow the tips under \"How do I self-isolate?\" below for 10 days (20 days if you have a weak immune system).    You don't need to be retested for COVID-19 before going back to school or work. As long as you're fever-free and feeling better, you can go back to school, work and other activities after waiting the 10 or 20 days.  What if I have questions after I get my results?  If you have questions about your results, please visit our testing website at [email or web address].   After 7 to 10 days, if you have not gotten your results:     Call [phone] ([phone]) and ask to speak with our COVID-19 results team.    If you're being treated at an " "infusion center: Call your infusion center directly.  What are the symptoms of COVID-19?  Cough, fever and trouble breathing are the most common signs of COVID-19.  Other symptoms can include new headaches, new muscle or body aches, new and unexplained fatigue (feeling very tired), chills, sore throat, congestion (stuffy or runny nose), diarrhea (loose poop), loss of taste or smell, belly pain, and nausea or vomiting (feeling sick to your stomach or throwing up).  You may already have symptoms of COVID-19, or they may show up later.  What should I do if I have symptoms?  If you're having surgery: Call your surgeon's office.  For all other patients: Stay home and away from others (self-isolate) until ...    You've had no fever--and no medicine that reduces fever--for 1 full day (24 hours), AND    Other symptoms have gotten better. For example, your cough or breathing has improved, AND    At least 10 days have passed since your symptoms first started.  How do I self-isolate?    Stay in your own room, even for meals. Use your own bathroom if you can.    Stay away from others in your home. No hugging, kissing or shaking hands. No visitors.    Don't go to work, school or anywhere else.    Clean \"high touch\" surfaces often (doorknobs, counters, handles). Use household cleaning spray or wipes. You'll find a full list of  on the EPA website: www.epa.gov/pesticide-registration/list-n-disinfectants-use-against-sars-cov-2.    Cover your mouth and nose with a mask or other face covering to avoid spreading germs.    Wash your hands and face often. Use soap and water.    Caregivers in these groups are at risk for severe illness due to COVID-19:  ? People 65 years and older  ? People who live in a nursing home or long-term care facility  ? People with chronic disease (lung, heart, cancer, diabetes, kidney, liver, immunologic)  ? People who have a weakened immune system, including those who:    Are in cancer " treatment    Take medicine that weakens the immune system, such as corticosteroids    Had a bone marrow or organ transplant    Have an immune deficiency    Have poorly controlled HIV or AIDS    Are obese (body mass index of 40 or higher)    Smoke regularly    Caregivers should wear gloves while washing dishes, handling laundry and cleaning bedrooms and bathrooms.    Use caution when washing and drying laundry: Don't shake dirty laundry and use the warmest water setting that you can.    For more tips on managing your health at home, go to www.cdc.gov/coronavirus/2019-ncov/downloads/10Things.pdf.  How can I take care of myself at home?  1. Get lots of rest. Drink extra fluids (unless a doctor has told you not to).  2. Take Tylenol (acetaminophen) for fever or pain. If you have liver or kidney problems, ask your family doctor if it's OK to take Tylenol.   Adults can take either:  ? 650 mg (two 325 mg pills) every 4 to 6 hours, or   ? 1,000 mg (two 500 mg pills) every 8 hours as needed.  ? Note: Don't take more than 3,000 mg in one day. Acetaminophen is found in many medicines (both prescribed and over-the-counter medicines). Read all labels to be sure you don't take too much.   For children, check the Tylenol bottle for the right dose. The dose is based on the child's age or weight.  3. If you have other health problems (like cancer, heart failure, an organ transplant or severe kidney disease): Call your specialty clinic if you don't feel better in the next 2 days.  4. Know when to call 911. Emergency warning signs include:  ? Trouble breathing or shortness of breath  ? Chest pain or pressure that doesn't go away  ? Feeling confused like you haven't felt before, or not being able to wake up  ? Bluish-colored lips or face  5. If your doctor prescribed a blood thinner medicine: Follow their instructions.  Where can I get more information?     [facility] - About COVID-19:   [email or web address]    CDC -  If You're Sick: cdc.gov/coronavirus/2019-ncov/about/steps-when-sick.html    CDC - Ending Home Isolation: www.cdc.gov/coronavirus/2019-ncov/hcp/disposition-in-home-patients.html    CDC - Caring for Someone: www.cdc.gov/coronavirus/2019-ncov/if-you-are-sick/care-for-someone.html    [facility] - Interim Guidance for Hospital Discharge to Home: [email or web address]./[email or web address]/[email or web address]/hcp/hospdischarge.pdf    [facility] clinical trials (COVID-19 research studies): [email or web address]-clinical-trials    Below are the COVID-19 hotlines at the [place] Department of [facility] ([facility]). Interpreters are available.  ? For health questions: Call 873-272-3500 or [phone] (7 a.m. to 7 p.m.)  ? For questions about schools and childcare: Call 995-561-7385 or [phone] (7 a.m. to 7 p.m.)    For informational purposes only. Not to replace the advice of your health care provider. Clinically reviewed by Infection Prevention and the [facility] COVID-19 Clinical Team. Copyright   2020 [facility]. All rights reserved. [employer] 716151 - Rev 11/11/20.

## 2021-06-30 NOTE — PATIENT INSTRUCTIONS
Patient Education     Treating Pneumonia   Pneumonia is an infection of one or both of the lungs. Pneumonia:    Is often caused by either a virus, fungus, or bacteria    Can be very serious, especially in babies, young children, and older adults. It s also serious for those with other long-term health problems or weakened immune systems.    Is sometimes treated at home and sometimes in the hospital    Antibiotic medicines  Antibiotics may be prescribed for pneumonia caused by bacteria. They may be pills (oral medicines) or shots (injections). Or they may be given by IV (intravenously) into a vein. If you are taking pills at home:    Fill your prescription and start taking your medicine as soon as you can.    You will likely start to feel better in a day or 2, but don t stop taking the antibiotic.    Use a pill organizer to help you remember to take your medicine.    Let your healthcare provider know if you have side effects.    Take your medicine exactly as directed on the label. Talk with your provider or pharmacist if you have any questions.  Antiviral medicines  Antiviral medicine may be prescribed for pneumonia caused by a virus. For example, antiviral medicine may be prescribed for pneumonia caused by the flu virus. Antibiotics don't work against viruses. If you are taking antiviral medicine at home:    Fill your prescription and start taking your medicine as soon as you can.    Talk with your provider or pharmacist about possible side effects.    Take the medicine exactly as instructed.  To relieve symptoms  There are many medicines that can help relieve symptoms of pneumonia. Some are prescription and some are over the counter.  Your healthcare provider may advise:    Acetaminophen or ibuprofen to lower your fever and to reduce headache or other pain    Cough medicine to loosen mucus or to reduce coughing  Check with your healthcare provider or pharmacist before taking any over-the-counter medicines. Some  over-the-counter medicines should not be used if you have certain health conditions.  Special treatments  If you are hospitalized for pneumonia, you may have other therapies, including:    Inhaled medicines to help with breathing or chest congestion    Supplemental oxygen to increase low oxygen levels  Drink fluids and eat healthy  You should eat healthy to help your body fight the infection. Drinking a lot of fluids helps to replace fluids lost from fever and to loosen mucus in your chest.    Diet. Make healthy food choices, including fruits and vegetables, lean meats and other proteins, 100% whole grain, and low-fat or no-fat dairy products.    Fluids. Drink at least 6 to 8 tall glasses a day. Water and 100% fruit or vegetable juice are best.  Get plenty of rest and sleep  You may be more tired than normal for a while. It's important to get enough sleep at night. It s also important to rest during the day. Talk with your healthcare provider if coughing or other symptoms are interfering with your sleep.  Preventing the spread of germs  The best thing you can do to prevent spreading germs is to wash your hands often. You should:    Scrub your hands with soap and warm water for 15 to 20 seconds.    Clean in between your fingers, the backs of your hands, and around your nails.    Dry your hands on a separate towel or use paper towels.  You should also:    Keep alcohol-based hand  nearby.    Make sure you also clean surfaces that you touch. Use a product that kills all types of germs.    Stay away from others until you are feeling better.  When to call your healthcare provider  Call your healthcare provider if you have any of these:    Symptoms get worse or new symptoms develop    Fever continues    Shortness of breath with normal daily activities    Side effects from your medicine    Increased mucus or mucus that is darker in color    Coughing gets worse    Chest pain when coughing or breathing  Call 911  Call  "911if any of these occur:    Lips or fingers are bluish, purple, or gray in color    Trouble breathing or wheezing    Shortness of breath that gets worse and doesn't improve with treatment    Feeling faint or dizzy    Loss of consciousness    Trouble talking or swallowing    Feeling of doom  [employer] last reviewed this educational content on 12/1/2019 2000-2021 [employer]. All rights reserved. This information is not intended as a substitute for professional medical care. Always follow your healthcare professional's instructions.           Patient Education   After Your COVID-19 (Coronavirus) Test  You have been tested for COVID-19 (coronavirus).   If you'll have surgery in the next few days, we'll let you know ahead of time if you have the virus. Please call your surgeon's office with any questions.  For all other patients: Results are usually available in [email or web address] within 2 to 3 days.   If you do not have a [email or web address] account, you'll get a letter in the mail in about 7 to 10 days.   [email or web address] is often the fastest way to get test results. Please sign up if you do not already have a [email or web address] account. See the handout Getting COVID-19 Test Results in [email or web address] for help.  What if my test result is positive?  If your test is positive and you have not viewed your result in [email or web address], you'll get a phone call with your result. (A positive test means that you have the virus.)     Follow the tips under \"How do I self-isolate?\" below for 10 days (20 days if you have a weak immune system).    You don't need to be retested for COVID-19 before going back to school or work. As long as you're fever-free and feeling better, you can go back to school, work and other activities after waiting the 10 or 20 days.  What if I have questions after I get my results?  If you have questions about your results, please visit our testing website at [email or web address].   After 7 to 10 days, if you have not " "gotten your results:     Call [phone] ([phone]) and ask to speak with our COVID-19 results team.    If you're being treated at an infusion center: Call your infusion center directly.  What are the symptoms of COVID-19?  Cough, fever and trouble breathing are the most common signs of COVID-19.  Other symptoms can include new headaches, new muscle or body aches, new and unexplained fatigue (feeling very tired), chills, sore throat, congestion (stuffy or runny nose), diarrhea (loose poop), loss of taste or smell, belly pain, and nausea or vomiting (feeling sick to your stomach or throwing up).  You may already have symptoms of COVID-19, or they may show up later.  What should I do if I have symptoms?  If you're having surgery: Call your surgeon's office.  For all other patients: Stay home and away from others (self-isolate) until ...    You've had no fever--and no medicine that reduces fever--for 1 full day (24 hours), AND    Other symptoms have gotten better. For example, your cough or breathing has improved, AND    At least 10 days have passed since your symptoms first started.  How do I self-isolate?    Stay in your own room, even for meals. Use your own bathroom if you can.    Stay away from others in your home. No hugging, kissing or shaking hands. No visitors.    Don't go to work, school or anywhere else.    Clean \"high touch\" surfaces often (doorknobs, counters, handles). Use household cleaning spray or wipes. You'll find a full list of  on the EPA website: www.epa.gov/pesticide-registration/list-n-disinfectants-use-against-sars-cov-2.    Cover your mouth and nose with a mask or other face covering to avoid spreading germs.    Wash your hands and face often. Use soap and water.    Caregivers in these groups are at risk for severe illness due to COVID-19:  ? People 65 years and older  ? People who live in a nursing home or long-term care facility  ? People with chronic disease (lung, heart, " cancer, diabetes, kidney, liver, immunologic)  ? People who have a weakened immune system, including those who:    Are in cancer treatment    Take medicine that weakens the immune system, such as corticosteroids    Had a bone marrow or organ transplant    Have an immune deficiency    Have poorly controlled HIV or AIDS    Are obese (body mass index of 40 or higher)    Smoke regularly    Caregivers should wear gloves while washing dishes, handling laundry and cleaning bedrooms and bathrooms.    Use caution when washing and drying laundry: Don't shake dirty laundry and use the warmest water setting that you can.    For more tips on managing your health at home, go to www.cdc.gov/coronavirus/2019-ncov/downloads/10Things.pdf.  How can I take care of myself at home?  1. Get lots of rest. Drink extra fluids (unless a doctor has told you not to).  2. Take Tylenol (acetaminophen) for fever or pain. If you have liver or kidney problems, ask your family doctor if it's OK to take Tylenol.   Adults can take either:  ? 650 mg (two 325 mg pills) every 4 to 6 hours, or   ? 1,000 mg (two 500 mg pills) every 8 hours as needed.  ? Note: Don't take more than 3,000 mg in one day. Acetaminophen is found in many medicines (both prescribed and over-the-counter medicines). Read all labels to be sure you don't take too much.   For children, check the Tylenol bottle for the right dose. The dose is based on the child's age or weight.  3. If you have other health problems (like cancer, heart failure, an organ transplant or severe kidney disease): Call your specialty clinic if you don't feel better in the next 2 days.  4. Know when to call 911. Emergency warning signs include:  ? Trouble breathing or shortness of breath  ? Chest pain or pressure that doesn't go away  ? Feeling confused like you haven't felt before, or not being able to wake up  ? Bluish-colored lips or face  5. If your doctor prescribed a blood thinner medicine: Follow their  instructions.  Where can I get more information?    [facility] - About COVID-19:   [email or web address]    CDC - If You're Sick: cdc.gov/coronavirus/2019-ncov/about/steps-when-sick.html    CDC - Ending Home Isolation: www.cdc.gov/coronavirus/2019-ncov/hcp/disposition-in-home-patients.html    CDC - Caring for Someone: www.cdc.gov/coronavirus/2019-ncov/if-you-are-sick/care-for-someone.html    [facility] - Interim Guidance for Hospital Discharge to Home: www.health.[facility].[place]./diseases/coronavirus/hcp/hospdischarge.pdf    [facility] clinical trials (COVID-19 research studies): [email or web address].[email or web address]-clinical-trials    Below are the COVID-19 hotlines at the [place] Department of Health ([facility]). Interpreters are available.  ? For health questions: Call 084-988-2719 or 1-567.809.4985 (7 a.m. to 7 p.m.)  ? For questions about schools and childcare: Call 740-380-9011 or 1-159.267.3766 (7 a.m. to 7 p.m.)    For informational purposes only. Not to replace the advice of your health care provider. Clinically reviewed by Infection Prevention and the [facility] COVID-19 Clinical Team. Copyright   2020 [facility]. All rights reserved. [employer] 009028 - Rev 11/11/20.

## 2021-07-01 ENCOUNTER — TELEPHONE (OUTPATIENT)
Dept: FAMILY MEDICINE | Facility: CLINIC | Age: 81
End: 2021-07-01

## 2021-07-01 NOTE — TELEPHONE ENCOUNTER
Reason for call:  Patient reporting a symptom    Symptom or request: Cough, no fever since Tues.   Pt had a Covid Test that was negative.  Pt said her cough started Friday.  Pt had a UC Visit     Phone Number patient can be reached at:  Home number on file [phone] (home)    Best Time:  Any Time      Can we leave a detailed message on this number:  YES    Call taken on 7/1/2021 at 1:25 PM by Genesis Rodriguez

## 2021-07-01 NOTE — TELEPHONE ENCOUNTER
S-(situation): Persistent cough, loosening. Asking COVID test could be false neg and need to retest.   Also requesting appt with PCP next wk for F/U.     B-(background): See 06-29-21  visit, CXR. COVID test neg. Started z- pack, tessalon. No known COVID exposure.     A-(assessment): No fever since Tues. Cough loosening. Denies SOA, wheezing, chest tightness.     R-(recommendations): Advise complete abx, tessalon fo cough. Push fluids, rest. Maintain good infection control. Forwarded to PCP for review, advise when to work in next wk.  FRANCISCA Bedoya RN

## 2021-07-02 NOTE — TELEPHONE ENCOUNTER
No other advice this will take time   I do not think this is covid at all   I can see her at 940am tuesday

## 2021-07-05 NOTE — PROGRESS NOTES
Assessment & Plan     Cough  Mot likely post infectious cough  Getting better   Reassurance   - XR Chest 2 Views; Future           Patient Instructions   Continue with current therapies at home       Return in about 1 week (around 7/13/2021), or if symptoms worsen or fail to improve.    Elvira Kowalski MD  [facility]    Kirsty Banerjee is a 80 year old who presents for the following health issues     HPI     ED/UC Followup:    Facility:  [facility]  Date of visit: 6/29/2021  Reason for visit: Atypical pneumonia, cough   Current Status: Still having cough. Feeling better. When moving still having SOB.     Doing better slowly and every day is a bit better   She has no fever   Appetite is better            Review of Systems   Constitutional, HEENT, cardiovascular, pulmonary, gi and gu systems are negative, except as otherwise noted.      Objective    /66 (Cuff Size: Adult Regular)   Pulse 76   Temp 98.2  F (36.8  C) (Tympanic)   SpO2 97%   There is no height or weight on file to calculate BMI.  Physical Exam   GENERAL APPEARANCE: healthy, alert and no distress  NECK: no adenopathy, no asymmetry, masses, or scars and thyroid normal to palpation  RESP: lungs clear to auscultation - no rales, rhonchi or wheezes  CV: regular rates and rhythm, normal S1 S2, no S3 or S4 and no murmur, click or rub  MS: extremities normal- no gross deformities noted  PSYCH: mentation appears normal and affect normal/bright

## 2021-07-06 ENCOUNTER — ANCILLARY PROCEDURE (OUTPATIENT)
Dept: GENERAL RADIOLOGY | Facility: CLINIC | Age: 81
End: 2021-07-06
Attending: FAMILY MEDICINE
Payer: MEDICARE

## 2021-07-06 ENCOUNTER — OFFICE VISIT (OUTPATIENT)
Dept: FAMILY MEDICINE | Facility: CLINIC | Age: 81
End: 2021-07-06
Payer: MEDICARE

## 2021-07-06 VITALS
DIASTOLIC BLOOD PRESSURE: 66 MMHG | SYSTOLIC BLOOD PRESSURE: 118 MMHG | HEART RATE: 76 BPM | TEMPERATURE: 98.2 F | OXYGEN SATURATION: 97 %

## 2021-07-06 DIAGNOSIS — R05.9 COUGH: ICD-10-CM

## 2021-07-06 DIAGNOSIS — R05.9 COUGH: Primary | ICD-10-CM

## 2021-07-06 PROCEDURE — 71046 X-RAY EXAM CHEST 2 VIEWS: CPT | Performed by: RADIOLOGY

## 2021-07-06 PROCEDURE — 99213 OFFICE O/P EST LOW 20 MIN: CPT | Performed by: FAMILY MEDICINE

## 2021-07-13 ENCOUNTER — TELEPHONE (OUTPATIENT)
Dept: OTOLARYNGOLOGY | Facility: CLINIC | Age: 81
End: 2021-07-13

## 2021-07-13 NOTE — TELEPHONE ENCOUNTER
Reason for Call:  Other     Detailed comments: Pt called stating she received notice that her claim (Medicare) could not be processed (DOS 03/1/21) because it did not match medicare guidelines and was advised to contact provider. Pt states that these services have been covered in the past. Please advise.    Phone Number Patient can be reached at: Home number on file [phone] (home)    Best Time: any    Can we leave a detailed message on this number? YES    Call taken on 7/13/2021 at 3:51 PM by Jeri Hartmann

## 2021-07-14 NOTE — TELEPHONE ENCOUNTER
"Called patient. Has received letter showing that Medicare is paying \"000\" of her 3/1/2021 appointment. She believes it is the hearing portion primarily not covered.  Has called billing. Is calling Medicare. Wondering if audiology has been sent denial of claim.   Billing is assisting patient with claim. If denial comes through please contact patient so she is aware of reason.     Thank you,   Tena VELARDE RN   [facility]   "

## 2021-07-14 NOTE — TELEPHONE ENCOUNTER
They have not sent me a notice as to why they re denying the claim, so I really can t help. I guess we send this to billing. Maybe we need to find out the point person for these problems when they happen since they re having multiple times a week currently.    Message text

## 2021-08-30 NOTE — PROGRESS NOTES
Chief Complaint   Patient presents with     Ear Problem     recheck left ear- is doing better right now-did have some drainage - tube in place     History of Present Illness  Lavonne Tidwell is a 81 year old female who presents today for follow-up.  The patient has a history of left otitis media with effusion.  We placed an ear tube in the left and treated her with drops on 12/18/2020.  He had a few episodes of drainage which we treated with drops.  When I evaluated her in April 2021, she was having intermittent issues with postnasal drainage and dysphonia.  We had her start budesonide irrigations after Kenalog injection.  I saw her on 5/17/2021 and she was much improved from a symptom standpoint.  We discussed tapering back the budesonide irrigations, using Flonase in between irrigations.  She returns today for an ear tube check.    Since last seeing the patient, the patient reports that the left ear is doing well.  She denies any otalgia she will occasionally get some moisture from the ear but this does not last more than a day or 2.  She has not really needed to use drops. She feels like her hearing is at baseline.  Aside from her ear tube, no previous ear surgery.     From a nasal drainage standpoint, she reports she is doing well. She denies any dysphagia or odynophagia.  No hemoptysis.  No neck lumps/bumps/swelling.  She has a remote smoking history quitting in the early 1970s.     Past Medical History  Patient Active Problem List   Diagnosis     Injury of sigmoid colon     Esophageal reflux     Menopausal syndrome (hot flashes)     Urinary incontinence     Atrophic vaginitis     Hyperlipidemia LDL goal <130     Advanced directives, counseling/discussion     Onychomycosis     Senile nuclear sclerosis     Status post total left knee replacement     Status post total right knee replacement     Primary localized osteoarthrosis, lower leg     Tubulovillous adenoma polyp of colon     Essential hypertension      Current Medications    Current Outpatient Medications:      Acetaminophen (TYLENOL PO), Take 1,000 mg by mouth 2 times daily as needed for mild pain or fever, Disp: , Rfl:      amLODIPine (NORVASC) 2.5 MG tablet, Take 1 tablet (2.5 mg) by mouth daily, Disp: 90 tablet, Rfl: 3     Biotin 10 MG CAPS, , Disp: , Rfl:      budesonide (PULMICORT) 0.5 MG/2ML neb solution, Place 0.5 mg/2 mL budesonide vial in 8 oz normal saline sinus rinse bottle.  Irrigate each nostril with one half of the bottle daily., Disp: 180 mL, Rfl: 3     CALCIUM + D OR, 2 TABLET DAILY, Disp: , Rfl:      fluticasone (FLONASE) 50 MCG/ACT nasal spray, Spray 2 sprays into both nostrils daily, Disp: 47.4 mL, Rfl: 3     garlic 150 MG TABS tablet, Take 150 mg by mouth daily, Disp: , Rfl:      grape seed 50 MG CAPS, Take 50 mg by mouth daily, Disp: , Rfl:      loratadine (CLARITIN) 10 MG tablet, Take 10 mg by mouth daily, Disp: , Rfl:      methylcellulose (FIBER THERAPY) 500 MG TABS tablet, Take 500 mg by mouth daily, Disp: , Rfl:      Multiple Vitamins-Minerals (CENTRUM SILVER) per tablet, Take 1 tablet by mouth daily, Disp: , Rfl:      omeprazole (PRILOSEC) 20 MG DR capsule, Take 1 capsule (20 mg) by mouth daily, Disp: 90 capsule, Rfl: 1     VITAMIN E COMPLEX PO, , Disp: , Rfl:      Zinc Sulfate (ZINC 15 PO), , Disp: , Rfl:      benzonatate (TESSALON) 100 MG capsule, Take 1 capsule (100 mg) by mouth 3 times daily as needed for cough (Patient not taking: Reported on 9/1/2021), Disp: 20 capsule, Rfl: 0     Docusate Calcium (STOOL SOFTENER PO), , Disp: , Rfl:      ofloxacin (OCUFLOX) 0.3 % ophthalmic solution, Place 3 drops ofloxacin, 3 drops dexamethasone in left ear twice daily for 10 days (Patient not taking: Reported on 6/4/2021), Disp: 10 mL, Rfl: 3    Allergies  Allergies   Allergen Reactions     Codeine GI Disturbance       Social History  Social History     Socioeconomic History     Marital status:      Spouse name: Not on file     Number  of children: Not on file     Years of education: Not on file     Highest education level: Not on file   Occupational History     Not on file   Tobacco Use     Smoking status: Former Smoker     Packs/day: 0.50     Years: 5.00     Pack years: 2.50     Types: Cigarettes     Start date: 1968     Quit date: 2/15/1972     Years since quittin.5     Smokeless tobacco: Never Used     Tobacco comment: stopped in her 20's   Substance and Sexual Activity     Alcohol use: Yes     Alcohol/week: 0.0 standard drinks     Comment: Occasional     Drug use: No     Sexual activity: Not Currently   Other Topics Concern     Parent/sibling w/ CABG, MI or angioplasty before 65F 55M? Yes   Social History Narrative     Not on file     Social Determinants of Health     Financial Resource Strain:      Difficulty of Paying Living Expenses:    Food Insecurity:      Worried About Running Out of Food in the Last Year:      Ran Out of Food in the Last Year:    Transportation Needs:      Lack of Transportation (Medical):      Lack of Transportation (Non-Medical):    Physical Activity:      Days of Exercise per Week:      Minutes of Exercise per Session:    Stress:      Feeling of Stress :    Social Connections:      Frequency of Communication with Friends and Family:      Frequency of Social Gatherings with Friends and Family:      Attends Islam Services:      Active Member of Clubs or Organizations:      Attends Club or Organization Meetings:      Marital Status:    Intimate Partner Violence:      Fear of Current or Ex-Partner:      Emotionally Abused:      Physically Abused:      Sexually Abused:        Family History  Family History   Problem Relation Age of Onset     Cancer Mother         Ovarian     Other Cancer Mother         Ovarian     Cerebrovascular Disease Father      Heart Disease Father      C.A.D. Father      Coronary Artery Disease Father      Cancer Maternal Grandmother      Other Cancer Maternal Grandmother          "Ovarian/Bone     Cerebrovascular Disease Maternal Grandfather      Colon Cancer Maternal Grandfather      Diabetes Other         Great Grandmother     Breast Cancer No family hx of        Review of Systems  As per HPI and PMHx, otherwise 10 system review including the head and neck, constitutional, eyes, respiratory, GI, skin, neurologic, lymphatic, endocrine, and allergy systems is negative.    Physical Exam  /68 (BP Location: Right arm, Patient Position: Sitting, Cuff Size: Adult Regular)   Pulse 70   Temp 98.5  F (36.9  C) (Tympanic)   Ht 1.651 m (5' 5\")   Wt 78.5 kg (173 lb)   BMI 28.79 kg/m    GENERAL: Patient is a pleasant, cooperative 81 year old female in no acute distress.  HEAD: Normocephalic, atraumatic.  Hair and scalp are normal.  EYES: Pupils are equal, round, reactive to light and accommodation.  Extraocular movements are intact.  The sclera nonicteric without injection.  The extraocular structures are normal.  EARS: See below.   NEUROLOGIC: Cranial nerves II through XII are grossly intact.  Voice is strong.  Facial nerve examination incomplete due to the patient wearing a mask.  CARDIOVASCULAR: Extremities are warm and well-perfused.  No significant peripheral edema.  RESPIRATORY: Patient has nonlabored breathing without cough, wheeze, stridor.  PSYCHIATRIC: Patient is alert and oriented.  Mood and affect appear normal.  SKIN: Warm and dry.  No scalp, face, or neck lesions noted.    Physical Exam and Procedure    EARS: Normal shape and symmetry.  No tenderness when palpating the mastoid or tragal areas bilaterally.  The ears were then examined under the otic binocular microscope to perform cerumen removal.  Otoscopic exam on the right reveals a clear canal.  The right tympanic membrane was round, intact without evidence of effusion.  No retraction, granulation, drainage, or evidence of cholesteatoma.      Attention was turned to the left ear.  Otoscopic exam on the left reveals some " cerumen and crusting impacted over the Dura-Vent ear tube.  I was able to remove this using a wire loop and alligator forceps.  There is a slight amount of moisture within the tube which I suctioned free.  There is a slight amount granularity around the Dura-Vent tube in the posterior-inferior quadrant.  The middle ear was well aerated.  No active drainage, tympanic membrane retraction, or evidence of cholesteatoma.     Assessment and Plan     ICD-10-CM    1. Chronic otitis media of left ear with effusion  H65.492    2. Mixed conductive and sensorineural hearing loss of left ear with restricted hearing of right ear  H90.A32    3. Sensorineural hearing loss (SNHL) of right ear with restricted hearing of left ear  H90.A21    4. Retained myringotomy tube in left ear  Z96.22    5. Impacted cerumen of left ear  H61.22 Remove Cerumen      It was my pleasure seeing Lavonne Tidwell today in clinic.  The left ear tube is in good placement.  She has a slight amount of granulation tissue around the tube but nothing significant that would require treatment.  I would recommend observation.  The patient can return in 6 months for routine ear tube check.  If she develops drainage, the patient knows to contact me and we would treat her with drops.    Rob Harris MD  Department of Otolaryngology-Head and Neck Surgery  [facility]

## 2021-09-01 ENCOUNTER — OFFICE VISIT (OUTPATIENT)
Dept: OTOLARYNGOLOGY | Facility: CLINIC | Age: 81
End: 2021-09-01
Payer: MEDICARE

## 2021-09-01 VITALS
SYSTOLIC BLOOD PRESSURE: 124 MMHG | TEMPERATURE: 98.5 F | WEIGHT: 173 LBS | DIASTOLIC BLOOD PRESSURE: 68 MMHG | BODY MASS INDEX: 28.82 KG/M2 | HEIGHT: 65 IN | HEART RATE: 70 BPM

## 2021-09-01 DIAGNOSIS — H61.22 IMPACTED CERUMEN OF LEFT EAR: ICD-10-CM

## 2021-09-01 DIAGNOSIS — H90.A21 SENSORINEURAL HEARING LOSS (SNHL) OF RIGHT EAR WITH RESTRICTED HEARING OF LEFT EAR: ICD-10-CM

## 2021-09-01 DIAGNOSIS — H65.492 CHRONIC OTITIS MEDIA OF LEFT EAR WITH EFFUSION: Primary | ICD-10-CM

## 2021-09-01 DIAGNOSIS — Z96.22 RETAINED MYRINGOTOMY TUBE IN LEFT EAR: ICD-10-CM

## 2021-09-01 DIAGNOSIS — H90.A32 MIXED CONDUCTIVE AND SENSORINEURAL HEARING LOSS OF LEFT EAR WITH RESTRICTED HEARING OF RIGHT EAR: ICD-10-CM

## 2021-09-01 PROCEDURE — 99213 OFFICE O/P EST LOW 20 MIN: CPT | Mod: 25 | Performed by: OTOLARYNGOLOGY

## 2021-09-01 PROCEDURE — 69210 REMOVE IMPACTED EAR WAX UNI: CPT | Performed by: OTOLARYNGOLOGY

## 2021-09-01 ASSESSMENT — MIFFLIN-ST. JEOR: SCORE: 1245.6

## 2021-09-01 NOTE — PATIENT INSTRUCTIONS
Per physician instructions      If you have questions or concerns on any instructions given to you by your provider today or if you need to schedule an appointment, you can reach us at [phone].

## 2021-09-01 NOTE — LETTER
9/1/2021         RE: Lavonne Tidwell  [address] [facility] [id number]        Dear Colleague,    Thank you for referring your patient, Lavonne Tidwell, to the [facility]. Please see a copy of my visit note below.    Chief Complaint   Patient presents with     Ear Problem     recheck left ear- is doing better right now-did have some drainage - tube in place     History of Present Illness  Lavonne Tidwell is a 81 year old female who presents today for follow-up.  The patient has a history of left otitis media with effusion.  We placed an ear tube in the left and treated her with drops on 12/18/2020.  He had a few episodes of drainage which we treated with drops.  When I evaluated her in April 2021, she was having intermittent issues with postnasal drainage and dysphonia.  We had her start budesonide irrigations after Kenalog injection.  I saw her on 5/17/2021 and she was much improved from a symptom standpoint.  We discussed tapering back the budesonide irrigations, using Flonase in between irrigations.  She returns today for an ear tube check.    Since last seeing the patient, the patient reports that the left ear is doing well.  She denies any otalgia she will occasionally get some moisture from the ear but this does not last more than a day or 2.  She has not really needed to use drops. She feels like her hearing is at baseline.  Aside from her ear tube, no previous ear surgery.     From a nasal drainage standpoint, she reports she is doing well. She denies any dysphagia or odynophagia.  No hemoptysis.  No neck lumps/bumps/swelling.  She has a remote smoking history quitting in the early 1970s.     Past Medical History  Patient Active Problem List   Diagnosis     Injury of sigmoid colon     Esophageal reflux     Menopausal syndrome (hot flashes)     Urinary incontinence     Atrophic vaginitis     Hyperlipidemia LDL goal <130     Advanced directives, counseling/discussion      Onychomycosis     Senile nuclear sclerosis     Status post total left knee replacement     Status post total right knee replacement     Primary localized osteoarthrosis, lower leg     Tubulovillous adenoma polyp of colon     Essential hypertension     Current Medications    Current Outpatient Medications:      Acetaminophen (TYLENOL PO), Take 1,000 mg by mouth 2 times daily as needed for mild pain or fever, Disp: , Rfl:      amLODIPine (NORVASC) 2.5 MG tablet, Take 1 tablet (2.5 mg) by mouth daily, Disp: 90 tablet, Rfl: 3     Biotin 10 MG CAPS, , Disp: , Rfl:      budesonide (PULMICORT) 0.5 MG/2ML neb solution, Place 0.5 mg/2 mL budesonide vial in 8 oz normal saline sinus rinse bottle.  Irrigate each nostril with one half of the bottle daily., Disp: 180 mL, Rfl: 3     CALCIUM + D OR, 2 TABLET DAILY, Disp: , Rfl:      fluticasone (FLONASE) 50 MCG/ACT nasal spray, Spray 2 sprays into both nostrils daily, Disp: 47.4 mL, Rfl: 3     garlic 150 MG TABS tablet, Take 150 mg by mouth daily, Disp: , Rfl:      grape seed 50 MG CAPS, Take 50 mg by mouth daily, Disp: , Rfl:      loratadine (CLARITIN) 10 MG tablet, Take 10 mg by mouth daily, Disp: , Rfl:      methylcellulose (FIBER THERAPY) 500 MG TABS tablet, Take 500 mg by mouth daily, Disp: , Rfl:      Multiple Vitamins-Minerals (CENTRUM SILVER) per tablet, Take 1 tablet by mouth daily, Disp: , Rfl:      omeprazole (PRILOSEC) 20 MG DR capsule, Take 1 capsule (20 mg) by mouth daily, Disp: 90 capsule, Rfl: 1     VITAMIN E COMPLEX PO, , Disp: , Rfl:      Zinc Sulfate (ZINC 15 PO), , Disp: , Rfl:      benzonatate (TESSALON) 100 MG capsule, Take 1 capsule (100 mg) by mouth 3 times daily as needed for cough (Patient not taking: Reported on 9/1/2021), Disp: 20 capsule, Rfl: 0     Docusate Calcium (STOOL SOFTENER PO), , Disp: , Rfl:      ofloxacin (OCUFLOX) 0.3 % ophthalmic solution, Place 3 drops ofloxacin, 3 drops dexamethasone in left ear twice daily for 10 days (Patient not  taking: Reported on 2021), Disp: 10 mL, Rfl: 3    Allergies  Allergies   Allergen Reactions     Codeine GI Disturbance       Social History  Social History     Socioeconomic History     Marital status:      Spouse name: Not on file     Number of children: Not on file     Years of education: Not on file     Highest education level: Not on file   Occupational History     Not on file   Tobacco Use     Smoking status: Former Smoker     Packs/day: 0.50     Years: 5.00     Pack years: 2.50     Types: Cigarettes     Start date: 1968     Quit date: 2/15/1972     Years since quittin.5     Smokeless tobacco: Never Used     Tobacco comment: stopped in her 20's   Substance and Sexual Activity     Alcohol use: Yes     Alcohol/week: 0.0 standard drinks     Comment: Occasional     Drug use: No     Sexual activity: Not Currently   Other Topics Concern     Parent/sibling w/ CABG, MI or angioplasty before 65F 55M? Yes   Social History Narrative     Not on file     Social Determinants of Health     Financial Resource Strain:      Difficulty of Paying Living Expenses:    Food Insecurity:      Worried About Running Out of Food in the Last Year:      Ran Out of Food in the Last Year:    Transportation Needs:      Lack of Transportation (Medical):      Lack of Transportation (Non-Medical):    Physical Activity:      Days of Exercise per Week:      Minutes of Exercise per Session:    Stress:      Feeling of Stress :    Social Connections:      Frequency of Communication with Friends and Family:      Frequency of Social Gatherings with Friends and Family:      Attends Yazidi Services:      Active Member of Clubs or Organizations:      Attends Club or Organization Meetings:      Marital Status:    Intimate Partner Violence:      Fear of Current or Ex-Partner:      Emotionally Abused:      Physically Abused:      Sexually Abused:        Family History  Family History   Problem Relation Age of Onset     Cancer Mother   "       Ovarian     Other Cancer Mother         Ovarian     Cerebrovascular Disease Father      Heart Disease Father      C.A.D. Father      Coronary Artery Disease Father      Cancer Maternal Grandmother      Other Cancer Maternal Grandmother         Ovarian/Bone     Cerebrovascular Disease Maternal Grandfather      Colon Cancer Maternal Grandfather      Diabetes Other         Great Grandmother     Breast Cancer No family hx of        Review of Systems  As per HPI and PMHx, otherwise 10 system review including the head and neck, constitutional, eyes, respiratory, GI, skin, neurologic, lymphatic, endocrine, and allergy systems is negative.    Physical Exam  /68 (BP Location: Right arm, Patient Position: Sitting, Cuff Size: Adult Regular)   Pulse 70   Temp 98.5  F (36.9  C) (Tympanic)   Ht 1.651 m (5' 5\")   Wt 78.5 kg (173 lb)   BMI 28.79 kg/m    GENERAL: Patient is a pleasant, cooperative 81 year old female in no acute distress.  HEAD: Normocephalic, atraumatic.  Hair and scalp are normal.  EYES: Pupils are equal, round, reactive to light and accommodation.  Extraocular movements are intact.  The sclera nonicteric without injection.  The extraocular structures are normal.  EARS: See below.   NEUROLOGIC: Cranial nerves II through XII are grossly intact.  Voice is strong.  Facial nerve examination incomplete due to the patient wearing a mask.  CARDIOVASCULAR: Extremities are warm and well-perfused.  No significant peripheral edema.  RESPIRATORY: Patient has nonlabored breathing without cough, wheeze, stridor.  PSYCHIATRIC: Patient is alert and oriented.  Mood and affect appear normal.  SKIN: Warm and dry.  No scalp, face, or neck lesions noted.    Physical Exam and Procedure    EARS: Normal shape and symmetry.  No tenderness when palpating the mastoid or tragal areas bilaterally.  The ears were then examined under the otic binocular microscope to perform cerumen removal.  Otoscopic exam on the right reveals " a clear canal.  The right tympanic membrane was round, intact without evidence of effusion.  No retraction, granulation, drainage, or evidence of cholesteatoma.      Attention was turned to the left ear.  Otoscopic exam on the left reveals some cerumen and crusting impacted over the Dura-Vent ear tube.  I was able to remove this using a wire loop and alligator forceps.  There is a slight amount of moisture within the tube which I suctioned free.  There is a slight amount granularity around the Dura-Vent tube in the posterior-inferior quadrant.  The middle ear was well aerated.  No active drainage, tympanic membrane retraction, or evidence of cholesteatoma.     Assessment and Plan     ICD-10-CM    1. Chronic otitis media of left ear with effusion  H65.492    2. Mixed conductive and sensorineural hearing loss of left ear with restricted hearing of right ear  H90.A32    3. Sensorineural hearing loss (SNHL) of right ear with restricted hearing of left ear  H90.A21    4. Retained myringotomy tube in left ear  Z96.22    5. Impacted cerumen of left ear  H61.22 Remove Cerumen      It was my pleasure seeing Lavonne Tidwell today in clinic.  The left ear tube is in good placement.  She has a slight amount of granulation tissue around the tube but nothing significant that would require treatment.  I would recommend observation.  The patient can return in 6 months for routine ear tube check.  If she develops drainage, the patient knows to contact me and we would treat her with drops.    Rob Harris MD  Department of Otolaryngology-Head and Neck Surgery  [facility]         Again, thank you for allowing me to participate in the care of your patient.        Sincerely,        Rob Harris MD

## 2021-09-01 NOTE — NURSING NOTE
"Initial /68 (BP Location: Right arm, Patient Position: Sitting, Cuff Size: Adult Regular)   Pulse 70   Temp 98.5  F (36.9  C) (Tympanic)   Ht 1.651 m (5' 5\")   Wt 78.5 kg (173 lb)   BMI 28.79 kg/m   Estimated body mass index is 28.79 kg/m  as calculated from the following:    Height as of this encounter: 1.651 m (5' 5\").    Weight as of this encounter: 78.5 kg (173 lb). .    Marilee Tijerina CMA    "

## 2021-09-03 ENCOUNTER — OFFICE VISIT (OUTPATIENT)
Dept: FAMILY MEDICINE | Facility: CLINIC | Age: 81
End: 2021-09-03
Payer: MEDICARE

## 2021-09-03 VITALS
WEIGHT: 175 LBS | DIASTOLIC BLOOD PRESSURE: 60 MMHG | SYSTOLIC BLOOD PRESSURE: 114 MMHG | BODY MASS INDEX: 29.12 KG/M2 | HEART RATE: 72 BPM | RESPIRATION RATE: 12 BRPM

## 2021-09-03 DIAGNOSIS — M15.0 PRIMARY OSTEOARTHRITIS INVOLVING MULTIPLE JOINTS: ICD-10-CM

## 2021-09-03 DIAGNOSIS — I10 ESSENTIAL HYPERTENSION: Primary | ICD-10-CM

## 2021-09-03 LAB
ALBUMIN SERPL-MCNC: 3.6 G/DL (ref 3.4–5)
ALP SERPL-CCNC: 86 U/L (ref 40–150)
ALT SERPL W P-5'-P-CCNC: 22 U/L (ref 0–50)
ANION GAP SERPL CALCULATED.3IONS-SCNC: 3 MMOL/L (ref 3–14)
AST SERPL W P-5'-P-CCNC: 23 U/L (ref 0–45)
BILIRUB SERPL-MCNC: 0.8 MG/DL (ref 0.2–1.3)
BUN SERPL-MCNC: 17 MG/DL (ref 7–30)
CALCIUM SERPL-MCNC: 8.6 MG/DL (ref 8.5–10.1)
CHLORIDE BLD-SCNC: 106 MMOL/L (ref 94–109)
CO2 SERPL-SCNC: 31 MMOL/L (ref 20–32)
CREAT SERPL-MCNC: 0.81 MG/DL (ref 0.52–1.04)
GFR SERPL CREATININE-BSD FRML MDRD: 68 ML/MIN/1.73M2
GLUCOSE BLD-MCNC: 80 MG/DL (ref 70–99)
POTASSIUM BLD-SCNC: 4.1 MMOL/L (ref 3.4–5.3)
PROT SERPL-MCNC: 7.6 G/DL (ref 6.8–8.8)
SODIUM SERPL-SCNC: 140 MMOL/L (ref 133–144)

## 2021-09-03 PROCEDURE — 36415 COLL VENOUS BLD VENIPUNCTURE: CPT | Performed by: FAMILY MEDICINE

## 2021-09-03 PROCEDURE — 80053 COMPREHEN METABOLIC PANEL: CPT | Performed by: FAMILY MEDICINE

## 2021-09-03 PROCEDURE — 99214 OFFICE O/P EST MOD 30 MIN: CPT | Performed by: FAMILY MEDICINE

## 2021-09-03 NOTE — NURSING NOTE
"Chief Complaint   Patient presents with     Hypertension     /60   Pulse 72   Resp 12   Wt 79.4 kg (175 lb)   BMI 29.12 kg/m   Estimated body mass index is 29.12 kg/m  as calculated from the following:    Height as of 9/1/21: 1.651 m (5' 5\").    Weight as of this encounter: 79.4 kg (175 lb).  Patient presents to the clinic using No DME      Health Maintenance that is potentially due pending provider review:    Health Maintenance Due   Topic Date Due     ANNUAL REVIEW OF HM ORDERS  Never done     INFLUENZA VACCINE (1) 09/01/2021        n/a        "

## 2021-09-03 NOTE — PATIENT INSTRUCTIONS
Take the tylenol every morning before your walk     Occasional use of alleve in the am is fine     No med changes otherwise

## 2021-09-03 NOTE — PROGRESS NOTES
Assessment & Plan     Essential hypertension  Stable no change in treatment plan.   - Comprehensive metabolic panel; Future    Primary osteoarthritis involving multiple joints  Flaring with weather   She takes tylenol with some relief       Recommend activity and periodic use of alleve                Patient Instructions   Take the tylenol every morning before your walk     Occasional use of alleve in the am is fine     No med changes otherwise       Return in about 6 months (around 3/3/2022) for using an in person visit, with me.    Elvira Kowalski MD  [facility]    Kirsty Banerjee is a 81 year old who presents for the following health issues     HPI     Hypertension Follow-up      Do you check your blood pressure regularly outside of the clinic? Yes     Are you following a low salt diet? Yes    Are your blood pressures ever more than 140 on the top number (systolic) OR more   than 90 on the bottom number (diastolic), for example 140/90? Yes fluctuates       Musculoskeletal problem/pain  Onset/Duration: few years  Description  Location: hips and back - bilateral  Shoulders and neck  Joint Swelling:   Redness:   Pain: YES-  Warmth: no  Intensity:  moderate  Progression of Symptoms:  worsening  Accompanying signs and symptoms:   Fevers: nono  Numbness/tingling/weakness: no  History  Trauma to the area: no  Recent illness:  no  Previous similar problem: YES- has had imaging done and showed arthritis  Previous evaluation:  YES  Precipitating or alleviating factors:  Aggravating factors include: overuse  Therapies tried and outcome: heat, ice and acetaminophen, biofreeze     Review of Systems   Constitutional, HEENT, cardiovascular, pulmonary, gi and gu systems are negative, except as otherwise noted.      Objective    /60   Pulse 72   Resp 12   Wt 79.4 kg (175 lb)   BMI 29.12 kg/m    Body mass index is 29.12 kg/m .  Physical Exam   GENERAL APPEARANCE: healthy, alert  and no distress  RESP: lungs clear to auscultation - no rales, rhonchi or wheezes  CV: regular rates and rhythm, normal S1 S2, no S3 or S4 and no murmur, click or rub  MS: extremities normal- no gross deformities noted  PSYCH: mentation appears normal and affect normal/bright

## 2021-09-12 ENCOUNTER — HEALTH MAINTENANCE LETTER (OUTPATIENT)
Age: 81
End: 2021-09-12

## 2021-10-07 ENCOUNTER — IMMUNIZATION (OUTPATIENT)
Dept: FAMILY MEDICINE | Facility: CLINIC | Age: 81
End: 2021-10-07
Payer: MEDICARE

## 2021-10-07 DIAGNOSIS — Z23 NEED FOR PROPHYLACTIC VACCINATION AND INOCULATION AGAINST INFLUENZA: Primary | ICD-10-CM

## 2021-10-07 PROCEDURE — G0008 ADMIN INFLUENZA VIRUS VAC: HCPCS

## 2021-10-07 PROCEDURE — 90662 IIV NO PRSV INCREASED AG IM: CPT

## 2021-10-11 ENCOUNTER — IMMUNIZATION (OUTPATIENT)
Dept: FAMILY MEDICINE | Facility: CLINIC | Age: 81
End: 2021-10-11
Payer: MEDICARE

## 2021-10-11 DIAGNOSIS — Z23 HIGH PRIORITY FOR 2019-NCOV VACCINE: Primary | ICD-10-CM

## 2021-10-11 PROCEDURE — 91300 COVID-19,PF,PFIZER (12+ YRS): CPT

## 2021-10-11 PROCEDURE — 0004A COVID-19,PF,PFIZER (12+ YRS): CPT

## 2021-10-28 DIAGNOSIS — K21.9 GASTROESOPHAGEAL REFLUX DISEASE WITHOUT ESOPHAGITIS: ICD-10-CM

## 2022-01-26 DIAGNOSIS — K21.9 GASTROESOPHAGEAL REFLUX DISEASE WITHOUT ESOPHAGITIS: ICD-10-CM

## 2022-02-24 ENCOUNTER — TELEPHONE (OUTPATIENT)
Dept: FAMILY MEDICINE | Facility: CLINIC | Age: 82
End: 2022-02-24
Payer: COMMERCIAL

## 2022-02-24 NOTE — TELEPHONE ENCOUNTER
Reason for call:  Patient reporting a symptom    Symptom or request: Pt says she has arthritis but has been having pain in the left arm above the wrist and it has been swollen for a couple days and hurts . It is swelling into the hand now. She would like to talk to a nurse. She has no known injury.   Duration (how long have symptoms been present): It started earlier in the week.     Phone Number patient can be reached at:  Home number on file [phone] (home)    Best Time:  anytime    Can we leave a detailed message on this number:  YES    Call taken on 2/24/2022 at 9:00 AM by Patty Krishnan

## 2022-02-24 NOTE — TELEPHONE ENCOUNTER
Call placed to patient.    She is having swelling in left wrist now down into her hand. Difficult to make fist. Denies injury.    No numbness or tingling in hand fingers. No redness. History of arthritis.    She is advised to elevate and try heat/cold as tolerated to reduce swelling. She is using tylenol twice daily. This is fair at controlling pain. She is advised to take additional dose as needed.    Will recall tomorrow.

## 2022-02-25 NOTE — TELEPHONE ENCOUNTER
Call placed to patient.    Not much change in swelling from yesterday. Has tried cold and elevation. Pain remains fair.    Conferred with provider. Patient is instructed to try 400 mg Ibuprofen bid x 2 days. She should stager this with her tylenol.    Has appointment 3/4/22. She is advised to reach out if worsens prior to appointment.

## 2022-02-25 NOTE — PROGRESS NOTES
Chief Complaint   Patient presents with     Ent Problem     check ears/left ear tube placed 12/20- has noticed some drainage this past week     History of Present Illness  Lavonne Tidwell is a 81 year old female who presents today for follow-up.  The patient has a history of left otitis media with effusion.  We placed an ear tube in the left and treated her with drops on 12/18/2020.  He had a few episodes of drainage which we treated with drops.  When I evaluated her in April 2021, she was having intermittent issues with postnasal drainage and dysphonia.  We had her start budesonide irrigations after Kenalog injection.  I last evaluated the patient on September 1, 2021 and she was doing well.  We had previously discussed tapering back the budesonide irrigations, using Flonase in between irrigations.  She returns today for an ear tube check.     Since last seeing the patient, the patient reports that the left ear is doing well.  She denies any otalgia she will occasionally get some moisture from the ear but this does not last more than a day or 2.  She has not really needed to use drops. She feels like her hearing is at baseline.  Aside from her ear tube, no previous ear surgery.     From a nasal drainage standpoint, she reports she is doing well. She denies any dysphagia or odynophagia.  No hemoptysis.  No neck lumps/bumps/swelling.  She has a remote smoking history quitting in the early 1970s.     Past Medical History  Patient Active Problem List   Diagnosis     Injury of sigmoid colon     Esophageal reflux     Menopausal syndrome (hot flashes)     Urinary incontinence     Atrophic vaginitis     Hyperlipidemia LDL goal <130     Advanced directives, counseling/discussion     Onychomycosis     Senile nuclear sclerosis     Status post total left knee replacement     Status post total right knee replacement     Primary localized osteoarthrosis, lower leg     Tubulovillous adenoma polyp of colon     Essential  hypertension     Primary osteoarthritis involving multiple joints     Current Medications    Current Outpatient Medications:      Acetaminophen (TYLENOL PO), Take 1,000 mg by mouth 2 times daily as needed for mild pain or fever, Disp: , Rfl:      amLODIPine (NORVASC) 2.5 MG tablet, Take 1 tablet (2.5 mg) by mouth daily, Disp: 90 tablet, Rfl: 3     Biotin 10 MG CAPS, , Disp: , Rfl:      budesonide (PULMICORT) 0.5 MG/2ML neb solution, Place 0.5 mg/2 mL budesonide vial in 8 oz normal saline sinus rinse bottle.  Irrigate each nostril with one half of the bottle daily., Disp: 180 mL, Rfl: 3     CALCIUM + D OR, 2 TABLET DAILY, Disp: , Rfl:      Docusate Calcium (STOOL SOFTENER PO), , Disp: , Rfl:      fluticasone (FLONASE) 50 MCG/ACT nasal spray, Spray 2 sprays into both nostrils daily, Disp: 47.4 mL, Rfl: 3     garlic 150 MG TABS tablet, Take 150 mg by mouth daily, Disp: , Rfl:      grape seed 50 MG CAPS, Take 50 mg by mouth daily, Disp: , Rfl:      loratadine (CLARITIN) 10 MG tablet, Take 10 mg by mouth daily, Disp: , Rfl:      methylcellulose (FIBER THERAPY) 500 MG TABS tablet, Take 500 mg by mouth daily, Disp: , Rfl:      Multiple Vitamins-Minerals (CENTRUM SILVER) per tablet, Take 1 tablet by mouth daily, Disp: , Rfl:      omeprazole (PRILOSEC) 20 MG DR capsule, Take 1 capsule (20 mg) by mouth daily, Disp: 90 capsule, Rfl: 1     VITAMIN E COMPLEX PO, , Disp: , Rfl:      Zinc Sulfate (ZINC 15 PO), , Disp: , Rfl:      ofloxacin (OCUFLOX) 0.3 % ophthalmic solution, Place 3 drops ofloxacin, 3 drops dexamethasone in left ear twice daily for 10 days (Patient not taking: Reported on 6/4/2021), Disp: 10 mL, Rfl: 3    Allergies  Allergies   Allergen Reactions     Codeine GI Disturbance       Social History  Social History     Socioeconomic History     Marital status:      Spouse name: Not on file     Number of children: Not on file     Years of education: Not on file     Highest education level: Not on file  "  Occupational History     Not on file   Tobacco Use     Smoking status: Former Smoker     Packs/day: 0.50     Years: 5.00     Pack years: 2.50     Types: Cigarettes     Start date: 1968     Quit date: 2/15/1972     Years since quittin.0     Smokeless tobacco: Never Used     Tobacco comment: stopped in her 20's   Substance and Sexual Activity     Alcohol use: Yes     Alcohol/week: 0.0 standard drinks     Comment: Occasional     Drug use: No     Sexual activity: Not Currently   Other Topics Concern     Parent/sibling w/ CABG, MI or angioplasty before 65F 55M? Yes   Social History Narrative     Not on file     Social Determinants of Health     Financial Resource Strain: Not on file   Food Insecurity: Not on file   Transportation Needs: Not on file   Physical Activity: Not on file   Stress: Not on file   Social Connections: Not on file   Intimate Partner Violence: Not on file   Housing Stability: Not on file       Family History  Family History   Problem Relation Age of Onset     Cancer Mother         Ovarian     Other Cancer Mother         Ovarian     Cerebrovascular Disease Father      Heart Disease Father      C.A.D. Father      Coronary Artery Disease Father      Cancer Maternal Grandmother      Other Cancer Maternal Grandmother         Ovarian/Bone     Cerebrovascular Disease Maternal Grandfather      Colon Cancer Maternal Grandfather      Diabetes Other         Great Grandmother     Breast Cancer No family hx of        Review of Systems  As per HPI and PMHx, otherwise 10 system review including the head and neck, constitutional, eyes, respiratory, GI, skin, neurologic, lymphatic, endocrine, and allergy systems is negative.    Physical Exam  /80 (BP Location: Right arm, Patient Position: Sitting, Cuff Size: Adult Regular)   Pulse 73   Temp 97.5  F (36.4  C) (Tympanic)   Ht 1.651 m (5' 5\")   Wt 78.9 kg (174 lb)   BMI 28.96 kg/m    GENERAL: Patient is a pleasant, cooperative 81 year old female " in no acute distress.  HEAD: Normocephalic, atraumatic.  Hair and scalp are normal.  EYES: Pupils are equal, round, reactive to light and accommodation.  Extraocular movements are intact.  The sclera nonicteric without injection.  The extraocular structures are normal.  EARS: See below.   NEUROLOGIC: Cranial nerves II through XII are grossly intact.  Voice is strong.  Facial nerve examination incomplete due to the patient wearing a mask.  CARDIOVASCULAR: Extremities are warm and well-perfused.  No significant peripheral edema.  RESPIRATORY: Patient has nonlabored breathing without cough, wheeze, stridor.  PSYCHIATRIC: Patient is alert and oriented.  Mood and affect appear normal.  SKIN: Warm and dry.  No scalp, face, or neck lesions noted.     Physical Exam and Procedure     EARS: Normal shape and symmetry.  No tenderness when palpating the mastoid or tragal areas bilaterally.  The ears were then examined under the otic binocular microscope to perform cerumen removal.  Otoscopic exam on the right reveals a clear canal.  The right tympanic membrane was round, intact without evidence of effusion.  No retraction, granulation, drainage, or evidence of cholesteatoma.       Attention was turned to the left ear.  Otoscopic exam on the left reveals some cerumen and crusting impacted over the Dura-Vent ear tube.  I was able to remove this using a wire loop and alligator forceps.  The Dura-Vent tube is in the posterior-inferior quadrant.  The middle ear was well aerated.  No active drainage, tympanic membrane retraction, or evidence of cholesteatoma.     Assessment and Plan     ICD-10-CM    1. Chronic otitis media of left ear with effusion  H65.492 Microscopy, Binocular   2. Mixed conductive and sensorineural hearing loss of left ear with restricted hearing of right ear  H90.A32 Microscopy, Binocular   3. Sensorineural hearing loss (SNHL) of right ear with restricted hearing of left ear  H90.A21 Microscopy, Binocular   4.  Retained myringotomy tube in left ear  Z96.22 Microscopy, Binocular   5. Impacted cerumen of left ear  H61.22 Microscopy, Binocular      It was my pleasure seeing Lavonne Tidwell today in clinic.  The left ear tube is in good placement.  She has a slight amount of crusting around the tube that I removed today in office. I would recommend observation.  The patient can return in 6 months for routine ear tube check.  If she develops drainage, the patient knows to contact me and we would treat her with drops.    Rob Harris MD  Department of Otolaryngology-Head and Neck Surgery  [facility]

## 2022-03-02 ENCOUNTER — OFFICE VISIT (OUTPATIENT)
Dept: OTOLARYNGOLOGY | Facility: CLINIC | Age: 82
End: 2022-03-02
Payer: COMMERCIAL

## 2022-03-02 VITALS
DIASTOLIC BLOOD PRESSURE: 80 MMHG | TEMPERATURE: 97.5 F | HEART RATE: 73 BPM | WEIGHT: 174 LBS | SYSTOLIC BLOOD PRESSURE: 125 MMHG | BODY MASS INDEX: 28.99 KG/M2 | HEIGHT: 65 IN

## 2022-03-02 DIAGNOSIS — H90.A32 MIXED CONDUCTIVE AND SENSORINEURAL HEARING LOSS OF LEFT EAR WITH RESTRICTED HEARING OF RIGHT EAR: ICD-10-CM

## 2022-03-02 DIAGNOSIS — H65.492 CHRONIC OTITIS MEDIA OF LEFT EAR WITH EFFUSION: Primary | ICD-10-CM

## 2022-03-02 DIAGNOSIS — H61.22 IMPACTED CERUMEN OF LEFT EAR: ICD-10-CM

## 2022-03-02 DIAGNOSIS — Z96.22 RETAINED MYRINGOTOMY TUBE IN LEFT EAR: ICD-10-CM

## 2022-03-02 DIAGNOSIS — H90.A21 SENSORINEURAL HEARING LOSS (SNHL) OF RIGHT EAR WITH RESTRICTED HEARING OF LEFT EAR: ICD-10-CM

## 2022-03-02 PROCEDURE — 99213 OFFICE O/P EST LOW 20 MIN: CPT | Mod: 25 | Performed by: OTOLARYNGOLOGY

## 2022-03-02 PROCEDURE — 92504 EAR MICROSCOPY EXAMINATION: CPT | Performed by: OTOLARYNGOLOGY

## 2022-03-02 NOTE — LETTER
3/2/2022         RE: Lavonne Tidwell  [id number] [facility] [address]        Dear Colleague,    Thank you for referring your patient, Lavonne Tidwell, to the [facility]. Please see a copy of my visit note below.    Chief Complaint   Patient presents with     Ent Problem     check ears/left ear tube placed 12/20- has noticed some drainage this past week     History of Present Illness  Lavonne Tidwell is a 81 year old female who presents today for follow-up.  The patient has a history of left otitis media with effusion.  We placed an ear tube in the left and treated her with drops on 12/18/2020.  He had a few episodes of drainage which we treated with drops.  When I evaluated her in April 2021, she was having intermittent issues with postnasal drainage and dysphonia.  We had her start budesonide irrigations after Kenalog injection.  I last evaluated the patient on September 1, 2021 and she was doing well.  We had previously discussed tapering back the budesonide irrigations, using Flonase in between irrigations.  She returns today for an ear tube check.     Since last seeing the patient, the patient reports that the left ear is doing well.  She denies any otalgia she will occasionally get some moisture from the ear but this does not last more than a day or 2.  She has not really needed to use drops. She feels like her hearing is at baseline.  Aside from her ear tube, no previous ear surgery.     From a nasal drainage standpoint, she reports she is doing well. She denies any dysphagia or odynophagia.  No hemoptysis.  No neck lumps/bumps/swelling.  She has a remote smoking history quitting in the early 1970s.     Past Medical History  Patient Active Problem List   Diagnosis     Injury of sigmoid colon     Esophageal reflux     Menopausal syndrome (hot flashes)     Urinary incontinence     Atrophic vaginitis     Hyperlipidemia LDL goal <130     Advanced directives,  counseling/discussion     Onychomycosis     Senile nuclear sclerosis     Status post total left knee replacement     Status post total right knee replacement     Primary localized osteoarthrosis, lower leg     Tubulovillous adenoma polyp of colon     Essential hypertension     Primary osteoarthritis involving multiple joints     Current Medications    Current Outpatient Medications:      Acetaminophen (TYLENOL PO), Take 1,000 mg by mouth 2 times daily as needed for mild pain or fever, Disp: , Rfl:      amLODIPine (NORVASC) 2.5 MG tablet, Take 1 tablet (2.5 mg) by mouth daily, Disp: 90 tablet, Rfl: 3     Biotin 10 MG CAPS, , Disp: , Rfl:      budesonide (PULMICORT) 0.5 MG/2ML neb solution, Place 0.5 mg/2 mL budesonide vial in 8 oz normal saline sinus rinse bottle.  Irrigate each nostril with one half of the bottle daily., Disp: 180 mL, Rfl: 3     CALCIUM + D OR, 2 TABLET DAILY, Disp: , Rfl:      Docusate Calcium (STOOL SOFTENER PO), , Disp: , Rfl:      fluticasone (FLONASE) 50 MCG/ACT nasal spray, Spray 2 sprays into both nostrils daily, Disp: 47.4 mL, Rfl: 3     garlic 150 MG TABS tablet, Take 150 mg by mouth daily, Disp: , Rfl:      grape seed 50 MG CAPS, Take 50 mg by mouth daily, Disp: , Rfl:      loratadine (CLARITIN) 10 MG tablet, Take 10 mg by mouth daily, Disp: , Rfl:      methylcellulose (FIBER THERAPY) 500 MG TABS tablet, Take 500 mg by mouth daily, Disp: , Rfl:      Multiple Vitamins-Minerals (CENTRUM SILVER) per tablet, Take 1 tablet by mouth daily, Disp: , Rfl:      omeprazole (PRILOSEC) 20 MG DR capsule, Take 1 capsule (20 mg) by mouth daily, Disp: 90 capsule, Rfl: 1     VITAMIN E COMPLEX PO, , Disp: , Rfl:      Zinc Sulfate (ZINC 15 PO), , Disp: , Rfl:      ofloxacin (OCUFLOX) 0.3 % ophthalmic solution, Place 3 drops ofloxacin, 3 drops dexamethasone in left ear twice daily for 10 days (Patient not taking: Reported on 6/4/2021), Disp: 10 mL, Rfl: 3    Allergies  Allergies   Allergen Reactions      Codeine GI Disturbance       Social History  Social History     Socioeconomic History     Marital status:      Spouse name: Not on file     Number of children: Not on file     Years of education: Not on file     Highest education level: Not on file   Occupational History     Not on file   Tobacco Use     Smoking status: Former Smoker     Packs/day: 0.50     Years: 5.00     Pack years: 2.50     Types: Cigarettes     Start date: 1968     Quit date: 2/15/1972     Years since quittin.0     Smokeless tobacco: Never Used     Tobacco comment: stopped in her 20's   Substance and Sexual Activity     Alcohol use: Yes     Alcohol/week: 0.0 standard drinks     Comment: Occasional     Drug use: No     Sexual activity: Not Currently   Other Topics Concern     Parent/sibling w/ CABG, MI or angioplasty before 65F 55M? Yes   Social History Narrative     Not on file     Social Determinants of Health     Financial Resource Strain: Not on file   Food Insecurity: Not on file   Transportation Needs: Not on file   Physical Activity: Not on file   Stress: Not on file   Social Connections: Not on file   Intimate Partner Violence: Not on file   Housing Stability: Not on file       Family History  Family History   Problem Relation Age of Onset     Cancer Mother         Ovarian     Other Cancer Mother         Ovarian     Cerebrovascular Disease Father      Heart Disease Father      C.A.D. Father      Coronary Artery Disease Father      Cancer Maternal Grandmother      Other Cancer Maternal Grandmother         Ovarian/Bone     Cerebrovascular Disease Maternal Grandfather      Colon Cancer Maternal Grandfather      Diabetes Other         Great Grandmother     Breast Cancer No family hx of        Review of Systems  As per HPI and PMHx, otherwise 10 system review including the head and neck, constitutional, eyes, respiratory, GI, skin, neurologic, lymphatic, endocrine, and allergy systems is negative.    Physical Exam  /80  "(BP Location: Right arm, Patient Position: Sitting, Cuff Size: Adult Regular)   Pulse 73   Temp 97.5  F (36.4  C) (Tympanic)   Ht 1.651 m (5' 5\")   Wt 78.9 kg (174 lb)   BMI 28.96 kg/m    GENERAL: Patient is a pleasant, cooperative 81 year old female in no acute distress.  HEAD: Normocephalic, atraumatic.  Hair and scalp are normal.  EYES: Pupils are equal, round, reactive to light and accommodation.  Extraocular movements are intact.  The sclera nonicteric without injection.  The extraocular structures are normal.  EARS: See below.   NEUROLOGIC: Cranial nerves II through XII are grossly intact.  Voice is strong.  Facial nerve examination incomplete due to the patient wearing a mask.  CARDIOVASCULAR: Extremities are warm and well-perfused.  No significant peripheral edema.  RESPIRATORY: Patient has nonlabored breathing without cough, wheeze, stridor.  PSYCHIATRIC: Patient is alert and oriented.  Mood and affect appear normal.  SKIN: Warm and dry.  No scalp, face, or neck lesions noted.     Physical Exam and Procedure     EARS: Normal shape and symmetry.  No tenderness when palpating the mastoid or tragal areas bilaterally.  The ears were then examined under the otic binocular microscope to perform cerumen removal.  Otoscopic exam on the right reveals a clear canal.  The right tympanic membrane was round, intact without evidence of effusion.  No retraction, granulation, drainage, or evidence of cholesteatoma.       Attention was turned to the left ear.  Otoscopic exam on the left reveals some cerumen and crusting impacted over the Dura-Vent ear tube.  I was able to remove this using a wire loop and alligator forceps.  The Dura-Vent tube is in the posterior-inferior quadrant.  The middle ear was well aerated.  No active drainage, tympanic membrane retraction, or evidence of cholesteatoma.     Assessment and Plan     ICD-10-CM    1. Chronic otitis media of left ear with effusion  H65.492 Microscopy, Binocular   2. " Mixed conductive and sensorineural hearing loss of left ear with restricted hearing of right ear  H90.A32 Microscopy, Binocular   3. Sensorineural hearing loss (SNHL) of right ear with restricted hearing of left ear  H90.A21 Microscopy, Binocular   4. Retained myringotomy tube in left ear  Z96.22 Microscopy, Binocular   5. Impacted cerumen of left ear  H61.22 Microscopy, Binocular      It was my pleasure seeing Lavonne Tidwell today in clinic.  The left ear tube is in good placement.  She has a slight amount of crusting around the tube that I removed today in office. I would recommend observation.  The patient can return in 6 months for routine ear tube check.  If she develops drainage, the patient knows to contact me and we would treat her with drops.    Rob Harris MD  Department of Otolaryngology-Head and Neck Surgery  [facility]         Again, thank you for allowing me to participate in the care of your patient.        Sincerely,        Rob Harris MD

## 2022-03-02 NOTE — NURSING NOTE
"Initial /80 (BP Location: Right arm, Patient Position: Sitting, Cuff Size: Adult Regular)   Pulse 73   Temp 97.5  F (36.4  C) (Tympanic)   Ht 1.651 m (5' 5\")   Wt 78.9 kg (174 lb)   BMI 28.96 kg/m   Estimated body mass index is 28.96 kg/m  as calculated from the following:    Height as of this encounter: 1.651 m (5' 5\").    Weight as of this encounter: 78.9 kg (174 lb). .    Marilee Tijerina CMA    "

## 2022-03-02 NOTE — PATIENT INSTRUCTIONS
Per physician instructions      If you have questions or concerns on any instructions given to you by your provider today or if you need to schedule an appointment, you can reach us at [phone].

## 2022-03-04 ENCOUNTER — OFFICE VISIT (OUTPATIENT)
Dept: FAMILY MEDICINE | Facility: CLINIC | Age: 82
End: 2022-03-04
Payer: COMMERCIAL

## 2022-03-04 ENCOUNTER — HOSPITAL ENCOUNTER (OUTPATIENT)
Dept: ULTRASOUND IMAGING | Facility: CLINIC | Age: 82
Discharge: HOME | End: 2022-03-04
Attending: FAMILY MEDICINE | Admitting: FAMILY MEDICINE
Payer: COMMERCIAL

## 2022-03-04 VITALS
BODY MASS INDEX: 29.81 KG/M2 | RESPIRATION RATE: 12 BRPM | HEART RATE: 64 BPM | WEIGHT: 174.6 LBS | HEIGHT: 64 IN | TEMPERATURE: 97.2 F | SYSTOLIC BLOOD PRESSURE: 138 MMHG | DIASTOLIC BLOOD PRESSURE: 76 MMHG

## 2022-03-04 DIAGNOSIS — M79.89 LEFT LEG SWELLING: Primary | ICD-10-CM

## 2022-03-04 DIAGNOSIS — I10 ESSENTIAL HYPERTENSION: ICD-10-CM

## 2022-03-04 DIAGNOSIS — M79.89 LEFT LEG SWELLING: ICD-10-CM

## 2022-03-04 PROCEDURE — 99214 OFFICE O/P EST MOD 30 MIN: CPT | Performed by: FAMILY MEDICINE

## 2022-03-04 PROCEDURE — 93971 EXTREMITY STUDY: CPT | Mod: LT

## 2022-03-04 RX ORDER — HYDROCHLOROTHIAZIDE 25 MG/1
25 TABLET ORAL DAILY
Qty: 90 TABLET | Refills: 1 | Status: SHIPPED | OUTPATIENT
Start: 2022-03-04 | End: 2022-03-14

## 2022-03-04 ASSESSMENT — PAIN SCALES - GENERAL: PAINLEVEL: NO PAIN (0)

## 2022-03-04 NOTE — NURSING NOTE
"Chief Complaint   Patient presents with     Hypertension     BP (!) 156/78   Pulse 64   Temp 97.2  F (36.2  C) (Tympanic)   Resp 12   Ht 1.626 m (5' 4\")   Wt 79.2 kg (174 lb 9.6 oz)   BMI 29.97 kg/m   Estimated body mass index is 29.97 kg/m  as calculated from the following:    Height as of this encounter: 1.626 m (5' 4\").    Weight as of this encounter: 79.2 kg (174 lb 9.6 oz).  Patient presents to the clinic using No DME      Health Maintenance that is potentially due pending provider review:    Health Maintenance Due   Topic Date Due     ANNUAL REVIEW OF HM ORDERS  Never done     DEXA  12/29/2021     PHQ-2  01/01/2022        Pended.        "

## 2022-03-04 NOTE — PROGRESS NOTES
"  Assessment & Plan     Left leg swelling  Concern for DVT   Will get US to rule out   If pos treat with DOAC i  - REVIEW OF HEALTH MAINTENANCE PROTOCOL ORDERS  - US Lower Extremity Venous Duplex Left; Future  - US Lower Extremity Venous Duplex Left; Future    Essential hypertension  Fair control   Depending on above will add hydrochlorothiazide if neg US   RN recheck BP in one week with K+ if we start hydrochlorothiazide       [place]  US neg   Started hydrochlorothiazide  She will see RN next week for BP check and chem 8  If BP is good would stay on hydrochlorothiazide and stop amlodipine as this may be causing the swelling and then follow BP          BMI:   Estimated body mass index is 29.97 kg/m  as calculated from the following:    Height as of this encounter: 1.626 m (5' 4\").    Weight as of this encounter: 79.2 kg (174 lb 9.6 oz).       Patient Instructions   US in [place] now to rule out clot    I will call you with plan after the US is complete       Return in about 1 week (around 3/11/2022), or if symptoms worsen or fail to improve, for Nurse Only Visit, BP Recheck.    Elvira Kowalski MD  [facility]    Kirsty Banerjee is a 81 year old who presents for the following health issues     HPI     Hypertension Follow-up      Do you check your blood pressure regularly outside of the clinic? No     Are you following a low salt diet? Yes    Are your blood pressures ever more than 140 on the top number (systolic) OR more   than 90 on the bottom number (diastolic), for example 140/90?     BPs have been up and down at  Home       Concern - Edema  Onset: on and off  Description: Left arm swelling - better now  Noticed left leg/ankle has been swelling more often - use to just be in the summer time    Left leg and ankle swelling off and on for the last month or so   She in the last week has had pain in the calf   No redness     No SOA or chest pain       Review of Systems " "  Constitutional, HEENT, cardiovascular, pulmonary, gi and gu systems are negative, except as otherwise noted.      Objective    BP (!) 156/78   Pulse 64   Temp 97.2  F (36.2  C) (Tympanic)   Resp 12   Ht 1.626 m (5' 4\")   Wt 79.2 kg (174 lb 9.6 oz)   BMI 29.97 kg/m    Body mass index is 29.97 kg/m .  Physical Exam   GENERAL APPEARANCE: healthy, alert and no distress  RESP: lungs clear to auscultation - no rales, rhonchi or wheezes  CV: regular rates and rhythm, normal S1 S2, no S3 or S4 and no murmur, click or rub  MS: left LE tender calf 1-2+ edema in the left   Mild edema of the right LE   PSYCH: mentation appears normal and affect normal/bright                "

## 2022-03-11 ENCOUNTER — LAB (OUTPATIENT)
Dept: LAB | Facility: CLINIC | Age: 82
End: 2022-03-11
Payer: COMMERCIAL

## 2022-03-11 ENCOUNTER — TELEPHONE (OUTPATIENT)
Dept: FAMILY MEDICINE | Facility: CLINIC | Age: 82
End: 2022-03-11

## 2022-03-11 ENCOUNTER — ALLIED HEALTH/NURSE VISIT (OUTPATIENT)
Dept: FAMILY MEDICINE | Facility: CLINIC | Age: 82
End: 2022-03-11
Payer: COMMERCIAL

## 2022-03-11 VITALS — RESPIRATION RATE: 18 BRPM | HEART RATE: 64 BPM | DIASTOLIC BLOOD PRESSURE: 66 MMHG | SYSTOLIC BLOOD PRESSURE: 128 MMHG

## 2022-03-11 DIAGNOSIS — E83.52 HYPERCALCEMIA: ICD-10-CM

## 2022-03-11 DIAGNOSIS — E83.52 HYPERCALCEMIA: Primary | ICD-10-CM

## 2022-03-11 DIAGNOSIS — I10 ESSENTIAL HYPERTENSION: Primary | ICD-10-CM

## 2022-03-11 DIAGNOSIS — I10 ESSENTIAL HYPERTENSION: ICD-10-CM

## 2022-03-11 DIAGNOSIS — Z01.30 BP CHECK: ICD-10-CM

## 2022-03-11 LAB
ANION GAP SERPL CALCULATED.3IONS-SCNC: 2 MMOL/L (ref 3–14)
BUN SERPL-MCNC: 24 MG/DL (ref 7–30)
CALCIUM SERPL-MCNC: 11.9 MG/DL (ref 8.5–10.1)
CHLORIDE BLD-SCNC: 102 MMOL/L (ref 94–109)
CO2 SERPL-SCNC: 32 MMOL/L (ref 20–32)
CREAT SERPL-MCNC: 0.91 MG/DL (ref 0.52–1.04)
GFR SERPL CREATININE-BSD FRML MDRD: 63 ML/MIN/1.73M2
GLUCOSE BLD-MCNC: 93 MG/DL (ref 70–99)
POTASSIUM BLD-SCNC: 4.5 MMOL/L (ref 3.4–5.3)
PTH-INTACT SERPL-MCNC: 27 PG/ML (ref 18–80)
SODIUM SERPL-SCNC: 136 MMOL/L (ref 133–144)

## 2022-03-11 PROCEDURE — 99207 PR NO CHARGE NURSE ONLY: CPT

## 2022-03-11 PROCEDURE — 83970 ASSAY OF PARATHORMONE: CPT

## 2022-03-11 PROCEDURE — 80048 BASIC METABOLIC PNL TOTAL CA: CPT

## 2022-03-11 PROCEDURE — 36415 COLL VENOUS BLD VENIPUNCTURE: CPT

## 2022-03-11 NOTE — RESULT ENCOUNTER NOTE
Hypercalcemia.  May be secondary to starting thiazide.  R/O hyperparathyroid.    PLAN: checking iPTH.    YOBANY FLOWERS MD

## 2022-03-11 NOTE — TELEPHONE ENCOUNTER
Reason for Call:  Other    Detailed comments: Pt was in last Friday and today, for a blood pressure check. At her last appointment last Friday they told her to stop taking her ofloxacin and today they told her to stop taking the amlodipine. Pt would like a call back to discuss which medications she should and should not be taking, just wants to make sure she is taking the right ones she should be.  Phone Number Patient can be reached at: Home number on file [phone] (home)    Best Time: anytime    Can we leave a detailed message on this number? YES    Call taken on 3/11/2022 at 10:54 AM by Dinorah Tello

## 2022-03-11 NOTE — PATIENT INSTRUCTIONS
Blood pressure was good today.  At last clinic visit on 3/4/22 Dr Kowalski said to stop the amlodipine if BP good.  Continue taking the hydrochlorothiazide  See clinic RN if a week or so for BP check

## 2022-03-11 NOTE — PROGRESS NOTES
Lavonne Tidwell is a 81 year old year old patient who comes in today for a Blood Pressure check because of new medication.  Vital Signs as repeated by /66  Patient is taking medication as prescribed  Patient is tolerating medications well.  Patient is not monitoring Blood Pressure at home. Has a wrist monitor at home, but does not use it much.  Current complaints: none  Disposition:  Stop the amlodipine( per Dr Kowalski at 3/4/22 visit).  BP good at this time.  She will continue the HCTZ for now  She had lab and will await result  BP check in a week or so .  Danielle Keith RN

## 2022-03-13 NOTE — RESULT ENCOUNTER NOTE
Please call.   Some swelling thought to be related to amlodipine which was stopped at recent visit to clinic.   She was started on hydrochlorothiazide but that has made calcium in blood high.  Parathyroid hormone is normal.  High calcium is likely caused by the hydrochlorothiazide.   PLAN: Stop the hydrochlorothiazide.   Start lisinopril 10mg daily instead.   Schedule an appointment with clinic RN in 2 weeks for BP recheck and Chem8.     Please arrange for orders.  Future Chem8.  YOBANY FLOWERS MD

## 2022-03-14 DIAGNOSIS — E83.52 HYPERCALCEMIA: ICD-10-CM

## 2022-03-14 DIAGNOSIS — I10 ESSENTIAL HYPERTENSION: ICD-10-CM

## 2022-03-14 DIAGNOSIS — I10 ESSENTIAL HYPERTENSION: Primary | ICD-10-CM

## 2022-03-14 RX ORDER — LISINOPRIL 10 MG/1
10 TABLET ORAL DAILY
Qty: 90 TABLET | Refills: 3 | Status: CANCELLED | OUTPATIENT
Start: 2022-03-14

## 2022-03-14 RX ORDER — LISINOPRIL 10 MG/1
10 TABLET ORAL DAILY
Qty: 30 TABLET | Refills: 1 | Status: SHIPPED | OUTPATIENT
Start: 2022-03-14 | End: 2022-05-11

## 2022-03-18 ENCOUNTER — ALLIED HEALTH/NURSE VISIT (OUTPATIENT)
Dept: FAMILY MEDICINE | Facility: CLINIC | Age: 82
End: 2022-03-18
Payer: COMMERCIAL

## 2022-03-18 VITALS — HEART RATE: 65 BPM | SYSTOLIC BLOOD PRESSURE: 169 MMHG | DIASTOLIC BLOOD PRESSURE: 80 MMHG

## 2022-03-18 DIAGNOSIS — I10 ESSENTIAL HYPERTENSION: Primary | ICD-10-CM

## 2022-03-18 PROCEDURE — 99207 PR NO CHARGE NURSE ONLY: CPT

## 2022-03-18 NOTE — PROGRESS NOTES
Lavonne Tidwell is a 81 year old year old patient who comes in today for a Blood Pressure check because of new medication, medication change on 03/13/22 and ongoing blood pressure monitoring.  Vital Signs as repeated by /66 P 68, 169/80 P 65  Patient is taking medication as prescribed  Patient is tolerating medications well.  Patient is monitoring Blood Pressure at home.  Average readings if yes are 118-174/64-88 since med changes  Current complaints: tired lately, lower extremity edema improved.  Disposition:  patient instructed to Continue with meds as ordered and daily BP checks. She will call care team on 03/23/22 with BP readings. Has lab and RN BP check scheduled for 03/28/22.  Grazyna VILLAVICENCIO RN

## 2022-03-23 ENCOUNTER — TELEPHONE (OUTPATIENT)
Dept: FAMILY MEDICINE | Facility: CLINIC | Age: 82
End: 2022-03-23

## 2022-03-23 NOTE — TELEPHONE ENCOUNTER
Patient questioning elevated calcium level. Reviewed result note from Dr Iniguez on 03/11/22. Hydrochlorothiazide was stopped as a result and lisinopril started which patient has done. Home BP readings entered vitals flowsheet. She will return for labs and BP check 03/28/22.   Takes tylenol twice daily for arthritis to hips and back as well as Aleve 3 times weekly. She has been using  aspercreme to her right hand at night. She has had random swelling to both upper and lower extremities which usually resolve after a couple of days.   Advised to elevate right hand on/off throughout the day and at night on a couple of pillows. Schedule clinic appt if swelling worsens or does not improve.   Patient wanted message routed to Dr Kowalski to review.  Grazyna VILLAVICENCIO RN

## 2022-03-23 NOTE — TELEPHONE ENCOUNTER
Reason for call:    Symptom or request:     Patient called with last few days of BP results:    03/18--174/88 pulse  64  03/19--140/67   73  03/20-- 109/61  76   03/21-- 126/62  73   03/22--  122/66 72  03/23-- 126/77  69     Patient reports she is tired but denies other symptoms no ha or dizziness.     Patient reports her right hand swollen and hard to make a fist. Feels it arthritis. But wanted MD to know    Patient has questions regarding last blood work done on 03/11--she had change in medications.  Looking for clarifications     [facility]       Best Time:  any    Can we leave a detailed message on this number?  YES     Brielle ESPINOSA  Station

## 2022-03-28 ENCOUNTER — ALLIED HEALTH/NURSE VISIT (OUTPATIENT)
Dept: FAMILY MEDICINE | Facility: CLINIC | Age: 82
End: 2022-03-28
Payer: COMMERCIAL

## 2022-03-28 ENCOUNTER — LAB (OUTPATIENT)
Dept: LAB | Facility: CLINIC | Age: 82
End: 2022-03-28
Payer: COMMERCIAL

## 2022-03-28 VITALS — DIASTOLIC BLOOD PRESSURE: 70 MMHG | SYSTOLIC BLOOD PRESSURE: 118 MMHG | RESPIRATION RATE: 18 BRPM | HEART RATE: 72 BPM

## 2022-03-28 DIAGNOSIS — E83.52 HYPERCALCEMIA: ICD-10-CM

## 2022-03-28 DIAGNOSIS — Z01.30 BP CHECK: Primary | ICD-10-CM

## 2022-03-28 LAB
ANION GAP SERPL CALCULATED.3IONS-SCNC: 3 MMOL/L (ref 3–14)
BUN SERPL-MCNC: 21 MG/DL (ref 7–30)
CALCIUM SERPL-MCNC: 9.5 MG/DL (ref 8.5–10.1)
CHLORIDE BLD-SCNC: 105 MMOL/L (ref 94–109)
CO2 SERPL-SCNC: 31 MMOL/L (ref 20–32)
CREAT SERPL-MCNC: 0.74 MG/DL (ref 0.52–1.04)
GFR SERPL CREATININE-BSD FRML MDRD: 80 ML/MIN/1.73M2
GLUCOSE BLD-MCNC: 96 MG/DL (ref 70–99)
POTASSIUM BLD-SCNC: 4.8 MMOL/L (ref 3.4–5.3)
SODIUM SERPL-SCNC: 139 MMOL/L (ref 133–144)

## 2022-03-28 PROCEDURE — 36415 COLL VENOUS BLD VENIPUNCTURE: CPT

## 2022-03-28 PROCEDURE — 80048 BASIC METABOLIC PNL TOTAL CA: CPT

## 2022-03-28 PROCEDURE — 99207 PR NO CHARGE NURSE ONLY: CPT

## 2022-03-28 NOTE — PROGRESS NOTES
Lavonne Tidwell is a 81 year old year old patient who comes in today for a Blood Pressure check because of new medication.  Recent start lisinopril 10 mg daily.  Vital Signs as repeated by /70  Patient is taking medication as prescribed  Patient is tolerating medications well.  Patient is monitoring Blood Pressure at home.  Average readings if yes are in the 130's/70's range.  Current complaints: none  Disposition:  patient to continue with the same medication.  Await lab  Danielle Keith RN

## 2022-03-29 NOTE — RESULT ENCOUNTER NOTE
Kris Banerjee,   The blood chemistries (Basic metabolic panel) are all normal including electrolytes (salt balances in the blood), blood glucose and kidney tests..    Calcium is back to normal.   YOBANY FLOWERS MD

## 2022-04-05 ENCOUNTER — ANCILLARY PROCEDURE (OUTPATIENT)
Dept: MAMMOGRAPHY | Facility: CLINIC | Age: 82
End: 2022-04-05
Attending: FAMILY MEDICINE
Payer: COMMERCIAL

## 2022-04-05 DIAGNOSIS — Z12.31 VISIT FOR SCREENING MAMMOGRAM: ICD-10-CM

## 2022-04-05 PROCEDURE — 77067 SCR MAMMO BI INCL CAD: CPT | Mod: TC | Performed by: RADIOLOGY

## 2022-04-05 PROCEDURE — 77063 BREAST TOMOSYNTHESIS BI: CPT | Mod: TC | Performed by: RADIOLOGY

## 2022-04-20 ENCOUNTER — IMMUNIZATION (OUTPATIENT)
Dept: FAMILY MEDICINE | Facility: CLINIC | Age: 82
End: 2022-04-20
Payer: COMMERCIAL

## 2022-04-20 PROCEDURE — 0054A COVID-19,PF,PFIZER (12+ YRS): CPT

## 2022-04-20 PROCEDURE — 91305 COVID-19,PF,PFIZER (12+ YRS): CPT

## 2022-04-21 ENCOUNTER — OFFICE VISIT (OUTPATIENT)
Dept: URGENT CARE | Facility: URGENT CARE | Age: 82
End: 2022-04-21
Payer: COMMERCIAL

## 2022-04-21 VITALS
HEART RATE: 75 BPM | SYSTOLIC BLOOD PRESSURE: 120 MMHG | OXYGEN SATURATION: 99 % | TEMPERATURE: 97.9 F | DIASTOLIC BLOOD PRESSURE: 60 MMHG

## 2022-04-21 DIAGNOSIS — L03.114 CELLULITIS OF LEFT UPPER EXTREMITY: Primary | ICD-10-CM

## 2022-04-21 PROCEDURE — 99213 OFFICE O/P EST LOW 20 MIN: CPT | Performed by: STUDENT IN AN ORGANIZED HEALTH CARE EDUCATION/TRAINING PROGRAM

## 2022-04-21 RX ORDER — CEPHALEXIN 500 MG/1
500 CAPSULE ORAL 3 TIMES DAILY
Qty: 30 CAPSULE | Refills: 0 | Status: SHIPPED | OUTPATIENT
Start: 2022-04-21 | End: 2022-05-01

## 2022-04-21 NOTE — PATIENT INSTRUCTIONS
Diagnosis: Cellulitis (infection of tissue)    Treatment: cephalexin three times daily for 10 days.     Watch the area of redness and if it is spreading go to the ER.    Amanda Ward, CNP

## 2022-04-21 NOTE — PROGRESS NOTES
Assessment & Plan     Cellulitis of left upper extremity  Symptoms consistent with cellulitis. Advised on antibiotic treatment and to monitor for spreading of redness or streaking and if this occurs to go to ER right away.   - cephALEXin (KEFLEX) 500 MG capsule  Dispense: 30 capsule; Refill: 0       No follow-ups on file.    ENA [facility]    Kirsty Banerjee is a 81 year old female who presents to clinic today for the following health issues:  Chief Complaint   Patient presents with     Musculoskeletal Problem     Right wrist swollen and red, started on Friday. Sore and irritated to touch.Then the red and swelling went to the hand.      HPI    Review of Systems  Constitutional, HEENT, cardiovascular, pulmonary, gi and gu systems are negative, except as otherwise noted.      Objective    /60   Pulse 75   Temp 97.9  F (36.6  C) (Tympanic)   SpO2 99%   Physical Exam   GENERAL APPEARANCE: alert and no distress  SKIN: localized erythema, warmth and tenderness of left inner forearm, mild swelling of left hand, no red streaking up arm

## 2022-04-22 ENCOUNTER — TELEPHONE (OUTPATIENT)
Dept: FAMILY MEDICINE | Facility: CLINIC | Age: 82
End: 2022-04-22
Payer: COMMERCIAL

## 2022-05-04 ENCOUNTER — TELEPHONE (OUTPATIENT)
Dept: FAMILY MEDICINE | Facility: CLINIC | Age: 82
End: 2022-05-04
Payer: COMMERCIAL

## 2022-05-04 NOTE — TELEPHONE ENCOUNTER
Reason for call:  Patient reporting a symptom    Symptom or request: Lavonne says she was seen in  4/21 for cellulitis in her left hand/wrist. She was put on an antibiotic for 10 days .  She says the swelling is down but the wrist is sore again and the hand is swollen and tender.         Have you been treated for this before? Yes    Additional comments:      Phone Number patient can be reached at:  Home number on file [phone] (home)    Best Time:  anytime    Can we leave a detailed message on this number:  YES    Call taken on 5/4/2022 at 1:59 PM by Patty Krishnan

## 2022-05-04 NOTE — TELEPHONE ENCOUNTER
I talked with Lavonne.  She tells me that left wrist no bettor, however not worse.  Got a little better after few days, then red again.   Finished antibiotics.  Seen in urgent care on 4/21/22.  Danielle Keith RN

## 2022-05-06 ENCOUNTER — OFFICE VISIT (OUTPATIENT)
Dept: FAMILY MEDICINE | Facility: CLINIC | Age: 82
End: 2022-05-06
Payer: COMMERCIAL

## 2022-05-06 VITALS
WEIGHT: 171 LBS | DIASTOLIC BLOOD PRESSURE: 64 MMHG | HEART RATE: 80 BPM | BODY MASS INDEX: 29.35 KG/M2 | SYSTOLIC BLOOD PRESSURE: 118 MMHG | TEMPERATURE: 98.4 F | RESPIRATION RATE: 12 BRPM

## 2022-05-06 DIAGNOSIS — L03.114 LEFT ARM CELLULITIS: Primary | ICD-10-CM

## 2022-05-06 PROCEDURE — 99213 OFFICE O/P EST LOW 20 MIN: CPT | Performed by: FAMILY MEDICINE

## 2022-05-06 RX ORDER — DOXYCYCLINE 100 MG/1
100 CAPSULE ORAL 2 TIMES DAILY
Qty: 20 CAPSULE | Refills: 0 | Status: SHIPPED | OUTPATIENT
Start: 2022-05-06 | End: 2022-05-16

## 2022-05-06 ASSESSMENT — PAIN SCALES - GENERAL: PAINLEVEL: MILD PAIN (2)

## 2022-05-06 NOTE — PATIENT INSTRUCTIONS
ASSESSMENT:   (L03.114) Left arm cellulitis  (primary encounter diagnosis)  Comment: better but still some redness and swelling in distal anterior left forearm.  Not ion joints at this time.   Plan: doxycycline hyclate (VIBRAMYCIN) 100 MG capsule          Try different antibiotic, doxycycline 100mg twice daily for 10 days.   You could use warm packs a couple times a day.   I expect the redness and swelling should be gradually improving and be gone by the time antibiotic has been completed.   Let us know if increasing redness or swelling.  Let us know if not improving.

## 2022-05-06 NOTE — NURSING NOTE
"Chief Complaint   Patient presents with     Arm Problem     Follow up cellulitis     /64   Pulse 80   Temp 98.4  F (36.9  C) (Tympanic)   Resp 12   Wt 77.6 kg (171 lb)   BMI 29.35 kg/m   Estimated body mass index is 29.35 kg/m  as calculated from the following:    Height as of 3/4/22: 1.626 m (5' 4\").    Weight as of this encounter: 77.6 kg (171 lb).  Patient presents to the clinic using No DME      Health Maintenance that is potentially due pending provider review:    Health Maintenance Due   Topic Date Due     DTAP/TDAP/TD IMMUNIZATION (1 - Tdap) 04/08/2019     DEXA  12/29/2021     FALL RISK ASSESSMENT  05/05/2022     MEDICARE ANNUAL WELLNESS VISIT  05/05/2022        Fall risk done. Does NOT need Tdap         "
2.68

## 2022-05-10 DIAGNOSIS — I10 ESSENTIAL HYPERTENSION: ICD-10-CM

## 2022-05-11 RX ORDER — LISINOPRIL 10 MG/1
TABLET ORAL
Qty: 30 TABLET | Refills: 1 | Status: SHIPPED | OUTPATIENT
Start: 2022-05-11 | End: 2022-07-12

## 2022-05-22 ENCOUNTER — HOSPITAL ENCOUNTER (EMERGENCY)
Facility: CLINIC | Age: 82
Discharge: HOME | End: 2022-05-22
Attending: STUDENT IN AN ORGANIZED HEALTH CARE EDUCATION/TRAINING PROGRAM | Admitting: STUDENT IN AN ORGANIZED HEALTH CARE EDUCATION/TRAINING PROGRAM
Payer: COMMERCIAL

## 2022-05-22 VITALS
HEART RATE: 77 BPM | RESPIRATION RATE: 16 BRPM | WEIGHT: 170 LBS | TEMPERATURE: 98.8 F | BODY MASS INDEX: 29.18 KG/M2 | OXYGEN SATURATION: 99 % | SYSTOLIC BLOOD PRESSURE: 129 MMHG | DIASTOLIC BLOOD PRESSURE: 67 MMHG

## 2022-05-22 DIAGNOSIS — R60.0 FACIAL EDEMA: ICD-10-CM

## 2022-05-22 PROCEDURE — G0463 HOSPITAL OUTPT CLINIC VISIT: HCPCS | Performed by: STUDENT IN AN ORGANIZED HEALTH CARE EDUCATION/TRAINING PROGRAM

## 2022-05-22 PROCEDURE — 250N000013 HC RX MED GY IP 250 OP 250 PS 637: Performed by: STUDENT IN AN ORGANIZED HEALTH CARE EDUCATION/TRAINING PROGRAM

## 2022-05-22 PROCEDURE — 99214 OFFICE O/P EST MOD 30 MIN: CPT | Performed by: STUDENT IN AN ORGANIZED HEALTH CARE EDUCATION/TRAINING PROGRAM

## 2022-05-22 RX ORDER — CETIRIZINE HYDROCHLORIDE 5 MG/1
5 TABLET ORAL ONCE
Status: COMPLETED | OUTPATIENT
Start: 2022-05-22 | End: 2022-05-22

## 2022-05-22 RX ORDER — FAMOTIDINE 20 MG/1
20 TABLET, FILM COATED ORAL ONCE
Status: COMPLETED | OUTPATIENT
Start: 2022-05-22 | End: 2022-05-22

## 2022-05-22 RX ADMIN — CETIRIZINE HYDROCHLORIDE 5 MG: 5 TABLET ORAL at 13:38

## 2022-05-22 RX ADMIN — FAMOTIDINE 20 MG: 20 TABLET, FILM COATED ORAL at 13:38

## 2022-05-22 NOTE — ED PROVIDER NOTES
"  History     Chief Complaint   Patient presents with     Insect Bite     Allergic Reaction     Leia bite per pt, on Friday, was sitting outside and got \" bit by a leia \" right side of face and eye are swollen, pt unable to open her right eye, no resp distress, pt is pink and speaking clearly.     HPI  Lavonne Tidwell is a 81 year old female who [facility] department for evaluation of right-sided forehead and periorbital edema beginning Friday evening.  Patient explains that she was exposed to gnats outside Friday evening and began appreciating right forehead and periorbital swelling and edema thereafterwards.  The swelling has progressed since onset despite Claritin each morning, also has some mild right forehead drainage but no blistering or vesicular lesions.  The area is mildly itchy and reminiscent of 2 previous episodes affecting left side of forehead and face related to insect exposures.  She denies recent fever, chills, cough, shortness of breath, nasal congestion, oral swelling, difficulty breathing or other concerns.      Allergies:  Allergies   Allergen Reactions     Codeine GI Disturbance       Problem List:    Patient Active Problem List    Diagnosis Date Noted     Primary osteoarthritis involving multiple joints 09/03/2021     Priority: Medium     Essential hypertension 05/05/2021     Priority: Medium     Tubulovillous adenoma polyp of colon 05/30/2019     Priority: Medium     Return in 3 years for the next colonoscopy       Status post total right knee replacement 02/23/2017     Priority: Medium     Primary localized osteoarthrosis, lower leg 02/23/2017     Priority: Medium     Status post total left knee replacement 01/06/2016     Priority: Medium     Senile nuclear sclerosis 08/18/2015     Priority: Medium     Onychomycosis 01/30/2015     Priority: Medium     Advanced directives, counseling/discussion 02/10/2012     Priority: Medium     Advance Directive Problem List Overview:   Name Relationship Phone  "   Primary Health Care Agent            Alternative Health Care Agent          Discussed advance care planning with patient; information given to patient to review. 2/10/2012          Hyperlipidemia LDL goal <130 10/31/2010     Priority: Medium     Urinary incontinence 12/31/2008     Priority: Medium     On Ditropan for 2 years--modest improvement, initially; s/p TOT--good improvement in control       Atrophic vaginitis 12/31/2008     Priority: Medium     Menopausal syndrome (hot flashes) 12/02/2008     Priority: Medium     On Premarin--decreasing dose for 4 years--now takes twice a week       Esophageal reflux 11/10/2006     Priority: Medium     Injury of sigmoid colon 09/13/2005     Priority: Medium     Sigmoid polyp  Problem list name updated by automated process. Provider to review          Past Medical History:    Past Medical History:   Diagnosis Date     Arthritis 2012     Essential hypertension 5/5/2021     History of blood transfusion 1961     Ovarian cysts 1974       Past Surgical History:    Past Surgical History:   Procedure Laterality Date     ABDOMEN SURGERY  1973 & 1974    Ovarian cyst/Hysterectomy     APPENDECTOMY  1952     ARTHROPLASTY KNEE Left 1/6/2016    Procedure: ARTHROPLASTY KNEE;  Surgeon: Wilfredo Thompson MD;  Location: [facility]     ARTHROPLASTY KNEE Right 2/23/2017    Procedure: ARTHROPLASTY KNEE;  Surgeon: Wilfredo Thompson MD;  Location: [facility]     BIOPSY      colonoscopy/polyps     COLONOSCOPY      every 10 years     GENITOURINARY SURGERY  2008    bladder     HYSTERECTOMY, CHELSY  1974     ORTHOPEDIC SURGERY  2007 & 2009    [place]  both feet     PHACOEMULSIFICATION WITH STANDARD INTRAOCULAR LENS IMPLANT Left 8/20/2015    Procedure: PHACOEMULSIFICATION WITH STANDARD INTRAOCULAR LENS IMPLANT;  Surgeon: Fernando Jeffrey MD;  Location: [facility]     PHACOEMULSIFICATION WITH STANDARD INTRAOCULAR LENS IMPLANT Right 9/17/2015    Procedure: PHACOEMULSIFICATION WITH STANDARD INTRAOCULAR LENS IMPLANT;   Surgeon: Fernando Jeffrey MD;  Location: [facility]     SURGICAL HISTORY OF -   1998    Colonoscopy     SURGICAL HISTORY OF -       Bilateral salpingoopherectomy     SURGICAL HISTORY OF -   3/09    TOT Midurethral sling       Family History:    Family History   Problem Relation Age of Onset     Cancer Mother         Ovarian     Other Cancer Mother         Ovarian     Cerebrovascular Disease Father      Heart Disease Father      C.A.D. Father      Coronary Artery Disease Father      Cancer Maternal Grandmother      Other Cancer Maternal Grandmother         Ovarian/Bone     Cerebrovascular Disease Maternal Grandfather      Colon Cancer Maternal Grandfather      Diabetes Other         Great Grandmother     Breast Cancer No family hx of        Social History:  Marital Status:   [5]  Social History     Tobacco Use     Smoking status: Former Smoker     Packs/day: 0.50     Years: 5.00     Pack years: 2.50     Types: Cigarettes     Start date: 1968     Quit date: 2/15/1972     Years since quittin.2     Smokeless tobacco: Never Used     Tobacco comment: stopped in her 20's   Substance Use Topics     Alcohol use: Yes     Alcohol/week: 0.0 standard drinks     Comment: Occasional     Drug use: No        Medications:    Acetaminophen (TYLENOL PO)  Biotin 10 MG CAPS  budesonide (PULMICORT) 0.5 MG/2ML neb solution  CALCIUM + D OR  Docusate Calcium (STOOL SOFTENER PO)  fluticasone (FLONASE) 50 MCG/ACT nasal spray  garlic 150 MG TABS tablet  grape seed 50 MG CAPS  lisinopril (ZESTRIL) 10 MG tablet  loratadine (CLARITIN) 10 MG tablet  methylcellulose (CITRUCEL) 500 MG TABS tablet  Multiple Vitamins-Minerals (CENTRUM SILVER) per tablet  omeprazole (PRILOSEC) 20 MG DR capsule  VITAMIN E COMPLEX PO  Zinc Sulfate (ZINC 15 PO)          Review of Systems  Constitutional:  Negative for fever or recent illness.  HEENT: Positive for right facial edema without pain or tenderness.  Denies ocular pain or change in vision  from baseline.  Cardiovascular:  Negative for chest discomfort.   Respiratory:  Negative for cough or shortness of breath.   Gastrointestinal:  Negative for abdominal pain, nausea or vomiting.   Musculoskeletal: Negative for neck pain or stiffness.  Neurological:  Negative for dizziness.    All others reviewed and are negative.      Physical Exam   BP: 129/67  Pulse: 77  Temp: 98.8  F (37.1  C)  Resp: 16  Weight: 77.1 kg (170 lb)  SpO2: 99 %      Physical Exam  Constitutional:  Well developed, well nourished.  Appears nontoxic and in no acute distress.   HENT: Moderate right forehead and periorbital edema without redness, warmth, or tenderness.  Eye:  Visual acuity is slim per patient.  No proptosis or subconjunctival hemorrhage.  EOMI and denies diplopia or pain with movement.  PERRL without direct or consensual photophobia.  Negative for ocular erythema, conjunctivitis, ciliary flushing or discharge.  Cornea clear and without hyphema or hypopyon.  No FB with eyelid eversion.    Oral:  Patient is without trismus.  Moist oral mucosa.  Voice is not muffled.  Tolerates secretions.   Neck:  Neck supple.  Cardiovascular:  No cyanosis.   Respiratory:  Effort normal, no respiratory distress.   Musculoskeletal:  Moves extremities spontaneously.  Neurological:  Patient is alert.  Skin:  Skin is warm and dry.  Psychiatric:  Normal mood and affect.      ED Course                 Procedures              Critical Care time:  none               No results found for this or any previous visit (from the past 24 hour(s)).    Medications   famotidine (PEPCID) tablet 20 mg (has no administration in time range)   cetirizine (zyrTEC) tablet 5 mg (has no administration in time range)       Assessments & Plan (with Medical Decision Making)   Lavonne Tidwell is a 81 year old female who presents to the department for evaluation of right-sided facial swelling and edema beginning Friday evening and associated with pruritus.  She is quite  adamant that she has had at least 2 previous episodes in the past related to insect exposures and are a form of allergic reaction.  There is no redness, warmth, or tenderness indicative of infection.  No vesicular lesions as would be expected with herpes zoster.  No sign of ocular involvement.  Recommend twice daily Claritin with close follow-up and monitoring of symptoms for expected resolution, she is in agreement discharge plan including strict return instructions for any ocular involvement or progressive symptoms.        Disclaimer:  This note consists of symbols derived from keyboarding, dictation, and/or voice recognition software.  As a result, there may be errors in the script that have gone undetected.  Please consider this when interpreting information found in the chart.        I have reviewed the nursing notes.    I have reviewed the findings, diagnosis, plan and need for follow up with the patient.       New Prescriptions    No medications on file       Final diagnoses:   Facial edema       5/22/2022   [facility] EMERGENCY DEPT     Wilfredo Flower DO  05/22/22 9540

## 2022-05-22 NOTE — ED NOTES
Pt presents to ED with c/o allergic reaction to leia bite. Bite on right forehead and swelling extends down right side of forehead to eat and down right side of face towards neck. Pt denies airway difficulties.

## 2022-05-22 NOTE — ED TRIAGE NOTES
"Leia bite per pt, on Friday, was sitting outside and got \" bit by a leia \" right side of face and eye are swollen, pt unable to open her right eye, no resp distress, pt is pink and speaking clearly     Triage Assessment     Row Name 05/22/22 1233       Triage Assessment (Adult)    Airway WDL WDL       Respiratory WDL    Respiratory WDL WDL       Skin Circulation/Temperature WDL    Skin Circulation/Temperature WDL X       Peripheral/Neurovascular WDL    Peripheral Neurovascular WDL WDL       Cognitive/Neuro/Behavioral WDL    Cognitive/Neuro/Behavioral WDL WDL              "

## 2022-05-23 ENCOUNTER — TELEPHONE (OUTPATIENT)
Dept: FAMILY MEDICINE | Facility: CLINIC | Age: 82
End: 2022-05-23
Payer: COMMERCIAL

## 2022-05-23 NOTE — TELEPHONE ENCOUNTER
Please call.   If she is making steady progress with improvement in swelling and redness, I do do not think she needs to be seen.  She mention no fever which is also good.  She can continue the antihistamines and the cool packs as needed.  If there is any sign of increasing intensity of redness or area of redness, swelling, pain or if a fever or she thinks at all that its not improving then we should take a look in clinic.  YOBANY FLOWERS MD

## 2022-05-23 NOTE — TELEPHONE ENCOUNTER
S-(situation): 05-22-22 ED visit- gnat bite rt side of face.   Sx slightly improved today. Asking if needs to be seen for F/U.     B-(background):  See ED visit. States hx sensitivity/ reaction to bug bites. Per ED instructions has been taking claritin BID, added zyrtec. Was told to may take Benadryl at HS if needed.   Recently treated for cellulites lt arm, has completed doxycycline with sx improvement.     A-(assessment): States rt facial/ eye swelling improved today but persists temple region down toward neck with redness. Has small open area a bite site draining clear fluid, reddened. Denies warmth, pain. Vision OK. No fevers, chills.     R-(recommendations): Advised keep area clean/ dry. Apply OTC abx oint to open area, cover with bandaid.  May apply cool compress to affected area 3-4x/day x15-20 min. Forwarded to Dr. Iniguez for review, F/U recommendations. FRANCISCA Bedoya RN

## 2022-05-23 NOTE — TELEPHONE ENCOUNTER
Reason for Call:  Bug bite     Detailed comments: Bit by a gnat Friday afternoon. Pt went to the ER 5/22/22  - Eye swollen. Pt said her eye is swollen but ok. Pt said she can open it a little today.   Pt was to contact Primary Provider let her know.     Phone Number Patient can be reached at: Home number on file [phone] (home)    Best Time: Any Time      Can we leave a detailed message on this number? YES    Call taken on 5/23/2022 at 9:56 AM by Genesis Rodriguez

## 2022-05-24 ENCOUNTER — TELEPHONE (OUTPATIENT)
Dept: FAMILY MEDICINE | Facility: CLINIC | Age: 82
End: 2022-05-24
Payer: COMMERCIAL

## 2022-05-24 NOTE — TELEPHONE ENCOUNTER
"Spoke with patient. She took zyrtec the past 2 mornings and felt shaky for 2-3 hours afterwards.   Her face is still red and swollen with some drainage but it has improved. Her eye is \"way better\". She is taking benadryl at night.   Advised to stop zyrtec. Continue to monitor, benadryl at night. Cool compresses for comfort. Should be seen if symptoms worsen or develops fever. Patient in agreement of plan.  Next available appt scheduled with PCP for 07/01/22. Patient would like to discuss recent bouts of cellulitis and arthritis symptoms. She will follow up sooner with another provider if facial symptoms from insect bite do not improve or worsen.   Grazyna VILLAVICENCIO RN    "

## 2022-05-24 NOTE — TELEPHONE ENCOUNTER
Reason for call:    Symptom or request:     Patient called stating that she becomes shakey after taking zytrec.     Patient is taking the medication for a bug bite.       Best Time:  any    Can we leave a detailed message on this number?  YES     Brielle ESPINOSA  Station

## 2022-06-03 ENCOUNTER — OFFICE VISIT (OUTPATIENT)
Dept: URGENT CARE | Facility: URGENT CARE | Age: 82
End: 2022-06-03
Payer: COMMERCIAL

## 2022-06-03 ENCOUNTER — NURSE TRIAGE (OUTPATIENT)
Dept: FAMILY MEDICINE | Facility: CLINIC | Age: 82
End: 2022-06-03
Payer: COMMERCIAL

## 2022-06-03 VITALS
OXYGEN SATURATION: 99 % | SYSTOLIC BLOOD PRESSURE: 112 MMHG | DIASTOLIC BLOOD PRESSURE: 67 MMHG | WEIGHT: 170 LBS | BODY MASS INDEX: 29.18 KG/M2 | HEART RATE: 71 BPM | TEMPERATURE: 97.2 F

## 2022-06-03 DIAGNOSIS — R30.0 DYSURIA: Primary | ICD-10-CM

## 2022-06-03 LAB
ALBUMIN UR-MCNC: NEGATIVE MG/DL
APPEARANCE UR: CLEAR
BACTERIA #/AREA URNS HPF: ABNORMAL /HPF
BILIRUB UR QL STRIP: NEGATIVE
COLOR UR AUTO: YELLOW
GLUCOSE UR STRIP-MCNC: NEGATIVE MG/DL
HGB UR QL STRIP: ABNORMAL
KETONES UR STRIP-MCNC: NEGATIVE MG/DL
LEUKOCYTE ESTERASE UR QL STRIP: ABNORMAL
NITRATE UR QL: NEGATIVE
PH UR STRIP: 5.5 [PH] (ref 5–7)
RBC #/AREA URNS AUTO: ABNORMAL /HPF
SP GR UR STRIP: 1.02 (ref 1–1.03)
SQUAMOUS #/AREA URNS AUTO: ABNORMAL /LPF
UROBILINOGEN UR STRIP-ACNC: 0.2 E.U./DL
WBC #/AREA URNS AUTO: ABNORMAL /HPF
WBC CLUMPS #/AREA URNS HPF: PRESENT /HPF

## 2022-06-03 PROCEDURE — 99214 OFFICE O/P EST MOD 30 MIN: CPT | Performed by: PHYSICIAN ASSISTANT

## 2022-06-03 PROCEDURE — 81001 URINALYSIS AUTO W/SCOPE: CPT | Performed by: PHYSICIAN ASSISTANT

## 2022-06-03 PROCEDURE — 87086 URINE CULTURE/COLONY COUNT: CPT | Performed by: PHYSICIAN ASSISTANT

## 2022-06-03 RX ORDER — CEFDINIR 300 MG/1
300 CAPSULE ORAL 2 TIMES DAILY
Qty: 14 CAPSULE | Refills: 0 | Status: SHIPPED | OUTPATIENT
Start: 2022-06-03 | End: 2022-06-10

## 2022-06-03 NOTE — TELEPHONE ENCOUNTER
Reason for call:  Patient reporting a symptom    Symptom or request: Lower back pain, frequent urination.    Duration (how long have symptoms been present): Started a couple days ago.    Additional comments: Pt is wondering if she should come in to get a a urine lab test done, or what she should.    Phone Number patient can be reached at:  Home number on file [phone] (home)    Best Time:  anytime    Can we leave a detailed message on this number:  YES    Call taken on 6/3/2022 at 8:54 AM by Dinorah Tello

## 2022-06-03 NOTE — PROGRESS NOTES
Assessment & Plan      1. Dysuria    - UA macro with reflex to Microscopic and Culture - Clinc Collect  - Urine Microscopic  - Urine Culture  - cefdinir (OMNICEF) 300 MG capsule; Take 1 capsule (300 mg) by mouth 2 times daily for 7 days  Dispense: 14 capsule; Refill: 0    UA positive. Follow up in PCP clinic if not improving over the next 3 days.               KAJ[facility]    Kirsty Banerjee is a 81 year old female who presents to clinic today for the following health issues:  Chief Complaint   Patient presents with     Urinary Problem     Since yesterday she has had frequency, urgency, no pain, no smell, color sometimes clear, sometimes dark.  Lower back pain for quite a while now, thinks its arthritis.       HPI    Here today for urinary problem. Off and on but worse over the past 2 days. Urgency and frequency with some burning. No abdominal pain but some low back pain. No fever. No redness in urine. No anti-bacterial medication for about 2 weeks after treatment for cellulitis with doxycycline. No vaginal irritation or itching.            Review of Systems        Objective    /67   Pulse 71   Temp 97.2  F (36.2  C) (Tympanic)   Wt 77.1 kg (170 lb)   SpO2 99%   BMI 29.18 kg/m    Physical Exam  Abdominal:      General: Abdomen is flat. Bowel sounds are normal.      Palpations: Abdomen is soft.      Tenderness: There is no abdominal tenderness. There is no right CVA tenderness or left CVA tenderness.                     Consent: The risks of atrophy were reviewed with the patient.

## 2022-06-03 NOTE — TELEPHONE ENCOUNTER
S-(situation): Pt main complaint is low back pain.     B-(background): Pt has a hx of frequent UTI's.     A-(assessment):Pt has flank pain, burning with urination, wears a brief for incontinence, stated she has taken her temp. But thinks she has sometimes had one since symptoms started, she has not noticed and odor and stated its santos to tell the look and color in her brief, states pain is 7/10 in lower back pain.     R-(recommendations): Patient was advised to be seen in clinic today due to disposition with flank lower back pain. She is agreeable and no office visits are open today. She is planning on coming into urgent care [place].     Reason for Disposition    Side (flank) or lower back pain present    Additional Information    Negative: Shock suspected (e.g., cold/pale/clammy skin, too weak to stand, low BP, rapid pulse)    Negative: Sounds like a life-threatening emergency to the triager    Negative: Followed a genital area injury    Negative: Followed a genital area injury (penis, scrotum)    Negative: Vaginal discharge    Negative: Pus (white, yellow) or bloody discharge from end of penis    Negative: Discomfort (pain, burning or stinging) when passing urine and pregnant    Negative: Discomfort (pain, burning or stinging) when passing urine and female    Negative: Discomfort (pain, burning or stinging) when passing urine and male    Negative: Pain or itching in the vulvar area    Negative: Pain in scrotum is main symptom    Negative: Blood in the urine is main symptom    Negative: Symptoms arising from use of a urinary catheter ([place] or [place])    Negative: Unable to urinate (or only a few drops) > 4 hours and bladder feels very full (e.g., palpable bladder or strong urge to urinate)    Negative: Decreased urination and drinking very little and dehydration suspected (e.g., dark urine, no urine > 12 hours, very dry mouth, very lightheaded)    Negative: Patient sounds very sick or weak to the triager     "Negative: Fever > 100.4 F (38.0 C)    Answer Assessment - Initial Assessment Questions  1. SYMPTOM: \"What's the main symptom you're concerned about?\" (e.g., frequency, incontinence)      Lower back pain   2. ONSET: \"When did the  Low back pain start  start?\"      6/1/22  3. PAIN: \"Is there any pain?\" If so, ask: \"How bad is it?\" (Scale: 1-10; mild, moderate, severe)      7/10  4. CAUSE: \"What do you think is causing the symptoms?\"      UTI  5. OTHER SYMPTOMS: \"Do you have any other symptoms?\" (e.g., fever, flank pain, blood in urine, pain with urination)      Patient has flank pain, burning when urinating, weak, urgency, doesn't notice look or odor.   6. PREGNANCY: \"Is there any chance you are pregnant?\" \"When was your last menstrual period?\"      NA    Protocols used: URINARY SYMPTOMS-A-[place]    "

## 2022-06-05 LAB — BACTERIA UR CULT: NORMAL

## 2022-06-06 ENCOUNTER — TELEPHONE (OUTPATIENT)
Dept: FAMILY MEDICINE | Facility: CLINIC | Age: 82
End: 2022-06-06
Payer: COMMERCIAL

## 2022-06-06 NOTE — TELEPHONE ENCOUNTER
I talked with Lavonne and she says the urinary tract infection is better.  Says cellulitis left arm sore over the weekend, better today  Arm is not red or swollen like it was on 22.    Says on 22 had episode at the  home of almost passing out and rapid.  No episodes since.  She says she gets tired out easily, doesn't want to do things and afraid to drive to too many places.  Family doesn't call her as much.    Danielle Keith RN

## 2022-06-06 NOTE — TELEPHONE ENCOUNTER
Reason for Call:  Other     Detailed comments: Pt is on a antibiotic, for her UTI which she started on Friday, she still has cellulitis on her right hand and left arm will this medication help with that?  Or does she need another?  Allyssa BAUM    Phone Number Patient can be reached at: Home number on file [phone] (home)    Best Time: any    Can we leave a detailed message on this number? YES    Call taken on 6/6/2022 at 10:56 AM by Betsey Jason

## 2022-06-15 ENCOUNTER — OFFICE VISIT (OUTPATIENT)
Dept: URGENT CARE | Facility: URGENT CARE | Age: 82
End: 2022-06-15
Payer: COMMERCIAL

## 2022-06-15 ENCOUNTER — TELEPHONE (OUTPATIENT)
Dept: FAMILY MEDICINE | Facility: CLINIC | Age: 82
End: 2022-06-15
Payer: COMMERCIAL

## 2022-06-15 ENCOUNTER — HOSPITAL ENCOUNTER (EMERGENCY)
Facility: CLINIC | Age: 82
Discharge: HOME | End: 2022-06-15
Attending: EMERGENCY MEDICINE | Admitting: EMERGENCY MEDICINE
Payer: COMMERCIAL

## 2022-06-15 VITALS
BODY MASS INDEX: 27.32 KG/M2 | RESPIRATION RATE: 18 BRPM | DIASTOLIC BLOOD PRESSURE: 78 MMHG | HEIGHT: 66 IN | TEMPERATURE: 97.6 F | OXYGEN SATURATION: 97 % | SYSTOLIC BLOOD PRESSURE: 159 MMHG | HEART RATE: 66 BPM | WEIGHT: 170 LBS

## 2022-06-15 VITALS
DIASTOLIC BLOOD PRESSURE: 68 MMHG | OXYGEN SATURATION: 100 % | SYSTOLIC BLOOD PRESSURE: 121 MMHG | HEART RATE: 64 BPM | TEMPERATURE: 97.6 F | WEIGHT: 170 LBS | BODY MASS INDEX: 29.18 KG/M2

## 2022-06-15 DIAGNOSIS — M79.631 PAIN AND SWELLING OF FOREARM, RIGHT: Primary | ICD-10-CM

## 2022-06-15 DIAGNOSIS — M79.89 PAIN AND SWELLING OF FOREARM, RIGHT: Primary | ICD-10-CM

## 2022-06-15 DIAGNOSIS — R60.0 EDEMA OF RIGHT UPPER EXTREMITY: ICD-10-CM

## 2022-06-15 DIAGNOSIS — M79.89 SWELLING OF RIGHT HAND: ICD-10-CM

## 2022-06-15 PROCEDURE — 99212 OFFICE O/P EST SF 10 MIN: CPT | Performed by: EMERGENCY MEDICINE

## 2022-06-15 PROCEDURE — 99214 OFFICE O/P EST MOD 30 MIN: CPT | Performed by: PHYSICIAN ASSISTANT

## 2022-06-15 PROCEDURE — G0463 HOSPITAL OUTPT CLINIC VISIT: HCPCS | Performed by: EMERGENCY MEDICINE

## 2022-06-15 ASSESSMENT — ENCOUNTER SYMPTOMS
MYALGIAS: 0
SORE THROAT: 0
BACK PAIN: 0
NECK PAIN: 0
DIZZINESS: 0
COUGH: 0
JOINT SWELLING: 0
NAUSEA: 0
PALPITATIONS: 0
ARTHRALGIAS: 0
NECK STIFFNESS: 0
SHORTNESS OF BREATH: 0
WEAKNESS: 0
FEVER: 0
DIARRHEA: 0
COLOR CHANGE: 1
RESPIRATORY NEGATIVE: 1
EYES NEGATIVE: 1
LIGHT-HEADEDNESS: 0
ALLERGIC/IMMUNOLOGIC NEGATIVE: 1
WOUND: 0
RHINORRHEA: 0
CARDIOVASCULAR NEGATIVE: 1
CHILLS: 0
ENDOCRINE NEGATIVE: 1
MUSCULOSKELETAL NEGATIVE: 1
VOMITING: 0
HEADACHES: 0

## 2022-06-15 NOTE — ED PROVIDER NOTES
History     Chief Complaint   Patient presents with     Swelling     RUE swelling from mid forearm down to hand-was seen in clinic today, advised to present to ED to rule out blood clot     Deep Vein Thrombosis     Rule out-advised by PCP to present to ED     HPI  Lavonne Tidwell is a 81 year old female with history of osteoarthritis, hypertension, hyperlipidemia, esophageal reflux, who presents for evaluation of right upper extremity edema.  Was seen in clinic and thought not to be cellulitis however sent to ED to rule out DVT.    Patient reports to me that she has had swelling in her right hand and forearm for 2 days.  Since she had similar swelling previously and it resolved spontaneously.  She had cellulitis on her left upper extremity which was treated with antibiotics as well as urinary tract infection.  Chest so has issues with that bites over the past few months with pain and swelling.  Denies any injury.  No fever or chills.  No pain in her forearm, wrist or fingers.  No erythema or warmth.  No nausea vomiting.  Otherwise feels well.  She denies any history of VTE.  She is a non-smoker.  No recent immobilization, surgery or trauma.    The patient's PMHx, Surgical Hx, Allergies, and Medications were all reviewed with the patient.    Allergies:  Allergies   Allergen Reactions     Codeine GI Disturbance       Problem List:    Patient Active Problem List    Diagnosis Date Noted     Primary osteoarthritis involving multiple joints 09/03/2021     Priority: Medium     Essential hypertension 05/05/2021     Priority: Medium     Tubulovillous adenoma polyp of colon 05/30/2019     Priority: Medium     Return in 3 years for the next colonoscopy       Status post total right knee replacement 02/23/2017     Priority: Medium     Primary localized osteoarthrosis, lower leg 02/23/2017     Priority: Medium     Status post total left knee replacement 01/06/2016     Priority: Medium     Senile nuclear sclerosis 08/18/2015      Priority: Medium     Onychomycosis 01/30/2015     Priority: Medium     Advanced directives, counseling/discussion 02/10/2012     Priority: Medium     Advance Directive Problem List Overview:   Name Relationship Phone    Primary Health Care Agent            Alternative Health Care Agent          Discussed advance care planning with patient; information given to patient to review. 2/10/2012          Hyperlipidemia LDL goal <130 10/31/2010     Priority: Medium     Urinary incontinence 12/31/2008     Priority: Medium     On Ditropan for 2 years--modest improvement, initially; s/p TOT--good improvement in control       Atrophic vaginitis 12/31/2008     Priority: Medium     Menopausal syndrome (hot flashes) 12/02/2008     Priority: Medium     On Premarin--decreasing dose for 4 years--now takes twice a week       Esophageal reflux 11/10/2006     Priority: Medium     Injury of sigmoid colon 09/13/2005     Priority: Medium     Sigmoid polyp  Problem list name updated by automated process. Provider to review          Past Medical History:    Past Medical History:   Diagnosis Date     Arthritis 2012     Essential hypertension 5/5/2021     History of blood transfusion 1961     Ovarian cysts 1974       Past Surgical History:    Past Surgical History:   Procedure Laterality Date     ABDOMEN SURGERY  1973 & 1974    Ovarian cyst/Hysterectomy     APPENDECTOMY  1952     ARTHROPLASTY KNEE Left 1/6/2016    Procedure: ARTHROPLASTY KNEE;  Surgeon: Wilfredo Thompson MD;  Location: [facility]     ARTHROPLASTY KNEE Right 2/23/2017    Procedure: ARTHROPLASTY KNEE;  Surgeon: Wilfredo Thompson MD;  Location: [facility]     BIOPSY      colonoscopy/polyps     COLONOSCOPY      every 10 years     GENITOURINARY SURGERY  2008    bladder     HYSTERECTOMY, CHELSY  1974     ORTHOPEDIC SURGERY  2007 & 2009    [place]  both feet     PHACOEMULSIFICATION WITH STANDARD INTRAOCULAR LENS IMPLANT Left 8/20/2015    Procedure: PHACOEMULSIFICATION WITH STANDARD  INTRAOCULAR LENS IMPLANT;  Surgeon: Fernando Jeffrey MD;  Location: [facility]     PHACOEMULSIFICATION WITH STANDARD INTRAOCULAR LENS IMPLANT Right 2015    Procedure: PHACOEMULSIFICATION WITH STANDARD INTRAOCULAR LENS IMPLANT;  Surgeon: Fernando Jeffrey MD;  Location: [facility]     SURGICAL HISTORY OF -   1998    Colonoscopy     SURGICAL HISTORY OF -       Bilateral salpingoopherectomy     SURGICAL HISTORY OF -   3/09    TOT Midurethral sling       Family History:    Family History   Problem Relation Age of Onset     Cancer Mother         Ovarian     Other Cancer Mother         Ovarian     Cerebrovascular Disease Father      Heart Disease Father      C.A.D. Father      Coronary Artery Disease Father      Cancer Maternal Grandmother      Other Cancer Maternal Grandmother         Ovarian/Bone     Cerebrovascular Disease Maternal Grandfather      Colon Cancer Maternal Grandfather      Diabetes Other         Great Grandmother     Breast Cancer No family hx of        Social History:  Marital Status:   [5]  Social History     Tobacco Use     Smoking status: Former Smoker     Packs/day: 0.50     Years: 5.00     Pack years: 2.50     Types: Cigarettes     Start date: 1968     Quit date: 2/15/1972     Years since quittin.3     Smokeless tobacco: Never Used     Tobacco comment: stopped in her 20's   Substance Use Topics     Alcohol use: Yes     Alcohol/week: 0.0 standard drinks     Comment: Occasional     Drug use: No        Medications:    Acetaminophen (TYLENOL PO)  Biotin 10 MG CAPS  budesonide (PULMICORT) 0.5 MG/2ML neb solution  CALCIUM + D OR  Docusate Calcium (STOOL SOFTENER PO)  fluticasone (FLONASE) 50 MCG/ACT nasal spray  garlic 150 MG TABS tablet  grape seed 50 MG CAPS  lisinopril (ZESTRIL) 10 MG tablet  loratadine (CLARITIN) 10 MG tablet  methylcellulose (CITRUCEL) 500 MG TABS tablet  Multiple Vitamins-Minerals (CENTRUM SILVER) per tablet  omeprazole (PRILOSEC) 20 MG DR capsule  VITAMIN  "E COMPLEX PO  Zinc Sulfate (ZINC 15 PO)          Review of Systems  A complete review of systems performed and is otherwise negative except as noted in the HPI    Physical Exam   BP: 122/71  Pulse: 86  Temp: 97.6  F (36.4  C)  Resp: 18  Height: 166.4 cm (5' 5.5\")  Weight: 77.1 kg (170 lb)  SpO2: 98 %    Physical Exam  GEN: Awake, alert, and cooperative. No acute distress.  Resting comfortably on cart  HENT: MMM. External ears and nose normal bilaterally.  EYES: EOM intact. Conjunctiva clear. No discharge.   NECK: Symmetric, freely mobile.   CV : Regular rate and rhythm.  PULM: Normal effort. No wheezes, rales, or rhonchi bilaterally.  ABD: Soft, non-tender, non-distended. No rebound or guarding.   NEURO: Normal speech. Following commands. CN II-XII grossly intact. Answering questions and interacting appropriately.   EXT: No gross deformity.  Symmetric radial pulses.  No erythema or calor of right upper extremity.  Very subtle swelling of anterior forearm of right upper extremity.  Compartments are soft.  Swelling is not circumferential.  Mild swelling of dorsum of hand, no swelling of fingers.  No tenderness to palpation.  INT: Warm. No diaphoresis. Normal color.        ED Course        Procedures         Critical Care time:  none               No results found for this or any previous visit (from the past 24 hour(s)).    Medications - No data to display    Assessments & Plan (with Medical Decision Making)   81 year old female with 2 days of edema in right anterior forearm and dorsum of hand.  No pain.  No erythema.  No warmth.  Forearm compartments are soft.  Swelling is very subtle and only noticeable with comparison to left upper extremity.  Edema does not follow pattern of deep veins.  No swelling of fingers.  Clinically this does not appear to be consistent with deep vein thrombosis.  No signs of superficial venous thrombosis on exam.  Option for ultrasound given to patient and after discussion she elected not " to have ultrasound done and is comfortable being discharged to home.  She reports similar edema a few months back which spontaneously resolved.  Etiology unclear but given her exam I do not think she needs urgent testing at this time. Could consider rheumatology follow-up if continues with migratory edema. Follow up and ED return precautions discussed.     I have reviewed the nursing notes.         New Prescriptions    No medications on file       Final diagnoses:   Edema of right upper extremity     Zach Mayo MD    6/15/2022   [facility] EMERGENCY DEPT    Disclaimer: This note consists of words and symbols derived from keyboarding and dictation using voice recognition software.  As a result, there may be errors that have gone undetected.  Please consider this when interpreting information found in this note.             Zach Mayo MD  06/15/22 [id number]

## 2022-06-15 NOTE — TELEPHONE ENCOUNTER
Reason for call:  Patient reporting a symptom    Symptom or request: Pt saw Dr. Iniguez a mo ago for left arm cellulitis and now pt's right arm and hand are swollen and stiff.  Please call patient and advise.      Duration (how long have symptoms been present): ongoing    Have you been treated for this before? Yes    Additional comments:     Phone Number patient can be reached at:  Home number on file [phone] (home)    Best Time:  any    Can we leave a detailed message on this number:  YES    Call taken on 6/15/2022 at 9:10 AM by Kailey Jimenez

## 2022-06-15 NOTE — PROGRESS NOTES
Chief Complaint:     Chief Complaint   Patient presents with     Musculoskeletal Problem     Right forearm and hand swelling, swelling started Sunday night. Same thing happened on left arm but went away and was told it may be cellulitis.        ASSESSMENT     1. Pain and swelling of forearm, right    2. Swelling of right hand         PLAN    Patient is in no acute distress.  She is afebrile with stable vital signs.  No indication of cellulitis at this time.  At this time I can not rule out acute DVT.  Patient instructed to go to the ED now for further evaluation, labs, and imaging.  Patient verbalized understanding, and agrees with this plan.  Patient discharged in stable condition.       HPI: Lavonne Tidwell is an 81 year old female who presents today for evaluation of R hand redness and swelling.  Symptoms started 2 days ago and have not changed.  She does not recall any acute injury.  She does not have any pain.  She has not tried anything for the symptoms.  She denies any chest pain or SOB.  She denies fever, chills, nausea or vomiting.    Patient denies any numbness, tingling, or dysfunction of the R hand      ROS:      Review of Systems   Constitutional: Negative for chills and fever.   HENT: Negative for congestion, ear pain, rhinorrhea and sore throat.    Eyes: Negative.    Respiratory: Negative.  Negative for cough and shortness of breath.    Cardiovascular: Negative.  Negative for chest pain and palpitations.   Gastrointestinal: Negative for diarrhea, nausea and vomiting.   Endocrine: Negative.    Genitourinary: Negative.    Musculoskeletal: Negative.  Negative for arthralgias, back pain, joint swelling, myalgias, neck pain and neck stiffness.        R hand swelling   Skin: Positive for color change. Negative for rash and wound.   Allergic/Immunologic: Negative.  Negative for immunocompromised state.   Neurological: Negative for dizziness, weakness, light-headedness and headaches.        Problem  history  Patient Active Problem List   Diagnosis     Injury of sigmoid colon     Esophageal reflux     Menopausal syndrome (hot flashes)     Urinary incontinence     Atrophic vaginitis     Hyperlipidemia LDL goal <130     Advanced directives, counseling/discussion     Onychomycosis     Senile nuclear sclerosis     Status post total left knee replacement     Status post total right knee replacement     Primary localized osteoarthrosis, lower leg     Tubulovillous adenoma polyp of colon     Essential hypertension     Primary osteoarthritis involving multiple joints        Allergies  Allergies   Allergen Reactions     Codeine GI Disturbance        Smoking History  History   Smoking Status     Former Smoker     Packs/day: 0.50     Years: 5.00     Types: Cigarettes     Start date: 9/20/1968     Quit date: 2/15/1972   Smokeless Tobacco     Never Used     Comment: stopped in her 20's        Current Meds    Current Outpatient Medications:      Acetaminophen (TYLENOL PO), Take 1,000 mg by mouth 2 times daily as needed for mild pain or fever, Disp: , Rfl:      Biotin 10 MG CAPS, , Disp: , Rfl:      budesonide (PULMICORT) 0.5 MG/2ML neb solution, Place 0.5 mg/2 mL budesonide vial in 8 oz normal saline sinus rinse bottle.  Irrigate each nostril with one half of the bottle daily., Disp: 180 mL, Rfl: 3     CALCIUM + D OR, 2 TABLET DAILY, Disp: , Rfl:      Docusate Calcium (STOOL SOFTENER PO), , Disp: , Rfl:      fluticasone (FLONASE) 50 MCG/ACT nasal spray, Spray 2 sprays into both nostrils daily, Disp: 47.4 mL, Rfl: 3     garlic 150 MG TABS tablet, Take 150 mg by mouth daily, Disp: , Rfl:      grape seed 50 MG CAPS, Take 50 mg by mouth daily, Disp: , Rfl:      lisinopril (ZESTRIL) 10 MG tablet, TAKE 1 TABLET LET BY MOUTH ONCE DAILY, Disp: 30 tablet, Rfl: 1     loratadine (CLARITIN) 10 MG tablet, Take 10 mg by mouth daily, Disp: , Rfl:      methylcellulose (CITRUCEL) 500 MG TABS tablet, Take 500 mg by mouth daily, Disp: , Rfl:       Multiple Vitamins-Minerals (CENTRUM SILVER) per tablet, Take 1 tablet by mouth daily, Disp: , Rfl:      omeprazole (PRILOSEC) 20 MG DR capsule, Take 1 capsule (20 mg) by mouth daily, Disp: 90 capsule, Rfl: 1     VITAMIN E COMPLEX PO, , Disp: , Rfl:      Zinc Sulfate (ZINC 15 PO), , Disp: , Rfl:         Vital signs reviewed by Emeka Lorenz PA-C  /68   Pulse 64   Temp 97.6  F (36.4  C) (Tympanic)   Wt 77.1 kg (170 lb)   SpO2 100%   BMI 29.18 kg/m      Physical Exam     Physical Exam  Vitals and nursing note reviewed.   Constitutional:       General: She is not in acute distress.     Appearance: She is well-developed. She is not ill-appearing, toxic-appearing or diaphoretic.   HENT:      Head: Normocephalic and atraumatic.      Right Ear: Tympanic membrane and external ear normal. No drainage, swelling or tenderness. Tympanic membrane is not perforated, erythematous, retracted or bulging.      Left Ear: Tympanic membrane and external ear normal. No drainage, swelling or tenderness. Tympanic membrane is not perforated, erythematous, retracted or bulging.      Nose: No mucosal edema, congestion or rhinorrhea.      Right Sinus: No maxillary sinus tenderness or frontal sinus tenderness.      Left Sinus: No maxillary sinus tenderness or frontal sinus tenderness.      Mouth/Throat:      Pharynx: No pharyngeal swelling, oropharyngeal exudate, posterior oropharyngeal erythema or uvula swelling.      Tonsils: No tonsillar abscesses.   Eyes:      Pupils: Pupils are equal, round, and reactive to light.   Neck:      Trachea: Trachea normal.   Cardiovascular:      Rate and Rhythm: Normal rate and regular rhythm.      Heart sounds: Normal heart sounds, S1 normal and S2 normal. No murmur heard.    No friction rub. No gallop.   Pulmonary:      Effort: Pulmonary effort is normal. No respiratory distress.      Breath sounds: Normal breath sounds. No decreased breath sounds, wheezing, rhonchi or rales.   Abdominal:       General: Bowel sounds are normal. There is no distension.      Palpations: Abdomen is soft. Abdomen is not rigid. There is no mass.      Tenderness: There is no abdominal tenderness. There is no guarding or rebound.   Musculoskeletal:      Right wrist: Swelling present. No tenderness, bony tenderness or snuff box tenderness. Normal range of motion.      Right hand: Swelling present. No deformity, tenderness or bony tenderness. Normal range of motion. Normal strength. Normal sensation. There is no disruption of two-point discrimination. Normal capillary refill. Normal pulse.        Arms:       Cervical back: Full passive range of motion without pain, normal range of motion and neck supple.   Lymphadenopathy:      Cervical: No cervical adenopathy.   Skin:     General: Skin is warm and dry.   Neurological:      Mental Status: She is alert and oriented to person, place, and time.      Cranial Nerves: No cranial nerve deficit.      Deep Tendon Reflexes: Reflexes are normal and symmetric.   Psychiatric:         Behavior: Behavior normal. Behavior is cooperative.         Thought Content: Thought content normal.         Judgment: Judgment normal.             Emeka Lorenz PA-C  6/15/2022, 11:59 AM

## 2022-06-15 NOTE — DISCHARGE INSTRUCTIONS
I do not think that you have a deep vein thrombosis (DVT).    No signs of infection.     If swelling worsens, follow up with primary care.     If your symptoms worsen or you develop new or concerning symptoms, please return to the Emergency Department for further evaluation and treatment.

## 2022-06-15 NOTE — ED TRIAGE NOTES
RUE swelling from mid forearm down to hand-was seen in clinic today, advised to present to ED to rule out blood clot     Triage Assessment     Row Name 06/15/22 [id number]       Triage Assessment (Adult)    Airway WDL WDL       Respiratory WDL    Respiratory WDL WDL       Skin Circulation/Temperature WDL    Skin Circulation/Temperature WDL WDL       Cardiac WDL    Cardiac WDL WDL       Peripheral/Neurovascular WDL    Peripheral Neurovascular WDL WDL       Cognitive/Neuro/Behavioral WDL    Cognitive/Neuro/Behavioral WDL WDL

## 2022-06-15 NOTE — CONFIDENTIAL NOTE
S-(situation): I talked with Lavonne.  She C/O right hand swelling and redness.  This started 2 days ago and she thinks it is worse.  Ring finger and baby finger on left hand swollen as well.    B-(background): seen on 5/6/22 for left arm cellulitis and treated with doxy.  Got better, not 100 % though.  Does not go outside much because wants to avoid gnats    A-(assessment): new swelling right hand and fingers.  Etiology unknown.    R-(recommendations): advised to try cold pack and antihistamine, but she prefers to be seen  Danielle Keith RN

## 2022-06-19 ENCOUNTER — HEALTH MAINTENANCE LETTER (OUTPATIENT)
Age: 82
End: 2022-06-19

## 2022-07-01 ENCOUNTER — OFFICE VISIT (OUTPATIENT)
Dept: FAMILY MEDICINE | Facility: CLINIC | Age: 82
End: 2022-07-01
Payer: COMMERCIAL

## 2022-07-01 VITALS
SYSTOLIC BLOOD PRESSURE: 128 MMHG | TEMPERATURE: 97.8 F | WEIGHT: 171 LBS | BODY MASS INDEX: 28.02 KG/M2 | OXYGEN SATURATION: 99 % | DIASTOLIC BLOOD PRESSURE: 80 MMHG | RESPIRATION RATE: 16 BRPM | HEART RATE: 74 BPM

## 2022-07-01 DIAGNOSIS — M54.50 CHRONIC BILATERAL LOW BACK PAIN WITHOUT SCIATICA: Primary | ICD-10-CM

## 2022-07-01 DIAGNOSIS — G89.29 CHRONIC BILATERAL LOW BACK PAIN WITHOUT SCIATICA: Primary | ICD-10-CM

## 2022-07-01 DIAGNOSIS — I10 ESSENTIAL HYPERTENSION: ICD-10-CM

## 2022-07-01 DIAGNOSIS — Z00.00 MEDICARE ANNUAL WELLNESS VISIT, SUBSEQUENT: ICD-10-CM

## 2022-07-01 PROCEDURE — 99397 PER PM REEVAL EST PAT 65+ YR: CPT | Performed by: FAMILY MEDICINE

## 2022-07-01 PROCEDURE — 99213 OFFICE O/P EST LOW 20 MIN: CPT | Mod: 25 | Performed by: FAMILY MEDICINE

## 2022-07-01 ASSESSMENT — PAIN SCALES - GENERAL: PAINLEVEL: EXTREME PAIN (8)

## 2022-07-01 NOTE — PROGRESS NOTES
"  Assessment & Plan     Chronic bilateral low back pain without sciatica  Pt will come back for xray   She will set up PT   If no improvement would consider MRI   - XR Lumbar Spine 2/3 Views; Future  - Physical Therapy Referral; Future    Essential hypertension  Stable no change in treatment plan.     Medicare annual wellness visit, subsequent  Fall risk discussed               BMI:   Estimated body mass index is 28.02 kg/m  as calculated from the following:    Height as of 6/15/22: 1.664 m (5' 5.5\").    Weight as of this encounter: 77.6 kg (171 lb).       Patient Instructions   Set up PT     Xray on Tuesday     If not better in 2-3 weeks of PT would get MRI     Please call in and let me know how you are doing          Return in about 2 weeks (around 7/15/2022) for with me with phone update .    Elvira Kowalski MD  [facility]    Kirsty Banerjee is a 81 year old, presenting for the following health issues:  Back Pain (arthritis)      History of Present Illness       Reason for visit:  Back pain, joint pain, infections, causes and help for.    She eats 2-3 servings of fruits and vegetables daily.She consumes 0 sweetened beverage(s) daily.She exercises with enough effort to increase her heart rate 9 or less minutes per day.  She exercises with enough effort to increase her heart rate 3 or less days per week.   She is taking medications regularly.   Hypertension Follow-up      Do you check your blood pressure regularly outside of the clinic? No     Are you following a low salt diet? Yes    Are your blood pressures ever more than 140 on the top number (systolic) OR more   than 90 on the bottom number (diastolic), for example 140/90? Unsure         Pain History:  When did you first notice your pain? - Acute Pain   Have you seen anyone else for your pain? No  Where in your body do you have pain? 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  Date 9/15/17  Sees a specialist for back pain: No  Alleviating factors:   Improved by: sitting    Precipitating factors:  Worsened by: walking and standing too long  Therapies tried and outcome: acetaminophen (Tylenol) 2-3 times a day, aleve 3 times a week    1 month ago  Had episode with low back pain and weakness  Had elevated and erratic pulse along with sweating  Felt she was going to pass out    Accompanying Signs & Symptoms:  Risk of Fracture: None  Risk of Cauda Equina: None  Risk of Infection: None  Risk of Cancer: None  Risk of Ankylosing Spondylitis: Onset at age <35, male, AND morning back stiffness  no     [id number]}    Annual Wellness Visit    Patient has been advised of split billing requirements and indicates understanding: Yes     Are you in the first 12 months of your Medicare Part B coverage?  No    Physical Health:    In general, how would you rate your overall physical health? fair    Outside of work, how many days during the week do you exercise?none    Outside of work, approximately how many minutes a day do you exercise?not applicable    If you drink alcohol do you typically have >3 drinks per day or >7 drinks per week? No    Do you usually eat at least 4 servings of fruit and vegetables a day, include whole grains & fiber and avoid regularly eating high fat or \"junk\" foods? Yes    Do you have any problems taking medications regularly? No    Do you have any side effects from medications? none    Needs assistance for the following daily activities: no " assistance needed    Which of the following safety concerns are present in your home?  none identified     Hearing impairment: No    In the past 6 months, have you been bothered by leaking of urine? yes    Mental Health:    In general, how would you rate your overall mental or emotional health? good  PHQ-2 Score:      Do you feel safe in your environment? Yes    Have you ever done Advance Care Planning? (For example, a Health Directive, POLST, or a discussion with a medical provider or your loved ones about your wishes)? Yes, patient states has an Advance Care Planning document and will bring a copy to the clinic.    Fall risk:  Fallen 2 or more times in the past year?: Yes  Any fall with injury in the past year?: No    Cognitive Screenin) Repeat 3 items (Leader, Season, Table)    2) Clock draw: NORMAL  3) 3 item recall: Recalls 3 objects  Results: 3 items recalled: COGNITIVE IMPAIRMENT LESS LIKELY    Mini-CogTM Copyright S Nolberto. Licensed by the author for use in [facility]; reprinted with permission (brittani@[facility]). All rights reserved.      Do you have sleep apnea, excessive snoring or daytime drowsiness?: no    Current providers sharing in care for this patient include:   Patient Care Team:  Elvira Kowalski MD as PCP - General (Family Practice)  Rob Harris MD as Assigned Surgical Provider  Elvira Kowalski MD as Assigned PCP    Patient has been advised of split billing requirements and indicates understanding: Yes    Review of Systems   Constitutional, HEENT, cardiovascular, pulmonary, gi and gu systems are negative, except as otherwise noted.      Objective    /80   Pulse 74   Temp 97.8  F (36.6  C) (Tympanic)   Resp 16   Wt 77.6 kg (171 lb)   SpO2 99%   BMI 28.02 kg/m    Body mass index is 28.02 kg/m .  Physical Exam   GENERAL APPEARANCE: healthy, alert and no distress  RESP: lungs clear to auscultation - no rales, rhonchi or wheezes  CV: regular rates and  rhythm, normal S1 S2, no S3 or S4 and no murmur, click or rub  ORTHO: Lumber/Thoracic Spine Exam: Tender:  left para lumbar muscles, right para lumbar muscles, left SI joint, right SI joint  Non-tender:    Range of Motion:  Limited due to pain in flex and ext   Strength:  Grossly intact   Special tests:  negative straight leg raises    Hip Exam: Hip ROM full    PSYCH: mentation appears normal and affect normal/bright                    .  ..

## 2022-07-01 NOTE — PATIENT INSTRUCTIONS
Set up PT     Xray on Tuesday     If not better in 2-3 weeks of PT would get MRI     Please call in and let me know how you are doing

## 2022-07-05 ENCOUNTER — ANCILLARY PROCEDURE (OUTPATIENT)
Dept: GENERAL RADIOLOGY | Facility: CLINIC | Age: 82
End: 2022-07-05
Attending: FAMILY MEDICINE
Payer: COMMERCIAL

## 2022-07-05 ENCOUNTER — TELEPHONE (OUTPATIENT)
Dept: FAMILY MEDICINE | Facility: CLINIC | Age: 82
End: 2022-07-05

## 2022-07-05 PROCEDURE — 72100 X-RAY EXAM L-S SPINE 2/3 VWS: CPT | Mod: TC | Performed by: RADIOLOGY

## 2022-07-05 NOTE — TELEPHONE ENCOUNTER
----- Message from Elvira Kowalski MD sent at 7/5/2022 10:16 AM CDT -----  Please call pt her xray shows lots of arthritis in the back   Plan to start PT as discussed and given the 2-3 weeks and let me know how she is doing     Patient notified the above.   Patient request I send a message to provider:    Patient can not get into physical therapy until July 14th.  Patient left arm is swollen again and declined a visit because she said she has been in multiple times.

## 2022-07-05 NOTE — TELEPHONE ENCOUNTER
I have not seen the arm swelling in the past she has been to urgent care   Not clear to me why this is happening   Is it possible for her to take a picture and send to me via [employer]

## 2022-07-06 NOTE — TELEPHONE ENCOUNTER
Spoke to patient, patient states that her arm is some better today. Patient thinks she can get a picture uploaded to her mychart.

## 2022-07-10 DIAGNOSIS — I10 ESSENTIAL HYPERTENSION: ICD-10-CM

## 2022-07-12 RX ORDER — LISINOPRIL 10 MG/1
TABLET ORAL
Qty: 90 TABLET | Refills: 2 | Status: SHIPPED | OUTPATIENT
Start: 2022-07-12 | End: 2023-03-15

## 2022-07-14 ENCOUNTER — HOSPITAL ENCOUNTER (OUTPATIENT)
Dept: PHYSICAL THERAPY | Facility: CLINIC | Age: 82
Setting detail: THERAPIES SERIES
Discharge: HOME | End: 2022-07-14
Attending: FAMILY MEDICINE
Payer: COMMERCIAL

## 2022-07-14 DIAGNOSIS — M54.50 CHRONIC BILATERAL LOW BACK PAIN WITHOUT SCIATICA: ICD-10-CM

## 2022-07-14 DIAGNOSIS — G89.29 CHRONIC BILATERAL LOW BACK PAIN WITHOUT SCIATICA: ICD-10-CM

## 2022-07-14 PROCEDURE — 97161 PT EVAL LOW COMPLEX 20 MIN: CPT | Mod: GP | Performed by: PHYSICAL THERAPIST

## 2022-07-14 PROCEDURE — 97110 THERAPEUTIC EXERCISES: CPT | Mod: GP | Performed by: PHYSICAL THERAPIST

## 2022-07-14 NOTE — PROGRESS NOTES
[facility]    OUTPATIENT PHYSICAL THERAPY ORTHOPEDIC EVALUATION  PLAN OF TREATMENT FOR OUTPATIENT REHABILITATION  (COMPLETE FOR INITIAL CLAIMS ONLY)  Patient's Last Name, First Name, M.I.  YOB: 1940  YawLavonne  LENO    Provider s Name:  [facility]   Medical Record No.  [id number]   Start of Care Date:  07/14/22   Onset Date:  07/01/22 (date of referral (chronic low back pain))   Type:     _X__PT   ___OT   ___SLP Medical Diagnosis:  Chronic bilateral low back pain wihtout sciatica     PT Diagnosis:  low back pain   Visits from SOC:  1      _________________________________________________________________________________  Plan of Treatment/Functional Goals:  balance training, gait training, joint mobilization, manual therapy, neuromuscular re-education, ROM, strengthening, stretching     Electrical stimulation, Cryotherapy, Hot packs, TENS, Ultrasound     Goals  Goal Identifier: Standing  Goal Description: Pt will tolerate standing for more than 10 minutes without an increase in symptoms.  Target Date: 08/25/22    Goal Identifier: Vacumming  Goal Description: Pt will tolerate vacumming her house without an increase in symptoms.  Target Date: 09/22/22    Goal Identifier: HEP  Goal Description: Pt will be independent with HEP in order to maintain improvements upon discharge.  Target Date: 09/22/22    Therapy Frequency:  1 time/week  Predicted Duration of Therapy Intervention:  8 weeks    Julia Lynn, PT                 I CERTIFY THE NEED FOR THESE SERVICES FURNISHED UNDER        THIS PLAN OF TREATMENT AND WHILE UNDER MY CARE     (Physician co-signature of this document indicates review and certification of the therapy plan).                     Certification Date From:  07/14/22   Certification Date To:  09/22/22    Referring Provider:  Dr. Felix  Dex    Initial Assessment        See Epic Evaluation Start of Care Date: 07/14/22                                                                  Physical Therapy Evaluation  07/14/22 0700   General Information   Type of Visit Initial OP Ortho PT Evaluation   Start of Care Date 07/14/22   Referring Physician Dr. Elvira Kowalski   Patient/Family Goals Statement to be able to stand and walk for longer   Orders Evaluate and Treat   Date of Order 07/01/22   Certification Required? Yes   Medical Diagnosis Chronic bilateral low back pain wihtout sciatica   Surgical/Medical history reviewed Yes   Precautions/Limitations no known precautions/limitations   General Information Comments PMHx B TKA   Body Part(s)   Body Part(s) Lumbar Spine/SI   Presentation and Etiology   Pertinent history of current problem (include personal factors and/or comorbidities that impact the POC) Pt reports that in May she was at a visitation and was standing for a long period of time.  She started to feel the pain in her back increasing and needed to sit down.  Her back conitnued bothering her all night and she had to go home.  With rest it improved.  Reports walking, standing, mopping and vacumming seem to increase her symptoms.   Impairments A. Pain   Functional Limitations perform activities of daily living;perform required work activities;perform desired leisure / sports activities   Symptom Location low back and hips   How/Where did it occur From insidious onset   Onset date of current episode/exacerbation 07/01/22  (date of referral (chronic low back pain))   Chronicity Chronic   Pain rating (0-10 point scale) Best (/10);Worst (/10)   Best (/10) 5   Worst (/10) 8-9   Pain quality B. Dull;C. Aching   Frequency of pain/symptoms D. Other   Pain frequency comment walking, standing, vacumming, and mopping   Pain/symptoms are: The same all the time   Pain/symptoms exacerbated by B. Walking;K. Home tasks   Pain/symptoms eased by A. Sitting  "  Progression of symptoms since onset: Unchanged   Current / Previous Interventions   Diagnostic Tests: X-ray   X-ray Results Results   X-ray results see epic   Current Level of Function   Patient role/employment history F. Retired   Living environment House/townhome   Home/community accessibility no [facility]   Current equipment-Gait/Locomotion None   Current equipment-ADL None   Fall Risk Screen   Fall screen completed by PT   Have you fallen 2 or more times in the past year? Yes   Have you fallen and had an injury in the past year? No   Is patient a fall risk? Yes;Department fall risk interventions implemented   Abuse Screen (yes response referral indicated)   Feels Unsafe at Home or Work/School no   Feels Threatened by Someone no   Does Anyone Try to Keep You From Having Contact with Others or Doing Things Outside Your Home? no   Physical Signs of Abuse Present no   Lumbar Spine/SI Objective Findings   Hamstring Flexibility lacking 10* B   Hip Flexor Flexibility slight restrictions   Piriformis Flexibility restricted B   Flexion *   Extension ROM 30*   Right Side Bending ROM finger tips 6.5\" from knee joint line   Left Side Bending ROM finger tips 6\" from knee joint line   Hip Flexion (L2) Strength 4-/5 B LE   Hip Abduction Strength 3-/5 B   Hip Extension Strength 3-/5 B   Knee Flexion Strength 4+/5 B   Knee Extension (L3) Strength 4+/5 B   Ankle Dorsiflexion (L4) Strength 5/5 B   Great Toe Extension (L5) Strength 5/5 B   Ankle Plantar Flexion (S1) Strength 4/5 B   SLR (-) B   Palpation hypertonicity lumbar paraspinal, hypomobility L1-L5   Sensation Testing denies changes to sensation, reports numbness in buttock area with long periods of standings   Planned Therapy Interventions   Planned Therapy Interventions balance training;gait training;joint mobilization;manual therapy;neuromuscular re-education;ROM;strengthening;stretching   Planned Modality Interventions   Planned Modality Interventions Electrical " stimulation;Cryotherapy;Hot packs;TENS;Ultrasound   Clinical Impression   Criteria for Skilled Therapeutic Interventions Met yes, treatment indicated   PT Diagnosis low back pain   Influenced by the following impairments weakness, decreased flexibility, pain   Functional limitations due to impairments difficulty with standing, walking, house hold cleaning and cooking   Clinical Presentation Stable/Uncomplicated   Clinical Presentation Rationale PMHx B TKA   Clinical Decision Making (Complexity) Low complexity   Therapy Frequency 1 time/week   Predicted Duration of Therapy Intervention (days/wks) 8 weeks   Risk & Benefits of therapy have been explained Yes   Patient, Family & other staff in agreement with plan of care Yes   ORTHO GOALS   PT Ortho Eval Goals 1;2;3   Ortho Goal 1   Goal Identifier Standing   Goal Description Pt will tolerate standing for more than 10 minutes without an increase in symptoms.   Target Date 08/25/22   Ortho Goal 2   Goal Identifier Vacumming   Goal Description Pt will tolerate vacumming her house without an increase in symptoms.   Target Date 09/22/22   Ortho Goal 3   Goal Identifier HEP   Goal Description Pt will be independent with HEP in order to maintain improvements upon discharge.   Target Date 09/22/22   Total Evaluation Time   PT Eval, Low Complexity Minutes (36234) 20   Therapy Certification   Certification date from 07/14/22   Certification date to 09/22/22     Julia Lynn, PT, DPT, CLT  Physical Therapist & Certified Lymphedema Therapist  [facility]

## 2022-07-19 ENCOUNTER — HOSPITAL ENCOUNTER (OUTPATIENT)
Dept: PHYSICAL THERAPY | Facility: CLINIC | Age: 82
Setting detail: THERAPIES SERIES
Discharge: HOME | End: 2022-07-19
Attending: FAMILY MEDICINE
Payer: COMMERCIAL

## 2022-07-19 PROCEDURE — 97110 THERAPEUTIC EXERCISES: CPT | Mod: GP | Performed by: PHYSICAL THERAPIST

## 2022-07-25 DIAGNOSIS — K21.9 GASTROESOPHAGEAL REFLUX DISEASE WITHOUT ESOPHAGITIS: ICD-10-CM

## 2022-07-26 NOTE — TELEPHONE ENCOUNTER
Prescription approved per [facility] Refill Protocol     Sharon Molina     RN MSN

## 2022-07-28 ENCOUNTER — HOSPITAL ENCOUNTER (OUTPATIENT)
Dept: PHYSICAL THERAPY | Facility: CLINIC | Age: 82
Setting detail: THERAPIES SERIES
Discharge: HOME | End: 2022-07-28
Attending: FAMILY MEDICINE
Payer: COMMERCIAL

## 2022-07-28 PROCEDURE — 97110 THERAPEUTIC EXERCISES: CPT | Mod: GP | Performed by: PHYSICAL THERAPIST

## 2022-08-02 ENCOUNTER — HOSPITAL ENCOUNTER (OUTPATIENT)
Dept: PHYSICAL THERAPY | Facility: CLINIC | Age: 82
Setting detail: THERAPIES SERIES
Discharge: HOME | End: 2022-08-02
Attending: FAMILY MEDICINE
Payer: COMMERCIAL

## 2022-08-02 PROCEDURE — 97110 THERAPEUTIC EXERCISES: CPT | Mod: GP | Performed by: PHYSICAL THERAPIST

## 2022-08-09 ENCOUNTER — HOSPITAL ENCOUNTER (OUTPATIENT)
Dept: PHYSICAL THERAPY | Facility: CLINIC | Age: 82
Setting detail: THERAPIES SERIES
Discharge: HOME | End: 2022-08-09
Attending: FAMILY MEDICINE
Payer: COMMERCIAL

## 2022-08-09 PROCEDURE — 97110 THERAPEUTIC EXERCISES: CPT | Mod: GP | Performed by: PHYSICAL THERAPIST

## 2022-08-23 ENCOUNTER — HOSPITAL ENCOUNTER (OUTPATIENT)
Dept: PHYSICAL THERAPY | Facility: CLINIC | Age: 82
Setting detail: THERAPIES SERIES
Discharge: HOME | End: 2022-08-23
Attending: FAMILY MEDICINE
Payer: COMMERCIAL

## 2022-08-23 PROCEDURE — 97140 MANUAL THERAPY 1/> REGIONS: CPT | Mod: GP | Performed by: PHYSICAL THERAPIST

## 2022-08-23 PROCEDURE — 97110 THERAPEUTIC EXERCISES: CPT | Mod: GP | Performed by: PHYSICAL THERAPIST

## 2022-08-31 ENCOUNTER — HOSPITAL ENCOUNTER (OUTPATIENT)
Dept: PHYSICAL THERAPY | Facility: CLINIC | Age: 82
Setting detail: THERAPIES SERIES
Discharge: HOME | End: 2022-08-31
Attending: FAMILY MEDICINE
Payer: COMMERCIAL

## 2022-08-31 PROCEDURE — 97110 THERAPEUTIC EXERCISES: CPT | Mod: GP | Performed by: PHYSICAL THERAPIST

## 2022-08-31 PROCEDURE — 97140 MANUAL THERAPY 1/> REGIONS: CPT | Mod: GP | Performed by: PHYSICAL THERAPIST

## 2022-09-02 ENCOUNTER — OFFICE VISIT (OUTPATIENT)
Dept: URGENT CARE | Facility: URGENT CARE | Age: 82
End: 2022-09-02
Payer: COMMERCIAL

## 2022-09-02 ENCOUNTER — TELEPHONE (OUTPATIENT)
Dept: FAMILY MEDICINE | Facility: CLINIC | Age: 82
End: 2022-09-02

## 2022-09-02 VITALS
OXYGEN SATURATION: 99 % | SYSTOLIC BLOOD PRESSURE: 126 MMHG | BODY MASS INDEX: 27.86 KG/M2 | DIASTOLIC BLOOD PRESSURE: 71 MMHG | TEMPERATURE: 98.3 F | HEART RATE: 74 BPM | WEIGHT: 170 LBS

## 2022-09-02 DIAGNOSIS — M79.89 SWELLING OF LEFT UPPER EXTREMITY: Primary | ICD-10-CM

## 2022-09-02 PROCEDURE — 99213 OFFICE O/P EST LOW 20 MIN: CPT | Performed by: FAMILY MEDICINE

## 2022-09-02 RX ORDER — PREDNISONE 10 MG/1
TABLET ORAL
Qty: 9 TABLET | Refills: 0 | Status: SHIPPED | OUTPATIENT
Start: 2022-09-02 | End: 2022-09-28

## 2022-09-02 RX ORDER — CEPHALEXIN 500 MG/1
500 CAPSULE ORAL 2 TIMES DAILY
Qty: 14 CAPSULE | Refills: 0 | Status: SHIPPED | OUTPATIENT
Start: 2022-09-02 | End: 2022-09-28

## 2022-09-02 NOTE — TELEPHONE ENCOUNTER
General Call      Reason for Call:  Pt calling because she has had problems in the past with having swelling in her arms. She has been okay for a while but then late yesterday her left arm has started to swell again, and there is a hard lump and it is red-david. Wondering if it might be cellulitis, she has had this in the past. Wondering what she should do.      Could we send this information to you in [place] or would you prefer to receive a phone call?:   Patient would prefer a phone call   Okay to leave a detailed message?: Yes at Home number on file [phone] (home)

## 2022-09-02 NOTE — PROGRESS NOTES
(M79.89) Swelling of left upper extremity  (primary encounter diagnosis)  Comment:     Patient has had intermittent episodes of nondescript swelling in proximal forearm.  Can apparently occur on either side.  Cellulitis versus some type of inflammatory reaction.    Plan: cephALEXin (KEFLEX) 500 MG capsule, predniSONE         (DELTASONE) 10 MG tablet              CHIEF COMPLAINT    Check left forearm.      HISTORY    Patient has mild swelling and tenderness.  Possible minimal redness.    She has had some reoccurring episodes.    I did see this during chart review.    Has seem to respond to antibiotic before.      REVIEW OF SYSTEMS    No fevers.  No shortness of breath.  Has arthritis.  No edema.      EXAM  /71   Pulse 74   Temp 98.3  F (36.8  C) (Tympanic)   Wt 77.1 kg (170 lb)   SpO2 99%   BMI 27.86 kg/m      Mild swelling on dorsal radial aspect of the distal left forearm with possible minimal redness.  Area is slightly warm.  No joint swelling in wrist or elbow noted.

## 2022-09-02 NOTE — TELEPHONE ENCOUNTER
Called the Pt and and she denies fever, chills. Pt stated the area is swollen, red and warm. Pt was advised to be seen in the ED, and Pt did not want to. Writer explained that it could be a DVT or something more serious.  She had writer look at the schedule and in person openings until start of Oct. 2022. Pt stated she will just go to Urgent care, but does not want to go to the ED.     Karime Castellanos RN

## 2022-09-14 ENCOUNTER — HOSPITAL ENCOUNTER (OUTPATIENT)
Dept: PHYSICAL THERAPY | Facility: CLINIC | Age: 82
Setting detail: THERAPIES SERIES
Discharge: HOME | End: 2022-09-14
Attending: FAMILY MEDICINE
Payer: COMMERCIAL

## 2022-09-14 PROCEDURE — 97110 THERAPEUTIC EXERCISES: CPT | Mod: GP | Performed by: PHYSICAL THERAPIST

## 2022-09-14 NOTE — PROGRESS NOTES
[facility]    Outpatient Physical Therapy Discharge Note  Patient: Lavonne Tidwell  : 1940    Beginning/End Dates of Reporting Period:  2022 to 2022    Referring Provider: Dr. Kowalski    Therapy Diagnosis: low back pain     Client Self Report: Continues to have difficulty and issues with mopping and vacuuming.    Objective Measurements:  Objective Measure: Glut activation  Details: poor on L LE    Outcome Measures (most recent score):  Joaquin STarT Sub-Score (Q5-9): 1  Joaquin STarT Total Score (all 9): 3  Oswestry Score (%): 31.11 %    Goals:  Goal Identifier Standing   Goal Description Pt will tolerate standing for more than 10 minutes without an increase in symptoms.   Target Date 22   Date Met  22   Progress (detail required for progress note):       Goal Identifier Vacumming   Goal Description Pt will tolerate vacumming her house without an increase in symptoms.   Target Date 22   Date Met      Progress (detail required for progress note): Pt continues progressing in strength to complete ADL's including vacuuming and mopping with less break time     Goal Identifier HEP   Goal Description Pt will be independent with HEP in order to maintain improvements upon discharge.   Target Date 22   Date Met      Progress (detail required for progress note): Pt continues to work through HEP in order to increase strength in core and LE to return to PLOF     Plan:  Discharge from therapy.    Discharge:    Reason for Discharge: No further expectation of progress.    Equipment Issued: none    Discharge Plan: Patient to continue home program.    Julia Lynn, PT, DPT, CLT  Physical Therapist & Certified Lymphedema Therapist  [facility]

## 2022-09-27 NOTE — PROGRESS NOTES
Chief Complaint   Patient presents with     Ent Problem     1 year follow up ears/  left ear tube placed 12/20- issues with decreased hearing in left ear- she notices echoing in this ear     History of Present Illness  Lavonne Tidwell is a 82 year old female who presents today for follow-up.  The patient has a history of left otitis media with effusion.  We placed an ear tube in the left and treated her with drops on 12/18/2020.  He had a few episodes of drainage which we treated with drops.  When I evaluated her in April 2021, she was having intermittent issues with postnasal drainage and dysphonia.  We had her start budesonide irrigations after Kenalog injection.  I last evaluated the patient on 3/2/2022 and she was doing well.  She returns today for an ear tube check.     Since last seeing the patient, the patient reports that the left ear started to plug up over the summer and the hearing is now down.  She is not really having any otalgia but was having a slight amount of otorrhea intermittently.  No bloody otorrhea.  No dizziness or vertigo.  Aside from her ear tubeS, no previous ear surgery.     From a nasal drainage standpoint, she has noticed more nasal drainage on the left-hand side and congestion.  She still has the irrigation supplies but is not currently irrigating her nose. She denies any dysphagia or odynophagia.  No hemoptysis.  No neck lumps/bumps/swelling.  She has a remote smoking history quitting in the early 1970s.     Past Medical History  Patient Active Problem List   Diagnosis     Injury of sigmoid colon     Esophageal reflux     Menopausal syndrome (hot flashes)     Urinary incontinence     Atrophic vaginitis     Hyperlipidemia LDL goal <130     Advanced directives, counseling/discussion     Onychomycosis     Senile nuclear sclerosis     Status post total left knee replacement     Status post total right knee replacement     Primary localized osteoarthrosis, lower leg     Tubulovillous  adenoma polyp of colon     Essential hypertension     Primary osteoarthritis involving multiple joints     Current Medications    Current Outpatient Medications:      Acetaminophen (TYLENOL PO), Take 1,000 mg by mouth 2 times daily as needed for mild pain or fever, Disp: , Rfl:      Biotin 10 MG CAPS, , Disp: , Rfl:      CALCIUM + D OR, 2 TABLET DAILY, Disp: , Rfl:      ciprofloxacin-dexamethasone (CIPRODEX) 0.3-0.1 % otic suspension, Place 5 drops Into the left ear 2 times daily for 28 days Place 5 drops in draining ear twice daily for 10 days.  Call if drainage persistent past 10 days., Disp: 10 mL, Rfl: 1     Docusate Calcium (STOOL SOFTENER PO), , Disp: , Rfl:      garlic 150 MG TABS tablet, Take 150 mg by mouth daily, Disp: , Rfl:      grape seed 50 MG CAPS, Take 50 mg by mouth daily, Disp: , Rfl:      lisinopril (ZESTRIL) 10 MG tablet, TAKE ONE TABLET BY MOUTH ONCE DAILY, Disp: 90 tablet, Rfl: 2     loratadine (CLARITIN) 10 MG tablet, Take 10 mg by mouth daily, Disp: , Rfl:      methylcellulose (CITRUCEL) 500 MG TABS tablet, Take 500 mg by mouth daily, Disp: , Rfl:      Multiple Vitamins-Minerals (CENTRUM SILVER) per tablet, Take 1 tablet by mouth daily, Disp: , Rfl:      omeprazole (PRILOSEC) 20 MG DR capsule, TAKE 1 CAPSULE (20 MG) BY MOUTH DAILY, Disp: 90 capsule, Rfl: 1     VITAMIN E COMPLEX PO, , Disp: , Rfl:      Zinc Sulfate (ZINC 15 PO), , Disp: , Rfl:      budesonide (PULMICORT) 0.5 MG/2ML neb solution, Place 0.5 mg/2 mL budesonide vial in 8 oz normal saline sinus rinse bottle.  Irrigate each nostril with one half of the bottle daily. (Patient not taking: Reported on 9/28/2022), Disp: 180 mL, Rfl: 3     fluticasone (FLONASE) 50 MCG/ACT nasal spray, Spray 2 sprays into both nostrils daily (Patient not taking: Reported on 9/28/2022), Disp: 47.4 mL, Rfl: 3    Allergies  Allergies   Allergen Reactions     Codeine GI Disturbance       Social History  Social History     Socioeconomic History     Marital  "status:    Tobacco Use     Smoking status: Former Smoker     Packs/day: 0.50     Years: 5.00     Pack years: 2.50     Types: Cigarettes     Start date: 1968     Quit date: 2/15/1972     Years since quittin.6     Smokeless tobacco: Never Used     Tobacco comment: stopped in her 20's   Substance and Sexual Activity     Alcohol use: Yes     Alcohol/week: 0.0 standard drinks     Comment: Occasional     Drug use: No     Sexual activity: Not Currently   Other Topics Concern     Parent/sibling w/ CABG, MI or angioplasty before 65F 55M? Yes       Family History  Family History   Problem Relation Age of Onset     Cancer Mother         Ovarian     Other Cancer Mother         Ovarian     Cerebrovascular Disease Father      Heart Disease Father      C.A.D. Father      Coronary Artery Disease Father      Cancer Maternal Grandmother      Other Cancer Maternal Grandmother         Ovarian/Bone     Cerebrovascular Disease Maternal Grandfather      Colon Cancer Maternal Grandfather      Diabetes Other         Great Grandmother     Breast Cancer No family hx of        Review of Systems  As per HPI and PMHx, otherwise 10 system review including the head and neck, constitutional, eyes, respiratory, GI, skin, neurologic, lymphatic, endocrine, and allergy systems is negative.    Physical Exam  /75 (BP Location: Right arm, Patient Position: Sitting, Cuff Size: Adult Regular)   Pulse 72   Temp 97.2  F (36.2  C) (Tympanic)   Ht 1.664 m (5' 5.5\")   Wt 77.1 kg (170 lb)   BMI 27.86 kg/m    GENERAL: Patient is a pleasant, cooperative 82 year old female in no acute distress.  HEAD: Normocephalic, atraumatic.  Hair and scalp are normal.  EYES: Pupils are equal, round, reactive to light and accommodation.  Extraocular movements are intact.  The sclera nonicteric without injection.  The extraocular structures are normal.  EARS: See below.   NEUROLOGIC: Cranial nerves II through XII are grossly intact.  Voice is " strong.  Facial nerve examination incomplete due to the patient wearing a mask.  CARDIOVASCULAR: Extremities are warm and well-perfused.  No significant peripheral edema.  RESPIRATORY: Patient has nonlabored breathing without cough, wheeze, stridor.  PSYCHIATRIC: Patient is alert and oriented.  Mood and affect appear normal.  SKIN: Warm and dry.  No scalp, face, or neck lesions noted.     Physical Exam and Procedure     EARS: Normal shape and symmetry.  No tenderness when palpating the mastoid or tragal areas bilaterally.  The ears were then examined under the otic binocular microscope to perform cerumen removal.  Otoscopic exam on the right reveals a clear canal.  The right tympanic membrane was round, intact without evidence of effusion.  No retraction, granulation, drainage, or evidence of cholesteatoma.       Attention was turned to the left ear.  Otoscopic exam on the left reveals impacted ceruminous debris overlying the tympanic membrane and some crusting within and surrounding the left Dura-Vent ear tube.  The Dura-Vent ear tube appears to be in the posterior-inferior quadrant.  The tube was grasped and removed.  I debrided the cerumen using an angled pick and alligator forceps.  There was some granulation tissue in the middle ear space.  There also appeared to be some middle ear effusion.  No active drainage, tympanic membrane retraction, or evidence of cholesteatoma.     Audiogram  The patient underwent an audiogram performed today.  My review of the audiogram shows normal hearing to 1000 Hz sloping to mild sensorineural hearing loss in the right ear and moderate sloping to moderately severe sloping to severe mixed hearing loss in the left ear.  Pure-tone average is 19 dB on the right and 71 dB on the left.  Speech reception threshold is 20 dB on the right and 70 dB on the left.  The patient had 100% word recognition on the right and 92% word recognition on the left.  The patient had a type A tympanogram on  the right and a low volume type B tympanogram on the left.    Assessment and Plan     ICD-10-CM    1. Chronic otitis media of left ear with effusion  H65.492 Microscopy, Binocular     ciprofloxacin-dexamethasone (CIPRODEX) 0.3-0.1 % otic suspension   2. Granuloma of tympanic membrane of left ear  H71.12 Microscopy, Binocular     ciprofloxacin-dexamethasone (CIPRODEX) 0.3-0.1 % otic suspension   3. Mixed conductive and sensorineural hearing loss of left ear with restricted hearing of right ear  H90.A32 Microscopy, Binocular     ciprofloxacin-dexamethasone (CIPRODEX) 0.3-0.1 % otic suspension   4. Sensorineural hearing loss (SNHL) of right ear with restricted hearing of left ear  H90.A21 Microscopy, Binocular     ciprofloxacin-dexamethasone (CIPRODEX) 0.3-0.1 % otic suspension      It was my pleasure seeing Lavonne Tidwell today in clinic. The left ear tube has extruded and the patient has some middle ear/tympanic membrane granulation.  She is also had progression in her mixed hearing loss on the left-hand side due to her nonfunctioning tube.  The tube was removed today in office.  We will place her on Ciprodex drops 5 drops twice daily for 4 weeks.  I will see her in 4 to 6 weeks for follow-up.  Depending on what the ear looks like in follow-up, we could consider placing an ear tube.  Given her previous issues, we could consider placing a T-tube.      Rob Harris MD  Department of Otolaryngology-Head and Neck Surgery  [facility]

## 2022-09-28 ENCOUNTER — OFFICE VISIT (OUTPATIENT)
Dept: AUDIOLOGY | Facility: CLINIC | Age: 82
End: 2022-09-28
Payer: COMMERCIAL

## 2022-09-28 ENCOUNTER — OFFICE VISIT (OUTPATIENT)
Dept: OTOLARYNGOLOGY | Facility: CLINIC | Age: 82
End: 2022-09-28
Payer: COMMERCIAL

## 2022-09-28 ENCOUNTER — TELEPHONE (OUTPATIENT)
Dept: FAMILY MEDICINE | Facility: CLINIC | Age: 82
End: 2022-09-28

## 2022-09-28 VITALS
HEIGHT: 66 IN | DIASTOLIC BLOOD PRESSURE: 75 MMHG | WEIGHT: 170 LBS | SYSTOLIC BLOOD PRESSURE: 127 MMHG | BODY MASS INDEX: 27.32 KG/M2 | HEART RATE: 72 BPM | TEMPERATURE: 97.2 F

## 2022-09-28 DIAGNOSIS — H71.12 GRANULOMA OF TYMPANIC MEMBRANE OF LEFT EAR: ICD-10-CM

## 2022-09-28 DIAGNOSIS — H65.492 CHRONIC OTITIS MEDIA OF LEFT EAR WITH EFFUSION: Primary | ICD-10-CM

## 2022-09-28 DIAGNOSIS — M54.50 CHRONIC BILATERAL LOW BACK PAIN WITHOUT SCIATICA: Primary | ICD-10-CM

## 2022-09-28 DIAGNOSIS — H90.A32 MIXED CONDUCTIVE AND SENSORINEURAL HEARING LOSS OF LEFT EAR WITH RESTRICTED HEARING OF RIGHT EAR: ICD-10-CM

## 2022-09-28 DIAGNOSIS — H90.A21 SENSORINEURAL HEARING LOSS (SNHL) OF RIGHT EAR WITH RESTRICTED HEARING OF LEFT EAR: Primary | ICD-10-CM

## 2022-09-28 DIAGNOSIS — G89.29 CHRONIC BILATERAL LOW BACK PAIN WITHOUT SCIATICA: Primary | ICD-10-CM

## 2022-09-28 DIAGNOSIS — H90.A21 SENSORINEURAL HEARING LOSS (SNHL) OF RIGHT EAR WITH RESTRICTED HEARING OF LEFT EAR: ICD-10-CM

## 2022-09-28 PROCEDURE — 99214 OFFICE O/P EST MOD 30 MIN: CPT | Mod: 25 | Performed by: OTOLARYNGOLOGY

## 2022-09-28 PROCEDURE — 92504 EAR MICROSCOPY EXAMINATION: CPT | Performed by: OTOLARYNGOLOGY

## 2022-09-28 PROCEDURE — 92550 TYMPANOMETRY & REFLEX THRESH: CPT | Performed by: AUDIOLOGIST

## 2022-09-28 PROCEDURE — 92557 COMPREHENSIVE HEARING TEST: CPT | Performed by: AUDIOLOGIST

## 2022-09-28 RX ORDER — CIPROFLOXACIN AND DEXAMETHASONE 3; 1 MG/ML; MG/ML
5 SUSPENSION/ DROPS AURICULAR (OTIC) 2 TIMES DAILY
Qty: 10 ML | Refills: 1 | Status: SHIPPED | OUTPATIENT
Start: 2022-09-28 | End: 2022-10-26

## 2022-09-28 NOTE — LETTER
9/28/2022         RE: Lavonne Tidwell  [address] [facility] [id number]        Dear Colleague,    Thank you for referring your patient, Lavonne Tidwell, to the [facility]. Please see a copy of my visit note below.    Chief Complaint   Patient presents with     Ent Problem     1 year follow up ears/  left ear tube placed 12/20- issues with decreased hearing in left ear- she notices echoing in this ear     History of Present Illness  Lavonne Tidwell is a 82 year old female who presents today for follow-up.  The patient has a history of left otitis media with effusion.  We placed an ear tube in the left and treated her with drops on 12/18/2020.  He had a few episodes of drainage which we treated with drops.  When I evaluated her in April 2021, she was having intermittent issues with postnasal drainage and dysphonia.  We had her start budesonide irrigations after Kenalog injection.  I last evaluated the patient on 3/2/2022 and she was doing well.  She returns today for an ear tube check.     Since last seeing the patient, the patient reports that the left ear started to plug up over the summer and the hearing is now down.  She is not really having any otalgia but was having a slight amount of otorrhea intermittently.  No bloody otorrhea.  No dizziness or vertigo.  Aside from her ear tubeS, no previous ear surgery.     From a nasal drainage standpoint, she has noticed more nasal drainage on the left-hand side and congestion.  She still has the irrigation supplies but is not currently irrigating her nose. She denies any dysphagia or odynophagia.  No hemoptysis.  No neck lumps/bumps/swelling.  She has a remote smoking history quitting in the early 1970s.     Past Medical History  Patient Active Problem List   Diagnosis     Injury of sigmoid colon     Esophageal reflux     Menopausal syndrome (hot flashes)     Urinary incontinence     Atrophic vaginitis     Hyperlipidemia LDL goal <130      Advanced directives, counseling/discussion     Onychomycosis     Senile nuclear sclerosis     Status post total left knee replacement     Status post total right knee replacement     Primary localized osteoarthrosis, lower leg     Tubulovillous adenoma polyp of colon     Essential hypertension     Primary osteoarthritis involving multiple joints     Current Medications    Current Outpatient Medications:      Acetaminophen (TYLENOL PO), Take 1,000 mg by mouth 2 times daily as needed for mild pain or fever, Disp: , Rfl:      Biotin 10 MG CAPS, , Disp: , Rfl:      CALCIUM + D OR, 2 TABLET DAILY, Disp: , Rfl:      ciprofloxacin-dexamethasone (CIPRODEX) 0.3-0.1 % otic suspension, Place 5 drops Into the left ear 2 times daily for 28 days Place 5 drops in draining ear twice daily for 10 days.  Call if drainage persistent past 10 days., Disp: 10 mL, Rfl: 1     Docusate Calcium (STOOL SOFTENER PO), , Disp: , Rfl:      garlic 150 MG TABS tablet, Take 150 mg by mouth daily, Disp: , Rfl:      grape seed 50 MG CAPS, Take 50 mg by mouth daily, Disp: , Rfl:      lisinopril (ZESTRIL) 10 MG tablet, TAKE ONE TABLET BY MOUTH ONCE DAILY, Disp: 90 tablet, Rfl: 2     loratadine (CLARITIN) 10 MG tablet, Take 10 mg by mouth daily, Disp: , Rfl:      methylcellulose (CITRUCEL) 500 MG TABS tablet, Take 500 mg by mouth daily, Disp: , Rfl:      Multiple Vitamins-Minerals (CENTRUM SILVER) per tablet, Take 1 tablet by mouth daily, Disp: , Rfl:      omeprazole (PRILOSEC) 20 MG DR capsule, TAKE 1 CAPSULE (20 MG) BY MOUTH DAILY, Disp: 90 capsule, Rfl: 1     VITAMIN E COMPLEX PO, , Disp: , Rfl:      Zinc Sulfate (ZINC 15 PO), , Disp: , Rfl:      budesonide (PULMICORT) 0.5 MG/2ML neb solution, Place 0.5 mg/2 mL budesonide vial in 8 oz normal saline sinus rinse bottle.  Irrigate each nostril with one half of the bottle daily. (Patient not taking: Reported on 9/28/2022), Disp: 180 mL, Rfl: 3     fluticasone (FLONASE) 50 MCG/ACT nasal spray, Spray 2  "sprays into both nostrils daily (Patient not taking: Reported on 2022), Disp: 47.4 mL, Rfl: 3    Allergies  Allergies   Allergen Reactions     Codeine GI Disturbance       Social History  Social History     Socioeconomic History     Marital status:    Tobacco Use     Smoking status: Former Smoker     Packs/day: 0.50     Years: 5.00     Pack years: 2.50     Types: Cigarettes     Start date: 1968     Quit date: 2/15/1972     Years since quittin.6     Smokeless tobacco: Never Used     Tobacco comment: stopped in her 20's   Substance and Sexual Activity     Alcohol use: Yes     Alcohol/week: 0.0 standard drinks     Comment: Occasional     Drug use: No     Sexual activity: Not Currently   Other Topics Concern     Parent/sibling w/ CABG, MI or angioplasty before 65F 55M? Yes       Family History  Family History   Problem Relation Age of Onset     Cancer Mother         Ovarian     Other Cancer Mother         Ovarian     Cerebrovascular Disease Father      Heart Disease Father      C.A.D. Father      Coronary Artery Disease Father      Cancer Maternal Grandmother      Other Cancer Maternal Grandmother         Ovarian/Bone     Cerebrovascular Disease Maternal Grandfather      Colon Cancer Maternal Grandfather      Diabetes Other         Great Grandmother     Breast Cancer No family hx of        Review of Systems  As per HPI and PMHx, otherwise 10 system review including the head and neck, constitutional, eyes, respiratory, GI, skin, neurologic, lymphatic, endocrine, and allergy systems is negative.    Physical Exam  /75 (BP Location: Right arm, Patient Position: Sitting, Cuff Size: Adult Regular)   Pulse 72   Temp 97.2  F (36.2  C) (Tympanic)   Ht 1.664 m (5' 5.5\")   Wt 77.1 kg (170 lb)   BMI 27.86 kg/m    GENERAL: Patient is a pleasant, cooperative 82 year old female in no acute distress.  HEAD: Normocephalic, atraumatic.  Hair and scalp are normal.  EYES: Pupils are equal, round, reactive " to light and accommodation.  Extraocular movements are intact.  The sclera nonicteric without injection.  The extraocular structures are normal.  EARS: See below.   NEUROLOGIC: Cranial nerves II through XII are grossly intact.  Voice is strong.  Facial nerve examination incomplete due to the patient wearing a mask.  CARDIOVASCULAR: Extremities are warm and well-perfused.  No significant peripheral edema.  RESPIRATORY: Patient has nonlabored breathing without cough, wheeze, stridor.  PSYCHIATRIC: Patient is alert and oriented.  Mood and affect appear normal.  SKIN: Warm and dry.  No scalp, face, or neck lesions noted.     Physical Exam and Procedure     EARS: Normal shape and symmetry.  No tenderness when palpating the mastoid or tragal areas bilaterally.  The ears were then examined under the otic binocular microscope to perform cerumen removal.  Otoscopic exam on the right reveals a clear canal.  The right tympanic membrane was round, intact without evidence of effusion.  No retraction, granulation, drainage, or evidence of cholesteatoma.       Attention was turned to the left ear.  Otoscopic exam on the left reveals impacted ceruminous debris overlying the tympanic membrane and some crusting within and surrounding the left Dura-Vent ear tube.  The Dura-Vent ear tube appears to be in the posterior-inferior quadrant.  The tube was grasped and removed.  I debrided the cerumen using an angled pick and alligator forceps.  There was some granulation tissue in the middle ear space.  There also appeared to be some middle ear effusion.  No active drainage, tympanic membrane retraction, or evidence of cholesteatoma.     Audiogram  The patient underwent an audiogram performed today.  My review of the audiogram shows normal hearing to 1000 Hz sloping to mild sensorineural hearing loss in the right ear and moderate sloping to moderately severe sloping to severe mixed hearing loss in the left ear.  Pure-tone average is 19 dB on  the right and 71 dB on the left.  Speech reception threshold is 20 dB on the right and 70 dB on the left.  The patient had 100% word recognition on the right and 92% word recognition on the left.  The patient had a type A tympanogram on the right and a low volume type B tympanogram on the left.    Assessment and Plan     ICD-10-CM    1. Chronic otitis media of left ear with effusion  H65.492 Microscopy, Binocular     ciprofloxacin-dexamethasone (CIPRODEX) 0.3-0.1 % otic suspension   2. Granuloma of tympanic membrane of left ear  H71.12 Microscopy, Binocular     ciprofloxacin-dexamethasone (CIPRODEX) 0.3-0.1 % otic suspension   3. Mixed conductive and sensorineural hearing loss of left ear with restricted hearing of right ear  H90.A32 Microscopy, Binocular     ciprofloxacin-dexamethasone (CIPRODEX) 0.3-0.1 % otic suspension   4. Sensorineural hearing loss (SNHL) of right ear with restricted hearing of left ear  H90.A21 Microscopy, Binocular     ciprofloxacin-dexamethasone (CIPRODEX) 0.3-0.1 % otic suspension      It was my pleasure seeing Lavonne Tidwell today in clinic. The left ear tube has extruded and the patient has some middle ear/tympanic membrane granulation.  She is also had progression in her mixed hearing loss on the left-hand side due to her nonfunctioning tube.  The tube was removed today in office.  We will place her on Ciprodex drops 5 drops twice daily for 4 weeks.  I will see her in 4 to 6 weeks for follow-up.  Depending on what the ear looks like in follow-up, we could consider placing an ear tube.  Given her previous issues, we could consider placing a T-tube.      Rob Harris MD  Department of Otolaryngology-Head and Neck Surgery  [facility]         Again, thank you for allowing me to participate in the care of your patient.        Sincerely,        Rob Harris MD

## 2022-09-28 NOTE — PATIENT INSTRUCTIONS
Per physician instructions      If you have questions or concerns on any instructions given to you by your provider today or if you need to schedule an appointment, you can reach us at [phone].

## 2022-09-28 NOTE — PROGRESS NOTES
AUDIOLOGY REPORT:    Patient was referred to [facility] from ENT by Dr. Harris for a hearing examination. Patient reports a decline in hearing in her left ear. Patient was unaccompanied to today's visit.     Testing:    Otoscopy:   Otoscopic exam indicates cerumen in the left ear with visible PE tube which looked to be blocked with cerumen. Right ear looks clear.     Tympanograms:    RIGHT: normal eardrum mobility     LEFT:   restricted eardrum mobility (Type B)    Reflexes (reported by stimulus ear): 1000 Hz  RIGHT: Ipsilateral is present at normal levels  RIGHT: Contralateral is absent at frequencies tested  LEFT:   Ipsilateral is absent at frequencies tested  LEFT:   Contralateral is absent at frequencies tested    Thresholds:   Pure Tone Thresholds assessed using standard techniques audiometry with good  reliability from 250-8000 Hz bilaterally using insert earphones and circumaural headphones     RIGHT:  normal and borderline-normal hearing sensitivity through 2000 Hz then a mild to moderate sensorineural hearing loss    LEFT:    moderate and profound mixed hearing loss    NOTE: Change in transducers did not merit a change in thresholds.     Speech Reception Threshold:    RIGHT: 20 dB HL    LEFT:   70 dB HL    Word Recognition Score:     RIGHT: 100% at 60 dB HL using NU-6 recorded word list.    LEFT:   92% at 95 dB HL using NU-6 recorded word list.    Discussed results with the patient.     Patient was returned to ENT for follow up.     Yong Liriano CCC-A  Licensed Audiologist  9/28/2022

## 2022-09-28 NOTE — NURSING NOTE
"Initial /75 (BP Location: Right arm, Patient Position: Sitting, Cuff Size: Adult Regular)   Pulse 72   Temp 97.2  F (36.2  C) (Tympanic)   Ht 1.664 m (5' 5.5\")   Wt 77.1 kg (170 lb)   BMI 27.86 kg/m   Estimated body mass index is 27.86 kg/m  as calculated from the following:    Height as of this encounter: 1.664 m (5' 5.5\").    Weight as of this encounter: 77.1 kg (170 lb). .    Marilee Tijerina CMA    "

## 2022-10-05 ENCOUNTER — HOSPITAL ENCOUNTER (OUTPATIENT)
Dept: MRI IMAGING | Facility: CLINIC | Age: 82
Discharge: HOME | End: 2022-10-05
Attending: FAMILY MEDICINE | Admitting: FAMILY MEDICINE
Payer: COMMERCIAL

## 2022-10-05 DIAGNOSIS — M54.50 CHRONIC BILATERAL LOW BACK PAIN WITHOUT SCIATICA: ICD-10-CM

## 2022-10-05 DIAGNOSIS — G89.29 CHRONIC BILATERAL LOW BACK PAIN WITHOUT SCIATICA: ICD-10-CM

## 2022-10-05 PROCEDURE — 72148 MRI LUMBAR SPINE W/O DYE: CPT

## 2022-10-06 ENCOUNTER — OFFICE VISIT (OUTPATIENT)
Dept: URGENT CARE | Facility: URGENT CARE | Age: 82
End: 2022-10-06
Payer: COMMERCIAL

## 2022-10-06 ENCOUNTER — TELEPHONE (OUTPATIENT)
Dept: FAMILY MEDICINE | Facility: CLINIC | Age: 82
End: 2022-10-06

## 2022-10-06 VITALS
SYSTOLIC BLOOD PRESSURE: 133 MMHG | OXYGEN SATURATION: 99 % | BODY MASS INDEX: 27.86 KG/M2 | TEMPERATURE: 98.1 F | HEART RATE: 71 BPM | WEIGHT: 170 LBS | DIASTOLIC BLOOD PRESSURE: 77 MMHG

## 2022-10-06 DIAGNOSIS — L03.313 CELLULITIS OF CHEST WALL: Primary | ICD-10-CM

## 2022-10-06 PROCEDURE — 99213 OFFICE O/P EST LOW 20 MIN: CPT | Performed by: FAMILY MEDICINE

## 2022-10-06 RX ORDER — CEPHALEXIN 500 MG/1
500 CAPSULE ORAL 3 TIMES DAILY
Qty: 30 CAPSULE | Refills: 0 | Status: SHIPPED | OUTPATIENT
Start: 2022-10-06 | End: 2022-10-18

## 2022-10-06 NOTE — PROGRESS NOTES
SUBJECTIVE:  Lavonne Tidwell is a 82 year old female who presents to the clinic today for a rash. Noted redness on rt shoulder near neck this am.  Seems to be getting larger.  No fever or pain or itch.  Has had at least 2 episodes of cellulitis in the last year.      Associated symptoms include: nothing.    Past Medical History:   Diagnosis Date     Arthritis 2012    knes and hips     Essential hypertension 5/5/2021     History of blood transfusion 1961    Miscarriage     Ovarian cysts 1974    s/p BSO     Current Outpatient Medications   Medication Sig Dispense Refill     Acetaminophen (TYLENOL PO) Take 1,000 mg by mouth 2 times daily as needed for mild pain or fever       Biotin 10 MG CAPS        CALCIUM + D OR 2 TABLET DAILY       cephALEXin (KEFLEX) 500 MG capsule Take 1 capsule (500 mg) by mouth 3 times daily 30 capsule 0     ciprofloxacin-dexamethasone (CIPRODEX) 0.3-0.1 % otic suspension Place 5 drops Into the left ear 2 times daily for 28 days Place 5 drops in draining ear twice daily for 10 days.  Call if drainage persistent past 10 days. 10 mL 1     Docusate Calcium (STOOL SOFTENER PO)        fluticasone (FLONASE) 50 MCG/ACT nasal spray Spray 2 sprays into both nostrils daily 47.4 mL 3     garlic 150 MG TABS tablet Take 150 mg by mouth daily       grape seed 50 MG CAPS Take 50 mg by mouth daily       lisinopril (ZESTRIL) 10 MG tablet TAKE ONE TABLET BY MOUTH ONCE DAILY 90 tablet 2     loratadine (CLARITIN) 10 MG tablet Take 10 mg by mouth daily       methylcellulose (CITRUCEL) 500 MG TABS tablet Take 500 mg by mouth daily       Multiple Vitamins-Minerals (CENTRUM SILVER) per tablet Take 1 tablet by mouth daily       omeprazole (PRILOSEC) 20 MG DR capsule TAKE 1 CAPSULE (20 MG) BY MOUTH DAILY 90 capsule 1     VITAMIN E COMPLEX PO        Zinc Sulfate (ZINC 15 PO)        budesonide (PULMICORT) 0.5 MG/2ML neb solution Place 0.5 mg/2 mL budesonide vial in 8 oz normal saline sinus rinse bottle.  Irrigate each  nostril with one half of the bottle daily. (Patient not taking: No sig reported) 180 mL 3     History   Smoking Status     Former Smoker     Packs/day: 0.50     Years: 5.00     Types: Cigarettes     Start date: 9/20/1968     Quit date: 2/15/1972   Smokeless Tobacco     Never Used     Comment: stopped in her 20's       ROS:  Review of systems negative except as stated above.    EXAM:   /77   Pulse 71   Temp 98.1  F (36.7  C) (Tympanic)   Wt 77.1 kg (170 lb)   SpO2 99%   BMI 27.86 kg/m    GENERAL: alert, no acute distress.  SKIN: area measuring 5 X 17 cm starting just rt of neck and down onto rt chest.  Red slightly indurated but soft.  No lymphadenopathy noted.  No streaking.  Minimal to no tenderness and no fluctuance.    ASSESSMENT:  Cellulitis    PLAN:  Cephalexin 500 mg may use warm packs.  Re evaluate if enlarging, or fever.  Follow up with primary care provider if no improvement.

## 2022-10-06 NOTE — TELEPHONE ENCOUNTER
Reason for call:  Patient reporting a symptom    Symptom or request: Pt reporting redness/irritation on side of neck, right shoulder, and down to breast that she noticed this morning, it is a bit raised but not painful, looking for recommendations, please advise.    Phone Number patient can be reached at:  Home number on file [phone] (home)    Best Time:  any    Can we leave a detailed message on this number:  YES    Call taken on 10/6/2022 at 4:21 PM by Tatum Cunningham

## 2022-10-06 NOTE — TELEPHONE ENCOUNTER
Pt stated she is wondering if she has shingles, Pt stated it is warm and not painful, but irritated. Pt was advised to be see in the UC due to having a hx of cellulitis, which pt compared it to. Pt agreed that she should be seen to have it assessed and will be going to UC in [place].     Karime Castellanos RN

## 2022-10-11 ENCOUNTER — VIRTUAL VISIT (OUTPATIENT)
Dept: FAMILY MEDICINE | Facility: CLINIC | Age: 82
End: 2022-10-11
Payer: COMMERCIAL

## 2022-10-11 DIAGNOSIS — G89.29 CHRONIC BILATERAL LOW BACK PAIN WITHOUT SCIATICA: Primary | ICD-10-CM

## 2022-10-11 DIAGNOSIS — M54.50 CHRONIC BILATERAL LOW BACK PAIN WITHOUT SCIATICA: Primary | ICD-10-CM

## 2022-10-11 PROCEDURE — 99213 OFFICE O/P EST LOW 20 MIN: CPT | Mod: TEL | Performed by: FAMILY MEDICINE

## 2022-10-11 NOTE — PROGRESS NOTES
"Lavonne is a 82 year old who is being evaluated via a billable telephone visit.      What phone number would you like to be contacted at? [phone]  How would you like to obtain your AVS? MyChart    Assessment & Plan     Chronic bilateral low back pain without sciatica  Due to degeneration and OA                BMI:   Estimated body mass index is 27.86 kg/m  as calculated from the following:    Height as of 9/28/22: 1.664 m (5' 5.5\").    Weight as of 10/6/22: 77.1 kg (170 lb).       Patient Instructions   Try Biofreeze    Try lidocaine patches     Try heat on your back     Keep active     Limited use of alleve     Try and do the above before activity to help with symptoms       Next time your arm is red or swollen take a picture and call the care team I want to see it         No follow-ups on file.    Elvira Kowalski MD  [facility]    Subjective   Lavonne is a 82 year old, presenting for the following health issues:  Back Pain      HPI     Pain History:  When did you first notice your pain? - Chronic Pain   Have you seen this provider for your pain in the past?   Yes   Where in your body do you have pain? Low back pain  Are you seeing anyone else for your pain? No                Chronic Pain Follow Up:    Location of pain: low back pain  Hard to get house work done  Has to take multiple breaks  Analgesia/pain control:    - Recent changes:  no    - Overall control: Tolerable with discomfort    - Current treatments: Tylenol daily 2 in am 2 in pm, aleve 3 times a week   Adherence:     - Do you ever take more pain medicine than prescribed? No    - When did you take your last dose of pain medicine?     Adverse effects: No   PDMP Review     None        Last CSA Agreement:   CSA -- Patient Level:    CSA: None found at the patient level.       Last UDS:         Pt taking alleve 3 times per week and this helps some     She is limited in activity as she needs to rest due to pain with walking " and house work   She has no neuro sxs   This seems to be variable depending on activity     Xray and MRI reviewed with her         Review of Systems   Constitutional, HEENT, cardiovascular, pulmonary, gi and gu systems are negative, except as otherwise noted.      Objective           Vitals:  No vitals were obtained today due to virtual visit.    Physical Exam   healthy, alert and no distress  PSYCH: Alert and oriented times 3; coherent speech, normal   rate and volume, able to articulate logical thoughts, able   to abstract reason, no tangential thoughts, no hallucinations   or delusions  Her affect is normal  RESP: No cough, no audible wheezing, able to talk in full sentences  Remainder of exam unable to be completed due to telephone visits                Phone call duration: 23 minutes

## 2022-10-11 NOTE — PATIENT INSTRUCTIONS
Try Biofreeze    Try lidocaine patches     Try heat on your back     Keep active     Limited use of alleve     Try and do the above before activity to help with symptoms       Next time your arm is red or swollen take a picture and call the care team I want to see it

## 2022-10-18 ENCOUNTER — OFFICE VISIT (OUTPATIENT)
Dept: FAMILY MEDICINE | Facility: CLINIC | Age: 82
End: 2022-10-18
Payer: COMMERCIAL

## 2022-10-18 ENCOUNTER — TELEPHONE (OUTPATIENT)
Dept: FAMILY MEDICINE | Facility: CLINIC | Age: 82
End: 2022-10-18

## 2022-10-18 DIAGNOSIS — M25.40 JOINT SWELLING: Primary | ICD-10-CM

## 2022-10-18 LAB
ALBUMIN SERPL BCG-MCNC: 4.2 G/DL (ref 3.5–5.2)
ALP SERPL-CCNC: 106 U/L (ref 35–104)
ALT SERPL W P-5'-P-CCNC: 19 U/L (ref 10–35)
ANION GAP SERPL CALCULATED.3IONS-SCNC: 11 MMOL/L (ref 7–15)
AST SERPL W P-5'-P-CCNC: 30 U/L (ref 10–35)
BILIRUB SERPL-MCNC: 0.5 MG/DL
BUN SERPL-MCNC: 24.7 MG/DL (ref 8–23)
CALCIUM SERPL-MCNC: 9.3 MG/DL (ref 8.8–10.2)
CHLORIDE SERPL-SCNC: 102 MMOL/L (ref 98–107)
CREAT SERPL-MCNC: 0.92 MG/DL (ref 0.51–0.95)
CRP SERPL-MCNC: 9.45 MG/L
DEPRECATED HCO3 PLAS-SCNC: 25 MMOL/L (ref 22–29)
ERYTHROCYTE [DISTWIDTH] IN BLOOD BY AUTOMATED COUNT: 13.4 % (ref 10–15)
ERYTHROCYTE [SEDIMENTATION RATE] IN BLOOD BY WESTERGREN METHOD: 35 MM/HR (ref 0–30)
GFR SERPL CREATININE-BSD FRML MDRD: 61 ML/MIN/1.73M2
GLUCOSE SERPL-MCNC: 113 MG/DL (ref 70–99)
HCT VFR BLD AUTO: 34.9 % (ref 35–47)
HGB BLD-MCNC: 11.4 G/DL (ref 11.7–15.7)
MCH RBC QN AUTO: 33.5 PG (ref 26.5–33)
MCHC RBC AUTO-ENTMCNC: 32.7 G/DL (ref 31.5–36.5)
MCV RBC AUTO: 103 FL (ref 78–100)
PLATELET # BLD AUTO: 237 10E3/UL (ref 150–450)
POTASSIUM SERPL-SCNC: 4.6 MMOL/L (ref 3.4–5.3)
PROT SERPL-MCNC: 7.6 G/DL (ref 6.4–8.3)
RBC # BLD AUTO: 3.4 10E6/UL (ref 3.8–5.2)
SODIUM SERPL-SCNC: 138 MMOL/L (ref 136–145)
URATE SERPL-MCNC: 6.3 MG/DL (ref 2.4–5.7)
WBC # BLD AUTO: 6.4 10E3/UL (ref 4–11)

## 2022-10-18 PROCEDURE — 85652 RBC SED RATE AUTOMATED: CPT | Performed by: FAMILY MEDICINE

## 2022-10-18 PROCEDURE — 86618 LYME DISEASE ANTIBODY: CPT | Performed by: FAMILY MEDICINE

## 2022-10-18 PROCEDURE — 85027 COMPLETE CBC AUTOMATED: CPT | Performed by: FAMILY MEDICINE

## 2022-10-18 PROCEDURE — 84550 ASSAY OF BLOOD/URIC ACID: CPT | Performed by: FAMILY MEDICINE

## 2022-10-18 PROCEDURE — 99214 OFFICE O/P EST MOD 30 MIN: CPT | Performed by: FAMILY MEDICINE

## 2022-10-18 PROCEDURE — 86431 RHEUMATOID FACTOR QUANT: CPT | Performed by: FAMILY MEDICINE

## 2022-10-18 PROCEDURE — 86140 C-REACTIVE PROTEIN: CPT | Performed by: FAMILY MEDICINE

## 2022-10-18 PROCEDURE — 36415 COLL VENOUS BLD VENIPUNCTURE: CPT | Performed by: FAMILY MEDICINE

## 2022-10-18 PROCEDURE — 80053 COMPREHEN METABOLIC PANEL: CPT | Performed by: FAMILY MEDICINE

## 2022-10-18 ASSESSMENT — PAIN SCALES - GENERAL: PAINLEVEL: MILD PAIN (2)

## 2022-10-18 NOTE — PROGRESS NOTES
"  Assessment & Plan     Joint swelling  Gout vs inflam arthritis vs OA inflamation   - CRP, inflammation; Future  - ESR: Erythrocyte sedimentation rate; Future  - CBC with platelets; Future  - Uric acid; Future  - Comprehensive metabolic panel; Future  - Lyme Disease Total Abs Bld with Reflex to Confirm CLIA; Future  - Rheumatoid factor; Future  - CRP, inflammation  - ESR: Erythrocyte sedimentation rate  - CBC with platelets  - Uric acid  - Comprehensive metabolic panel  - Lyme Disease Total Abs Bld with Reflex to Confirm CLIA  - Rheumatoid factor             BMI:   Estimated body mass index is 28.35 kg/m  as calculated from the following:    Height as of 9/28/22: 1.664 m (5' 5.5\").    Weight as of this encounter: 78.5 kg (173 lb).       Patient Instructions   Labs today     Take one alleve 2 times daily for three days          Return in about 1 week (around 10/25/2022), or if symptoms worsen or fail to improve.    Elvira Kowalski MD  [facility]    Kirsty Banerjee is a 82 year old, presenting for the following health issues:  Hand Problem (Swelling/)      HPI       Check swollen hand  Right hand  Started last night - seems to have gone down a little bit  Redness to hand last night  Hard to grasp anything  Finished Keflex yesterday   Painful - better today    She has had this off and on for the last 6 months   Generally it lasts a few days has been treated in UC many times for cellulitis     No fever today   Swelling is actually a bit better than yesterday   Right aand sore and stiff difficult to grab thing         Review of Systems   Constitutional, HEENT, cardiovascular, pulmonary, gi and gu systems are negative, except as otherwise noted.      Objective    /78   Pulse 73   Temp 97.7  F (36.5  C) (Tympanic)   Resp 16   Wt 78.5 kg (173 lb)   SpO2 99%   BMI 28.35 kg/m    Body mass index is 28.35 kg/m .  Physical Exam   GENERAL APPEARANCE: healthy, alert and no " distress  ORTHO: Hand/Finger Exam: Inspection:swelling over the dorsum of hand mild erythema   Tender: Metacarpals:  2nd metacarpal, 3rd metacarpal  Non-tender:   Range of Motion Index Finger:   flexion MCP   decreased, painful, Long Finger:   flexion MCP   decreased, painful  Strength: full strength  Special tests: none        PSYCH: mentation appears normal and affect normal/bright

## 2022-10-18 NOTE — NURSING NOTE
"Chief Complaint   Patient presents with     Hand Problem     Swelling       BP (!) 150/74   Pulse 73   Temp 97.7  F (36.5  C) (Tympanic)   Resp 16   Wt 78.5 kg (173 lb)   SpO2 99%   BMI 28.35 kg/m   Estimated body mass index is 28.35 kg/m  as calculated from the following:    Height as of 9/28/22: 1.664 m (5' 5.5\").    Weight as of this encounter: 78.5 kg (173 lb).  Patient presents to the clinic using No DME      Health Maintenance that is potentially due pending provider review:    Health Maintenance Due   Topic Date Due     DEXA  12/29/2021     COVID-19 Vaccine (5 - Booster for Pfizer series) 06/15/2022     INFLUENZA VACCINE (1) 09/01/2022        n/a        "

## 2022-10-18 NOTE — TELEPHONE ENCOUNTER
Reason for Call:  Same Day Appointment, Requested Provider:  Dr Kowalski    PCP: Elvira Kowalski    Reason for visit: Lavonne says she had talked to Dr Kowalski on the phone and Dr Kowalski wanted to see her when she had swelling in her Right hand. She says it started again last night and was quite swollen. It still is swollen today but not as much.  Can Dr Kowalski see her this afternoon?        Can we leave a detailed message on this number? YES    Phone number patient can be reached at: Home number on file [phone] (home)    Best Time: Anytime      Call taken on 10/18/2022 at 11:27 AM by Patty Krishnan

## 2022-10-19 VITALS
OXYGEN SATURATION: 99 % | WEIGHT: 173 LBS | HEART RATE: 73 BPM | DIASTOLIC BLOOD PRESSURE: 78 MMHG | TEMPERATURE: 97.7 F | BODY MASS INDEX: 28.35 KG/M2 | RESPIRATION RATE: 16 BRPM | SYSTOLIC BLOOD PRESSURE: 138 MMHG

## 2022-10-19 LAB
B BURGDOR IGG+IGM SER QL: 0.04
RHEUMATOID FACT SER NEPH-ACNC: 11 IU/ML

## 2022-10-20 ENCOUNTER — TELEPHONE (OUTPATIENT)
Dept: FAMILY MEDICINE | Facility: CLINIC | Age: 82
End: 2022-10-20

## 2022-10-20 NOTE — TELEPHONE ENCOUNTER
Reason for Call:  Request for results:    Name of test or procedure: Labs done 10/18    Location of the test or procedure: [place]    Phone number Patient can be reached at:  Home number on file [phone] (home)    Additional comments: Lavonne says Dr Kowalski was going to call her with her lab results. She says she hasn't wanted to leave the house in fear of missing her call. She is home today and tomorrow will be home in the morning.     Call taken on 10/20/2022 at 4:01 PM by Patty Krishnan

## 2022-10-25 ENCOUNTER — LAB (OUTPATIENT)
Dept: LAB | Facility: CLINIC | Age: 82
End: 2022-10-25
Payer: COMMERCIAL

## 2022-10-25 DIAGNOSIS — M25.40 JOINT SWELLING: ICD-10-CM

## 2022-10-25 LAB
CRP SERPL-MCNC: 12.18 MG/L
ERYTHROCYTE [SEDIMENTATION RATE] IN BLOOD BY WESTERGREN METHOD: 43 MM/HR (ref 0–30)
URATE SERPL-MCNC: 6 MG/DL (ref 2.4–5.7)

## 2022-10-25 PROCEDURE — 86140 C-REACTIVE PROTEIN: CPT

## 2022-10-25 PROCEDURE — 36415 COLL VENOUS BLD VENIPUNCTURE: CPT

## 2022-10-25 PROCEDURE — 85652 RBC SED RATE AUTOMATED: CPT

## 2022-10-25 PROCEDURE — 84550 ASSAY OF BLOOD/URIC ACID: CPT

## 2022-10-27 ENCOUNTER — TELEPHONE (OUTPATIENT)
Dept: FAMILY MEDICINE | Facility: CLINIC | Age: 82
End: 2022-10-27

## 2022-10-27 DIAGNOSIS — M19.90 INFLAMMATORY ARTHRITIS: Primary | ICD-10-CM

## 2022-10-27 RX ORDER — PREDNISONE 10 MG/1
TABLET ORAL
Qty: 20 TABLET | Refills: 0 | Status: SHIPPED | OUTPATIENT
Start: 2022-10-27 | End: 2023-01-18

## 2022-10-27 NOTE — TELEPHONE ENCOUNTER
Marty Pt called to see if her Lab results were in, and she wanted Dr. Kowalski to take a look at them please.     Karime Castellanos RN

## 2022-11-01 ENCOUNTER — LAB (OUTPATIENT)
Dept: LAB | Facility: CLINIC | Age: 82
End: 2022-11-01
Payer: COMMERCIAL

## 2022-11-01 ENCOUNTER — TELEPHONE (OUTPATIENT)
Dept: FAMILY MEDICINE | Facility: CLINIC | Age: 82
End: 2022-11-01

## 2022-11-01 DIAGNOSIS — M19.90 INFLAMMATORY ARTHRITIS: ICD-10-CM

## 2022-11-01 LAB
CRP SERPL-MCNC: <3 MG/L
ERYTHROCYTE [SEDIMENTATION RATE] IN BLOOD BY WESTERGREN METHOD: 34 MM/HR (ref 0–30)

## 2022-11-01 PROCEDURE — 86038 ANTINUCLEAR ANTIBODIES: CPT

## 2022-11-01 PROCEDURE — 86140 C-REACTIVE PROTEIN: CPT

## 2022-11-01 PROCEDURE — 86200 CCP ANTIBODY: CPT

## 2022-11-01 PROCEDURE — 86618 LYME DISEASE ANTIBODY: CPT

## 2022-11-01 PROCEDURE — 86431 RHEUMATOID FACTOR QUANT: CPT

## 2022-11-01 PROCEDURE — 85652 RBC SED RATE AUTOMATED: CPT

## 2022-11-01 PROCEDURE — 36415 COLL VENOUS BLD VENIPUNCTURE: CPT

## 2022-11-01 NOTE — PROGRESS NOTES
Chief Complaint   Patient presents with     Ear Problem     Still having issues with left ear - possible tube - just found out exposed to Covid on Monday by 7 month old grandchild     History of Present Illness  Lavonne Tidwell is a 82 year old female who presents today for follow-up.  The patient was last evaluated on 9/28/2022.  She has a history of left otitis media with effusion.  We placed an ear tube in the left and treated her with drops on 12/18/2020.  When I last saw her, the ear tube had extruded.  There is significant inflammation and granulation the tympanic membrane.  We placed her on a course of Ciprodex drops.  She returns today for follow-up.      From a symptom standpoint, the patient reports that the left ear continues to be plugged and muffled, occasional pulsatile tinnitus on the left.  She denies any significant otalgia or otorrhea. No bloody otorrhea.  No dizziness or vertigo.  Aside from her ear tubes, no previous ear surgery.     The patient underwent an audiogram performed 9/28/2022.  My review of the audiogram showed normal hearing to 1000 Hz sloping to mild sensorineural hearing loss in the right ear and moderate sloping to moderately severe sloping to severe mixed hearing loss in the left ear.  Pure-tone average was 19 dB on the right and 71 dB on the left.  Speech reception threshold was 20 dB on the right and 70 dB on the left.  The patient had 100% word recognition on the right and 92% word recognition on the left.  The patient had a type A tympanogram on the right and a low volume type B tympanogram on the left.    From a nasal drainage standpoint, she has noticed more nasal drainage on the left-hand side and congestion.  She still has the irrigation supplies but is not currently irrigating her nose on a regular basis. She denies any dysphagia or odynophagia.  No hemoptysis.  No neck lumps/bumps/swelling.  She has a remote smoking history quitting in the early 1970s.     Past Medical  History  Patient Active Problem List   Diagnosis     Injury of sigmoid colon     Esophageal reflux     Menopausal syndrome (hot flashes)     Urinary incontinence     Atrophic vaginitis     Hyperlipidemia LDL goal <130     Advanced directives, counseling/discussion     Onychomycosis     Senile nuclear sclerosis     Status post total left knee replacement     Status post total right knee replacement     Primary localized osteoarthrosis, lower leg     Tubulovillous adenoma polyp of colon     Essential hypertension     Primary osteoarthritis involving multiple joints     Chronic bilateral low back pain without sciatica     Current Medications    Current Outpatient Medications:      Acetaminophen (TYLENOL PO), Take 1,000 mg by mouth 2 times daily as needed for mild pain or fever, Disp: , Rfl:      Biotin 10 MG CAPS, , Disp: , Rfl:      CALCIUM + D OR, 2 TABLET DAILY, Disp: , Rfl:      Docusate Calcium (STOOL SOFTENER PO), , Disp: , Rfl:      fluticasone (FLONASE) 50 MCG/ACT nasal spray, Spray 2 sprays into both nostrils daily, Disp: 47.4 mL, Rfl: 3     garlic 150 MG TABS tablet, Take 150 mg by mouth daily, Disp: , Rfl:      grape seed 50 MG CAPS, Take 50 mg by mouth daily, Disp: , Rfl:      lisinopril (ZESTRIL) 10 MG tablet, TAKE ONE TABLET BY MOUTH ONCE DAILY, Disp: 90 tablet, Rfl: 2     loratadine (CLARITIN) 10 MG tablet, Take 10 mg by mouth daily, Disp: , Rfl:      methylcellulose (CITRUCEL) 500 MG TABS tablet, Take 500 mg by mouth daily, Disp: , Rfl:      Multiple Vitamins-Minerals (CENTRUM SILVER) per tablet, Take 1 tablet by mouth daily, Disp: , Rfl:      omeprazole (PRILOSEC) 20 MG DR capsule, TAKE 1 CAPSULE (20 MG) BY MOUTH DAILY, Disp: 90 capsule, Rfl: 1     predniSONE (DELTASONE) 10 MG tablet, Take 3 tabs by mouth daily x 3 days, then 2 tabs daily x 3 days, then 1 tab daily x 3 days, then 1/2 tab daily x 3 days., Disp: 20 tablet, Rfl: 0     VITAMIN E COMPLEX PO, , Disp: , Rfl:      Zinc Sulfate (ZINC 15 PO), ,  "Disp: , Rfl:     Allergies  Allergies   Allergen Reactions     Codeine GI Disturbance       Social History  Social History     Socioeconomic History     Marital status:    Tobacco Use     Smoking status: Former     Packs/day: 0.50     Years: 5.00     Pack years: 2.50     Types: Cigarettes     Start date: 1968     Quit date: 2/15/1972     Years since quittin.7     Smokeless tobacco: Never     Tobacco comments:     stopped in her 20's   Substance and Sexual Activity     Alcohol use: Yes     Alcohol/week: 0.0 standard drinks     Comment: Occasional     Drug use: No     Sexual activity: Not Currently   Other Topics Concern     Parent/sibling w/ CABG, MI or angioplasty before 65F 55M? Yes       Family History  Family History   Problem Relation Age of Onset     Cancer Mother         Ovarian     Other Cancer Mother         Ovarian     Cerebrovascular Disease Father      Heart Disease Father      C.A.D. Father      Coronary Artery Disease Father      Cancer Maternal Grandmother      Other Cancer Maternal Grandmother         Ovarian/Bone     Cerebrovascular Disease Maternal Grandfather      Colon Cancer Maternal Grandfather      Diabetes Other         Great Grandmother     Breast Cancer No family hx of        Review of Systems  As per HPI and PMHx, otherwise 10 system review including the head and neck, constitutional, eyes, respiratory, GI, skin, neurologic, lymphatic, endocrine, and allergy systems is negative.    Physical Exam  /75 (BP Location: Right arm, Patient Position: Sitting, Cuff Size: Adult Regular)   Pulse 80   Temp 97.3  F (36.3  C) (Tympanic)   Ht 1.664 m (5' 5.5\")   BMI 28.35 kg/m    GENERAL: Patient is a pleasant, cooperative 82 year old female in no acute distress.  HEAD: Normocephalic, atraumatic.  Hair and scalp are normal.  EYES: Pupils are equal, round, reactive to light and accommodation.  Extraocular movements are intact.  The sclera nonicteric without injection.  The " extraocular structures are normal.  EARS: See below.   NEUROLOGIC: Cranial nerves II through XII are grossly intact.  Voice is strong.  Facial nerve examination incomplete due to the patient wearing a mask.  CARDIOVASCULAR: Extremities are warm and well-perfused.  No significant peripheral edema.  RESPIRATORY: Patient has nonlabored breathing without cough, wheeze, stridor.  PSYCHIATRIC: Patient is alert and oriented.  Mood and affect appear normal.  SKIN: Warm and dry.  No scalp, face, or neck lesions noted.     Physical Exam and Procedure     EARS: Normal shape and symmetry.  No tenderness when palpating the mastoid or tragal areas bilaterally.  The ears were then examined under the otic binocular microscope.  Otoscopic exam on the right reveals a clear canal.  The right tympanic membrane was round, intact without evidence of effusion.  No retraction, granulation, drainage, or evidence of cholesteatoma.       Attention was turned to the left ear.  Otoscopic exam on the left reveals ear canal.  The left tympanic membrane appears to be intact with some mild retraction and evidence of serous middle ear effusion.  No retraction pockets, granulation, drainage, or evidence of cholesteatoma.     Assessment and Plan     ICD-10-CM    1. Chronic otitis media of left ear with effusion  H65.492       2. Mixed conductive and sensorineural hearing loss of left ear with restricted hearing of right ear  H90.A32       3. Sensorineural hearing loss (SNHL) of right ear with restricted hearing of left ear  H90.A21          It was my pleasure seeing Lavonne Tidwell today in clinic.  Unfortunately, she continues to have middle ear effusion on the left-hand side.  We discussed myringotomy with ear tube placement using a T-tube today in office.  The patient would like to proceed.    We discussed the risks, benefits, alternatives, options of left myringotomy with 5 Mustafa T-tube placement including, but not limited to: Risk of  bleeding, risk of infection, risk of retained tympanostomy tube, risk of tympanic membrane perforation, risk of recurrent otorrhea, tympanostomy tube failure, potential need for additional procedures including tube removal/replacement.  We discussed the postoperative course and convalescence including using eardrops.  The patient voiced understanding and is willing to proceed.  Please see the procedure note for further details.    We will see the patient back in 2 to 3 months with an audiogram.      Rob Harris MD  Department of Otolaryngology-Head and Neck Surgery  [facility]

## 2022-11-01 NOTE — TELEPHONE ENCOUNTER
Reason for Call:  Flu / Covid Vaccine     Detailed comments: Pt is wondering if she can get her Flu/ Covid Vaccine. Pt has had Infections and pt is wondering if she can get these Vaccines.     Phone Number Patient can be reached at: Home number on file [phone] (home)    Best Time: Any Time      Can we leave a detailed message on this number? YES    Call taken on 11/1/2022 at 11:37 AM by Genesis Rodriguez

## 2022-11-02 ENCOUNTER — OFFICE VISIT (OUTPATIENT)
Dept: OTOLARYNGOLOGY | Facility: CLINIC | Age: 82
End: 2022-11-02
Payer: COMMERCIAL

## 2022-11-02 VITALS
DIASTOLIC BLOOD PRESSURE: 75 MMHG | BODY MASS INDEX: 28.35 KG/M2 | SYSTOLIC BLOOD PRESSURE: 136 MMHG | HEIGHT: 66 IN | HEART RATE: 80 BPM | TEMPERATURE: 97.3 F

## 2022-11-02 DIAGNOSIS — H90.A32 MIXED CONDUCTIVE AND SENSORINEURAL HEARING LOSS OF LEFT EAR WITH RESTRICTED HEARING OF RIGHT EAR: ICD-10-CM

## 2022-11-02 DIAGNOSIS — H65.492 CHRONIC OTITIS MEDIA OF LEFT EAR WITH EFFUSION: Primary | ICD-10-CM

## 2022-11-02 DIAGNOSIS — H90.A21 SENSORINEURAL HEARING LOSS (SNHL) OF RIGHT EAR WITH RESTRICTED HEARING OF LEFT EAR: ICD-10-CM

## 2022-11-02 LAB
ANA SER QL IF: NEGATIVE
B BURGDOR IGG+IGM SER QL: 0.04
CCP AB SER IA-ACNC: 0.9 U/ML
RHEUMATOID FACT SER NEPH-ACNC: 12 IU/ML

## 2022-11-02 PROCEDURE — 69433 CREATE EARDRUM OPENING: CPT | Mod: LT | Performed by: OTOLARYNGOLOGY

## 2022-11-02 PROCEDURE — 99213 OFFICE O/P EST LOW 20 MIN: CPT | Mod: 25 | Performed by: OTOLARYNGOLOGY

## 2022-11-02 NOTE — PATIENT INSTRUCTIONS
Per physician instructions      If you have questions or concerns on any instructions given to you by your provider today or if you need to schedule an appointment, you can reach us at [phone].

## 2022-11-02 NOTE — NURSING NOTE
"Initial /75 (BP Location: Right arm, Patient Position: Sitting, Cuff Size: Adult Regular)   Pulse 80   Temp 97.3  F (36.3  C) (Tympanic)   Ht 1.664 m (5' 5.5\")   BMI 28.35 kg/m   Estimated body mass index is 28.35 kg/m  as calculated from the following:    Height as of this encounter: 1.664 m (5' 5.5\").    Weight as of 10/18/22: 78.5 kg (173 lb). .    Marilee Tijerina CMA    "

## 2022-11-02 NOTE — LETTER
11/2/2022         RE: Lavonne Tidwell  [address] [facility] [address]        Dear Colleague,    Thank you for referring your patient, Lavonne Tidwell, to the [facility]. Please see a copy of my visit note below.    Chief Complaint   Patient presents with     Ear Problem     Still having issues with left ear - possible tube - just found out exposed to Covid on Monday by 7 month old grandchild     History of Present Illness  Lavonne Tidwell is a 82 year old female who presents today for follow-up.  The patient was last evaluated on 9/28/2022.  She has a history of left otitis media with effusion.  We placed an ear tube in the left and treated her with drops on 12/18/2020.  When I last saw her, the ear tube had extruded.  There is significant inflammation and granulation the tympanic membrane.  We placed her on a course of Ciprodex drops.  She returns today for follow-up.      From a symptom standpoint, the patient reports that the left ear continues to be plugged and muffled, occasional pulsatile tinnitus on the left.  She denies any significant otalgia or otorrhea. No bloody otorrhea.  No dizziness or vertigo.  Aside from her ear tubes, no previous ear surgery.     The patient underwent an audiogram performed 9/28/2022.  My review of the audiogram showed normal hearing to 1000 Hz sloping to mild sensorineural hearing loss in the right ear and moderate sloping to moderately severe sloping to severe mixed hearing loss in the left ear.  Pure-tone average was 19 dB on the right and 71 dB on the left.  Speech reception threshold was 20 dB on the right and 70 dB on the left.  The patient had 100% word recognition on the right and 92% word recognition on the left.  The patient had a type A tympanogram on the right and a low volume type B tympanogram on the left.    From a nasal drainage standpoint, she has noticed more nasal drainage on the left-hand side and congestion.  She still  has the irrigation supplies but is not currently irrigating her nose. She denies any dysphagia or odynophagia.  No hemoptysis.  No neck lumps/bumps/swelling.  She has a remote smoking history quitting in the early 1970s.     Past Medical History  Patient Active Problem List   Diagnosis     Injury of sigmoid colon     Esophageal reflux     Menopausal syndrome (hot flashes)     Urinary incontinence     Atrophic vaginitis     Hyperlipidemia LDL goal <130     Advanced directives, counseling/discussion     Onychomycosis     Senile nuclear sclerosis     Status post total left knee replacement     Status post total right knee replacement     Primary localized osteoarthrosis, lower leg     Tubulovillous adenoma polyp of colon     Essential hypertension     Primary osteoarthritis involving multiple joints     Chronic bilateral low back pain without sciatica     Current Medications    Current Outpatient Medications:      Acetaminophen (TYLENOL PO), Take 1,000 mg by mouth 2 times daily as needed for mild pain or fever, Disp: , Rfl:      Biotin 10 MG CAPS, , Disp: , Rfl:      CALCIUM + D OR, 2 TABLET DAILY, Disp: , Rfl:      Docusate Calcium (STOOL SOFTENER PO), , Disp: , Rfl:      fluticasone (FLONASE) 50 MCG/ACT nasal spray, Spray 2 sprays into both nostrils daily, Disp: 47.4 mL, Rfl: 3     garlic 150 MG TABS tablet, Take 150 mg by mouth daily, Disp: , Rfl:      grape seed 50 MG CAPS, Take 50 mg by mouth daily, Disp: , Rfl:      lisinopril (ZESTRIL) 10 MG tablet, TAKE ONE TABLET BY MOUTH ONCE DAILY, Disp: 90 tablet, Rfl: 2     loratadine (CLARITIN) 10 MG tablet, Take 10 mg by mouth daily, Disp: , Rfl:      methylcellulose (CITRUCEL) 500 MG TABS tablet, Take 500 mg by mouth daily, Disp: , Rfl:      Multiple Vitamins-Minerals (CENTRUM SILVER) per tablet, Take 1 tablet by mouth daily, Disp: , Rfl:      omeprazole (PRILOSEC) 20 MG DR capsule, TAKE 1 CAPSULE (20 MG) BY MOUTH DAILY, Disp: 90 capsule, Rfl: 1     predniSONE  "(DELTASONE) 10 MG tablet, Take 3 tabs by mouth daily x 3 days, then 2 tabs daily x 3 days, then 1 tab daily x 3 days, then 1/2 tab daily x 3 days., Disp: 20 tablet, Rfl: 0     VITAMIN E COMPLEX PO, , Disp: , Rfl:      Zinc Sulfate (ZINC 15 PO), , Disp: , Rfl:     Allergies  Allergies   Allergen Reactions     Codeine GI Disturbance       Social History  Social History     Socioeconomic History     Marital status:    Tobacco Use     Smoking status: Former     Packs/day: 0.50     Years: 5.00     Pack years: 2.50     Types: Cigarettes     Start date: 1968     Quit date: 2/15/1972     Years since quittin.7     Smokeless tobacco: Never     Tobacco comments:     stopped in her 20's   Substance and Sexual Activity     Alcohol use: Yes     Alcohol/week: 0.0 standard drinks     Comment: Occasional     Drug use: No     Sexual activity: Not Currently   Other Topics Concern     Parent/sibling w/ CABG, MI or angioplasty before 65F 55M? Yes       Family History  Family History   Problem Relation Age of Onset     Cancer Mother         Ovarian     Other Cancer Mother         Ovarian     Cerebrovascular Disease Father      Heart Disease Father      C.A.D. Father      Coronary Artery Disease Father      Cancer Maternal Grandmother      Other Cancer Maternal Grandmother         Ovarian/Bone     Cerebrovascular Disease Maternal Grandfather      Colon Cancer Maternal Grandfather      Diabetes Other         Great Grandmother     Breast Cancer No family hx of        Review of Systems  As per HPI and PMHx, otherwise 10 system review including the head and neck, constitutional, eyes, respiratory, GI, skin, neurologic, lymphatic, endocrine, and allergy systems is negative.    Physical Exam  /75 (BP Location: Right arm, Patient Position: Sitting, Cuff Size: Adult Regular)   Pulse 80   Temp 97.3  F (36.3  C) (Tympanic)   Ht 1.664 m (5' 5.5\")   BMI 28.35 kg/m    GENERAL: Patient is a pleasant, cooperative 82 year old " female in no acute distress.  HEAD: Normocephalic, atraumatic.  Hair and scalp are normal.  EYES: Pupils are equal, round, reactive to light and accommodation.  Extraocular movements are intact.  The sclera nonicteric without injection.  The extraocular structures are normal.  EARS: See below.   NEUROLOGIC: Cranial nerves II through XII are grossly intact.  Voice is strong.  Facial nerve examination incomplete due to the patient wearing a mask.  CARDIOVASCULAR: Extremities are warm and well-perfused.  No significant peripheral edema.  RESPIRATORY: Patient has nonlabored breathing without cough, wheeze, stridor.  PSYCHIATRIC: Patient is alert and oriented.  Mood and affect appear normal.  SKIN: Warm and dry.  No scalp, face, or neck lesions noted.     Physical Exam and Procedure     EARS: Normal shape and symmetry.  No tenderness when palpating the mastoid or tragal areas bilaterally.  The ears were then examined under the otic binocular microscope.  Otoscopic exam on the right reveals a clear canal.  The right tympanic membrane was round, intact without evidence of effusion.  No retraction, granulation, drainage, or evidence of cholesteatoma.       Attention was turned to the left ear.  Otoscopic exam on the left reveals ear canal.  The left tympanic membrane appears to be intact with some mild retraction and evidence of serous middle ear effusion.  No retraction pockets, granulation, drainage, or evidence of cholesteatoma.     Assessment and Plan     ICD-10-CM    1. Chronic otitis media of left ear with effusion  H65.492       2. Mixed conductive and sensorineural hearing loss of left ear with restricted hearing of right ear  H90.A32       3. Sensorineural hearing loss (SNHL) of right ear with restricted hearing of left ear  H90.A21          It was my pleasure seeing Lavonne Tidwell today in clinic.  Unfortunately, she continues to have middle ear effusion on the left-hand side.  We discussed myringotomy with ear  tube placement using a T-tube today in office.  The patient would like to proceed.    We discussed the risks, benefits, alternatives, options of left myringotomy with 5 Mustafa T-tube placement including, but not limited to: Risk of bleeding, risk of infection, risk of retained tympanostomy tube, risk of tympanic membrane perforation, risk of recurrent otorrhea, tympanostomy tube failure, potential need for additional procedures including tube removal/replacement.  We discussed the postoperative course and convalescence including using eardrops.  The patient voiced understanding and is willing to proceed.  Please see the procedure note for further details.    We will see the patient back in 2 to 3 months with an audiogram.      Rob Harris MD  Department of Otolaryngology-Head and Neck Surgery  [facility]         Again, thank you for allowing me to participate in the care of your patient.        Sincerely,        Rob Harris MD

## 2022-11-07 ENCOUNTER — TELEPHONE (OUTPATIENT)
Dept: FAMILY MEDICINE | Facility: CLINIC | Age: 82
End: 2022-11-07

## 2022-11-07 NOTE — TELEPHONE ENCOUNTER
Lavonne is aware that Dr. Kowalski is out of the office this week per Darleen, station  and she will address when she returns.     ENA Arora CNP

## 2022-11-07 NOTE — TELEPHONE ENCOUNTER
General Call    Contacts       Type Contact Phone/Fax    11/07/2022 01:15 PM CST Phone (Incoming) Lavonne Tidwell (Self) [phone] (H)        Reason for Call:  Pt states she had a conversation with Dr. Kowalski on 11/4/22 via Tele. Pt stating that Dex had informed her that there would be papers for her to  at the clinic with information on Arthritis and rheumatoid arthritis. Patient went to  forms and there was nothing for her.      What are your questions or concerns:  Patient is wonder when she can  papers from Dr. Kowalski.     Patient also wanted to inform provider that she is scheduled for Rheumatology in March of 2022    Date of last appointment with provider: 10/18/22.     Could we send this information to you in [employer] or would you prefer to receive a phone call?:   Patient would prefer a phone call   Okay to leave a detailed message?: Yes at Home number on file [phone] (home)

## 2022-11-19 ENCOUNTER — HEALTH MAINTENANCE LETTER (OUTPATIENT)
Age: 82
End: 2022-11-19

## 2022-11-22 ENCOUNTER — IMMUNIZATION (OUTPATIENT)
Dept: FAMILY MEDICINE | Facility: CLINIC | Age: 82
End: 2022-11-22
Payer: COMMERCIAL

## 2022-11-22 PROCEDURE — 90662 IIV NO PRSV INCREASED AG IM: CPT

## 2022-11-22 PROCEDURE — G0008 ADMIN INFLUENZA VIRUS VAC: HCPCS

## 2022-11-22 PROCEDURE — 0124A COVID-19,PF,PFIZER BOOSTER BIVALENT: CPT

## 2022-11-22 PROCEDURE — 91312 COVID-19,PF,PFIZER BOOSTER BIVALENT: CPT

## 2022-11-30 NOTE — PROGRESS NOTES
"[facility] Orthopaedics Progress Note      Post-operative Day: 2 Days Post-Op    Procedure(s):  Right Total Knee Arthroplasty - Wound Class: I-Clean      Subjective:    Pain: minimal  Chest pain, SOB:  No      Objective:  Blood pressure 132/52, pulse 81, temperature 98.4  F (36.9  C), temperature source Oral, resp. rate 16, height 1.651 m (5' 5\"), weight 78 kg (171 lb 15.3 oz), SpO2 94 %, not currently breastfeeding.    Patient Vitals for the past 24 hrs:   BP Temp Temp src Pulse Heart Rate Resp SpO2   02/25/17 0700 132/52 98.4  F (36.9  C) Oral - 72 16 94 %   02/25/17 0600 - 98.4  F (36.9  C) Oral - - - -   02/24/17 2322 130/69 99.6  F (37.6  C) Oral 81 - 16 95 %   02/24/17 1914 135/65 98.3  F (36.8  C) Oral 83 - 18 92 %   02/24/17 1532 121/57 99.5  F (37.5  C) Oral 79 - 18 94 %   02/24/17 1117 115/59 98.2  F (36.8  C) Oral 76 - 18 94 %       Wt Readings from Last 4 Encounters:   02/23/17 78 kg (171 lb 15.3 oz)   02/06/17 76.2 kg (168 lb)   07/19/16 76.4 kg (168 lb 6.4 oz)   01/06/16 75.8 kg (167 lb)         Motor function, sensation, and circulation intact   Yes  Wound status: incisions are clean dry and intact. Yes  Calf tenderness: Right  No    Pertinent Labs   Lab Results: personally reviewed.     Recent Labs   Lab Test  02/25/17   0630  02/24/17   0610  02/23/17   0855  02/06/17   0953  07/19/16   1011   12/22/15   0944   INR   --    --   0.96   --    --    --    --    HGB  10.6*  10.8*  12.7  12.9  13.3   < >  12.0   HCT   --    --   39.9  40.4  40.9   --   38.3   MCV   --    --   98  99  97   --   100   PLT   --    --   223  235  217   < >  349   NA   --    --    --   139  141   --   140    < > = values in this interval not displayed.       Plan: Anticoagulation protocol: Lovenox inpatient and then Xarelto at discharge x 14 days            Pain medications:  Tylenol or norco for breakthrough pain            Weight bearing status:  WBAT            Disposition:  TCU (pedro) tomorrow.  Patient is BPCI        "     Continue cares and rehabilitation     Report completed by:  Joseph Chen MD  Date: 2/25/2017  Time: 8:28 AM   .

## 2022-12-13 ENCOUNTER — OFFICE VISIT (OUTPATIENT)
Dept: FAMILY MEDICINE | Facility: CLINIC | Age: 82
End: 2022-12-13
Payer: COMMERCIAL

## 2022-12-13 ENCOUNTER — TELEPHONE (OUTPATIENT)
Dept: FAMILY MEDICINE | Facility: CLINIC | Age: 82
End: 2022-12-13

## 2022-12-13 ENCOUNTER — ANCILLARY PROCEDURE (OUTPATIENT)
Dept: GENERAL RADIOLOGY | Facility: CLINIC | Age: 82
End: 2022-12-13
Attending: FAMILY MEDICINE
Payer: COMMERCIAL

## 2022-12-13 VITALS
OXYGEN SATURATION: 97 % | HEART RATE: 76 BPM | DIASTOLIC BLOOD PRESSURE: 80 MMHG | BODY MASS INDEX: 28.35 KG/M2 | SYSTOLIC BLOOD PRESSURE: 135 MMHG | WEIGHT: 173 LBS | TEMPERATURE: 97.8 F | RESPIRATION RATE: 16 BRPM

## 2022-12-13 DIAGNOSIS — J18.9 PNEUMONIA OF RIGHT LOWER LOBE DUE TO INFECTIOUS ORGANISM: Primary | ICD-10-CM

## 2022-12-13 DIAGNOSIS — R05.2 SUBACUTE COUGH: ICD-10-CM

## 2022-12-13 PROCEDURE — 99214 OFFICE O/P EST MOD 30 MIN: CPT | Performed by: FAMILY MEDICINE

## 2022-12-13 PROCEDURE — 71046 X-RAY EXAM CHEST 2 VIEWS: CPT | Mod: TC | Performed by: RADIOLOGY

## 2022-12-13 RX ORDER — BENZONATATE 100 MG/1
100 CAPSULE ORAL 3 TIMES DAILY PRN
Qty: 30 CAPSULE | Refills: 0 | Status: SHIPPED | OUTPATIENT
Start: 2022-12-13 | End: 2023-01-25

## 2022-12-13 RX ORDER — AZITHROMYCIN 250 MG/1
TABLET, FILM COATED ORAL
Qty: 6 TABLET | Refills: 0 | Status: SHIPPED | OUTPATIENT
Start: 2022-12-13 | End: 2022-12-18

## 2022-12-13 ASSESSMENT — PAIN SCALES - GENERAL: PAINLEVEL: NO PAIN (0)

## 2022-12-13 NOTE — TELEPHONE ENCOUNTER
Patient called stating she is still sick from 12/3. Has head pain, cough, SOB, Congestion.     Patient is wondering if she should make an appointment? Or if she can be tested for Bronchitis or have Xray done for Pnuemonia ?     Wondering what her next steps are.       Could we send this information to you in [employer] or would you prefer to receive a phone call?:   Patient would prefer a phone call   Okay to leave a detailed message?: Yes at Home number on file [phone] (home)

## 2022-12-13 NOTE — NURSING NOTE
"Chief Complaint   Patient presents with     Cough     BP (!) 160/80   Pulse 76   Temp 97.8  F (36.6  C) (Tympanic)   Resp 16   Wt 78.5 kg (173 lb)   SpO2 97%   BMI 28.35 kg/m   Estimated body mass index is 28.35 kg/m  as calculated from the following:    Height as of 11/2/22: 1.664 m (5' 5.5\").    Weight as of this encounter: 78.5 kg (173 lb).  Patient presents to the clinic using No DME      Health Maintenance that is potentially due pending provider review:    Health Maintenance Due   Topic Date Due     DEXA  12/29/2021        n/a        "

## 2022-12-13 NOTE — PROGRESS NOTES
"  Assessment & Plan     Pneumonia of right lower lobe due to infectious organism  CAP in >65 Rx with the below   Non toxic VSS   - amoxicillin-clavulanate (AUGMENTIN) 875-125 MG tablet; Take 1 tablet by mouth 2 times daily  - azithromycin (ZITHROMAX) 250 MG tablet; Take 2 tablets (500 mg) by mouth daily for 1 day, THEN 1 tablet (250 mg) daily for 4 days.  - benzonatate (TESSALON) 100 MG capsule; Take 1 capsule (100 mg) by mouth 3 times daily as needed for cough    Subacute cough  Due to above   - XR Chest 2 Views; Future  - benzonatate (TESSALON) 100 MG capsule; Take 1 capsule (100 mg) by mouth 3 times daily as needed for cough             BMI:   Estimated body mass index is 28.35 kg/m  as calculated from the following:    Height as of 11/2/22: 1.664 m (5' 5.5\").    Weight as of this encounter: 78.5 kg (173 lb).   Weight management plan: Discussed healthy diet and exercise guidelines    Patient Instructions   Start antibiotic as directed     Tessalon for cough     Probiotic every day       Return in about 1 week (around 12/20/2022), or if symptoms worsen or fail to improve.    Elvira Kowalski MD  [facility]    Kirsty Banerjee is a 82 year old, presenting for the following health issues:  Cough      HPI     Acute Illness  Acute illness concerns: cough  Onset/Duration: 10 days  Symptoms:  Fever: feels feverish  Chills/Sweats: YES- sweats - getting better  Headache (location?): YES- when coughing  Sinus Pressure: No  Conjunctivitis:  No  Ear Pain: no  Rhinorrhea: YES  Congestion: YES  Sore Throat: YES  Cough: YES-non-productive, productive of clear sputum - maybe a little redness  Wheeze: No  Decreased Appetite: YES  Nausea: No  Vomiting: No  Diarrhea: YES  Dysuria/Freq.: No  Dysuria or Hematuria: No  Fatigue/Achiness: YES  Sick/Strep Exposure: No  Therapies tried and outcome: cold and flu medicine, ibuprofen          Review of Systems   Constitutional, HEENT, cardiovascular, " pulmonary, gi and gu systems are negative, except as otherwise noted.      Objective    BP (!) 160/80   Pulse 76   Temp 97.8  F (36.6  C) (Tympanic)   Resp 16   Wt 78.5 kg (173 lb)   SpO2 97%   BMI 28.35 kg/m    Body mass index is 28.35 kg/m .  Physical Exam   GENERAL APPEARANCE: healthy, alert and no distress  HENT: ear canals and TM's normal and nose and mouth without ulcers or lesions  NECK: no adenopathy, no asymmetry, masses, or scars and thyroid normal to palpation  RESP: rales R lower posterior  CV: regular rates and rhythm, normal S1 S2, no S3 or S4 and no murmur, click or rub  MS: extremities normal- no gross deformities noted  SKIN: no suspicious lesions or rashes  PSYCH: mentation appears normal and affect normal/bright

## 2023-01-17 NOTE — PROGRESS NOTES
Chief Complaint   Patient presents with     Ear Tube Follow Up     History of Present Illness  Lavonne Tidwell is a 82 year old female who presents today for follow-up. She has a history of left otitis media with effusion. The patient was last evaluated on 11/2/2022.  When I last saw her, the ear tube had extruded and we replaced a T-tube.  She returns today for follow-up.       From a symptom standpoint, the patient reports sniffing and improvement in her left ear symptoms.  The hearing is still down on the left but is much improved from previous.  She denies any significant otalgia or otorrhea. No bloody otorrhea.  No dizziness or vertigo.  Aside from her ear tubes, no previous ear surgery.     From a nasal drainage standpoint, she has noticed more nasal drainage on the left-hand side and congestion.  She still has the irrigation supplies but is not currently irrigating her nose on a regular basis. She denies any dysphagia or odynophagia.  No hemoptysis.  No neck lumps/bumps/swelling.  She has a remote smoking history quitting in the early 1970s.     Past Medical History  Patient Active Problem List   Diagnosis     Injury of sigmoid colon     Esophageal reflux     Menopausal syndrome (hot flashes)     Urinary incontinence     Atrophic vaginitis     Hyperlipidemia LDL goal <130     Advanced directives, counseling/discussion     Onychomycosis     Senile nuclear sclerosis     Status post total left knee replacement     Status post total right knee replacement     Primary localized osteoarthrosis, lower leg     Tubulovillous adenoma polyp of colon     Essential hypertension     Primary osteoarthritis involving multiple joints     Chronic bilateral low back pain without sciatica     Current Medications    Current Outpatient Medications:      Acetaminophen (TYLENOL PO), Take 1,000 mg by mouth 2 times daily as needed for mild pain or fever, Disp: , Rfl:      benzonatate (TESSALON) 100 MG capsule, Take 1 capsule (100 mg)  by mouth 3 times daily as needed for cough, Disp: 30 capsule, Rfl: 0     Biotin 10 MG CAPS, , Disp: , Rfl:      budesonide (PULMICORT) 0.5 MG/2ML neb solution, Place 0.5 mg/2 mL budesonide vial in 8 oz normal saline sinus rinse bottle.  Irrigate each nostril with one half of the bottle daily., Disp: 180 mL, Rfl: 3     CALCIUM + D OR, 2 TABLET DAILY, Disp: , Rfl:      Docusate Calcium (STOOL SOFTENER PO), , Disp: , Rfl:      fluticasone (FLONASE) 50 MCG/ACT nasal spray, Spray 2 sprays into both nostrils daily, Disp: 47.4 mL, Rfl: 3     garlic 150 MG TABS tablet, Take 150 mg by mouth daily, Disp: , Rfl:      grape seed 50 MG CAPS, Take 50 mg by mouth daily, Disp: , Rfl:      lisinopril (ZESTRIL) 10 MG tablet, TAKE ONE TABLET BY MOUTH ONCE DAILY, Disp: 90 tablet, Rfl: 2     loratadine (CLARITIN) 10 MG tablet, Take 10 mg by mouth daily, Disp: , Rfl:      methylcellulose (CITRUCEL) 500 MG TABS tablet, Take 500 mg by mouth daily, Disp: , Rfl:      Multiple Vitamins-Minerals (CENTRUM SILVER) per tablet, Take 1 tablet by mouth daily, Disp: , Rfl:      omeprazole (PRILOSEC) 20 MG DR capsule, TAKE 1 CAPSULE (20 MG) BY MOUTH DAILY, Disp: 90 capsule, Rfl: 1     VITAMIN E COMPLEX PO, , Disp: , Rfl:      Zinc Sulfate (ZINC 15 PO), , Disp: , Rfl:     Allergies  Allergies   Allergen Reactions     Codeine GI Disturbance       Social History  Social History     Socioeconomic History     Marital status:    Tobacco Use     Smoking status: Former     Packs/day: 0.50     Years: 5.00     Pack years: 2.50     Types: Cigarettes     Start date: 1968     Quit date: 2/15/1972     Years since quittin.9     Smokeless tobacco: Never     Tobacco comments:     stopped in her 20's   Substance and Sexual Activity     Alcohol use: Yes     Alcohol/week: 0.0 standard drinks     Comment: Occasional     Drug use: No     Sexual activity: Not Currently   Other Topics Concern     Parent/sibling w/ CABG, MI or angioplasty before 65F 55M? Yes        Family History  Family History   Problem Relation Age of Onset     Cancer Mother         Ovarian     Other Cancer Mother         Ovarian     Cerebrovascular Disease Father      Heart Disease Father      C.A.D. Father      Coronary Artery Disease Father      Cancer Maternal Grandmother      Other Cancer Maternal Grandmother         Ovarian/Bone     Cerebrovascular Disease Maternal Grandfather      Colon Cancer Maternal Grandfather      Diabetes Other         Great Grandmother     Breast Cancer No family hx of        Review of Systems  As per HPI and PMHx, otherwise 10 system review including the head and neck, constitutional, eyes, respiratory, GI, skin, neurologic, lymphatic, endocrine, and allergy systems is negative.    Physical Exam  There were no vitals taken for this visit.  GENERAL: Patient is a pleasant, cooperative 82 year old female in no acute distress.  HEAD: Normocephalic, atraumatic.  Hair and scalp are normal.  EYES: Pupils are equal, round, reactive to light and accommodation.  Extraocular movements are intact.  The sclera nonicteric without injection.  The extraocular structures are normal.  EARS: Normal shape and symmetry.  No tenderness when palpating the mastoid or tragal areas bilaterally.  Otoscopic exam on the right reveals a moderate amount of cerumen.  The right tympanic membrane was round, intact without evidence of effusion.  No retraction, granulation, drainage, or evidence of cholesteatoma. Attention was turned to the left ear.  Otoscopic exam on the left reveals some mild cerumen and crusting surrounding her modified Mustafa T-tube in the posterior-inferior quadrant.  The middle ear is well aerated.  No active drainage, tympanic membrane retraction, or evidence of cholesteatoma.    NEUROLOGIC: Cranial nerves II through XII are grossly intact.  Voice is strong.  Facial nerve examination incomplete due to the patient wearing a mask.  CARDIOVASCULAR: Extremities are warm and  well-perfused.  No significant peripheral edema.  RESPIRATORY: Patient has nonlabored breathing without cough, wheeze, stridor.  PSYCHIATRIC: Patient is alert and oriented.  Mood and affect appear normal.  SKIN: Warm and dry.  No scalp, face, or neck lesions noted.    Audiogram  The patient underwent an audiogram performed today.  My review of the audiogram shows normal hearing to 1000 Hz sloping to mild sloping to moderate sensorineural hearing loss in the right ear and mild sloping to moderate sloping to moderately severe mixed hearing loss in the left ear.  Pure-tone average is 20 dB on the right and 45 dB on the left.  Speech reception threshold is 20 dB on the right and 35 dB on the left.  The patient had 100% word recognition on the right and 88% word recognition on the left.  The patient had a type A tympanogram on the right and a large volume type B tympanogram on the left.    Assessment and Plan     ICD-10-CM    1. Chronic otitis media of left ear with effusion  H65.492 Adult Audiology  Referral     budesonide (PULMICORT) 0.5 MG/2ML neb solution      2. Mixed conductive and sensorineural hearing loss of left ear with restricted hearing of right ear  H90.A32 Adult Audiology  Referral     budesonide (PULMICORT) 0.5 MG/2ML neb solution      3. Sensorineural hearing loss (SNHL) of right ear with restricted hearing of left ear  H90.A21 Adult Audiology  Referral     budesonide (PULMICORT) 0.5 MG/2ML neb solution      4. Retained myringotomy tube in left ear  Z96.22 Adult Audiology  Referral     budesonide (PULMICORT) 0.5 MG/2ML neb solution      5. Nasal obstruction  J34.89 Adult Audiology  Referral     budesonide (PULMICORT) 0.5 MG/2ML neb solution      6. Post-nasal drainage  R09.82 Adult Audiology  Referral     budesonide (PULMICORT) 0.5 MG/2ML neb solution         It was my pleasure seeing Lavonne Tidwell today in clinic.  The left ear tube is in good  placement and her hearing is improved.  Its not quite back to baseline but she can notice a subjective improvement.  We did briefly discussed a hearing aid for the left ear.  At this time, the patient would like for observation. The patient can return in 6 months for routine ear tube check.  If she develops drainage, the patient knows to contact me and we would treat her with drops.    From a nose and sinus standpoint, the patient is interested in a refill of her budesonide.  I did provide her with a new prescription.  The patient knows to contact me if her symptoms are worsening.  She also knows to contact me if budesonide is too expensive.    Rob Harris MD  Department of Otolaryngology-Head and Neck Surgery  [facility]

## 2023-01-18 ENCOUNTER — OFFICE VISIT (OUTPATIENT)
Dept: AUDIOLOGY | Facility: CLINIC | Age: 83
End: 2023-01-18
Payer: COMMERCIAL

## 2023-01-18 ENCOUNTER — OFFICE VISIT (OUTPATIENT)
Dept: OTOLARYNGOLOGY | Facility: CLINIC | Age: 83
End: 2023-01-18
Payer: COMMERCIAL

## 2023-01-18 DIAGNOSIS — H65.492 CHRONIC OTITIS MEDIA OF LEFT EAR WITH EFFUSION: Primary | ICD-10-CM

## 2023-01-18 DIAGNOSIS — R09.82 POST-NASAL DRAINAGE: ICD-10-CM

## 2023-01-18 DIAGNOSIS — H65.492 CHRONIC OTITIS MEDIA OF LEFT EAR WITH EFFUSION: ICD-10-CM

## 2023-01-18 DIAGNOSIS — Z96.22 RETAINED MYRINGOTOMY TUBE IN LEFT EAR: ICD-10-CM

## 2023-01-18 DIAGNOSIS — H90.A21 SENSORINEURAL HEARING LOSS (SNHL) OF RIGHT EAR WITH RESTRICTED HEARING OF LEFT EAR: Primary | ICD-10-CM

## 2023-01-18 DIAGNOSIS — J34.89 NASAL OBSTRUCTION: ICD-10-CM

## 2023-01-18 DIAGNOSIS — H90.A21 SENSORINEURAL HEARING LOSS (SNHL) OF RIGHT EAR WITH RESTRICTED HEARING OF LEFT EAR: ICD-10-CM

## 2023-01-18 DIAGNOSIS — H90.A32 MIXED CONDUCTIVE AND SENSORINEURAL HEARING LOSS OF LEFT EAR WITH RESTRICTED HEARING OF RIGHT EAR: ICD-10-CM

## 2023-01-18 PROCEDURE — 92557 COMPREHENSIVE HEARING TEST: CPT | Performed by: AUDIOLOGIST

## 2023-01-18 PROCEDURE — 99213 OFFICE O/P EST LOW 20 MIN: CPT | Performed by: OTOLARYNGOLOGY

## 2023-01-18 PROCEDURE — 92567 TYMPANOMETRY: CPT | Performed by: AUDIOLOGIST

## 2023-01-18 RX ORDER — BUDESONIDE 0.5 MG/2ML
INHALANT ORAL
Qty: 180 ML | Refills: 3 | Status: SHIPPED | OUTPATIENT
Start: 2023-01-18 | End: 2023-06-12

## 2023-01-18 NOTE — LETTER
1/18/2023         RE: Lavonne Tidwell  [address] [facility] [address]        Dear Colleague,    Thank you for referring your patient, Lavonne Tidwell, to the [facility]. Please see a copy of my visit note below.    Chief Complaint   Patient presents with     Ear Tube Follow Up     History of Present Illness  Lavonne Tidwell is a 82 year old female who presents today for follow-up. She has a history of left otitis media with effusion. The patient was last evaluated on 11/2/2022.  When I last saw her, the ear tube had extruded and we replaced a T-tube.  She returns today for follow-up.       From a symptom standpoint, the patient reports sniffing and improvement in her left ear symptoms.  The hearing is still down on the left but is much improved from previous.  She denies any significant otalgia or otorrhea. No bloody otorrhea.  No dizziness or vertigo.  Aside from her ear tubes, no previous ear surgery.     From a nasal drainage standpoint, she has noticed more nasal drainage on the left-hand side and congestion.  She still has the irrigation supplies but is not currently irrigating her nose on a regular basis. She denies any dysphagia or odynophagia.  No hemoptysis.  No neck lumps/bumps/swelling.  She has a remote smoking history quitting in the early 1970s.     Past Medical History  Patient Active Problem List   Diagnosis     Injury of sigmoid colon     Esophageal reflux     Menopausal syndrome (hot flashes)     Urinary incontinence     Atrophic vaginitis     Hyperlipidemia LDL goal <130     Advanced directives, counseling/discussion     Onychomycosis     Senile nuclear sclerosis     Status post total left knee replacement     Status post total right knee replacement     Primary localized osteoarthrosis, lower leg     Tubulovillous adenoma polyp of colon     Essential hypertension     Primary osteoarthritis involving multiple joints     Chronic bilateral low back pain  without sciatica     Current Medications    Current Outpatient Medications:      Acetaminophen (TYLENOL PO), Take 1,000 mg by mouth 2 times daily as needed for mild pain or fever, Disp: , Rfl:      benzonatate (TESSALON) 100 MG capsule, Take 1 capsule (100 mg) by mouth 3 times daily as needed for cough, Disp: 30 capsule, Rfl: 0     Biotin 10 MG CAPS, , Disp: , Rfl:      budesonide (PULMICORT) 0.5 MG/2ML neb solution, Place 0.5 mg/2 mL budesonide vial in 8 oz normal saline sinus rinse bottle.  Irrigate each nostril with one half of the bottle daily., Disp: 180 mL, Rfl: 3     CALCIUM + D OR, 2 TABLET DAILY, Disp: , Rfl:      Docusate Calcium (STOOL SOFTENER PO), , Disp: , Rfl:      fluticasone (FLONASE) 50 MCG/ACT nasal spray, Spray 2 sprays into both nostrils daily, Disp: 47.4 mL, Rfl: 3     garlic 150 MG TABS tablet, Take 150 mg by mouth daily, Disp: , Rfl:      grape seed 50 MG CAPS, Take 50 mg by mouth daily, Disp: , Rfl:      lisinopril (ZESTRIL) 10 MG tablet, TAKE ONE TABLET BY MOUTH ONCE DAILY, Disp: 90 tablet, Rfl: 2     loratadine (CLARITIN) 10 MG tablet, Take 10 mg by mouth daily, Disp: , Rfl:      methylcellulose (CITRUCEL) 500 MG TABS tablet, Take 500 mg by mouth daily, Disp: , Rfl:      Multiple Vitamins-Minerals (CENTRUM SILVER) per tablet, Take 1 tablet by mouth daily, Disp: , Rfl:      omeprazole (PRILOSEC) 20 MG DR capsule, TAKE 1 CAPSULE (20 MG) BY MOUTH DAILY, Disp: 90 capsule, Rfl: 1     VITAMIN E COMPLEX PO, , Disp: , Rfl:      Zinc Sulfate (ZINC 15 PO), , Disp: , Rfl:     Allergies  Allergies   Allergen Reactions     Codeine GI Disturbance       Social History  Social History     Socioeconomic History     Marital status:    Tobacco Use     Smoking status: Former     Packs/day: 0.50     Years: 5.00     Pack years: 2.50     Types: Cigarettes     Start date: 1968     Quit date: 2/15/1972     Years since quittin.9     Smokeless tobacco: Never     Tobacco comments:     stopped in her  20's   Substance and Sexual Activity     Alcohol use: Yes     Alcohol/week: 0.0 standard drinks     Comment: Occasional     Drug use: No     Sexual activity: Not Currently   Other Topics Concern     Parent/sibling w/ CABG, MI or angioplasty before 65F 55M? Yes       Family History  Family History   Problem Relation Age of Onset     Cancer Mother         Ovarian     Other Cancer Mother         Ovarian     Cerebrovascular Disease Father      Heart Disease Father      C.A.D. Father      Coronary Artery Disease Father      Cancer Maternal Grandmother      Other Cancer Maternal Grandmother         Ovarian/Bone     Cerebrovascular Disease Maternal Grandfather      Colon Cancer Maternal Grandfather      Diabetes Other         Great Grandmother     Breast Cancer No family hx of        Review of Systems  As per HPI and PMHx, otherwise 10 system review including the head and neck, constitutional, eyes, respiratory, GI, skin, neurologic, lymphatic, endocrine, and allergy systems is negative.    Physical Exam  There were no vitals taken for this visit.  GENERAL: Patient is a pleasant, cooperative 82 year old female in no acute distress.  HEAD: Normocephalic, atraumatic.  Hair and scalp are normal.  EYES: Pupils are equal, round, reactive to light and accommodation.  Extraocular movements are intact.  The sclera nonicteric without injection.  The extraocular structures are normal.  EARS: See below.   NEUROLOGIC: Cranial nerves II through XII are grossly intact.  Voice is strong.  Facial nerve examination incomplete due to the patient wearing a mask.  CARDIOVASCULAR: Extremities are warm and well-perfused.  No significant peripheral edema.  RESPIRATORY: Patient has nonlabored breathing without cough, wheeze, stridor.  PSYCHIATRIC: Patient is alert and oriented.  Mood and affect appear normal.  SKIN: Warm and dry.  No scalp, face, or neck lesions noted.     Physical Exam and Procedure     EARS: Normal shape and symmetry.  No  tenderness when palpating the mastoid or tragal areas bilaterally.  Otoscopic exam on the right reveals a moderate amount of cerumen.  The right tympanic membrane was round, intact without evidence of effusion.  No retraction, granulation, drainage, or evidence of cholesteatoma.       Attention was turned to the left ear.  Otoscopic exam on the left reveals some mild cerumen and crusting surrounding her modified Mustafa T-tube in the posterior-inferior quadrant.  The middle ear is well aerated.  No active drainage, tympanic membrane retraction, or evidence of cholesteatoma.      Audiogram  The patient underwent an audiogram performed today.  My review of the audiogram shows normal hearing to 1000 Hz sloping to mild sloping to moderate sensorineural hearing loss in the right ear and mild sloping to moderate sloping to moderately severe mixed hearing loss in the left ear.  Pure-tone average is 20 dB on the right and 45 dB on the left.  Speech reception threshold is 20 dB on the right and 35 dB on the left.  The patient had 100% word recognition on the right and 88% word recognition on the left.  The patient had a type A tympanogram on the right and a large volume type B tympanogram on the left.    Assessment and Plan     ICD-10-CM    1. Chronic otitis media of left ear with effusion  H65.492 Adult Audiology  Referral     budesonide (PULMICORT) 0.5 MG/2ML neb solution      2. Mixed conductive and sensorineural hearing loss of left ear with restricted hearing of right ear  H90.A32 Adult Audiology  Referral     budesonide (PULMICORT) 0.5 MG/2ML neb solution      3. Sensorineural hearing loss (SNHL) of right ear with restricted hearing of left ear  H90.A21 Adult Audiology  Referral     budesonide (PULMICORT) 0.5 MG/2ML neb solution      4. Retained myringotomy tube in left ear  Z96.22 Adult Audiology  Referral     budesonide (PULMICORT) 0.5 MG/2ML neb solution      5. Nasal obstruction   J34.89 Adult Audiology  Referral     budesonide (PULMICORT) 0.5 MG/2ML neb solution      6. Post-nasal drainage  R09.82 Adult Audiology  Referral     budesonide (PULMICORT) 0.5 MG/2ML neb solution         It was my pleasure seeing Lavonne Tidwell today in clinic.  The left ear tube is in good placement and her hearing is improved.  Its not quite back to baseline but she can notice a subjective improvement.  We did briefly discussed a hearing aid for the left ear.  At this time, the patient would like for observation. The patient can return in 6 months for routine ear tube check.  If she develops drainage, the patient knows to contact me and we would treat her with drops.    From a nose and sinus standpoint, the patient is interested in a refill of her budesonide.  I did provide her with a new prescription.  The patient knows to contact me if her symptoms are worsening.  She also knows to contact me if budesonide is too expensive.    Rob Harris MD  Department of Otolaryngology-Head and Neck Surgery  [facility]         Again, thank you for allowing me to participate in the care of your patient.        Sincerely,        Rob Harris MD

## 2023-01-18 NOTE — PROGRESS NOTES
AUDIOLOGY REPORT    SUBJECTIVE:  Lavonne Tidwell is a 82 year old female who was seen in the Audiology Clinic at the [facility] for audiologic evaluation, referred by Rob Harris M.D. .The patient has been seen previously in this clinic on 9/28/2022 for assessment and results indicated decreased hearing in the left ear. The patient reports a T-tube was placed in office on 11/2/2022. She states her hearing is now much better. The patient denies  bilateral otalgia and bilateral drainage.  The patient notes limited difficulty with communication in a variety of listening situations.  They were accompanied today by their self.    OBJECTIVE:    Otoscopic exam indicates PE tubes present in the left ear and ears are clear of cerumen bilaterally     Pure Tone Thresholds assessed using conventional audiometry with good  reliability from 250-8000 Hz bilaterally using insert earphones and circumaural headphones     RIGHT:  normal sloping to mild and moderate sensorineural hearing loss    LEFT:    mild sloping to moderate and severe mixed hearing loss    Tympanogram:    RIGHT: normal eardrum mobility    LEFT:   large ear canal volume consistent with patent PE tubes    Speech Reception Threshold:    RIGHT: 20 dB HL    LEFT:   35 dB HL  Word Recognition Score:     RIGHT: 100% at 60 dB HL using NU-6 recorded word list.    LEFT:   88% at 70 dB HL using NU-6 recorded word list.      ASSESSMENT:     ICD-10-CM    1. Sensorineural hearing loss (SNHL) of right ear with restricted hearing of left ear  H90.A21       2. Mixed conductive and sensorineural hearing loss of left ear with restricted hearing of right ear  H90.A32           Compared to patient's previous audiogram dated 9/28/2022, hearing has improved in the left ear and remained stable in the right ear. Today s results were discussed with the patient in detail.     PLAN:  Patient was counseled regarding hearing loss and impact on communication. Patient is a  good candidate for left ear amplification at this time. She reports she is not interested in hearing aids at this time.  It is recommended that the patient be seen by Dr. Harris for medical evaluation of their ears and hearing evaluation.  Please call this clinic with questions regarding these results or recommendations.        Sherri Matthew M.A. -[facility], #[id number]

## 2023-01-23 DIAGNOSIS — K21.9 GASTROESOPHAGEAL REFLUX DISEASE WITHOUT ESOPHAGITIS: ICD-10-CM

## 2023-01-23 DIAGNOSIS — Z87.09 HISTORY OF ALLERGIC RHINITIS: ICD-10-CM

## 2023-01-23 RX ORDER — FLUTICASONE PROPIONATE 50 MCG
2 SPRAY, SUSPENSION (ML) NASAL DAILY
Qty: 47.4 ML | Refills: 3 | Status: SHIPPED | OUTPATIENT
Start: 2023-01-23 | End: 2023-01-25

## 2023-01-24 ENCOUNTER — TELEPHONE (OUTPATIENT)
Dept: FAMILY MEDICINE | Facility: CLINIC | Age: 83
End: 2023-01-24
Payer: COMMERCIAL

## 2023-01-24 NOTE — TELEPHONE ENCOUNTER
Patient calling. States she has had a sore throat for about 3 weeks. Recently started to get congested and now losing her voice.     States she had pneumonia recently and wondering if she hasn't recovered from that? Wanting to know what she should do next or if Dr. Kowalski would prescribe a medication?         Preferred Pharmacy:  [address] [address]  Phone: [phone] Fax: [phone]  Could we send this information to you in [facility] or would you prefer to receive a phone call?:   Patient would prefer a phone call   Okay to leave a detailed message?: Yes at Home number on file [phone] (home)

## 2023-01-24 NOTE — TELEPHONE ENCOUNTER
S-(situation): ST going on 3 wks, lt side worse. Sinus pressure, nasal congestion.     B-(background): Ongoing ST going on 2 wks, has developed lt side face/ sinus pressure, congestion worse past 2 days. Hx chronic lt otitis, saw ENT 11-02-22, OV note reviewed. Pneumonia med Dec, not sure is sx have every really resolved.     A-(assessment): Ongoing ST, worse lt side; sinus pressure/ pain also worse on lt side. Has had some PND with runny nose. No more nasal stuffiness with thick yellowish mucous. No fevers, chills. Denies body aches, headaches. Denies ear pressure/ pain, cough congestion. No recent home COVID testing. Using flonase nasal spray, takes OTC antihistamine regularly, throat lozenges. Has ordered humidifier.     R-(recommendations): Advise appt for eval, scheduled with Dr. Kowalski tomorrow AM. In meantime advise salt water gargles, OTC analgesic as needed. Added humidity in home, steam self in shower. WMP to sinus areas.  Push fluids.   FRANCISCA Bedoya RN

## 2023-01-25 ENCOUNTER — OFFICE VISIT (OUTPATIENT)
Dept: FAMILY MEDICINE | Facility: CLINIC | Age: 83
End: 2023-01-25
Payer: COMMERCIAL

## 2023-01-25 VITALS
WEIGHT: 171 LBS | DIASTOLIC BLOOD PRESSURE: 60 MMHG | SYSTOLIC BLOOD PRESSURE: 104 MMHG | RESPIRATION RATE: 16 BRPM | HEART RATE: 87 BPM | BODY MASS INDEX: 28.02 KG/M2 | OXYGEN SATURATION: 99 % | TEMPERATURE: 97.8 F

## 2023-01-25 DIAGNOSIS — J01.00 ACUTE NON-RECURRENT MAXILLARY SINUSITIS: Primary | ICD-10-CM

## 2023-01-25 PROCEDURE — 99213 OFFICE O/P EST LOW 20 MIN: CPT | Performed by: FAMILY MEDICINE

## 2023-01-25 RX ORDER — DOXYCYCLINE 100 MG/1
100 CAPSULE ORAL 2 TIMES DAILY
Qty: 20 CAPSULE | Refills: 0 | Status: SHIPPED | OUTPATIENT
Start: 2023-01-25 | End: 2023-02-14

## 2023-01-25 ASSESSMENT — PATIENT HEALTH QUESTIONNAIRE - PHQ9
SUM OF ALL RESPONSES TO PHQ QUESTIONS 1-9: 3
10. IF YOU CHECKED OFF ANY PROBLEMS, HOW DIFFICULT HAVE THESE PROBLEMS MADE IT FOR YOU TO DO YOUR WORK, TAKE CARE OF THINGS AT HOME, OR GET ALONG WITH OTHER PEOPLE: NOT DIFFICULT AT ALL
SUM OF ALL RESPONSES TO PHQ QUESTIONS 1-9: 3

## 2023-01-25 ASSESSMENT — PAIN SCALES - GENERAL: PAINLEVEL: NO PAIN (0)

## 2023-01-25 NOTE — NURSING NOTE
"Chief Complaint   Patient presents with     Pharyngitis     Sinus congestion x 3 weeks     /60   Pulse 87   Temp 97.8  F (36.6  C) (Tympanic)   Resp 16   Wt 77.6 kg (171 lb)   SpO2 99%   BMI 28.02 kg/m   Estimated body mass index is 28.02 kg/m  as calculated from the following:    Height as of 11/2/22: 1.664 m (5' 5.5\").    Weight as of this encounter: 77.6 kg (171 lb).  Patient presents to the clinic using No DME      Health Maintenance that is potentially due pending provider review:    Health Maintenance Due   Topic Date Due     DEXA  12/29/2021        n/a        "

## 2023-01-25 NOTE — PROGRESS NOTES
Assessment & Plan     Acute non-recurrent maxillary sinusitis    - doxycycline hyclate (VIBRAMYCIN) 100 MG capsule; Take 1 capsule (100 mg) by mouth 2 times daily             Patient Instructions   Go back to the sinus irrigation and flonase as directed by ENT     Antibiotic to help with infection       Return in about 2 weeks (around 2/8/2023), or if symptoms worsen or fail to improve.    Elvira Kowalski MD  [facility]    Kirsty Banerjee is a 82 year old, presenting for the following health issues:  Pharyngitis (Sinus congestion x 3 weeks)      History of Present Illness       Reason for visit:  Sore throat and sinus  Symptom onset:  1-2 weeks ago  Symptoms include:  Sore throat  Symptom intensity:  Moderate  Symptom progression:  Worsening  Had these symptoms before:  No  What makes it worse:  No  What makes it better:  Cold water or cold coke with ice    She eats 2-3 servings of fruits and vegetables daily.She consumes 1 sweetened beverage(s) daily.She exercises with enough effort to increase her heart rate 9 or less minutes per day.  She exercises with enough effort to increase her heart rate 3 or less days per week.   She is taking medications regularly.    Today's PHQ-9         PHQ-9 Total Score: 3    PHQ-9 Q9 Thoughts of better off dead/self-harm past 2 weeks :   Not at all    How difficult have these problems made it for you to do your work, take care of things at home, or get along with other people: Not difficult at all     Sinus congestion  Coughing some the last couple days - no color to phlegm  Facial congestion on left side  Sore throat  No fevers  No Conjunctivitis             Review of Systems   Constitutional, HEENT, cardiovascular, pulmonary, gi and gu systems are negative, except as otherwise noted.      Objective    /60   Pulse 87   Temp 97.8  F (36.6  C) (Tympanic)   Resp 16   Wt 77.6 kg (171 lb)   SpO2 99%   BMI 28.02 kg/m    Body mass index is  28.02 kg/m .  Physical Exam   GENERAL APPEARANCE: healthy, alert and no distress  HENT: ear canals and TM's normal and rhinorrhea green  NECK: no adenopathy, no asymmetry, masses, or scars and thyroid normal to palpation  RESP: lungs clear to auscultation - no rales, rhonchi or wheezes  CV: regular rates and rhythm, normal S1 S2, no S3 or S4 and no murmur, click or rub  PSYCH: mentation appears normal and affect normal/bright

## 2023-01-25 NOTE — PATIENT INSTRUCTIONS
Go back to the sinus irrigation and flonase as directed by ENT     Antibiotic to help with infection

## 2023-02-13 ENCOUNTER — TELEPHONE (OUTPATIENT)
Dept: FAMILY MEDICINE | Facility: CLINIC | Age: 83
End: 2023-02-13
Payer: COMMERCIAL

## 2023-02-13 DIAGNOSIS — J01.00 ACUTE NON-RECURRENT MAXILLARY SINUSITIS: ICD-10-CM

## 2023-02-13 NOTE — TELEPHONE ENCOUNTER
Reason for Call:  Sinus infection and Left Arm swelling     Detailed comments:   1) Pt was treated for Sinus infection took last of Antibiotic 1 week ago. Pt started Friday with the symptoms returning. Was told to call back if symptoms return.   2) Pt has been seen several times for her Left arm swelling.  Pt said flare up started last week.  Pt does have appt last week in March to see Rhuematologist.   Please Advise next step   Phone Number Patient can be reached at: Home number on file [phone] (home)    Best Time: Any Time      Can we leave a detailed message on this number? YES    Call taken on 2/13/2023 at 9:44 AM by Genesis Rodriguez

## 2023-02-14 RX ORDER — DOXYCYCLINE 100 MG/1
100 CAPSULE ORAL 2 TIMES DAILY
Qty: 20 CAPSULE | Refills: 0 | Status: SHIPPED | OUTPATIENT
Start: 2023-02-14 | End: 2023-03-15

## 2023-02-14 NOTE — TELEPHONE ENCOUNTER
At this point there is nothing I can do regarding her arm and would wait for rheumatology   If this is miserable would consider trying another course of prednisone but we have been working on this for many months and now march is not that far away     I did refill doxy for her but likely this is the end part of the sinus and the abx may or may not help

## 2023-02-14 NOTE — TELEPHONE ENCOUNTER
Patient is notified. She will continue to monitor the arm, she will call back if becomes painful and ask for the prednisone.      STEW Chavez

## 2023-02-14 NOTE — TELEPHONE ENCOUNTER
Dr. Kowalski:    Patient was called, she is reporting increased drainage in the back of her throat causing a sore throat since sinus infection and feels like symptoms have returned since last Friday, patient denies fever, no headache, no sinus pain or pressure. Patient would like another course of antibiotics to help with this.    Also reporting that she continues to have left arm swelling from the elbow to the wrist and top of hand, says it is mildly swollen compared to the right, does not make indentation, occassionally painful, no redness or streaks, she denies any chest pain or shortness of breath unless she is exerted with activity. Says she can move the left arm, she says she used to see blue veins on the top of her hand but now unable to see them. Says PCP is suggesting could be rheumatoid related, but patient can not get in to see rheumatology till March, wondering what next steps should be. Patient says she tries to elevate the arm but says she has joint stiffness.    Please advise, pharmacy is pended.      STEW Chavez

## 2023-02-28 NOTE — PROGRESS NOTES
Rheumatology Clinic Visit  [facility]  Soledad Lopes, [facility]     Lavonne Tidwell MRN# [id number]   YOB: 1940 Age: 82 year old   Date of Visit: 03/01/2023  Primary care provider: Elvira Kowalski          Assessment and Plan:     1.  Polyarthralgia  2.  Elevated rheumatoid factor    Patient presents today for an initial evaluation of polyarthralgia.  She states that every 2 to 3 weeks she will have episodes where she gets swelling in her arms, wrists and hands.  She states that initially this started in her forearm and then moved down into her hand.  It lasts for about 1 week and then will resolve on its own.  At 1 point she was diagnosed with cellulitis and was given antibiotics for it.  After it had returned she saw her primary care provider who did not believe that it was cellulitis but instead believed it to be an inflammatory arthritis.  The patient was treated for the infection towards the beginning of October.  A couple of weeks after, she did have labs checked which showed a mildly elevated CRP and sed rate.  She had a normal rheumatoid factor.  1 week after that her CRP and sed rate had increased and she was given prednisone.  Four days after she started the prednisone her CRP was normal and sed rate was very mildly elevated.  A rheumatoid factor was checked again and was slightly elevated at 12.  Physical examination today did not show any active synovitis, dactylitis, tenosynovitis or enthesopathy.  She had full fist formation and normal  strength.  She did not have any tenderness to palpation of her MCPs, PIPs, wrists or MTP squeeze.  And no tenderness to palpation of her spine.  Previous laboratory evaluations and imaging studies were reviewed.  Results below.    Discussed with the patient that a mildly elevated rheumatoid factor does not diagnose her with rheumatoid arthritis.  This could be a false positive as she did have a normal result a couple of weeks prior to this  mild elevation.  It is also estimated that 3 to 25% of the older population has a positive rheumatoid factor.  She does have a negative CCP antibody which is more specific for rheumatoid arthritis.  We did talk about rechecking her inflammatory markers however she recently completed an antibiotic for a sinus infection, therefore would prefer to hold off on it at this time.  Would recommend getting x-rays of her bilateral hands and wrists.  If these are normal/negative, we may want to consider getting an MRI.  Rheumatoid arthritis does remain on the differential for the symptoms, pseudogout is also on the differential.  I will contact the patient with the results of the x-ray once they are complete.    Plan:     1. Schedule follow-up with Soledad Lopes PA-C as needed.   2. Imaging: will start with xrays of your hands and wrists; we may need to get a MRI if the xrays are negative    MIRZA Beebe  Rheumatology         History of Present Illness:   Lavonne Tidwell presents for evaluation of joint pain.      She reports that she first started to notice pain in her lower back. It was worse with standing or walking too far. She states that it would get to the point where she would need to sit down. She had xrays done that showed arthritis. She has swelling in her arms and hands. She started to notice it last spring. She states that someone had told it was arthritis. It was swollen on her forearm. She saw urgent care and was diagnosed with cellulitis. She was given antibiotics. It had returned. She will get episodes where her hands will be stiff. She saw Dr. Kowalski who did not believe it was cellulitis and thought that it was rheumatoid arthritis. Things are worse after periods of activity. She gets fatigued easily, such as doing the dishes. She will then need to sit and it will improve. She states that it'll take about 1 week, then return. When she swells the veins in her hands cannot be seen. These episodes are  happening every 2-3 weeks. She also had a lump above her left elbow at one point and she lost some motion in that arm. She had a fever and cough in December, was diagnosed with pneumonia. After this she had some issues with sinus drainage. She has dry mouth, unsure about dry eyes. She does get watering of her eyes, this seems to come and go. She has taken Prednisone for her joints and found those to be helpful. She has a strong reaction to gnat bites as well, these having caused infections in the past.     Great aunt with a form of arthritis, sounds like RA with the description of her symptoms. No personal or family history of psoriasis, ulcerative colitis or crohn's.           Review of Systems:     Constitutional: as above  Skin: negative  Eyes: negative  Ears/Nose/Throat: negative  Respiratory: No shortness of breath, dyspnea on exertion, cough, or hemoptysis  Cardiovascular: negative  Gastrointestinal: negative  Genitourinary: negative  Musculoskeletal: as above  Neurologic: negative  Psychiatric: negative  Hematologic/Lymphatic/Immunologic: negative  Endocrine: negative         Active Problem List:     Patient Active Problem List    Diagnosis Date Noted     Chronic bilateral low back pain without sciatica 10/11/2022     Priority: Medium     Primary osteoarthritis involving multiple joints 09/03/2021     Priority: Medium     Essential hypertension 05/05/2021     Priority: Medium     Tubulovillous adenoma polyp of colon 05/30/2019     Priority: Medium     Return in 3 years for the next colonoscopy       Status post total right knee replacement 02/23/2017     Priority: Medium     Primary localized osteoarthrosis, lower leg 02/23/2017     Priority: Medium     Status post total left knee replacement 01/06/2016     Priority: Medium     Senile nuclear sclerosis 08/18/2015     Priority: Medium     Onychomycosis 01/30/2015     Priority: Medium     Advanced directives, counseling/discussion 02/10/2012     Priority:  Medium     Advance Directive Problem List Overview:   Name Relationship Phone    Primary Health Care Agent            Alternative Health Care Agent          Discussed advance care planning with patient; information given to patient to review. 2/10/2012          Hyperlipidemia LDL goal <130 10/31/2010     Priority: Medium     Urinary incontinence 12/31/2008     Priority: Medium     On Ditropan for 2 years--modest improvement, initially; s/p TOT--good improvement in control       Atrophic vaginitis 12/31/2008     Priority: Medium     Menopausal syndrome (hot flashes) 12/02/2008     Priority: Medium     On Premarin--decreasing dose for 4 years--now takes twice a week       Esophageal reflux 11/10/2006     Priority: Medium     Injury of sigmoid colon 09/13/2005     Priority: Medium     Sigmoid polyp  Problem list name updated by automated process. Provider to review              Past Medical History:     Past Medical History:   Diagnosis Date     Arthritis 2012    knes and hips     Essential hypertension 5/5/2021     History of blood transfusion 1961    Miscarriage     Ovarian cysts 1974    s/p BSO     Past Surgical History:   Procedure Laterality Date     ABDOMEN SURGERY  1973 & 1974    Ovarian cyst/Hysterectomy     APPENDECTOMY  1952     ARTHROPLASTY KNEE Left 1/6/2016    Procedure: ARTHROPLASTY KNEE;  Surgeon: Wilfredo Thompson MD;  Location: [facility]     ARTHROPLASTY KNEE Right 2/23/2017    Procedure: ARTHROPLASTY KNEE;  Surgeon: Wilfredo Thompson MD;  Location: [facility]     BIOPSY      colonoscopy/polyps     COLONOSCOPY      every 10 years     GENITOURINARY SURGERY  2008    bladder     HYSTERECTOMY, CHELSY  1974     ORTHOPEDIC SURGERY  2007 & 2009    [place]  both feet     PHACOEMULSIFICATION WITH STANDARD INTRAOCULAR LENS IMPLANT Left 8/20/2015    Procedure: PHACOEMULSIFICATION WITH STANDARD INTRAOCULAR LENS IMPLANT;  Surgeon: Fernando Jeffrey MD;  Location: [facility] OR     PHACOEMULSIFICATION WITH STANDARD INTRAOCULAR LENS  IMPLANT Right 2015    Procedure: PHACOEMULSIFICATION WITH STANDARD INTRAOCULAR LENS IMPLANT;  Surgeon: Fernando Jeffrey MD;  Location: [facility]     SURGICAL HISTORY OF -   1998    Colonoscopy     SURGICAL HISTORY OF 1974    Bilateral salpingoopherectomy     SURGICAL HISTORY OF -   3/09    TOT Midurethral sling            Social History:     Social History     Socioeconomic History     Marital status:      Spouse name: Not on file     Number of children: Not on file     Years of education: Not on file     Highest education level: Not on file   Occupational History     Not on file   Tobacco Use     Smoking status: Former     Packs/day: 0.50     Years: 5.00     Pack years: 2.50     Types: Cigarettes     Start date: 1968     Quit date: 2/15/1972     Years since quittin.0     Smokeless tobacco: Never     Tobacco comments:     stopped in her 20's   Substance and Sexual Activity     Alcohol use: Yes     Alcohol/week: 0.0 standard drinks     Comment: Occasional     Drug use: No     Sexual activity: Not Currently   Other Topics Concern     Parent/sibling w/ CABG, MI or angioplasty before 65F 55M? Yes   Social History Narrative     Not on file     Social Determinants of Health     Financial Resource Strain: Not on file   Food Insecurity: Not on file   Transportation Needs: Not on file   Physical Activity: Not on file   Stress: Not on file   Social Connections: Not on file   Intimate Partner Violence: Not on file   Housing Stability: Not on file          Family History:     Family History   Problem Relation Age of Onset     Cancer Mother         Ovarian     Other Cancer Mother         Ovarian     Cerebrovascular Disease Father      Heart Disease Father      C.A.D. Father      Coronary Artery Disease Father      Cancer Maternal Grandmother      Other Cancer Maternal Grandmother         Ovarian/Bone     Cerebrovascular Disease Maternal Grandfather      Colon Cancer Maternal Grandfather       "Diabetes Other         Great Grandmother     Breast Cancer No family hx of             Allergies:     Allergies   Allergen Reactions     Codeine GI Disturbance            Medications:     Current Outpatient Medications   Medication Sig Dispense Refill     Acetaminophen (TYLENOL PO) Take 1,000 mg by mouth 2 times daily as needed for mild pain or fever       Biotin 10 MG CAPS        budesonide (PULMICORT) 0.5 MG/2ML neb solution Place 0.5 mg/2 mL budesonide vial in 8 oz normal saline sinus rinse bottle.  Irrigate each nostril with one half of the bottle daily. 180 mL 3     CALCIUM + D OR 2 TABLET DAILY       Docusate Calcium (STOOL SOFTENER PO)        garlic 150 MG TABS tablet Take 150 mg by mouth daily       grape seed 50 MG CAPS Take 50 mg by mouth daily       lisinopril (ZESTRIL) 10 MG tablet TAKE ONE TABLET BY MOUTH ONCE DAILY 90 tablet 2     loratadine (CLARITIN) 10 MG tablet Take 10 mg by mouth daily       methylcellulose (CITRUCEL) 500 MG TABS tablet Take 500 mg by mouth daily       Multiple Vitamins-Minerals (CENTRUM SILVER) per tablet Take 1 tablet by mouth daily       omeprazole (PRILOSEC) 20 MG DR capsule TAKE ONE CAPSULE BY MOUTH ONCE DAILY 90 capsule 1     VITAMIN E COMPLEX PO        Zinc Sulfate (ZINC 15 PO)        doxycycline hyclate (VIBRAMYCIN) 100 MG capsule Take 1 capsule (100 mg) by mouth 2 times daily 20 capsule 0            Physical Exam:   Blood pressure 128/62, height 1.664 m (5' 5.5\"), weight 77.6 kg (171 lb), not currently breastfeeding.  Wt Readings from Last 6 Encounters:   03/01/23 77.6 kg (171 lb)   01/25/23 77.6 kg (171 lb)   12/13/22 78.5 kg (173 lb)   10/18/22 78.5 kg (173 lb)   10/06/22 77.1 kg (170 lb)   09/28/22 77.1 kg (170 lb)     Constitutional: well-developed, appearing stated age; cooperative  Eyes: nl PERRLA, conjunctiva, sclera  ENT: nl external ears, nose, hearing, lips, teeth, gums, throat. No mucositis.   No mucous membrane lesions, normal saliva pool  Neck: no mass or " thyroid enlargement  Resp: lungs clear to auscultation  CV: RRR, no murmurs, rubs or gallops, no edema  Lymph: no cervical, supraclavicular or epitrochlear nodes  MS: The TMJ, neck, shoulder, elbow, wrist, MCP/PIP/DIP, spine,  knee, ankle, and foot MTP/IP joints were examined and found normal. No active synovitis. Full joint ROM. Normal  strength. No dactylitis,  tenosynovitis, enthesopathy.   Skin: no nail pitting, alopecia, rash, nodules or lesions.   Neuro: nl cranial nerves.   Psych: nl judgement, orientation, memory, affect.           Data:   Imaging:  MRI lumbar Spine 10/05/2022  IMPRESSION:  1. Diffuse degenerative change of the lumbar spine as detailed above.  2. No high-grade spinal canal stenosis.  3. Moderate narrowing of the left lateral recess of the spinal canal  at the L4-L5 level. No other significant lateral recess narrowing of  the lumbar spine.  4. Moderate neural foraminal stenosis bilaterally at L5-S1. No other  significant neural foraminal stenosis of the lumbar spine.    Laboratory:  10/18/2022  Creatinine 0.92, GFR 61  Albumin 4.2  ALT 19, AST 30  CRP 9.45  Rheumatoid factor 11  Uric acid 6.3  White blood cell count 6.4, hemoglobin 11.4, platelet count 237  Sed rate 35  Lyme disease 0.04    10/25/2022  CRP 12.18  Uric acid 6.0  Sed rate 43    11/1/2022  CRP less than 3.0  CCP antibody 0.9  Rheumatoid factor 12  JESSICA negative  Lyme disease negative  Sed rate 34

## 2023-03-01 ENCOUNTER — OFFICE VISIT (OUTPATIENT)
Dept: RHEUMATOLOGY | Facility: CLINIC | Age: 83
End: 2023-03-01
Payer: COMMERCIAL

## 2023-03-01 ENCOUNTER — ANCILLARY PROCEDURE (OUTPATIENT)
Dept: GENERAL RADIOLOGY | Facility: CLINIC | Age: 83
End: 2023-03-01
Attending: PHYSICIAN ASSISTANT
Payer: COMMERCIAL

## 2023-03-01 VITALS
DIASTOLIC BLOOD PRESSURE: 62 MMHG | HEIGHT: 66 IN | BODY MASS INDEX: 27.48 KG/M2 | WEIGHT: 171 LBS | SYSTOLIC BLOOD PRESSURE: 128 MMHG

## 2023-03-01 DIAGNOSIS — R76.8 ELEVATED RHEUMATOID FACTOR: ICD-10-CM

## 2023-03-01 DIAGNOSIS — M25.50 POLYARTHRALGIA: ICD-10-CM

## 2023-03-01 DIAGNOSIS — M25.50 POLYARTHRALGIA: Primary | ICD-10-CM

## 2023-03-01 PROCEDURE — 73110 X-RAY EXAM OF WRIST: CPT | Mod: TC | Performed by: RADIOLOGY

## 2023-03-01 PROCEDURE — 73130 X-RAY EXAM OF HAND: CPT | Mod: TC | Performed by: RADIOLOGY

## 2023-03-01 PROCEDURE — 99204 OFFICE O/P NEW MOD 45 MIN: CPT | Performed by: PHYSICIAN ASSISTANT

## 2023-03-01 NOTE — PATIENT INSTRUCTIONS
After Visit Instructions:     Thank you for coming to [facility] for your care. It is my goal to partner with you to help you reach your optimal state of health.       Plan:     Schedule follow-up with Soledad Lopes PA-C as needed.   Imaging: will start with xrays of your hands and wrists; we may need to get a MRI if the xrays are negative        Soledad Lopes PA-C  [facility]  [facility]    Contact information: [facility]  Clinic Number:  [phone]  Please call or send a [employer] message with any questions about your care

## 2023-03-06 ENCOUNTER — TELEPHONE (OUTPATIENT)
Dept: FAMILY MEDICINE | Facility: CLINIC | Age: 83
End: 2023-03-06
Payer: COMMERCIAL

## 2023-03-06 ENCOUNTER — TELEPHONE (OUTPATIENT)
Dept: RHEUMATOLOGY | Facility: CLINIC | Age: 83
End: 2023-03-06
Payer: COMMERCIAL

## 2023-03-06 NOTE — TELEPHONE ENCOUNTER
We should not need any further labs. The labs I was referring to were the CRP and Sed Rate. This would not change the treatment plan for pseudogout. She also does not need a MRI. The xray showed the diagnosis. If she is having increased hand pain again, she can do another course of Prednisone. Otherwise, I would recommend that she follow up with me in 3 months.     Soledad Lopes PA-C on 3/6/2023 at 1:02 PM

## 2023-03-06 NOTE — TELEPHONE ENCOUNTER
[facility] Call Center    Phone Message    May a detailed message be left on voicemail: yes     Reason for Call: Other: Soledad Marianne lab orders      Pt just finished her antibiotic for her sinus infection a week ago; Pt states Soledad wanted her to hold off on getting labs done until she had finished her antibiotic.    Pt would like to know when she should go in to complete the labs?     She is starting to have sinus issues again and is worried they might place her on antibiotics again and labs will continue to be postponed.    Please contact the Pt back to let her know when she can go in for the labs and also would like to know what next steps are for an MRI was mentioned at her last visit?    Pt already completed her xray and received results through her [facility].    Action Taken: Other: [facility]

## 2023-03-07 ENCOUNTER — OFFICE VISIT (OUTPATIENT)
Dept: URGENT CARE | Facility: URGENT CARE | Age: 83
End: 2023-03-07
Payer: COMMERCIAL

## 2023-03-07 ENCOUNTER — ANCILLARY PROCEDURE (OUTPATIENT)
Dept: GENERAL RADIOLOGY | Facility: CLINIC | Age: 83
End: 2023-03-07
Attending: PHYSICIAN ASSISTANT
Payer: COMMERCIAL

## 2023-03-07 VITALS
OXYGEN SATURATION: 98 % | SYSTOLIC BLOOD PRESSURE: 121 MMHG | DIASTOLIC BLOOD PRESSURE: 67 MMHG | HEART RATE: 71 BPM | WEIGHT: 171 LBS | TEMPERATURE: 98.5 F | BODY MASS INDEX: 28.02 KG/M2

## 2023-03-07 DIAGNOSIS — J01.01 ACUTE RECURRENT MAXILLARY SINUSITIS: ICD-10-CM

## 2023-03-07 DIAGNOSIS — J34.89 SINUS PRESSURE: Primary | ICD-10-CM

## 2023-03-07 LAB
BASOPHILS # BLD AUTO: 0 10E3/UL (ref 0–0.2)
BASOPHILS NFR BLD AUTO: 0 %
EOSINOPHIL # BLD AUTO: 0.1 10E3/UL (ref 0–0.7)
EOSINOPHIL NFR BLD AUTO: 2 %
ERYTHROCYTE [DISTWIDTH] IN BLOOD BY AUTOMATED COUNT: 14.5 % (ref 10–15)
HCT VFR BLD AUTO: 32.6 % (ref 35–47)
HGB BLD-MCNC: 10.2 G/DL (ref 11.7–15.7)
IMM GRANULOCYTES # BLD: 0 10E3/UL
IMM GRANULOCYTES NFR BLD: 0 %
LYMPHOCYTES # BLD AUTO: 4.4 10E3/UL (ref 0.8–5.3)
LYMPHOCYTES NFR BLD AUTO: 58 %
MCH RBC QN AUTO: 32.9 PG (ref 26.5–33)
MCHC RBC AUTO-ENTMCNC: 31.3 G/DL (ref 31.5–36.5)
MCV RBC AUTO: 105 FL (ref 78–100)
MONOCYTES # BLD AUTO: 0.1 10E3/UL (ref 0–1.3)
MONOCYTES NFR BLD AUTO: 2 %
NEUTROPHILS # BLD AUTO: 2.9 10E3/UL (ref 1.6–8.3)
NEUTROPHILS NFR BLD AUTO: 38 %
NRBC # BLD AUTO: 0 10E3/UL
NRBC BLD AUTO-RTO: 0 /100
PLATELET # BLD AUTO: 228 10E3/UL (ref 150–450)
RBC # BLD AUTO: 3.1 10E6/UL (ref 3.8–5.2)
WBC # BLD AUTO: 7.6 10E3/UL (ref 4–11)

## 2023-03-07 PROCEDURE — 85025 COMPLETE CBC W/AUTO DIFF WBC: CPT | Performed by: PHYSICIAN ASSISTANT

## 2023-03-07 PROCEDURE — 70220 X-RAY EXAM OF SINUSES: CPT | Mod: TC | Performed by: RADIOLOGY

## 2023-03-07 PROCEDURE — 99214 OFFICE O/P EST MOD 30 MIN: CPT | Performed by: PHYSICIAN ASSISTANT

## 2023-03-07 PROCEDURE — 36415 COLL VENOUS BLD VENIPUNCTURE: CPT | Performed by: PHYSICIAN ASSISTANT

## 2023-03-07 ASSESSMENT — ENCOUNTER SYMPTOMS
RHINORRHEA: 1
SINUS PAIN: 1
SINUS PRESSURE: 1
FEVER: 0

## 2023-03-07 NOTE — PROGRESS NOTES
"SUBJECTIVE:   Lavonne Tidwell is a 82 year old female presenting with a chief complaint of   Chief Complaint   Patient presents with     Sinus Problem     Has Been Treated For Back To Back Sinus Infections Since January. Every Time She Finishes Her 10 Day Abx Course Her Sx Come Back      Throat Problem     Throat is Sore and Raw From Drainage        She is an established patient of [facility].  Patient presents with complaints of \"sinus infection.\"  Per patient has had two rounds of abx.  1/28 was given doxycycline.  2/13 RF on doxycycline.  Per patient, improves and a week later returns.  Patient states has used steroid nasal spray in past, but was told to do sinus rinse and steroid nasal spray, but patient discontinued steroid nasal spray.  No hx of sinus surgery.  She mentioned to ENT on her last visit, but nothing was recommended.  Nasal drainage is clear.   Patient points to left side of face, prearicular area as the pain.      Treatment:  Tylenol; sinus rinse,     Review of Systems   Constitutional: Negative for fever.   HENT: Positive for congestion, postnasal drip, rhinorrhea, sinus pressure and sinus pain. Negative for ear pain.    All other systems reviewed and are negative.      Past Medical History:   Diagnosis Date     Arthritis 2012    knes and hips     Essential hypertension 5/5/2021     History of blood transfusion 1961    Miscarriage     Ovarian cysts 1974    s/p BSO     Family History   Problem Relation Age of Onset     Cancer Mother         Ovarian     Other Cancer Mother         Ovarian     Cerebrovascular Disease Father      Heart Disease Father      C.A.D. Father      Coronary Artery Disease Father      Cancer Maternal Grandmother      Other Cancer Maternal Grandmother         Ovarian/Bone     Cerebrovascular Disease Maternal Grandfather      Colon Cancer Maternal Grandfather      Diabetes Other         Great Grandmother     Breast Cancer No family hx of      Current Outpatient Medications "   Medication Sig Dispense Refill     Acetaminophen (TYLENOL PO) Take 1,000 mg by mouth 2 times daily as needed for mild pain or fever       Biotin 10 MG CAPS        budesonide (PULMICORT) 0.5 MG/2ML neb solution Place 0.5 mg/2 mL budesonide vial in 8 oz normal saline sinus rinse bottle.  Irrigate each nostril with one half of the bottle daily. 180 mL 3     CALCIUM + D OR 2 TABLET DAILY       Docusate Calcium (STOOL SOFTENER PO)        doxycycline hyclate (VIBRAMYCIN) 100 MG capsule Take 1 capsule (100 mg) by mouth 2 times daily 20 capsule 0     garlic 150 MG TABS tablet Take 150 mg by mouth daily       grape seed 50 MG CAPS Take 50 mg by mouth daily       lisinopril (ZESTRIL) 10 MG tablet TAKE ONE TABLET BY MOUTH ONCE DAILY 90 tablet 2     loratadine (CLARITIN) 10 MG tablet Take 10 mg by mouth daily       methylcellulose (CITRUCEL) 500 MG TABS tablet Take 500 mg by mouth daily       Multiple Vitamins-Minerals (CENTRUM SILVER) per tablet Take 1 tablet by mouth daily       omeprazole (PRILOSEC) 20 MG DR capsule TAKE ONE CAPSULE BY MOUTH ONCE DAILY 90 capsule 1     VITAMIN E COMPLEX PO        Zinc Sulfate (ZINC 15 PO)        Social History     Tobacco Use     Smoking status: Former     Packs/day: 0.50     Years: 5.00     Pack years: 2.50     Types: Cigarettes     Start date: 1968     Quit date: 2/15/1972     Years since quittin.0     Smokeless tobacco: Never     Tobacco comments:     stopped in her 20's   Substance Use Topics     Alcohol use: Yes     Alcohol/week: 0.0 standard drinks     Comment: Occasional       OBJECTIVE  /67   Pulse 71   Temp 98.5  F (36.9  C)   Wt 77.6 kg (171 lb)   SpO2 98%   BMI 28.02 kg/m      Physical Exam  Vitals and nursing note reviewed.   Constitutional:       General: She is not in acute distress.     Appearance: Normal appearance. She is normal weight. She is not ill-appearing.   HENT:      Head: Normocephalic and atraumatic.      Comments: No sinus tenderness.      Right Ear: Tympanic membrane, ear canal and external ear normal.      Left Ear: Tympanic membrane, ear canal and external ear normal.      Nose: Nose normal.      Mouth/Throat:      Mouth: Mucous membranes are moist.      Pharynx: Oropharynx is clear.   Eyes:      Extraocular Movements: Extraocular movements intact.      Conjunctiva/sclera: Conjunctivae normal.   Cardiovascular:      Rate and Rhythm: Normal rate and regular rhythm.      Pulses: Normal pulses.      Heart sounds: Normal heart sounds.   Pulmonary:      Effort: Pulmonary effort is normal.      Breath sounds: Normal breath sounds.   Musculoskeletal:      Cervical back: Normal range of motion.   Skin:     General: Skin is warm and dry.      Findings: No rash.   Neurological:      General: No focal deficit present.      Mental Status: She is alert.   Psychiatric:         Mood and Affect: Mood normal.         Behavior: Behavior normal.         Labs:  Results for orders placed or performed in visit on 03/07/23 (from the past 24 hour(s))   CBC with platelets and differential    Narrative    The following orders were created for panel order CBC with platelets and differential.  Procedure                               Abnormality         Status                     ---------                               -----------         ------                     CBC with platelets and d...[[id number]]                      In process                   Please view results for these tests on the individual orders.   XR Sinus Complete G/E 3 Views    Narrative    SINUS COMPLETE THREE OR MORE VIEWS  March 7, 2023 2:23 PM     HISTORY: Sinus pressure.    COMPARISON: None.      Impression    IMPRESSION: No high-grade paranasal sinus mucosal thickening or  air-fluid levels identified.    STEPHANIE PARRISH MD         SYSTEM ID:  [id number]       X-Ray was done, my findings are: NAD    ASSESSMENT:      ICD-10-CM    1. Sinus pressure  J34.89 XR Sinus Complete G/E 3 Views     CBC with  platelets and differential     CBC with platelets and differential      2. Acute recurrent maxillary sinusitis  J01.01 CBC with platelets and differential     CBC with platelets and differential           Medical Decision Making:    Differential Diagnosis:  URI Adult/Peds:  Sinusitis and Viral upper respiratory illness    Serious Comorbid Conditions:  Adult:  reviewed    PLAN:  Encouraged patient to restart flonase and consider zyrtec. To follow up with ENT.  Drink plenty of fluids.  Patient education.        Followup:    If not improving or if condition worsens, follow up with your Primary Care Provider, If not improving or if conditions worsens over the next 12-24 hours, go to the Emergency Department    There are no Patient Instructions on file for this visit.       None

## 2023-03-08 ENCOUNTER — TELEPHONE (OUTPATIENT)
Dept: OTOLARYNGOLOGY | Facility: CLINIC | Age: 83
End: 2023-03-08
Payer: COMMERCIAL

## 2023-03-08 DIAGNOSIS — R09.82 POST-NASAL DRAINAGE: Primary | ICD-10-CM

## 2023-03-08 NOTE — TELEPHONE ENCOUNTER
Patient c/o sinus drainage causing irritation in back of throat.   Restarting budesonide rinses and Flonase  Has not had CT scan.     Would you like CT scan ordered and appointment determined based on results?     Thank you,   Tena VELARDE RN  [facility]

## 2023-03-08 NOTE — TELEPHONE ENCOUNTER
[facility] Call Center    Phone Message    May a detailed message be left on voicemail: yes     Reason for Call: Symptoms or Concerns     If patient has red-flag symptoms, warm transfer to triage line    Current symptom or concern: Sore throat and drainage chronically. Been on 2 antibiotics. Clears during use and comes back about a week after completion. X-ray of sinuses showed no infection despite symptoms.     Symptoms have been present for:  2 month(s)    Has patient previously been seen for this? Yes      Are there any new or worsening symptoms? Yes: new and worsening      Action Taken: Message routed to:  [facility] ent    Travel Screening: Not Applicable

## 2023-03-08 NOTE — TELEPHONE ENCOUNTER
Rob Harris MD  You; June Keller Rn Pool 1 minute ago (4:57 PM)     [place]  Lets order a sinus CT first.  I will contact her with the results when available and then we can discuss follow-up depending on what the CT shows. Order placed     IJL

## 2023-03-09 NOTE — RESULT ENCOUNTER NOTE
Dear Lavonne    Here are your results from your urgent care visit. As you can see, the hemoglobin is low, as it has been in the past. It is a little lower than it was about 4 months ago. If you are noticing bleeding, lightheadedness, dizziness, worsened shortness of breath, or any other new symptoms, it may be beneficial to have an in person visit in clinic where we can discuss the hemoglobin.     Please message me if you have any concerns.  Мария Reyna MD  Covering for Dr. Kowalski.

## 2023-03-10 ENCOUNTER — TELEPHONE (OUTPATIENT)
Dept: FAMILY MEDICINE | Facility: CLINIC | Age: 83
End: 2023-03-10
Payer: COMMERCIAL

## 2023-03-10 NOTE — TELEPHONE ENCOUNTER
PC to pt as requested by CMA. See provider lab result note/ RN note.  No further action needed.  FRANCISCA Bedoya RN

## 2023-03-10 NOTE — TELEPHONE ENCOUNTER
Spoke with Lavonne who states she is feeling a little bit better today, has CT scan scheduled for Monday 3/13/23. Patient denies any signs of bleeding, dizziness, worsened SOB or new symptoms does report intermittent lightheadedness which is not new for her she relates this to her sinuses. Advised patient to have an in person visit at the clinic to further evaluate her symptoms she has appointment scheduled with Dr. Reyna for 3/17/23. If patient develops any signs of bleeding, SOB, dizziness or worsening lightheadedness she will need to be seen sooner, she verbalized good understanding.      Julie Behrendt RN Brande Bruce, RN   3/10/2023  1:00 PM CST       Forwarding to [facility] care team for f/u please.      Mireille Arellano RN  [facility]    Marycarmen Ocasio CMA   3/10/2023 12:03 PM CST       Lavonne is not feeling well and would like an RN to call her and help her get an appointment.     Marycarmen Reyna MD   3/9/2023  3:37 PM CST       Dear Lavonne     Here are your results from your urgent care visit. As you can see, the hemoglobin is low, as it has been in the past. It is a little lower than it was about 4 months ago. If you are noticing bleeding, lightheadedness, dizziness, worsened shortness of breath, or any other new symptoms, it may be beneficial to have an in person visit in clinic where we can discuss the hemoglobin.      Please message me if you have any concerns.  Мария Reyna MD  Covering for Dr. Kowalski.

## 2023-03-13 ENCOUNTER — HOSPITAL ENCOUNTER (OUTPATIENT)
Dept: CT IMAGING | Facility: CLINIC | Age: 83
Discharge: HOME | End: 2023-03-13
Attending: OTOLARYNGOLOGY | Admitting: OTOLARYNGOLOGY
Payer: COMMERCIAL

## 2023-03-13 DIAGNOSIS — R09.82 POST-NASAL DRAINAGE: ICD-10-CM

## 2023-03-13 PROCEDURE — 70486 CT MAXILLOFACIAL W/O DYE: CPT

## 2023-03-14 ENCOUNTER — MYC MEDICAL ADVICE (OUTPATIENT)
Dept: OTOLARYNGOLOGY | Facility: CLINIC | Age: 83
End: 2023-03-14
Payer: COMMERCIAL

## 2023-03-14 DIAGNOSIS — R09.82 POST-NASAL DRAINAGE: Primary | ICD-10-CM

## 2023-03-14 DIAGNOSIS — R09.89 THROAT CLEARING: ICD-10-CM

## 2023-03-14 DIAGNOSIS — T46.4X5A ACE-INHIBITOR COUGH: ICD-10-CM

## 2023-03-14 DIAGNOSIS — R05.3 CHRONIC COUGH: ICD-10-CM

## 2023-03-14 DIAGNOSIS — R05.8 ACE-INHIBITOR COUGH: ICD-10-CM

## 2023-03-14 DIAGNOSIS — J02.9 SORE THROAT: ICD-10-CM

## 2023-03-14 PROCEDURE — 99358 PROLONG SERVICE W/O CONTACT: CPT | Performed by: OTOLARYNGOLOGY

## 2023-03-15 RX ORDER — LOSARTAN POTASSIUM 50 MG/1
50 TABLET ORAL DAILY
Qty: 90 TABLET | Refills: 1 | Status: SHIPPED | OUTPATIENT
Start: 2023-03-15 | End: 2023-09-12

## 2023-03-15 NOTE — TELEPHONE ENCOUNTER
A total duration of 35 minutes of non-face-to-face time spent by myself to order a sinus CT for patient's persistent symptoms, review sinus CT and discussed results with the patient via MyChart.  Perform multiple MyChart messages to discuss treatment options, make medication prescription changes, and set up and coordinate follow-up visit.  This relates to the previous visit from 1/18/2023.      Rob Harris MD  Department of Otolaryngology-Head and Neck Surgery  [facility]

## 2023-03-17 ENCOUNTER — OFFICE VISIT (OUTPATIENT)
Dept: FAMILY MEDICINE | Facility: CLINIC | Age: 83
End: 2023-03-17
Payer: COMMERCIAL

## 2023-03-17 VITALS
HEIGHT: 66 IN | RESPIRATION RATE: 18 BRPM | OXYGEN SATURATION: 98 % | SYSTOLIC BLOOD PRESSURE: 124 MMHG | TEMPERATURE: 97.5 F | BODY MASS INDEX: 27.97 KG/M2 | HEART RATE: 90 BPM | DIASTOLIC BLOOD PRESSURE: 60 MMHG | WEIGHT: 174 LBS

## 2023-03-17 DIAGNOSIS — N18.2 CKD (CHRONIC KIDNEY DISEASE) STAGE 2, GFR 60-89 ML/MIN: ICD-10-CM

## 2023-03-17 DIAGNOSIS — R74.8 ELEVATED ALKALINE PHOSPHATASE LEVEL: ICD-10-CM

## 2023-03-17 DIAGNOSIS — Z78.0 POSTMENOPAUSAL STATUS: ICD-10-CM

## 2023-03-17 DIAGNOSIS — R09.82 POSTNASAL DRIP: Primary | ICD-10-CM

## 2023-03-17 DIAGNOSIS — D64.9 ANEMIA, UNSPECIFIED TYPE: ICD-10-CM

## 2023-03-17 DIAGNOSIS — R53.82 CHRONIC FATIGUE: ICD-10-CM

## 2023-03-17 DIAGNOSIS — R32 URINARY INCONTINENCE, UNSPECIFIED TYPE: ICD-10-CM

## 2023-03-17 DIAGNOSIS — R73.9 HYPERGLYCEMIA: ICD-10-CM

## 2023-03-17 LAB
ALBUMIN SERPL BCG-MCNC: 4.2 G/DL (ref 3.5–5.2)
ALP SERPL-CCNC: 112 U/L (ref 35–104)
ALT SERPL W P-5'-P-CCNC: 18 U/L (ref 10–35)
ANION GAP SERPL CALCULATED.3IONS-SCNC: 9 MMOL/L (ref 7–15)
AST SERPL W P-5'-P-CCNC: 29 U/L (ref 10–35)
BILIRUB SERPL-MCNC: 0.3 MG/DL
BUN SERPL-MCNC: 25.1 MG/DL (ref 8–23)
CALCIUM SERPL-MCNC: 9.6 MG/DL (ref 8.8–10.2)
CHLORIDE SERPL-SCNC: 105 MMOL/L (ref 98–107)
CREAT SERPL-MCNC: 1.04 MG/DL (ref 0.51–0.95)
DEPRECATED HCO3 PLAS-SCNC: 26 MMOL/L (ref 22–29)
ERYTHROCYTE [DISTWIDTH] IN BLOOD BY AUTOMATED COUNT: 14.1 % (ref 10–15)
FERRITIN SERPL-MCNC: 67 NG/ML (ref 11–328)
GFR SERPL CREATININE-BSD FRML MDRD: 53 ML/MIN/1.73M2
GLUCOSE SERPL-MCNC: 117 MG/DL (ref 70–99)
HBA1C MFR BLD: 5.5 % (ref 0–5.6)
HCT VFR BLD AUTO: 33.4 % (ref 35–47)
HGB BLD-MCNC: 10.3 G/DL (ref 11.7–15.7)
IRON BINDING CAPACITY (ROCHE): 296 UG/DL (ref 240–430)
IRON SATN MFR SERPL: 16 % (ref 15–46)
IRON SERPL-MCNC: 46 UG/DL (ref 37–145)
MCH RBC QN AUTO: 32.6 PG (ref 26.5–33)
MCHC RBC AUTO-ENTMCNC: 30.8 G/DL (ref 31.5–36.5)
MCV RBC AUTO: 106 FL (ref 78–100)
PLATELET # BLD AUTO: 251 10E3/UL (ref 150–450)
POTASSIUM SERPL-SCNC: 4.4 MMOL/L (ref 3.4–5.3)
PROT SERPL-MCNC: 7.7 G/DL (ref 6.4–8.3)
RBC # BLD AUTO: 3.16 10E6/UL (ref 3.8–5.2)
SODIUM SERPL-SCNC: 140 MMOL/L (ref 136–145)
T4 FREE SERPL-MCNC: 1.16 NG/DL (ref 0.9–1.7)
TSH SERPL DL<=0.005 MIU/L-ACNC: 5.11 UIU/ML (ref 0.3–4.2)
WBC # BLD AUTO: 7.4 10E3/UL (ref 4–11)

## 2023-03-17 PROCEDURE — 80053 COMPREHEN METABOLIC PANEL: CPT | Performed by: STUDENT IN AN ORGANIZED HEALTH CARE EDUCATION/TRAINING PROGRAM

## 2023-03-17 PROCEDURE — 83540 ASSAY OF IRON: CPT | Performed by: STUDENT IN AN ORGANIZED HEALTH CARE EDUCATION/TRAINING PROGRAM

## 2023-03-17 PROCEDURE — 84443 ASSAY THYROID STIM HORMONE: CPT | Performed by: STUDENT IN AN ORGANIZED HEALTH CARE EDUCATION/TRAINING PROGRAM

## 2023-03-17 PROCEDURE — 83036 HEMOGLOBIN GLYCOSYLATED A1C: CPT | Performed by: STUDENT IN AN ORGANIZED HEALTH CARE EDUCATION/TRAINING PROGRAM

## 2023-03-17 PROCEDURE — 99215 OFFICE O/P EST HI 40 MIN: CPT | Performed by: STUDENT IN AN ORGANIZED HEALTH CARE EDUCATION/TRAINING PROGRAM

## 2023-03-17 PROCEDURE — 84439 ASSAY OF FREE THYROXINE: CPT | Performed by: STUDENT IN AN ORGANIZED HEALTH CARE EDUCATION/TRAINING PROGRAM

## 2023-03-17 PROCEDURE — 83550 IRON BINDING TEST: CPT | Performed by: STUDENT IN AN ORGANIZED HEALTH CARE EDUCATION/TRAINING PROGRAM

## 2023-03-17 PROCEDURE — 85027 COMPLETE CBC AUTOMATED: CPT | Performed by: STUDENT IN AN ORGANIZED HEALTH CARE EDUCATION/TRAINING PROGRAM

## 2023-03-17 PROCEDURE — 36415 COLL VENOUS BLD VENIPUNCTURE: CPT | Performed by: STUDENT IN AN ORGANIZED HEALTH CARE EDUCATION/TRAINING PROGRAM

## 2023-03-17 PROCEDURE — 82728 ASSAY OF FERRITIN: CPT | Performed by: STUDENT IN AN ORGANIZED HEALTH CARE EDUCATION/TRAINING PROGRAM

## 2023-03-17 RX ORDER — GUAIFENESIN 600 MG/1
1200 TABLET, EXTENDED RELEASE ORAL 2 TIMES DAILY
Qty: 90 TABLET | Refills: 1 | Status: SHIPPED | OUTPATIENT
Start: 2023-03-17 | End: 2023-05-10

## 2023-03-17 ASSESSMENT — PAIN SCALES - GENERAL: PAINLEVEL: NO PAIN (0)

## 2023-03-17 NOTE — PATIENT INSTRUCTIONS
Consider pelvic floor physical therapy - let me know if you want a referral.     If you want to talk to someone about the pessary device, let me know and I can refer you to [facility].      Flonase (fluticasone) nasal spray:   1 spray in each nostril every morning  In the evenings, do the sinus rinse, and then 1 spray in each nostril as well.     Maximum daily dose of 2 sprays in each nostril.     Mucinex (guaifenesin)   1 tablet in the evening if that is going okay without side effects, then go up to 1 tablet in the morning and 1 in the evening. If you aren't having side effects and symptoms aren't improved, then go up to 2 tablets in the morning, 2 in the evening.       Goal for fluid intake is 64 ounces.       Patient Education   Here is the plan from today's visit    1. Chronic fatigue  - TSH with free T4 reflex; Future    2. Anemia, unspecified type  - Ferritin; Future  - CBC with platelets; Future  - Iron and iron binding capacity; Future    3. CKD (chronic kidney disease) stage 2, GFR 60-89 ml/min  - Comprehensive metabolic panel; Future    4. Elevated alkaline phosphatase level  - Comprehensive metabolic panel; Future    5. Hyperglycemia  - Hemoglobin A1c; Future    6. Postmenopausal status  - DEXA HIP/PELVIS/SPINE - Future; Future    7. Postnasal drip  - guaiFENesin (MUCINEX) 600 MG 12 hr tablet; Take 2 tablets (1,200 mg) by mouth 2 times daily  Dispense: 90 tablet; Refill: 1      Please call or return to clinic if your symptoms don't go away.    Follow up plan  Return if symptoms worsen or fail to improve.    Thank you for coming to the [facility] today.  Lab Testing:  **If you had lab testing today and your results are reassuring or normal they will be mailed to you or sent through vitalclip within 7 days.   **If the lab tests need quick action we will call you with the results.  **If you are having labs done on a different day, please call [phone] to schedule at the [facility] or  [phone] for other [facility] Outpatient Lab locations. Labs do not offer walk-in appointments.  The phone number we will call with results is # [phone] (home) . If this is not the best number please call our clinic and change the number.  Medication Refills:  If you need any refills please call your pharmacy and they will contact us.   If you need to  your refill at a new pharmacy, please contact the new pharmacy directly. The new pharmacy will help you get your medications transferred faster.   Scheduling:  If you have any concerns about today's visit or wish to schedule another appointment please call our office during normal business hours [phone]   If a referral was made to an [facility] specialty provider and you do not get a call from central scheduling, please refer to directions on your visit summary or call our office during normal business hours for assistance.   If a Mammogram was ordered for you at the [facility] call [phone] to schedule or change your appointment.  If you had an XRay/CT/Ultrasound/MRI ordered the number is [phone] to schedule or change your radiology appointment.   If you had an Echo/Heart Monitor/Cardiac Cath or other cardiac testing ordered the number is [phone] to schedule or change your appointment.   Medical Concerns:  If you have urgent medical concerns please call [phone] at any time of the day.    Мария Reyna MD

## 2023-03-17 NOTE — PROGRESS NOTES
Assessment & Plan     Patient is a very pleasant 82-year-old female with past medical history of hyperlipidemia, hypertension, chronic lumbar back pain, otherwise a generally healthy who presents today for ongoing nasal/sinus drip/drainage, and ongoing fatigue.      Postnasal drip  Since the first of this year, patient has been having sore throat, drainage, which is more of a postnasal drip particularly when she is sleeping, irritates her throat, and she starts to cough.  Because of the buildup and phlegm, she does have gagging as well.  She has a history of chronic sinus issues, and has been seen by ENT for this.  She actually has a tympanostomy on the left at this time.  Does use Flonase and sinus rinses, but does not feel these have been terribly helpful.  She has tried Advil sinus and flu/cold.  Overall, symptoms do not seem to be improving, and have discontinued over this timeframe.  She does have follow-up scheduled with ENT in May.  For this initial issue, we discussed doing Flonase twice daily, 1 spray in each nostril in the morning, 1 spray in each nostril in the evening after sinus rinse.  We will also add Mucinex, start with a low-dose, and increase up to 2 tablets twice daily as tolerated.  She did also recently have MyChart communication with her ENT, who recommended switching from lisinopril to losartan, which is certainly reasonable.  She does not have any significant lower extremity pitting edema, or pulmonary abnormalities, so heart failure is certainly a less likely diagnosis as a contributing cause for her cough.  Overall I am not concerned about an insidious cause of her phlegm buildup.  Encouraged oral fluid hydration.  - guaiFENesin (MUCINEX) 600 MG 12 hr tablet  Dispense: 90 tablet; Refill: 1    Chronic fatigue  Patient is also noting that she has had issues with chronic fatigue.  Has not been checked for thyroid issues, so this was completed today.  - TSH with free T4 reflex  - TSH with  free T4 reflex    Anemia, unspecified type  Patient also has a history of anemia.  Does not appear to have had iron studies completed in the past, so t hemoglobin rechecked with iron studies ordered today.  - Ferritin  - CBC with platelets  - Iron and iron binding capacity  - Iron and iron binding capacity  - CBC with platelets  - Ferritin    CKD (chronic kidney disease) stage 2, GFR 60-89 ml/min  Patient has a history of GFR in the 60s to 80s, placing her in CKD stage II.  Unknown if she has had any significant protein in her urine.  Regardless, we will recheck the renal function today.  She did have elevated BUN back in October.  - Comprehensive metabolic panel  - Comprehensive metabolic panel    Elevated alkaline phosphatase level  On CMP completed in October, she did have some mild elevation in alkaline phosphatase.  She is postmenopausal, and certainly could be related to osteoporosis.  Recheck of alkaline phosphatase is ordered today.  Reassuringly, other LFTs were normal.  - Comprehensive metabolic panel  - Comprehensive metabolic panel    Hyperglycemia  On CMP in October, patient did have some mild elevation in glucose, did not have any follow-up laboratory studies completed.  An A1c is completed today to screen for prediabetes/diabetes.  - Hemoglobin A1c  - Hemoglobin A1c    Postmenopausal status  Patient is postmenopausal, is due for DEXA scan, particularly in the context of the elevated alk phos as above.  - DEXA HIP/PELVIS/SPINE - Future    Urinary incontinence, unspecified type  Patient has known urinary incontinence issues.  She had a bladder sling many years ago, failed after couple years.  She continues to have frequent urinary leaking, worse with urgency.  She wears pads throughout the day, and sometimes will have such significant leaking/loss of control that she has to change her close.  We briefly discussed pelvic floor physical therapy today, as well as pessaries.  Should she desire to pursue  either of these, referrals for PT or OB/GYN respectively would certainly be placed at her request.    I spent a total of 61 minutes on the day of the visit.   Time spent doing chart review, history and exam, documentation and further activities per the note     Return if symptoms worsen or fail to improve.    Мария Reyna MD  [facility]    Kirsty Banerjee is a 82 year old, presenting for the following health issues:  Chief Complaint   Patient presents with     Results     Labwork, CT scan     Swelling     arms     History of Present Illness       Reason for visit:  Review labwork, low hemoglobin  Symptoms include:  Was last seen for  sinus infection, has since been found not to be a sinus problem  Symptom intensity:  Moderate  Symptom progression:  Staying the same  Had these symptoms before:  No    She eats 2-3 servings of fruits and vegetables daily.She consumes 0 sweetened beverage(s) daily.She exercises with enough effort to increase her heart rate 9 or less minutes per day.  She exercises with enough effort to increase her heart rate 3 or less days per week.   She is taking medications regularly.     Hemogobin low:   xra yand blood draw - xr beth  Drainage and sore throat, since 1/1. Can't sleep because of it. Fills up, starts coughing. Right by gag reflex to try to get some o fit spit out. Mostly at night, 1-2 times a day.   Feels like there is a dry spot on the side of the throat. Sneezing as well.     December had a cough and fever, ?flu. Didn't go away, had pneumonia.     Going to rheum.    Arms and hands swelling up. Told it was celulitis.   pseudogou tin a couple of knuckcles.   Uric acid slightly elevated.       Colnosocpy?   No bloody stools or urine.   History of significant constipation.     Having trouble hearing for a while, was seen by ENT. Fluid ehind the ear drum. Put in a tube.     Drinking fluids, more soothing with coolder drinks.     Sleeping Now sitting up  "at night mostly    Forearms swelling. Lump just above left elbow.     Chronic left leg swelling, not changed.     udsed to take advil sinus and flu/cold, hasn't tried mucinex.     Uses fluticasone and sinus rinses.   Using it only with cold. When feeling good, wasn't using it.   Taking it daily. Usually in the morning and then doing the sinus rinse at night.     Review of Systems   Constitutional, HEENT, cardiovascular, pulmonary, GI, , musculoskeletal, neuro, skin, endocrine and psych systems are negative, except as otherwise noted.      Objective    /60 (BP Location: Right arm, Patient Position: Sitting, Cuff Size: Adult Regular)   Pulse 90   Temp 97.5  F (36.4  C) (Tympanic)   Resp 18   Ht 1.664 m (5' 5.5\")   Wt 78.9 kg (174 lb)   SpO2 98%   BMI 28.51 kg/m    Body mass index is 28.51 kg/m .  Physical Exam  Constitutional:       Appearance: Normal appearance.   HENT:      Head: Normocephalic.      Ears:      Comments: Tympanostomy tube in left TM     Nose: Rhinorrhea present. Rhinorrhea is clear.      Mouth/Throat:      Pharynx: No oropharyngeal exudate or posterior oropharyngeal erythema.   Eyes:      General: No scleral icterus.     Extraocular Movements: Extraocular movements intact.      Conjunctiva/sclera: Conjunctivae normal.   Cardiovascular:      Rate and Rhythm: Normal rate and regular rhythm.      Heart sounds: Normal heart sounds.   Pulmonary:      Effort: Pulmonary effort is normal.      Breath sounds: Normal breath sounds. No wheezing, rhonchi or rales.   Musculoskeletal:         General: Normal range of motion.      Right forearm: Swelling (worse than left) present. No tenderness.      Left forearm: Swelling present. No tenderness.      Cervical back: Normal range of motion.      Right lower leg: Edema (1+pitting) present.      Left lower leg: Edema (1+ pitting) present.   Neurological:      General: No focal deficit present.      Mental Status: She is alert and oriented to person, " place, and time.           Results from this visit  Results for orders placed or performed in visit on 03/17/23   Hemoglobin A1c     Status: Normal   Result Value Ref Range    Hemoglobin A1C 5.5 0.0 - 5.6 %   CBC with platelets     Status: Abnormal   Result Value Ref Range    WBC Count 7.4 4.0 - 11.0 10e3/uL    RBC Count 3.16 (L) 3.80 - 5.20 10e6/uL    Hemoglobin 10.3 (L) 11.7 - 15.7 g/dL    Hematocrit 33.4 (L) 35.0 - 47.0 %     (H) 78 - 100 fL    MCH 32.6 26.5 - 33.0 pg    MCHC 30.8 (L) 31.5 - 36.5 g/dL    RDW 14.1 10.0 - 15.0 %    Platelet Count 251 150 - 450 10e3/uL

## 2023-03-28 ENCOUNTER — HOSPITAL ENCOUNTER (OUTPATIENT)
Dept: BONE DENSITY | Facility: CLINIC | Age: 83
Discharge: HOME | End: 2023-03-28
Attending: STUDENT IN AN ORGANIZED HEALTH CARE EDUCATION/TRAINING PROGRAM | Admitting: STUDENT IN AN ORGANIZED HEALTH CARE EDUCATION/TRAINING PROGRAM
Payer: COMMERCIAL

## 2023-03-28 DIAGNOSIS — Z78.0 POSTMENOPAUSAL STATUS: ICD-10-CM

## 2023-03-28 PROCEDURE — 77080 DXA BONE DENSITY AXIAL: CPT

## 2023-03-30 ENCOUNTER — MYC MEDICAL ADVICE (OUTPATIENT)
Dept: FAMILY MEDICINE | Facility: CLINIC | Age: 83
End: 2023-03-30
Payer: COMMERCIAL

## 2023-04-17 NOTE — PROGRESS NOTES
AUDIOLOGY REPORT    SUMMARY: Audiology visit completed. See audiogram for results.      RECOMMENDATIONS: Follow-up with ENT.    Heri Rogers.  Clinical Audiologist, [place] #[id number]       Dr. Whalen

## 2023-05-09 NOTE — PROGRESS NOTES
Chief Complaint   Patient presents with     RECHECK     Had CT scan and x-ray that showed no sinus infection- c/o frequent cough  but phlegm cleared after going off prilosec but she is on zantac now instead patient frustrated she is getting the run around with these concerns     History of Present Illness  Lavonne Tidwell is a 82 year old female who presents today for follow-up. She has a history of left otitis media with effusion. The patient was last evaluated on 1/18/2023 and her left T-tube was in good placement.  I have also seen her for issues regarding her throat and postnasal drainage. She returns today for follow-up.       From an ear symptom standpoint, the patient reports  stable improvement in her left ear symptoms.  The hearing is still down on the left but is much improved from previous.  She denies any significant otalgia or otorrhea. No bloody otorrhea.  No dizziness or vertigo.  Aside from her ear tubes, no previous ear surgery.     From a nasal drainage standpoint, she has noticed a sense of postnasal drainage, throat fullness, and throat clearing for many months.  She felt like it got worse after an upper respiratory/influenza-like illness.  She read about stopping the Prilosec she was on and she switched to Zantac.  She had messaged me via "[employer] and we have taken her off her lisinopril and switch her to losartan.  She did not feel like the medication change made a difference in her throat fullness, postnasal drainage, and cough.  She feels like her voice will become hoarse especially with voice use at times.  She denies any dysphagia or odynophagia.  No hemoptysis.  No neck lumps/bumps/swelling.  She has a remote smoking history quitting in the early 1970s.     Past Medical History  Patient Active Problem List   Diagnosis     Injury of sigmoid colon     Esophageal reflux     Menopausal syndrome (hot flashes)     Urinary incontinence     Atrophic vaginitis     Hyperlipidemia LDL goal <130      Advanced directives, counseling/discussion     Onychomycosis     Senile nuclear sclerosis     Status post total left knee replacement     Status post total right knee replacement     Primary localized osteoarthrosis, lower leg     Tubulovillous adenoma polyp of colon     Essential hypertension     Primary osteoarthritis involving multiple joints     Chronic bilateral low back pain without sciatica     Current Medications    Current Outpatient Medications:      Acetaminophen (TYLENOL PO), Take 1,000 mg by mouth 2 times daily as needed for mild pain or fever, Disp: , Rfl:      Biotin 10 MG CAPS, , Disp: , Rfl:      budesonide (PULMICORT) 0.5 MG/2ML neb solution, Place 0.5 mg/2 mL budesonide vial in 8 oz normal saline sinus rinse bottle.  Irrigate each nostril with one half of the bottle daily., Disp: 180 mL, Rfl: 3     CALCIUM + D OR, 2 TABLET DAILY, Disp: , Rfl:      Docusate Calcium (STOOL SOFTENER PO), , Disp: , Rfl:      famotidine (PEPCID) 40 MG tablet, Take 1 tablet (40 mg) by mouth At Bedtime, Disp: 90 tablet, Rfl: 3     garlic 150 MG TABS tablet, Take 150 mg by mouth daily, Disp: , Rfl:      grape seed 50 MG CAPS, Take 50 mg by mouth daily, Disp: , Rfl:      loratadine (CLARITIN) 10 MG tablet, Take 10 mg by mouth daily, Disp: , Rfl:      losartan (COZAAR) 50 MG tablet, Take 1 tablet (50 mg) by mouth daily, Disp: 90 tablet, Rfl: 1     methylcellulose (CITRUCEL) 500 MG TABS tablet, Take 500 mg by mouth daily, Disp: , Rfl:      Multiple Vitamins-Minerals (CENTRUM SILVER) per tablet, Take 1 tablet by mouth daily, Disp: , Rfl:      pantoprazole (PROTONIX) 40 MG EC tablet, Take 1 tablet (40 mg) by mouth daily 20-30 minutes before meal, Disp: 90 tablet, Rfl: 1     VITAMIN E COMPLEX PO, , Disp: , Rfl:      Zinc Sulfate (ZINC 15 PO), , Disp: , Rfl:     Allergies  Allergies   Allergen Reactions     Morphine And Related GI Disturbance       Social History  Social History     Socioeconomic History     Marital status:     Tobacco Use     Smoking status: Former     Packs/day: 0.50     Years: 5.00     Pack years: 2.50     Types: Cigarettes     Start date: 1968     Quit date: 2/15/1972     Years since quittin.2     Smokeless tobacco: Never     Tobacco comments:     stopped in her 20's   Substance and Sexual Activity     Alcohol use: Yes     Alcohol/week: 0.0 standard drinks of alcohol     Comment: Occasional     Drug use: No     Sexual activity: Not Currently   Other Topics Concern     Parent/sibling w/ CABG, MI or angioplasty before 65F 55M? Yes       Family History  Family History   Problem Relation Age of Onset     Cancer Mother         Ovarian     Other Cancer Mother         Ovarian     Cerebrovascular Disease Father      Heart Disease Father      C.A.D. Father      Coronary Artery Disease Father      Cancer Maternal Grandmother      Other Cancer Maternal Grandmother         Ovarian/Bone     Cerebrovascular Disease Maternal Grandfather      Colon Cancer Maternal Grandfather      Diabetes Other         Great Grandmother     Breast Cancer No family hx of        Review of Systems  As per HPI and PMHx, otherwise 10 system review including the head and neck, constitutional, eyes, respiratory, GI, skin, neurologic, lymphatic, endocrine, and allergy systems is negative.    Physical Exam  /75 (BP Location: Right arm, Patient Position: Chair, Cuff Size: Adult Regular)   Pulse 62   Temp 99  F (37.2  C) (Tympanic)   Wt 78.9 kg (174 lb)   BMI 28.51 kg/m    GENERAL: Patient is a pleasant, cooperative 82 year old female in no acute distress.  HEAD: Normocephalic, atraumatic.  Hair and scalp are normal.  EYES: Pupils are equal, round, reactive to light and accommodation.  Extraocular movements are intact.  The sclera nonicteric without injection.  The extraocular structures are normal.  EARS: Normal shape and symmetry.  No tenderness when palpating the mastoid or tragal areas bilaterally.  Otoscopic exam on the right  reveals a clear canal.  The right tympanic membrane is round, intact without evidence effusion, good landmarks.  Otoscope exam in the left reveals a clear canal.  There is a modified Mustafa T-tube in the posterior-inferior quadrant.  The middle ear is well aerated.  No granulation or drainage.    NOSE: Nares are patent.  Nasal mucosa is boggy and inflamed with sticky, inflammatory mucus.  Negative anterior rhinoscopy.  NECK: Supple, trachea is midline.  There no palpable cervical lymphadenopathy or masses bilaterally.  Palpation of the bilateral parotid and submandibular areas reveal no masses.  No thyromegaly.    NEUROLOGIC: Cranial nerves II through XII are grossly intact.  Voice is strong.  Patient is House-Brackmann I/VI bilaterally.  CARDIOVASCULAR: Extremities are warm and well-perfused.  No significant peripheral edema.  RESPIRATORY: Patient has nonlabored breathing without cough, wheeze, stridor.  PSYCHIATRIC: Patient is alert and oriented.  Mood and affect appear normal.  SKIN: Warm and dry.  No scalp, face, or neck lesions noted.    Procedure: Flexible Laryngoscopy  Indication: Fronek cough, postnasal drainage, throat fullness/globus    To best visualize the upper airway anatomy and due to the chief complaint and HPI, I proceeded with flexible fiberoptic laryngoscopy examination.  The bilateral nasal cavities were anesthetized and decongested with a mixture of lidocaine and neosynephrine.  The bilateral nasal cavities were examined using a flexible fiberoptic laryngoscope.  There were no nasal cavity masses, polyps, or mucopurulence bilaterally. The nasopharynx had a normal appearance with normal Eustachian tube openings and fossa of Rosenmuller bilaterally.  Minimal adenoid tissue.  The patient is a moderate amount of posterior oropharyngeal cobblestoning that extends down to the piriforms.  The base of tongue, vallecula, epiglottis, aryepiglottic folds, arytenoids, and piriform sinuses were without  mass or lesion.  The patient has fairly significant interarytenoid thickening and a mild amount of erythema.  The bilateral true vocal folds were symmetrically mobile without nodules or masses.  The visualized portions of the infraglottic and subglottic airway are unremarkable.  The scope was removed.  The patient tolerated the procedure well.                Assessment and Plan     ICD-10-CM    1. Chronic cough  R05.3 pantoprazole (PROTONIX) 40 MG EC tablet     famotidine (PEPCID) 40 MG tablet     LARYNGOSCOPY FLEX FIBEROPTIC, DIAGNOSTIC      2. Throat clearing  R09.89 pantoprazole (PROTONIX) 40 MG EC tablet     famotidine (PEPCID) 40 MG tablet     LARYNGOSCOPY FLEX FIBEROPTIC, DIAGNOSTIC      3. Post-nasal drainage  R09.82 pantoprazole (PROTONIX) 40 MG EC tablet     famotidine (PEPCID) 40 MG tablet     LARYNGOSCOPY FLEX FIBEROPTIC, DIAGNOSTIC      4. Sore throat  J02.9 pantoprazole (PROTONIX) 40 MG EC tablet     famotidine (PEPCID) 40 MG tablet     LARYNGOSCOPY FLEX FIBEROPTIC, DIAGNOSTIC      5. Chronic otitis media of left ear with effusion  H65.492 pantoprazole (PROTONIX) 40 MG EC tablet     famotidine (PEPCID) 40 MG tablet     LARYNGOSCOPY FLEX FIBEROPTIC, DIAGNOSTIC      6. Mixed conductive and sensorineural hearing loss of left ear with restricted hearing of right ear  H90.A32 pantoprazole (PROTONIX) 40 MG EC tablet     famotidine (PEPCID) 40 MG tablet     LARYNGOSCOPY FLEX FIBEROPTIC, DIAGNOSTIC      7. Sensorineural hearing loss (SNHL) of right ear with restricted hearing of left ear  H90.A21 pantoprazole (PROTONIX) 40 MG EC tablet     famotidine (PEPCID) 40 MG tablet     LARYNGOSCOPY FLEX FIBEROPTIC, DIAGNOSTIC      8. Retained myringotomy tube in left ear  Z96.22 pantoprazole (PROTONIX) 40 MG EC tablet     famotidine (PEPCID) 40 MG tablet     LARYNGOSCOPY FLEX FIBEROPTIC, DIAGNOSTIC         It was my pleasure seeing Lavonne Tidwell today in clinic.  The patient presents with persistent throat fullness,  postnasal drainage, cough.  Switching her ACE inhibitor to an ARB did not seem to help symptoms.  She did stop her Prilosec and switch to Zantac but feels like the symptoms are persistent.  We discussed a trial of dual acid suppression therapy with 40 mg of pantoprazole once daily 20-30 minutes prior to morning meal and 40 mg of Pepcid at bedtime.  I will send prescriptions to her pharmacy.  We discussed not eating or drink anything significant 2 to 3 hours prior to bedtime.  I will message her in 6 weeks to check on her symptoms.  If she is not improving, we would expand her work-up to include a chest CT, pulmonary function testing, and allergy evaluation.    Rob Harris MD  Department of Otolaryngology-Head and Neck Surgery  [facility]

## 2023-05-10 ENCOUNTER — OFFICE VISIT (OUTPATIENT)
Dept: OTOLARYNGOLOGY | Facility: CLINIC | Age: 83
End: 2023-05-10
Payer: COMMERCIAL

## 2023-05-10 VITALS
DIASTOLIC BLOOD PRESSURE: 75 MMHG | HEART RATE: 62 BPM | WEIGHT: 174 LBS | TEMPERATURE: 99 F | SYSTOLIC BLOOD PRESSURE: 134 MMHG | BODY MASS INDEX: 28.51 KG/M2

## 2023-05-10 DIAGNOSIS — R09.82 POST-NASAL DRAINAGE: ICD-10-CM

## 2023-05-10 DIAGNOSIS — Z96.22 RETAINED MYRINGOTOMY TUBE IN LEFT EAR: ICD-10-CM

## 2023-05-10 DIAGNOSIS — H90.A21 SENSORINEURAL HEARING LOSS (SNHL) OF RIGHT EAR WITH RESTRICTED HEARING OF LEFT EAR: ICD-10-CM

## 2023-05-10 DIAGNOSIS — R05.3 CHRONIC COUGH: Primary | ICD-10-CM

## 2023-05-10 DIAGNOSIS — R09.89 THROAT CLEARING: ICD-10-CM

## 2023-05-10 DIAGNOSIS — H65.492 CHRONIC OTITIS MEDIA OF LEFT EAR WITH EFFUSION: ICD-10-CM

## 2023-05-10 DIAGNOSIS — J02.9 SORE THROAT: ICD-10-CM

## 2023-05-10 DIAGNOSIS — H90.A32 MIXED CONDUCTIVE AND SENSORINEURAL HEARING LOSS OF LEFT EAR WITH RESTRICTED HEARING OF RIGHT EAR: ICD-10-CM

## 2023-05-10 PROCEDURE — 31575 DIAGNOSTIC LARYNGOSCOPY: CPT | Performed by: OTOLARYNGOLOGY

## 2023-05-10 PROCEDURE — 99213 OFFICE O/P EST LOW 20 MIN: CPT | Mod: 25 | Performed by: OTOLARYNGOLOGY

## 2023-05-10 RX ORDER — PANTOPRAZOLE SODIUM 40 MG/1
40 TABLET, DELAYED RELEASE ORAL DAILY
Qty: 90 TABLET | Refills: 1 | Status: SHIPPED | OUTPATIENT
Start: 2023-05-10 | End: 2023-06-08

## 2023-05-10 RX ORDER — FAMOTIDINE 40 MG/1
40 TABLET, FILM COATED ORAL AT BEDTIME
Qty: 90 TABLET | Refills: 3 | Status: SHIPPED | OUTPATIENT
Start: 2023-05-10 | End: 2023-06-08

## 2023-05-10 ASSESSMENT — PAIN SCALES - GENERAL: PAINLEVEL: NO PAIN (0)

## 2023-05-10 NOTE — NURSING NOTE
"Initial /75 (BP Location: Right arm, Patient Position: Chair, Cuff Size: Adult Regular)   Pulse 62   Temp 99  F (37.2  C) (Tympanic)   Wt 78.9 kg (174 lb)   BMI 28.51 kg/m   Estimated body mass index is 28.51 kg/m  as calculated from the following:    Height as of 3/17/23: 1.664 m (5' 5.5\").    Weight as of this encounter: 78.9 kg (174 lb). .    Indigo Pastor LPN    "

## 2023-05-10 NOTE — LETTER
5/10/2023         RE: Lavonne Tidwell  [address]  [facility] [address]        Dear Colleague,    Thank you for referring your patient, Lavonne Tidwell, to the [facility]. Please see a copy of my visit note below.    Chief Complaint   Patient presents with     RECHECK     Had CT scan and x-ray that showed no sinus infection- c/o frequent cough  but phlegm cleared after going off prilosec but she is on zantac now instead patient frustrated she is getting the run around with these concerns     History of Present Illness  Lavonne Tidwell is a 82 year old female who presents today for follow-up. She has a history of left otitis media with effusion. The patient was last evaluated on 1/18/2023 and her left T-tube was in good placement.  I have also seen her for issues regarding her throat and postnasal drainage. She returns today for follow-up.       From an ear symptom standpoint, the patient reports  stable improvement in her left ear symptoms.  The hearing is still down on the left but is much improved from previous.  She denies any significant otalgia or otorrhea. No bloody otorrhea.  No dizziness or vertigo.  Aside from her ear tubes, no previous ear surgery.     From a nasal drainage standpoint, she has noticed a sense of postnasal drainage, throat fullness, and throat clearing for many months.  She felt like it got worse after an upper respiratory/influenza-like illness.  She read about stopping the Prilosec she was on and she switched to Zantac.  She had messaged me via [employer] and we have taken her off her lisinopril and switch her to losartan.  She did not feel like the medication change made a difference in her throat fullness, postnasal drainage, and cough.  She feels like her voice will become hoarse especially with voice use at times.  She denies any dysphagia or odynophagia.  No hemoptysis.  No neck lumps/bumps/swelling.  She has a remote smoking history quitting in the  early 1970s.     Past Medical History  Patient Active Problem List   Diagnosis     Injury of sigmoid colon     Esophageal reflux     Menopausal syndrome (hot flashes)     Urinary incontinence     Atrophic vaginitis     Hyperlipidemia LDL goal <130     Advanced directives, counseling/discussion     Onychomycosis     Senile nuclear sclerosis     Status post total left knee replacement     Status post total right knee replacement     Primary localized osteoarthrosis, lower leg     Tubulovillous adenoma polyp of colon     Essential hypertension     Primary osteoarthritis involving multiple joints     Chronic bilateral low back pain without sciatica     Current Medications    Current Outpatient Medications:      Acetaminophen (TYLENOL PO), Take 1,000 mg by mouth 2 times daily as needed for mild pain or fever, Disp: , Rfl:      Biotin 10 MG CAPS, , Disp: , Rfl:      budesonide (PULMICORT) 0.5 MG/2ML neb solution, Place 0.5 mg/2 mL budesonide vial in 8 oz normal saline sinus rinse bottle.  Irrigate each nostril with one half of the bottle daily., Disp: 180 mL, Rfl: 3     CALCIUM + D OR, 2 TABLET DAILY, Disp: , Rfl:      Docusate Calcium (STOOL SOFTENER PO), , Disp: , Rfl:      famotidine (PEPCID) 40 MG tablet, Take 1 tablet (40 mg) by mouth At Bedtime, Disp: 90 tablet, Rfl: 3     garlic 150 MG TABS tablet, Take 150 mg by mouth daily, Disp: , Rfl:      grape seed 50 MG CAPS, Take 50 mg by mouth daily, Disp: , Rfl:      loratadine (CLARITIN) 10 MG tablet, Take 10 mg by mouth daily, Disp: , Rfl:      losartan (COZAAR) 50 MG tablet, Take 1 tablet (50 mg) by mouth daily, Disp: 90 tablet, Rfl: 1     methylcellulose (CITRUCEL) 500 MG TABS tablet, Take 500 mg by mouth daily, Disp: , Rfl:      Multiple Vitamins-Minerals (CENTRUM SILVER) per tablet, Take 1 tablet by mouth daily, Disp: , Rfl:      pantoprazole (PROTONIX) 40 MG EC tablet, Take 1 tablet (40 mg) by mouth daily 20-30 minutes before meal, Disp: 90 tablet, Rfl: 1      VITAMIN E COMPLEX PO, , Disp: , Rfl:      Zinc Sulfate (ZINC 15 PO), , Disp: , Rfl:     Allergies  Allergies   Allergen Reactions     Morphine And Related GI Disturbance       Social History  Social History     Socioeconomic History     Marital status:    Tobacco Use     Smoking status: Former     Packs/day: 0.50     Years: 5.00     Pack years: 2.50     Types: Cigarettes     Start date: 1968     Quit date: 2/15/1972     Years since quittin.2     Smokeless tobacco: Never     Tobacco comments:     stopped in her 20's   Substance and Sexual Activity     Alcohol use: Yes     Alcohol/week: 0.0 standard drinks of alcohol     Comment: Occasional     Drug use: No     Sexual activity: Not Currently   Other Topics Concern     Parent/sibling w/ CABG, MI or angioplasty before 65F 55M? Yes       Family History  Family History   Problem Relation Age of Onset     Cancer Mother         Ovarian     Other Cancer Mother         Ovarian     Cerebrovascular Disease Father      Heart Disease Father      C.A.D. Father      Coronary Artery Disease Father      Cancer Maternal Grandmother      Other Cancer Maternal Grandmother         Ovarian/Bone     Cerebrovascular Disease Maternal Grandfather      Colon Cancer Maternal Grandfather      Diabetes Other         Great Grandmother     Breast Cancer No family hx of        Review of Systems  As per HPI and PMHx, otherwise 10 system review including the head and neck, constitutional, eyes, respiratory, GI, skin, neurologic, lymphatic, endocrine, and allergy systems is negative.    Physical Exam  /75 (BP Location: Right arm, Patient Position: Chair, Cuff Size: Adult Regular)   Pulse 62   Temp 99  F (37.2  C) (Tympanic)   Wt 78.9 kg (174 lb)   BMI 28.51 kg/m    GENERAL: Patient is a pleasant, cooperative 82 year old female in no acute distress.  HEAD: Normocephalic, atraumatic.  Hair and scalp are normal.  EYES: Pupils are equal, round, reactive to light and  accommodation.  Extraocular movements are intact.  The sclera nonicteric without injection.  The extraocular structures are normal.  EARS: Normal shape and symmetry.  No tenderness when palpating the mastoid or tragal areas bilaterally.  Otoscopic exam on the right reveals a clear canal.  The right tympanic membrane is round, intact without evidence effusion, good landmarks.  Otoscope exam in the left reveals a clear canal.  There is a modified Mustafa T-tube in the posterior-inferior quadrant.  The middle ear is well aerated.  No granulation or drainage.    NOSE: Nares are patent.  Nasal mucosa is boggy and inflamed with sticky, inflammatory mucus.  Negative anterior rhinoscopy.  NECK: Supple, trachea is midline.  There no palpable cervical lymphadenopathy or masses bilaterally.  Palpation of the bilateral parotid and submandibular areas reveal no masses.  No thyromegaly.    NEUROLOGIC: Cranial nerves II through XII are grossly intact.  Voice is strong.  Patient is House-Brackmann I/VI bilaterally.  CARDIOVASCULAR: Extremities are warm and well-perfused.  No significant peripheral edema.  RESPIRATORY: Patient has nonlabored breathing without cough, wheeze, stridor.  PSYCHIATRIC: Patient is alert and oriented.  Mood and affect appear normal.  SKIN: Warm and dry.  No scalp, face, or neck lesions noted.    Procedure: Flexible Laryngoscopy  Indication: Fronek cough, postnasal drainage, throat fullness/globus    To best visualize the upper airway anatomy and due to the chief complaint and HPI, I proceeded with flexible fiberoptic laryngoscopy examination.  The bilateral nasal cavities were anesthetized and decongested with a mixture of lidocaine and neosynephrine.  The bilateral nasal cavities were examined using a flexible fiberoptic laryngoscope.  There were no nasal cavity masses, polyps, or mucopurulence bilaterally. The nasopharynx had a normal appearance with normal Eustachian tube openings and fossa of  Rosenmuller bilaterally.  Minimal adenoid tissue.  The patient is a moderate amount of posterior oropharyngeal cobblestoning that extends down to the piriforms.  The base of tongue, vallecula, epiglottis, aryepiglottic folds, arytenoids, and piriform sinuses were without mass or lesion.  The patient has fairly significant interarytenoid thickening and a mild amount of erythema.  The bilateral true vocal folds were symmetrically mobile without nodules or masses.  The visualized portions of the infraglottic and subglottic airway are unremarkable.  The scope was removed.  The patient tolerated the procedure well.                Assessment and Plan     ICD-10-CM    1. Chronic cough  R05.3 pantoprazole (PROTONIX) 40 MG EC tablet     famotidine (PEPCID) 40 MG tablet     LARYNGOSCOPY FLEX FIBEROPTIC, DIAGNOSTIC      2. Throat clearing  R09.89 pantoprazole (PROTONIX) 40 MG EC tablet     famotidine (PEPCID) 40 MG tablet     LARYNGOSCOPY FLEX FIBEROPTIC, DIAGNOSTIC      3. Post-nasal drainage  R09.82 pantoprazole (PROTONIX) 40 MG EC tablet     famotidine (PEPCID) 40 MG tablet     LARYNGOSCOPY FLEX FIBEROPTIC, DIAGNOSTIC      4. Sore throat  J02.9 pantoprazole (PROTONIX) 40 MG EC tablet     famotidine (PEPCID) 40 MG tablet     LARYNGOSCOPY FLEX FIBEROPTIC, DIAGNOSTIC      5. Chronic otitis media of left ear with effusion  H65.492 pantoprazole (PROTONIX) 40 MG EC tablet     famotidine (PEPCID) 40 MG tablet     LARYNGOSCOPY FLEX FIBEROPTIC, DIAGNOSTIC      6. Mixed conductive and sensorineural hearing loss of left ear with restricted hearing of right ear  H90.A32 pantoprazole (PROTONIX) 40 MG EC tablet     famotidine (PEPCID) 40 MG tablet     LARYNGOSCOPY FLEX FIBEROPTIC, DIAGNOSTIC      7. Sensorineural hearing loss (SNHL) of right ear with restricted hearing of left ear  H90.A21 pantoprazole (PROTONIX) 40 MG EC tablet     famotidine (PEPCID) 40 MG tablet     LARYNGOSCOPY FLEX FIBEROPTIC, DIAGNOSTIC      8. Retained myringotomy  tube in left ear  Z96.22 pantoprazole (PROTONIX) 40 MG EC tablet     famotidine (PEPCID) 40 MG tablet     LARYNGOSCOPY FLEX FIBEROPTIC, DIAGNOSTIC         It was my pleasure seeing Lavonne Tidwell today in clinic.  The patient presents with persistent throat fullness, postnasal drainage, cough.  Switching her ACE inhibitor to an ARB did not seem to help symptoms.  She did stop her Prilosec and switch to Zantac but feels like the symptoms are persistent.  We discussed a trial of dual acid suppression therapy with 40 mg of pantoprazole once daily 20-30 minutes prior to morning meal and 40 mg of Pepcid at bedtime.  I will send prescriptions to her pharmacy.  We discussed not eating or drink anything significant 2 to 3 hours prior to bedtime.  I will message her in 6 weeks to check on her symptoms.  If she is not improving, we would expand her work-up to include a chest CT, pulmonary function testing, and allergy evaluation.    Rob Harris MD  Department of Otolaryngology-Head and Neck Surgery  [facility]         Again, thank you for allowing me to participate in the care of your patient.        Sincerely,        Rob Harris MD

## 2023-05-18 ENCOUNTER — TELEPHONE (OUTPATIENT)
Dept: FAMILY MEDICINE | Facility: CLINIC | Age: 83
End: 2023-05-18
Payer: COMMERCIAL

## 2023-05-18 NOTE — TELEPHONE ENCOUNTER
Reason for Call:  Appointment Request    Patient requesting this type of appt: back pain, unable to walk long distances.     Requested provider: ELTON Kowalski    When does patient want to be seen/preferred time: 1-2 weeks/ as soon as possible.     Comments: scheduled an appt    Next 5 appointments (look out 90 days)    Jun 08, 2023  2:30 PM  (Arrive by 2:10 PM)  Provider Visit with Мария Reyna MD  [facility] [address] [place] [id number]  [phone]     But requesting to see Dr Kowalski.       Could we send this information to you in [employer] or would you prefer to receive a phone call?:   Patient would prefer a phone call   Okay to leave a detailed message?: Yes at Home number on file [phone] (home)    Call taken on 5/18/2023 at 2:47 PM by Brielle George

## 2023-05-18 NOTE — TELEPHONE ENCOUNTER
Spoke with patient and notified that Dr. Kowalski is out of the clinic. Patient verbalized understanding and will keep visit with Dr. Reyna

## 2023-05-24 ENCOUNTER — ANCILLARY PROCEDURE (OUTPATIENT)
Dept: MAMMOGRAPHY | Facility: CLINIC | Age: 83
End: 2023-05-24
Attending: FAMILY MEDICINE
Payer: COMMERCIAL

## 2023-05-24 ENCOUNTER — TELEPHONE (OUTPATIENT)
Dept: PHARMACY | Facility: OTHER | Age: 83
End: 2023-05-24

## 2023-05-24 DIAGNOSIS — Z12.31 VISIT FOR SCREENING MAMMOGRAM: ICD-10-CM

## 2023-05-24 PROCEDURE — 77067 SCR MAMMO BI INCL CAD: CPT | Mod: TC | Performed by: RADIOLOGY

## 2023-05-24 PROCEDURE — 77063 BREAST TOMOSYNTHESIS BI: CPT | Mod: TC | Performed by: RADIOLOGY

## 2023-05-24 NOTE — TELEPHONE ENCOUNTER
I called patient to offer an MTM visit as referred by her [employer] insurance plan. Scheduled MTM visit on Monday June 12th. Patient will bring in all her supplement bottles.    Juvenal EarlD, [facility]  Medication Therapy Management Pharmacist  Pager: [phone]

## 2023-06-01 ENCOUNTER — PATIENT OUTREACH (OUTPATIENT)
Dept: CARE COORDINATION | Facility: CLINIC | Age: 83
End: 2023-06-01
Payer: COMMERCIAL

## 2023-06-01 ENCOUNTER — MYC MEDICAL ADVICE (OUTPATIENT)
Dept: OTOLARYNGOLOGY | Facility: CLINIC | Age: 83
End: 2023-06-01
Payer: COMMERCIAL

## 2023-06-01 DIAGNOSIS — R09.82 POST-NASAL DRAINAGE: ICD-10-CM

## 2023-06-01 DIAGNOSIS — R09.89 THROAT CLEARING: ICD-10-CM

## 2023-06-01 DIAGNOSIS — J02.9 SORE THROAT: ICD-10-CM

## 2023-06-01 DIAGNOSIS — R05.3 CHRONIC COUGH: Primary | ICD-10-CM

## 2023-06-01 NOTE — TELEPHONE ENCOUNTER
LOV 5/10/23: Reflux, PND  Started Protonix in am and Pepcid in PM     Patient stopped Protonix due to diarrhea.   Cough is better, throat is sore.   Nasal sx continued.     Plan if not improving: chest CT, pulmonary function testing, and allergy evaluation.    - Any substitute to Protonix patient could try for reflux?  - Would you like allergy referral with ongoing nasal sx?    Thank you,   Tena VELARDE RN  [facility]

## 2023-06-04 RX ORDER — FAMOTIDINE 40 MG/1
40 TABLET, FILM COATED ORAL 2 TIMES DAILY
Qty: 180 TABLET | Refills: 3 | Status: SHIPPED | OUTPATIENT
Start: 2023-06-04 | End: 2023-09-13

## 2023-06-08 ENCOUNTER — OFFICE VISIT (OUTPATIENT)
Dept: FAMILY MEDICINE | Facility: CLINIC | Age: 83
End: 2023-06-08
Payer: COMMERCIAL

## 2023-06-08 VITALS
SYSTOLIC BLOOD PRESSURE: 126 MMHG | BODY MASS INDEX: 27.64 KG/M2 | WEIGHT: 172 LBS | HEIGHT: 66 IN | DIASTOLIC BLOOD PRESSURE: 66 MMHG | OXYGEN SATURATION: 99 % | HEART RATE: 74 BPM | RESPIRATION RATE: 16 BRPM

## 2023-06-08 DIAGNOSIS — K21.9 GASTROESOPHAGEAL REFLUX DISEASE WITHOUT ESOPHAGITIS: Chronic | ICD-10-CM

## 2023-06-08 DIAGNOSIS — M48.061 SPINAL STENOSIS OF LUMBAR REGION WITHOUT NEUROGENIC CLAUDICATION: ICD-10-CM

## 2023-06-08 DIAGNOSIS — J30.2 SEASONAL ALLERGIC RHINITIS, UNSPECIFIED TRIGGER: ICD-10-CM

## 2023-06-08 DIAGNOSIS — Z51.81 ENCOUNTER FOR MONITORING LONG-TERM PROTON PUMP INHIBITOR THERAPY: ICD-10-CM

## 2023-06-08 DIAGNOSIS — E03.8 SUBCLINICAL HYPOTHYROIDISM: ICD-10-CM

## 2023-06-08 DIAGNOSIS — R74.8 ELEVATED ALKALINE PHOSPHATASE LEVEL: ICD-10-CM

## 2023-06-08 DIAGNOSIS — D50.9 IRON DEFICIENCY ANEMIA, UNSPECIFIED IRON DEFICIENCY ANEMIA TYPE: ICD-10-CM

## 2023-06-08 DIAGNOSIS — Z79.899 ENCOUNTER FOR MONITORING LONG-TERM PROTON PUMP INHIBITOR THERAPY: ICD-10-CM

## 2023-06-08 DIAGNOSIS — M48.07 NEURAL FORAMINAL STENOSIS OF LUMBOSACRAL SPINE: Primary | ICD-10-CM

## 2023-06-08 DIAGNOSIS — M1A.0320 CHRONIC GOUT OF LEFT WRIST, UNSPECIFIED CAUSE: ICD-10-CM

## 2023-06-08 LAB
ALBUMIN SERPL BCG-MCNC: 4 G/DL (ref 3.5–5.2)
ALP SERPL-CCNC: 128 U/L (ref 35–104)
ALT SERPL W P-5'-P-CCNC: 18 U/L (ref 10–35)
ANION GAP SERPL CALCULATED.3IONS-SCNC: 9 MMOL/L (ref 7–15)
AST SERPL W P-5'-P-CCNC: 29 U/L (ref 10–35)
BILIRUB SERPL-MCNC: 0.4 MG/DL
BUN SERPL-MCNC: 23.4 MG/DL (ref 8–23)
CALCIUM SERPL-MCNC: 9.7 MG/DL (ref 8.8–10.2)
CHLORIDE SERPL-SCNC: 99 MMOL/L (ref 98–107)
CREAT SERPL-MCNC: 0.84 MG/DL (ref 0.51–0.95)
DEPRECATED HCO3 PLAS-SCNC: 30 MMOL/L (ref 22–29)
GFR SERPL CREATININE-BSD FRML MDRD: 69 ML/MIN/1.73M2
GLUCOSE SERPL-MCNC: 106 MG/DL (ref 70–99)
MAGNESIUM SERPL-MCNC: 2 MG/DL (ref 1.7–2.3)
POTASSIUM SERPL-SCNC: 4.8 MMOL/L (ref 3.4–5.3)
PROT SERPL-MCNC: 7.8 G/DL (ref 6.4–8.3)
SODIUM SERPL-SCNC: 138 MMOL/L (ref 136–145)
TSH SERPL DL<=0.005 MIU/L-ACNC: 4.18 UIU/ML (ref 0.3–4.2)
URATE SERPL-MCNC: 4.6 MG/DL (ref 2.4–5.7)

## 2023-06-08 PROCEDURE — 82607 VITAMIN B-12: CPT | Performed by: STUDENT IN AN ORGANIZED HEALTH CARE EDUCATION/TRAINING PROGRAM

## 2023-06-08 PROCEDURE — 36415 COLL VENOUS BLD VENIPUNCTURE: CPT | Performed by: STUDENT IN AN ORGANIZED HEALTH CARE EDUCATION/TRAINING PROGRAM

## 2023-06-08 PROCEDURE — 99214 OFFICE O/P EST MOD 30 MIN: CPT | Performed by: STUDENT IN AN ORGANIZED HEALTH CARE EDUCATION/TRAINING PROGRAM

## 2023-06-08 PROCEDURE — 83735 ASSAY OF MAGNESIUM: CPT | Performed by: STUDENT IN AN ORGANIZED HEALTH CARE EDUCATION/TRAINING PROGRAM

## 2023-06-08 PROCEDURE — 84550 ASSAY OF BLOOD/URIC ACID: CPT | Performed by: STUDENT IN AN ORGANIZED HEALTH CARE EDUCATION/TRAINING PROGRAM

## 2023-06-08 PROCEDURE — 84443 ASSAY THYROID STIM HORMONE: CPT | Performed by: STUDENT IN AN ORGANIZED HEALTH CARE EDUCATION/TRAINING PROGRAM

## 2023-06-08 PROCEDURE — 99000 SPECIMEN HANDLING OFFICE-LAB: CPT | Performed by: STUDENT IN AN ORGANIZED HEALTH CARE EDUCATION/TRAINING PROGRAM

## 2023-06-08 PROCEDURE — 80053 COMPREHEN METABOLIC PANEL: CPT | Performed by: STUDENT IN AN ORGANIZED HEALTH CARE EDUCATION/TRAINING PROGRAM

## 2023-06-08 PROCEDURE — 82977 ASSAY OF GGT: CPT | Mod: 90 | Performed by: STUDENT IN AN ORGANIZED HEALTH CARE EDUCATION/TRAINING PROGRAM

## 2023-06-08 PROCEDURE — 84080 ASSAY ALKALINE PHOSPHATASES: CPT | Mod: 90 | Performed by: STUDENT IN AN ORGANIZED HEALTH CARE EDUCATION/TRAINING PROGRAM

## 2023-06-08 RX ORDER — FERROUS SULFATE 325(65) MG
325 TABLET ORAL
Qty: 45 TABLET | Refills: 3 | Status: SHIPPED | OUTPATIENT
Start: 2023-06-09 | End: 2024-09-24 | Stop reason: ALTCHOICE

## 2023-06-08 RX ORDER — FLUTICASONE PROPIONATE 50 MCG
1 SPRAY, SUSPENSION (ML) NASAL 2 TIMES DAILY
COMMUNITY
Start: 2023-06-08 | End: 2023-09-13

## 2023-06-08 RX ORDER — ALLOPURINOL 100 MG/1
100 TABLET ORAL DAILY
Qty: 90 TABLET | Refills: 3 | Status: SHIPPED | OUTPATIENT
Start: 2023-06-08 | End: 2024-06-19

## 2023-06-08 RX ORDER — MECOBALAMIN 5000 MCG
15 TABLET,DISINTEGRATING ORAL DAILY
Qty: 90 CAPSULE | Refills: 3 | Status: SHIPPED | OUTPATIENT
Start: 2023-06-08 | End: 2024-02-15

## 2023-06-08 ASSESSMENT — PAIN SCALES - GENERAL: PAINLEVEL: MODERATE PAIN (4)

## 2023-06-08 NOTE — PATIENT INSTRUCTIONS
For the cough:  - let's try lansoprazole (Prevacid) instead of the Protonix (pantoprazole) or omeprazole  - switch the flonase to 1 spray in each nostril at bedtime, and you can still use 1 spray in each nostril during the day if you need it  - Keep taking pepcid (famotidine) twice daily.     Another possible medication we could add to help with the cough and the allergy symptoms would be Montelukast (Singulair). There is a possible complication of issues with mental health, including depression, insomnia, and psychosis, so we don't want to use it except as a last resort.     Try these things first. If you're still feeling fatigued, or having other issues not well controlled, send me a message before the middle of July and we will plan to treat you with a low dose of thyroid hormone replacement.

## 2023-06-08 NOTE — PROGRESS NOTES
Assessment & Plan     Neural foraminal stenosis of lumbosacral spine  Spinal stenosis of lumbar region without neurogenic claudication  Discussed that back pain is likely in part related to underlying degenerative changes. Also with some muscular pain bilaterally lower back. She has previously done PT. Next step would be consideration of steroid injection. Continue gentle low back stretching and PT exercises.   - pain management referral.     Chronic gout of left wrist, unspecified cause  Previously with mildly elevated uric acid.  Has a history of flares of pain in the left wrist.  She states that the rheumatologist had verbally discussed the possibility of pseudogout or gout in her hands and wrists.  Given that she has elevated uric acid history, ongoing fluctuating joint pains occasionally with redness, we discussed starting allopurinol with a recheck uric acid today to monitor improvement on this medication, which will hopefully help with some of her joint pains.  - allopurinol (ZYLOPRIM) 100 MG tablet  Dispense: 90 tablet; Refill: 3  - Uric acid  - Uric acid    Gastroesophageal reflux disease without esophagitis  Has known issues with reflux likely a large contributor to her chronic cough and throat discomfort.  Tried pantoprazole per her ENT, but had negative GI side effects from this, so stopped it.  We discussed trying Prevacid instead as she did not feel that the omeprazole she was on was all that helpful anymore.  This is likely a combination issue of GERD and ongoing allergic rhinitis.  However, for her heartburn/GERD, we will try the Prevacid in the morning, and continue Pepcid twice daily.  - LANsoprazole (PREVACID) 15 MG DR capsule  Dispense: 90 capsule; Refill: 3    Encounter for monitoring long-term proton pump inhibitor therapy  She has previously been on PPI therapy ongoing for many years, and has had ongoing fatigue, the below work-up is obtained today.  - Magnesium  - Comprehensive metabolic  "panel  - Vitamin B12  - Magnesium  - Comprehensive metabolic panel  - Vitamin B12    Seasonal allergic rhinitis, unspecified trigger  Patient with ongoing allergic rhinitis symptoms, chronic cough.  She is already using Flonase, and I counseled her on trying to use that at night instead as it may be more helpful with some of her symptoms.  In addition, should take it regularly rather than just as needed.  - fluticasone (FLONASE) 50 MCG/ACT nasal spray    Iron deficiency anemia, unspecified iron deficiency anemia type  Previously has had anemia, though it is macrocytic.  She does have low normal iron and ferritin levels, and I instructed her to take an additional iron supplement beyond her multivitamin, 3 times a week to avoid constipation.  - ferrous sulfate (FEROSUL) 325 (65 Fe) MG tablet  Dispense: 45 tablet; Refill: 3    Subclinical hypothyroidism  Previously with mildly elevated TSH.  In the context of her ongoing fatigue, we did opt to recheck it today as it has been about a couple months since last checked.  If it is worsened, or symptoms persist despite above work-up and treatment, will consider low-dose levothyroxine for symptomatic management.  - TSH with free T4 reflex  - TSH with free T4 reflex    Elevated alkaline phosphatase level  History of mildly elevated alk phos.  Did have a DEXA scan this year that did not show any signs of osteoporosis, reassuringly.  Recheck electrolytes as above, but will also recheck alk phos with CMP today.  - Comprehensive metabolic panel  - Comprehensive metabolic panel    I spent a total of 38 minutes on the day of the visit.   Time spent by me doing chart review, history and exam, documentation and further activities per the note     BMI:   Estimated body mass index is 28.19 kg/m  as calculated from the following:    Height as of this encounter: 1.664 m (5' 5.5\").    Weight as of this encounter: 78 kg (172 lb).     Мария Reyna MD  [facility] " "GLORIA Banerjee is a 82 year old, presenting for the following health issues:  Back Pain        6/8/2023     2:08 PM   Additional Questions   Roomed by Elis   Accompanied by Self     History of Present Illness       Back Pain:  She presents for follow up of back pain. Patient's back pain is a chronic problem.  Location of back pain:  Right lower back, left lower back, right buttock and left buttock  Description of back pain: dull ache and gnawing  Back pain spreads: right buttocks and left buttocks    Since patient first noticed back pain, pain is: gradually worsening  Does back pain interfere with her job:  Not applicable      She eats 4 or more servings of fruits and vegetables daily.She consumes 0 sweetened beverage(s) daily.She exercises with enough effort to increase her heart rate 9 or less minutes per day.  She exercises with enough effort to increase her heart rate 3 or less days per week.   She is taking medications regularly.     Dry mouth sometimes  Sore throat    Tried protonix - took it for about 5 days, had diarrhea so bad. Just did pepcid at night, but that ddidn't seem good enough. Now pepcid twice daily.     Was on omeprazole previously     No shooting pain in the back of legs. Sometimes just feels weak.  Most recent PT was in September 2022    Thinking about spine injections.     Pseudogout in a couple of knuckles.   uric acid - gout?     meds to try falling asleep:  Has tried melatonin in the past - helped some. But has to wake up to use the bathroom throughout the night.       Review of Systems   Constitutional, HEENT, cardiovascular, pulmonary, GI, , musculoskeletal, neuro, skin, endocrine and psych systems are negative, except as otherwise noted.      Objective    /66 (BP Location: Right arm, Patient Position: Sitting, Cuff Size: Adult Regular)   Pulse 74   Resp 16   Ht 1.664 m (5' 5.5\")   Wt 78 kg (172 lb)   SpO2 99%   BMI 28.19 kg/m    Body mass index is 28.19 " kg/m .  Physical Exam  Constitutional:       Appearance: Normal appearance.   HENT:      Head: Normocephalic.   Eyes:      General: No scleral icterus.     Extraocular Movements: Extraocular movements intact.      Conjunctiva/sclera: Conjunctivae normal.   Cardiovascular:      Rate and Rhythm: Normal rate and regular rhythm.      Heart sounds: Normal heart sounds.   Pulmonary:      Effort: Pulmonary effort is normal.      Breath sounds: Normal breath sounds.   Neurological:      General: No focal deficit present.      Mental Status: She is alert and oriented to person, place, and time.             Results from this visitNo results found for any visits on 06/08/23.    Results from last visit:  Office Visit on 03/17/2023   Component Date Value Ref Range Status     Hemoglobin A1C 03/17/2023 5.5  0.0 - 5.6 % Final    Normal <5.7%   Prediabetes 5.7-6.4%    Diabetes 6.5% or higher     Note: Adopted from ADA consensus guidelines.     TSH 03/17/2023 5.11 (H)  0.30 - 4.20 uIU/mL Final     Iron 03/17/2023 46  37 - 145 ug/dL Final     Iron Binding Capacity 03/17/2023 296  240 - 430 ug/dL Final     Iron Sat Index 03/17/2023 16  15 - 46 % Final     WBC Count 03/17/2023 7.4  4.0 - 11.0 10e3/uL Final     RBC Count 03/17/2023 3.16 (L)  3.80 - 5.20 10e6/uL Final     Hemoglobin 03/17/2023 10.3 (L)  11.7 - 15.7 g/dL Final     Hematocrit 03/17/2023 33.4 (L)  35.0 - 47.0 % Final     MCV 03/17/2023 106 (H)  78 - 100 fL Final     MCH 03/17/2023 32.6  26.5 - 33.0 pg Final     MCHC 03/17/2023 30.8 (L)  31.5 - 36.5 g/dL Final     RDW 03/17/2023 14.1  10.0 - 15.0 % Final     Platelet Count 03/17/2023 251  150 - 450 10e3/uL Final     Ferritin 03/17/2023 67  11 - 328 ng/mL Final     Sodium 03/17/2023 140  136 - 145 mmol/L Final     Potassium 03/17/2023 4.4  3.4 - 5.3 mmol/L Final     Chloride 03/17/2023 105  98 - 107 mmol/L Final     Carbon Dioxide (CO2) 03/17/2023 26  22 - 29 mmol/L Final     Anion Gap 03/17/2023 9  7 - 15 mmol/L Final      Urea Nitrogen 03/17/2023 25.1 (H)  8.0 - 23.0 mg/dL Final     Creatinine 03/17/2023 1.04 (H)  0.51 - 0.95 mg/dL Final     Calcium 03/17/2023 9.6  8.8 - 10.2 mg/dL Final     Glucose 03/17/2023 117 (H)  70 - 99 mg/dL Final     Alkaline Phosphatase 03/17/2023 112 (H)  35 - 104 U/L Final     AST 03/17/2023 29  10 - 35 U/L Final     ALT 03/17/2023 18  10 - 35 U/L Final     Protein Total 03/17/2023 7.7  6.4 - 8.3 g/dL Final     Albumin 03/17/2023 4.2  3.5 - 5.2 g/dL Final     Bilirubin Total 03/17/2023 0.3  <=1.2 mg/dL Final     GFR Estimate 03/17/2023 53 (L)  >60 mL/min/1.73m2 Final    eGFR calculated using 2021 CKD-EPI equation.     Free T4 03/17/2023 1.16  0.90 - 1.70 ng/dL Final

## 2023-06-09 ENCOUNTER — HOSPITAL ENCOUNTER (OUTPATIENT)
Dept: CT IMAGING | Facility: CLINIC | Age: 83
Discharge: HOME | End: 2023-06-09
Attending: OTOLARYNGOLOGY | Admitting: OTOLARYNGOLOGY
Payer: COMMERCIAL

## 2023-06-09 DIAGNOSIS — J02.9 SORE THROAT: ICD-10-CM

## 2023-06-09 DIAGNOSIS — R09.82 POST-NASAL DRAINAGE: ICD-10-CM

## 2023-06-09 DIAGNOSIS — R05.3 CHRONIC COUGH: ICD-10-CM

## 2023-06-09 DIAGNOSIS — R09.89 THROAT CLEARING: ICD-10-CM

## 2023-06-09 LAB
CREAT BLD-MCNC: 1 MG/DL (ref 0.5–1)
GFR SERPL CREATININE-BSD FRML MDRD: 56 ML/MIN/1.73M2
GGT SERPL-CCNC: 55 U/L (ref 5–36)
RADIOLOGIST FLAGS: ABNORMAL
VIT B12 SERPL-MCNC: 2974 PG/ML (ref 232–1245)

## 2023-06-09 PROCEDURE — 82565 ASSAY OF CREATININE: CPT

## 2023-06-09 PROCEDURE — 250N000009 HC RX 250: Performed by: OTOLARYNGOLOGY

## 2023-06-09 PROCEDURE — 71260 CT THORAX DX C+: CPT

## 2023-06-09 PROCEDURE — 250N000011 HC RX IP 250 OP 636: Performed by: OTOLARYNGOLOGY

## 2023-06-09 RX ORDER — IOPAMIDOL 755 MG/ML
84 INJECTION, SOLUTION INTRAVASCULAR ONCE
Status: COMPLETED | OUTPATIENT
Start: 2023-06-09 | End: 2023-06-09

## 2023-06-09 RX ADMIN — SODIUM CHLORIDE 61 ML: 9 INJECTION, SOLUTION INTRAVENOUS at 14:14

## 2023-06-09 RX ADMIN — IOPAMIDOL 84 ML: 755 INJECTION, SOLUTION INTRAVENOUS at 14:13

## 2023-06-10 LAB — ALP BONE SERPL-MCNC: 11 UG/L

## 2023-06-11 NOTE — PROGRESS NOTES
Medication Therapy Management (MTM) Encounter    ASSESSMENT:                            Medication Adherence/Access: No issues identified    GERD: Deferring to ENT for further work-up.    Anemia: Patient needs education on iron/antacid interaction. According to UpToDate, H2 blockers and PPIs may decrease the absorption of iron, however interaction is minor and no clinical action is likely necessary.    Allergic Rhinitis: Deferring to ENT for further work-up.    Hypertension: Stable. Patient is meeting blood pressure goal of < 130/80mmHg.    Chronic Back Pain/Polyarthralgia/Arthritis/Gout: Patient needs education on maximum Tylenol dosing. Unclear if allopurinol will be beneficial given uric acid level <6 mg/dL. Follow-up plan in place with PCP and pain clinic.     Supplements: Patient would benefit from changing calcium from carbonate to citrate for better absorption due to concomitant PPI. Discussed efficacy and safety of supplements using the Natural Medicines database. Zinc is possibly ineffective for alopecia. Vitamin E has insufficient reliable evidence for menopausal symptoms. Garlic is possibly effective for atherosclerosis. Grape seed has insufficient reliable evidence for colorectal cancer, however no safety concerns so patient opts to continue.     Constipation: Stable.    PLAN:                            1. Ok to take iron supplement along with antacids.   2. Maximum acetaminophen dose is 4000 mg/day.  3. Ok to stop vitamin E and zinc.  4. Change to Citracal Maximum Plus 2 tablets twice daily. Calcium citrate is better aborbed than calcium carbonate due to antacid interaction.    Follow-up: Return in about 1 year (around 6/12/2024) for Medication Therapy Management.    SUBJECTIVE/OBJECTIVE:                          Lavonne Tidwell is a 82 year old female coming in for an initial visit. She was referred to me from her [employer] insurance plan.     Reason for visit: Patient brings in all her  prescription and over-the-counter bottles today. She's had a lot of recent medication changes.    Allergies/ADRs: Reviewed in chart  Past Medical History: Reviewed in chart  Tobacco: She reports that she quit smoking about 51 years ago. Her smoking use included cigarettes. She started smoking about 54 years ago. She has a 2.50 pack-year smoking history. She has never used smokeless tobacco.  Alcohol: 1 drink per day    Medication Adherence/Access:   Patient uses pill box(es), sets up herself.  Patient takes medications 2 time(s) per day.   Per patient, misses medication 0 times per week.   Medication barriers: none.   The patient fills medications at [facility]: YES.    GERD:   Prevacid (lansoprazole) 15mg once daily - PCP started a week ago.  Pepcid (famotidine) 40mg twice daily - ENT increased frequency 2 weeks ago.  Patient has persistent symptoms including throat irritation, cough/phlegm, and difficulty swallowing ever since January and is undergoing work-up by ENT. Sinus CT was negative and awaiting chest CT results.  Patient initially had been on Zantac which worked well until it was recalled, then changed omeprazole to Zantac AC because she read cough/phlegm was a possible side effect, then tried pantoprazole which caused diarrhea.    Anemia: Recently started ferrous sulfate 325mg on Mon/Wed/Fri. Patient asks about interaction with antacids.     Hemoglobin   Date Value Ref Range Status   03/17/2023 10.3 (L) 11.7 - 15.7 g/dL Final     Ferritin   Date Value Ref Range Status   03/17/2023 67 11 - 328 ng/mL Final     Iron   Date Value Ref Range Status   03/17/2023 46 37 - 145 ug/dL Final     Iron Binding Capacity   Date Value Ref Range Status   03/17/2023 296 240 - 430 ug/dL Final     Allergic Rhinitis:   Fluticasone 1 spray both nostrils twice daily  Loratadine 10mg once daily  Budesonide neb for nasal rinse no recent use  Patient finds somewhat helpful for congestion and runny nose.  Currently undergoing work-up  by ENT for ongoing symptoms.    Hypertension:  Losartan 25mg daily - changed from lisinopril since mid-March due to cough.  Patient self-monitors blood pressure. Home BP monitoring in range of 110-130/70's mmHg. Patient reports no current medication side effects.    BP Readings from Last 3 Encounters:   06/08/23 126/66   05/10/23 134/75   03/17/23 124/60     Pulse Readings from Last 3 Encounters:   06/08/23 74   05/10/23 62   03/17/23 90     Chronic Back Pain/Polyarthralgia/Arthritis/Gout:  Allopurinol 100mg daily - PCP started last week.  Acetaminophen 2 tablets up to 4 times daily. Patient was using extra strength but bought arthritis strength to try.  Patient endorses left forearm/hand swelling for the last year. Initially thought might be cellulitis but now might be gout?  Patient is experiencing the following medication side effects: none.   Saw rheumatology in March. Upcoming pain clinic visit in August.   Uric Acid   Date Value Ref Range Status   06/08/2023 4.6 2.4 - 5.7 mg/dL Final     Supplements: Currently taking vitamin D 1000 units daily, biotin 5000 mcg daily and zinc 50mg daily for hair/nails, vitamin E 400 units daily since hysterectomy, garlic 1000mg daily for heart health, grape seed 250mg daily for cancer prevention after  passed away from colorectal cancer in 1995, and calcium/vitamin D 600mg/20mcg 2 tablets at bedtime. Recent DEXA scan was normal.    Constipation: Currently taking daily fiber psyllium husk 1 capsule every morning and 3 capsules at bedtime, Metamucil 2 capsules twice daily, and senna/docusate 2 tablets twice daily. States this regimen keeps BM's satisfactory.    Today's Vitals: There were no vitals taken for this visit.     Last Comprehensive Metabolic Panel:  Lab Results   Component Value Date     06/08/2023    POTASSIUM 4.8 06/08/2023    CHLORIDE 99 06/08/2023    CO2 30 (H) 06/08/2023    ANIONGAP 9 06/08/2023     (H) 06/08/2023    BUN 23.4 (H) 06/08/2023     CR 1.0 06/09/2023    GFRESTIMATED 56 (L) 06/09/2023    MANI 9.7 06/08/2023     ----------------    I spent 60 minutes with this patient today (an extra 15 minutes was spent creating the Medication Action Plan). All changes were made via collaborative practice agreement with Elvira Kowalski MD. A copy of the visit note was provided to the patient's provider(s).    A summary of these recommendations was given to the patient.    Belkis Melendez, PharmD, BCACP  Medication Therapy Management Pharmacist  Pager: [phone]     Medication Therapy Recommendations  Iron deficiency anemia, unspecified iron deficiency anemia type    Current Medication: ferrous sulfate (FEROSUL) 325 (65 Fe) MG tablet   Rationale: Medication interaction - Dosage too low - Effectiveness   Recommendation: Provide Education - Minor interaction w/ PPI and H2RA   Status: Patient Agreed - Adherence/Education         Primary osteoarthritis involving multiple joints    Current Medication: Acetaminophen (TYLENOL PO)   Rationale: Dose too high - Dosage too high - Safety   Recommendation: Decrease Frequency - Max 4000 mg/day from all sources.   Status: Accepted - no CPA Needed         Takes dietary supplements    Current Medication: CALCIUM + D OR (Discontinued)   Rationale: More effective medication available - Ineffective medication - Effectiveness   Recommendation: Change Medication - CITRACAL MAXIMUM PLUS PO - Take 2 tablets by mouth twice daily   Status: Accepted - no CPA Needed          Current Medication: Zinc Sulfate (ZINC 15 PO) (Discontinued)   Rationale: No medical indication at this time - Unnecessary medication therapy - Indication   Recommendation: Discontinue Medication - Stop zinc and vit E.   Status: Accepted - no CPA Needed

## 2023-06-12 ENCOUNTER — MYC MEDICAL ADVICE (OUTPATIENT)
Dept: OTOLARYNGOLOGY | Facility: CLINIC | Age: 83
End: 2023-06-12
Payer: COMMERCIAL

## 2023-06-12 ENCOUNTER — OFFICE VISIT (OUTPATIENT)
Dept: PHARMACY | Facility: CLINIC | Age: 83
End: 2023-06-12
Payer: COMMERCIAL

## 2023-06-12 ENCOUNTER — TELEPHONE (OUTPATIENT)
Dept: OTOLARYNGOLOGY | Facility: CLINIC | Age: 83
End: 2023-06-12
Payer: COMMERCIAL

## 2023-06-12 DIAGNOSIS — Z78.9 TAKES DIETARY SUPPLEMENTS: ICD-10-CM

## 2023-06-12 DIAGNOSIS — K59.00 CONSTIPATION, UNSPECIFIED CONSTIPATION TYPE: ICD-10-CM

## 2023-06-12 DIAGNOSIS — R59.0 LYMPHADENOPATHY, THORACIC: ICD-10-CM

## 2023-06-12 DIAGNOSIS — R59.0 ABDOMINAL LYMPHADENOPATHY: ICD-10-CM

## 2023-06-12 DIAGNOSIS — M15.0 PRIMARY OSTEOARTHRITIS INVOLVING MULTIPLE JOINTS: ICD-10-CM

## 2023-06-12 DIAGNOSIS — I10 ESSENTIAL HYPERTENSION: ICD-10-CM

## 2023-06-12 DIAGNOSIS — R05.3 CHRONIC COUGH: Primary | ICD-10-CM

## 2023-06-12 DIAGNOSIS — J02.9 SORE THROAT: ICD-10-CM

## 2023-06-12 DIAGNOSIS — R05.3 CHRONIC COUGH: ICD-10-CM

## 2023-06-12 DIAGNOSIS — M54.50 CHRONIC BILATERAL LOW BACK PAIN WITHOUT SCIATICA: ICD-10-CM

## 2023-06-12 DIAGNOSIS — J30.2 SEASONAL ALLERGIC RHINITIS, UNSPECIFIED TRIGGER: ICD-10-CM

## 2023-06-12 DIAGNOSIS — R59.0 LYMPHADENOPATHY, THORACIC: Primary | ICD-10-CM

## 2023-06-12 DIAGNOSIS — G89.29 CHRONIC BILATERAL LOW BACK PAIN WITHOUT SCIATICA: ICD-10-CM

## 2023-06-12 DIAGNOSIS — K21.9 GASTROESOPHAGEAL REFLUX DISEASE WITHOUT ESOPHAGITIS: Primary | Chronic | ICD-10-CM

## 2023-06-12 DIAGNOSIS — R59.0 LYMPHADENOPATHY, AXILLARY: ICD-10-CM

## 2023-06-12 DIAGNOSIS — M1A.0320 CHRONIC GOUT OF LEFT WRIST, UNSPECIFIED CAUSE: ICD-10-CM

## 2023-06-12 DIAGNOSIS — D50.9 IRON DEFICIENCY ANEMIA, UNSPECIFIED IRON DEFICIENCY ANEMIA TYPE: ICD-10-CM

## 2023-06-12 DIAGNOSIS — M25.50 POLYARTHRALGIA: ICD-10-CM

## 2023-06-12 PROCEDURE — 99607 MTMS BY PHARM ADDL 15 MIN: CPT | Performed by: PHARMACIST

## 2023-06-12 PROCEDURE — 99605 MTMS BY PHARM NP 15 MIN: CPT | Performed by: PHARMACIST

## 2023-06-12 RX ORDER — AMOXICILLIN 250 MG
2 CAPSULE ORAL DAILY
COMMUNITY

## 2023-06-12 RX ORDER — FAMOTIDINE 20 MG
1 TABLET ORAL DAILY
COMMUNITY
End: 2024-06-19

## 2023-06-12 RX ORDER — CALCIUM CITRATE/VITAMIN D3 200MG-6.25
2 TABLET ORAL 2 TIMES DAILY
COMMUNITY
End: 2024-05-27

## 2023-06-12 NOTE — LETTER
_  Medication List        Prepared on: Jun 12, 2023     Bring your Medication List when you go to the doctor, hospital, or   emergency room. And, share it with your family or caregivers.     Note any changes to how you take your medications.  Cross out medications when you no longer use them.    Medication How I take it Why I use it Prescriber   Acetaminophen (TYLENOL PO) Take 2 tablets (1,000 mg) by mouth every 8 hours as needed for pain Pain Patient Reported   allopurinol (ZYLOPRIM) 100 MG tablet Take 1 tablet (100 mg) by mouth daily Gout Мария Reyna MD   Bioflavonoid Products (GRAPE SEED PO) Take 1 capsule by mouth daily General health Patient Reported   Biotin 5000 MCG CAPS Take 1 capsule by mouth daily General health Patient Reported   famotidine (PEPCID) 40 MG tablet Take 1 tablet (40 mg) by mouth 2 times daily Acid reflux Rob Harris MD   ferrous sulfate (FEROSUL) 325 (65 Fe) MG tablet Take 1 tablet (325 mg) by mouth Every Mon, Wed, Fri Morning Anemia Мария Reyna MD   fluticasone (FLONASE) 50 MCG/ACT nasal spray Spray 1 spray into both nostrils 2 times daily Allergies Мария Reyna MD   Garlic 1000 MG CAPS Take 1 capsule by mouth daily General health Patient Reported   LANsoprazole (PREVACID) 15 MG DR capsule Take 1 capsule (15 mg) by mouth daily Acid reflux Мария Reyna MD   loratadine (CLARITIN) 10 MG tablet Take 1 tablet (10 mg) by mouth daily Allergies Patient Reported   losartan (COZAAR) 50 MG tablet Take 1 tablet (50 mg) by mouth daily Blood pressure Rob Harris MD   METAMUCIL FIBER PO Take 2 capsules by mouth 2 times daily Constipation Patient Reported   Multiple Minerals-Vitamins (CITRACAL MAXIMUM PLUS) TABS Take 2 tablets by mouth 2 times daily Bone health Patient Reported   Multiple Vitamins-Minerals (CENTRUM SILVER) per tablet Take 1 tablet by mouth daily General health Patient Reported   PSYLLIUM PO Take 1 capsule by mouth every morning and 3 capsules at bedtime  Constipation Patient Reported   senna-docusate (SENOKOT-S/PERICOLACE) 8.6-50 MG tablet Take 2 tablets by mouth 2 times daily Constipation Patient Reported   Vitamin D, Cholecalciferol, 25 MCG (1000 UT) CAPS Take 1 capsule by mouth daily General health Patient Reported         Add new medications, over-the-counter drugs, herbals, vitamins, or  minerals in the blank rows below.    Medication How I take it Why I use it Prescriber                                      Allergies:      codeine        Side effects I have had:               Other Information:              My notes and questions:

## 2023-06-12 NOTE — LETTER
"Recommended To-Do List      Prepared on: Jun 12, 2023       You can get the best results from your medications by completing the items on this \"To-Do List.\"      Bring your To-Do List when you go to your doctor. And, share it with your family or caregivers.    My To-Do List:  What we talked about: What I should do:   Iron/antacid interaction    Ok to take iron supplement along with antacids. This interaction is considered minor.          What we talked about: What I should do:   Safe Tylenol dosing    Maximum acetaminophen dose is 4000 mg/day from all sources.          What we talked about: What I should do:   Calcium absorption    Change to Citracal Maximum Plus 2 tablets twice daily. Calcium citrate is better absorbed than calcium carbonate due to antacid interaction.          What we talked about: What I should do:   Unnecessary supplements    Ok to stop taking zinc and vitamin E          What we talked about: What I should do:                     "

## 2023-06-12 NOTE — PATIENT INSTRUCTIONS
"Recommendations from today's MTM visit:                                                    MTM (medication therapy management) is a service provided by a clinical pharmacist designed to help you get the most of out of your medicines.   Today we reviewed what your medicines are for, how to know if they are working, that your medicines are safe and how to make your medicine regimen as easy as possible.      1. Ok to take iron supplement along with antacids.     2. Maximum acetaminophen dose is 4000 mg/day.    3. Ok to stop vitamin E and zinc.    4. Change to Citracal Maximum Plus 2 tablets twice daily. Calcium citrate is better aborbed than calcium carbonate due to antacid interaction.    Follow-up: Return in about 1 year (around 6/12/2024) for Medication Therapy Management.    It was great speaking with you today.  I value your experience and would be very thankful for your time in providing feedback in our clinic survey. In the next few days, you may receive an email or text message from [employer] with a link to a survey related to your  clinical pharmacist.\"     To schedule another MTM appointment, please call the clinic directly or you may call the MTM scheduling line at [phone] or toll-free at [phone].     My Clinical Pharmacist's contact information:                                                      Please feel free to contact me with any questions or concerns you have.      Belkis Melendez, PharmD, BCACP  Medication Therapy Management Pharmacist  Pager: [phone]   "

## 2023-06-12 NOTE — LETTER
Yecenia 15, 2023  Lavonne LENO Yaw  [id number] [facility] [address]    Dear Ms. Tidwell, EM[facility]     Thank you for talking with me on Jun 12, 2023 about your health and medications. As a follow-up to our conversation, I have included two documents:      1. Your Recommended To-Do List has steps you should take to get the best results from your medications.  2. Your Medication List will help you keep track of your medications and how to take them.    If you want to talk about these documents, please call Belkis Melendez RPH at phone: [phone], Monday-Friday 8-4:30pm.    I look forward to working with you and your doctors to make sure your medications work well for you.    Sincerely,  Belkis Melendez RPH  [facility] Pharmacist, [facility]

## 2023-06-12 NOTE — TELEPHONE ENCOUNTER
Left the patient messages and sent her a MyChart.    Rob Harris MD  Department of Otolaryngology-Head and Neck Surgery  [facility]

## 2023-06-13 ENCOUNTER — PATIENT OUTREACH (OUTPATIENT)
Dept: ONCOLOGY | Facility: CLINIC | Age: 83
End: 2023-06-13
Payer: COMMERCIAL

## 2023-06-13 NOTE — TELEPHONE ENCOUNTER
"Oncology care coordinator wondering if you'd be willing to order the following, in hopes it can be completed prior to appointment on 6/22:    \"IR bx for the LAD - maybe the axillary node?\"     Pended order requested by oncology- please review and change as needed  Per onc nurse, they will ask PCP if you'd prefer.     Thank you,   Tena VELARDE RN  [facility]               "

## 2023-06-13 NOTE — TELEPHONE ENCOUNTER
Appointment has been schedule dof 6/22/23    Tena VELARDE RN  [facility]

## 2023-06-13 NOTE — TELEPHONE ENCOUNTER
Rob Harris MD  Fl Ilya Keller Rn Pool 3 hours ago (6:45 AM)     Please reach out to oncology to make sure she gets an appointment for possible lymphoma.     IJL      Message sent to oncology coordinator.   Tena VELARDE RN  [facility]

## 2023-06-13 NOTE — PROGRESS NOTES
[facility]: Cancer Care                                                                   Hem/Onc  Referral reviewed  Referred By  Referred To   Rob Harris MD [facility]      [address]   [place] [address]   Phone: [phone]   Fax: [phone]    Diagnoses: Lymphadenopathy, thoracic   Abdominal lymphadenopathy   Chronic cough   Order: Adult Oncology/Hematology  Referral    My Clinical Question Is:Chest and abdomen lymphadenopathy concerning for possible lymphoproliferative disorder       Hematology     Hematology type:Malignant    Possible lymphoma        ASSESSMENT      Clinical History (per Nurse review of records provided):    82 year old female patient with cough and sore throat evaluated by ENT w/CT chest showing LAD  NN notes elevated alk phos and anemia   No biopsy ordered yet    [address]  [facility] [address]  Payor: [employer] / Plan: [employer] / Product Type: HMO /   PCP: Elvira Kowalski    Records Location: [facility]     Pertinent labs -- BOOKMARKED    Pertinent imaging -- BOOKMARKED    Referring provider note and PCP note-- BOOKMARKED    INTERVENTION(S)                                                      -OUTGOING CALL to pt:   Introduced [facility] role [facility] and that we have recd the referral to hematology/oncology from Dr Harris.  Identity verified. Pt confirms they are aware of the referral and ready to schedule  Pt is able to do video consult if necessary / recommended  Preferred [facility] Hem/Onc clinic location: [place] or [place] -- ok with driving to [place] as needed, aware the NEW and RTN appt availability in [place] is limited for the coming months or longer.  Pt has been symptomatic with sinus infection sx and treated with abx twice since Jan 2023 and is somewhat relieved to know that there is something possibly found  that could be the cause of all of her symptoms and just not feeling well since January. Active listening and emotional support provided to Lavonne as we discussed that bx is the only way to determine a dx, lymphoma or otherwise. Explained that there are many types of lymphoma and that tx will be based on bx, her sx and other test results that will be needed if bx is diagnostic of lymphoma or other. Explained that after she schedules the consult we will ask consulting oncologist if any pre-consult orders for lab or additional imaging are going to placed and we will call her to schedule if so. She is motivated to do any pre-consult testing to help expedite a dx and tx to feel better.  Pt voiced understanding of above instructions and information and denied questions and was warm-transferred to Oncology Patient Access rep to schedule the consultation.    -Referral Triage Scheduling Instructions added to Hem/Onc  referral for Patient Access rep    -Message to referring dept re: possible IR consult for bx of LAD, ordered and pt notified by ENT dept    PLAN                                                      Hem/Onc consult     Future Appointments   Date Time Provider Department Center   6/22/2023  3:30 PM Robby Peña MD [facility]   7/27/2023  1:00 PM Rob Harris MD [facility]   8/7/2023 10:30 AM Hamzah Kingston MD [facility]   10/20/2023  9:30 AM Barron Cardozo MD [facility]       See records team's Pre-Visit encounter documentation for additional records retrieval information.    Lilian Rivera, RN, BSN, OCN  Hematology/Oncology New Patient Nurse Navigator   [facility]  [phone]

## 2023-06-14 NOTE — TELEPHONE ENCOUNTER
RECORDS STATUS - ALL OTHER DIAGNOSIS      RECORDS RECEIVED FROM: IN [facility]   DATE RECEIVED: 06/14/2023   NOTES STATUS DETAILS   OFFICE NOTE from referring provider IN [facility] DR DENISE TRACEY-06/13/2023   OFFICE NOTE from medical oncologist NO     MEDICATION LIST IN EPIC    LABS IN [facility] MOST RECENT  03/2023 TO 06/2023   TYPE:     IMAGING (NEED IMAGES & REPORT)     CT SCANS IN [facility] CT CHEST-06/09/2023  CT SINUS- 03/13/2023   MRI IN [facility] MRI LUMBAR SPINE-10/05/2022   ULTRASOUND IN [facility] US ABD-06/17/2023     Action 06/14/2023@250PM CDK   Action Taken IN PROCESS FINE NEEDLE BX LYMPH NODE  CK

## 2023-06-15 ENCOUNTER — PATIENT OUTREACH (OUTPATIENT)
Dept: CARE COORDINATION | Facility: CLINIC | Age: 83
End: 2023-06-15
Payer: COMMERCIAL

## 2023-06-16 ENCOUNTER — HOSPITAL ENCOUNTER (OUTPATIENT)
Dept: ULTRASOUND IMAGING | Facility: CLINIC | Age: 83
Discharge: HOME | End: 2023-06-16
Attending: OTOLARYNGOLOGY | Admitting: OTOLARYNGOLOGY
Payer: COMMERCIAL

## 2023-06-16 DIAGNOSIS — J02.9 SORE THROAT: ICD-10-CM

## 2023-06-16 DIAGNOSIS — R05.3 CHRONIC COUGH: ICD-10-CM

## 2023-06-16 DIAGNOSIS — R59.0 LYMPHADENOPATHY, AXILLARY: ICD-10-CM

## 2023-06-16 DIAGNOSIS — R59.0 LYMPHADENOPATHY, THORACIC: ICD-10-CM

## 2023-06-16 DIAGNOSIS — R59.0 ABDOMINAL LYMPHADENOPATHY: ICD-10-CM

## 2023-06-16 PROCEDURE — 272N000221 US BIOPSY FINE NEEDLE ASPIRATION LYMPH NODE

## 2023-06-16 PROCEDURE — 88305 TISSUE EXAM BY PATHOLOGIST: CPT | Mod: TC | Performed by: OTOLARYNGOLOGY

## 2023-06-16 PROCEDURE — 88185 FLOWCYTOMETRY/TC ADD-ON: CPT | Performed by: OTOLARYNGOLOGY

## 2023-06-16 PROCEDURE — 272N000431 US BIOPSY FINE NEEDLE ASPIRATION LYMPH NODE

## 2023-06-16 PROCEDURE — 88189 FLOWCYTOMETRY/READ 16 & >: CPT | Performed by: PATHOLOGY

## 2023-06-16 PROCEDURE — 250N000009 HC RX 250: Performed by: RADIOLOGY

## 2023-06-16 PROCEDURE — 88184 FLOWCYTOMETRY/ TC 1 MARKER: CPT | Performed by: OTOLARYNGOLOGY

## 2023-06-16 RX ADMIN — LIDOCAINE HYDROCHLORIDE 5 ML: 10 INJECTION, SOLUTION EPIDURAL; INFILTRATION; INTRACAUDAL; PERINEURAL at 10:19

## 2023-06-16 NOTE — DISCHARGE INSTRUCTIONS
Discharge Instructions for Fine-Needle Biopsy  You have had a procedure called fine-needle  biopsy. During the biopsy, a very thin needle is inserted through the skin into the gland. The needle is used to remove a small amount of tissue from the gland. More than 1 tissue sample may be done to be sure to get cells from all parts of the nodule. Or the needle might be used to drain fluid from a cyst. Often a special ultrasound probe or transducer is used to help guide the needle to the right place. The tissue or fluid is then studied in a lab.    Home care  Here are some tips to take care of yourself at home:   You will have a small adhesive bandage on your biopsy site. Leave the bandage on for 4 to 6 hours. After this time, you don't need to keep it covered.   If you feel discomfort after the biopsy, take over-the-counter pain medicine. Don't take aspirin. It's normal to feel sore for a day or 2.  Ask your healthcare provider when you can return to normal activities. This will likely be the same day as your procedure.  Getting your results  Your biopsy results may take a few days. When the results are ready, your healthcare provider will discuss them with you and tell you what, if anything, needs to be done next. If you have questions, consider writing them down so you won't forget to ask when the provider calls.   Follow-up  Make a follow-up appointment as directed by your healthcare provider.  When to call your healthcare provider  Call your healthcare provider right away if you have any of the following:  Bleeding that won t stop  Fever of 100.4 F (38 C) or higher, or as directed by your provider  Increasing pain, redness, tenderness, or drainage at the biopsy site  Swelling of the biopsy site  Be sure you understand what problems you should watch for and know how to reach the healthcare provider after hours and on weekends.    [employer] last reviewed this educational content on 12/1/2021 2000-2022 [employer]. All rights reserved. This information is not intended as a substitute for professional medical care. Always follow your healthcare professional's instructions.

## 2023-06-17 ENCOUNTER — HOSPITAL ENCOUNTER (OUTPATIENT)
Dept: ULTRASOUND IMAGING | Facility: CLINIC | Age: 83
Discharge: HOME | End: 2023-06-17
Attending: STUDENT IN AN ORGANIZED HEALTH CARE EDUCATION/TRAINING PROGRAM | Admitting: STUDENT IN AN ORGANIZED HEALTH CARE EDUCATION/TRAINING PROGRAM
Payer: COMMERCIAL

## 2023-06-17 DIAGNOSIS — R74.8 ELEVATED ALKALINE PHOSPHATASE LEVEL: ICD-10-CM

## 2023-06-17 PROCEDURE — 76705 ECHO EXAM OF ABDOMEN: CPT

## 2023-06-19 LAB
PATH REPORT.COMMENTS IMP SPEC: ABNORMAL
PATH REPORT.COMMENTS IMP SPEC: YES
PATH REPORT.FINAL DX SPEC: ABNORMAL
PATH REPORT.MICROSCOPIC SPEC OTHER STN: ABNORMAL
PATH REPORT.RELEVANT HX SPEC: ABNORMAL

## 2023-06-20 LAB
INTERPRETATION: NORMAL
SIGNIFICANT RESULTS: NORMAL
SPECIMEN DESCRIPTION: NORMAL
TEST DETAILS, MDL: NORMAL

## 2023-06-20 PROCEDURE — 81305 MYD88 GENE P.LEU265PRO VRNT: CPT | Performed by: OTOLARYNGOLOGY

## 2023-06-20 PROCEDURE — G0452 MOLECULAR PATHOLOGY INTERPR: HCPCS | Mod: 26 | Performed by: STUDENT IN AN ORGANIZED HEALTH CARE EDUCATION/TRAINING PROGRAM

## 2023-06-21 ENCOUNTER — TELEPHONE (OUTPATIENT)
Dept: FAMILY MEDICINE | Facility: CLINIC | Age: 83
End: 2023-06-21
Payer: COMMERCIAL

## 2023-06-21 NOTE — TELEPHONE ENCOUNTER
Patient is calling. Asking for Dr. Kowalski to review her biopsy results and relay to patient .    Could we send this information to you in [employer] or would you prefer to receive a phone call?:   Patient would prefer a phone call   Okay to leave a detailed message?: Yes at Home number on file [phone] (home)

## 2023-06-22 ENCOUNTER — PRE VISIT (OUTPATIENT)
Dept: ONCOLOGY | Facility: CLINIC | Age: 83
End: 2023-06-22
Payer: COMMERCIAL

## 2023-06-22 NOTE — TELEPHONE ENCOUNTER
Called patient, informed her pathology results are still pending at this time. Will follow up when results are available.    Update sent to PCP.    Julie Behrendt RN

## 2023-06-29 NOTE — TELEPHONE ENCOUNTER
Lymphadenopathy, thoracic [R59.0]  Abdominal lymphadenopathy [R59.0]  Chronic cough [R05.3]    June 29, 2023 12:50 PM TJ   Internal Referral, No outside records needed

## 2023-06-30 ENCOUNTER — PRE VISIT (OUTPATIENT)
Dept: ONCOLOGY | Facility: CLINIC | Age: 83
End: 2023-06-30
Payer: COMMERCIAL

## 2023-06-30 ENCOUNTER — APPOINTMENT (OUTPATIENT)
Dept: LAB | Facility: CLINIC | Age: 83
End: 2023-06-30
Payer: COMMERCIAL

## 2023-06-30 ENCOUNTER — ONCOLOGY VISIT (OUTPATIENT)
Dept: ONCOLOGY | Facility: CLINIC | Age: 83
End: 2023-06-30
Attending: OTOLARYNGOLOGY
Payer: COMMERCIAL

## 2023-06-30 VITALS
WEIGHT: 174.4 LBS | BODY MASS INDEX: 29.06 KG/M2 | OXYGEN SATURATION: 99 % | HEIGHT: 65 IN | TEMPERATURE: 97.5 F | DIASTOLIC BLOOD PRESSURE: 62 MMHG | HEART RATE: 73 BPM | SYSTOLIC BLOOD PRESSURE: 151 MMHG | RESPIRATION RATE: 16 BRPM

## 2023-06-30 DIAGNOSIS — C85.93 NON-HODGKIN LYMPHOMA OF INTRA-ABDOMINAL LYMPH NODES, UNSPECIFIED NON-HODGKIN LYMPHOMA TYPE (H): ICD-10-CM

## 2023-06-30 DIAGNOSIS — R05.3 CHRONIC COUGH: ICD-10-CM

## 2023-06-30 DIAGNOSIS — D53.9 MACROCYTIC ANEMIA: Primary | ICD-10-CM

## 2023-06-30 DIAGNOSIS — R59.0 LYMPHADENOPATHY, THORACIC: ICD-10-CM

## 2023-06-30 DIAGNOSIS — R59.0 ABDOMINAL LYMPHADENOPATHY: ICD-10-CM

## 2023-06-30 LAB
ALBUMIN SERPL BCG-MCNC: 4 G/DL (ref 3.5–5.2)
ALP SERPL-CCNC: 125 U/L (ref 35–104)
ALT SERPL W P-5'-P-CCNC: 17 U/L (ref 0–50)
ANION GAP SERPL CALCULATED.3IONS-SCNC: 9 MMOL/L (ref 7–15)
AST SERPL W P-5'-P-CCNC: 25 U/L (ref 0–45)
BASOPHILS # BLD AUTO: 0 10E3/UL (ref 0–0.2)
BASOPHILS NFR BLD AUTO: 0 %
BILIRUB SERPL-MCNC: 0.5 MG/DL
BUN SERPL-MCNC: 18.4 MG/DL (ref 8–23)
CALCIUM SERPL-MCNC: 9.5 MG/DL (ref 8.8–10.2)
CHLORIDE SERPL-SCNC: 103 MMOL/L (ref 98–107)
CREAT SERPL-MCNC: 0.78 MG/DL (ref 0.51–0.95)
DEPRECATED HCO3 PLAS-SCNC: 26 MMOL/L (ref 22–29)
EOSINOPHIL # BLD AUTO: 0.1 10E3/UL (ref 0–0.7)
EOSINOPHIL NFR BLD AUTO: 2 %
ERYTHROCYTE [DISTWIDTH] IN BLOOD BY AUTOMATED COUNT: 13.7 % (ref 10–15)
ERYTHROCYTE [SEDIMENTATION RATE] IN BLOOD BY WESTERGREN METHOD: 38 MM/HR (ref 0–30)
FERRITIN SERPL-MCNC: 66 NG/ML (ref 11–328)
FOLATE SERPL-MCNC: >40 NG/ML (ref 4.6–34.8)
GFR SERPL CREATININE-BSD FRML MDRD: 75 ML/MIN/1.73M2
GLUCOSE SERPL-MCNC: 80 MG/DL (ref 70–99)
HCT VFR BLD AUTO: 33.7 % (ref 35–47)
HGB BLD-MCNC: 10.5 G/DL (ref 11.7–15.7)
IMM GRANULOCYTES # BLD: 0.1 10E3/UL
IMM GRANULOCYTES NFR BLD: 1 %
IRON BINDING CAPACITY (ROCHE): 301 UG/DL (ref 240–430)
IRON SATN MFR SERPL: 24 % (ref 15–46)
IRON SERPL-MCNC: 71 UG/DL (ref 37–145)
LDH SERPL L TO P-CCNC: 178 U/L (ref 0–250)
LYMPHOCYTES # BLD AUTO: 3.9 10E3/UL (ref 0.8–5.3)
LYMPHOCYTES NFR BLD AUTO: 56 %
MCH RBC QN AUTO: 31.8 PG (ref 26.5–33)
MCHC RBC AUTO-ENTMCNC: 31.2 G/DL (ref 31.5–36.5)
MCV RBC AUTO: 102 FL (ref 78–100)
MONOCYTES # BLD AUTO: 0.1 10E3/UL (ref 0–1.3)
MONOCYTES NFR BLD AUTO: 2 %
NEUTROPHILS # BLD AUTO: 2.7 10E3/UL (ref 1.6–8.3)
NEUTROPHILS NFR BLD AUTO: 39 %
NRBC # BLD AUTO: 0 10E3/UL
NRBC BLD AUTO-RTO: 0 /100
PLATELET # BLD AUTO: 266 10E3/UL (ref 150–450)
POTASSIUM SERPL-SCNC: 4.7 MMOL/L (ref 3.4–5.3)
PROT SERPL-MCNC: 7.7 G/DL (ref 6.4–8.3)
RBC # BLD AUTO: 3.3 10E6/UL (ref 3.8–5.2)
RETICS # AUTO: 0.04 10E6/UL (ref 0.03–0.1)
RETICS/RBC NFR AUTO: 1.3 % (ref 0.5–2)
SODIUM SERPL-SCNC: 138 MMOL/L (ref 136–145)
TOTAL PROTEIN SERUM FOR ELP: 7.3 G/DL (ref 6.4–8.3)
URATE SERPL-MCNC: 3.9 MG/DL (ref 2.4–5.7)
VIT B12 SERPL-MCNC: 2464 PG/ML (ref 232–1245)
WBC # BLD AUTO: 6.9 10E3/UL (ref 4–11)

## 2023-06-30 PROCEDURE — 83615 LACTATE (LD) (LDH) ENZYME: CPT | Performed by: INTERNAL MEDICINE

## 2023-06-30 PROCEDURE — 82728 ASSAY OF FERRITIN: CPT | Performed by: INTERNAL MEDICINE

## 2023-06-30 PROCEDURE — 85652 RBC SED RATE AUTOMATED: CPT | Performed by: INTERNAL MEDICINE

## 2023-06-30 PROCEDURE — G0463 HOSPITAL OUTPT CLINIC VISIT: HCPCS | Performed by: INTERNAL MEDICINE

## 2023-06-30 PROCEDURE — 85045 AUTOMATED RETICULOCYTE COUNT: CPT | Performed by: INTERNAL MEDICINE

## 2023-06-30 PROCEDURE — 82232 ASSAY OF BETA-2 PROTEIN: CPT | Performed by: INTERNAL MEDICINE

## 2023-06-30 PROCEDURE — 84165 PROTEIN E-PHORESIS SERUM: CPT | Mod: 26

## 2023-06-30 PROCEDURE — 82784 ASSAY IGA/IGD/IGG/IGM EACH: CPT | Performed by: INTERNAL MEDICINE

## 2023-06-30 PROCEDURE — 85025 COMPLETE CBC W/AUTO DIFF WBC: CPT | Performed by: INTERNAL MEDICINE

## 2023-06-30 PROCEDURE — 84550 ASSAY OF BLOOD/URIC ACID: CPT | Performed by: INTERNAL MEDICINE

## 2023-06-30 PROCEDURE — 83550 IRON BINDING TEST: CPT | Performed by: INTERNAL MEDICINE

## 2023-06-30 PROCEDURE — 88342 IMHCHEM/IMCYTCHM 1ST ANTB: CPT | Mod: 26 | Performed by: PATHOLOGY

## 2023-06-30 PROCEDURE — 86334 IMMUNOFIX E-PHORESIS SERUM: CPT | Mod: 26

## 2023-06-30 PROCEDURE — 80053 COMPREHEN METABOLIC PANEL: CPT | Performed by: INTERNAL MEDICINE

## 2023-06-30 PROCEDURE — 88341 IMHCHEM/IMCYTCHM EA ADD ANTB: CPT | Mod: 26 | Performed by: PATHOLOGY

## 2023-06-30 PROCEDURE — 36415 COLL VENOUS BLD VENIPUNCTURE: CPT | Performed by: INTERNAL MEDICINE

## 2023-06-30 PROCEDURE — 86334 IMMUNOFIX E-PHORESIS SERUM: CPT | Performed by: PATHOLOGY

## 2023-06-30 PROCEDURE — 84165 PROTEIN E-PHORESIS SERUM: CPT | Mod: TC | Performed by: PATHOLOGY

## 2023-06-30 PROCEDURE — 83521 IG LIGHT CHAINS FREE EACH: CPT | Performed by: INTERNAL MEDICINE

## 2023-06-30 PROCEDURE — 88307 TISSUE EXAM BY PATHOLOGIST: CPT | Mod: 26 | Performed by: PATHOLOGY

## 2023-06-30 PROCEDURE — 85060 BLOOD SMEAR INTERPRETATION: CPT | Performed by: PATHOLOGY

## 2023-06-30 PROCEDURE — 82607 VITAMIN B-12: CPT | Performed by: INTERNAL MEDICINE

## 2023-06-30 PROCEDURE — 84155 ASSAY OF PROTEIN SERUM: CPT | Mod: 91 | Performed by: INTERNAL MEDICINE

## 2023-06-30 PROCEDURE — 99204 OFFICE O/P NEW MOD 45 MIN: CPT | Performed by: INTERNAL MEDICINE

## 2023-06-30 PROCEDURE — 82746 ASSAY OF FOLIC ACID SERUM: CPT | Performed by: INTERNAL MEDICINE

## 2023-06-30 ASSESSMENT — PAIN SCALES - GENERAL: PAINLEVEL: NO PAIN (0)

## 2023-06-30 NOTE — PATIENT INSTRUCTIONS
Non-Hodgkin's low-grade lymphoma:  Discussed in detail differential diagnosis including marginal zone lymphoma/Waldenström macroglobulinemia.  MYD 88 is still pending.  In the meantime we will complete staging work-up by obtaining PET scan, bone marrow biopsy and laboratory work-up.     Explained to her and the family the natural history of low-grade lymphomas, prognosis and treatment options.     She remains without constitutional symptom     Patient completely understands and agrees with the plan     Follow-up plan: 3 weeks labs and PET scan and bone marrow biopsy.

## 2023-06-30 NOTE — PROGRESS NOTES
"Oncology Rooming Note    June 30, 2023 9:01 AM   Lavonne Tidwell is a 82 year old female who presents for:    Chief Complaint   Patient presents with     Oncology Clinic Visit     Lymphadenopathy, thoracic, Abdominal lymphadenopathy - Provider only visit     Initial Vitals: BP (!) 151/62 (BP Location: Right arm, Patient Position: Sitting, Cuff Size: Adult Regular)   Pulse 73   Temp 97.5  F (36.4  C) (Tympanic)   Resp 16   Ht 1.651 m (5' 5\")   Wt 79.1 kg (174 lb 6.4 oz)   SpO2 99%   BMI 29.02 kg/m   Estimated body mass index is 29.02 kg/m  as calculated from the following:    Height as of this encounter: 1.651 m (5' 5\").    Weight as of this encounter: 79.1 kg (174 lb 6.4 oz). Body surface area is 1.9 meters squared.  No Pain (0) Comment: Data Unavailable   No LMP recorded. Patient has had a hysterectomy.  Allergies reviewed: Yes, patient completed echeck in  Medications reviewed: Yes, patient completed echeck in    Medications: Medication refills not needed today.  Pharmacy name entered into [employer]:    [facility], [place] - [address] [place] [address] PHARMACY #[id number] - [place], [place] - [address]    Clinical concerns:  None      Ramya Onofre CMA            "

## 2023-06-30 NOTE — LETTER
"    6/30/2023         RE: Lavonne Tidwell  [address] [facility] [address]        Dear Colleague,    Thank you for referring your patient, Lavonne Tidwell, to the [facility]. Please see a copy of my visit note below.    Oncology Rooming Note    June 30, 2023 9:01 AM   Lavonne Tidwell is a 82 year old female who presents for:    Chief Complaint   Patient presents with     Oncology Clinic Visit     Lymphadenopathy, thoracic, Abdominal lymphadenopathy - Provider only visit     Initial Vitals: BP (!) 151/62 (BP Location: Right arm, Patient Position: Sitting, Cuff Size: Adult Regular)   Pulse 73   Temp 97.5  F (36.4  C) (Tympanic)   Resp 16   Ht 1.651 m (5' 5\")   Wt 79.1 kg (174 lb 6.4 oz)   SpO2 99%   BMI 29.02 kg/m   Estimated body mass index is 29.02 kg/m  as calculated from the following:    Height as of this encounter: 1.651 m (5' 5\").    Weight as of this encounter: 79.1 kg (174 lb 6.4 oz). Body surface area is 1.9 meters squared.  No Pain (0) Comment: Data Unavailable   No LMP recorded. Patient has had a hysterectomy.  Allergies reviewed: Yes, patient completed echeck in  Medications reviewed: Yes, patient completed echeck in    Medications: Medication refills not needed today.  Pharmacy name entered into [employer]:    [facility], [place] - [address] [place] [address] PHARMACY #[id number] [facility], [place] - [address]    Clinical concerns:  None      Ramya Onofre CMA              Hematology Consultation:    Reason for Visit: Low-grade lymphoma      History Of Present Illness:  [unfilled] is a 82 year old female who presents for evaluation of low-grade lymphoma.  She has been complaining of soreness of her throat and productive cough with nasal discharge for the last 6 months and she has seen ENT physician for chronic sinusitis.  She had CAT scan of chest for the work-up of sinusitis and upper respiratory " infection that showed lymphadenopathy.  She denies anorexia, weight loss, low-grade fever, excessive fatigue or tiredness, she does complain of night sweats.  She denies palpable masses.    6/9/2023 CT CHEST:  1.  Several scattered irregular subcentimeter nodules in the posterior  left upper lobe, favoring infectious or inflammatory etiology.  Recommend follow-up chest CT in three months to assess for resolution  or stability.  2.  Benign 8 mm left lower lobe pulmonary nodule. No follow-up is  necessary.  3.  Lower lobe predominant, right greater than left, subpleural  scarring and atelectasis.  4.  Multiple prominent and mildly enlarged axillary, mediastinal, and  upper abdominal lymph nodes, suspicious for a lymphoproliferative  disorder.    6/16/2023: Biopsy:  A. Lymph Node(s), Axillary, Left:  -Lambda-monotypic B cells (76%)  -No definitive aberrant immunophenotype on T cells   Electronically signed by Ghulam Anthony MD on 6/19/2023 at  2:31 PM   Comment     By flow cytometry, the monotypic B cells lack CD5 and CD10. This immunophenotype can be seen in marginal zone lymphoma, lymphoplasmacytic lymphoma, and hairy cell leukemia, as well as rarely in follicular lymphoma, CLL/SLL, or mantle cell lymphoma. Hairy cell leukemia is less likely based on meka presentation.         Past medical history:  Past Medical History:   Diagnosis Date     Arthritis 2012    knes and hips     Essential hypertension 5/5/2021     History of blood transfusion 1961    Miscarriage     Ovarian cysts 1974    s/p BSO       Past Surgical History:   Procedure Laterality Date     ABDOMEN SURGERY  1973 & 1974    Ovarian cyst/Hysterectomy     APPENDECTOMY  1952     ARTHROPLASTY KNEE Left 1/6/2016    Procedure: ARTHROPLASTY KNEE;  Surgeon: Wilfredo Thompson MD;  Location: [facility]     ARTHROPLASTY KNEE Right 2/23/2017    Procedure: ARTHROPLASTY KNEE;  Surgeon: Wilfredo Thompson MD;  Location: [facility] OR     BIOPSY      colonoscopy/polyps      COLONOSCOPY      every 10 years     GENITOURINARY SURGERY  2008    bladder     HYSTERECTOMY, CHELSY  1974     ORTHOPEDIC SURGERY  2007 & 2009    [place]  both feet     PHACOEMULSIFICATION WITH STANDARD INTRAOCULAR LENS IMPLANT Left 8/20/2015    Procedure: PHACOEMULSIFICATION WITH STANDARD INTRAOCULAR LENS IMPLANT;  Surgeon: Fernando Jeffrey MD;  Location: [facility] OR     PHACOEMULSIFICATION WITH STANDARD INTRAOCULAR LENS IMPLANT Right 9/17/2015    Procedure: PHACOEMULSIFICATION WITH STANDARD INTRAOCULAR LENS IMPLANT;  Surgeon: Fernando Jeffrey MD;  Location: [facility] OR     SURGICAL HISTORY OF -   07/30/1998    Colonoscopy     SURGICAL HISTORY OF -   1974    Bilateral salpingoopherectomy     SURGICAL HISTORY OF -   3/09    TOT Midurethral sling       Medications:  Acetaminophen (TYLENOL PO), Take 1,000 mg by mouth every 8 hours as needed for other (pain)  allopurinol (ZYLOPRIM) 100 MG tablet, Take 1 tablet (100 mg) by mouth daily  Bioflavonoid Products (GRAPE SEED PO), Take 1 capsule by mouth daily  Biotin 5000 MCG CAPS, Take 1 capsule by mouth daily  famotidine (PEPCID) 40 MG tablet, Take 1 tablet (40 mg) by mouth 2 times daily for 90 days  ferrous sulfate (FEROSUL) 325 (65 Fe) MG tablet, Take 1 tablet (325 mg) by mouth Every Mon, Wed, Fri Morning  fluticasone (FLONASE) 50 MCG/ACT nasal spray, Spray 1 spray into both nostrils 2 times daily  Garlic 1000 MG CAPS, Take 1 capsule by mouth daily  LANsoprazole (PREVACID) 15 MG DR capsule, Take 1 capsule (15 mg) by mouth daily  loratadine (CLARITIN) 10 MG tablet, Take 10 mg by mouth daily  losartan (COZAAR) 50 MG tablet, Take 1 tablet (50 mg) by mouth daily  METAMUCIL FIBER PO, Take 2 capsules by mouth 2 times daily  Multiple Minerals-Vitamins (CITRACAL MAXIMUM PLUS) TABS, Take 2 tablets by mouth 2 times daily  Multiple Vitamins-Minerals (CENTRUM SILVER) per tablet, Take 1 tablet by mouth daily  PSYLLIUM PO, Take 1 capsule by mouth every morning 3 capsules at  "bedtime  senna-docusate (SENOKOT-S/PERICOLACE) 8.6-50 MG tablet, Take 2 tablets by mouth 2 times daily  Vitamin D, Cholecalciferol, 25 MCG (1000 UT) CAPS, Take 1 capsule by mouth daily    No current facility-administered medications on file prior to visit.      Allergy:     Allergies   Allergen Reactions     Codeine GI Disturbance       Family History:  Family History   Problem Relation Age of Onset     Cancer Mother         Ovarian     Other Cancer Mother         Ovarian     Cerebrovascular Disease Father      Heart Disease Father      C.A.D. Father      Coronary Artery Disease Father      Cancer Maternal Grandmother      Other Cancer Maternal Grandmother         Ovarian/Bone     Cerebrovascular Disease Maternal Grandfather      Colon Cancer Maternal Grandfather      Diabetes Other         Great Grandmother     Breast Cancer No family hx of        Social History     Tobacco Use     Smoking status: Former     Packs/day: 0.50     Years: 5.00     Pack years: 2.50     Types: Cigarettes     Start date: 1968     Quit date: 2/15/1972     Years since quittin.4     Smokeless tobacco: Never     Tobacco comments:     stopped in her 20's   Substance Use Topics     Alcohol use: Yes     Alcohol/week: 0.0 standard drinks of alcohol     Comment: Occasional       Review Of Systems:  A 14 point review of systems is otherwise negative except as mentioned above        PHYSICAL EXAM:  BP (!) 151/62 (BP Location: Right arm, Patient Position: Sitting, Cuff Size: Adult Regular)   Pulse 73   Temp 97.5  F (36.4  C) (Tympanic)   Resp 16   Ht 1.651 m (5' 5\")   Wt 79.1 kg (174 lb 6.4 oz)   SpO2 99%   BMI 29.02 kg/m    GENERAL: no acute distress. Cooperative in conversation. Here with   HEENT: pupils are equal, round and reactive. Oromucosa is clean and intact. No ulcerations or mucositis noted. No bleeding noted.  RESP: lungs are clear bilaterally per auscultation. Regular respiratory rate. No wheezes or rhonchi.  CV: Regular, " rate and rhythm. No murmurs.  ABD: soft, nontender. Positive bowel sounds. No organomegaly.   MUSCULOSKELETAL: No lower extremity swelling.   NEURO: non focal. Alert and oriented x3.   PSYCH: within normal limits. No depression or anxiety.  SKIN: warm dry intact   LYMPH: Palpable subcentimeter right posterior cervical lymphadenopathy, left axilla lymphadenopathy 1.5 cm, subcentimeter bilateral inguinal lymphadenopathy    Laboratory/Imaging Studies  Please see attached reports  Hospital Outpatient Visit on 06/16/2023   Component Date Value Ref Range Status     Case Report 06/16/2023    Final                    Value:Flow Cytometry Report                             Case: [id number]                                  Authorizing Provider:  Rob Harris MD          Collected:           06/16/2023 10:59 AM          Ordering Location:     [facility]  Received:            06/16/2023 11:15 AM                                 Imaging                                                                      Pathologist:           Ghulam Anthony MD                                                   Specimen:    Lymph Node(s), Axillary, Left                                                               Flow Interpretation 06/16/2023    Final                    Value:This result contains rich text formatting which cannot be displayed here.     Comment 06/16/2023    Final                    Value:This result contains rich text formatting which cannot be displayed here.     Flow Phenotypic Data 06/16/2023    Final                    Value:This result contains rich text formatting which cannot be displayed here.     Flow Processing Information 06/16/2023    Final                    Value:This result contains rich text formatting which cannot be displayed here.     Clinical Information 06/16/2023    Final                    Value:This result contains rich text formatting which cannot be displayed here.     FDA  Disclaimer 06/16/2023    Final                    Value:This result contains rich text formatting which cannot be displayed here.     MCRS 06/16/2023 Yes (A)  N/A Final     Performing Labs 06/16/2023    Final                    Value:This result contains rich text formatting which cannot be displayed here.       ASSESSMENT/PLAN:    Non-Hodgkin's low-grade lymphoma:  Discussed in detail differential diagnosis including marginal zone lymphoma/Waldenström macroglobulinemia.  MYD 88 is still pending.  In the meantime we will complete staging work-up by obtaining PET scan, bone marrow biopsy and laboratory work-up.    Explained to her and the family the natural history of low-grade lymphomas, prognosis and treatment options.    She remains without constitutional symptom    Patient completely understands and agrees with the plan    Follow-up plan: 3 weeks labs and PET scan and bone marrow biopsy.      Total time spent was over 50 minutes.  This includes chart prep time, review of clinical data including notes from outside physicians, labs, review of medical imaging, discussion about  plan of care, documentation and .      This note has been dictated using voice recognition software. Any grammatical or context distortions are unintentional and inherent to the software                           Again, thank you for allowing me to participate in the care of your patient.        Sincerely,        Robby Peña MD

## 2023-06-30 NOTE — PROGRESS NOTES
Hematology Consultation:    Reason for Visit: Low-grade lymphoma      History Of Present Illness:  [unfilled] is a 82 year old female who presents for evaluation of low-grade lymphoma.  She has been complaining of soreness of her throat and productive cough with nasal discharge for the last 6 months and she has seen ENT physician for chronic sinusitis.  She had CAT scan of chest for the work-up of sinusitis and upper respiratory infection that showed lymphadenopathy.  She denies anorexia, weight loss, low-grade fever, excessive fatigue or tiredness, she does complain of night sweats.  She denies palpable masses.    6/9/2023 CT CHEST:  1.  Several scattered irregular subcentimeter nodules in the posterior  left upper lobe, favoring infectious or inflammatory etiology.  Recommend follow-up chest CT in three months to assess for resolution  or stability.  2.  Benign 8 mm left lower lobe pulmonary nodule. No follow-up is  necessary.  3.  Lower lobe predominant, right greater than left, subpleural  scarring and atelectasis.  4.  Multiple prominent and mildly enlarged axillary, mediastinal, and  upper abdominal lymph nodes, suspicious for a lymphoproliferative  disorder.    6/16/2023: Biopsy:  A. Lymph Node(s), Axillary, Left:  -Lambda-monotypic B cells (76%)  -No definitive aberrant immunophenotype on T cells   Electronically signed by Ghulam Anthony MD on 6/19/2023 at  2:31 PM   Comment     By flow cytometry, the monotypic B cells lack CD5 and CD10. This immunophenotype can be seen in marginal zone lymphoma, lymphoplasmacytic lymphoma, and hairy cell leukemia, as well as rarely in follicular lymphoma, CLL/SLL, or mantle cell lymphoma. Hairy cell leukemia is less likely based on meka presentation.         Past medical history:  Past Medical History:   Diagnosis Date     Arthritis 2012    knes and hips     Essential hypertension 5/5/2021     History of blood transfusion 1961    Miscarriage     Ovarian  cysts 1974    s/p BSO       Past Surgical History:   Procedure Laterality Date     ABDOMEN SURGERY  1973 & 1974    Ovarian cyst/Hysterectomy     APPENDECTOMY  1952     ARTHROPLASTY KNEE Left 1/6/2016    Procedure: ARTHROPLASTY KNEE;  Surgeon: Wilfredo Thompson MD;  Location: [facility]     ARTHROPLASTY KNEE Right 2/23/2017    Procedure: ARTHROPLASTY KNEE;  Surgeon: Wilfredo Thompson MD;  Location: [facility]     BIOPSY      colonoscopy/polyps     COLONOSCOPY      every 10 years     GENITOURINARY SURGERY  2008    bladder     HYSTERECTOMY, CHELSY  1974     ORTHOPEDIC SURGERY  2007 & 2009    [place]  both feet     PHACOEMULSIFICATION WITH STANDARD INTRAOCULAR LENS IMPLANT Left 8/20/2015    Procedure: PHACOEMULSIFICATION WITH STANDARD INTRAOCULAR LENS IMPLANT;  Surgeon: Fernando Jeffrey MD;  Location: [facility]     PHACOEMULSIFICATION WITH STANDARD INTRAOCULAR LENS IMPLANT Right 9/17/2015    Procedure: PHACOEMULSIFICATION WITH STANDARD INTRAOCULAR LENS IMPLANT;  Surgeon: Fernando Jeffrey MD;  Location: [facility]     SURGICAL HISTORY OF -   07/30/1998    Colonoscopy     SURGICAL HISTORY OF - 1974    Bilateral salpingoopherectomy     SURGICAL HISTORY OF -   3/09    TOT Midurethral sling       Medications:  Acetaminophen (TYLENOL PO), Take 1,000 mg by mouth every 8 hours as needed for other (pain)  allopurinol (ZYLOPRIM) 100 MG tablet, Take 1 tablet (100 mg) by mouth daily  Bioflavonoid Products (GRAPE SEED PO), Take 1 capsule by mouth daily  Biotin 5000 MCG CAPS, Take 1 capsule by mouth daily  famotidine (PEPCID) 40 MG tablet, Take 1 tablet (40 mg) by mouth 2 times daily for 90 days  ferrous sulfate (FEROSUL) 325 (65 Fe) MG tablet, Take 1 tablet (325 mg) by mouth Every Mon, Wed, Fri Morning  fluticasone (FLONASE) 50 MCG/ACT nasal spray, Spray 1 spray into both nostrils 2 times daily  Garlic 1000 MG CAPS, Take 1 capsule by mouth daily  LANsoprazole (PREVACID) 15 MG DR capsule, Take 1 capsule (15 mg) by mouth daily  loratadine  (CLARITIN) 10 MG tablet, Take 10 mg by mouth daily  losartan (COZAAR) 50 MG tablet, Take 1 tablet (50 mg) by mouth daily  METAMUCIL FIBER PO, Take 2 capsules by mouth 2 times daily  Multiple Minerals-Vitamins (CITRACAL MAXIMUM PLUS) TABS, Take 2 tablets by mouth 2 times daily  Multiple Vitamins-Minerals (CENTRUM SILVER) per tablet, Take 1 tablet by mouth daily  PSYLLIUM PO, Take 1 capsule by mouth every morning 3 capsules at bedtime  senna-docusate (SENOKOT-S/PERICOLACE) 8.6-50 MG tablet, Take 2 tablets by mouth 2 times daily  Vitamin D, Cholecalciferol, 25 MCG (1000 UT) CAPS, Take 1 capsule by mouth daily    No current facility-administered medications on file prior to visit.      Allergy:     Allergies   Allergen Reactions     Codeine GI Disturbance       Family History:  Family History   Problem Relation Age of Onset     Cancer Mother         Ovarian     Other Cancer Mother         Ovarian     Cerebrovascular Disease Father      Heart Disease Father      C.A.D. Father      Coronary Artery Disease Father      Cancer Maternal Grandmother      Other Cancer Maternal Grandmother         Ovarian/Bone     Cerebrovascular Disease Maternal Grandfather      Colon Cancer Maternal Grandfather      Diabetes Other         Great Grandmother     Breast Cancer No family hx of        Social History     Tobacco Use     Smoking status: Former     Packs/day: 0.50     Years: 5.00     Pack years: 2.50     Types: Cigarettes     Start date: 1968     Quit date: 2/15/1972     Years since quittin.4     Smokeless tobacco: Never     Tobacco comments:     stopped in her 20's   Substance Use Topics     Alcohol use: Yes     Alcohol/week: 0.0 standard drinks of alcohol     Comment: Occasional       Review Of Systems:  A 14 point review of systems is otherwise negative except as mentioned above        PHYSICAL EXAM:  BP (!) 151/62 (BP Location: Right arm, Patient Position: Sitting, Cuff Size: Adult Regular)   Pulse 73   Temp 97.5  F  "(36.4  C) (Tympanic)   Resp 16   Ht 1.651 m (5' 5\")   Wt 79.1 kg (174 lb 6.4 oz)   SpO2 99%   BMI 29.02 kg/m    GENERAL: no acute distress. Cooperative in conversation. Here with   HEENT: pupils are equal, round and reactive. Oromucosa is clean and intact. No ulcerations or mucositis noted. No bleeding noted.  RESP: lungs are clear bilaterally per auscultation. Regular respiratory rate. No wheezes or rhonchi.  CV: Regular, rate and rhythm. No murmurs.  ABD: soft, nontender. Positive bowel sounds. No organomegaly.   MUSCULOSKELETAL: No lower extremity swelling.   NEURO: non focal. Alert and oriented x3.   PSYCH: within normal limits. No depression or anxiety.  SKIN: warm dry intact   LYMPH: Palpable subcentimeter right posterior cervical lymphadenopathy, left axilla lymphadenopathy 1.5 cm, subcentimeter bilateral inguinal lymphadenopathy    Laboratory/Imaging Studies  Please see attached reports  Hospital Outpatient Visit on 06/16/2023   Component Date Value Ref Range Status     Case Report 06/16/2023    Final                    Value:Flow Cytometry Report                             Case: [id number]                                  Authorizing Provider:  Rob Harris MD          Collected:           06/16/2023 10:59 AM          Ordering Location:     [facility]  Received:            06/16/2023 11:15 AM                                 Imaging                                                                      Pathologist:           Ghulam Anthony MD                                                   Specimen:    Lymph Node(s), Axillary, Left                                                               Flow Interpretation 06/16/2023    Final                    Value:This result contains rich text formatting which cannot be displayed here.     Comment 06/16/2023    Final                    Value:This result contains rich text formatting which cannot be displayed here.     Flow Phenotypic " Data 06/16/2023    Final                    Value:This result contains rich text formatting which cannot be displayed here.     Flow Processing Information 06/16/2023    Final                    Value:This result contains rich text formatting which cannot be displayed here.     Clinical Information 06/16/2023    Final                    Value:This result contains rich text formatting which cannot be displayed here.     FDA Disclaimer 06/16/2023    Final                    Value:This result contains rich text formatting which cannot be displayed here.     MCRS 06/16/2023 Yes (A)  N/A Final     Performing Labs 06/16/2023    Final                    Value:This result contains rich text formatting which cannot be displayed here.       ASSESSMENT/PLAN:    Non-Hodgkin's low-grade lymphoma:  Discussed in detail differential diagnosis including marginal zone lymphoma/Waldenström macroglobulinemia.  MYD 88 is still pending.  In the meantime we will complete staging work-up by obtaining PET scan, bone marrow biopsy and laboratory work-up.    Explained to her and the family the natural history of low-grade lymphomas, prognosis and treatment options.    She remains without constitutional symptom    Patient completely understands and agrees with the plan    Follow-up plan: 3 weeks labs and PET scan and bone marrow biopsy.      Total time spent was over 50 minutes.  This includes chart prep time, review of clinical data including notes from outside physicians, labs, review of medical imaging, discussion about  plan of care, documentation and .      This note has been dictated using voice recognition software. Any grammatical or context distortions are unintentional and inherent to the software

## 2023-07-03 LAB
B2 MICROGLOB TUMOR MARKER SER-MCNC: 2.7 MG/L
IGA SERPL-MCNC: 378 MG/DL (ref 84–499)
IGG SERPL-MCNC: 1670 MG/DL (ref 610–1616)
IGM SERPL-MCNC: 46 MG/DL (ref 35–242)
KAPPA LC FREE SER-MCNC: 6.58 MG/DL (ref 0.33–1.94)
KAPPA LC FREE/LAMBDA FREE SER NEPH: 1.52 {RATIO} (ref 0.26–1.65)
LAMBDA LC FREE SERPL-MCNC: 4.34 MG/DL (ref 0.57–2.63)
PATH REPORT.COMMENTS IMP SPEC: NORMAL
PATH REPORT.COMMENTS IMP SPEC: NORMAL
PATH REPORT.FINAL DX SPEC: NORMAL
PATH REPORT.MICROSCOPIC SPEC OTHER STN: NORMAL
PATH REPORT.MICROSCOPIC SPEC OTHER STN: NORMAL

## 2023-07-05 LAB
ALBUMIN SERPL ELPH-MCNC: 3.9 G/DL (ref 3.7–5.1)
ALPHA1 GLOB SERPL ELPH-MCNC: 0.2 G/DL (ref 0.2–0.4)
ALPHA2 GLOB SERPL ELPH-MCNC: 0.7 G/DL (ref 0.5–0.9)
B-GLOBULIN SERPL ELPH-MCNC: 0.9 G/DL (ref 0.6–1)
GAMMA GLOB SERPL ELPH-MCNC: 1.5 G/DL (ref 0.7–1.6)
M PROTEIN SERPL ELPH-MCNC: 0.1 G/DL
PROT PATTERN SERPL ELPH-IMP: ABNORMAL
PROT PATTERN SERPL IFE-IMP: NORMAL

## 2023-07-05 NOTE — PROGRESS NOTES
Chief Complaint   Patient presents with    RECHECK     Feels hearing is worse.     History of Present Illness  Lavonne Tidwell is a 82 year old female who presents today for follow-up. She has a history of left otitis media with effusion. The patient was last evaluated on 1/18/2023 and her left T-tube was in good placement.  I have also seen her for issues regarding her throat and postnasal drainage. She was seen for follow-up on 5/10/2023.  She was having quite a bit more issues with cough, throat discomfort, throat fullness, throat clearing.  Symptoms seem to get quite a bit worse after having an upper respiratory illness/influenza-like illness.  We attempted to switch her ACE inhibitor to see if this would help symptoms.  We also changed around some of her reflux medications without much benefit.  We decided to move forward with CT imaging both of the sinuses and of the chest.  Sinus CT on 3/13/2023 was within normal limits.  Chest CT performed 6/9/2023 did show some concerning axillary/mediastinal lymphadenopathy.  I ordered ultrasound-guided needle aspiration biopsy of her axillary lymph nodes.  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  The patient was referred to oncology for further work-up and evaluation.  The patient returns today for ear tube follow-up.       From an ear symptom standpoint, the patient reports stable improvement in her left ear but decreased hearing in the right ear over the past few weeks.  She folic her ear plugged up quite suddenly.  She denies any otalgia or otorrhea.  No bloody otorrhea.  No dizziness or vertigo.  Aside from her ear tubes, no previous ear surgery.    Throat standpoint, she is still struggling with symptoms of gastroesophageal reflux, throat fullness, thick phlegm in her throat, throat clearing.  She also is struggling intermittently with hoarseness.  He is currently taking lansoprazole once daily and Pepcid at bedtime.  She has  not seen speech therapy in the past.     Past Medical History  Patient Active Problem List   Diagnosis    Injury of sigmoid colon    Esophageal reflux    Menopausal syndrome (hot flashes)    Urinary incontinence    Atrophic vaginitis    Hyperlipidemia LDL goal <130    Advanced directives, counseling/discussion    Onychomycosis    Senile nuclear sclerosis    Status post total left knee replacement    Status post total right knee replacement    Primary localized osteoarthrosis, lower leg    Tubulovillous adenoma polyp of colon    Essential hypertension    Primary osteoarthritis involving multiple joints    Chronic bilateral low back pain without sciatica     Current Medications    Current Outpatient Medications:     Acetaminophen (TYLENOL PO), Take 1,000 mg by mouth every 8 hours as needed for other (pain), Disp: , Rfl:     allopurinol (ZYLOPRIM) 100 MG tablet, Take 1 tablet (100 mg) by mouth daily, Disp: 90 tablet, Rfl: 3    Bioflavonoid Products (GRAPE SEED PO), Take 1 capsule by mouth daily, Disp: , Rfl:     Biotin 5000 MCG CAPS, Take 1 capsule by mouth daily, Disp: , Rfl:     famotidine (PEPCID) 40 MG tablet, Take 1 tablet (40 mg) by mouth 2 times daily for 90 days, Disp: 180 tablet, Rfl: 3    ferrous sulfate (FEROSUL) 325 (65 Fe) MG tablet, Take 1 tablet (325 mg) by mouth Every Mon, Wed, Fri Morning, Disp: 45 tablet, Rfl: 3    fluticasone (FLONASE) 50 MCG/ACT nasal spray, Spray 1 spray into both nostrils 2 times daily, Disp: , Rfl:     Garlic 1000 MG CAPS, Take 1 capsule by mouth daily, Disp: , Rfl:     LANsoprazole (PREVACID) 15 MG DR capsule, Take 1 capsule (15 mg) by mouth daily, Disp: 90 capsule, Rfl: 3    loratadine (CLARITIN) 10 MG tablet, Take 10 mg by mouth daily, Disp: , Rfl:     losartan (COZAAR) 50 MG tablet, Take 1 tablet (50 mg) by mouth daily, Disp: 90 tablet, Rfl: 1    METAMUCIL FIBER PO, Take 2 capsules by mouth 2 times daily, Disp: , Rfl:     Multiple Minerals-Vitamins (CITRACAL MAXIMUM PLUS)  TABS, Take 2 tablets by mouth 2 times daily, Disp: , Rfl:     Multiple Vitamins-Minerals (CENTRUM SILVER) per tablet, Take 1 tablet by mouth daily, Disp: , Rfl:     PSYLLIUM PO, Take 1 capsule by mouth every morning 3 capsules at bedtime, Disp: , Rfl:     senna-docusate (SENOKOT-S/PERICOLACE) 8.6-50 MG tablet, Take 2 tablets by mouth 2 times daily, Disp: , Rfl:     sucralfate (CARAFATE) 1 GM tablet, Take 1 tablet (1 g) by mouth 4 times daily as needed (reflux/throat fullness), Disp: 180 tablet, Rfl: 1    Vitamin D, Cholecalciferol, 25 MCG (1000 UT) CAPS, Take 1 capsule by mouth daily, Disp: , Rfl:     Allergies  Allergies   Allergen Reactions    Codeine GI Disturbance       Social History  Social History     Socioeconomic History    Marital status:    Tobacco Use    Smoking status: Former     Packs/day: 0.50     Years: 5.00     Pack years: 2.50     Types: Cigarettes     Start date: 1968     Quit date: 2/15/1972     Years since quittin.4    Smokeless tobacco: Never    Tobacco comments:     stopped in her 20's   Substance and Sexual Activity    Alcohol use: Yes     Alcohol/week: 0.0 standard drinks of alcohol     Comment: Occasional    Drug use: No    Sexual activity: Not Currently   Other Topics Concern    Parent/sibling w/ CABG, MI or angioplasty before 65F 55M? Yes       Family History  Family History   Problem Relation Age of Onset    Cancer Mother         Ovarian    Other Cancer Mother         Ovarian    Cerebrovascular Disease Father     Heart Disease Father     C.A.D. Father     Coronary Artery Disease Father     Cancer Maternal Grandmother     Other Cancer Maternal Grandmother         Ovarian/Bone    Cerebrovascular Disease Maternal Grandfather     Colon Cancer Maternal Grandfather     Diabetes Other         Great Grandmother    Breast Cancer No family hx of        Review of Systems  As per HPI and PMHx, otherwise 10 system review including the head and neck, constitutional, eyes, respiratory,  GI, skin, neurologic, lymphatic, endocrine, and allergy systems is negative.    Physical Exam  /71   Pulse 72   Temp 98.1  F (36.7  C) (Tympanic)   SpO2 97%   GENERAL: Patient is a pleasant, cooperative 82 year old female in no acute distress.  HEAD: Normocephalic, atraumatic.  Hair and scalp are normal.  EYES: Pupils are equal, round, reactive to light and accommodation.  Extraocular movements are intact.  The sclera nonicteric without injection.  The extraocular structures are normal.  EARS: See below.  NOSE: Nares are patent.  Nasal mucosa is boggy and inflamed with sticky, inflammatory mucus.  Negative anterior rhinoscopy.  NECK: Supple, trachea is midline.  There no palpable cervical lymphadenopathy or masses bilaterally.  Palpation of the bilateral parotid and submandibular areas reveal no masses.  No thyromegaly.    NEUROLOGIC: Cranial nerves II through XII are grossly intact.  Voice is strong.  Patient is House-Brackmann I/VI bilaterally.  CARDIOVASCULAR: Extremities are warm and well-perfused.  No significant peripheral edema.  RESPIRATORY: Patient has nonlabored breathing without cough, wheeze, stridor.  PSYCHIATRIC: Patient is alert and oriented.  Mood and affect appear normal.  SKIN: Warm and dry.  No scalp, face, or neck lesions noted.    Physical Exam and Procedure    EARS: Normal shape and symmetry.  No tenderness when palpating the mastoid or tragal areas bilaterally.  The ears were then examined under the otic binocular microscope to perform cerumen removal.  Otoscopic exam on the right reveals impacted cerumen down to the level of the tympanic membrane.  It was an overlying false membranous crust on the right tympanic membrane that I removed with a straight pick and alligator forceps.  The right tympanic membrane appeared to be intact without any significant retraction or obvious evidence of middle ear effusion.  Patient did notice improvement in her hearing after removing the cerumen.       Attention was turned to the left ear.  Otoscopic exam on the left reveals an extruded ventilation tube impacted in cerumen down the level of tympanic membrane.  The cerumen and ventilation tube were removed with an alligator forceps.  The left tympanic membrane was intact with some moderate retraction.  No evidence of middle ear effusion.  Any fork examination revealed a midline Dominique.  Air conduction was greater than bone conduction bilaterally on Rinne testing.    Assessment and Plan     ICD-10-CM    1. Chronic otitis media of left ear with effusion  H65.492 sucralfate (CARAFATE) 1 GM tablet     Speech Therapy Referral      2. Mixed conductive and sensorineural hearing loss of both ears  H90.6 sucralfate (CARAFATE) 1 GM tablet     Speech Therapy Referral      3. Bilateral impacted cerumen  H61.23 sucralfate (CARAFATE) 1 GM tablet     Speech Therapy Referral     Remove Cerumen      4. Chronic cough  R05.3 sucralfate (CARAFATE) 1 GM tablet     Speech Therapy Referral      5. 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  The left ear tube has extruded and the eardrum is healed.  She has no evidence of middle ear effusion.  She had some overlying crusting of the right tympanic membrane that I removed today in office.  This seemed to significantly help her symptoms.  There was not any obvious evidence of middle ear effusion on examination. At this time, the patient would like for observation. The patient can return in 3-4 months for ear cleaning.      From a throat symptom standpoint, I do think the patient potentially would benefit from adding Carafate as needed for symptoms.  We also discussed good hydration to keep the secretions thin.  I will place a referral to speech therapy for treatment of  presbylarynx/hoarseness.       Rob Harris MD  Department of Otolaryngology-Head and Neck Surgery  [facility]

## 2023-07-06 ENCOUNTER — MYC MEDICAL ADVICE (OUTPATIENT)
Dept: OTOLARYNGOLOGY | Facility: CLINIC | Age: 83
End: 2023-07-06

## 2023-07-06 RX ORDER — LIDOCAINE HYDROCHLORIDE 10 MG/ML
10 INJECTION, SOLUTION EPIDURAL; INFILTRATION; INTRACAUDAL; PERINEURAL ONCE
Status: CANCELLED | OUTPATIENT
Start: 2023-07-06 | End: 2023-07-06

## 2023-07-07 NOTE — TELEPHONE ENCOUNTER
Patient has appt 7/27/23 with Dr Harris to assess/FU left ear tube.  Patient sent MyChart reporting pressure and loss of hearing in both ears and is hoping both can be assessed at 7/27/23 appt.  Please see MyChart for details.    Routed to Dr Harris:  Can we tell patient that she can have both of her ears looked at at the 7/27/23 appt?    Zander MATHIAS [facility]

## 2023-07-07 NOTE — TELEPHONE ENCOUNTER
Audio scheduled and patient is aware of appointment.    Kati Johnston LPN on 7/7/2023 at 9:17 AM

## 2023-07-11 ENCOUNTER — PROCEDURE ONLY VISIT (OUTPATIENT)
Dept: ONCOLOGY | Facility: HOSPITAL | Age: 83
End: 2023-07-11
Attending: INTERNAL MEDICINE
Payer: COMMERCIAL

## 2023-07-11 ENCOUNTER — LAB (OUTPATIENT)
Dept: INFUSION THERAPY | Facility: HOSPITAL | Age: 83
End: 2023-07-11
Attending: INTERNAL MEDICINE
Payer: COMMERCIAL

## 2023-07-11 VITALS
SYSTOLIC BLOOD PRESSURE: 138 MMHG | BODY MASS INDEX: 28.82 KG/M2 | DIASTOLIC BLOOD PRESSURE: 65 MMHG | TEMPERATURE: 98 F | HEIGHT: 65 IN | HEART RATE: 66 BPM | RESPIRATION RATE: 16 BRPM | OXYGEN SATURATION: 99 % | WEIGHT: 173 LBS

## 2023-07-11 DIAGNOSIS — C85.93 NON-HODGKIN LYMPHOMA OF INTRA-ABDOMINAL LYMPH NODES, UNSPECIFIED NON-HODGKIN LYMPHOMA TYPE (H): ICD-10-CM

## 2023-07-11 DIAGNOSIS — C85.93 NON-HODGKIN LYMPHOMA OF INTRA-ABDOMINAL LYMPH NODES, UNSPECIFIED NON-HODGKIN LYMPHOMA TYPE (H): Primary | ICD-10-CM

## 2023-07-11 LAB
BASOPHILS # BLD AUTO: 0 10E3/UL (ref 0–0.2)
BASOPHILS NFR BLD AUTO: 0 %
EOSINOPHIL # BLD AUTO: 0.1 10E3/UL (ref 0–0.7)
EOSINOPHIL NFR BLD AUTO: 1 %
ERYTHROCYTE [DISTWIDTH] IN BLOOD BY AUTOMATED COUNT: 14 % (ref 10–15)
HCT VFR BLD AUTO: 33.8 % (ref 35–47)
HGB BLD-MCNC: 10.5 G/DL (ref 11.7–15.7)
IMM GRANULOCYTES # BLD: 0 10E3/UL
IMM GRANULOCYTES NFR BLD: 0 %
LYMPHOCYTES # BLD AUTO: 4.4 10E3/UL (ref 0.8–5.3)
LYMPHOCYTES NFR BLD AUTO: 54 %
MCH RBC QN AUTO: 32.2 PG (ref 26.5–33)
MCHC RBC AUTO-ENTMCNC: 31.1 G/DL (ref 31.5–36.5)
MCV RBC AUTO: 104 FL (ref 78–100)
MONOCYTES # BLD AUTO: 0.1 10E3/UL (ref 0–1.3)
MONOCYTES NFR BLD AUTO: 2 %
NEUTROPHILS # BLD AUTO: 3.5 10E3/UL (ref 1.6–8.3)
NEUTROPHILS NFR BLD AUTO: 43 %
NRBC # BLD AUTO: 0 10E3/UL
NRBC BLD AUTO-RTO: 0 /100
PLATELET # BLD AUTO: 251 10E3/UL (ref 150–450)
RBC # BLD AUTO: 3.26 10E6/UL (ref 3.8–5.2)
WBC # BLD AUTO: 8.2 10E3/UL (ref 4–11)

## 2023-07-11 PROCEDURE — 88311 DECALCIFY TISSUE: CPT | Mod: TC | Performed by: NURSE PRACTITIONER

## 2023-07-11 PROCEDURE — 88368 INSITU HYBRIDIZATION MANUAL: CPT | Mod: 26 | Performed by: MEDICAL GENETICS

## 2023-07-11 PROCEDURE — 88364 INSITU HYBRIDIZATION (FISH): CPT | Mod: TC | Performed by: NURSE PRACTITIONER

## 2023-07-11 PROCEDURE — 88185 FLOWCYTOMETRY/TC ADD-ON: CPT | Performed by: NURSE PRACTITIONER

## 2023-07-11 PROCEDURE — 38222 DX BONE MARROW BX & ASPIR: CPT | Performed by: NURSE PRACTITIONER

## 2023-07-11 PROCEDURE — 88369 M/PHMTRC ALYSISHQUANT/SEMIQ: CPT | Mod: 26 | Performed by: MEDICAL GENETICS

## 2023-07-11 PROCEDURE — 88305 TISSUE EXAM BY PATHOLOGIST: CPT | Mod: TC | Performed by: NURSE PRACTITIONER

## 2023-07-11 PROCEDURE — 88184 FLOWCYTOMETRY/ TC 1 MARKER: CPT | Performed by: STUDENT IN AN ORGANIZED HEALTH CARE EDUCATION/TRAINING PROGRAM

## 2023-07-11 PROCEDURE — 85018 HEMOGLOBIN: CPT

## 2023-07-11 PROCEDURE — 88184 FLOWCYTOMETRY/ TC 1 MARKER: CPT | Performed by: NURSE PRACTITIONER

## 2023-07-11 PROCEDURE — 88271 CYTOGENETICS DNA PROBE: CPT | Performed by: NURSE PRACTITIONER

## 2023-07-11 PROCEDURE — 36415 COLL VENOUS BLD VENIPUNCTURE: CPT

## 2023-07-11 PROCEDURE — 88189 FLOWCYTOMETRY/READ 16 & >: CPT | Mod: GC | Performed by: STUDENT IN AN ORGANIZED HEALTH CARE EDUCATION/TRAINING PROGRAM

## 2023-07-11 PROCEDURE — 88275 CYTOGENETICS 100-300: CPT | Performed by: NURSE PRACTITIONER

## 2023-07-11 PROCEDURE — 88237 TISSUE CULTURE BONE MARROW: CPT | Performed by: NURSE PRACTITIONER

## 2023-07-11 PROCEDURE — 88264 CHROMOSOME ANALYSIS 20-25: CPT | Performed by: NURSE PRACTITIONER

## 2023-07-11 PROCEDURE — 250N000013 HC RX MED GY IP 250 OP 250 PS 637: Performed by: INTERNAL MEDICINE

## 2023-07-11 RX ORDER — HYDROCODONE BITARTRATE AND ACETAMINOPHEN 5; 325 MG/1; MG/1
1 TABLET ORAL ONCE
Status: COMPLETED | OUTPATIENT
Start: 2023-07-11 | End: 2023-07-11

## 2023-07-11 RX ORDER — LORAZEPAM 0.5 MG/1
0.5 TABLET ORAL ONCE
Status: COMPLETED | OUTPATIENT
Start: 2023-07-11 | End: 2023-07-11

## 2023-07-11 RX ADMIN — HYDROCODONE BITARTRATE AND ACETAMINOPHEN 1 TABLET: 5; 325 TABLET ORAL at 10:41

## 2023-07-11 RX ADMIN — LORAZEPAM 0.5 MG: 0.5 TABLET ORAL at 10:41

## 2023-07-11 ASSESSMENT — PAIN SCALES - GENERAL
PAINLEVEL: MILD PAIN (3)
PAINLEVEL: NO PAIN (0)

## 2023-07-11 NOTE — PROGRESS NOTES
PROCEDURE: Bone marrow Biopsy and Aspiration    INDICATIONS FOR PROCEDURE: low grade lymphoma, staging    DESCRIPTION OF PROCEDURE: Pt was given written information about the procedure.  They provided their written informed consent.  I then went on to premedicate the patient with lorazepam and hydrocodone.  The patient got into the prone position and I sterilely prepped and draped their right posterior iliac crest.  I used 1% local lidocaine for anesthesia.  I then used an 11-gauge Jamshidi needle.  I was able to get about 10 mL of particulate aspirate.  I then went on to get a core of trabecular bone that was about 10 mm in size.  Patient tolerated the procedure with minimal pain and bleeding.  The specimens were sent for routine histology, flow cytometry, and cytogenetics.  I placed a sterile pressure dressing with instructions for it to remain clean dry and intact for 24 hours.  Patient will return to the clinic for follow-up of these results.

## 2023-07-11 NOTE — PROGRESS NOTES
"Oncology Rooming Note    July 11, 2023 10:27 AM   Lavonne Tidwell is a 82 year old female who presents for:    Chief Complaint   Patient presents with     Oncology Clinic Visit     Bone marrow biopsy     Initial Vitals: BP (!) 147/68   Pulse 70   Resp 16   Ht 1.651 m (5' 5\")   Wt 78.5 kg (173 lb)   SpO2 98%   BMI 28.79 kg/m   Estimated body mass index is 28.79 kg/m  as calculated from the following:    Height as of this encounter: 1.651 m (5' 5\").    Weight as of this encounter: 78.5 kg (173 lb). Body surface area is 1.9 meters squared.  Mild Pain (3) Comment: Data Unavailable   No LMP recorded. Patient has had a hysterectomy.  Allergies reviewed: Yes  Medications reviewed: Yes    Medications: Medication refills not needed today.  Pharmacy name entered into [employer]:    [facility], [place] - [address] [place] [address] [id number] - [place], [place] - [address]    Clinical concerns:  Bone marrow biopsy    Verna Echevarria            "

## 2023-07-12 ENCOUNTER — HOSPITAL ENCOUNTER (OUTPATIENT)
Dept: PET IMAGING | Facility: HOSPITAL | Age: 83
Discharge: HOME | End: 2023-07-12
Attending: INTERNAL MEDICINE | Admitting: INTERNAL MEDICINE
Payer: COMMERCIAL

## 2023-07-12 DIAGNOSIS — R59.0 LYMPHADENOPATHY, THORACIC: ICD-10-CM

## 2023-07-12 DIAGNOSIS — C85.93 NON-HODGKIN LYMPHOMA OF INTRA-ABDOMINAL LYMPH NODES, UNSPECIFIED NON-HODGKIN LYMPHOMA TYPE (H): ICD-10-CM

## 2023-07-12 LAB
GLUCOSE BLDC GLUCOMTR-MCNC: 79 MG/DL (ref 70–99)
PATH REPORT.COMMENTS IMP SPEC: ABNORMAL
PATH REPORT.COMMENTS IMP SPEC: YES
PATH REPORT.FINAL DX SPEC: ABNORMAL
PATH REPORT.MICROSCOPIC SPEC OTHER STN: ABNORMAL
PATH REPORT.RELEVANT HX SPEC: ABNORMAL

## 2023-07-12 PROCEDURE — 343N000001 HC RX 343: Performed by: INTERNAL MEDICINE

## 2023-07-12 PROCEDURE — 82962 GLUCOSE BLOOD TEST: CPT

## 2023-07-12 PROCEDURE — 78816 PET IMAGE W/CT FULL BODY: CPT | Mod: PI

## 2023-07-12 PROCEDURE — A9552 F18 FDG: HCPCS | Performed by: INTERNAL MEDICINE

## 2023-07-12 RX ADMIN — FLUDEOXYGLUCOSE F-18 10.32 MILLICURIE: 500 INJECTION, SOLUTION INTRAVENOUS at 12:04

## 2023-07-14 PROCEDURE — 88365 INSITU HYBRIDIZATION (FISH): CPT | Mod: 26 | Performed by: PATHOLOGY

## 2023-07-14 PROCEDURE — 88342 IMHCHEM/IMCYTCHM 1ST ANTB: CPT | Mod: 26 | Performed by: PATHOLOGY

## 2023-07-14 PROCEDURE — 88341 IMHCHEM/IMCYTCHM EA ADD ANTB: CPT | Mod: 26 | Performed by: PATHOLOGY

## 2023-07-14 PROCEDURE — 88313 SPECIAL STAINS GROUP 2: CPT | Mod: 26 | Performed by: PATHOLOGY

## 2023-07-14 PROCEDURE — 88305 TISSUE EXAM BY PATHOLOGIST: CPT | Mod: 26 | Performed by: PATHOLOGY

## 2023-07-14 PROCEDURE — 85097 BONE MARROW INTERPRETATION: CPT | Performed by: PATHOLOGY

## 2023-07-14 PROCEDURE — 88364 INSITU HYBRIDIZATION (FISH): CPT | Mod: 26 | Performed by: PATHOLOGY

## 2023-07-14 PROCEDURE — 88311 DECALCIFY TISSUE: CPT | Mod: 26 | Performed by: PATHOLOGY

## 2023-07-20 ENCOUNTER — ONCOLOGY VISIT (OUTPATIENT)
Dept: ONCOLOGY | Facility: CLINIC | Age: 83
End: 2023-07-20
Attending: INTERNAL MEDICINE
Payer: COMMERCIAL

## 2023-07-20 VITALS
DIASTOLIC BLOOD PRESSURE: 53 MMHG | HEART RATE: 79 BPM | SYSTOLIC BLOOD PRESSURE: 142 MMHG | BODY MASS INDEX: 28.82 KG/M2 | RESPIRATION RATE: 12 BRPM | TEMPERATURE: 98.1 F | HEIGHT: 65 IN | OXYGEN SATURATION: 98 % | WEIGHT: 173 LBS

## 2023-07-20 DIAGNOSIS — C85.80 MARGINAL ZONE B-CELL LYMPHOMA (H): Primary | ICD-10-CM

## 2023-07-20 LAB — CULTURE HARVEST COMPLETE DATE: NORMAL

## 2023-07-20 PROCEDURE — 99214 OFFICE O/P EST MOD 30 MIN: CPT | Performed by: INTERNAL MEDICINE

## 2023-07-20 PROCEDURE — G0463 HOSPITAL OUTPT CLINIC VISIT: HCPCS | Performed by: INTERNAL MEDICINE

## 2023-07-20 ASSESSMENT — PAIN SCALES - GENERAL: PAINLEVEL: NO PAIN (0)

## 2023-07-20 NOTE — LETTER
"    7/20/2023         RE: Lavonne Tidwell  [address] [facility] [id number]        Dear Colleague,    Thank you for referring your patient, Lavonne Tidwell, to the [facility]. Please see a copy of my visit note below.    Oncology Rooming Note    July 20, 2023 10:03 AM   Lavonne Tidwell is a 82 year old female who presents for:    Chief Complaint   Patient presents with     Oncology Clinic Visit     Lymphadenopathy, thoracic - Provider visit only     Initial Vitals: BP (!) 142/53 (BP Location: Right arm, Patient Position: Sitting, Cuff Size: Adult Regular)   Pulse 79   Temp 98.1  F (36.7  C) (Tympanic)   Resp 12   Ht 1.651 m (5' 5\")   Wt 78.5 kg (173 lb)   SpO2 98%   BMI 28.79 kg/m   Estimated body mass index is 28.79 kg/m  as calculated from the following:    Height as of this encounter: 1.651 m (5' 5\").    Weight as of this encounter: 78.5 kg (173 lb). Body surface area is 1.9 meters squared.  No Pain (0) Comment: Data Unavailable   No LMP recorded. Patient has had a hysterectomy.  Allergies reviewed: Yes  Medications reviewed: Yes    Medications: Medication refills not needed today.  Pharmacy name entered into [facility]:    [facility], [place] - [address] [place] [address] PHARMACY [address][id number] [facility], [place] - [address] [facility]    Clinical concerns:  None      Nery Donaldson CMA              Hematology follow up:    Reason for Visit: Low-grade lymphoma      History Of Present Illness:Ms Banerjee is a 82 year old female who presents for evaluation of low-grade lymphoma.  She has been complaining of soreness of her throat and productive cough with nasal discharge for the last 6 months and she has seen ENT physician for chronic sinusitis.  She had CAT scan of chest for the work-up of sinusitis and upper respiratory infection that showed lymphadenopathy.  She denies anorexia, weight loss, low-grade fever, excessive fatigue or " tiredness, she does complain of night sweats.  She denies palpable masses.    6/9/2023 CT CHEST:  1.  Several scattered irregular subcentimeter nodules in the posterior  left upper lobe, favoring infectious or inflammatory etiology.  Recommend follow-up chest CT in three months to assess for resolution  or stability.  2.  Benign 8 mm left lower lobe pulmonary nodule. No follow-up is  necessary.  3.  Lower lobe predominant, right greater than left, subpleural  scarring and atelectasis.  4.  Multiple prominent and mildly enlarged axillary, mediastinal, and  upper abdominal lymph nodes, suspicious for a lymphoproliferative  disorder.        PET scan:  Findings consistent with active lymphoma involving lymph nodes above and below the diaphragm (Deauville 4)     SIFE: M spike 0.1  Possible very small monoclonal free immunoglobulin light chain of  lambda  chain type. Pathologic significance requires clinical correlation    Left axillary Lymph node biopsy:  - Low-grade CD5 negative CD10 negative B-cell non-Hodgkin's lymphoma (see comment #1)   Electronically signed by Emeka Arellano MD on 6/30/2023 at  2:14 PM   Comment     (1) Molecular study for MYD88 and differential diagnosis.  The differential diagnosis of the low-grade B-cell lymphoma shown to be negative for CD5 and CD10 by flow cytometry is primarily between a lymphoplasmacytic lymphoma and a marginal zone lymphoma.  A molecular study for MYD88 was performed showing no mutation of this gene which is commonly present in lymphoplasmacytic lymphomas (90%) and less commonly present in marginal zone lymphomas (4-21%). A moderate number of plasmacytoid lympoctes are present as shwoing in the attached images.  Plasmacytoid lymphocytes can be seen in both LPL and MZL       Bone marrow biopsy:  Bone marrow aspirate clot section and biopsy  - Hypercellular marrow involved by marginal zone lymphoma (at least 80%)  - Lambda light chain restricted monoclonal B-cell  "population ( 23-3 08)  - Adequate iron stores  - Tumor previously negative for MYD88 (arguing against lymphoplasmacytic lymphoma      Medications:  Acetaminophen (TYLENOL PO), Take 1,000 mg by mouth every 8 hours as needed for other (pain)  allopurinol (ZYLOPRIM) 100 MG tablet, Take 1 tablet (100 mg) by mouth daily  Bioflavonoid Products (GRAPE SEED PO), Take 1 capsule by mouth daily  Biotin 5000 MCG CAPS, Take 1 capsule by mouth daily  famotidine (PEPCID) 40 MG tablet, Take 1 tablet (40 mg) by mouth 2 times daily for 90 days  ferrous sulfate (FEROSUL) 325 (65 Fe) MG tablet, Take 1 tablet (325 mg) by mouth Every Mon, Wed, Fri Morning  fluticasone (FLONASE) 50 MCG/ACT nasal spray, Spray 1 spray into both nostrils 2 times daily  Garlic 1000 MG CAPS, Take 1 capsule by mouth daily  LANsoprazole (PREVACID) 15 MG DR capsule, Take 1 capsule (15 mg) by mouth daily  loratadine (CLARITIN) 10 MG tablet, Take 10 mg by mouth daily  losartan (COZAAR) 50 MG tablet, Take 1 tablet (50 mg) by mouth daily  METAMUCIL FIBER PO, Take 2 capsules by mouth 2 times daily  Multiple Minerals-Vitamins (CITRACAL MAXIMUM PLUS) TABS, Take 2 tablets by mouth 2 times daily  Multiple Vitamins-Minerals (CENTRUM SILVER) per tablet, Take 1 tablet by mouth daily  PSYLLIUM PO, Take 1 capsule by mouth every morning 3 capsules at bedtime  senna-docusate (SENOKOT-S/PERICOLACE) 8.6-50 MG tablet, Take 2 tablets by mouth 2 times daily  Vitamin D, Cholecalciferol, 25 MCG (1000 UT) CAPS, Take 1 capsule by mouth daily    No current facility-administered medications on file prior to visit.      Allergy:     Allergies   Allergen Reactions     Codeine GI Disturbance         Review Of Systems:  8 point review of systems is otherwise negative except as mentioned above        PHYSICAL EXAM:  BP (!) 142/53 (BP Location: Right arm, Patient Position: Sitting, Cuff Size: Adult Regular)   Pulse 79   Temp 98.1  F (36.7  C) (Tympanic)   Resp 12   Ht 1.651 m (5' 5\")   Wt " 78.5 kg (173 lb)   SpO2 98%   BMI 28.79 kg/m    GENERAL: no acute distress. Cooperative in conversation. Here with   RESP: lungs are clear bilaterally per auscultation. Regular respiratory rate. No wheezes or rhonchi.  CV: Regular, rate and rhythm. No murmurs.  ABD: soft, nontender. Positive bowel sounds. No organomegaly.   MUSCULOSKELETAL: No lower extremity swelling.   NEURO: non focal. Alert and oriented x3.   PSYCH: within normal limits. No depression or anxiety.  SKIN: warm dry intact   LYMPH: Palpable subcentimeter right posterior cervical lymphadenopathy, left axilla lymphadenopathy 1.5 cm, subcentimeter bilateral inguinal lymphadenopathy    Laboratory/Imaging Studies  Please see attached reports  Hospital Outpatient Visit on 06/16/2023   Component Date Value Ref Range Status     Case Report 06/16/2023    Final                    Value:Flow Cytometry Report                             Case: [id number]                                  Authorizing Provider:  Rob Harris MD          Collected:           06/16/2023 10:59 AM          Ordering Location:     [facility]  Received:            06/16/2023 11:15 AM                                 Imaging                                                                      Pathologist:           Ghulam Anthony MD                                                   Specimen:    Lymph Node(s), Axillary, Left                                                               Flow Interpretation 06/16/2023    Final                    Value:This result contains rich text formatting which cannot be displayed here.     Comment 06/16/2023    Final                    Value:This result contains rich text formatting which cannot be displayed here.     Flow Phenotypic Data 06/16/2023    Final                    Value:This result contains rich text formatting which cannot be displayed here.     Flow Processing Information 06/16/2023    Final                     Value:This result contains rich text formatting which cannot be displayed here.     Clinical Information 06/16/2023    Final                    Value:This result contains rich text formatting which cannot be displayed here.     FDA Disclaimer 06/16/2023    Final                    Value:This result contains rich text formatting which cannot be displayed here.     MCRS 06/16/2023 Yes (A)  N/A Final     Performing Labs 06/16/2023    Final                    Value:This result contains rich text formatting which cannot be displayed here.       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  MYD 88 negative.    She remains without constitutional symptoms, cytopenias or any indication for treatment.  We will observe her.  Patient is in agreement with plan    Macrocytic anemia: Likely secondary to bone marrow involvement, B12 serum folate within normal limits.  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     Patient completely understands and agrees with the plan    Follow-up plan: 3 months with CBC CMP and LDH      Total time spent was over 20 minutes.  This includes chart prep time, review of clinical data including notes from outside physicians, labs, review of medical imaging, discussion about  plan of care, documentation and .      This note has been dictated using voice recognition software. Any grammatical or context distortions are unintentional and inherent to the software                           Again, thank you for allowing me to participate in the care of your patient.        Sincerely,        Robby Peña MD

## 2023-07-20 NOTE — PROGRESS NOTES
"Oncology Rooming Note    July 20, 2023 10:03 AM   Lavonne Tidwell is a 82 year old female who presents for:    Chief Complaint   Patient presents with     Oncology Clinic Visit     Lymphadenopathy, thoracic - Provider visit only     Initial Vitals: BP (!) 142/53 (BP Location: Right arm, Patient Position: Sitting, Cuff Size: Adult Regular)   Pulse 79   Temp 98.1  F (36.7  C) (Tympanic)   Resp 12   Ht 1.651 m (5' 5\")   Wt 78.5 kg (173 lb)   SpO2 98%   BMI 28.79 kg/m   Estimated body mass index is 28.79 kg/m  as calculated from the following:    Height as of this encounter: 1.651 m (5' 5\").    Weight as of this encounter: 78.5 kg (173 lb). Body surface area is 1.9 meters squared.  No Pain (0) Comment: Data Unavailable   No LMP recorded. Patient has had a hysterectomy.  Allergies reviewed: Yes  Medications reviewed: Yes    Medications: Medication refills not needed today.  Pharmacy name entered into [facility]:    [facility], [place] - [address] [place] [address] PHARMACY [address][id number] [place], [place] - [address] [facility]    Clinical concerns:  None      Nery Donaldson CMA            "

## 2023-07-20 NOTE — PROGRESS NOTES
Hematology follow up:    Reason for Visit: Low-grade lymphoma      History Of Present Illness:Ms Banerjee is a 82 year old female who presents for evaluation of low-grade lymphoma.  She has been complaining of soreness of her throat and productive cough with nasal discharge for the last 6 months and she has seen ENT physician for chronic sinusitis.  She had CAT scan of chest for the work-up of sinusitis and upper respiratory infection that showed lymphadenopathy.  She denies anorexia, weight loss, low-grade fever, excessive fatigue or tiredness, she does complain of night sweats.  She denies palpable masses.    6/9/2023 CT CHEST:  1.  Several scattered irregular subcentimeter nodules in the posterior  left upper lobe, favoring infectious or inflammatory etiology.  Recommend follow-up chest CT in three months to assess for resolution  or stability.  2.  Benign 8 mm left lower lobe pulmonary nodule. No follow-up is  necessary.  3.  Lower lobe predominant, right greater than left, subpleural  scarring and atelectasis.  4.  Multiple prominent and mildly enlarged axillary, mediastinal, and  upper abdominal lymph nodes, suspicious for a lymphoproliferative  disorder.        PET scan:  Findings consistent with active lymphoma involving lymph nodes above and below the diaphragm (Deauville 4)     SIFE: M spike 0.1  Possible very small monoclonal free immunoglobulin light chain of  lambda  chain type. Pathologic significance requires clinical correlation    Left axillary Lymph node biopsy:  - Low-grade CD5 negative CD10 negative B-cell non-Hodgkin's lymphoma (see comment #1)   Electronically signed by Emeka Arellano MD on 6/30/2023 at  2:14 PM   Comment     (1) Molecular study for MYD88 and differential diagnosis.  The differential diagnosis of the low-grade B-cell lymphoma shown to be negative for CD5 and CD10 by flow cytometry is primarily between a lymphoplasmacytic lymphoma and a marginal zone lymphoma.  A molecular  study for MYD88 was performed showing no mutation of this gene which is commonly present in lymphoplasmacytic lymphomas (90%) and less commonly present in marginal zone lymphomas (4-21%). A moderate number of plasmacytoid lympoctes are present as shwoing in the attached images.  Plasmacytoid lymphocytes can be seen in both LPL and MZL       Bone marrow biopsy:  Bone marrow aspirate clot section and biopsy  - Hypercellular marrow involved by marginal zone lymphoma (at least 80%)  - Lambda light chain restricted monoclonal B-cell population (JF 23-3 08)  - Adequate iron stores  - Tumor previously negative for MYD88 (arguing against lymphoplasmacytic lymphoma      Medications:  Acetaminophen (TYLENOL PO), Take 1,000 mg by mouth every 8 hours as needed for other (pain)  allopurinol (ZYLOPRIM) 100 MG tablet, Take 1 tablet (100 mg) by mouth daily  Bioflavonoid Products (GRAPE SEED PO), Take 1 capsule by mouth daily  Biotin 5000 MCG CAPS, Take 1 capsule by mouth daily  famotidine (PEPCID) 40 MG tablet, Take 1 tablet (40 mg) by mouth 2 times daily for 90 days  ferrous sulfate (FEROSUL) 325 (65 Fe) MG tablet, Take 1 tablet (325 mg) by mouth Every Mon, Wed, Fri Morning  fluticasone (FLONASE) 50 MCG/ACT nasal spray, Spray 1 spray into both nostrils 2 times daily  Garlic 1000 MG CAPS, Take 1 capsule by mouth daily  LANsoprazole (PREVACID) 15 MG DR capsule, Take 1 capsule (15 mg) by mouth daily  loratadine (CLARITIN) 10 MG tablet, Take 10 mg by mouth daily  losartan (COZAAR) 50 MG tablet, Take 1 tablet (50 mg) by mouth daily  METAMUCIL FIBER PO, Take 2 capsules by mouth 2 times daily  Multiple Minerals-Vitamins (CITRACAL MAXIMUM PLUS) TABS, Take 2 tablets by mouth 2 times daily  Multiple Vitamins-Minerals (CENTRUM SILVER) per tablet, Take 1 tablet by mouth daily  PSYLLIUM PO, Take 1 capsule by mouth every morning 3 capsules at bedtime  senna-docusate (SENOKOT-S/PERICOLACE) 8.6-50 MG tablet, Take 2 tablets by mouth 2 times  "daily  Vitamin D, Cholecalciferol, 25 MCG (1000 UT) CAPS, Take 1 capsule by mouth daily    No current facility-administered medications on file prior to visit.      Allergy:     Allergies   Allergen Reactions     Codeine GI Disturbance         Review Of Systems:  8 point review of systems is otherwise negative except as mentioned above        PHYSICAL EXAM:  BP (!) 142/53 (BP Location: Right arm, Patient Position: Sitting, Cuff Size: Adult Regular)   Pulse 79   Temp 98.1  F (36.7  C) (Tympanic)   Resp 12   Ht 1.651 m (5' 5\")   Wt 78.5 kg (173 lb)   SpO2 98%   BMI 28.79 kg/m    GENERAL: no acute distress. Cooperative in conversation. Here with   RESP: lungs are clear bilaterally per auscultation. Regular respiratory rate. No wheezes or rhonchi.  CV: Regular, rate and rhythm. No murmurs.  ABD: soft, nontender. Positive bowel sounds. No organomegaly.   MUSCULOSKELETAL: No lower extremity swelling.   NEURO: non focal. Alert and oriented x3.   PSYCH: within normal limits. No depression or anxiety.  SKIN: warm dry intact   LYMPH: Palpable subcentimeter right posterior cervical lymphadenopathy, left axilla lymphadenopathy 1.5 cm, subcentimeter bilateral inguinal lymphadenopathy    Laboratory/Imaging Studies  Please see attached reports  Hospital Outpatient Visit on 06/16/2023   Component Date Value Ref Range Status     Case Report 06/16/2023    Final                    Value:Flow Cytometry Report                             Case: [id number]                                  Authorizing Provider:  Rob Harris MD          Collected:           06/16/2023 10:59 AM          Ordering Location:     [facility]  Received:            06/16/2023 11:15 AM                                 Imaging                                                                      Pathologist:           Ghulam Anthony MD                                                   Specimen:    Lymph Node(s), Axillary, Left          "                                                      Flow Interpretation 06/16/2023    Final                    Value:This result contains rich text formatting which cannot be displayed here.     Comment 06/16/2023    Final                    Value:This result contains rich text formatting which cannot be displayed here.     Flow Phenotypic Data 06/16/2023    Final                    Value:This result contains rich text formatting which cannot be displayed here.     Flow Processing Information 06/16/2023    Final                    Value:This result contains rich text formatting which cannot be displayed here.     Clinical Information 06/16/2023    Final                    Value:This result contains rich text formatting which cannot be displayed here.     FDA Disclaimer 06/16/2023    Final                    Value:This result contains rich text formatting which cannot be displayed here.     MCRS 06/16/2023 Yes (A)  N/A Final     Performing Labs 06/16/2023    Final                    Value:This result contains rich text formatting which cannot be displayed here.       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  MYD 88 negative.    She remains without constitutional symptoms, cytopenias or any indication for treatment.  We will observe her.  Patient is in agreement with plan    Macrocytic anemia: Likely secondary to bone marrow involvement, B12 serum folate within normal limits.  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     Patient completely understands and agrees with the plan    Follow-up plan: 3 months with CBC CMP and LDH      Total time spent was over 20 minutes.  This includes chart prep time, review of clinical data including notes from outside physicians, labs, review of medical imaging, discussion  about  plan of care, documentation and .      This note has been dictated using voice recognition software. Any grammatical or context distortions are unintentional and inherent to the software

## 2023-07-21 LAB — INTERPRETATION: NORMAL

## 2023-07-24 ENCOUNTER — OFFICE VISIT (OUTPATIENT)
Dept: AUDIOLOGY | Facility: CLINIC | Age: 83
End: 2023-07-24
Payer: COMMERCIAL

## 2023-07-24 DIAGNOSIS — H69.93 EUSTACHIAN TUBE DISORDER, BILATERAL: ICD-10-CM

## 2023-07-24 DIAGNOSIS — H90.6 MIXED HEARING LOSS, BILATERAL: Primary | ICD-10-CM

## 2023-07-24 PROCEDURE — 92550 TYMPANOMETRY & REFLEX THRESH: CPT | Performed by: AUDIOLOGIST

## 2023-07-24 PROCEDURE — 92557 COMPREHENSIVE HEARING TEST: CPT | Performed by: AUDIOLOGIST

## 2023-07-24 NOTE — PROGRESS NOTES
AUDIOLOGY REPORT    SUBJECTIVE:  Lavonne Tidwell is a 82 year old female who was seen in the Audiology Clinic at the [facility] for audiologic evaluation, referred by Rob Harris M.D. .The patient has been seen previously in this clinic on 1/18/2023 for assessment and results indicated a mixed left hearing loss with a sensorineural hearing loss in the right ear. The patient reports a plugged sensation and decrease in hearing bilaterally for the past month. She reports the right ear hearing seems like it is now worse than her left ear. Patient does have a PE tube in the left ear. The patient denies  bilateral otalgia and bilateral drainage.  The patient notes difficulty with communication in a variety of listening situations.  They were accompanied today by their self.    OBJECTIVE:  Abuse Screening:  Do you feel unsafe at home or work/school? No  Do you feel threatened by someone? No  Does anyone try to keep you from having contact with others, or doing things outside of your home? No  Physical signs of abuse present? No     Fall Risk Screen:  1. Have you fallen two or more times in the past year? No  2. Have you fallen and had an injury in the past year? No      Otoscopic exam indicates partial obstruction with cerumen bilaterally. A PE tube is present in the left ear.     Pure Tone Thresholds assessed using conventional audiometry with good  reliability from 250-8000 Hz bilaterally using insert earphones and circumaural headphones     RIGHT:  mild sloping to moderate and severe mixed hearing loss    LEFT:    mild sloping to moderate and severe mixed hearing loss    Tympanogram:    RIGHT: restricted eardrum mobility     LEFT:   restricted eardrum mobility     Reflexes (reported by stimulus ear):  RIGHT: Ipsilateral is absent at frequencies tested  RIGHT: Contralateral is absent at frequencies tested  LEFT:   Ipsilateral is absent at frequencies tested  LEFT:   Contralateral is absent at  frequencies tested      Speech Reception Threshold:    RIGHT: 45 dB HL    LEFT:   40 dB HL  Word Recognition Score:     RIGHT: 100% at 75 dB HL using NU-6 recorded word list.    LEFT:   88% at 75 dB HL using NU-6 recorded word list.      ASSESSMENT:     ICD-10-CM    1. Mixed hearing loss, bilateral  H90.6       2. Eustachian tube disorder, bilateral  H69.93           Compared to patient's previous audiogram dated 1/18/2023, hearing has decreased 25-40 dB  in the right ear and 10-20 dB across frequencies in the left ear. Today s results were discussed with the patient in detail.     PLAN:  Patient was counseled regarding hearing loss and impact on communication.  It is recommended that the patient be seen by Dr. Harris for medical evaluation of their ears and hearing evaluation.  An appointment is scheduled for 7/27/2023. Please call this clinic with questions regarding these results or recommendations.        Sherri DARNELL, #[id number]

## 2023-07-27 ENCOUNTER — OFFICE VISIT (OUTPATIENT)
Dept: OTOLARYNGOLOGY | Facility: CLINIC | Age: 83
End: 2023-07-27
Payer: COMMERCIAL

## 2023-07-27 VITALS
OXYGEN SATURATION: 97 % | DIASTOLIC BLOOD PRESSURE: 71 MMHG | HEART RATE: 72 BPM | TEMPERATURE: 98.1 F | SYSTOLIC BLOOD PRESSURE: 124 MMHG

## 2023-07-27 DIAGNOSIS — R09.89 CHRONIC THROAT CLEARING: ICD-10-CM

## 2023-07-27 DIAGNOSIS — J38.7 PRESBYLARYNGES: ICD-10-CM

## 2023-07-27 DIAGNOSIS — H65.492 CHRONIC OTITIS MEDIA OF LEFT EAR WITH EFFUSION: Primary | ICD-10-CM

## 2023-07-27 DIAGNOSIS — H61.23 BILATERAL IMPACTED CERUMEN: ICD-10-CM

## 2023-07-27 DIAGNOSIS — R05.3 CHRONIC COUGH: ICD-10-CM

## 2023-07-27 DIAGNOSIS — H90.6 MIXED CONDUCTIVE AND SENSORINEURAL HEARING LOSS OF BOTH EARS: ICD-10-CM

## 2023-07-27 PROCEDURE — 99214 OFFICE O/P EST MOD 30 MIN: CPT | Mod: 25 | Performed by: OTOLARYNGOLOGY

## 2023-07-27 PROCEDURE — 69210 REMOVE IMPACTED EAR WAX UNI: CPT | Mod: 50 | Performed by: OTOLARYNGOLOGY

## 2023-07-27 RX ORDER — SUCRALFATE 1 G/1
1 TABLET ORAL 4 TIMES DAILY PRN
Qty: 180 TABLET | Refills: 1 | Status: SHIPPED | OUTPATIENT
Start: 2023-07-27 | End: 2023-11-01

## 2023-07-27 ASSESSMENT — PAIN SCALES - GENERAL: PAINLEVEL: NO PAIN (0)

## 2023-07-27 NOTE — LETTER
7/27/2023         RE: Lavonne Tidwell  [address] [facility] [address]        Dear Colleague,    Thank you for referring your patient, Lavonne Tidwell, to the [facility]. Please see a copy of my visit note below.    Chief Complaint   Patient presents with     RECHECK     Feels hearing is worse.     History of Present Illness  Lavonne Tidwell is a 82 year old female who presents today for follow-up. She has a history of left otitis media with effusion. The patient was last evaluated on 1/18/2023 and her left T-tube was in good placement.  I have also seen her for issues regarding her throat and postnasal drainage. She was seen for follow-up on 5/10/2023.  She was having quite a bit more issues with cough, throat discomfort, throat fullness, throat clearing.  Symptoms seem to get quite a bit worse after having an upper respiratory illness/influenza-like illness.  We attempted to switch her ACE inhibitor to see if this would help symptoms.  We also changed around some of her reflux medications without much benefit.  We decided to move forward with CT imaging both of the sinuses and of the chest.  Sinus CT on 3/13/2023 was within normal limits.  Chest CT performed 6/9/2023 did show some concerning axillary/mediastinal lymphadenopathy.  I ordered ultrasound-guided needle aspiration biopsy of her axillary lymph nodes.  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  The patient was referred to oncology for further work-up and evaluation.  The patient returns today for ear tube follow-up.       From an ear symptom standpoint, the patient reports stable improvement in her left ear but decreased hearing in the right ear over the past few weeks.  She folic her ear plugged up quite suddenly.  She denies any otalgia or otorrhea.  No bloody otorrhea.  No dizziness or vertigo.  Aside from her ear tubes, no previous ear surgery.    Throat standpoint, she is  Protocol failed     Requesting: ventolin 108(90base) mcg/act     LOV:10/5/21   RTC: 4 weeks   Upcoming OV: none scheduled still struggling with symptoms of gastroesophageal reflux, throat fullness, thick phlegm in her throat, throat clearing.  She also is struggling intermittently with hoarseness.  He is currently taking lansoprazole once daily and Pepcid at bedtime.  She has not seen speech therapy in the past.     Past Medical History  Patient Active Problem List   Diagnosis     Injury of sigmoid colon     Esophageal reflux     Menopausal syndrome (hot flashes)     Urinary incontinence     Atrophic vaginitis     Hyperlipidemia LDL goal <130     Advanced directives, counseling/discussion     Onychomycosis     Senile nuclear sclerosis     Status post total left knee replacement     Status post total right knee replacement     Primary localized osteoarthrosis, lower leg     Tubulovillous adenoma polyp of colon     Essential hypertension     Primary osteoarthritis involving multiple joints     Chronic bilateral low back pain without sciatica     Current Medications    Current Outpatient Medications:      Acetaminophen (TYLENOL PO), Take 1,000 mg by mouth every 8 hours as needed for other (pain), Disp: , Rfl:      allopurinol (ZYLOPRIM) 100 MG tablet, Take 1 tablet (100 mg) by mouth daily, Disp: 90 tablet, Rfl: 3     Bioflavonoid Products (GRAPE SEED PO), Take 1 capsule by mouth daily, Disp: , Rfl:      Biotin 5000 MCG CAPS, Take 1 capsule by mouth daily, Disp: , Rfl:      famotidine (PEPCID) 40 MG tablet, Take 1 tablet (40 mg) by mouth 2 times daily for 90 days, Disp: 180 tablet, Rfl: 3     ferrous sulfate (FEROSUL) 325 (65 Fe) MG tablet, Take 1 tablet (325 mg) by mouth Every Mon, Wed, Fri Morning, Disp: 45 tablet, Rfl: 3     fluticasone (FLONASE) 50 MCG/ACT nasal spray, Spray 1 spray into both nostrils 2 times daily, Disp: , Rfl:      Garlic 1000 MG CAPS, Take 1 capsule by mouth daily, Disp: , Rfl:      LANsoprazole (PREVACID) 15 MG DR capsule, Take 1 capsule (15 mg) by mouth daily, Disp: 90 capsule, Rfl: 3     loratadine (CLARITIN) 10  MG tablet, Take 10 mg by mouth daily, Disp: , Rfl:      losartan (COZAAR) 50 MG tablet, Take 1 tablet (50 mg) by mouth daily, Disp: 90 tablet, Rfl: 1     METAMUCIL FIBER PO, Take 2 capsules by mouth 2 times daily, Disp: , Rfl:      Multiple Minerals-Vitamins (CITRACAL MAXIMUM PLUS) TABS, Take 2 tablets by mouth 2 times daily, Disp: , Rfl:      Multiple Vitamins-Minerals (CENTRUM SILVER) per tablet, Take 1 tablet by mouth daily, Disp: , Rfl:      PSYLLIUM PO, Take 1 capsule by mouth every morning 3 capsules at bedtime, Disp: , Rfl:      senna-docusate (SENOKOT-S/PERICOLACE) 8.6-50 MG tablet, Take 2 tablets by mouth 2 times daily, Disp: , Rfl:      sucralfate (CARAFATE) 1 GM tablet, Take 1 tablet (1 g) by mouth 4 times daily as needed (reflux/throat fullness), Disp: 180 tablet, Rfl: 1     Vitamin D, Cholecalciferol, 25 MCG (1000 UT) CAPS, Take 1 capsule by mouth daily, Disp: , Rfl:     Allergies  Allergies   Allergen Reactions     Codeine GI Disturbance       Social History  Social History     Socioeconomic History     Marital status:    Tobacco Use     Smoking status: Former     Packs/day: 0.50     Years: 5.00     Pack years: 2.50     Types: Cigarettes     Start date: 1968     Quit date: 2/15/1972     Years since quittin.4     Smokeless tobacco: Never     Tobacco comments:     stopped in her 20's   Substance and Sexual Activity     Alcohol use: Yes     Alcohol/week: 0.0 standard drinks of alcohol     Comment: Occasional     Drug use: No     Sexual activity: Not Currently   Other Topics Concern     Parent/sibling w/ CABG, MI or angioplasty before 65F 55M? Yes       Family History  Family History   Problem Relation Age of Onset     Cancer Mother         Ovarian     Other Cancer Mother         Ovarian     Cerebrovascular Disease Father      Heart Disease Father      C.A.D. Father      Coronary Artery Disease Father      Cancer Maternal Grandmother      Other Cancer Maternal Grandmother          Ovarian/Bone     Cerebrovascular Disease Maternal Grandfather      Colon Cancer Maternal Grandfather      Diabetes Other         Great Grandmother     Breast Cancer No family hx of        Review of Systems  As per HPI and PMHx, otherwise 10 system review including the head and neck, constitutional, eyes, respiratory, GI, skin, neurologic, lymphatic, endocrine, and allergy systems is negative.    Physical Exam  /71   Pulse 72   Temp 98.1  F (36.7  C) (Tympanic)   SpO2 97%   GENERAL: Patient is a pleasant, cooperative 82 year old female in no acute distress.  HEAD: Normocephalic, atraumatic.  Hair and scalp are normal.  EYES: Pupils are equal, round, reactive to light and accommodation.  Extraocular movements are intact.  The sclera nonicteric without injection.  The extraocular structures are normal.  EARS: See below.  NOSE: Nares are patent.  Nasal mucosa is boggy and inflamed with sticky, inflammatory mucus.  Negative anterior rhinoscopy.  NECK: Supple, trachea is midline.  There no palpable cervical lymphadenopathy or masses bilaterally.  Palpation of the bilateral parotid and submandibular areas reveal no masses.  No thyromegaly.    NEUROLOGIC: Cranial nerves II through XII are grossly intact.  Voice is strong.  Patient is House-Brackmann I/VI bilaterally.  CARDIOVASCULAR: Extremities are warm and well-perfused.  No significant peripheral edema.  RESPIRATORY: Patient has nonlabored breathing without cough, wheeze, stridor.  PSYCHIATRIC: Patient is alert and oriented.  Mood and affect appear normal.  SKIN: Warm and dry.  No scalp, face, or neck lesions noted.    Physical Exam and Procedure    EARS: Normal shape and symmetry.  No tenderness when palpating the mastoid or tragal areas bilaterally.  The ears were then examined under the otic binocular microscope to perform cerumen removal.  Otoscopic exam on the right reveals impacted cerumen down to the level of the tympanic membrane.  It was an overlying  false membranous crust on the right tympanic membrane that I removed with a straight pick and alligator forceps.  The right tympanic membrane appeared to be intact without any significant retraction or obvious evidence of middle ear effusion.  Patient did notice improvement in her hearing after removing the cerumen.      Attention was turned to the left ear.  Otoscopic exam on the left reveals an extruded ventilation tube impacted in cerumen down the level of tympanic membrane.  The cerumen and ventilation tube were removed with an alligator forceps.  The left tympanic membrane was intact with some moderate retraction.  No evidence of middle ear effusion.  Any fork examination revealed a midline Dominique.  Air conduction was greater than bone conduction bilaterally on Rinne testing.    Assessment and Plan     ICD-10-CM    1. Chronic otitis media of left ear with effusion  H65.492 sucralfate (CARAFATE) 1 GM tablet     Speech Therapy Referral      2. Mixed conductive and sensorineural hearing loss of both ears  H90.6 sucralfate (CARAFATE) 1 GM tablet     Speech Therapy Referral      3. Bilateral impacted cerumen  H61.23 sucralfate (CARAFATE) 1 GM tablet     Speech Therapy Referral     Remove Cerumen      4. Chronic cough  R05.3 sucralfate (CARAFATE) 1 GM tablet     Speech Therapy Referral      5. 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  The left ear tube has extruded and the eardrum is healed.  She has no evidence of middle ear effusion.  She had some overlying crusting of the right tympanic membrane that I removed today in office.  This seemed to significantly help her symptoms.  There was not any obvious evidence of middle ear effusion on examination. At this time, the patient would like for observation. The  patient can return in 3-4 months for ear cleaning.      From a throat symptom standpoint, I do think the patient potentially would benefit from adding Carafate as needed for symptoms.  We also discussed good hydration to keep the secretions thin.  I will place a referral to speech therapy for treatment of presbylarynx/hoarseness.       Rob Harris MD  Department of Otolaryngology-Head and Neck Surgery  [facility]       Again, thank you for allowing me to participate in the care of your patient.        Sincerely,        Rob Harris MD

## 2023-08-01 ENCOUNTER — TELEPHONE (OUTPATIENT)
Dept: FAMILY MEDICINE | Facility: CLINIC | Age: 83
End: 2023-08-01
Payer: COMMERCIAL

## 2023-08-01 NOTE — TELEPHONE ENCOUNTER
Patient Returning Call    Reason for call:1.  needing handicapped parking pass please. Will accept appt if needed. By the time she walks all the way down to the radiology she is exhausted.    2.  Needing something for toenail fungus. New stuff being advertised on TV and I would like you to prescribe or advise as to one that will work for me    Information relayed to patient:  she may need to see PCP for the permit.    Patient has additional questions:  No      Could we send this information to you in [place] or would you prefer to receive a phone call?:   Patient would prefer a phone call   Okay to leave a detailed message?: Yes at Home number on file [phone] (home)

## 2023-08-02 NOTE — TELEPHONE ENCOUNTER
Dr. Kowalski:    Would you advise recommendations or script for toenail fungus? Does patient need appointment? Care team can assist with parking permit.      STEW Chavez

## 2023-08-03 NOTE — PROGRESS NOTES
ASSESSMENT:   (L03.114) Left arm cellulitis  (primary encounter diagnosis)  Comment: better but still some redness and swelling in distal anterior left forearm.  Not ion joints at this time.   Plan: doxycycline hyclate (VIBRAMYCIN) 100 MG capsule          Try different antibiotic, doxycycline 100mg twice daily for 10 days.   You could use warm packs a couple times a day.   I expect the redness and swelling should be gradually improving and be gone by the time antibiotic has been completed.   Let us know if increasing redness or swelling.  Let us know if not improving.        Kirsty Banerjee is a 81 year old who presents for the following health issues   Chief Complaint   Patient presents with     Arm Problem     Follow up cellulitis        Went to Urgent Care 04/21/22  Cellulitis of Left upper extremity  10 days of antibiotics  Arm is still mildly swollen, little pink to color, no warmth  Went flexing wrist forward feels pulling in lower arm    Reason for visit:  Swelling and redness in left wrist area  Symptom onset:  3-4 weeks ago  Symptoms include:  Tender and swelling of wrist and hand, hand swelling is going down  Symptom intensity:  Mild  Symptom progression:  Staying the same  Had these symptoms before:  Yes  Has tried/received treatment for these symptoms:  Yes  Previous treatment was successful:  NoShe exercises with enough effort to increase her heart rate 9 or less minutes per day.  She exercises with enough effort to increase her heart rate 3 or less days per week.   She is taking medications regularly    Prior to UC, she had 1-2 weeks of redness and swelling in left forearm, wrist and hand.      Treated with cephalexin three times daily for 10 days.   Swelling and redness seemed to get a little better.  Hand and wrist swelling did improve.    Still swelling in distal forearm with redness.     Has had hand swelling on occasion.   Has arthritis in many joints.     Patient Active Problem List    Diagnosis     Injury of sigmoid colon     Esophageal reflux     Menopausal syndrome (hot flashes)     Urinary incontinence     Atrophic vaginitis     Hyperlipidemia LDL goal <130     Advanced directives, counseling/discussion     Onychomycosis     Senile nuclear sclerosis     Status post total left knee replacement     Status post total right knee replacement     Primary localized osteoarthrosis, lower leg     Tubulovillous adenoma polyp of colon     Essential hypertension     Primary osteoarthritis involving multiple joints        OBJECTIVE:Blood pressure 118/64, pulse 80, temperature 98.4  F (36.9  C), temperature source Tympanic, resp. rate 12, weight 77.6 kg (171 lb), not currently breastfeeding. BMI=Body mass index is 29.35 kg/m .  GENERAL APPEARANCE ADULT: Alert, no acute distress  MS: She does have some mild swelling and erythema in her distal anterior forearm.  There is slight tenderness present.  There is no swelling in the wrist or hand.  No erythema in any of the hand or wrist joints.  She does have a little pain with flexion and extension of the wrist but it bothers the forearm and not the wrist joint.    Unna Boot Text: An Unna boot was placed to help immobilize the limb and facilitate more rapid healing.

## 2023-08-07 ENCOUNTER — OFFICE VISIT (OUTPATIENT)
Dept: PALLIATIVE MEDICINE | Facility: CLINIC | Age: 83
End: 2023-08-07
Attending: STUDENT IN AN ORGANIZED HEALTH CARE EDUCATION/TRAINING PROGRAM
Payer: COMMERCIAL

## 2023-08-07 VITALS — SYSTOLIC BLOOD PRESSURE: 126 MMHG | DIASTOLIC BLOOD PRESSURE: 70 MMHG | HEART RATE: 79 BPM

## 2023-08-07 DIAGNOSIS — M54.16 LUMBAR RADICULOPATHY: Primary | ICD-10-CM

## 2023-08-07 DIAGNOSIS — M48.07 NEURAL FORAMINAL STENOSIS OF LUMBOSACRAL SPINE: ICD-10-CM

## 2023-08-07 DIAGNOSIS — M48.061 SPINAL STENOSIS OF LUMBAR REGION WITHOUT NEUROGENIC CLAUDICATION: ICD-10-CM

## 2023-08-07 DIAGNOSIS — M47.816 SPONDYLOSIS OF LUMBAR REGION WITHOUT MYELOPATHY OR RADICULOPATHY: ICD-10-CM

## 2023-08-07 PROCEDURE — 99204 OFFICE O/P NEW MOD 45 MIN: CPT | Performed by: PAIN MEDICINE

## 2023-08-07 ASSESSMENT — PAIN SCALES - GENERAL: PAINLEVEL: EXTREME PAIN (8)

## 2023-08-07 NOTE — PATIENT INSTRUCTIONS
- Physical Therapy: continue  - Further procedures recommended:    - bilateral L5-S1 TRANSFORAMINAL EPIDURAL STEROID INJECTION     - If no improved would bilateral L3,4,5 MEDIAL BRANCH BLOCK/RFA  - Follow up: for procedure     ----------------------------------------------------------------  Clinic Number:  [phone]   Call with any questions about your care and for scheduling assistance.   Calls are returned Monday through Friday between 8 AM and 4:30 PM. We usually get back to you within 2 business days depending on the issue/request.    If we are prescribing your medications:  For opioid medication refills, call the clinic or send a [employer] message 7 days in advance.  Please include:  Name of requested medication  Name of the pharmacy.  For non-opioid medications, call your pharmacy directly to request a refill. Please allow 3-4 days to be processed.   Per [place] State Law:  All controlled substance prescriptions must be filled within 30 days of being written.    For those controlled substances allowing refills, pickup must occur within 30 days of last fill.      We believe regular attendance is key to your success in our program!    Any time you are unable to keep your appointment we ask that you call us at least 24 hours in advance to cancel.This will allow us to offer the appointment time to another patient.   Multiple missed appointments may lead to dismissal from the clinic.

## 2023-08-07 NOTE — PROGRESS NOTES
[facility] Interventional Consultation    Date of visit: 8/7/2023    Reason for consultation:    Primary Care Provider is Elvira Kowalski.  Pain medications are being prescribed by n/a.    Please see the [facility] health questionnaire which the patient completed and reviewed with me in detail.  Patient is a Lavonne Tidwell is a 83 year old femalewho I was asked to see in consultation by Elvira Kowalski for evaluation of   Chief Complaint   Patient presents with    Pain   .   Chief Complaint:    Chief Complaint   Patient presents with    Pain       Pain history:  Lavonne Tidwell is a 83 year old female who first started having problems with pain in lbp  Chronic denies inciting event  The pain did become worse with time  The pain is becoming more constant  The pain varies in severity and nature  The pain a deep ache  The pain can be sharp   + numbness ting  Denies burning  The pain radiates to her POst thigh and buttocks   Denies overt progressive weakness  Does feel weak overall   Denies incontinence- has chronic bladder issues   Denies recent falls       Struggling to sleep  Pain sig affects her quality of life    The pain is worse with walking  The pain improves with rest  Some benefit with acetamin  Seen by rheum- no tx at this time  Recent dx of lymphoma- not tx   Has not been doing daily PHYSICAL THERAPY  she felt it did not help  Pain rating: intensity  Averages 8/10 on a 0-10 scale.      Current treatments include:  acetamin    Previous medication treatments included:  no    Other treatments have included:  Lavonne Tidwell has not been seen at a pain clinic in the past.    PT: has done with PHYSICAL THERAPY    Chiropractic: n  Acupuncture: n  TENs Unit: n  Injections: n    Past Medical History:  Past Medical History:   Diagnosis Date    Arthritis 2012    knes and hips    Essential hypertension 5/5/2021    History of blood transfusion 1961    Miscarriage     Ovarian cysts 1974    s/p BSO     Past Surgical History:  Past Surgical History:   Procedure Laterality Date    ABDOMEN SURGERY  1973 & 1974    Ovarian cyst/Hysterectomy    APPENDECTOMY  1952    ARTHROPLASTY KNEE Left 1/6/2016    Procedure: ARTHROPLASTY KNEE;  Surgeon: Wilfredo Thompson MD;  Location: [facility]    ARTHROPLASTY KNEE Right 2/23/2017    Procedure: ARTHROPLASTY KNEE;  Surgeon: Wilfredo Thompson MD;  Location: [facility] OR    BIOPSY      colonoscopy/polyps    COLONOSCOPY      every 10 years    GENITOURINARY SURGERY  2008    bladder    HYSTERECTOMY, CHELSY  1974    ORTHOPEDIC SURGERY  2007 & 2009    [place]  both feet    PHACOEMULSIFICATION WITH STANDARD INTRAOCULAR LENS IMPLANT Left 8/20/2015    Procedure: PHACOEMULSIFICATION WITH STANDARD INTRAOCULAR LENS IMPLANT;  Surgeon: Fernando Jeffrey MD;  Location: [facility] OR    PHACOEMULSIFICATION WITH STANDARD INTRAOCULAR LENS IMPLANT Right 9/17/2015    Procedure: PHACOEMULSIFICATION WITH STANDARD INTRAOCULAR LENS IMPLANT;  Surgeon: Fernando Jeffrey MD;  Location: [facility]    SURGICAL HISTORY OF -   07/30/1998    Colonoscopy    SURGICAL HISTORY OF -   1974    Bilateral salpingoopherectomy    SURGICAL HISTORY OF -   3/09    TOT Midurethral sling     Medications:  Current Outpatient Medications   Medication Sig Dispense Refill    Acetaminophen (TYLENOL PO) Take 1,000 mg by mouth every 8 hours as needed for other (pain)      allopurinol (ZYLOPRIM) 100 MG tablet Take 1 tablet (100 mg) by mouth daily 90 tablet 3    Bioflavonoid Products (GRAPE SEED PO) Take 1 capsule by mouth daily      Biotin 5000 MCG CAPS Take 1 capsule by mouth daily      famotidine (PEPCID) 40 MG tablet Take 1 tablet (40 mg) by mouth 2 times daily for 90 days 180 tablet 3    ferrous sulfate (FEROSUL) 325 (65 Fe) MG tablet Take 1 tablet (325 mg) by mouth Every Mon, Wed, Fri Morning 45 tablet 3    fluticasone (FLONASE) 50 MCG/ACT nasal spray Spray 1 spray into both nostrils 2 times daily      Garlic 1000 MG  CAPS Take 1 capsule by mouth daily      LANsoprazole (PREVACID) 15 MG DR capsule Take 1 capsule (15 mg) by mouth daily 90 capsule 3    loratadine (CLARITIN) 10 MG tablet Take 10 mg by mouth daily      losartan (COZAAR) 50 MG tablet Take 1 tablet (50 mg) by mouth daily 90 tablet 1    METAMUCIL FIBER PO Take 2 capsules by mouth 2 times daily      Multiple Minerals-Vitamins (CITRACAL MAXIMUM PLUS) TABS Take 2 tablets by mouth 2 times daily      Multiple Vitamins-Minerals (CENTRUM SILVER) per tablet Take 1 tablet by mouth daily      PSYLLIUM PO Take 1 capsule by mouth every morning 3 capsules at bedtime      senna-docusate (SENOKOT-S/PERICOLACE) 8.6-50 MG tablet Take 2 tablets by mouth 2 times daily      sucralfate (CARAFATE) 1 GM tablet Take 1 tablet (1 g) by mouth 4 times daily as needed (reflux/throat fullness) 180 tablet 1    Vitamin D, Cholecalciferol, 25 MCG (1000 UT) CAPS Take 1 capsule by mouth daily       Allergies:     Allergies   Allergen Reactions    Codeine GI Disturbance     Family history:  Family History   Problem Relation Age of Onset    Cancer Mother         Ovarian    Other Cancer Mother         Ovarian    Cerebrovascular Disease Father     Heart Disease Father     C.A.D. Father     Coronary Artery Disease Father     Cancer Maternal Grandmother     Other Cancer Maternal Grandmother         Ovarian/Bone    Cerebrovascular Disease Maternal Grandfather     Colon Cancer Maternal Grandfather     Diabetes Other         Great Grandmother    Breast Cancer No family hx of        Physical Exam:  Vitals:    08/07/23 1013   BP: 126/70   Pulse: 79     Exam:  Constitutional: healthy, alert, and no distress  Head: normocephalic. Atraumatic.     Psychiatric: mentation appears normal and affect normal/bright    Musculoskeletal exam:  Gait/Station/Posture: antalgic  Cervical spine: ROMwnl       Thoracic spine:  Normal     Lumbar spine:     ROM: dec   Myofascial tenderness:  mild   Peace's: +++               Straight leg  exam: mild On the rgih   MARCOS/FADIR: neg              SI: neg   Greater trochanteric bursa: neg  Neurologic exam:    Motor:  5/5 UE and LE strength  Reflexes:        Achilles:  +2    Sensory:  (upper and lower extremities):   Light touch: normal    Allodynia: absent    Dysethesia: absent    Hyperalgesia: absent    Diagnostic tests:  MRI   Narrative & Impression   MRI OF THE LUMBAR SPINE WITHOUT CONTRAST 10/5/2022 9:28 AM      COMPARISON: Lumbar spine x-ray series 7/5/2022.     HISTORY: Low back pain, no complicating feature; Chronic LBP duration  >= 3 months; Worsening or not improving; PT/chiropractic  in the last  60 days; Potential contraindications to CT; Chronic bilateral low back  pain without sciatica.     TECHNIQUE: Multiplanar, multisequence MRI images of the lumbar spine  were acquired without IV contrast.     FINDINGS: There are five lumbar-type vertebrae for the purposes of  this dictation.      Minimal degenerative retrolisthesis of L1 upon L2, L2 upon L3, L3 upon  L4 and L5 upon S1. Grade 1 anterolisthesis of L4 upon L5. Mild right  convex curvature of the thoracic spine centered at L3 again noted.  Vertebral body heights normal. T1 and T2 hyperintense nodule in the L4  vertebral body consistent with a benign hemangioma. Marrow signal  otherwise normal. No evidence for fracture or pathologic bony lesion.     There is loss of disc height, disc desiccation and posterior disc  bulging/herniation to varying degrees at all levels of the lumbar  spine.      The tip of the conus medullaris is at the L1-L2 level which is within  normal limits. There is no evidence for intrathecal abnormality.      Level by level:      T12-L1: Normal disc height and signal. No herniation. Mild facet  arthropathy bilaterally. No spinal canal stenosis. No foraminal  stenosis on either side.      L1-L2: Loss of disc height, disc desiccation and circumferential disc  bulging with a superimposed small left paracentral/left  lateral  (foraminal zone) disc herniation (protrusion). Mild facet arthropathy  bilaterally. Minimal spinal canal narrowing. Mild narrowing of the  left lateral recess of the spinal canal. Mild left foraminal  narrowing. Minimal right foraminal narrowing.     L2-L3: Loss of disc height, disc desiccation and mild circumferential  disc bulging. No herniation. Mild facet arthropathy bilaterally.  Minimal spinal canal narrowing. Mild left foraminal narrowing. No  right foraminal stenosis.     L3-L4: Loss of disc height, disc desiccation and mild circumferential  disc bulging. No herniation. Circumferential endplate spurring. Mild  facet arthropathy bilaterally. Mild spinal canal narrowing. Mild left  foraminal narrowing. Minimal right foraminal narrowing.     L4-L5: Loss of disc height, disc desiccation and moderate  circumferential disc bulging. No herniation. Circumferential endplate  spurring. Severe facet arthropathy bilaterally. Mild spinal canal  narrowing. Moderate narrowing of the left lateral recess of the spinal  canal. Mild left foraminal narrowing. Mild right foraminal narrowing.     L5-S1: Loss of disc height, disc desiccation and mild circumferential  disc bulging. No herniation. Circumferential endplate spurring.  Moderate facet arthropathy bilaterally. No spinal canal stenosis.  Moderate bilateral neural foraminal stenosis.                                                                      IMPRESSION:  1. Diffuse degenerative change of the lumbar spine as detailed above.  2. No high-grade spinal canal stenosis.  3. Moderate narrowing of the left lateral recess of the spinal canal  at the L4-L5 level. No other significant lateral recess narrowing of  the lumbar spine.  4. Moderate neural foraminal stenosis bilaterally at L5-S1. No other  significant neural foraminal stenosis of the lumbar spine.         Assessment/Plan:  Lavonne Tidwell is a 83 year old female who presents with the complaints of  bilateral LBP radiating to her buttocks   Lavonne was seen today for pain.    Diagnoses and all orders for this visit:    Lumbar radiculopathy  -     PAIN INJECTION EVAL/TREAT/FOLLOW UP; Future    Neural foraminal stenosis of lumbosacral spine  -     Pain Management  Referral    Spinal stenosis of lumbar region without neurogenic claudication  -     Pain Management  Referral    Spondylosis of lumbar region without myelopathy or radiculopathy             Total time spent was 30 minutes, and more than 50% of face to face time was spent counseling and/or coordination of care regarding principles of multidisciplinary care and medication management     Hamzah Kingston MD  [facility] [place]

## 2023-08-08 ENCOUNTER — TELEPHONE (OUTPATIENT)
Dept: PALLIATIVE MEDICINE | Facility: CLINIC | Age: 83
End: 2023-08-08
Payer: COMMERCIAL

## 2023-08-08 NOTE — TELEPHONE ENCOUNTER
Screening Questions for Radiology Injections:    Injection to be done at which interventional clinic site? [facility] [place]    Procedure ordered by Thee    Procedure ordered? bilateral L5-S1 TRANSFORAMINAL EPIDURAL STEROID INJECTION   Transforaminal Cervical TARYN - Send to [place] ([facility]) - No [facility] providers perform this procedure    What insurance would patient like us to bill for this procedure? [employer]  IF SCHEDULING IN [place] PAIN OR SPINE PLEASE SCHEDULE AT LEAST 7-10 BUSINESS DAYS OUT SO A PA CAN BE OBTAINED  Worker's comp or MVA (motor vehicle accident) -Any injection DO NOT SCHEDULE and route to Koki Sullivan.    [employer] insurance - For SI joint injections, DO NOT SCHEDULE and route to Lisa Jose.     ALL BCBS, [employer] and [employer] CIG[employer] - DO NOT SCHEDULE and route to Lisa Jose  MEDICA- facet joint injections, route to Lisa Jose    Is patient scheduled at [facility]? no   If YES, route every encounter to [facility]INE [facility] CARE NAVIGATION POOL [[phone]]    Is an  needed? No     Patient has a  home? (Review Grid) YES: ok    Any chance of pregnancy? NO   If YES, do NOT schedule and route to RN pool  - Dr. Alcantara route to Tatum Abdul and PM&R Nurse  [[id number]]      Is patient actively being treated for cancer or immunocompromised? No  If YES, do NOT schedule and route to RN pool/ Dr. Alcantara's Team    Does the patient have a bleeding or clotting disorder? No   If YES, okay to schedule AND route to RN nurse garrett/ Dr. Alcantara's Team   (For any patients with platelet count <100, RN must forward to provider)    Is patient taking any Blood Thinners OR Antiplatelet medication?  No   If hold needed, do NOT schedule, route to RN pool/ Dr. Alcantara's Team  Examples:   Blood Thinners: (Coumadin, Warfarin, Jantoven, Pradaxa, Xarelto, Eliquis, Edoxaban, Enoxaparin, Lovenox, Heparin, Arixtra, Fondaparinux or Fragmin)  Antiplatelet Medications: (Plavix,  Brilinta or Effient)     Is patient taking any aspirin products (includes Excedrin and Fiorinal)? No   If more than 325mg/day, OK to schedule; Instruct Pt to decrease to less than 325 mg for 7 days AND route to RN pool/ Dr. Alcantara's Team   For CERVICAL procedures, hold all aspirin products for 6 days.   Tell Pt that if aspirin product is not held for 6 days, the procedure WILL BE cancelled.     Any allergies to contrast dye, iodine, shellfish, or numbing and steroid medications? No  If YES, schedule and add allergy information to appointment notes AND route to the RN pool/ Dr. Alcantara's Team  If TARYN and Contrast Dye / Iodine Allergy? DO NOT SCHEDULE, route to RN pool/ Dr. Alcantara's Team  Allergies: Codeine     Does patient have an active infection or treated for one within the past week? No  Is patient currently taking any antibiotics or steroid medications?  No   For patients on chronic, preventative, or prophylactic antibiotics, procedures may be scheduled.   For patients on antibiotics for active or recent infection, schedule 4 days after completed.  For patients on steroid medications, schedule 4 days after completed.     Has the patient had a flu shot or any other vaccinations within the past 7 days? No  If yes, explain that for the vaccine to work best they need to:     wait 1 week before and 1 week after getting any Vaccine  wait 1 week before and 2 weeks after getting Covid Vaccine #2 or BOOSTER  If patient has concerns about the timing, send to RN pool/ Dr. Alcantara's Team    Does patient have an MRI/CT?  YES: MRI Include Date and Check Procedure Scheduling Grid to see if required.  Was the MRI/CT done within the last 3 years?  Yes   If no route to RN Pool/ Dr. Alcantara's Team  If yes, where was the MRI/CT done? [facility]   Refer to PACS Transmissions list for approved external locations and route to RN Pool High Priority/ Dr. Frasers Team  If MRI was not done at approved external location do NOT schedule and  route to STEW tucker/ Dr. Alcantara's Team    If patient has an imaging disc, the injection MAY be scheduled but patient must bring disc to appt or appt will be cancelled.    Is patient able to transfer to a procedure table with minimal or no assistance? Yes   If no, do NOT schedule and route to RN Pool/ Dr. Alcantara's Team    Procedure Specific Instructions:  If celiac plexus block, informed patient NPO for 6 hours and that it is okay to take medications with sips of water, especially blood pressure medications Not Applicable       If this is for a cervical procedure, informed patient that aspirin needs to be held for 6 days.   Not Applicable    Sedation, If Sedation is ordered for any procedure, patient must be NPO for 6 hours prior to procedure Not Applicable    If IV needed:  Do not schedule procedures requiring IV placement in the first appointment of the day or first appointment after lunch. Do NOT schedule at 0745, 0815 or 1245. ok  Instructed patient to arrive 30 minutes early for IV start if required. (Check Procedure Scheduling Grid)  Not Applicable    Reminders:  If you are started on any steroids or antibiotics between now and your appointment, you must contact us because the procedure may need to be cancelled.  Yes    As a reminder, receiving steroids can decrease your body's ability to fight infection.   Would you still like to move forward with scheduling the injection?  Yes    IV Sedation is not provided for procedures. If oral anti-anxiety medication is needed, the patient should request this from their referring provider.    Instruct patient to arrive as directed prior to the scheduled appointment time:  If IV needed 30 minutes before appointment time     For patients 85 or older we recommend having an adult stay w/ them for the remainder of the day.     If the patient is Diabetic, remind them to bring their glucometer.      Does the patient have any questions?  NO  [facility]

## 2023-08-09 NOTE — TELEPHONE ENCOUNTER
Discussed hearing aid consultation process. Patient will check with her insurance on possible hearing aid coverage. Lavonne will call back and schedule a hearing aid consultation if she elects to be fit with hearing aids at [facility].     Sherri DARNELL, #[id number]

## 2023-08-09 NOTE — TELEPHONE ENCOUNTER
Patient had audio 7/24  Followed by cleaning 7/27    Patient is continuing to have decreased hearing, using max volume on devices, having issues in crowds.     Okay for hearing aid eval or do you recommend she sees Kimberly again prior?    Thank you,   Tena VELARDE RN  [facility]

## 2023-08-14 LAB
PATH REPORT.ADDENDUM SPEC: ABNORMAL
PATH REPORT.COMMENTS IMP SPEC: ABNORMAL
PATH REPORT.COMMENTS IMP SPEC: YES
PATH REPORT.FINAL DX SPEC: ABNORMAL
PATH REPORT.GROSS SPEC: ABNORMAL
PATH REPORT.MICROSCOPIC SPEC OTHER STN: ABNORMAL
PATH REPORT.MICROSCOPIC SPEC OTHER STN: ABNORMAL
PATH REPORT.RELEVANT HX SPEC: ABNORMAL
PATH REPORT.SUPPLEMENTAL REPORTS SPEC: ABNORMAL
PATH REPORT.SUPPLEMENTAL REPORTS SPEC: ABNORMAL
PHOTO IMAGE: ABNORMAL

## 2023-08-16 ENCOUNTER — VIRTUAL VISIT (OUTPATIENT)
Dept: FAMILY MEDICINE | Facility: CLINIC | Age: 83
End: 2023-08-16
Payer: COMMERCIAL

## 2023-08-16 DIAGNOSIS — B35.1 ONYCHOMYCOSIS: ICD-10-CM

## 2023-08-16 DIAGNOSIS — R49.0 HOARSENESS: Primary | ICD-10-CM

## 2023-08-16 LAB
CULTURE HARVEST COMPLETE DATE: NORMAL
CULTURE HARVEST COMPLETE DATE: NORMAL

## 2023-08-16 PROCEDURE — 99213 OFFICE O/P EST LOW 20 MIN: CPT | Mod: 93 | Performed by: FAMILY MEDICINE

## 2023-08-16 NOTE — PROGRESS NOTES
"  Lavonne is a 83 year old who is being evaluated via a billable telephone visit.      What phone number would you like to be contacted at? [phone]  How would you like to obtain your AVS? MyChart    Distant Location (provider location):  On-site    Assessment & Plan     Hoarseness  Working this up with ENT     Onychomycosis  Not able to make this dx on the phone   She will use otc options and set up in person visit              BMI:   Estimated body mass index is 28.79 kg/m  as calculated from the following:    Height as of 7/20/23: 1.651 m (5' 5\").    Weight as of 7/20/23: 78.5 kg (173 lb).           Elvira Kowalski MD  [facility]    Subjective   Lavonne is a 83 year old, presenting for the following health issues:  Forms (Handicap form) and Nail Problem (Fungus)      History of Present Illness       Reason for visit:  Handicap parking permission and info on Toenail Fungus    She eats 2-3 servings of fruits and vegetables daily.She consumes 0 sweetened beverage(s) daily.She exercises with enough effort to increase her heart rate 9 or less minutes per day.  She exercises with enough effort to increase her heart rate 3 or less days per week.   She is taking medications regularly.     Toenail Fungus  Bilateral feet 1st great toe  Raised off of skin  Not thick - no discoloration     Hoarse voice  Ongoing issue  Coughing - deeper, tight cough  Losing hearing  Still seeing Dr. Harris  Pt was given Carafate to help with reflux and throat per Kimberly  Has acid reflux      Objective           Vitals:  No vitals were obtained today due to virtual visit.    Physical Exam   healthy, alert, and no distress  PSYCH: Alert and oriented times 3; coherent speech, normal   rate and volume, able to articulate logical thoughts, able   to abstract reason, no tangential thoughts, no hallucinations   or delusions  Her affect is normal  RESP: No cough, no audible wheezing, able to talk in full " sentences  Remainder of exam unable to be completed due to telephone visits                Phone call duration: 15 minutes

## 2023-08-31 ENCOUNTER — RADIOLOGY INJECTION OFFICE VISIT (OUTPATIENT)
Dept: PALLIATIVE MEDICINE | Facility: CLINIC | Age: 83
End: 2023-08-31
Attending: PAIN MEDICINE
Payer: COMMERCIAL

## 2023-08-31 VITALS — SYSTOLIC BLOOD PRESSURE: 126 MMHG | OXYGEN SATURATION: 99 % | DIASTOLIC BLOOD PRESSURE: 66 MMHG | HEART RATE: 66 BPM

## 2023-08-31 DIAGNOSIS — M54.16 LUMBAR RADICULOPATHY: ICD-10-CM

## 2023-08-31 PROCEDURE — 64483 NJX AA&/STRD TFRM EPI L/S 1: CPT | Mod: 50 | Performed by: PAIN MEDICINE

## 2023-08-31 RX ORDER — DEXAMETHASONE SODIUM PHOSPHATE 10 MG/ML
10 INJECTION, SOLUTION INTRAMUSCULAR; INTRAVENOUS ONCE
Status: COMPLETED | OUTPATIENT
Start: 2023-08-31 | End: 2023-08-31

## 2023-08-31 RX ADMIN — DEXAMETHASONE SODIUM PHOSPHATE 10 MG: 10 INJECTION, SOLUTION INTRAMUSCULAR; INTRAVENOUS at 08:51

## 2023-08-31 ASSESSMENT — PAIN SCALES - GENERAL
PAINLEVEL: MODERATE PAIN (5)
PAINLEVEL: NO PAIN (1)

## 2023-08-31 NOTE — PATIENT INSTRUCTIONS
[facility]   Procedure Discharge Instructions    Today you saw:    Dr. Hamzah Kingston         You had an:  Epidural steroid injection       Be cautious when walking. Numbness and/or weakness in the lower extremities may occur for up to 6-8 hours after the procedure due to effect of the local anesthetic  Do not drive for 6 hours. The effect of the local anesthetic could slow your reflexes.   You may resume your regular activities after 24 hours  Avoid strenuous activity for the first 24 hours  You may shower, however avoid swimming, tub baths or hot tubs for 24 hours following your procedure  You may have a mild to moderate increase in pain for several days following the injection.  It may take up to 14 days for the steroid medication to start working although you may feel the effect as early as a few days after the procedure.     You may use ice packs for 10-15 minutes, 3 to 4 times a day at the injection site for comfort  Do not use heat to painful areas for 6 to 8 hours. This will give the local anesthetic time to wear off and prevent you from accidentally burning your skin.   Unless you have been directed to avoid the use of anti-inflammatory medications (NSAIDS), you may use medications such as ibuprofen, Aleve or Tylenol for pain control if needed.   Possible side effects of steroids that you may experience include flushing, elevated blood pressure, increased appetite, mild headaches and restlessness.  All of these symptoms will get better with time.  If you experience any of the following, call the Pain Clinic during work hours (Mon-Friday 8-4:30 pm) at [phone] or the Provider Line after hours at [phone]:  -Fever over 100 degree F  -Swelling, bleeding, redness, drainage, warmth at the injection site  -Progressive weakness or numbness in your legs   -Loss of bowel or bladder function  -Unusual new onset of pain that is not improving

## 2023-08-31 NOTE — PROGRESS NOTES
Pre procedure Diagnosis: lumbar radiculopathy, lumbar degenerative disc disease   Post procedure Diagnosis: Same  Procedure performed: lumbar transforaminal epidural steroid injection at bilateral l5-S1, fluoroscopically guided, contrast controlled  Anesthesia: none  Complications: none  Operators: Hamzah Kingston MD     Indications:   Lavonne Tidwell is a 83 year old female.  They have a history of lbp rad to her le.  Exam shows + slump and they have tried conservative treatment including meds/pt.    MRI rev  Options/alternatives, benefits and risks were discussed with the patient including bleeding, infection, tissue trauma, numbness, weakness, paralysis, spinal cord injury, radiation exposure, headache and reaction to medications. Questions were answered to her satisfaction and she agrees to proceed. Voluntary informed consent was obtained and signed.     Vitals were reviewed: Yes  /66   Pulse 66   SpO2 99%   Allergies were reviewed:  Yes   Medications were reviewed:  Yes   Pre-procedure pain score: 5/10    Procedure:  After getting informed consent, patient was brought into the procedure suite and was placed in a prone position on the procedure table.   A Pause for the Cause was performed.  Patient was prepped and draped in sterile fashion.     After identifying the bilateral L5-s1 neuroforamen, the C-arm was rotated to a bilateral lateral oblique angle.  A total of 4ml of Lidocaine 1% was used to anesthetize the skin and the needle track at a skin entry site coaxial with the fluoroscopy beam, and overriding the superior aspect of the neuroforamen.  A 22 gauge 3.5 inch spinal needle was advanced under intermittent fluoroscopy until it entered the foramen superiorly.    The position was then inspected from anteroposterior and lateral views, and the needle adjusted appropriately.  A total of 1ml of Omnipaque-300 was injected, confirming appropriate position, with spread into the nerve root sheath and  the epidural space, with no intravascular uptake. 9ml was wasted    Then, after repeated negative aspiration, a combination of Decadron 10 mg, 0.25% bupivacaine 2 ml, diluted with 3ml of normal saline was injected.     Hemostasis was achieved, the area was cleaned, and bandaids were placed when appropriate.  The patient tolerated the procedure well, and was taken to the recovery room.    Images were saved to PACS.    Post-procedure pain score: 1/10  Follow-up includes:   -f/u phone call in one week  -f/u with referring provider    Hamzah Kingston MD  [facility]

## 2023-08-31 NOTE — NURSING NOTE
Discharge Information    IV Discontiued Time:  NA    Amount of Fluid Infused:  NA    Discharge Criteria = When patient returns to baseline or as per MD order    Consciousness:  Pt is fully awake    Circulation:  BP +/- 20% of pre-procedure level    Respiration:  Patient is able to breathe deeply    O2 Sat:  Patient is able to maintain O2 Sat >92% on room air    Activity:  Moves 4 extremities on command    Ambulation:  Patient is able to stand and walk or stand and pivot into wheelchair    Dressing:  Clean/dry or No Dressing    Notes:   Discharge instructions and AVS given to patient    Patient meets criteria for discharge?  YES    Admitted to PCU?  No    Responsible adult present to accompany patient home?  Yes    Signature/Title:    Jeanette Zapata RN  RN Care Coordinator  [facility]

## 2023-08-31 NOTE — NURSING NOTE
Pre-procedure Intake  If YES to any questions or NO to having a   Please complete laminated checklist and leave on the computer keyboard for Provider, verbally inform provider if able.    For SCS Trial, RFA's or any sedation procedure:  Have you been fasting? NA  If yes, for how long? NA    Are you taking any any blood thinners such as Coumadin, Warfarin, Jantoven, Pradaxa Xarelto, Eliquis, Edoxaban, Enoxaparin, Lovenox, Heparin, Arixtra, Fondaparinux, or Fragmin? OR Antiplatelet medication such as Plavix, Brilinta, or Effient?   No   If yes, when did you take your last dose? NA    Do you take aspirin?  No  If cervical procedure, have you held aspirin for 6 days?   NA    Do you have any allergies to contrast dye, iodine, steroid and/or numbing medications?  NO    Are you currently taking antibiotics or have an active infection?  NO    Have you had a fever/elevated temperature within the past week? NO    Are you currently taking oral steroids? NO    Do you have a ? Yes    Are you pregnant or breastfeeding?  NO    Have you received the COVID-19 vaccine? Yes  If yes, was it your 1st, 2nd or only dose needed? 4  Date of most recent vaccine: 11/22/22    Notify provider and RNs if systolic BP >170, diastolic BP >100, P >100 or O2 sats < 90%    Socorro Ireland CMA (AAMA)

## 2023-09-07 ENCOUNTER — THERAPY VISIT (OUTPATIENT)
Dept: SPEECH THERAPY | Facility: CLINIC | Age: 83
End: 2023-09-07
Attending: OTOLARYNGOLOGY
Payer: COMMERCIAL

## 2023-09-07 DIAGNOSIS — H90.6 MIXED CONDUCTIVE AND SENSORINEURAL HEARING LOSS OF BOTH EARS: ICD-10-CM

## 2023-09-07 DIAGNOSIS — H65.492 CHRONIC OTITIS MEDIA OF LEFT EAR WITH EFFUSION: ICD-10-CM

## 2023-09-07 DIAGNOSIS — J38.7 PRESBYLARYNGES: ICD-10-CM

## 2023-09-07 DIAGNOSIS — H61.23 BILATERAL IMPACTED CERUMEN: ICD-10-CM

## 2023-09-07 DIAGNOSIS — R05.3 CHRONIC COUGH: ICD-10-CM

## 2023-09-07 DIAGNOSIS — R09.89 CHRONIC THROAT CLEARING: ICD-10-CM

## 2023-09-07 PROCEDURE — 92507 TX SP LANG VOICE COMM INDIV: CPT | Mod: GN | Performed by: SPEECH-LANGUAGE PATHOLOGIST

## 2023-09-07 PROCEDURE — 92524 BEHAVRAL QUALIT ANALYS VOICE: CPT | Mod: GN | Performed by: SPEECH-LANGUAGE PATHOLOGIST

## 2023-09-07 NOTE — PROGRESS NOTES
SPEECH LANGUAGE PATHOLOGY EVALUATION    See electronic medical record for Abuse and Falls Screening details.      Pt reports she has had a cough since December 1st, and  she was diagnosed with pneumonia. She reports drainage and a sore throat. She feels her throat and chest are tight and her throat gets really dry especially when talking. Has become more isolated because of her voice and coughing. Voice is a 5/10. Doesn't drink enough water.      Subjective      Presenting condition or subjective complaint: Dr Harris  my continued cough and sore throat  Date of onset: 12/01/22    Relevant medical history: Anemia; Arthritis; Bladder or bowel problems; Hearing problems; High blood pressure; Incontinence; Osteoarthritis   Dates & types of surgery: Total knee replacement  2017 bunion surgery 2009, Historectomy 1974    Prior diagnostic imaging/testing results: MRI; CT scan     Prior therapy history for the same diagnosis, illness or injury: No      Living Environment  Social support: Alone   Help at home: None  Equipment owned:       Employment: No    Hobbies/Interests: Reading, spending time with grandchildren and great grandchildren    Patient goals for therapy: to not have this cough and sore throat, its been a recuring thing for several months    Pain assessment: Pain denied     Objective     PERSONAL RATING/VOICE USE  Avocational Voice Use: talking to friends and family  Patient description of current voice quality: dry, echo-y and hoarse. 5/10 (ten being normal).  Describe the amount of typical non-work voice use: very little    Describe the intensity of typical non-work voice use: soft and quiet     COUGH/THROAT CLEAR  Cough/throat clear characteristics: dry   Cough/throat clear triggers in evaluation: voice use, deep breathing, exposure to strong odors     HYPERFUNCTION  Visible tension: neck, shoulders         VOICE PROFILE DURING CONVERSATION (1 min monologue & paragraph reading)  Voice quality: hoarse,  scratchy   Voice quality severity rating continuum: 6 - mild   Breath control: weak, tight, audible inspirations   Breath control severity rating continuum: 5 - mild-moderate  Voice Use/Effort: throat push   Voice Use/Effort severity rating continuum: 6 - mild  Volume: WNL   Volume severity rating continuum: 7 - WNL  Neuromuscular Control: WNL   Neuromuscular Control severity rating continuum: 7 - WNL  Resonance: WNL   Resonance severity rating continuum: 7 - WNL      VIBRATORY FUNCTION OF VOCAL FOLDS  Prolonged  ah  at mid pitch (sec):  5.69 - noticeably more  strain and effort with sustained phonation exercises      MUCOSAL FUNCTION OF VOCAL FOLDS  S/Z ratio (___ sec/ ___sec): 2.12  (>1.4 is suggestive of VF pathology)   Vocal fold stiffness/swelling test: positive for stiffness/swelling       RESPIRATORY FUNCTION SCREENING  Longest prolonged  S  from S/Z ratio (# of sec): 15.20  Longest prolonged  S  characteristics: respiratory weakness       Assessment & Plan   CLINICAL IMPRESSIONS   Medical Diagnosis: Chronic cough (R05.3)    Treatment Diagnosis:     Impression/Assessment: Pt is a 83 year old female with chronic cough and hoarse voice complaints. The following significant findings have been identified: impaired voicing, characterized by mildly rough vocal quality and dry chronic coughing. Identified deficits interfere with their ability to communicate within the home or community as compared to previous level of function.    PLAN OF CARE  Treatment Interventions: Voice    Prognosis to achieve stated therapy goals is good   Rehab potential is impacted by: patient motivation    Long Term Goals   SLP Goal 1  Goal Identifier: vocal hygiene  Goal Description: Patient will report improvement of vocal hygiene concepts across 2 sessions.  Rationale: To maximize functional communication within the home or community  Goal Progress: Patient is educated on vocal hygiene concepts. She reports she does not drink enough  water. She reports low vocal abuse behaviors.  Target Date: 10/07/23  SLP Goal 2  Goal Identifier: Relaxed Breathing  Goal Description: Patient will demonstrate success with relax breathing exercises (rescue breathing, diaphragmatic  breathing, square breathing) across 2 sessions.  Rationale: To maximize functional communication within the home or community  Target Date: 10/07/23  SLP Goal 3  Goal Identifier: Self rating  Goal Description: Patient will rate her voice an 8/10 (10 being normal).  Rationale: To maximize functional communication within the home or community  Goal Progress: rates her voice a 5/10 this date.  Target Date: 10/07/23  SLP Goal 4  Goal Identifier: cough suppression  Goal Description: Patient will report use of cough suppression strategies to reduce chronic cough and throat clearing by 50% across 2 weeks.  Rationale: To maximize functional communication within the home or community  Target Date: 10/07/23      Frequency of Treatment: 1x a week  Duration of Treatment: 6 weeks     Recommended Referrals to Other Professionals:   Education Assessment:        Risks and benefits of evaluation/treatment have been explained.   Patient/Family/caregiver agrees with Plan of Care.     Evaluation Time:    Voice Minutes (58390): 25         Signing Clinician: DON Milton    [facility]                                                                                   OUTPATIENT SPEECH LANGUAGE PATHOLOGY      PLAN OF TREATMENT FOR OUTPATIENT REHABILITATION   Patient's Last Name, First Name, Lavonne Galvan YOB: 1940   Provider's Name   [facility]   Medical Record No.  [id number]     Onset Date: 12/01/22 Start of Care Date: 09/07/23     Medical Diagnosis:  Chronic cough (R05.3)      SLP Treatment Diagnosis:    Plan of Treatment  Frequency/Duration: 1x a week  / 6 weeks     Certification date from 09/07/23   To 10/07/23           See note for plan of treatment details and functional goals     Marianela Samson, SLP                         I CERTIFY THE NEED FOR THESE SERVICES FURNISHED UNDER        THIS PLAN OF TREATMENT AND WHILE UNDER MY CARE     (Physician attestation of this document indicates review and certification of the therapy plan).                Referring Provider:  Rob Harris      Initial Assessment  See Epic Evaluation- 09/07/23

## 2023-09-10 ENCOUNTER — HEALTH MAINTENANCE LETTER (OUTPATIENT)
Age: 83
End: 2023-09-10

## 2023-09-11 ASSESSMENT — ENCOUNTER SYMPTOMS
COUGH: 1
HEARTBURN: 0
SORE THROAT: 1
HEADACHES: 0
ABDOMINAL PAIN: 0
BREAST MASS: 0
PARESTHESIAS: 0
EYE PAIN: 0
JOINT SWELLING: 1
ARTHRALGIAS: 1
FREQUENCY: 1
FEVER: 0
PALPITATIONS: 0
CHILLS: 1
NAUSEA: 0
MYALGIAS: 1
HEMATOCHEZIA: 0
DYSURIA: 0
DIZZINESS: 0
HEMATURIA: 0

## 2023-09-11 ASSESSMENT — ACTIVITIES OF DAILY LIVING (ADL): CURRENT_FUNCTION: NO ASSISTANCE NEEDED

## 2023-09-12 DIAGNOSIS — R09.89 THROAT CLEARING: ICD-10-CM

## 2023-09-12 DIAGNOSIS — T46.4X5A ACE-INHIBITOR COUGH: ICD-10-CM

## 2023-09-12 DIAGNOSIS — J02.9 SORE THROAT: ICD-10-CM

## 2023-09-12 DIAGNOSIS — R05.3 CHRONIC COUGH: ICD-10-CM

## 2023-09-12 DIAGNOSIS — R09.82 POST-NASAL DRAINAGE: ICD-10-CM

## 2023-09-12 DIAGNOSIS — R05.8 ACE-INHIBITOR COUGH: ICD-10-CM

## 2023-09-12 RX ORDER — LOSARTAN POTASSIUM 50 MG/1
50 TABLET ORAL DAILY
Qty: 90 TABLET | Refills: 1 | Status: SHIPPED | OUTPATIENT
Start: 2023-09-12 | End: 2023-11-01

## 2023-09-13 ENCOUNTER — OFFICE VISIT (OUTPATIENT)
Dept: FAMILY MEDICINE | Facility: CLINIC | Age: 83
End: 2023-09-13
Payer: COMMERCIAL

## 2023-09-13 VITALS
OXYGEN SATURATION: 99 % | BODY MASS INDEX: 28.82 KG/M2 | HEART RATE: 68 BPM | DIASTOLIC BLOOD PRESSURE: 70 MMHG | RESPIRATION RATE: 16 BRPM | SYSTOLIC BLOOD PRESSURE: 138 MMHG | HEIGHT: 65 IN | WEIGHT: 173 LBS | TEMPERATURE: 98.2 F

## 2023-09-13 DIAGNOSIS — R09.89 THROAT CLEARING: ICD-10-CM

## 2023-09-13 DIAGNOSIS — R05.3 CHRONIC COUGH: ICD-10-CM

## 2023-09-13 DIAGNOSIS — Z00.00 ENCOUNTER FOR MEDICARE ANNUAL WELLNESS EXAM: Primary | ICD-10-CM

## 2023-09-13 DIAGNOSIS — J30.2 SEASONAL ALLERGIC RHINITIS, UNSPECIFIED TRIGGER: ICD-10-CM

## 2023-09-13 DIAGNOSIS — R09.82 POST-NASAL DRAINAGE: ICD-10-CM

## 2023-09-13 DIAGNOSIS — I10 ESSENTIAL HYPERTENSION: ICD-10-CM

## 2023-09-13 PROCEDURE — 90662 IIV NO PRSV INCREASED AG IM: CPT | Performed by: FAMILY MEDICINE

## 2023-09-13 PROCEDURE — G0008 ADMIN INFLUENZA VIRUS VAC: HCPCS | Performed by: FAMILY MEDICINE

## 2023-09-13 PROCEDURE — 99214 OFFICE O/P EST MOD 30 MIN: CPT | Mod: 25 | Performed by: FAMILY MEDICINE

## 2023-09-13 PROCEDURE — G0439 PPPS, SUBSEQ VISIT: HCPCS | Performed by: FAMILY MEDICINE

## 2023-09-13 RX ORDER — FAMOTIDINE 40 MG/1
40 TABLET, FILM COATED ORAL 2 TIMES DAILY
Qty: 180 TABLET | Refills: 3 | Status: SHIPPED | OUTPATIENT
Start: 2023-09-13 | End: 2024-09-24

## 2023-09-13 RX ORDER — FLUTICASONE PROPIONATE 50 MCG
1 SPRAY, SUSPENSION (ML) NASAL 2 TIMES DAILY
Qty: 16 G | Refills: 3 | Status: SHIPPED | OUTPATIENT
Start: 2023-09-13 | End: 2024-02-15

## 2023-09-13 ASSESSMENT — ENCOUNTER SYMPTOMS
DYSURIA: 0
PALPITATIONS: 0
HEMATURIA: 0
CHILLS: 1
NAUSEA: 0
HEMATOCHEZIA: 0
EYE PAIN: 0
ABDOMINAL PAIN: 0
HEADACHES: 0
JOINT SWELLING: 1
SORE THROAT: 1
HEARTBURN: 0
FREQUENCY: 1
COUGH: 1
FEVER: 0
BREAST MASS: 0
PARESTHESIAS: 0
DIZZINESS: 0
ARTHRALGIAS: 1
MYALGIAS: 1

## 2023-09-13 ASSESSMENT — PAIN SCALES - GENERAL: PAINLEVEL: NO PAIN (0)

## 2023-09-13 ASSESSMENT — ACTIVITIES OF DAILY LIVING (ADL): CURRENT_FUNCTION: NO ASSISTANCE NEEDED

## 2023-09-13 NOTE — PROGRESS NOTES
"The patient was provided with suggestions to help her develop a healthy physical lifestyle.  She is at risk for lack of exercise and has been provided with information to increase physical activity for the benefit of her well-being.  The patient was counseled and encouraged to consider modifying their diet and eating habits. She was provided with information on recommended healthy diet options.  The patient was provided with written information regarding signs of hearing loss.  Information on urinary incontinence and treatment options given to patient.Answers submitted by the patient for this visit:  Annual Preventive Visit (Submitted on 9/11/2023)  Chief Complaint: Annual Exam:  In general, how would you rate your overall physical health?: fair  Frequency of exercise:: None  Do you usually eat at least 4 servings of fruit and vegetables a day, include whole grains & fiber, and avoid regularly eating high fat or \"junk\" foods? : No  Taking medications regularly:: Yes  Medication side effects:: Other  Activities of Daily Living: no assistance needed  Home safety: no safety concerns identified  Hearing Impairment:: difficulty following a conversation in a noisy restaurant or crowded room, feel that people are mumbling or not speaking clearly, difficulty following dialogue in the theater, difficult to understand a speaker at a public meeting or [employer] service, need to ask people to speak up or repeat themselves, difficulty understanding soft or whispered speech  In the past 6 months, have you been bothered by leaking of urine?: Yes  abdominal pain: No  Blood in stool: No  Blood in urine: No  chest pain: No  chills: Yes  congestion: Yes  cough: Yes  dizziness: No  ear pain: No  eye pain: No  fever: No  frequency: Yes  genital sores: No  headaches: No  hearing loss: Yes  heartburn: No  arthralgias: Yes  joint swelling: Yes  peripheral edema: Yes  mood changes: No  myalgias: Yes  nausea: No  dysuria: No  palpitations: " No  Skin sensation changes: No  sore throat: Yes  urgency: Yes  rash: No  visual disturbance: No  pelvic pain: No  vaginal bleeding: No  vaginal discharge: No  tenderness: No  breast mass: No  breast discharge: No  In general, how would you rate your overall mental or emotional health?: good

## 2023-09-13 NOTE — NURSING NOTE
"Chief Complaint   Patient presents with    Medicare Visit     /70   Pulse 68   Temp 98.2  F (36.8  C) (Tympanic)   Resp 16   Ht 1.651 m (5' 5\")   Wt 78.5 kg (173 lb)   SpO2 99%   BMI 28.79 kg/m   Estimated body mass index is 28.79 kg/m  as calculated from the following:    Height as of this encounter: 1.651 m (5' 5\").    Weight as of this encounter: 78.5 kg (173 lb).  Patient presents to the clinic using No DME      Health Maintenance that is potentially due pending provider review:    Health Maintenance Due   Topic Date Due    COVID-19 Vaccine (6 - Pfizer risk series) 01/17/2023    ANNUAL REVIEW OF HM ORDERS  03/04/2023    MEDICARE ANNUAL WELLNESS VISIT  07/01/2023    INFLUENZA VACCINE (1) 09/01/2023        n/a          "

## 2023-09-13 NOTE — PATIENT INSTRUCTIONS
Stop carmeloartgeovany for 10 days     Nurse visit in 10 days to recheck bp and cough     Go back to the [facility] daily   Patient Education   Personalized Prevention Plan  You are due for the preventive services outlined below.  Your care team is available to assist you in scheduling these services.  If you have already completed any of these items, please share that information with your care team to update in your medical record.  Health Maintenance Due   Topic Date Due     COVID-19 Vaccine (6 - Pfizer risk series) 01/17/2023     Annual Wellness Visit  07/01/2023     Your Health Risk Assessment indicates you feel you are not in good health    A healthy lifestyle helps keep the body fit and the mind alert. It helps protect you from disease, helps you fight disease, and helps prevent chronic disease (disease that doesn't go away) from getting worse. This is important as you get older and begin to notice twinges in muscles and joints and a decline in the strength and stamina you once took for granted. A healthy lifestyle includes good healthcare, good nutrition, weight control, recreation, and regular exercise. Avoid harmful substances and do what you can to keep safe. Another part of a healthy lifestyle is stay mentally active and socially involved.    Good healthcare   Have a wellness visit every year.   If you have new symptoms, let us know right away. Don't wait until the next checkup.   Take medicines exactly as prescribed and keep your medicines in a safe place. Tell us if your medicine causes problems.   Healthy diet and weight control   Eat 3 or 4 small, nutritious, low-fat, high-fiber meals a day. Include a variety of fruits, vegetables, and whole-grain foods.   Make sure you get enough calcium in your diet. Calcium, vitamin D, and exercise help prevent osteoporosis (bone thinning).   If you live alone, try eating with others when you can. That way you get a good meal and have company while you eat it.   Try to keep a  "healthy weight. If you eat more calories than your body uses for energy, it will be stored as fat and you will gain weight.     Recreation   Recreation is not limited to sports and team events. It includes any activity that provides relaxation, interest, enjoyment, and exercise. Recreation provides an outlet for physical, mental, and social energy. It can give a sense of worth and achievement. It can help you stay healthy.    Mental Exercise and Social Involvement  Mental and emotional health is as important as physical health. Keep in touch with friends and family. Stay as active as possible. Continue to learn and challenge yourself.   Things you can do to stay mentally active are:  Learn something new, like a foreign language or musical instrument.   Play SCRABBLE or do crossword puzzles. If you cannot find people to play these games with you at home, you can play them with others on your computer through the Internet.   Join a games club--anything from card games to chess or checkers or lawn bowling.   Start a new hobby.   Go back to school.   Volunteer.   Read.   Keep up with world events.  Learning About Being Physically Active  What is physical activity?     Being physically active means doing any kind of activity that gets your body moving.  The types of physical activity that can help you get fit and stay healthy include:  Aerobic or \"cardio\" activities. These make your heart beat faster and make you breathe harder, such as brisk walking, riding a bike, or running. They strengthen your heart and lungs and build up your endurance.  Strength training activities. These make your muscles work against, or \"resist,\" something. Examples include lifting weights or doing push-ups. These activities help tone and strengthen your muscles and bones.  Stretches. These let you move your joints and muscles through their full range of motion. Stretching helps you be more flexible.  Reaching a balance between these three " types of physical activity is important because each one contributes to your overall fitness.  What are the benefits of being active?  Being active is one of the best things you can do for your health. It helps you to:  Feel stronger and have more energy to do all the things you like to do.  Focus better at school or work.  Feel, think, and sleep better.  Reach and stay at a healthy weight.  Lose fat and build lean muscle.  Lower your risk for serious health problems, including diabetes, heart attack, high blood pressure, and some cancers.  Keep your heart, lungs, bones, muscles, and joints strong and healthy.  How can you make being active part of your life?  Start slowly. Make it your long-term goal to get at least 30 minutes of exercise on most days of the week. Walking is a good choice. You also may want to do other activities, such as running, swimming, cycling, or playing tennis or team sports.  Pick activities that you like--ones that make your heart beat faster, your muscles stronger, and your muscles and joints more flexible. If you find more than one thing you like doing, do them all. You don't have to do the same thing every day.  Get your heart pumping every day. Any activity that makes your heart beat faster and keeps it at that rate for a while counts.  Here are some great ways to get your heart beating faster:  Go for a brisk walk, run, or bike ride.  Go for a hike or swim.  Go in-line skating.  Play a game of touch football, basketball, or soccer.  Ride a bike.  Play tennis or racquetball.  Climb stairs.  Even some household chores can be aerobic--just do them at a faster pace. Vacuuming, raking or mowing the lawn, sweeping the garage, and washing and waxing the car all can help get your heart rate up.  Strengthen your muscles during the week. You don't have to lift heavy weights or grow big, bulky muscles to get stronger. Doing a few simple activities that make your muscles work against, or  "\"resist,\" something can help you get stronger.  For example, you can:  Do push-ups or sit-ups, which use your own body weight as resistance.  Lift weights or dumbbells or use stretch bands at home or in a gym or community center.  Stretch your muscles often. Stretching will help you as you become more active. It can help you stay flexible, loosen tight muscles, and avoid injury. It can also help improve your balance and posture and can be a great way to relax.  Be sure to stretch the muscles you'll be using when you work out. It's best to warm your muscles slightly before you stretch them. Walk or do some other light aerobic activity for a few minutes, and then start stretching.  When you stretch your muscles:  Do it slowly. Stretching is not about going fast or making sudden movements.  Don't push or bounce during a stretch.  Hold each stretch for at least 15 to 30 seconds, if you can. You should feel a stretch in the muscle, but not pain.  Breathe out as you do the stretch. Then breathe in as you hold the stretch. Don't hold your breath.  If you're worried about how more activity might affect your health, have a checkup before you start. Follow any special advice your doctor gives you for getting a smart start.  Where can you learn more?  Go to https://www.[employer].net/patiented  Enter W332 in the search box to learn more about \"Learning About Being Physically Active.\"  Current as of: October 10, 2022               Content Version: 13.7    8970-5386 [employer].   Care instructions adapted under license by your healthcare professional. If you have questions about a medical condition or this instruction, always ask your healthcare professional. [employer] disclaims any warranty or liability for your use of this information.      Learning About Dietary Guidelines  What are the Dietary Guidelines for Americans?     Dietary Guidelines for Americans provide tips for eating well and staying " healthy. This helps reduce the risk for long-term (chronic) diseases.  These guidelines recommend that you:  Eat and drink the right amount for you. The [place] government's food guide is called MyPlate. It can help you make your own well-balanced eating plan.  Try to balance your eating with your activity. This helps you stay at a healthy weight.  Drink alcohol in moderation, if at all.  Limit foods high in salt, saturated fat, trans fat, and added sugar.  These guidelines are from the U.[place]. Department of Agriculture and the U.S. Department of Health and Human Services. They are updated every 5 years.  What is MyPlate?  MyPlate is the [place] government's food guide. It can help you make your own well-balanced eating plan. A balanced eating plan means that you eat enough, but not too much, and that your food gives you the nutrients you need to stay healthy.  MyPlate focuses on eating plenty of whole grains, fruits, and vegetables, and on limiting fat and sugar. It is available online at www.ChooseMyPlate.gov.  How can you get started?  If you're trying to eat healthier, you can slowly change your eating habits over time. You don't have to make big changes all at once. Start by adding one or two healthy foods to your meals each day.  Grains  Choose whole-grain breads and cereals and whole-wheat pasta and whole-grain crackers.  Vegetables  Eat a variety of vegetables every day. They have lots of nutrients and are part of a heart-healthy diet.  Fruits  Eat a variety of fruits every day. Fruits contain lots of nutrients. Choose fresh fruit instead of fruit juice.  Protein foods  Choose fish and lean poultry more often. Eat red meat and fried meats less often. Dried beans, tofu, and nuts are also good sources of protein.  Dairy  Choose low-fat or fat-free products from this food group. If you have problems digesting milk, try eating cheese or yogurt instead.  Fats and oils  Limit fats and oils if you're trying to cut calories.  "Choose healthy fats when you cook. These include canola oil and olive oil.  Where can you learn more?  Go to https://www.[employer].net/patiented  Enter D676 in the search box to learn more about \"Learning About Dietary Guidelines.\"  Current as of: March 1, 2023               Content Version: 13.7    3792-9212 [employer].   Care instructions adapted under license by your healthcare professional. If you have questions about a medical condition or this instruction, always ask your healthcare professional. [employer] disclaims any warranty or liability for your use of this information.      Hearing Loss: Care Instructions  Overview     Hearing loss is a sudden or slow decrease in how well you hear. It can range from slight to profound. Permanent hearing loss can occur with aging. It also can happen when you are exposed long-term to loud noise. Examples include listening to loud music, riding motorcycles, or being around other loud machines.  Hearing loss can affect your work and home life. It can make you feel lonely or depressed. You may feel that you have lost your independence. But hearing aids and other devices can help you hear better and feel connected to others.  Follow-up care is a key part of your treatment and safety. Be sure to make and go to all appointments, and call your doctor if you are having problems. It's also a good idea to know your test results and keep a list of the medicines you take.  How can you care for yourself at home?  Avoid loud noises whenever possible. This helps keep your hearing from getting worse.  Always wear hearing protection around loud noises.  Wear a hearing aid as directed.  A professional can help you pick a hearing aid that will work best for you.  You can also get hearing aids over the counter for mild to moderate hearing loss.  Have hearing tests as your doctor suggests. They can show whether your hearing has changed. Your hearing aid may need " "to be adjusted.  Use other devices as needed. These may include:  Telephone amplifiers and hearing aids that can connect to a television, stereo, radio, or microphone.  Devices that use lights or vibrations. These alert you to the doorbell, a ringing telephone, or a baby monitor.  Television closed-captioning. This shows the words at the bottom of the screen. Most new TVs can do this.  TTY (text telephone). This lets you type messages back and forth on the telephone instead of talking or listening. These devices are also called TDD. When messages are typed on the keyboard, they are sent over the phone line to a receiving TTY. The message is shown on a monitor.  Use text messaging, social media, and email if it is hard for you to communicate by telephone.  Try to learn a listening technique called speechreading. It is not lipreading. You pay attention to people's gestures, expressions, posture, and tone of voice. These clues can help you understand what a person is saying. Face the person you are talking to, and have them face you. Make sure the lighting is good. You need to see the other person's face clearly.  Think about counseling if you need help to adjust to your hearing loss.  When should you call for help?  Watch closely for changes in your health, and be sure to contact your doctor if:    You think your hearing is getting worse.     You have new symptoms, such as dizziness or nausea.   Where can you learn more?  Go to https://www.[employer].net/patiented  Enter R798 in the search box to learn more about \"Hearing Loss: Care Instructions.\"  Current as of: March 1, 2023               Content Version: 13.7    8353-1844 [employer].   Care instructions adapted under license by your healthcare professional. If you have questions about a medical condition or this instruction, always ask your healthcare professional. [employer] disclaims any warranty or liability for your use of this " information.      Bladder Training: Care Instructions  Your Care Instructions     Bladder training is used to treat urge incontinence and stress incontinence. Urge incontinence means that the need to urinate comes on so fast that you can't get to a toilet in time. Stress incontinence means that you leak urine because of pressure on your bladder. For example, it may happen when you laugh, cough, or lift something heavy.  Bladder training can increase how long you can wait before you have to urinate. It can also help your bladder hold more urine. And it can give you better control over the urge to urinate.  It is important to remember that bladder training takes a few weeks to a few months to make a difference. You may not see results right away, but don't give up.  Follow-up care is a key part of your treatment and safety. Be sure to make and go to all appointments, and call your doctor if you are having problems. It's also a good idea to know your test results and keep a list of the medicines you take.  How can you care for yourself at home?  Work with your doctor to come up with a bladder training program that is right for you. You may use one or more of the following methods.  Delayed urination  In the beginning, try to keep from urinating for 5 minutes after you first feel the need to go.  While you wait, take deep, slow breaths to relax. Kegel exercises can also help you delay the need to go to the bathroom.  After some practice, when you can easily wait 5 minutes to urinate, try to wait 10 minutes before you urinate.  Slowly increase the waiting period until you are able to control when you have to urinate.  Scheduled urination  Empty your bladder when you first wake up in the morning.  Schedule times throughout the day when you will urinate.  Start by going to the bathroom every hour, even if you don't need to go.  Slowly increase the time between trips to the bathroom.  When you have found a schedule that  "works well for you, keep doing it.  If you wake up during the night and have to urinate, do it. Apply your schedule to waking hours only.  Kegel exercises  These tighten and strengthen pelvic muscles, which can help you control the flow of urine. (If doing these exercises causes pain, stop doing them and talk with your doctor.) To do Kegel exercises:  Squeeze your muscles as if you were trying not to pass gas. Or squeeze your muscles as if you were stopping the flow of urine. Your belly, legs, and buttocks shouldn't move.  Hold the squeeze for 3 seconds, then relax for 5 to 10 seconds.  Start with 3 seconds, then add 1 second each week until you are able to squeeze for 10 seconds.  Repeat the exercise 10 times a session. Do 3 to 8 sessions a day.  When should you call for help?  Watch closely for changes in your health, and be sure to contact your doctor if:    Your incontinence is getting worse.     You do not get better as expected.   Where can you learn more?  Go to https://www.[employer].net/patiented  Enter V684 in the search box to learn more about \"Bladder Training: Care Instructions.\"  Current as of: March 1, 2023               Content Version: 13.7    1412-1736 [employer].   Care instructions adapted under license by your healthcare professional. If you have questions about a medical condition or this instruction, always ask your healthcare professional. [employer] disclaims any warranty or liability for your use of this information.         "

## 2023-09-13 NOTE — PROGRESS NOTES
"2UBJECTIVE:   Lavonne is a 83 year old who presents for Preventive Visit.      9/13/2023     3:07 PM   Additional Questions   Roomed by Nicki DIAZ   Accompanied by Self         9/13/2023     3:07 PM   Patient Reported Additional Medications   Patient reports taking the following new medications .       Are you in the first 12 months of your Medicare coverage?  No    Healthy Habits:     In general, how would you rate your overall health?  Fair    Frequency of exercise:  None    Do you usually eat at least 4 servings of fruit and vegetables a day, include whole grains    & fiber and avoid regularly eating high fat or \"junk\" foods?  No    Taking medications regularly:  Yes    Medication side effects:  Other    Ability to successfully perform activities of daily living:  No assistance needed    Home Safety:  No safety concerns identified    Hearing Impairment:  Difficulty following a conversation in a noisy restaurant or crowded room, feel that people are mumbling or not speaking clearly, difficulty following dialogue in the theater, difficult to understand a speaker at a public meeting or [employer] service, need to ask people to speak up or repeat themselves and difficulty understanding soft or whispered speech    In the past 6 months, have you been bothered by leaking of urine? Yes    In general, how would you rate your overall mental or emotional health?  Good        Have you ever done Advance Care Planning? (For example, a Health Directive, POLST, or a discussion with a medical provider or your loved ones about your wishes): No, advance care planning information given to patient to review.  Patient plans to discuss their wishes with loved ones or provider.         Fall risk  Fallen 2 or more times in the past year?: No  Any fall with injury in the past year?: No    Cognitive Screening   1) Repeat 3 items (Leader, Season, Table)    2) Clock draw: NORMAL  3) 3 item recall: Recalls 3 objects  Results: 3 items recalled: " COGNITIVE IMPAIRMENT LESS LIKELY    Mini-CogTM Copyright S Nolberto. Licensed by the author for use in [facility]; reprinted with permission (brittani@[email or web address]). All rights reserved.      Do you have sleep apnea, excessive snoring or daytime drowsiness? : no    Reviewed and updated as needed this visit by clinical staff   Tobacco  Allergies  Meds  Problems  Med Hx  Surg Hx  Fam Hx          Reviewed and updated as needed this visit by Provider   Tobacco  Allergies  Meds  Problems  Med Hx  Surg Hx  Fam Hx         Social History     Tobacco Use    Smoking status: Former     Packs/day: 0.50     Years: 5.00     Pack years: 2.50     Types: Cigarettes     Start date: 1968     Quit date: 2/15/1972     Years since quittin.6    Smokeless tobacco: Never    Tobacco comments:     stopped in her 's   Substance Use Topics    Alcohol use: Yes     Comment: Occasional             2023     9:21 AM   Alcohol Use   Prescreen: >3 drinks/day or >7 drinks/week? No     Do you have a current opioid prescription? No  Do you use any other controlled substances or medications that are not prescribed by a provider? None          Hypertension Follow-up    Do you check your blood pressure regularly outside of the clinic? Yes   Are you following a low salt diet? Yes  Are your blood pressures ever more than 140 on the top number (systolic) OR more   than 90 on the bottom number (diastolic), for example 140/90? No      Chronic cough   Reviewed the work up she is working with ENT And speech   She still may have some sxs of reflux   Definitely has PND  She stopped her flonase and not sure why     Current providers sharing in care for this patient include:   Patient Care Team:  Elvira Kowalski MD as PCP - General (Family Practice)  Rob Harris MD as Assigned Surgical Provider  Elvira Kowalski MD as Assigned PCP  Soldead Lopes PA-C as Assigned Rheumatology Provider  Belkis Melendez [facility]  as Pharmacist (Pharmacist)  Belkis Melendez RPH as Assigned MTM Pharmacist  Mary Jane Alamo APRN CNP as Assigned Cancer Care Provider    The following health maintenance items are reviewed in Epic and correct as of today:  Health Maintenance   Topic Date Due    COVID-19 Vaccine (6 - Pfizer risk series) 01/17/2023    COLORECTAL CANCER SCREENING  07/13/2024 (Originally 1940)    MEDICARE ANNUAL WELLNESS VISIT  09/13/2024    ANNUAL REVIEW OF HM ORDERS  09/13/2024    FALL RISK ASSESSMENT  09/13/2024    ADVANCE CARE PLANNING  07/01/2027    DTAP/TDAP/TD IMMUNIZATION (2 - Td or Tdap) 04/08/2029    DEXA  03/28/2038    PHQ-2 (once per calendar year)  Completed    INFLUENZA VACCINE  Completed    Pneumococcal Vaccine: 65+ Years  Completed    ZOSTER IMMUNIZATION  Completed    IPV IMMUNIZATION  Aged Out    HPV IMMUNIZATION  Aged Out    MENINGITIS IMMUNIZATION  Aged Out    MAMMO SCREENING  Discontinued     Labs reviewed in EPIC        Pertinent mammograms are reviewed under the imaging tab.    Review of Systems   Constitutional:  Positive for chills. Negative for fever.   HENT:  Positive for congestion, hearing loss and sore throat. Negative for ear pain.    Eyes:  Negative for pain and visual disturbance.   Respiratory:  Positive for cough.    Cardiovascular:  Positive for peripheral edema. Negative for chest pain and palpitations.   Gastrointestinal:  Negative for abdominal pain, heartburn, hematochezia and nausea.   Breasts:  Negative for tenderness, breast mass and discharge.   Genitourinary:  Positive for frequency and urgency. Negative for dysuria, genital sores, hematuria, pelvic pain, vaginal bleeding and vaginal discharge.   Musculoskeletal:  Positive for arthralgias, joint swelling and myalgias.   Skin:  Negative for rash.   Neurological:  Negative for dizziness, headaches and paresthesias.   Psychiatric/Behavioral:  Negative for mood changes.      All of the above chronic and addressed in HPI    OBJECTIVE:  "  /70   Pulse 68   Temp 98.2  F (36.8  C) (Tympanic)   Resp 16   Ht 1.651 m (5' 5\")   Wt 78.5 kg (173 lb)   SpO2 99%   BMI 28.79 kg/m   Estimated body mass index is 28.79 kg/m  as calculated from the following:    Height as of this encounter: 1.651 m (5' 5\").    Weight as of this encounter: 78.5 kg (173 lb).  Physical Exam  GENERAL APPEARANCE: healthy, alert and no distress  EYES: Eyes grossly normal to inspection, PERRL and conjunctivae and sclerae normal  HENT: ear canals and TM's normal, nose and mouth without ulcers or lesions, oropharynx clear and oral mucous membranes moist  NECK: no adenopathy, no asymmetry, masses, or scars and thyroid normal to palpation  RESP: lungs clear to auscultation - no rales, rhonchi or wheezes  CV: regular rate and rhythm, normal S1 S2, no S3 or S4, no murmur, click or rub, no peripheral edema and peripheral pulses strong  ABDOMEN: soft, nontender, no hepatosplenomegaly, no masses and bowel sounds normal  MS: no musculoskeletal defects are noted and gait is age appropriate without ataxia  SKIN: no suspicious lesions or rashes  NEURO: Normal strength and tone, sensory exam grossly normal, mentation intact and speech normal  PSYCH: mentation appears normal and affect normal/bright    Diagnostic Test Results:  Labs reviewed in Epic    ASSESSMENT / PLAN:   (Z00.00) Encounter for Medicare annual wellness exam  (primary encounter diagnosis)  Comment:   Plan:     (J30.2) Seasonal allergic rhinitis, unspecified trigger  Comment: needs to restart flonase as this may be contributing to the cough   Plan: fluticasone (FLONASE) 50 MCG/ACT nasal spray            (R05.3) Chronic cough  Comment: restart pepcid   Continue work with speech and ENT   Plan: famotidine (PEPCID) 40 MG tablet            (R09.89) Throat clearing  Comment:   Plan: famotidine (PEPCID) 40 MG tablet      (I10) Essential hypertension  Comment: Stable no change in treatment plan.   Plan:     Patient has been " "advised of split billing requirements and indicates understanding: Yes      COUNSELING:  Reviewed preventive health counseling, as reflected in patient instructions      BMI:   Estimated body mass index is 28.79 kg/m  as calculated from the following:    Height as of this encounter: 1.651 m (5' 5\").    Weight as of this encounter: 78.5 kg (173 lb).         She reports that she quit smoking about 51 years ago. Her smoking use included cigarettes. She started smoking about 55 years ago. She has a 2.50 pack-year smoking history. She has never used smokeless tobacco.      Appropriate preventive services were discussed with this patient, including applicable screening as appropriate for cardiovascular disease, diabetes, osteopenia/osteoporosis, and glaucoma.  As appropriate for age/gender, discussed screening for colorectal cancer, prostate cancer, breast cancer, and cervical cancer. Checklist reviewing preventive services available has been given to the patient.    Reviewed patients plan of care and provided an AVS. The Basic Care Plan (routine screening as documented in Health Maintenance) for Lavonne meets the Care Plan requirement. This Care Plan has been established and reviewed with the Patient.          Elvira Kowalski MD  [facility]    Identified Health Risks:  I have reviewed Opioid Use Disorder and Substance Use Disorder risk factors and made any needed referrals.   "

## 2023-09-14 ENCOUNTER — THERAPY VISIT (OUTPATIENT)
Dept: SPEECH THERAPY | Facility: CLINIC | Age: 83
End: 2023-09-14
Attending: OTOLARYNGOLOGY
Payer: COMMERCIAL

## 2023-09-14 DIAGNOSIS — R05.3 CHRONIC COUGH: Primary | ICD-10-CM

## 2023-09-14 PROCEDURE — 92507 TX SP LANG VOICE COMM INDIV: CPT | Mod: GN | Performed by: SPEECH-LANGUAGE PATHOLOGIST

## 2023-09-21 ENCOUNTER — THERAPY VISIT (OUTPATIENT)
Dept: SPEECH THERAPY | Facility: CLINIC | Age: 83
End: 2023-09-21
Attending: OTOLARYNGOLOGY
Payer: COMMERCIAL

## 2023-09-21 DIAGNOSIS — R05.3 CHRONIC COUGH: Primary | ICD-10-CM

## 2023-09-21 LAB
INTERPRETATION: NORMAL
INTERPRETATION: NORMAL
ISCN: NORMAL
METHODS: NORMAL

## 2023-09-21 PROCEDURE — 88291 CYTO/MOLECULAR REPORT: CPT | Performed by: MEDICAL GENETICS

## 2023-09-21 PROCEDURE — 88368 INSITU HYBRIDIZATION MANUAL: CPT | Mod: 26 | Performed by: MEDICAL GENETICS

## 2023-09-21 PROCEDURE — 92507 TX SP LANG VOICE COMM INDIV: CPT | Mod: GN | Performed by: SPEECH-LANGUAGE PATHOLOGIST

## 2023-09-21 NOTE — PROGRESS NOTES
DISCHARGE  Reason for Discharge: Patient has met all goals.      Discharge Plan: Patient to continue home program. Continue water intake for hydration.    Referring Provider:  Rob Harris    Speech Therapy Discharge Note  09/21/23    Appointment Info   Treating Provider Elvira Blackwell MA, CCC/SLP   Visits Used 3   Medical Diagnosis Chronic cough (R05.3)   Progress Note/Certification   Start Of Care Date 09/07/23   Onset Of Illness/injury Or Date Of Surgery 12/01/22   Progress Note Due Date 09/21/23   Progress Note Completed Date 09/07/23   Subjective Report   Subjective Report Pt reports she is doing better and coughing much less.   She feels increased water intake has been helpful.  Is coughing 70% less than at eval date per pt report.   SLP Goal 1   Goal Identifier vocal hygiene   Goal Description Patient will report improvement of vocal hygiene concepts across 2 sessions.   Rationale To maximize functional communication within the home or community   Goal Progress Goal Met.  Pt drinking more water and cut back on caffeine/pop   Target Date 10/07/23   Date Met 09/21/23   SLP Goal 2   Goal Identifier Relaxed Breathing   Goal Description Patient will demonstrate success with relax breathing exercises (rescue breathing, diaphragmatic  breathing, square breathing) across 2 sessions.   Rationale To maximize functional communication within the home or community   Goal Progress Goal Met- uses at time during the day and helps with relaxing and helps with throat tension   Target Date 10/07/23   Date Met 09/21/23   SLP Goal 3   Goal Identifier Self rating   Goal Description Patient will rate her voice an 8/10 (10 being normal).   Rationale To maximize functional communication within the home or community   Goal Progress States it's hard to know because of her hearing but feels less hoarse/harsh.  Judges 7/10 today.   Target Date 10/07/23   Date Met   (partially met)   SLP Goal 4   Goal Identifier cough suppression   Goal  Description Patient will report use of cough suppression strategies to reduce chronic cough and throat clearing by 50% across 2 weeks.   Rationale To maximize functional communication within the home or community   Goal Progress Pt judges 70% less.   Target Date 10/07/23   Date Met 09/21/23   Treatment Speech/Lang/Voice   Treatment of Speech, Language, Voice Communication&/or Auditory Processing (91419) 30 Minutes   Patient Response/Progress Pt has met 3 of 4 short term goals and reporting good success with reduction of cough/throat clear and improved vocal quality.   Education   Learner/Method Patient   Plan   Updates to plan of care Discontinue ST   Total Session Time   Total Treatment Time (sum of timed and untimed services) 30

## 2023-09-25 ENCOUNTER — TELEPHONE (OUTPATIENT)
Dept: FAMILY MEDICINE | Facility: CLINIC | Age: 83
End: 2023-09-25

## 2023-09-25 ENCOUNTER — ALLIED HEALTH/NURSE VISIT (OUTPATIENT)
Dept: FAMILY MEDICINE | Facility: CLINIC | Age: 83
End: 2023-09-25
Payer: COMMERCIAL

## 2023-09-25 VITALS — DIASTOLIC BLOOD PRESSURE: 72 MMHG | SYSTOLIC BLOOD PRESSURE: 110 MMHG | HEART RATE: 76 BPM

## 2023-09-25 DIAGNOSIS — I10 ESSENTIAL HYPERTENSION: Primary | ICD-10-CM

## 2023-09-25 PROCEDURE — 99207 PR NO CHARGE NURSE ONLY: CPT

## 2023-09-25 NOTE — TELEPHONE ENCOUNTER
Lavonne Tidwell is a 83 year old year old patient who comes in today for a Blood Pressure check because of medication change, losartan was put on hold 10 days ago.  Vital Signs as repeated by /72 P 76  Patient is taking medication as prescribed  Patient is tolerating medications well.  Patient is not monitoring Blood Pressure at home.  Average readings if yes are N/A  Current complaints: ears feel plugged and patient noted bright red blood on her pillow this AM from her left ear.  Disposition:  huddled with provider Dr. Elvira Kowalski who was present during visit to check her ear which revealed a small scratch in the ear canal, patient instructed to continue holding losartan for now, increase flonase to 1 spray into each nostril BID.    Julie Behrendt RN

## 2023-09-25 NOTE — PROGRESS NOTES
Lavonne Tidwell is a 83 year old year old patient who comes in today for a Blood Pressure check because of medication change, losartan was put on hold 10 days ago.  Vital Signs as repeated by /72 P 76  Patient is taking medication as prescribed  Patient is tolerating medications well.  Patient is not monitoring Blood Pressure at home.  Average readings if yes are N/A  Current complaints: ears feel plugged and noted blood on her pillow this AM from her left ear.  Disposition:  huddled with provider Dr. Elvira Kowalski who was present during visit to check her ears which revealed a small scratch in the ear canal, patient instructed to continue holding losartan for now and to increase flonase to 1 spray into each nostril BID.    Julie Behrendt RN

## 2023-10-12 ENCOUNTER — TELEPHONE (OUTPATIENT)
Dept: OTOLARYNGOLOGY | Facility: CLINIC | Age: 83
End: 2023-10-12
Payer: COMMERCIAL

## 2023-10-12 NOTE — TELEPHONE ENCOUNTER
[facility] Call Center    Phone Message    May a detailed message be left on voicemail: yes     Reason for Call: Other: Pt would like to have Eustachean tubes checked at her appt. She is looking to get hearing aids. Please call with questions [place] location. Thanks     Action Taken: Other: ENT    Travel Screening: Not Applicable

## 2023-10-12 NOTE — TELEPHONE ENCOUNTER
Patient has audio appointment   ENT appointment to follow on 11/1 : will recheck ears and discuss audio     Tena VELARDE RN  [facility]

## 2023-10-18 ENCOUNTER — LAB (OUTPATIENT)
Dept: LAB | Facility: CLINIC | Age: 83
End: 2023-10-18
Attending: INTERNAL MEDICINE
Payer: COMMERCIAL

## 2023-10-18 DIAGNOSIS — C85.80 MARGINAL ZONE B-CELL LYMPHOMA (H): ICD-10-CM

## 2023-10-18 LAB
ACANTHOCYTES BLD QL SMEAR: NORMAL
ALBUMIN SERPL BCG-MCNC: 4.2 G/DL (ref 3.5–5.2)
ALP SERPL-CCNC: 141 U/L (ref 35–104)
ALT SERPL W P-5'-P-CCNC: 15 U/L (ref 0–50)
ANION GAP SERPL CALCULATED.3IONS-SCNC: 9 MMOL/L (ref 7–15)
AST SERPL W P-5'-P-CCNC: 28 U/L (ref 0–45)
AUER BODIES BLD QL SMEAR: NORMAL
BASO STIPL BLD QL SMEAR: NORMAL
BASO+EOS+MONOS # BLD AUTO: ABNORMAL 10*3/UL
BASO+EOS+MONOS NFR BLD AUTO: ABNORMAL %
BASOPHILS # BLD AUTO: 0.1 10E3/UL (ref 0–0.2)
BASOPHILS NFR BLD AUTO: 1 %
BILIRUB SERPL-MCNC: 0.8 MG/DL
BITE CELLS BLD QL SMEAR: NORMAL
BLISTER CELLS BLD QL SMEAR: NORMAL
BUN SERPL-MCNC: 19.3 MG/DL (ref 8–23)
BURR CELLS BLD QL SMEAR: NORMAL
CALCIUM SERPL-MCNC: 9.6 MG/DL (ref 8.8–10.2)
CHLORIDE SERPL-SCNC: 101 MMOL/L (ref 98–107)
CREAT SERPL-MCNC: 0.88 MG/DL (ref 0.51–0.95)
DACRYOCYTES BLD QL SMEAR: NORMAL
DEPRECATED HCO3 PLAS-SCNC: 28 MMOL/L (ref 22–29)
EGFRCR SERPLBLD CKD-EPI 2021: 64 ML/MIN/1.73M2
ELLIPTOCYTES BLD QL SMEAR: NORMAL
EOSINOPHIL # BLD AUTO: 0.1 10E3/UL (ref 0–0.7)
EOSINOPHIL NFR BLD AUTO: 2 %
ERYTHROCYTE [DISTWIDTH] IN BLOOD BY AUTOMATED COUNT: 14 % (ref 10–15)
FRAGMENTS BLD QL SMEAR: NORMAL
GLUCOSE SERPL-MCNC: 97 MG/DL (ref 70–99)
HCT VFR BLD AUTO: 36.3 % (ref 35–47)
HGB BLD-MCNC: 11.4 G/DL (ref 11.7–15.7)
HGB C CRYSTALS: NORMAL
HOWELL-JOLLY BOD BLD QL SMEAR: NORMAL
IMM GRANULOCYTES # BLD: 0 10E3/UL
IMM GRANULOCYTES NFR BLD: 0 %
LDH SERPL L TO P-CCNC: 200 U/L (ref 0–250)
LYMPHOCYTES # BLD AUTO: 5 10E3/UL (ref 0.8–5.3)
LYMPHOCYTES NFR BLD AUTO: 60 %
MCH RBC QN AUTO: 32.5 PG (ref 26.5–33)
MCHC RBC AUTO-ENTMCNC: 31.4 G/DL (ref 31.5–36.5)
MCV RBC AUTO: 103 FL (ref 78–100)
MONOCYTES # BLD AUTO: 0.2 10E3/UL (ref 0–1.3)
MONOCYTES NFR BLD AUTO: 3 %
NEUTROPHILS # BLD AUTO: 2.8 10E3/UL (ref 1.6–8.3)
NEUTROPHILS NFR BLD AUTO: 34 %
NEUTS HYPERSEG BLD QL SMEAR: NORMAL
NRBC # BLD AUTO: 0 10E3/UL
NRBC BLD AUTO-RTO: 0 /100
PLAT MORPH BLD: NORMAL
PLATELET # BLD AUTO: 278 10E3/UL (ref 150–450)
POLYCHROMASIA BLD QL SMEAR: NORMAL
POTASSIUM SERPL-SCNC: 4.9 MMOL/L (ref 3.4–5.3)
PROT SERPL-MCNC: 7.9 G/DL (ref 6.4–8.3)
RBC # BLD AUTO: 3.51 10E6/UL (ref 3.8–5.2)
RBC AGGLUT BLD QL: NORMAL
RBC MORPH BLD: NORMAL
ROULEAUX BLD QL SMEAR: NORMAL
SICKLE CELLS BLD QL SMEAR: NORMAL
SMUDGE CELLS BLD QL SMEAR: NORMAL
SODIUM SERPL-SCNC: 138 MMOL/L (ref 135–145)
SPHEROCYTES BLD QL SMEAR: NORMAL
STOMATOCYTES BLD QL SMEAR: NORMAL
TARGETS BLD QL SMEAR: NORMAL
TOXIC GRANULES BLD QL SMEAR: NORMAL
VARIANT LYMPHS BLD QL SMEAR: NORMAL
WBC # BLD AUTO: 8.1 10E3/UL (ref 4–11)

## 2023-10-18 PROCEDURE — 85025 COMPLETE CBC W/AUTO DIFF WBC: CPT

## 2023-10-18 PROCEDURE — 80053 COMPREHEN METABOLIC PANEL: CPT

## 2023-10-18 PROCEDURE — 36415 COLL VENOUS BLD VENIPUNCTURE: CPT

## 2023-10-18 PROCEDURE — 83615 LACTATE (LD) (LDH) ENZYME: CPT

## 2023-10-20 ENCOUNTER — TELEPHONE (OUTPATIENT)
Dept: PALLIATIVE MEDICINE | Facility: CLINIC | Age: 83
End: 2023-10-20

## 2023-10-20 ENCOUNTER — OFFICE VISIT (OUTPATIENT)
Dept: ALLERGY | Facility: CLINIC | Age: 83
End: 2023-10-20
Attending: OTOLARYNGOLOGY
Payer: COMMERCIAL

## 2023-10-20 VITALS
BODY MASS INDEX: 28.62 KG/M2 | OXYGEN SATURATION: 95 % | TEMPERATURE: 97.9 F | DIASTOLIC BLOOD PRESSURE: 66 MMHG | HEART RATE: 70 BPM | WEIGHT: 172 LBS | SYSTOLIC BLOOD PRESSURE: 135 MMHG

## 2023-10-20 DIAGNOSIS — J30.0 CHRONIC VASOMOTOR RHINITIS: Primary | ICD-10-CM

## 2023-10-20 DIAGNOSIS — R05.3 CHRONIC COUGH: ICD-10-CM

## 2023-10-20 DIAGNOSIS — M54.16 LUMBAR RADICULOPATHY: Primary | ICD-10-CM

## 2023-10-20 PROCEDURE — 82785 ASSAY OF IGE: CPT | Performed by: ALLERGY & IMMUNOLOGY

## 2023-10-20 PROCEDURE — 95004 PERQ TESTS W/ALRGNC XTRCS: CPT | Performed by: ALLERGY & IMMUNOLOGY

## 2023-10-20 PROCEDURE — 99204 OFFICE O/P NEW MOD 45 MIN: CPT | Mod: 25 | Performed by: ALLERGY & IMMUNOLOGY

## 2023-10-20 PROCEDURE — 36415 COLL VENOUS BLD VENIPUNCTURE: CPT | Performed by: ALLERGY & IMMUNOLOGY

## 2023-10-20 PROCEDURE — 86003 ALLG SPEC IGE CRUDE XTRC EA: CPT | Performed by: ALLERGY & IMMUNOLOGY

## 2023-10-20 RX ORDER — IPRATROPIUM BROMIDE 42 UG/1
2 SPRAY, METERED NASAL 3 TIMES DAILY PRN
Qty: 15 ML | Refills: 4 | Status: SHIPPED | OUTPATIENT
Start: 2023-10-20 | End: 2023-12-12

## 2023-10-20 ASSESSMENT — ENCOUNTER SYMPTOMS
ACTIVITY CHANGE: 0
SINUS PRESSURE: 0
FATIGUE: 1
LIGHT-HEADEDNESS: 0
EYE REDNESS: 0
COUGH: 1
VOMITING: 0
JOINT SWELLING: 0
CHILLS: 0
EYE ITCHING: 0
FEVER: 0
SHORTNESS OF BREATH: 0
SORE THROAT: 0
ABDOMINAL PAIN: 0
CONSTIPATION: 0
WHEEZING: 0
RHINORRHEA: 1
EYE DISCHARGE: 0
DIZZINESS: 0
NAUSEA: 0
DIARRHEA: 0
HEADACHES: 0
CHEST TIGHTNESS: 0

## 2023-10-20 NOTE — LETTER
10/20/2023         RE: Lavonne Tidwell  [address] [facility] [address]        Dear Colleague,    Thank you for referring your patient, Lavonne Tidwell, to the [facility]. Please see a copy of my visit note below.    SUBJECTIVE:                                                                   Lavonne Tidwell is an 83-year-old female who presents today to our Allergy Clinic at [facility]; She is being seen in consultation at the request of Dr. Rob Harris, for an evaluation of chronic cough.    The patient presents with a chief complaint of persistent throat/cough issues since last December 2022, initially starting with influenza/ pneumonia. Since the patient continued having symptoms both sinus and chest CT were done. Sinus CT didn't demonstrate any acute or chronic sinus disease. Chest CT performed 6/9/2023 did show some concerning axillary/mediastinal lymphadenopathy. 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   The patient reports persistent dry coughing, clear rhinorrhea, and post-nasal drainage, and sore throat. Switching her ACE inhibitor to an ARB did not seem to help her symptoms. A combination of famotidine and lansoprazole provided an approximately 50% improvement in symptoms.   While the cough is dry, she also reports some thick white phlegm. Cough is not associated with wheezing or shortness of breath. She currently uses intranasal fluticasone 1 spray in each nostril twice daily. Unclear if its helpful. She doesn't take oral antihistamines.       Patient Active Problem List   Diagnosis     Injury of sigmoid colon     Esophageal reflux     Menopausal syndrome (hot flashes)     Urinary incontinence     Atrophic vaginitis     Hyperlipidemia LDL goal <130     Advanced directives, counseling/discussion     Onychomycosis     Senile nuclear sclerosis     Status post total left knee replacement     Status post  total right knee replacement     Primary localized osteoarthrosis, lower leg     Tubulovillous adenoma polyp of colon     Essential hypertension     Primary osteoarthritis involving multiple joints     Chronic bilateral low back pain without sciatica     Chronic cough       Past Medical History:   Diagnosis Date     Arthritis 2012    knes and hips     Cancer (H)      Essential hypertension 05/05/2021     History of blood transfusion 1961    Miscarriage     Ovarian cysts 1974    s/p BSO      Problem (# of Occurrences) Relation (Name,Age of Onset)    Cancer (2) Mother (Ana): Ovarian, Maternal Grandmother (Mikala)    Diabetes (1) Other (Agnes Valdez): Great Grandmother    Heart Disease (1) Father ([place])    Cerebrovascular Disease (2) Father ([place]), Maternal Grandfather (Ede Chambers)    C.A.D. (1) Father ([place])    Other Cancer (2) Mother (Ana): Ovarian, Maternal Grandmother (Mikala): Ovarian/Bone    Coronary Artery Disease (1) Father ([place])    Colon Cancer (1) Maternal Grandfather (Ede Chambers)           Negative family history of: Breast Cancer          Past Surgical History:   Procedure Laterality Date     ABDOMEN SURGERY  1973 & 1974    Ovarian cyst/Hysterectomy     APPENDECTOMY  1952     ARTHROPLASTY KNEE Left 1/6/2016    Procedure: ARTHROPLASTY KNEE;  Surgeon: Wilfredo Thompson MD;  Location: [facility]     ARTHROPLASTY KNEE Right 2/23/2017    Procedure: ARTHROPLASTY KNEE;  Surgeon: Wilfredo Thompson MD;  Location: [facility]     BIOPSY      colonoscopy/polyps     COLONOSCOPY      every 10 years     GENITOURINARY SURGERY  2008    bladder     HYSTERECTOMY, CHELSY  1974     ORTHOPEDIC SURGERY  2007 & 2009    [place]  both feet     PHACOEMULSIFICATION WITH STANDARD INTRAOCULAR LENS IMPLANT Left 8/20/2015    Procedure: PHACOEMULSIFICATION WITH STANDARD INTRAOCULAR LENS IMPLANT;  Surgeon: Fernando Jeffrey MD;  Location: [facility]     PHACOEMULSIFICATION WITH STANDARD INTRAOCULAR LENS IMPLANT Right 9/17/2015     Procedure: PHACOEMULSIFICATION WITH STANDARD INTRAOCULAR LENS IMPLANT;  Surgeon: Fernando Jeffrey MD;  Location: [facility]     SURGICAL HISTORY OF -   1998    Colonoscopy     SURGICAL HISTORY OF -       Bilateral salpingoopherectomy     SURGICAL HISTORY OF -   3/09    TOT Midurethral sling     Social History     Socioeconomic History     Marital status:      Spouse name: None     Number of children: None     Years of education: None     Highest education level: None   Tobacco Use     Smoking status: Former     Packs/day: 0.50     Years: 5.00     Additional pack years: 0.00     Total pack years: 2.50     Types: Cigarettes     Start date: 1968     Quit date: 2/15/1972     Years since quittin.7     Smokeless tobacco: Never     Tobacco comments:     stopped in her 20's   Vaping Use     Vaping Use: Never used   Substance and Sexual Activity     Alcohol use: Yes     Comment: Occasional     Drug use: No     Sexual activity: Not Currently   Other Topics Concern     Parent/sibling w/ CABG, MI or angioplasty before 65F 55M? Yes   Social History Narrative    10/20/23        ENVIRONMENTAL HISTORY: [place] The home is heated with a forced air. They does have central air conditioning. The patient's bedroom is furnished with carpeting in bedroom and fabric window coverings.  Pets inside the house include 0. There no history of cockroach or mice infestation. There is/are 0 smokers in the house.  The house does not have a damp basement.            Review of Systems   Constitutional:  Positive for fatigue. Negative for activity change, chills and fever.   HENT:  Positive for congestion, postnasal drip, rhinorrhea and sneezing. Negative for nosebleeds, sinus pressure and sore throat.    Eyes:  Negative for discharge, redness and itching.   Respiratory:  Positive for cough. Negative for chest tightness, shortness of breath and wheezing.    Cardiovascular:  Negative  for chest pain.   Gastrointestinal:  Negative for abdominal pain, constipation, diarrhea, nausea and vomiting.   Musculoskeletal:  Negative for joint swelling.   Skin:  Negative for rash.   Neurological:  Negative for dizziness, light-headedness and headaches.           Current Outpatient Medications:      Acetaminophen (TYLENOL PO), Take 1,000 mg by mouth every 8 hours as needed for other (pain), Disp: , Rfl:      allopurinol (ZYLOPRIM) 100 MG tablet, Take 1 tablet (100 mg) by mouth daily, Disp: 90 tablet, Rfl: 3     Bioflavonoid Products (GRAPE SEED PO), Take 1 capsule by mouth daily, Disp: , Rfl:      famotidine (PEPCID) 40 MG tablet, Take 1 tablet (40 mg) by mouth 2 times daily, Disp: 180 tablet, Rfl: 3     ferrous sulfate (FEROSUL) 325 (65 Fe) MG tablet, Take 1 tablet (325 mg) by mouth Every Mon, Wed, Fri Morning, Disp: 45 tablet, Rfl: 3     fluticasone (FLONASE) 50 MCG/ACT nasal spray, Spray 1 spray into both nostrils 2 times daily, Disp: 16 g, Rfl: 3     Garlic 1000 MG CAPS, Take 1 capsule by mouth daily, Disp: , Rfl:      ipratropium (ATROVENT) 0.06 % nasal spray, Spray 2 sprays into both nostrils 3 times daily as needed for rhinitis, Disp: 15 mL, Rfl: 4     LANsoprazole (PREVACID) 15 MG DR capsule, Take 1 capsule (15 mg) by mouth daily, Disp: 90 capsule, Rfl: 3     loratadine (CLARITIN) 10 MG tablet, Take 10 mg by mouth daily, Disp: , Rfl:      Multiple Minerals-Vitamins (CITRACAL MAXIMUM PLUS) TABS, Take 2 tablets by mouth 2 times daily, Disp: , Rfl:      Multiple Vitamins-Minerals (CENTRUM SILVER) per tablet, Take 1 tablet by mouth daily, Disp: , Rfl:      PSYLLIUM PO, Take 1 capsule by mouth every morning 3 capsules at bedtime, Disp: , Rfl:      senna-docusate (SENOKOT-S/PERICOLACE) 8.6-50 MG tablet, Take 2 tablets by mouth 2 times daily, Disp: , Rfl:      Vitamin D, Cholecalciferol, 25 MCG (1000 UT) CAPS, Take 1 capsule by mouth daily, Disp: , Rfl:      losartan (COZAAR) 50 MG tablet, TAKE 1 TABLET (50  MG) BY MOUTH DAILY (Patient not taking: Reported on 10/20/2023), Disp: 90 tablet, Rfl: 1     sucralfate (CARAFATE) 1 GM tablet, Take 1 tablet (1 g) by mouth 4 times daily as needed (reflux/throat fullness) (Patient not taking: Reported on 8/31/2023), Disp: 180 tablet, Rfl: 1  Immunization History   Administered Date(s) Administered     COVID-19 Bivalent 12+ (Pfizer) 11/22/2022     COVID-19 MONOVALENT 12+ (Pfizer) 02/04/2021, 02/25/2021, 10/11/2021     COVID-19 Monovalent 12+ (Pfizer 2022) 04/20/2022     HepB 04/07/1992, 05/11/1992, 10/14/1992     Influenza (H1N1) 12/28/2009     Influenza (High Dose) 3 valent vaccine 12/03/2013, 12/12/2014, 09/25/2015, 11/03/2016, 09/15/2017, 10/17/2018, 10/10/2019     Influenza (IIV3) PF 11/12/2002, 11/10/2006, 11/02/2007, 12/02/2008, 12/28/2009, 01/07/2011, 12/02/2011, 12/07/2012     Influenza Vaccine 65+ (Fluzone HD) 10/13/2020, 10/07/2021, 11/22/2022, 09/13/2023     Mantoux Tuberculin Skin Test 01/09/2016, 02/26/2017     Pneumo Conj 13-V (2010&after) 05/19/2015     Pneumococcal 23 valent 10/21/2005     TD,PF 7+ (Tenivac) 12/02/2008     TDAP Vaccine (Adacel) 04/08/2019     Zoster recombinant adjuvanted (SHINGRIX) 04/08/2019, 08/01/2019     Zoster vaccine, live 11/03/2016     Allergies   Allergen Reactions     Codeine GI Disturbance     OBJECTIVE:                                                                 /66 (BP Location: Left arm, Patient Position: Sitting, Cuff Size: Adult Regular)   Pulse 70   Temp 97.9  F (36.6  C) (Tympanic)   Wt 78 kg (172 lb)   SpO2 95%   BMI 28.62 kg/m          Physical Exam  Vitals and nursing note reviewed.   Constitutional:       General: She is not in acute distress.     Appearance: She is not ill-appearing, toxic-appearing or diaphoretic.   HENT:      Head: Normocephalic and atraumatic.      Right Ear: Tympanic membrane, ear canal and external ear normal.      Left Ear: Tympanic membrane, ear canal and external ear normal.      Nose:  No congestion or rhinorrhea.      Right Turbinates: Not enlarged, swollen or pale.      Left Turbinates: Not enlarged, swollen or pale.      Mouth/Throat:      Lips: Pink.      Mouth: Mucous membranes are moist.      Pharynx: Oropharynx is clear. No pharyngeal swelling, oropharyngeal exudate, posterior oropharyngeal erythema or uvula swelling.   Eyes:      General:         Right eye: No discharge.         Left eye: No discharge.      Conjunctiva/sclera: Conjunctivae normal.   Cardiovascular:      Rate and Rhythm: Normal rate and regular rhythm.      Heart sounds: Normal heart sounds.   Pulmonary:      Effort: Pulmonary effort is normal. No respiratory distress.      Breath sounds: Normal breath sounds and air entry. No stridor, decreased air movement or transmitted upper airway sounds. No decreased breath sounds, wheezing, rhonchi or rales.   Neurological:      Mental Status: She is alert and oriented to person, place, and time.   Psychiatric:         Mood and Affect: Mood normal.         Behavior: Behavior normal.               WORKUP: At today's visit, the patient and I engaged in an informed consent discussion about allergy testing.  We discussed skin testing, blood testing, and the alternative of not undergoing any testing. The patient has a preference for skin testing. We then discussed the risks and benefits of skin testing.  The patient understands skin testing risks can include, but are not limited to, urticaria, angioedema, shortness of breath, and severe anaphylaxis.  The benefits include, but are not limited, to evaluation for allergens causing symptoms.  After answering the patient's questions they have agreed to proceed with skin testing.    ENVIRONMENTAL PERCUTANEOUS SKIN TESTING: ADULT      10/20/2023    10:00 AM   [facility]   Consent Y   Ordering Physician Dr. Cardozo   Interpreting Physician Dr. Cardozo   Testing Technician Tatianna THORNTON RN   Location Back   Time start: 10:08   Time End:  10:23   Positive Control: Histatrol*ALK 1 mg/ml 0   Negative Control: 50% Glycerin 0   Cat Hair*ALK (10,000 BAU/ml) 0   AP Dog Hair/Dander (1:100 w/v) 0   Dust Mite p. 30,000 AU/ml 0   Dust Mite f. (30,000 AU/ml) 0   Kristopher (W/F in millimeters) 0   Enio Grass (100,000 BAU/mL) 0   Red Cedar (W/F in millimeters) 0   Maple/Korbel (W/F in millimeters) 0   Hackberry (W/F in millimeters) 0   Hillside (W/F in millimeters) 0   Des Lacs *ALK (W/F in millimeters) 0   American Elm (W/F in millimeters) 0   T[place] (W/F in millimeters) 0   Black Auburn (W/F in millimeters) 0   Birch Mix (W/F in millimeters) 0   Chalfont (W/F in millimeters) 0   Oak (W/F in millimeters) 0   Cocklebur (W/F in millimeters) 0   Rock Falls (W/F in millimeters) 0   White Tin (W/F in millimeters) 0   Careless (W/F in millimeters) 0   Nettle (W/F in millimeters) 0   English Plantain (W/F in millimeters) 0   Kochia (W/F in millimeters) 0   Lamb's Quarter (W/F in millimeters) 0   Marshelder (W/F in millimeters) 0   Ragweed Mix* ALK (W/F in millimeters) 0   Russian Thistle (W/F in millimeters) 0   Sagebrush/Mugwort (W/F in millimeters) 0   Sheep Sorrel (W/F in millimeters) 0   Feather Mix* ALK (W/F in millimeters) 0   Penicillium Mix (1:10 w/v) 0   Curvularia spicifera (1:10 w/v) 0   Epicoccum (1:10 w/v) 0   Aspergillus fumigatus (1:10 w/v): 0   Alternaria tenius (1:10 w/v) 0   H. Cladosporium (1:10 w/v) 0   Phoma herbarum (1:10 w/v) 0         My interpretation: Invalid testing.  The patient did not have an appropriate response to positive and negative control.    ASSESSMENT/PLAN:    Chronic vasomotor rhinitis  Chronic cough  Considering the skin test results, I ordered serum IgE for the regional aeroallergen panel.  -She will continue with intranasal fluticasone 1 spray in each nostril twice daily.  - She will add ipratropium bromide 2 sprays in each nostril up to 3-4 times a day.  Her symptoms have improved by 50% with a combination of  lansoprazole and famotidine. Depending on lab results and symptom control with intranasal fluticasone and ipratropium bromide, it is something to look into.    - ALLERGY SKIN TESTS,ALLERGENS  - ipratropium (ATROVENT) 0.06 % nasal spray  Dispense: 15 mL; Refill: 4  - IgE  - Allergen cat epithellium IgE  - Allergen dog epithelium IgE  - Allergen Kristopher grass IgE  - Allergen orchard grass IgE  - Allergen armin IgE  - Allergen D farinae IgE  - Allergen D pteronyssinus IgE  - Allergen alternaria alternata IgE  - Allergen aspergillus fumigatus IgE  - Allergen cladosporium herbarum IgE  - Allergen Epicoccum purpurascens IgE  - Allergen penicillium notatum IgE  - Allergen abraham white IgE  - Allergen Cedar IgE  - Allergen cottonwood IgE  - Allergen elm IgE  - Allergen maple box elder IgE  - Allergen oak white IgE  - Allergen Red [place] IgE  - Allergen silver  birch IgE  - Allergen Tree White [place] IgE  - Allergen [place] Tree  - Allergen white pine IgE  - Allergen English plantain IgE  - Allergen giant ragweed IgE  - Allergen lamb's quarter IgE  - Allergen Mugwort IgE  - Allergen ragweed short IgE  - Allergen Sagebrush Wormwood IgE  - Allergen Sheep Sorrel IgE  - Allergen thistle Russian IgE  - Allergen Weed Nettle IgE  - Allergen, Kochia/Firebush    Follow up in 8-10 weeks or sooner if needed.       Thank you for allowing us to participate in the care of this patient. Please feel free to contact us if there are any questions or concerns about the patient.    Disclaimer: This note consists of symbols derived from keyboarding, dictation and/or voice recognition software. As a result, there may be errors in the script that have gone undetected. Please consider this when interpreting information found in this chart.    Barron Cardozo MD, FAAAAI, FACAAI  Allergy, Asthma and Immunology     [facility]        Again, thank you for allowing me to participate in the  care of your patient.        Sincerely,        Barron Cardozo MD

## 2023-10-20 NOTE — TELEPHONE ENCOUNTER
"Screening Questions for Radiology Injections:    Injection to be done at which interventional clinic site? [facility] [place]    If choosing [facility] for location, please inform patient:  \"[facility] is a Hospital based clinic. Before your visit, you should check with your insurance about how it covers the charges for facility services in a hospital-based clinic.     Procedure ordered by Thee    Procedure ordered? Caudal Injection   Transforaminal Cervical TARYN - [place] [facility] - No [facility] providers perform this procedure    What insurance would patient like us to bill for this procedure? [employer]  IF SCHEDULING IN [place] PAIN OR SPINE PLEASE SCHEDULE AT LEAST 7-10 BUSINESS DAYS OUT SO A PA CAN BE OBTAINED  Worker's comp or MVA (motor vehicle accident) -Any injection DO NOT SCHEDULE and route to Koki Sullivan.    [employer] insurance - For SI joint injections, DO NOT SCHEDULE and route to Lisa Garcia.     ALL BCBS, [employer] and [employer] CIG[employer] - DO NOT SCHEDULE and route to Lisa Garcia  MEDICA- facet joint injections, route to Lisa Garcia    Is patient scheduled at [facility]? no   If YES, route every encounter to [facility] CARE NAVIGATION POOL [[phone]]    Is an  needed? No     Patient has a  home? (Review Grid) YES: ok    Any chance of pregnancy? NO   If YES, do NOT schedule and route to RN garrett  - Dr. Alcantara route to Tatum Abdul and PM&R Nurse  [[id number]]      Is patient actively being treated for cancer or immunocompromised? No  If YES, do NOT schedule and route to RN pool/ Dr. Alcantara's Team    Does the patient have a bleeding or clotting disorder? No   If YES, okay to schedule AND route to STEW galloway / Dr. Alcantara's Team   (For any patients with platelet count <100, RN must forward to provider)    Is patient taking any Blood Thinners OR Antiplatelet medication?  No   If hold needed, do NOT schedule, route to RN pool/  " Maricruz's Team  Examples:   Blood Thinners: (Coumadin, Warfarin, Jantoven, Pradaxa, Xarelto, Eliquis, Edoxaban, Enoxaparin, Lovenox, Heparin, Arixtra, Fondaparinux or Fragmin)  Antiplatelet Medications: (Plavix, Brilinta or Effient)     Is patient taking any aspirin products (includes Excedrin and Fiorinal)? No   If more than 325mg/day, OK to schedule; Instruct Pt to decrease to less than 325 mg for 7 days AND route to RN pool/ Dr. Alcantara's Team   For CERVICAL procedures, hold all aspirin products for 6 days.   Tell Pt that if aspirin product is not held for 6 days, the procedure WILL BE cancelled.     Any allergies to contrast dye, iodine, shellfish, or numbing and steroid medications? No  If YES, schedule and add allergy information to appointment notes AND route to the RN pool/ Dr. Alcantara's Team  If TARYN and Contrast Dye / Iodine Allergy? DO NOT SCHEDULE, route to RN pool/ Dr. Alcantara's Team  Allergies: Codeine     Does patient have an active infection or treated for one within the past week? No  Is patient currently taking any antibiotics or steroid medications?  No   For patients on chronic, preventative, or prophylactic antibiotics, procedures may be scheduled.   For patients on antibiotics for active or recent infection, schedule 4 days after completed.  For patients on steroid medications, schedule 4 days after completed.     Has the patient had a flu shot or any other vaccinations within the past 7 days? No  If yes, explain that for the vaccine to work best they need to:     wait 1 week before and 1 week after getting any Vaccine  wait 1 week before and 2 weeks after getting Covid Vaccine #2 or BOOSTER  If patient has concerns about the timing, send to RN pool/ Dr. Alcantara's Team    Does patient have an MRI/CT?  YES: MRI Include Date and Check Procedure Scheduling Grid to see if required.  Was the MRI/CT done within the last 3 years?  Yes   If no route to RN Pool/ Dr. Alcantara's Team  If yes, where was the MRI/CT  done? [facility]   Refer to PACS Transmissions list for approved external locations and route to RN Pool High Priority/ Dr. Frasers Team  If MRI was not done at approved external location do NOT schedule and route to  pool/ Dr. Alcantara's Team    If patient has an imaging disc, the injection MAY be scheduled but patient must bring disc to appt or appt will be cancelled.    Is patient able to transfer to a procedure table with minimal or no assistance? Yes   If no, do NOT schedule and route to  Pool/ Dr. Alcantara's Team    Procedure Specific Instructions:  If celiac plexus block, informed patient NPO for 6 hours and that it is okay to take medications with sips of water, especially blood pressure medications Not Applicable       If this is for a cervical procedure, informed patient that aspirin needs to be held for 6 days.   Not Applicable    Sedation, If Sedation is ordered for any procedure, patient must be NPO for 6 hours prior to procedure Not Applicable    If IV needed:  Do not schedule procedures requiring IV placement in the first appointment of the day or first appointment after lunch. Do NOT schedule at 0745, 0815 or 1245. ok  Instructed patient to arrive 30 minutes early for IV start if required. (Check Procedure Scheduling Grid)  Not Applicable    Reminders:  If you are started on any steroids or antibiotics between now and your appointment, you must contact us because the procedure may need to be cancelled.  Yes    As a reminder, receiving steroids can decrease your body's ability to fight infection.   Would you still like to move forward with scheduling the injection?  Yes    IV Sedation is not provided for procedures. If oral anti-anxiety medication is needed, the patient should request this from their referring provider.    Instruct patient to arrive as directed prior to the scheduled appointment time:  If IV needed 30 minutes before appointment time     For patients 85 or older we recommend having  an adult stay w/ them for the remainder of the day.     If the patient is Diabetic, remind them to bring their glucometer.      Does the patient have any questions?  NO  Bonnie Sullivan  [facility]

## 2023-10-20 NOTE — PROGRESS NOTES
SUBJECTIVE:                                                                   Lavonne Tidwell is an 83-year-old female who presents today to our Allergy Clinic at [facility]; She is being seen in consultation at the request of Dr. Rob Harris, for an evaluation of chronic cough.    The patient presents with a chief complaint of persistent throat/cough issues since last December 2022, initially starting with influenza/ pneumonia. Since the patient continued having symptoms both sinus and chest CT were done. Sinus CT didn't demonstrate any acute or chronic sinus disease. Chest CT performed 6/9/2023 did show some concerning axillary/mediastinal lymphadenopathy. 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   The patient reports persistent dry coughing, clear rhinorrhea, and post-nasal drainage, and sore throat. Switching her ACE inhibitor to an ARB did not seem to help her symptoms. A combination of famotidine and lansoprazole provided an approximately 50% improvement in symptoms.   While the cough is dry, she also reports some thick white phlegm. Cough is not associated with wheezing or shortness of breath. She currently uses intranasal fluticasone 1 spray in each nostril twice daily. Unclear if its helpful. She doesn't take oral antihistamines.       Patient Active Problem List   Diagnosis    Injury of sigmoid colon    Esophageal reflux    Menopausal syndrome (hot flashes)    Urinary incontinence    Atrophic vaginitis    Hyperlipidemia LDL goal <130    Advanced directives, counseling/discussion    Onychomycosis    Senile nuclear sclerosis    Status post total left knee replacement    Status post total right knee replacement    Primary localized osteoarthrosis, lower leg    Tubulovillous adenoma polyp of colon    Essential hypertension    Primary osteoarthritis involving multiple joints    Chronic bilateral low back pain without sciatica    Chronic cough        Past Medical History:   Diagnosis Date    Arthritis 2012    knes and hips    Cancer (H)     Essential hypertension 05/05/2021    History of blood transfusion 1961    Miscarriage    Ovarian cysts 1974    s/p BSO      Problem (# of Occurrences) Relation (Name,Age of Onset)    Cancer (2) Mother (Ana): Ovarian, Maternal Grandmother (Mikala)    Diabetes (1) Other (Agnes Valdez): Great Grandmother    Heart Disease (1) Father ([place])    Cerebrovascular Disease (2) Father ([place]), Maternal Grandfather (Ede Chambers)    C.A.D. (1) Father ([place])    Other Cancer (2) Mother (Ana): Ovarian, Maternal Grandmother (Mikala): Ovarian/Bone    Coronary Artery Disease (1) Father ([place])    Colon Cancer (1) Maternal Grandfather (Ede Chambers)           Negative family history of: Breast Cancer          Past Surgical History:   Procedure Laterality Date    ABDOMEN SURGERY  1973 & 1974    Ovarian cyst/Hysterectomy    APPENDECTOMY  1952    ARTHROPLASTY KNEE Left 1/6/2016    Procedure: ARTHROPLASTY KNEE;  Surgeon: Wilfredo Thompson MD;  Location: [facility]    ARTHROPLASTY KNEE Right 2/23/2017    Procedure: ARTHROPLASTY KNEE;  Surgeon: Wilfredo Thompson MD;  Location: [facility]    BIOPSY      colonoscopy/polyps    COLONOSCOPY      every 10 years    GENITOURINARY SURGERY  2008    bladder    HYSTERECTOMY, CHELSY  1974    ORTHOPEDIC SURGERY  2007 & 2009    [place]  both feet    PHACOEMULSIFICATION WITH STANDARD INTRAOCULAR LENS IMPLANT Left 8/20/2015    Procedure: PHACOEMULSIFICATION WITH STANDARD INTRAOCULAR LENS IMPLANT;  Surgeon: Fernando Jeffrey MD;  Location: [facility]    PHACOEMULSIFICATION WITH STANDARD INTRAOCULAR LENS IMPLANT Right 9/17/2015    Procedure: PHACOEMULSIFICATION WITH STANDARD INTRAOCULAR LENS IMPLANT;  Surgeon: Fernando Jeffrey MD;  Location: [facility]    SURGICAL HISTORY OF -   07/30/1998    Colonoscopy    SURGICAL HISTORY OF -   1974    Bilateral salpingoopherectomy    SURGICAL HISTORY OF -   3/09    TOT Midurethral  sling     Social History     Socioeconomic History    Marital status:      Spouse name: None    Number of children: None    Years of education: None    Highest education level: None   Tobacco Use    Smoking status: Former     Packs/day: 0.50     Years: 5.00     Additional pack years: 0.00     Total pack years: 2.50     Types: Cigarettes     Start date: 1968     Quit date: 2/15/1972     Years since quittin.7    Smokeless tobacco: Never    Tobacco comments:     stopped in her 20's   Vaping Use    Vaping Use: Never used   Substance and Sexual Activity    Alcohol use: Yes     Comment: Occasional    Drug use: No    Sexual activity: Not Currently   Other Topics Concern    Parent/sibling w/ CABG, MI or angioplasty before 65F 55M? Yes   Social History Narrative    10/20/23        ENVIRONMENTAL HISTORY: [place] The home is heated with a forced air. They does have central air conditioning. The patient's bedroom is furnished with carpeting in bedroom and fabric window coverings.  Pets inside the house include 0. There no history of cockroach or mice infestation. There is/are 0 smokers in the house.  The house does not have a damp basement.            Review of Systems   Constitutional:  Positive for fatigue. Negative for activity change, chills and fever.   HENT:  Positive for congestion, postnasal drip, rhinorrhea and sneezing. Negative for nosebleeds, sinus pressure and sore throat.    Eyes:  Negative for discharge, redness and itching.   Respiratory:  Positive for cough. Negative for chest tightness, shortness of breath and wheezing.    Cardiovascular:  Negative for chest pain.   Gastrointestinal:  Negative for abdominal pain, constipation, diarrhea, nausea and vomiting.   Musculoskeletal:  Negative for joint swelling.   Skin:  Negative for rash.   Neurological:  Negative for dizziness, light-headedness and headaches.           Current Outpatient Medications:      Acetaminophen (TYLENOL PO), Take 1,000 mg by mouth every 8 hours as needed for other (pain), Disp: , Rfl:     allopurinol (ZYLOPRIM) 100 MG tablet, Take 1 tablet (100 mg) by mouth daily, Disp: 90 tablet, Rfl: 3    Bioflavonoid Products (GRAPE SEED PO), Take 1 capsule by mouth daily, Disp: , Rfl:     famotidine (PEPCID) 40 MG tablet, Take 1 tablet (40 mg) by mouth 2 times daily, Disp: 180 tablet, Rfl: 3    ferrous sulfate (FEROSUL) 325 (65 Fe) MG tablet, Take 1 tablet (325 mg) by mouth Every Mon, Wed, Fri Morning, Disp: 45 tablet, Rfl: 3    fluticasone (FLONASE) 50 MCG/ACT nasal spray, Spray 1 spray into both nostrils 2 times daily, Disp: 16 g, Rfl: 3    Garlic 1000 MG CAPS, Take 1 capsule by mouth daily, Disp: , Rfl:     ipratropium (ATROVENT) 0.06 % nasal spray, Spray 2 sprays into both nostrils 3 times daily as needed for rhinitis, Disp: 15 mL, Rfl: 4    LANsoprazole (PREVACID) 15 MG DR capsule, Take 1 capsule (15 mg) by mouth daily, Disp: 90 capsule, Rfl: 3    loratadine (CLARITIN) 10 MG tablet, Take 10 mg by mouth daily, Disp: , Rfl:     Multiple Minerals-Vitamins (CITRACAL MAXIMUM PLUS) TABS, Take 2 tablets by mouth 2 times daily, Disp: , Rfl:     Multiple Vitamins-Minerals (CENTRUM SILVER) per tablet, Take 1 tablet by mouth daily, Disp: , Rfl:     PSYLLIUM PO, Take 1 capsule by mouth every morning 3 capsules at bedtime, Disp: , Rfl:     senna-docusate (SENOKOT-S/PERICOLACE) 8.6-50 MG tablet, Take 2 tablets by mouth 2 times daily, Disp: , Rfl:     Vitamin D, Cholecalciferol, 25 MCG (1000 UT) CAPS, Take 1 capsule by mouth daily, Disp: , Rfl:     losartan (COZAAR) 50 MG tablet, TAKE 1 TABLET (50 MG) BY MOUTH DAILY (Patient not taking: Reported on 10/20/2023), Disp: 90 tablet, Rfl: 1    sucralfate (CARAFATE) 1 GM tablet, Take 1 tablet (1 g) by mouth 4 times daily as needed (reflux/throat fullness) (Patient not taking: Reported on 8/31/2023), Disp: 180 tablet, Rfl: 1  Immunization History   Administered Date(s)  Administered    COVID-19 Bivalent 12+ (Pfizer) 11/22/2022    COVID-19 MONOVALENT 12+ (Pfizer) 02/04/2021, 02/25/2021, 10/11/2021    COVID-19 Monovalent 12+ (Pfizer 2022) 04/20/2022    HepB 04/07/1992, 05/11/1992, 10/14/1992    Influenza (H1N1) 12/28/2009    Influenza (High Dose) 3 valent vaccine 12/03/2013, 12/12/2014, 09/25/2015, 11/03/2016, 09/15/2017, 10/17/2018, 10/10/2019    Influenza (IIV3) PF 11/12/2002, 11/10/2006, 11/02/2007, 12/02/2008, 12/28/2009, 01/07/2011, 12/02/2011, 12/07/2012    Influenza Vaccine 65+ (Fluzone HD) 10/13/2020, 10/07/2021, 11/22/2022, 09/13/2023    Mantoux Tuberculin Skin Test 01/09/2016, 02/26/2017    Pneumo Conj 13-V (2010&after) 05/19/2015    Pneumococcal 23 valent 10/21/2005    TD,PF 7+ (Tenivac) 12/02/2008    TDAP Vaccine (Adacel) 04/08/2019    Zoster recombinant adjuvanted (SHINGRIX) 04/08/2019, 08/01/2019    Zoster vaccine, live 11/03/2016     Allergies   Allergen Reactions    Codeine GI Disturbance     OBJECTIVE:                                                                 /66 (BP Location: Left arm, Patient Position: Sitting, Cuff Size: Adult Regular)   Pulse 70   Temp 97.9  F (36.6  C) (Tympanic)   Wt 78 kg (172 lb)   SpO2 95%   BMI 28.62 kg/m          Physical Exam  Vitals and nursing note reviewed.   Constitutional:       General: She is not in acute distress.     Appearance: She is not ill-appearing, toxic-appearing or diaphoretic.   HENT:      Head: Normocephalic and atraumatic.      Right Ear: Tympanic membrane, ear canal and external ear normal.      Left Ear: Tympanic membrane, ear canal and external ear normal.      Nose: No congestion or rhinorrhea.      Right Turbinates: Not enlarged, swollen or pale.      Left Turbinates: Not enlarged, swollen or pale.      Mouth/Throat:      Lips: Pink.      Mouth: Mucous membranes are moist.      Pharynx: Oropharynx is clear. No pharyngeal swelling, oropharyngeal exudate, posterior oropharyngeal erythema or uvula  swelling.   Eyes:      General:         Right eye: No discharge.         Left eye: No discharge.      Conjunctiva/sclera: Conjunctivae normal.   Cardiovascular:      Rate and Rhythm: Normal rate and regular rhythm.      Heart sounds: Normal heart sounds.   Pulmonary:      Effort: Pulmonary effort is normal. No respiratory distress.      Breath sounds: Normal breath sounds and air entry. No stridor, decreased air movement or transmitted upper airway sounds. No decreased breath sounds, wheezing, rhonchi or rales.   Neurological:      Mental Status: She is alert and oriented to person, place, and time.   Psychiatric:         Mood and Affect: Mood normal.         Behavior: Behavior normal.               WORKUP: At today's visit, the patient and I engaged in an informed consent discussion about allergy testing.  We discussed skin testing, blood testing, and the alternative of not undergoing any testing. The patient has a preference for skin testing. We then discussed the risks and benefits of skin testing.  The patient understands skin testing risks can include, but are not limited to, urticaria, angioedema, shortness of breath, and severe anaphylaxis.  The benefits include, but are not limited, to evaluation for allergens causing symptoms.  After answering the patient's questions they have agreed to proceed with skin testing.    ENVIRONMENTAL PERCUTANEOUS SKIN TESTING: ADULT      10/20/2023    10:00 AM   [facility]   Consent Y   Ordering Physician Dr. Cardozo   Interpreting Physician Dr. Cardozo   Testing Technician Tatianna THORNTON RN   Location Back   Time start: 10:08   Time End: 10:23   Positive Control: Histatrol*ALK 1 mg/ml 0   Negative Control: 50% Glycerin 0   Cat Hair*ALK (10,000 BAU/ml) 0   AP Dog Hair/Dander (1:100 w/v) 0   Dust Mite p. 30,000 AU/ml 0   Dust Mite f. (30,000 AU/ml) 0   Kristopher (W/F in millimeters) 0   Enio Grass (100,000 BAU/mL) 0   Red Cedar (W/F in millimeters) 0   Maple/Scotland (W/F  in millimeters) 0   Hackberry (W/F in millimeters) 0   [place] (W/F in millimeters) 0   [place] *ALK (W/F in millimeters) 0   American Elm (W/F in millimeters) 0   [place] (W/F in millimeters) 0   Black [place] (W/F in millimeters) 0   Birch Mix (W/F in millimeters) 0   [place] (W/F in millimeters) 0   Oak (W/F in millimeters) 0   Cocklebur (W/F in millimeters) 0   [place] (W/F in millimeters) 0   White Tin (W/F in millimeters) 0   Careless (W/F in millimeters) 0   Nettle (W/F in millimeters) 0   English Plantain (W/F in millimeters) 0   Kochia (W/F in millimeters) 0   Lamb's Quarter (W/F in millimeters) 0   Marshelder (W/F in millimeters) 0   Ragweed Mix* ALK (W/F in millimeters) 0   Russian Thistle (W/F in millimeters) 0   Sagebrush/Mugwort (W/F in millimeters) 0   Sheep Sorrel (W/F in millimeters) 0   Feather Mix* ALK (W/F in millimeters) 0   Penicillium Mix (1:10 w/v) 0   Curvularia spicifera (1:10 w/v) 0   Epicoccum (1:10 w/v) 0   Aspergillus fumigatus (1:10 w/v): 0   Alternaria tenius (1:10 w/v) 0   H. Cladosporium (1:10 w/v) 0   Phoma herbarum (1:10 w/v) 0         My interpretation: Invalid testing.  The patient did not have an appropriate response to positive and negative control.    ASSESSMENT/PLAN:    Chronic vasomotor rhinitis  Chronic cough  Considering the skin test results, I ordered serum IgE for the regional aeroallergen panel.  -She will continue with intranasal fluticasone 1 spray in each nostril twice daily.  - She will add ipratropium bromide 2 sprays in each nostril up to 3-4 times a day.  Her symptoms have improved by 50% with a combination of lansoprazole and famotidine. Depending on lab results and symptom control with intranasal fluticasone and ipratropium bromide, it is something to look into.    - ALLERGY SKIN TESTS,ALLERGENS  - ipratropium (ATROVENT) 0.06 % nasal spray  Dispense: 15 mL; Refill: 4  - IgE  - Allergen cat epithellium IgE  - Allergen dog epithelium IgE  - Allergen  Kristopher grass IgE  - Allergen orchard grass IgE  - Allergen armin IgE  - Allergen D farinae IgE  - Allergen D pteronyssinus IgE  - Allergen alternaria alternata IgE  - Allergen aspergillus fumigatus IgE  - Allergen cladosporium herbarum IgE  - Allergen Epicoccum purpurascens IgE  - Allergen penicillium notatum IgE  - Allergen abraham white IgE  - Allergen Cedar IgE  - Allergen cottonwood IgE  - Allergen elm IgE  - Allergen maple box elder IgE  - Allergen oak white IgE  - Allergen Red Creighton IgE  - Allergen silver  birch IgE  - Allergen Tree White Creighton IgE  - Allergen Northridge Tree  - Allergen white pine IgE  - Allergen English plantain IgE  - Allergen giant ragweed IgE  - Allergen lamb's quarter IgE  - Allergen Mugwort IgE  - Allergen ragweed short IgE  - Allergen Sagebrush [place]ormwood IgE  - Allergen Sheep Sorrel IgE  - Allergen thistle Russian IgE  - Allergen Weed Nettle IgE  - Allergen, Kochia/Firebush    Follow up in 8-10 weeks or sooner if needed.       Thank you for allowing us to participate in the care of this patient. Please feel free to contact us if there are any questions or concerns about the patient.    Disclaimer: This note consists of symbols derived from keyboarding, dictation and/or voice recognition software. As a result, there may be errors in the script that have gone undetected. Please consider this when interpreting information found in this chart.    Barron Cardozo MD, FAAAAI, FACAAI  Allergy, Asthma and Immunology     [facility]

## 2023-10-20 NOTE — PATIENT INSTRUCTIONS
Get the bloodwork done.  Continue current regimen, including Flonase 1 spray in each nostril twice daily.  Add Atrovent nasal spray 2 sprays in each nostril up to 3 times a day as needed.      Follow up in 8-10 weeks or sooner if needed.       Prescription Assistance  If you need assistance with your prescriptions (cost, coverage, etc) please contact: [facility] [phone]           If labs have been ordered/completed, please allow 7-14 business days for final interpretation of results to be sent on My Chart, phone or mail. Some lab results can take up to 28 days for results.         Allergy Staff Appt Hours Shot Hours Locations    Physician      Barron Cardozo MD         Support Staff      Bonita Cherry MA     Tuesday:   [place] :  [place]: :         :  [facility]-3     [place]        Tuesday: :: : 7:10        Tuesday: 7:10        Thursday: 7-3:10     [facility]       Tues & Wed: :10       Thurs: 12-4:10       Fri:             [facility]  [address] [place] [address]  Appt Line: [phone]        [facility]  [address] [place] [address]  Appt Line: [phone]     Pulmonary Function Scheduling:  [place]: [phone]  [place]: [phone]  [phone]

## 2023-10-20 NOTE — NURSING NOTE
Per provider verbal order, RN placed positive control, negative control, Adult Environmental Panel scratch test.  Consent was obtained prior to procedure.  Once scratch test(s) were placed, patient was monitored for 15 minutes in clinic.  RN read test after 15 minutes and provider was notified of results.  Pt tolerated procedure well.  All questions and concerns were addressed at office visit.     Tatianna THORNTON  Allergy [facility]

## 2023-10-23 ENCOUNTER — ONCOLOGY VISIT (OUTPATIENT)
Dept: ONCOLOGY | Facility: CLINIC | Age: 83
End: 2023-10-23
Attending: NURSE PRACTITIONER
Payer: COMMERCIAL

## 2023-10-23 VITALS
TEMPERATURE: 97.7 F | HEIGHT: 65 IN | SYSTOLIC BLOOD PRESSURE: 135 MMHG | OXYGEN SATURATION: 98 % | DIASTOLIC BLOOD PRESSURE: 62 MMHG | BODY MASS INDEX: 28.82 KG/M2 | RESPIRATION RATE: 12 BRPM | WEIGHT: 173 LBS | HEART RATE: 77 BPM

## 2023-10-23 DIAGNOSIS — C85.80 MARGINAL ZONE B-CELL LYMPHOMA (H): Primary | ICD-10-CM

## 2023-10-23 LAB
A ALTERNATA IGE QN: <0.1 KU(A)/L
A FUMIGATUS IGE QN: <0.1 KU(A)/L
C HERBARUM IGE QN: <0.1 KU(A)/L
CALIF WALNUT POLN IGE QN: <0.1 KU(A)/L
CAT DANDER IGG QN: <0.1 KU(A)/L
CEDAR IGE QN: <0.1 KU(A)/L
COCKSFOOT IGE QN: <0.1 KU(A)/L
COMMON RAGWEED IGE QN: <0.1 KU(A)/L
COTTONWOOD IGE QN: <0.1 KU(A)/L
D FARINAE IGE QN: <0.1 KU(A)/L
D PTERONYSS IGE QN: <0.1 KU(A)/L
DOG DANDER+EPITH IGE QN: <0.1 KU(A)/L
E PURPURASCENS IGE QN: <0.1 KU(A)/L
EAST WHITE PINE IGE QN: <0.1 KU(A)/L
ENGL PLANTAIN IGE QN: <0.1 KU(A)/L
FIREBUSH IGE QN: <0.1 KU(A)/L
GIANT RAGWEED IGE QN: <0.1 KU(A)/L
GOOSEFOOT IGE QN: <0.1 KU(A)/L
IGE SERPL-ACNC: 68 KU/L (ref 0–114)
JOHNSON GRASS IGE QN: <0.1 KU(A)/L
MAPLE IGE QN: <0.1 KU(A)/L
MUGWORT IGE QN: <0.1 KU(A)/L
NETTLE IGE QN: <0.1 KU(A)/L
P NOTATUM IGE QN: <0.1 KU(A)/L
RED MULBERRY IGE QN: <0.1 KU(A)/L
SALTWORT IGE QN: <0.1 KU(A)/L
SHEEP SORREL IGE QN: <0.1 KU(A)/L
SILVER BIRCH IGE QN: <0.1 KU(A)/L
TIMOTHY IGE QN: <0.1 KU(A)/L
WHITE ASH IGE QN: <0.1 KU(A)/L
WHITE ELM IGE QN: <0.1 KU(A)/L
WHITE MULBERRY IGE QN: <0.1 KU(A)/L
WHITE OAK IGE QN: <0.1 KU(A)/L
WORMWOOD IGE QN: <0.1 KU(A)/L

## 2023-10-23 PROCEDURE — 99214 OFFICE O/P EST MOD 30 MIN: CPT | Performed by: NURSE PRACTITIONER

## 2023-10-23 PROCEDURE — G0463 HOSPITAL OUTPT CLINIC VISIT: HCPCS | Performed by: NURSE PRACTITIONER

## 2023-10-23 ASSESSMENT — PAIN SCALES - GENERAL: PAINLEVEL: EXTREME PAIN (8)

## 2023-10-23 NOTE — PROGRESS NOTES
[facility] Hematology [facility] Oncology Outpatient Progress Note    Patient: Lavonne Tidwell  MRN: [id number]  Date of Service: Oct 23, 2023          Reason for Visit    Low grade B cell lymphoma    Primary Hematologist: formerly, Dr. Peña      Assessment/Plan  Low grade B cell lymphoma  Diagnosed 6/2023. Appears low grade and without B symptoms nor cytopenias, no indication to treat so under observation.     She remains stable, no new symptoms. Hot flashes/night sweats are rare and have been long-standing x years.     Labwork: Stable hgb 11.4, , normal WBC/platelets. CMP and LDH WNL.    Plan:  -Return in 3-4 months with labs, exam  -Scan as needed for new symptoms  -Will likely follow 3 times/year initially and if stable, elongate to every 6 months    MGUS, lambda LC  Follow MGUS labs every 6-12 months.     Macrocytic anemia  Secondary to lymphoma bone marrow involvement.   B12 and folate WNL.     Hgb is 11.4 (stable over last few years).  Follow.    Chronic rhinitis/cough  Seems to be improving with nasal spray and PPI for reflux. Still has a sore throat/mild voice hoarseness that may take time to subsequently resolve, but following ENT on this.    ______________________________________________________________________________    History of Present Illness/ Interval History    Ms. Lavonne Tidwell  is a 83 year old diagnosed with low grade lymphoma 3 months ago, on observation.     She has had a rhinorrhea/chronic cough since last December following influenza/pneumonia. CT in June showed DAMIAN inflammatory-appearing nodules and adenopathy. LN biopsy consistent with lymphoma. She's been evaluated in ENT and Allergy for the chronic rhinorrhea/sore throat/hoarse voice/cough. She had partial improvement with PPI and nasal spray. Cough nearly resolved, still has sore throat/hoarse voice and intermittent reflux.    Undergoing allergy work-up.     Occasional night sweats one night/month chronic since off estrogen  replacement x 10 yrs (no recent change), weight loss, masses, abd distension/pain, fevers.    Chronic bilateral hip/pelvis pain with ambulation.       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 Saw ENT for chronic sinusitis. CT chest showed lymphadenopathy (mildly prominent axillary, mediastinal and upper abd). +night sweats,no other B symptoms.  -No cytopenias.   -PET: avid LN above and below diaphragm  -Serum immunofixation/SPEP: 0.1 m spike. Possible very small monoclonal free  immunoglobulin light chain of  lambda chain type   -6/30/23 L axillary LN biopsy: low-grade CD5 neg, CD10 neg B-cell non-Hodkin's lymphoma. Neg MYD88 mutation (differentials: lymphoplasmacytic lymphoma or marginal zone)  -Bone marrow biopsy: hypercellular marrow involved by marginal zone lymphoma (at least 80%), lambda LC restricted monoclonal B-cell population.     Treatment:  Observation      Physical Exam    GENERAL: Alert and oriented to time place and person. Hard of hearing. In no distress. Alone.   HEAD: Atraumatic and normocephalic. No alopecia.  EYES: DK, EOMI. No erythema. No icterus.  LYMPH NODES: No palpable supraclavicular, cervical, axillary or inguinal lymphadenopathy.  CHEST: clear to auscultation bilaterally. Resonant to percussion throughout bilaterally. Symmetrical breath movements bilaterally.  CVS: RRR  ABDOMEN: Soft. Not tender. Not distended. No palpable hepatomegaly or splenomegaly. No other mass palpable. Bowel sounds present.  EXTREMITIES: Warm. No peripheral edema.  SKIN: no rash, or bruising or purpura.   NEURO: No gross deficit noted. Non-antalgic gait.      Lab Results    Recent Results (from the past 168 hour(s))   Lactate Dehydrogenase   Result Value Ref Range    Lactate Dehydrogenase 200 0 - 250 U/L   Comprehensive metabolic  panel   Result Value Ref Range    Sodium 138 135 - 145 mmol/L    Potassium 4.9 3.4 - 5.3 mmol/L    Carbon Dioxide (CO2) 28 22 - 29 mmol/L    Anion Gap 9 7 - 15 mmol/L    Urea Nitrogen 19.3 8.0 - 23.0 mg/dL    Creatinine 0.88 0.51 - 0.95 mg/dL    GFR Estimate 64 >60 mL/min/1.73m2    Calcium 9.6 8.8 - 10.2 mg/dL    Chloride 101 98 - 107 mmol/L    Glucose 97 70 - 99 mg/dL    Alkaline Phosphatase 141 (H) 35 - 104 U/L    AST 28 0 - 45 U/L    ALT 15 0 - 50 U/L    Protein Total 7.9 6.4 - 8.3 g/dL    Albumin 4.2 3.5 - 5.2 g/dL    Bilirubin Total 0.8 <=1.2 mg/dL   CBC with platelets and differential   Result Value Ref Range    WBC Count 8.1 4.0 - 11.0 10e3/uL    RBC Count 3.51 (L) 3.80 - 5.20 10e6/uL    Hemoglobin 11.4 (L) 11.7 - 15.7 g/dL    Hematocrit 36.3 35.0 - 47.0 %     (H) 78 - 100 fL    MCH 32.5 26.5 - 33.0 pg    MCHC 31.4 (L) 31.5 - 36.5 g/dL    RDW 14.0 10.0 - 15.0 %    Platelet Count 278 150 - 450 10e3/uL    % Neutrophils 34 %    % Lymphocytes 60 %    % Monocytes 3 %    Mids % (Monos, Eos, Basos)      % Eosinophils 2 %    % Basophils 1 %    % Immature Granulocytes 0 %    NRBCs per 100 WBC 0 <1 /100    Absolute Neutrophils 2.8 1.6 - 8.3 10e3/uL    Absolute Lymphocytes 5.0 0.8 - 5.3 10e3/uL    Absolute Monocytes 0.2 0.0 - 1.3 10e3/uL    Mids Abs (Monos, Eos, Basos)      Absolute Eosinophils 0.1 0.0 - 0.7 10e3/uL    Absolute Basophils 0.1 0.0 - 0.2 10e3/uL    Absolute Immature Granulocytes 0.0 <=0.4 10e3/uL    Absolute NRBCs 0.0 10e3/uL   RBC and Platelet Morphology   Result Value Ref Range    Platelet Assessment  Automated Count Confirmed. Platelet morphology is normal.     Automated Count Confirmed. Platelet morphology is normal.    Acanthocytes      Jorge Rods      Basophilic Stippling      Bite Cells      Blister Cells      Benzonia Cells      Elliptocytes      Hgb C Crystals      Alberto-Jolly Bodies      Hypersegmented Neutrophils      Polychromasia      RBC agglutination      RBC Fragments      Reactive  Lymphocytes      Rouleaux      Sickle Cells      Smudge Cells      Spherocytes      Stomatocytes      Target Cells      Teardrop Cells      Toxic Neutrophils      RBC Morphology Confirmed RBC Indices        Imaging    No results found.    Billing  Total time 30 minutes, to include face to face visit, review of EMR, ordering, documentation and coordination of care on date of service    Signed by: Estela Barrett, NP

## 2023-10-23 NOTE — LETTER
10/23/2023         RE: Lavonne Tidwell  [address]  [facility] [id number]        Dear Colleague,    Thank you for referring your patient, Lavonne Tidwell, to the [facility]. Please see a copy of my visit note below.    [facility] Hematology and Oncology Outpatient Progress Note    Patient: Lavonne Tidwell  MRN: [id number]  Date of Service: Oct 23, 2023          Reason for Visit    Low grade B cell lymphoma    Primary Hematologist: formerly, Dr. Peña      Assessment/Plan  Low grade B cell lymphoma  Diagnosed 6/2023. Appears low grade and without B symptoms nor cytopenias, no indication to treat so under observation.     She remains stable, no new symptoms. Hot flashes/night sweats are rare and have been long-standing x years.     Labwork: Stable hgb 11.4, , normal WBC/platelets. CMP and LDH WNL.    Plan:  -Return in 3-4 months with labs, exam  -Scan as needed for new symptoms  -Will likely follow 3 times/year initially and if stable, elongate to every 6 months    MGUS, lambda LC  Follow MGUS labs every 6-12 months.     Macrocytic anemia  Secondary to lymphoma bone marrow involvement.   B12 and folate WNL.     Hgb is 11.4 (stable over last few years).  Follow.    Chronic rhinitis/cough  Seems to be improving with nasal spray and PPI for reflux. Still has a sore throat/mild voice hoarseness that may take time to subsequently resolve, but following ENT on this.    ______________________________________________________________________________    History of Present Illness/ Interval History    Ms. Lavonne Tidwell  is a 83 year old diagnosed with low grade lymphoma 3 months ago, on observation.     She has had a rhinorrhea/chronic cough since last December following influenza/pneumonia. CT in June showed DAMIAN inflammatory-appearing nodules and adenopathy. LN biopsy consistent with lymphoma. She's been evaluated in ENT and Allergy for the chronic rhinorrhea/sore  throat/hoarse voice/cough. She had partial improvement with PPI and nasal spray. Cough nearly resolved, still has sore throat/hoarse voice and intermittent reflux.    Undergoing allergy work-up.     Occasional night sweats one night/month chronic since off estrogen replacement x 10 yrs (no recent change), weight loss, masses, abd distension/pain, fevers.    Chronic bilateral hip/pelvis pain with ambulation.       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 Saw ENT for chronic sinusitis. CT chest showed lymphadenopathy (mildly prominent axillary, mediastinal and upper abd). +night sweats,no other B symptoms.  -No cytopenias.   -PET: avid LN above and below diaphragm  -Serum immunofixation/SPEP: 0.1 m spike. Possible very small monoclonal free  immunoglobulin light chain of  lambda chain type   -6/30/23 L axillary LN biopsy: low-grade CD5 neg, CD10 neg B-cell non-Hodkin's lymphoma. Neg MYD88 mutation (differentials: lymphoplasmacytic lymphoma or marginal zone)  -Bone marrow biopsy: hypercellular marrow involved by marginal zone lymphoma (at least 80%), lambda LC restricted monoclonal B-cell population.     Treatment:  Observation      Physical Exam    GENERAL: Alert and oriented to time place and person. Hard of hearing. In no distress. Alone.   HEAD: Atraumatic and normocephalic. No alopecia.  EYES: DK, EOMI. No erythema. No icterus.  LYMPH NODES: No palpable supraclavicular, cervical, axillary or inguinal lymphadenopathy.  CHEST: clear to auscultation bilaterally. Resonant to percussion throughout bilaterally. Symmetrical breath movements bilaterally.  CVS: RRR  ABDOMEN: Soft. Not tender. Not distended. No palpable hepatomegaly or splenomegaly. No other mass palpable. Bowel sounds present.  EXTREMITIES: Warm. No peripheral  edema.  SKIN: no rash, or bruising or purpura.   NEURO: No gross deficit noted. Non-antalgic gait.      Lab Results    Recent Results (from the past 168 hour(s))   Lactate Dehydrogenase   Result Value Ref Range    Lactate Dehydrogenase 200 0 - 250 U/L   Comprehensive metabolic panel   Result Value Ref Range    Sodium 138 135 - 145 mmol/L    Potassium 4.9 3.4 - 5.3 mmol/L    Carbon Dioxide (CO2) 28 22 - 29 mmol/L    Anion Gap 9 7 - 15 mmol/L    Urea Nitrogen 19.3 8.0 - 23.0 mg/dL    Creatinine 0.88 0.51 - 0.95 mg/dL    GFR Estimate 64 >60 mL/min/1.73m2    Calcium 9.6 8.8 - 10.2 mg/dL    Chloride 101 98 - 107 mmol/L    Glucose 97 70 - 99 mg/dL    Alkaline Phosphatase 141 (H) 35 - 104 U/L    AST 28 0 - 45 U/L    ALT 15 0 - 50 U/L    Protein Total 7.9 6.4 - 8.3 g/dL    Albumin 4.2 3.5 - 5.2 g/dL    Bilirubin Total 0.8 <=1.2 mg/dL   CBC with platelets and differential   Result Value Ref Range    WBC Count 8.1 4.0 - 11.0 10e3/uL    RBC Count 3.51 (L) 3.80 - 5.20 10e6/uL    Hemoglobin 11.4 (L) 11.7 - 15.7 g/dL    Hematocrit 36.3 35.0 - 47.0 %     (H) 78 - 100 fL    MCH 32.5 26.5 - 33.0 pg    MCHC 31.4 (L) 31.5 - 36.5 g/dL    RDW 14.0 10.0 - 15.0 %    Platelet Count 278 150 - 450 10e3/uL    % Neutrophils 34 %    % Lymphocytes 60 %    % Monocytes 3 %    Mids % (Monos, Eos, Basos)      % Eosinophils 2 %    % Basophils 1 %    % Immature Granulocytes 0 %    NRBCs per 100 WBC 0 <1 /100    Absolute Neutrophils 2.8 1.6 - 8.3 10e3/uL    Absolute Lymphocytes 5.0 0.8 - 5.3 10e3/uL    Absolute Monocytes 0.2 0.0 - 1.3 10e3/uL    Mids Abs (Monos, Eos, Basos)      Absolute Eosinophils 0.1 0.0 - 0.7 10e3/uL    Absolute Basophils 0.1 0.0 - 0.2 10e3/uL    Absolute Immature Granulocytes 0.0 <=0.4 10e3/uL    Absolute NRBCs 0.0 10e3/uL   RBC and Platelet Morphology   Result Value Ref Range    Platelet Assessment  Automated Count Confirmed. Platelet morphology is normal.     Automated Count Confirmed. Platelet morphology is normal.     "Acanthocytes      Jorge Rods      Basophilic Stippling      Bite Cells      Blister Cells      [place] Cells      Elliptocytes      Hgb C Crystals      Alberto-Jolly Bodies      Hypersegmented Neutrophils      Polychromasia      RBC agglutination      RBC Fragments      Reactive Lymphocytes      Rouleaux      Sickle Cells      Smudge Cells      Spherocytes      Stomatocytes      Target Cells      Teardrop Cells      Toxic Neutrophils      RBC Morphology Confirmed RBC Indices        Imaging    No results found.    Billing  Total time 30 minutes, to include face to face visit, review of EMR, ordering, documentation and coordination of care on date of service    Signed by: Estela Barrett NP      Oncology Rooming Note    October 23, 2023 9:44 AM   Lavonne Tidwell is a 83 year old female who presents for:    Chief Complaint   Patient presents with     Oncology Clinic Visit     Marginal zone B-cell lymphoma - Provider visit only     Initial Vitals: /62 (BP Location: Right arm, Patient Position: Sitting, Cuff Size: Adult Regular)   Pulse 77   Temp 97.7  F (36.5  C) (Oral)   Resp 12   Ht 1.651 m (5' 5\")   Wt 78.5 kg (173 lb)   SpO2 98%   BMI 28.79 kg/m   Estimated body mass index is 28.79 kg/m  as calculated from the following:    Height as of this encounter: 1.651 m (5' 5\").    Weight as of this encounter: 78.5 kg (173 lb). Body surface area is 1.9 meters squared.  Extreme Pain (8) Comment: Data Unavailable   No LMP recorded. Patient has had a hysterectomy.  Allergies reviewed: Yes  Medications reviewed: Yes    Medications: Medication refills not needed today.  Pharmacy name entered into [facility]:    [facility], [place] - [address] [place] [address] PHARMACY [address][id number] [place], [place] - [address]    Clinical concerns:  None      Nery Donaldson, [facility]                Again, thank you for allowing me to participate in the care of your patient.  "       Sincerely,        Estela Barrett, NP

## 2023-10-23 NOTE — PROGRESS NOTES
"Oncology Rooming Note    October 23, 2023 9:44 AM   Lavonne Tidwell is a 83 year old female who presents for:    Chief Complaint   Patient presents with    Oncology Clinic Visit     Marginal zone B-cell lymphoma - Provider visit only     Initial Vitals: /62 (BP Location: Right arm, Patient Position: Sitting, Cuff Size: Adult Regular)   Pulse 77   Temp 97.7  F (36.5  C) (Oral)   Resp 12   Ht 1.651 m (5' 5\")   Wt 78.5 kg (173 lb)   SpO2 98%   BMI 28.79 kg/m   Estimated body mass index is 28.79 kg/m  as calculated from the following:    Height as of this encounter: 1.651 m (5' 5\").    Weight as of this encounter: 78.5 kg (173 lb). Body surface area is 1.9 meters squared.  Extreme Pain (8) Comment: Data Unavailable   No LMP recorded. Patient has had a hysterectomy.  Allergies reviewed: Yes  Medications reviewed: Yes    Medications: Medication refills not needed today.  Pharmacy name entered into [facility]:    [facility] [place], [place] - [address] [place] [address] PHARMACY [address][id number] [place], [place] - [address] [facility]    Clinical concerns:  None      Nery Donaldson CMA              "

## 2023-10-23 NOTE — RESULT ENCOUNTER NOTE
etrigghart message sent:   Total serum IgE is within normal limits.  Negative serum IgE for regional aeroallergen panel. It suggests lack of sensitivity to tested environmental/seasonal allergens.  Unable to recommend avoidance measures.  -No changes in the previously discussed treatment plan.  Follow-up in 8 to 10 weeks or sooner if needed.

## 2023-10-27 ENCOUNTER — OFFICE VISIT (OUTPATIENT)
Dept: AUDIOLOGY | Facility: CLINIC | Age: 83
End: 2023-10-27
Payer: COMMERCIAL

## 2023-10-27 DIAGNOSIS — H90.6 MIXED HEARING LOSS, BILATERAL: Primary | ICD-10-CM

## 2023-10-27 PROCEDURE — 92550 TYMPANOMETRY & REFLEX THRESH: CPT | Performed by: AUDIOLOGIST

## 2023-10-27 PROCEDURE — 92557 COMPREHENSIVE HEARING TEST: CPT | Performed by: AUDIOLOGIST

## 2023-10-27 NOTE — PROGRESS NOTES
AUDIOLOGY REPORT    SUBJECTIVE:  Lavonne Tidwell is a 83 year old female who was seen in the Audiology Clinic [facility] on 10/27/23 for audiologic evaluation, referred by Self.  The patient has been seen previously in this clinic on 7/24/2023 for assessment and results indicated bilateral mixed hearing loss. The patient reports a decline in hearing. The patient notes difficulty with communication in a variety of listening situations. Patient was unaccompanied to today's visit.     OBJECTIVE:    Otoscopic exam indicates ears are clear of cerumen bilaterally     Pure Tone Thresholds assessed using standard techniques  audiometry with good  reliability from 250-8000 Hz bilaterally using insert earphones and circumaural headphones     RIGHT:  mild and profound mixed hearing loss    LEFT:    mild and profound mixed hearing loss   NOTE: Change in transducers did not merit a change in thresholds. Could  not mask for bone conduction at 3000 & 4000 Hz for the right ear and at 4000 Hz for the left ear due to equipment limits     Tympanogram:    RIGHT: restricted eardrum mobility [Type B]    LEFT:   restricted eardrum mobility [Type B]    Reflexes (reported by stimulus ear): 1000 Hz  RIGHT: Ipsilateral is absent at frequencies tested  RIGHT: Contralateral is absent at frequencies tested  LEFT:   Ipsilateral is absent at frequencies tested  LEFT:   Contralateral is absent at frequencies tested    Speech Reception Threshold:    RIGHT: 55 dB HL    LEFT:   60 dB HL    Word Recognition Score:     RIGHT: 100% at 95 dB HL using NU-6 recorded word list.    LEFT:   84% at 95 dB HL using NU-6 recorded word list.    ASSESSMENT:   Bilateral mixed hearing loss    Compared to patient's previous audiogram dated 7/24/2023, hearing has decline bilaterally. Today s results were discussed with the patient in detail. Patient was provided a copy of her audiogram.     PLAN:  Patient was counseled regarding hearing  loss and impact on communication. It is recommended that the patient keep her appointment with ENT on 11/1/2023.  Please call this clinic with questions regarding these results or recommendations.    Yong Liriano CCC-A  Licensed Audiologist #[id number]  10/27/2023

## 2023-10-31 NOTE — PROGRESS NOTES
Chief Complaint   Patient presents with    Hearing Problem     Patient states she just can't hear went to get hearing aids but they told her she needs to go  back and see her ENT after getting new hearing test - done on 1`0/27/23     History of Present Illness  Lavonne Tidwell is a 82 year old female who presents today for follow-up. She has a history of left otitis media with effusion. The patient last underwent left T-tube placement on 11/2/2022. She was last seen for follow-up on 7/27/2023 and the left T-tube was extruded.  The patient returns today for ear follow-up.       From an ear symptom standpoint, the patient reports developing an upper respiratory illness over the summer in both ears plugging up and not improving.  She was seen by an audiologist and they recommended follow-up with an ENT.      The patient underwent an audiogram performed on 10/27/2023.  My review of the audiogram show showed bilateral moderate to moderately severe to severe mixed hearing loss in both ears.  Pure-tone average was 58 dB on the right and 66 dB on the left.  Speech reception threshold was 55 dB on the right and 60 dB on the left.  The patient had 100% word recognition on the right and 84% word recognition on the left.  The patient had a low volume type B tympanogram on the right and a low volume type B tympanogram on the left.    From a symptom standpoint, she denies any otalgia or otorrhea.  No bloody otorrhea.  No dizziness or vertigo.  Aside from her ear tubes, no previous ear surgery.     Past Medical History  Patient Active Problem List   Diagnosis    Injury of sigmoid colon    Esophageal reflux    Menopausal syndrome (hot flashes)    Urinary incontinence    Atrophic vaginitis    Hyperlipidemia LDL goal <130    Advanced directives, counseling/discussion    Onychomycosis    Senile nuclear sclerosis    Status post total left knee replacement    Status post total right knee replacement    Primary localized osteoarthrosis,  lower leg    Tubulovillous adenoma polyp of colon    Essential hypertension    Primary osteoarthritis involving multiple joints    Chronic bilateral low back pain without sciatica    Chronic cough     Current Medications    Current Outpatient Medications:     Acetaminophen (TYLENOL PO), Take 1,000 mg by mouth every 8 hours as needed for other (pain), Disp: , Rfl:     allopurinol (ZYLOPRIM) 100 MG tablet, Take 1 tablet (100 mg) by mouth daily, Disp: 90 tablet, Rfl: 3    Bioflavonoid Products (GRAPE SEED PO), Take 1 capsule by mouth daily, Disp: , Rfl:     ciprofloxacin-dexAMETHasone (CIPRODEX) 0.3-0.1 % otic suspension, Place 5 drops in right ear twice daily for 28 days., Disp: 30 mL, Rfl: 3    famotidine (PEPCID) 40 MG tablet, Take 1 tablet (40 mg) by mouth 2 times daily, Disp: 180 tablet, Rfl: 3    ferrous sulfate (FEROSUL) 325 (65 Fe) MG tablet, Take 1 tablet (325 mg) by mouth Every Mon, Wed, Fri Morning, Disp: 45 tablet, Rfl: 3    fluticasone (FLONASE) 50 MCG/ACT nasal spray, Spray 1 spray into both nostrils 2 times daily, Disp: 16 g, Rfl: 3    Garlic 1000 MG CAPS, Take 1 capsule by mouth daily, Disp: , Rfl:     ipratropium (ATROVENT) 0.06 % nasal spray, Spray 2 sprays into both nostrils 3 times daily as needed for rhinitis, Disp: 15 mL, Rfl: 4    LANsoprazole (PREVACID) 15 MG DR capsule, Take 1 capsule (15 mg) by mouth daily, Disp: 90 capsule, Rfl: 3    loratadine (CLARITIN) 10 MG tablet, Take 10 mg by mouth daily, Disp: , Rfl:     Multiple Minerals-Vitamins (CITRACAL MAXIMUM PLUS) TABS, Take 2 tablets by mouth 2 times daily, Disp: , Rfl:     Multiple Vitamins-Minerals (CENTRUM SILVER) per tablet, Take 1 tablet by mouth daily, Disp: , Rfl:     PSYLLIUM PO, Take 1 capsule by mouth every morning 3 capsules at bedtime, Disp: , Rfl:     senna-docusate (SENOKOT-S/PERICOLACE) 8.6-50 MG tablet, Take 2 tablets by mouth 2 times daily, Disp: , Rfl:     Vitamin D, Cholecalciferol, 25 MCG (1000 UT) CAPS, Take 1 capsule by  mouth daily, Disp: , Rfl:     Allergies  Allergies   Allergen Reactions    Codeine GI Disturbance       Social History  Social History     Socioeconomic History    Marital status:    Tobacco Use    Smoking status: Former     Packs/day: 0.50     Years: 5.00     Pack years: 2.50     Types: Cigarettes     Start date: 1968     Quit date: 2/15/1972     Years since quittin.4    Smokeless tobacco: Never    Tobacco comments:     stopped in her 20's   Substance and Sexual Activity    Alcohol use: Yes     Alcohol/week: 0.0 standard drinks of alcohol     Comment: Occasional    Drug use: No    Sexual activity: Not Currently   Other Topics Concern    Parent/sibling w/ CABG, MI or angioplasty before 65F 55M? Yes       Family History  Family History   Problem Relation Age of Onset    Cancer Mother         Ovarian    Other Cancer Mother         Ovarian    Cerebrovascular Disease Father     Heart Disease Father     C.A.D. Father     Coronary Artery Disease Father     Cancer Maternal Grandmother     Other Cancer Maternal Grandmother         Ovarian/Bone    Cerebrovascular Disease Maternal Grandfather     Colon Cancer Maternal Grandfather     Diabetes Other         Great Grandmother    Breast Cancer No family hx of        Review of Systems  As per HPI and PMHx, otherwise 10 system review including the head and neck, constitutional, eyes, respiratory, GI, skin, neurologic, lymphatic, endocrine, and allergy systems is negative.    Physical Exam  /74 (BP Location: Right arm, Patient Position: Chair, Cuff Size: Adult Regular)   Pulse 84   Temp 98.8  F (37.1  C) (Tympanic)   Wt 78 kg (172 lb)   BMI 28.62 kg/m    GENERAL: Patient is a pleasant, cooperative 82 year old female in no acute distress.  HEAD: Normocephalic, atraumatic.  Hair and scalp are normal.  EYES: Pupils are equal, round, reactive to light and accommodation.  Extraocular movements are intact.  The sclera nonicteric without injection.  The  extraocular structures are normal.  EARS: See below.  NOSE: Nares are patent.  Nasal mucosa is boggy and inflamed with sticky, inflammatory mucus.  Negative anterior rhinoscopy.  NECK: Supple, trachea is midline.  There no palpable cervical lymphadenopathy or masses bilaterally.  Palpation of the bilateral parotid and submandibular areas reveal no masses.  No thyromegaly.    NEUROLOGIC: Cranial nerves II through XII are grossly intact.  Voice is strong.  Patient is House-Brackmann I/VI bilaterally.  CARDIOVASCULAR: Extremities are warm and well-perfused.  No significant peripheral edema.  RESPIRATORY: Patient has nonlabored breathing without cough, wheeze, stridor.  PSYCHIATRIC: Patient is alert and oriented.  Mood and affect appear normal.  SKIN: Warm and dry.  No scalp, face, or neck lesions noted.    Physical Exam and Procedure    EARS: Normal shape and symmetry.  No tenderness when palpating the mastoid or tragal areas bilaterally.  The ears were then examined under the otic binocular microscope to perform cerumen removal.  Otoscopic exam on the right reveals a clear canal.  The right tympanic membrane was round, intact and slightly opaque.  It is difficult to tell if there is any evidence of effusion.  No retraction, granulation, drainage, or evidence of cholesteatoma.      Attention was turned to the left ear.  Otoscopic exam on the left reveals a clear canal.  The left tympanic membrane was round, intact with mild to moderate retraction and obvious evidence of serous middle ear effusion.  No retraction, granulation, drainage, or evidence of cholesteatoma.      Assessment and Plan     ICD-10-CM    1. Chronic otitis media with effusion, bilateral  H65.493 Microscopy, Binocular     Tympanotomy W/Tube     ciprofloxacin-dexAMETHasone (CIPRODEX) 0.3-0.1 % otic suspension      2. Mixed conductive and sensorineural hearing loss of both ears  H90.6 Microscopy, Binocular     Tympanotomy W/Tube      ciprofloxacin-dexAMETHasone (CIPRODEX) 0.3-0.1 % otic suspension      3. History of placement of ear tubes  Z96.22 Microscopy, Binocular     Tympanotomy W/Tube     ciprofloxacin-dexAMETHasone (CIPRODEX) 0.3-0.1 % otic suspension      4. Granulation polyp of right middle ear  H74.41 ciprofloxacin-dexAMETHasone (CIPRODEX) 0.3-0.1 % otic suspension        It was my pleasure seeing Lavonne Tidwell today in clinic.  Patient has bilateral mixed hearing loss with restricted tympanic membrane mobility bilaterally.  She has had serous middle ear effusion in the past.  The right tympanic membrane is a bit opaque and it is difficult to tell if there is fluid, the left ear has obvious fluid.  We discussed placing ear tubes bilaterally and then following up with audiometric assessment.       We discussed the risks, benefits, alternatives, options of bilateral myringotomy with tube placement including, but not limited to: Risk of bleeding, risk of infection, risk of retained tympanostomy tube, risk of tympanic membrane perforation, risk of recurrent otorrhea, tympanostomy tube failure, potential need for additional procedures including tube removal/replacement.  We discussed the postoperative course and convalescence including using eardrops.  The patient voiced understanding and is willing to proceed.  Please see the procedure note for further details.    ADDENDUM  The patient did have copious mucoid middle ear effusion on the left and significant granulation tissue filling the right middle ear space.  After some trial, I was able to get an ear tube in on the right.  I think the patient will need several weeks of Ciprodex drops treatment to the right ear to try to clear the middle ear granulation.  We will have her use 5 drops twice daily for 4 weeks.  We will see her back for follow-up with audiometric assessment after 4 weeks of drop treatment.  Since the left ear was clear aside from the mucoid fluid, she needs no drop  treatment on the left.      Rob Harris MD  Department of Otolaryngology-Head and Neck Surgery  [facility]

## 2023-11-01 ENCOUNTER — OFFICE VISIT (OUTPATIENT)
Dept: OTOLARYNGOLOGY | Facility: CLINIC | Age: 83
End: 2023-11-01
Payer: COMMERCIAL

## 2023-11-01 VITALS
SYSTOLIC BLOOD PRESSURE: 133 MMHG | BODY MASS INDEX: 28.62 KG/M2 | DIASTOLIC BLOOD PRESSURE: 74 MMHG | HEART RATE: 84 BPM | WEIGHT: 172 LBS | TEMPERATURE: 98.8 F

## 2023-11-01 DIAGNOSIS — H65.493 CHRONIC OTITIS MEDIA WITH EFFUSION, BILATERAL: Primary | ICD-10-CM

## 2023-11-01 DIAGNOSIS — Z96.22 HISTORY OF PLACEMENT OF EAR TUBES: ICD-10-CM

## 2023-11-01 DIAGNOSIS — H90.6 MIXED CONDUCTIVE AND SENSORINEURAL HEARING LOSS OF BOTH EARS: ICD-10-CM

## 2023-11-01 DIAGNOSIS — H74.41 GRANULATION POLYP OF RIGHT MIDDLE EAR: ICD-10-CM

## 2023-11-01 PROCEDURE — 69433 CREATE EARDRUM OPENING: CPT | Mod: 50 | Performed by: OTOLARYNGOLOGY

## 2023-11-01 PROCEDURE — 99213 OFFICE O/P EST LOW 20 MIN: CPT | Mod: 25 | Performed by: OTOLARYNGOLOGY

## 2023-11-01 RX ORDER — CIPROFLOXACIN AND DEXAMETHASONE 3; 1 MG/ML; MG/ML
SUSPENSION/ DROPS AURICULAR (OTIC)
Qty: 30 ML | Refills: 3 | Status: SHIPPED | OUTPATIENT
Start: 2023-11-01 | End: 2023-12-18

## 2023-11-01 ASSESSMENT — PAIN SCALES - GENERAL: PAINLEVEL: NO PAIN (0)

## 2023-11-01 NOTE — LETTER
11/1/2023         RE: Lavonne Tidwell  [address] [facility] [address]        Dear Colleague,    Thank you for referring your patient, Lavonne Tidwell, to the [facility]. Please see a copy of my visit note below.    Chief Complaint   Patient presents with     Hearing Problem     Patient states she just can't hear went to get hearing aids but they told her she needs to go  back and see her ENT after getting new hearing test - done on 1`0/27/23     History of Present Illness  Lavonne Tidwell is a 82 year old female who presents today for follow-up. She has a history of left otitis media with effusion. The patient last underwent left T-tube placement on 11/2/2022. She was last seen for follow-up on 7/27/2023 and the left T-tube was extruded.  The patient returns today for ear follow-up.       From an ear symptom standpoint, the patient reports developing an upper respiratory illness over the summer in both ears plugging up and not improving.  She was seen by an audiologist and they recommended follow-up with an ENT.      The patient underwent an audiogram performed on 10/27/2023.  My review of the audiogram show showed bilateral moderate to moderately severe to severe mixed hearing loss in both ears.  Pure-tone average was 58 dB on the right and 66 dB on the left.  Speech reception threshold was 55 dB on the right and 60 dB on the left.  The patient had 100% word recognition on the right and 84% word recognition on the left.  The patient had a low volume type B tympanogram on the right and a low volume type B tympanogram on the left.    From a symptom standpoint, she denies any otalgia or otorrhea.  No bloody otorrhea.  No dizziness or vertigo.  Aside from her ear tubes, no previous ear surgery.     Past Medical History  Patient Active Problem List   Diagnosis     Injury of sigmoid colon     Esophageal reflux     Menopausal syndrome (hot flashes)     Urinary incontinence      Atrophic vaginitis     Hyperlipidemia LDL goal <130     Advanced directives, counseling/discussion     Onychomycosis     Senile nuclear sclerosis     Status post total left knee replacement     Status post total right knee replacement     Primary localized osteoarthrosis, lower leg     Tubulovillous adenoma polyp of colon     Essential hypertension     Primary osteoarthritis involving multiple joints     Chronic bilateral low back pain without sciatica     Chronic cough     Current Medications    Current Outpatient Medications:      Acetaminophen (TYLENOL PO), Take 1,000 mg by mouth every 8 hours as needed for other (pain), Disp: , Rfl:      allopurinol (ZYLOPRIM) 100 MG tablet, Take 1 tablet (100 mg) by mouth daily, Disp: 90 tablet, Rfl: 3     Bioflavonoid Products (GRAPE SEED PO), Take 1 capsule by mouth daily, Disp: , Rfl:      ciprofloxacin-dexAMETHasone (CIPRODEX) 0.3-0.1 % otic suspension, Place 5 drops in right ear twice daily for 28 days., Disp: 30 mL, Rfl: 3     famotidine (PEPCID) 40 MG tablet, Take 1 tablet (40 mg) by mouth 2 times daily, Disp: 180 tablet, Rfl: 3     ferrous sulfate (FEROSUL) 325 (65 Fe) MG tablet, Take 1 tablet (325 mg) by mouth Every Mon, Wed, Fri Morning, Disp: 45 tablet, Rfl: 3     fluticasone (FLONASE) 50 MCG/ACT nasal spray, Spray 1 spray into both nostrils 2 times daily, Disp: 16 g, Rfl: 3     Garlic 1000 MG CAPS, Take 1 capsule by mouth daily, Disp: , Rfl:      ipratropium (ATROVENT) 0.06 % nasal spray, Spray 2 sprays into both nostrils 3 times daily as needed for rhinitis, Disp: 15 mL, Rfl: 4     LANsoprazole (PREVACID) 15 MG DR capsule, Take 1 capsule (15 mg) by mouth daily, Disp: 90 capsule, Rfl: 3     loratadine (CLARITIN) 10 MG tablet, Take 10 mg by mouth daily, Disp: , Rfl:      Multiple Minerals-Vitamins (CITRACAL MAXIMUM PLUS) TABS, Take 2 tablets by mouth 2 times daily, Disp: , Rfl:      Multiple Vitamins-Minerals (CENTRUM SILVER) per tablet, Take 1 tablet by mouth daily,  Disp: , Rfl:      PSYLLIUM PO, Take 1 capsule by mouth every morning 3 capsules at bedtime, Disp: , Rfl:      senna-docusate (SENOKOT-S/PERICOLACE) 8.6-50 MG tablet, Take 2 tablets by mouth 2 times daily, Disp: , Rfl:      Vitamin D, Cholecalciferol, 25 MCG (1000 UT) CAPS, Take 1 capsule by mouth daily, Disp: , Rfl:     Allergies  Allergies   Allergen Reactions     Codeine GI Disturbance       Social History  Social History     Socioeconomic History     Marital status:    Tobacco Use     Smoking status: Former     Packs/day: 0.50     Years: 5.00     Pack years: 2.50     Types: Cigarettes     Start date: 1968     Quit date: 2/15/1972     Years since quittin.4     Smokeless tobacco: Never     Tobacco comments:     stopped in her 20's   Substance and Sexual Activity     Alcohol use: Yes     Alcohol/week: 0.0 standard drinks of alcohol     Comment: Occasional     Drug use: No     Sexual activity: Not Currently   Other Topics Concern     Parent/sibling w/ CABG, MI or angioplasty before 65F 55M? Yes       Family History  Family History   Problem Relation Age of Onset     Cancer Mother         Ovarian     Other Cancer Mother         Ovarian     Cerebrovascular Disease Father      Heart Disease Father      C.A.D. Father      Coronary Artery Disease Father      Cancer Maternal Grandmother      Other Cancer Maternal Grandmother         Ovarian/Bone     Cerebrovascular Disease Maternal Grandfather      Colon Cancer Maternal Grandfather      Diabetes Other         Great Grandmother     Breast Cancer No family hx of        Review of Systems  As per HPI and PMHx, otherwise 10 system review including the head and neck, constitutional, eyes, respiratory, GI, skin, neurologic, lymphatic, endocrine, and allergy systems is negative.    Physical Exam  /74 (BP Location: Right arm, Patient Position: Chair, Cuff Size: Adult Regular)   Pulse 84   Temp 98.8  F (37.1  C) (Tympanic)   Wt 78 kg (172 lb)   BMI 28.62  kg/m    GENERAL: Patient is a pleasant, cooperative 82 year old female in no acute distress.  HEAD: Normocephalic, atraumatic.  Hair and scalp are normal.  EYES: Pupils are equal, round, reactive to light and accommodation.  Extraocular movements are intact.  The sclera nonicteric without injection.  The extraocular structures are normal.  EARS: See below.  NOSE: Nares are patent.  Nasal mucosa is boggy and inflamed with sticky, inflammatory mucus.  Negative anterior rhinoscopy.  NECK: Supple, trachea is midline.  There no palpable cervical lymphadenopathy or masses bilaterally.  Palpation of the bilateral parotid and submandibular areas reveal no masses.  No thyromegaly.    NEUROLOGIC: Cranial nerves II through XII are grossly intact.  Voice is strong.  Patient is House-Brackmann I/VI bilaterally.  CARDIOVASCULAR: Extremities are warm and well-perfused.  No significant peripheral edema.  RESPIRATORY: Patient has nonlabored breathing without cough, wheeze, stridor.  PSYCHIATRIC: Patient is alert and oriented.  Mood and affect appear normal.  SKIN: Warm and dry.  No scalp, face, or neck lesions noted.    Physical Exam and Procedure    EARS: Normal shape and symmetry.  No tenderness when palpating the mastoid or tragal areas bilaterally.  The ears were then examined under the otic binocular microscope to perform cerumen removal.  Otoscopic exam on the right reveals a clear canal.  The right tympanic membrane was round, intact and slightly opaque.  It is difficult to tell if there is any evidence of effusion.  No retraction, granulation, drainage, or evidence of cholesteatoma.      Attention was turned to the left ear.  Otoscopic exam on the left reveals a clear canal.  The left tympanic membrane was round, intact with mild to moderate retraction and obvious evidence of serous middle ear effusion.  No retraction, granulation, drainage, or evidence of cholesteatoma.      Assessment and Plan     ICD-10-CM    1. Chronic  otitis media with effusion, bilateral  H65.493 Microscopy, Binocular     Tympanotomy W/Tube     ciprofloxacin-dexAMETHasone (CIPRODEX) 0.3-0.1 % otic suspension      2. Mixed conductive and sensorineural hearing loss of both ears  H90.6 Microscopy, Binocular     Tympanotomy W/Tube     ciprofloxacin-dexAMETHasone (CIPRODEX) 0.3-0.1 % otic suspension      3. History of placement of ear tubes  Z96.22 Microscopy, Binocular     Tympanotomy W/Tube     ciprofloxacin-dexAMETHasone (CIPRODEX) 0.3-0.1 % otic suspension      4. Granulation polyp of right middle ear  H74.41 ciprofloxacin-dexAMETHasone (CIPRODEX) 0.3-0.1 % otic suspension        It was my pleasure seeing Lavonne Tidwell today in clinic.  Patient has bilateral mixed hearing loss with restricted tympanic membrane mobility bilaterally.  She has had serous middle ear effusion in the past.  The right tympanic membrane is a bit opaque and it is difficult to tell if there is fluid, the left ear has obvious fluid.  We discussed placing ear tubes bilaterally and then following up with audiometric assessment.       We discussed the risks, benefits, alternatives, options of bilateral myringotomy with tube placement including, but not limited to: Risk of bleeding, risk of infection, risk of retained tympanostomy tube, risk of tympanic membrane perforation, risk of recurrent otorrhea, tympanostomy tube failure, potential need for additional procedures including tube removal/replacement.  We discussed the postoperative course and convalescence including using eardrops.  The patient voiced understanding and is willing to proceed.  Please see the procedure note for further details.    ADDENDUM  The patient did have copious mucoid middle ear effusion on the left and significant granulation tissue filling the right middle ear space.  After some trial, I was able to get an ear tube in on the right.  I think the patient will need several weeks of Ciprodex drops treatment to the  right ear to try to clear the middle ear granulation.  We will have her use 5 drops twice daily for 4 weeks.  We will see her back for follow-up with audiometric assessment after 4 weeks of drop treatment.  Since the left ear was clear aside from the mucoid fluid, she needs no drop treatment on the left.      Rob Harris MD  Department of Otolaryngology-Head and Neck Surgery  [facility]       Again, thank you for allowing me to participate in the care of your patient.        Sincerely,        Rob Harris MD

## 2023-11-01 NOTE — NURSING NOTE
"Initial /74 (BP Location: Right arm, Patient Position: Chair, Cuff Size: Adult Regular)   Pulse 84   Temp 98.8  F (37.1  C) (Tympanic)   Wt 78 kg (172 lb)   BMI 28.62 kg/m   Estimated body mass index is 28.62 kg/m  as calculated from the following:    Height as of 10/23/23: 1.651 m (5' 5\").    Weight as of this encounter: 78 kg (172 lb). .  Indigo Pastor LPN    "

## 2023-11-01 NOTE — PROCEDURES
PREOPERATIVE DIAGNOSES: Bilateral chronic otitis media with effusion, bilateral mixed hearing loss.    POSTOPERATIVE DIAGNOSES: Same.     PROCEDURE PERFORMED:   Bilateral myringotomy with tympanostomy tube placement     SURGEON: Rob Harris MD      ASSISTANTS: None.     BLOOD LOSS: Scant.     COMPLICATIONS: None.      SPECIMENS: None.     ANESTHESIA: Local.     GRAFTS, IMPLANTS, DRAINS: None.     INDICATIONS: Prevent complications, treat disease.    FINDINGS:   Right intact tympanic membrane with copious middle ear granulation without much middle ear effusion.   Left intact tympanic membrane with copious mucoserous middle-ear effusion.    OPERATIVE TECHNIQUE: The patient was brought to the procedure room and identified by name clinic number.  They were placed supinely on the procedure chair.  The patient was prepped and draped in standard fashion.  After standard surgical pause, the microscope was brought to the field and used throughout the remainder of the case.  I examined the ear on the right.  Cerumen was cleaned with curette.  Phenol was placed in the posterior-inferior quadrant.  A radial myringotomy was made in the posterior-inferior quadrant.  There was significant middle ear granulation and not much middle ear effusion.  I was able to make a space for an ear tube and after much difficulty was finally able to place a Dura-Vent ear tube in the posterior-inferior quadrant.  The patient did not notice much improvement in hearing given the copious middle ear granulation.  Ciprodex drops were placed in the ear.       Attention was then turned to the left ear. Cerumen was cleaned with curette.  Phenol was placed in the posterior-inferior quadrant.  A radial myringotomy was made in the posterior-inferior quadrant.  The middle ear was suctioned free.  The patient noticed improvement in hearing.  A Dura-Vent ear tube was placed into the myringotomy.       This marked the end of the procedure.  The patient  tolerated the procedure well.  There were no complications.  There was minimal blood loss.  All standard protocol and universal precautions were used throughout the procedure. Due to significant middle ear granulation on the right, this procedure took additional time, effort, and technical skill normally required for the procedure.        Rob Harris MD  Department of Otolaryngology-Head and Neck Surgery  [facility]

## 2023-11-02 ENCOUNTER — TELEPHONE (OUTPATIENT)
Dept: OTOLARYNGOLOGY | Facility: CLINIC | Age: 83
End: 2023-11-02
Payer: COMMERCIAL

## 2023-11-02 NOTE — TELEPHONE ENCOUNTER
Called patient  Started drops  Calling audiology to discuss if she should come in for hearing aid fitting or not. Informed that if she is having drainage, they may opt for seeing her after drainage has subsided.   No further concerns    Tena VELARDE RN  [facility] Specialty [facility]

## 2023-11-02 NOTE — TELEPHONE ENCOUNTER
Reason for Call:  Other call back    Detailed comments: Patient has an appointment for new hearing aids on 11/8/23 at an external facility, and was wondering if it is to soon since she has an infection in her ears. Please call back when able.    Phone Number Patient can be reached at: Home number on file [phone] (home)     Best Time: Anytime    Can we leave a detailed message on this number? YES    Thank you,  Ginny DIAZ [facility]  Specialty Clinic - [facility]      Call taken on 11/2/2023 at 10:37 AM by Ginny Killian

## 2023-11-03 ENCOUNTER — TELEPHONE (OUTPATIENT)
Dept: FAMILY MEDICINE | Facility: CLINIC | Age: 83
End: 2023-11-03
Payer: COMMERCIAL

## 2023-11-03 NOTE — TELEPHONE ENCOUNTER
Symptoms    Describe your symptoms: Voice is fading away per pt. Coughing at times, congested feeling in nose area.  Pt had ENT Surgery had tube put in both ears 11/1/23.  Pt did call them and they recommended pt to call Primary provider.  Please Advise.       Preferred Pharmacy:       [facility] [place] [address] [address] [place] [id number]  Phone: [phone] Fax: [phone]      Could we send this information to you in [employer] or would you prefer to receive a phone call?:   Patient would prefer a phone call   Okay to leave a detailed message?: Yes at Home number on file [phone] (home)    [employer]

## 2023-11-06 NOTE — TELEPHONE ENCOUNTER
Spoke with pt who reports ongoing voices hoarseness., loss of voice, scratchy throat, congestion/ cough, burping, heartburn. Feels sx worse past week. States has been told sx R/T acid reflux, taking pepcid and prevacid.   Has seen allergy and most recently ENT for bilat ear tube placement.  Denies fevers, chills. No increased cough, congestion. No headaches/ body aches.  Has not done ant home COVID testing.   Pt asking if Dr. Kowalski has any further recommendations.  FRANCISCA Bedoya RN

## 2023-11-06 NOTE — TELEPHONE ENCOUNTER
Unable to leave a message.  Phone asks to leave my area code followed by phone number and #.  I did that and then it asks for pin number.   Recall  Danielle Keith RN

## 2023-11-06 NOTE — TELEPHONE ENCOUNTER
Sharon called back, advised to check in with ENT if her voice hoarseness is still bothering her as recommended by Dr Kowalski

## 2023-11-14 NOTE — TELEPHONE ENCOUNTER
Per chart review on 11/1/2023 patient had the following procedure:    PROCEDURE PERFORMED:   Bilateral myringotomy with tympanostomy tube placement   PREOPERATIVE DIAGNOSES: Bilateral chronic otitis media with effusion, bilateral mixed hearing loss.     POSTOPERATIVE DIAGNOSES: Same.    Patient prescribed 28 day course of ciprofloxacin-dexamethasone otic suspension.     Patient currently scheduled for 11/16 injection.  Would you like patient rescheduled?     Jeanette Zapata RN  [facility] Pain Management [facility]  [phone]

## 2023-11-15 NOTE — TELEPHONE ENCOUNTER
Patient rescheduled for 12/14 0915.     Jeanette Zapata RN  [facility] Pain Management [facility]  [phone]

## 2023-12-08 ENCOUNTER — TELEPHONE (OUTPATIENT)
Dept: OTOLARYNGOLOGY | Facility: CLINIC | Age: 83
End: 2023-12-08
Payer: COMMERCIAL

## 2023-12-08 DIAGNOSIS — R09.82 POST-NASAL DRAINAGE: Primary | ICD-10-CM

## 2023-12-08 NOTE — TELEPHONE ENCOUNTER
[facility] Call Center    Phone Message    May a detailed message be left on voicemail: yes     Reason for Call: Other: PT would like to receive a call to discuss her symptoms. Please call. Thanks.     Action Taken: Other: ENT    Travel Screening: Not Applicable

## 2023-12-08 NOTE — TELEPHONE ENCOUNTER
Allergy RN  please call.      Patient reports increased vasomotor Rhinitis.  Pt states she could not take the new sinus spray because it dried her nostrils out so bad she was seeing tinges of blood.    States her reflux is under control.  The dripping running stuffy nose causing cough and sore throat is cause of her being miserable and requests a change in plan to try and dry up the VR.    Per allergy doctor,  switched her BP med and prescribed sinus sprays:   Advised I will send message to Dr Cardozo and his team as he wanted to see her back for follow up  however pt was unable to take the spray.    (Also advised pt to stop vicks in the nasal passages and to use aquphor, vaseline lightly or just saline based moisture product for dry sinus and to see what Allergy provider recommends.    Genesis ACKERMAN   Specialty Clinic RN

## 2023-12-12 RX ORDER — IPRATROPIUM BROMIDE 21 UG/1
1-2 SPRAY, METERED NASAL 3 TIMES DAILY PRN
Qty: 30 ML | Refills: 1 | Status: SHIPPED | OUTPATIENT
Start: 2023-12-12 | End: 2024-02-15

## 2023-12-12 NOTE — TELEPHONE ENCOUNTER
I will prescribe the same spray but a lower concentration.  She does not have to use it 3 times a day scheduled.  It is an as-needed medicine.    Barron Cardozo MD

## 2023-12-12 NOTE — TELEPHONE ENCOUNTER
Called and spoke with pt. Informed of new medication and that it should be used as needed. Also verified with her that she should use her flonase every day twice daily and then use the atrovent as needed. Verbal understanding. States she did not recall that she should continue with the flonase daily. Pt has follow up scheduled for 2/15/24. No further questions.     Bonita BEE RN  Specialty/Allergy Clinics

## 2023-12-14 ENCOUNTER — RADIOLOGY INJECTION OFFICE VISIT (OUTPATIENT)
Dept: PALLIATIVE MEDICINE | Facility: CLINIC | Age: 83
End: 2023-12-14
Attending: PAIN MEDICINE
Payer: COMMERCIAL

## 2023-12-14 VITALS — HEART RATE: 75 BPM | OXYGEN SATURATION: 98 % | SYSTOLIC BLOOD PRESSURE: 128 MMHG | DIASTOLIC BLOOD PRESSURE: 56 MMHG

## 2023-12-14 DIAGNOSIS — M54.16 LUMBAR RADICULOPATHY: ICD-10-CM

## 2023-12-14 PROCEDURE — 62323 NJX INTERLAMINAR LMBR/SAC: CPT | Performed by: PAIN MEDICINE

## 2023-12-14 RX ORDER — TRIAMCINOLONE ACETONIDE 40 MG/ML
40 INJECTION, SUSPENSION INTRA-ARTICULAR; INTRAMUSCULAR ONCE
Status: COMPLETED | OUTPATIENT
Start: 2023-12-14 | End: 2023-12-14

## 2023-12-14 RX ADMIN — TRIAMCINOLONE ACETONIDE 40 MG: 40 INJECTION, SUSPENSION INTRA-ARTICULAR; INTRAMUSCULAR at 11:54

## 2023-12-14 ASSESSMENT — PAIN SCALES - GENERAL
PAINLEVEL: SEVERE PAIN (6)
PAINLEVEL: MILD PAIN (2)

## 2023-12-14 NOTE — PROGRESS NOTES
Chief Complaint   Patient presents with    RECHECK     Follow up      History of Present Illness  Lavonne Tidwell is a 83 year old female who presents today for follow-up. She has a history of left otitis media with effusion and underwent previous T-tube placement in November 2022.  Her tubes subsequently extruded and her ears and hearing were normal.  She again developed upper respiratory illness over the summer 2023 and was seen for follow-up evaluation on 11/1/2023.  At that time, she had significant bilateral middle ear effusion with significant granulation in the right ear making it quite difficult to place an ear tube.  I was able to successfully get ear tubes in both ears in the office.  I placed her on a longer course of Ciprodex drops for the right ear.  The patient returns today for follow-up and audiometric assessment.    From an ear symptom standpoint, the patient reports improvement overall in her right ear symptoms.  The left ear is stable.  She denies any otalgia or otorrhea.  No bloody otorrhea.  No dizziness or vertigo.  Aside from her ear tubes, no previous ear surgery.     Past Medical History  Patient Active Problem List   Diagnosis    Injury of sigmoid colon    Esophageal reflux    Menopausal syndrome (hot flashes)    Urinary incontinence    Atrophic vaginitis    Hyperlipidemia LDL goal <130    Advanced directives, counseling/discussion    Onychomycosis    Senile nuclear sclerosis    Status post total left knee replacement    Status post total right knee replacement    Primary localized osteoarthrosis, lower leg    Tubulovillous adenoma polyp of colon    Essential hypertension    Primary osteoarthritis involving multiple joints    Chronic bilateral low back pain without sciatica    Chronic cough     Current Medications    Current Outpatient Medications:     Acetaminophen (TYLENOL PO), Take 1,000 mg by mouth every 8 hours as needed for other (pain), Disp: , Rfl:     allopurinol (ZYLOPRIM) 100 MG  tablet, Take 1 tablet (100 mg) by mouth daily, Disp: 90 tablet, Rfl: 3    famotidine (PEPCID) 40 MG tablet, Take 1 tablet (40 mg) by mouth 2 times daily, Disp: 180 tablet, Rfl: 3    ferrous sulfate (FEROSUL) 325 (65 Fe) MG tablet, Take 1 tablet (325 mg) by mouth Every Mon, Wed, Fri Morning, Disp: 45 tablet, Rfl: 3    fluticasone (FLONASE) 50 MCG/ACT nasal spray, Spray 1 spray into both nostrils 2 times daily, Disp: 16 g, Rfl: 3    ipratropium (ATROVENT) 0.03 % nasal spray, Spray 1-2 sprays into both nostrils 3 times daily as needed for rhinitis (runny nose, postnasal drainage), Disp: 30 mL, Rfl: 1    LANsoprazole (PREVACID) 15 MG DR capsule, Take 1 capsule (15 mg) by mouth daily, Disp: 90 capsule, Rfl: 3    loratadine (CLARITIN) 10 MG tablet, Take 10 mg by mouth daily, Disp: , Rfl:     mupirocin (BACTROBAN) 2 % external ointment, Apply topically 3 times daily Apply to nose as needed, Disp: 30 g, Rfl: 3    PSYLLIUM PO, Take 1 capsule by mouth every morning 3 capsules at bedtime, Disp: , Rfl:     senna-docusate (SENOKOT-S/PERICOLACE) 8.6-50 MG tablet, Take 2 tablets by mouth 2 times daily, Disp: , Rfl:     Bioflavonoid Products (GRAPE SEED PO), Take 1 capsule by mouth daily (Patient not taking: Reported on 12/18/2023), Disp: , Rfl:     Garlic 1000 MG CAPS, Take 1 capsule by mouth daily, Disp: , Rfl:     Multiple Minerals-Vitamins (CITRACAL MAXIMUM PLUS) TABS, Take 2 tablets by mouth 2 times daily (Patient not taking: Reported on 12/18/2023), Disp: , Rfl:     Multiple Vitamins-Minerals (CENTRUM SILVER) per tablet, Take 1 tablet by mouth daily (Patient not taking: Reported on 12/18/2023), Disp: , Rfl:     Vitamin D, Cholecalciferol, 25 MCG (1000 UT) CAPS, Take 1 capsule by mouth daily (Patient not taking: Reported on 12/18/2023), Disp: , Rfl:     Allergies  Allergies   Allergen Reactions    Codeine GI Disturbance       Social History  Social History     Socioeconomic History    Marital status:    Tobacco Use     "Smoking status: Former     Packs/day: 0.50     Years: 5.00     Pack years: 2.50     Types: Cigarettes     Start date: 1968     Quit date: 2/15/1972     Years since quittin.4    Smokeless tobacco: Never    Tobacco comments:     stopped in her 20's   Substance and Sexual Activity    Alcohol use: Yes     Alcohol/week: 0.0 standard drinks of alcohol     Comment: Occasional    Drug use: No    Sexual activity: Not Currently   Other Topics Concern    Parent/sibling w/ CABG, MI or angioplasty before 65F 55M? Yes       Family History  Family History   Problem Relation Age of Onset    Cancer Mother         Ovarian    Other Cancer Mother         Ovarian    Cerebrovascular Disease Father     Heart Disease Father     C.A.D. Father     Coronary Artery Disease Father     Cancer Maternal Grandmother     Other Cancer Maternal Grandmother         Ovarian/Bone    Cerebrovascular Disease Maternal Grandfather     Colon Cancer Maternal Grandfather     Diabetes Other         Great Grandmother    Breast Cancer No family hx of        Review of Systems  As per HPI and PMHx, otherwise 10 system review including the head and neck, constitutional, eyes, respiratory, GI, skin, neurologic, lymphatic, endocrine, and allergy systems is negative.    Physical Exam  /69 (BP Location: Right arm, Patient Position: Chair, Cuff Size: Adult Regular)   Pulse 67   Temp 97.8  F (36.6  C) (Tympanic)   Ht 1.651 m (5' 5\")   Wt 78 kg (172 lb)   SpO2 97%   BMI 28.62 kg/m    GENERAL: Patient is a pleasant, cooperative 83 year old female in no acute distress.  HEAD: Normocephalic, atraumatic.  Hair and scalp are normal.  EYES: Pupils are equal, round, reactive to light and accommodation.  Extraocular movements are intact.  The sclera nonicteric without injection.  The extraocular structures are normal.  EARS:See below.  NOSE: Nares are patent.  Nasal mucosa is boggy and inflamed with sticky, inflammatory mucus.  Patient has significantly dry " nasal skin and some redness.  Negative anterior rhinoscopy.  NECK: Supple, trachea is midline.  There no palpable cervical lymphadenopathy or masses bilaterally.  Palpation of the bilateral parotid and submandibular areas reveal no masses.  No thyromegaly.    NEUROLOGIC: Cranial nerves II through XII are grossly intact.  Voice is strong.  Patient is House-Brackmann I/VI bilaterally.  CARDIOVASCULAR: Extremities are warm and well-perfused.  No significant peripheral edema.  RESPIRATORY: Patient has nonlabored breathing without cough, wheeze, stridor.  PSYCHIATRIC: Patient is alert and oriented.  Mood and affect appear normal.  SKIN: Warm and dry.  No scalp, face, or neck lesions noted.    Physical Exam and Procedure    EARS: Normal shape and symmetry.  No tenderness when palpating the mastoid or tragal areas bilaterally.  The ears were then examined under the otic binocular microscope to perform cerumen removal.  Otoscopic exam on the right reveals a clear canal.  There is an extruded Dura-Vent ventilation tube overlying the tympanic membrane.  The ear tube was removed.  The right tympanic membrane was round, intact and slightly opaque.  There is no evidence of effusion.  No retraction, granulation, drainage, or evidence of cholesteatoma.      Attention was turned to the left ear.  Otoscopic exam on the left reveals a clear canal.  There is a Dura-Vent ventilation tube in the posterior-inferior quadrant with some slight surrounding crusting.  There is mild to moderate retraction and obvious evidence of serous middle ear effusion.  No retraction, granulation, drainage, or evidence of cholesteatoma.      Audiogram  The patient underwent an audiogram performed today.  My review of the audiogram shows mild to moderate to moderately severe to severe mixed hearing loss in the right ear and mild sloping to moderate sloping to moderately severe mixed hearing loss in the left ear.  Pure-tone average is 35 dB on the right and  48 dB on the left.  Speech reception threshold is 40 dB on the right and 45 dB on the left.  The patient had 96% word recognition on the right and 84% word recognition on the left.  The patient had a slight negative pressure type C tympanogram on the right and a large volume type B tympanogram on the left.    Assessment and Plan     ICD-10-CM    1. Chronic otitis media with effusion, bilateral  H65.493 Adult Audiology  Referral     mupirocin (BACTROBAN) 2 % external ointment      2. Mixed conductive and sensorineural hearing loss of both ears  H90.6 Adult Audiology  Referral     mupirocin (BACTROBAN) 2 % external ointment      3. History of placement of ear tubes  Z96.22 Adult Audiology  Referral     mupirocin (BACTROBAN) 2 % external ointment      4. Retained myringotomy tube in left ear  Z96.22 Adult Audiology  Referral     mupirocin (BACTROBAN) 2 % external ointment        It was my pleasure seeing Lavonne Tidwell today in clinic.  Unfortunately, the right ear tube is already extruded.  However, the eardrum is healed and there is no evidence of middle ear effusion.  She does have some mild mixed during loss on that side but overall the ears aerated.  The left ear tube is in good placement.  At this point in time, I would recommend observation.  The patient will return in 4 to 6 months for follow-up and ear tube recheck.  The patient knows to contact us sooner with any problems or concerns.    With regards to the patient's nose, we discussed stopping the triple antibiotic ointment/bacitracin and starting mupirocin as needed.  I sent the prescription to her pharmacy.  She will continue her reflux medication famotidine 40 mg twice daily.    Rob Harris MD  Department of Otolaryngology-Head and Neck Surgery  [facility]

## 2023-12-14 NOTE — NURSING NOTE
Pre-procedure Intake  If YES to any questions or NO to having a   Please complete laminated checklist and leave on the computer keyboard for Provider, verbally inform provider if able.    For SCS Trial, RFA's or any sedation procedure:  Have you been fasting? NA  If yes, for how long?     Are you taking any any blood thinners such as Coumadin, Warfarin, Jantoven, Pradaxa Xarelto, Eliquis, Edoxaban, Enoxaparin, Lovenox, Heparin, Arixtra, Fondaparinux, or Fragmin? OR Antiplatelet medication such as Plavix, Brilinta, or Effient?   No   If yes, when did you take your last dose?     Do you take aspirin?  No  If cervical procedure, have you held aspirin for 6 days?       Do you have any allergies to contrast dye, iodine, steroid and/or numbing medications?  NO    Are you currently taking antibiotics or have an active infection?  NO    Have you had a fever/elevated temperature within the past week? NO    Are you currently taking oral steroids? NO    Do you have a ? Yes    Are you pregnant or breastfeeding?  NO    Have you received the COVID-19 vaccine? Yes  If yes, was it your 1st, 2nd or only dose needed?   Date of most recent vaccine: 11/22/22    Notify provider and RNs if systolic BP >170, diastolic BP >100, P >100 or O2 sats < 90%

## 2023-12-14 NOTE — PATIENT INSTRUCTIONS
[facility]   Procedure Discharge Instructions    Today you saw:   Dr. Hamzah Kingston      You had an:  Caudal injection    Be cautious when walking. Numbness and/or weakness in the lower extremities may occur for up to 6-8 hours after the procedure due to effect of the local anesthetic  Do not drive for 6 hours. The effect of the local anesthetic could slow your reflexes.   You may resume your regular activities after 24 hours  Avoid strenuous activity for the first 24 hours  You may shower, however avoid swimming, tub baths or hot tubs for 24 hours following your procedure  You may have a mild to moderate increase in pain for several days following the injection.  It may take up to 14 days for the steroid medication to start working although you may feel the effect as early as a few days after the procedure.     You may use ice packs for 10-15 minutes, 3 to 4 times a day at the injection site for comfort  Do not use heat to painful areas for 6 to 8 hours. This will give the local anesthetic time to wear off and prevent you from accidentally burning your skin.   Unless you have been directed to avoid the use of anti-inflammatory medications (NSAIDS), you may use medications such as ibuprofen, Aleve or Tylenol for pain control if needed.   If you have diabetes, check your blood sugar more frequently than usual as your blood sugar may be higher than normal for 10-14 days following a steroid injection. Contact your doctor who manages your diabetes if your blood sugar is higher than usual  Possible side effects of steroids that you may experience include flushing, elevated blood pressure, increased appetite, mild headaches and restlessness.  All of these symptoms will get better with time.  If you experience any of the following, call the Pain Clinic during work hours (Mon-Friday 8-4:30 pm) at [phone] or the Provider Line after hours at [phone]:  -Fever over 100 degree  F  -Swelling, bleeding, redness, drainage, warmth at the injection site  -Progressive weakness or numbness in your legs  -Loss of bowel or bladder function  -Unusual new onset of pain that is not improving

## 2023-12-14 NOTE — PROGRESS NOTES
Pre procedure Diagnosis: Lumbar radiculopathy,   Post procedure Diagnosis: Same  Procedure performed: caudal epidural steroid injection  Anesthesia: none  Complications: none  Operators: Hamzah Kingston MD     Indications:   Lavonne Tidwell is a 83 year old female.  They have a history of lbp rad to her LE.  Exam shows + slump and they have tried conservative treatment including meds/pt/injections with variable benefit.    MR rev  Options/alternatives, benefits and risks were discussed with the patient including bleeding, infection, tissue trauma, numbness, weakness, paralysis, spinal cord injury, radiation exposure, headache, reaction to medications including seizure, and effects on the bladder from local anesthetic.  Questions were answered to her satisfaction and she agrees to proceed. Voluntary informed consent was obtained and signed.     Vitals were reviewed: Yes  /56   Pulse 75   SpO2 98%   Allergies were reviewed:  Yes   Medications were reviewed:  Yes   Pre-procedure pain score: 6/10  Post-procedure pain score 2/10    Procedure:  After obtaining signed informed consent, patient was brought into the procedure suite and was placed in a prone position on the procedure table.   A Pause for the Cause was performed.  Patient was prepped and draped in the usual sterile fashion.     The sacral hiatus and cornua were palpated.  Under lateral fluoroscopic guidance sacral hiatus was identified.  3 ml of lidocaine 1% was then injected at the needle entry point to anesthetize the skin. Then a 17 gauge 3.5 inch Tuohy needle was inserted into sacral hiatus utilizing fluoroscopic guidance.  Aspiration was negative for blood or CSF.  Needle placement was verified by injecting Omnipaque 300 2 ml utilizing real time fluoroscopy that showed good needle placement and adequate spread in the lower sacral epidural space without intravascular or intrathecal uptake.  Then, an epidural catheter was advanced through the  Tuohy needle into the epidural space without resistance.  Aspiration was negative.  Catheter placement was verified by injecting Omnipaque 300 1 ml under real time fluoroscopyThen, after repeated negative aspiration, a combination of Kenalog 40 mg, 2 ml of  0.25% Bupivicaine, diluted with 3 ml of normal saline for a total injectate volume of 6 ml was injected in a slow and incremental fashion and the needle was withdrawn. Omnipaque wasted 8.  The injection site was cleaned and sterile dressing applied.     The patient tolerated the procedure well without complications and was taken to the recovery area for continued observation.  They were subsequently discharged to home in good condition after meeting discharge criteria.      Images were saved to PACS.    Post-procedure pain score: 2/10  Follow-up includes:   -f/u with referring provider    Hamzah Kingston MD  [facility]

## 2023-12-18 ENCOUNTER — OFFICE VISIT (OUTPATIENT)
Dept: OTOLARYNGOLOGY | Facility: CLINIC | Age: 83
End: 2023-12-18
Payer: COMMERCIAL

## 2023-12-18 ENCOUNTER — OFFICE VISIT (OUTPATIENT)
Dept: AUDIOLOGY | Facility: CLINIC | Age: 83
End: 2023-12-18
Payer: COMMERCIAL

## 2023-12-18 VITALS
WEIGHT: 172 LBS | DIASTOLIC BLOOD PRESSURE: 69 MMHG | OXYGEN SATURATION: 97 % | SYSTOLIC BLOOD PRESSURE: 135 MMHG | BODY MASS INDEX: 28.66 KG/M2 | TEMPERATURE: 97.8 F | HEIGHT: 65 IN | HEART RATE: 67 BPM

## 2023-12-18 DIAGNOSIS — H90.6 MIXED CONDUCTIVE AND SENSORINEURAL HEARING LOSS OF BOTH EARS: ICD-10-CM

## 2023-12-18 DIAGNOSIS — Z96.22 HISTORY OF PLACEMENT OF EAR TUBES: ICD-10-CM

## 2023-12-18 DIAGNOSIS — H65.493 CHRONIC OTITIS MEDIA WITH EFFUSION, BILATERAL: Primary | ICD-10-CM

## 2023-12-18 DIAGNOSIS — H69.93 DISORDER OF BOTH EUSTACHIAN TUBES: ICD-10-CM

## 2023-12-18 DIAGNOSIS — Z96.22 RETAINED MYRINGOTOMY TUBE IN LEFT EAR: ICD-10-CM

## 2023-12-18 DIAGNOSIS — H90.6 MIXED HEARING LOSS, BILATERAL: Primary | ICD-10-CM

## 2023-12-18 DIAGNOSIS — H65.493 CHRONIC OTITIS MEDIA WITH EFFUSION, BILATERAL: ICD-10-CM

## 2023-12-18 PROCEDURE — 92557 COMPREHENSIVE HEARING TEST: CPT | Performed by: AUDIOLOGIST

## 2023-12-18 PROCEDURE — 92504 EAR MICROSCOPY EXAMINATION: CPT | Performed by: OTOLARYNGOLOGY

## 2023-12-18 PROCEDURE — 92567 TYMPANOMETRY: CPT | Performed by: AUDIOLOGIST

## 2023-12-18 PROCEDURE — 99213 OFFICE O/P EST LOW 20 MIN: CPT | Mod: 25 | Performed by: OTOLARYNGOLOGY

## 2023-12-18 RX ORDER — MUPIROCIN 20 MG/G
OINTMENT TOPICAL 3 TIMES DAILY
Qty: 30 G | Refills: 3 | Status: SHIPPED | OUTPATIENT
Start: 2023-12-18

## 2023-12-18 ASSESSMENT — PAIN SCALES - GENERAL: PAINLEVEL: NO PAIN (0)

## 2023-12-18 NOTE — NURSING NOTE
"Initial /69 (BP Location: Right arm, Patient Position: Chair, Cuff Size: Adult Regular)   Pulse 67   Temp 97.8  F (36.6  C) (Tympanic)   Ht 1.651 m (5' 5\")   Wt 78 kg (172 lb)   SpO2 97%   BMI 28.62 kg/m   Estimated body mass index is 28.62 kg/m  as calculated from the following:    Height as of this encounter: 1.651 m (5' 5\").    Weight as of this encounter: 78 kg (172 lb). .  Chase Young on 12/18/2023 at 11:16 AM    "

## 2023-12-18 NOTE — LETTER
December 18, 2023      Lavonne Tidwell  [address] [place] [id number]        To Whom It May Concern:    Lavonne Tidwell is a patient under my care at [facility].  The patient underwent medical evaluation of their hearing and was found to have bilateral mixed hearing loss. She has an ear tube in the left ear.  The patient is medically clear for hearing amplification.  If you have any questions, feel free to contact me at [phone].    Regards,          Rob Harris MD  Department of Otolaryngology-Head and Neck Surgery  [facility]

## 2023-12-18 NOTE — PROGRESS NOTES
AUDIOLOGY REPORT:    Patient was referred from ENT by Dr. Harris for audiology evaluation. The patient had bilateral PE tubes placed on 11/1/2023. She returns today for follow up and reports that her hearing has improved. She has had occasional drainage in both ears, but not recently. The patient reports that she does not have ear pain or pressure currently.  She reports that she did not have ear problems until around three years ago. The patient also has voice, throat, and nose concerns. She was last tested on 10/27/2023 and results showed mild to profound mixed hearing loss bilaterally.    Testing:    Otoscopy:   Otoscopic exam indicates PE tubes present bilaterally. A small amount of debris, likely ear drop residue, is visible in the right ear.    Tympanograms:    RIGHT: negative pressure      LEFT:   large ear canal volume consistent with patent PE tubes    Thresholds:   Pure Tone Thresholds assessed using conventional audiometry with good reliability from 250-8000 Hz bilaterally using insert earphones and circumaural headphones     RIGHT:  normal hearing sensitivity at 250 Hz sloping to mild to severe essentially sensorineural hearing loss. A significant air-bone gap is present at 4000 Hz.    LEFT:     mild to severe essentially  sensorineural hearing loss with air-bone gaps at 250 and 4000 Hz    Speech Reception Threshold:    RIGHT: 40 dB HL    LEFT:   45 dB HL  Results are in agreement with pure tone average.     Word Recognition Score:     RIGHT: 96% at 80 dB HL using NU-6 recorded word list.    LEFT:   84% at 85 dB HL using NU-6 recorded word list.    Discussed results with the patient. Compared to 10/27/2023, right ear thresholds are 10-20 dB better at all tested frequencies. Left ear thresholds are stable at 500 Hz and 10-30 dB better at all other tested frequencies.    The patient is considering hearing aids and was given a copy of her audiogram.    Patient was returned to ENT for follow up.     [place]  Rosy Curran, [facility]  Licensed Audiologist #[id number]  12/18/2023

## 2023-12-18 NOTE — LETTER
12/18/2023         RE: Lavonne Tidwell  [address] [facility] [id number]        Dear Colleague,    Thank you for referring your patient, Lavonne Tidwell, to the [facility]. Please see a copy of my visit note below.    Chief Complaint   Patient presents with     RECHECK     Follow up      History of Present Illness  Lavonne Tidwell is a 83 year old female who presents today for follow-up. She has a history of left otitis media with effusion and underwent previous T-tube placement in November 2022.  Her tubes subsequently extruded and her ears and hearing were normal.  She again developed upper respiratory illness over the summer 2023 and was seen for follow-up evaluation on 11/1/2023.  At that time, she had significant bilateral middle ear effusion with significant granulation in the right ear making it quite difficult to place an ear tube.  I was able to successfully get ear tubes in both ears in the office.  I placed her on a longer course of Ciprodex drops for the right ear.  The patient returns today for follow-up and audiometric assessment.    From an ear symptom standpoint, the patient reports improvement overall in her right ear symptoms.  The left ear is stable.  She denies any otalgia or otorrhea.  No bloody otorrhea.  No dizziness or vertigo.  Aside from her ear tubes, no previous ear surgery.     Past Medical History  Patient Active Problem List   Diagnosis     Injury of sigmoid colon     Esophageal reflux     Menopausal syndrome (hot flashes)     Urinary incontinence     Atrophic vaginitis     Hyperlipidemia LDL goal <130     Advanced directives, counseling/discussion     Onychomycosis     Senile nuclear sclerosis     Status post total left knee replacement     Status post total right knee replacement     Primary localized osteoarthrosis, lower leg     Tubulovillous adenoma polyp of colon     Essential hypertension     Primary osteoarthritis involving multiple joints      Chronic bilateral low back pain without sciatica     Chronic cough     Current Medications    Current Outpatient Medications:      Acetaminophen (TYLENOL PO), Take 1,000 mg by mouth every 8 hours as needed for other (pain), Disp: , Rfl:      allopurinol (ZYLOPRIM) 100 MG tablet, Take 1 tablet (100 mg) by mouth daily, Disp: 90 tablet, Rfl: 3     famotidine (PEPCID) 40 MG tablet, Take 1 tablet (40 mg) by mouth 2 times daily, Disp: 180 tablet, Rfl: 3     ferrous sulfate (FEROSUL) 325 (65 Fe) MG tablet, Take 1 tablet (325 mg) by mouth Every Mon, Wed, Fri Morning, Disp: 45 tablet, Rfl: 3     fluticasone (FLONASE) 50 MCG/ACT nasal spray, Spray 1 spray into both nostrils 2 times daily, Disp: 16 g, Rfl: 3     ipratropium (ATROVENT) 0.03 % nasal spray, Spray 1-2 sprays into both nostrils 3 times daily as needed for rhinitis (runny nose, postnasal drainage), Disp: 30 mL, Rfl: 1     LANsoprazole (PREVACID) 15 MG DR capsule, Take 1 capsule (15 mg) by mouth daily, Disp: 90 capsule, Rfl: 3     loratadine (CLARITIN) 10 MG tablet, Take 10 mg by mouth daily, Disp: , Rfl:      mupirocin (BACTROBAN) 2 % external ointment, Apply topically 3 times daily Apply to nose as needed, Disp: 30 g, Rfl: 3     PSYLLIUM PO, Take 1 capsule by mouth every morning 3 capsules at bedtime, Disp: , Rfl:      senna-docusate (SENOKOT-S/PERICOLACE) 8.6-50 MG tablet, Take 2 tablets by mouth 2 times daily, Disp: , Rfl:      Bioflavonoid Products (GRAPE SEED PO), Take 1 capsule by mouth daily (Patient not taking: Reported on 12/18/2023), Disp: , Rfl:      Garlic 1000 MG CAPS, Take 1 capsule by mouth daily, Disp: , Rfl:      Multiple Minerals-Vitamins (CITRACAL MAXIMUM PLUS) TABS, Take 2 tablets by mouth 2 times daily (Patient not taking: Reported on 12/18/2023), Disp: , Rfl:      Multiple Vitamins-Minerals (CENTRUM SILVER) per tablet, Take 1 tablet by mouth daily (Patient not taking: Reported on 12/18/2023), Disp: , Rfl:      Vitamin D, Cholecalciferol, 25  "MCG (1000 UT) CAPS, Take 1 capsule by mouth daily (Patient not taking: Reported on 2023), Disp: , Rfl:     Allergies  Allergies   Allergen Reactions     Codeine GI Disturbance       Social History  Social History     Socioeconomic History     Marital status:    Tobacco Use     Smoking status: Former     Packs/day: 0.50     Years: 5.00     Pack years: 2.50     Types: Cigarettes     Start date: 1968     Quit date: 2/15/1972     Years since quittin.4     Smokeless tobacco: Never     Tobacco comments:     stopped in her 20's   Substance and Sexual Activity     Alcohol use: Yes     Alcohol/week: 0.0 standard drinks of alcohol     Comment: Occasional     Drug use: No     Sexual activity: Not Currently   Other Topics Concern     Parent/sibling w/ CABG, MI or angioplasty before 65F 55M? Yes       Family History  Family History   Problem Relation Age of Onset     Cancer Mother         Ovarian     Other Cancer Mother         Ovarian     Cerebrovascular Disease Father      Heart Disease Father      C.A.D. Father      Coronary Artery Disease Father      Cancer Maternal Grandmother      Other Cancer Maternal Grandmother         Ovarian/Bone     Cerebrovascular Disease Maternal Grandfather      Colon Cancer Maternal Grandfather      Diabetes Other         Great Grandmother     Breast Cancer No family hx of        Review of Systems  As per HPI and PMHx, otherwise 10 system review including the head and neck, constitutional, eyes, respiratory, GI, skin, neurologic, lymphatic, endocrine, and allergy systems is negative.    Physical Exam  /69 (BP Location: Right arm, Patient Position: Chair, Cuff Size: Adult Regular)   Pulse 67   Temp 97.8  F (36.6  C) (Tympanic)   Ht 1.651 m (5' 5\")   Wt 78 kg (172 lb)   SpO2 97%   BMI 28.62 kg/m    GENERAL: Patient is a pleasant, cooperative 83 year old female in no acute distress.  HEAD: Normocephalic, atraumatic.  Hair and scalp are normal.  EYES: Pupils are " equal, round, reactive to light and accommodation.  Extraocular movements are intact.  The sclera nonicteric without injection.  The extraocular structures are normal.  EARS:See below.  NOSE: Nares are patent.  Nasal mucosa is boggy and inflamed with sticky, inflammatory mucus.  Patient has significantly dry nasal skin and some redness.  Negative anterior rhinoscopy.  NECK: Supple, trachea is midline.  There no palpable cervical lymphadenopathy or masses bilaterally.  Palpation of the bilateral parotid and submandibular areas reveal no masses.  No thyromegaly.    NEUROLOGIC: Cranial nerves II through XII are grossly intact.  Voice is strong.  Patient is House-Brackmann I/VI bilaterally.  CARDIOVASCULAR: Extremities are warm and well-perfused.  No significant peripheral edema.  RESPIRATORY: Patient has nonlabored breathing without cough, wheeze, stridor.  PSYCHIATRIC: Patient is alert and oriented.  Mood and affect appear normal.  SKIN: Warm and dry.  No scalp, face, or neck lesions noted.    Physical Exam and Procedure    EARS: Normal shape and symmetry.  No tenderness when palpating the mastoid or tragal areas bilaterally.  The ears were then examined under the otic binocular microscope to perform cerumen removal.  Otoscopic exam on the right reveals a clear canal.  There is an extruded Dura-Vent ventilation tube overlying the tympanic membrane.  The ear tube was removed.  The right tympanic membrane was round, intact and slightly opaque.  There is no evidence of effusion.  No retraction, granulation, drainage, or evidence of cholesteatoma.      Attention was turned to the left ear.  Otoscopic exam on the left reveals a clear canal.  There is a Dura-Vent ventilation tube in the posterior-inferior quadrant with some slight surrounding crusting.  There is mild to moderate retraction and obvious evidence of serous middle ear effusion.  No retraction, granulation, drainage, or evidence of cholesteatoma.       Audiogram  The patient underwent an audiogram performed today.  My review of the audiogram shows mild to moderate to moderately severe to severe mixed hearing loss in the right ear and mild sloping to moderate sloping to moderately severe mixed hearing loss in the left ear.  Pure-tone average is 35 dB on the right and 48 dB on the left.  Speech reception threshold is 40 dB on the right and 45 dB on the left.  The patient had 96% word recognition on the right and 84% word recognition on the left.  The patient had a slight negative pressure type C tympanogram on the right and a large volume type B tympanogram on the left.    Assessment and Plan     ICD-10-CM    1. Chronic otitis media with effusion, bilateral  H65.493 Adult Audiology  Referral     mupirocin (BACTROBAN) 2 % external ointment      2. Mixed conductive and sensorineural hearing loss of both ears  H90.6 Adult Audiology  Referral     mupirocin (BACTROBAN) 2 % external ointment      3. History of placement of ear tubes  Z96.22 Adult Audiology  Referral     mupirocin (BACTROBAN) 2 % external ointment      4. Retained myringotomy tube in left ear  Z96.22 Adult Audiology  Referral     mupirocin (BACTROBAN) 2 % external ointment        It was my pleasure seeing Lavonne Tidwell today in clinic.  Unfortunately, the right ear tube is already extruded.  However, the eardrum is healed and there is no evidence of middle ear effusion.  She does have some mild mixed during loss on that side but overall the ears aerated.  The left ear tube is in good placement.  At this point in time, I would recommend observation.  The patient will return in 4 to 6 months for follow-up and ear tube recheck.  The patient knows to contact us sooner with any problems or concerns.    With regards to the patient's nose, we discussed stopping the triple antibiotic ointment/bacitracin and starting mupirocin as needed.  I sent the prescription to her  pharmacy.  She will continue her reflux medication famotidine 40 mg twice daily.    Rob Harris MD  Department of Otolaryngology-Head and Neck Surgery  [facility]       Again, thank you for allowing me to participate in the care of your patient.        Sincerely,        Rob Harris MD

## 2024-01-18 ENCOUNTER — TELEPHONE (OUTPATIENT)
Dept: FAMILY MEDICINE | Facility: CLINIC | Age: 84
End: 2024-01-18

## 2024-01-18 NOTE — TELEPHONE ENCOUNTER
I would have her come in tomorrow am when I am in clinic for an RN visit to have her BP checked if high we can start something then

## 2024-01-18 NOTE — TELEPHONE ENCOUNTER
WILBERTO:  Spoke with Lavonne /90 this morning, high BP started a week or so ago. She states she has been off her lisinopril for a long time and BP had been around 110-120/80, the hypertension came on suddenly this time. She will bring her BP cuff with her to tomorrows appointment as well to compare.

## 2024-01-18 NOTE — TELEPHONE ENCOUNTER
Lavonne thought she sent a [employer] message 2 days ago but must not of pressed the button. She says her BP has been 170/90 for the past 2 days. She says Dr Kowalski wanted to know if it was above 140/90, she does not have any symptoms. She is wondering if she should just watch for a few more days or do something different with her medication?

## 2024-01-19 ENCOUNTER — ALLIED HEALTH/NURSE VISIT (OUTPATIENT)
Dept: FAMILY MEDICINE | Facility: CLINIC | Age: 84
End: 2024-01-19
Payer: COMMERCIAL

## 2024-01-19 ENCOUNTER — TELEPHONE (OUTPATIENT)
Dept: FAMILY MEDICINE | Facility: CLINIC | Age: 84
End: 2024-01-19

## 2024-01-19 VITALS — HEART RATE: 72 BPM | SYSTOLIC BLOOD PRESSURE: 136 MMHG | DIASTOLIC BLOOD PRESSURE: 64 MMHG

## 2024-01-19 DIAGNOSIS — Z01.30 BLOOD PRESSURE CHECK: Primary | ICD-10-CM

## 2024-01-19 PROCEDURE — 99207 PR NO CHARGE NURSE ONLY: CPT

## 2024-01-19 NOTE — TELEPHONE ENCOUNTER
Pt informed/ advised as noted per MD.  Advised continue periodic RN visit BP monitoring.  May also consider purchasing arm monitor.  FRANCISCA Bedoya RN

## 2024-01-19 NOTE — PROGRESS NOTES
Lavonne Tidwell is a 83 year old year old patient who comes in today for a Blood Pressure check because of elevated home BP readings.  BP Readings from Last 6 Encounters:   01/19/24 136/64   12/18/23 135/69   12/14/23 128/56   11/01/23 133/74   10/23/23 135/62   10/20/23 135/66    HR 72  No current antihypertensive meds  Patient is monitoring Blood Pressure at home.  Average readings if yes are 170/80-low 90's past several days, 6 AM, with wrist monitor.  BP check per pt's wrist monitor in clinic today 146/76, HR 74.  Current complaints: none  Disposition:   Forwarded to Dr. Kowalski for review.  FRANCISCA Bedoya RN

## 2024-01-19 NOTE — TELEPHONE ENCOUNTER
Please call pt back and let her know BP is fine her wrist cuff reads a bit high   I do not think we need to restart lisinopril

## 2024-02-02 NOTE — PROGRESS NOTES
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    Impression:  No evidence of progressive stage IV B cell marginal zone lymphoma  Chronic rhinitis  Mild, nonprogressive macrocytosis    Recommendation, plan, instructions:  Recommend no intervention at this time  Follow-up with Estela Barrett NP in 4 months with CBC CMP, reticulocyte count and LDH before appointment    Time with patient including review, documentation, history, examination, coordination of care and counseling was 20 minutes.    History of present illness:  In June, 2023, the patient presented with a 6-month history of a sore throat, productive cough, chronic sinusitis with CT imaging revealing intrathoracic lymphadenopathy associated with night sweats.  She was seen at the [facility] by Dr. Robby Peña.    A 6/16/2023 left axillary lymph node biopsy revealed a low-grade , MYD88 negative, CD5/CD10 negative B-cell malignant lymphoma consistent with a marginal zone lymphoma.  Bone marrow aspirate and biopsy revealed a hypercellular marrow involved by marginal zone lymphoma of at least 80% with lambda light chain restriction.  Serum protein electrophoresis confirmed a 0.1 g/dL lambda monoclonal protein.  She also had a mild macrocytic anemia with negative B12, folate, TSH, reticulocyte count and liver function studies.  PET imaging confirmed lymphadenopathy above and below the diaphragm.    When evaluated by Dr. Peña on 7/20/2023, the absence of constitutional symptoms led him to recommend observation without intervention.  10/23/2023 follow-up with Estela Barrett NP was similarly unremarkable with her history, examination, and CBC/CMP.    In retrospect, her night sweats are improved.  Her chief  complaints are that of fatigue, sleepiness and hand arthritis.  She is also seeing Dr. Cardozo regarding her allergic rhinitis.  Her 2/5/2024 CBC, CMP and LDH are unremarkable including an actual slightly improved hemoglobin up to 11.8.    Past medical history:  Past Medical History:   Diagnosis Date    Arthritis 2012    knes and hips    Cancer (H)     Essential hypertension 05/05/2021    History of blood transfusion 1961    Miscarriage    Ovarian cysts 1974    s/p BSO        Family history:  I have reviewed this patient's family history and updated it with pertinent information if needed.  Family History   Problem Relation Age of Onset    Cancer Mother         Ovarian    Other Cancer Mother         Ovarian    Cerebrovascular Disease Father     Heart Disease Father     C.A.D. Father     Coronary Artery Disease Father     Cancer Maternal Grandmother     Other Cancer Maternal Grandmother         Ovarian/Bone    Cerebrovascular Disease Maternal Grandfather     Colon Cancer Maternal Grandfather     Diabetes Other         Great Grandmother    Breast Cancer No family hx of          Medications:  Current Outpatient Medications   Medication    Acetaminophen (TYLENOL PO)    allopurinol (ZYLOPRIM) 100 MG tablet    famotidine (PEPCID) 40 MG tablet    ferrous sulfate (FEROSUL) 325 (65 Fe) MG tablet    fluticasone (FLONASE) 50 MCG/ACT nasal spray    Garlic 1000 MG CAPS    ipratropium (ATROVENT) 0.03 % nasal spray    loratadine (CLARITIN) 10 MG tablet    mupirocin (BACTROBAN) 2 % external ointment    PSYLLIUM PO    senna-docusate (SENOKOT-S/PERICOLACE) 8.6-50 MG tablet    Bioflavonoid Products (GRAPE SEED PO)    LANsoprazole (PREVACID) 15 MG DR capsule    Multiple Minerals-Vitamins (CITRACAL MAXIMUM PLUS) TABS    Multiple Vitamins-Minerals (CENTRUM SILVER) per tablet    Vitamin D, Cholecalciferol, 25 MCG (1000 UT) CAPS     No current facility-administered medications for this visit.       Allergies:  Allergies   Allergen  "Reactions    Codeine GI Disturbance       Review of systems:  Except as noted in the note above, the patient denies headaches, diplopia, hearing loss or dizziness; dyspnea, cough, hemoptysis, pleurisy; chest pain/pressure, palpitations, lightheadedness; anorexia, nausea, vomiting, abdominal pain, diarrhea, constipation, melena or rectal bleeding; dysuria, frequency,  blood in the urine; vaginal bleeding or discharge; fever, chills, sweats, hot flashes; tingling, numbness, loss of balance; insomnia, depression, anxiety.    Physical examination:  /57 (BP Location: Right arm, Patient Position: Sitting, Cuff Size: Adult Regular)   Pulse 74   Temp 97.8  F (36.6  C) (Tympanic)   Resp 16   Ht 1.651 m (5' 5\")   Wt 78 kg (172 lb)   SpO2 97%   BMI 28.62 kg/m      Multiple 6-7 mm right posterior cervical lymph nodes noted.  There are several 5-6 mm left supraclavicular lymph nodes noted.  At least two or three 16 x 20 mm left axillary lymph nodes are noted.  Of note, I do not palpate any right axillary adenopathy as described in her 2023 PET scan.    The patient is alert and oriented. Throat and oral mucosa are normal-appearing. Thyroid gland is not enlarged. Adenopathy is absent in the groin; Lungs are clear to auscultation and percussion without rales, wheezes or rubs; heart rhythm is regular, heart sounds are without murmurs or gallops; the abdomen is soft and flat without hepatosplenomegaly, masses, ascites or tenderness.; extremities and skin reveal no unusual skin lesions, joint swelling or edema;        Enio Haywood MD  "

## 2024-02-05 ENCOUNTER — LAB (OUTPATIENT)
Dept: LAB | Facility: CLINIC | Age: 84
End: 2024-02-05
Payer: COMMERCIAL

## 2024-02-05 DIAGNOSIS — C85.80 MARGINAL ZONE B-CELL LYMPHOMA (H): ICD-10-CM

## 2024-02-05 LAB
ALBUMIN SERPL BCG-MCNC: 4.2 G/DL (ref 3.5–5.2)
ALP SERPL-CCNC: 143 U/L (ref 40–150)
ALT SERPL W P-5'-P-CCNC: 12 U/L (ref 0–50)
ANION GAP SERPL CALCULATED.3IONS-SCNC: 13 MMOL/L (ref 7–15)
AST SERPL W P-5'-P-CCNC: 23 U/L (ref 0–45)
BASOPHILS # BLD AUTO: 0 10E3/UL (ref 0–0.2)
BASOPHILS NFR BLD AUTO: 0 %
BILIRUB SERPL-MCNC: 0.7 MG/DL
BUN SERPL-MCNC: 18.2 MG/DL (ref 8–23)
CALCIUM SERPL-MCNC: 9.8 MG/DL (ref 8.8–10.2)
CHLORIDE SERPL-SCNC: 103 MMOL/L (ref 98–107)
CREAT SERPL-MCNC: 0.73 MG/DL (ref 0.51–0.95)
DEPRECATED HCO3 PLAS-SCNC: 24 MMOL/L (ref 22–29)
EGFRCR SERPLBLD CKD-EPI 2021: 81 ML/MIN/1.73M2
EOSINOPHIL # BLD AUTO: 0.1 10E3/UL (ref 0–0.7)
EOSINOPHIL NFR BLD AUTO: 1 %
ERYTHROCYTE [DISTWIDTH] IN BLOOD BY AUTOMATED COUNT: 14.2 % (ref 10–15)
GLUCOSE SERPL-MCNC: 98 MG/DL (ref 70–99)
HCT VFR BLD AUTO: 37.9 % (ref 35–47)
HGB BLD-MCNC: 11.8 G/DL (ref 11.7–15.7)
IMM GRANULOCYTES # BLD: 0 10E3/UL
IMM GRANULOCYTES NFR BLD: 0 %
LDH SERPL L TO P-CCNC: 193 U/L (ref 0–250)
LYMPHOCYTES # BLD AUTO: 4.4 10E3/UL (ref 0.8–5.3)
LYMPHOCYTES NFR BLD AUTO: 57 %
MCH RBC QN AUTO: 32.5 PG (ref 26.5–33)
MCHC RBC AUTO-ENTMCNC: 31.1 G/DL (ref 31.5–36.5)
MCV RBC AUTO: 104 FL (ref 78–100)
MONOCYTES # BLD AUTO: 0.2 10E3/UL (ref 0–1.3)
MONOCYTES NFR BLD AUTO: 2 %
NEUTROPHILS # BLD AUTO: 3.1 10E3/UL (ref 1.6–8.3)
NEUTROPHILS NFR BLD AUTO: 40 %
NRBC # BLD AUTO: 0 10E3/UL
NRBC BLD AUTO-RTO: 0 /100
PLATELET # BLD AUTO: 263 10E3/UL (ref 150–450)
POTASSIUM SERPL-SCNC: 4.6 MMOL/L (ref 3.4–5.3)
PROT SERPL-MCNC: 8 G/DL (ref 6.4–8.3)
RBC # BLD AUTO: 3.63 10E6/UL (ref 3.8–5.2)
SODIUM SERPL-SCNC: 140 MMOL/L (ref 135–145)
WBC # BLD AUTO: 7.8 10E3/UL (ref 4–11)

## 2024-02-05 PROCEDURE — 83615 LACTATE (LD) (LDH) ENZYME: CPT

## 2024-02-05 PROCEDURE — 36415 COLL VENOUS BLD VENIPUNCTURE: CPT

## 2024-02-05 PROCEDURE — 80053 COMPREHEN METABOLIC PANEL: CPT

## 2024-02-05 PROCEDURE — 85025 COMPLETE CBC W/AUTO DIFF WBC: CPT

## 2024-02-08 ENCOUNTER — ONCOLOGY VISIT (OUTPATIENT)
Dept: ONCOLOGY | Facility: CLINIC | Age: 84
End: 2024-02-08
Attending: INTERNAL MEDICINE
Payer: COMMERCIAL

## 2024-02-08 VITALS
BODY MASS INDEX: 28.66 KG/M2 | WEIGHT: 172 LBS | TEMPERATURE: 97.8 F | OXYGEN SATURATION: 97 % | SYSTOLIC BLOOD PRESSURE: 135 MMHG | DIASTOLIC BLOOD PRESSURE: 57 MMHG | RESPIRATION RATE: 16 BRPM | HEART RATE: 74 BPM | HEIGHT: 65 IN

## 2024-02-08 DIAGNOSIS — C85.80 MARGINAL ZONE LYMPHOMA (H): Primary | ICD-10-CM

## 2024-02-08 PROCEDURE — 99213 OFFICE O/P EST LOW 20 MIN: CPT | Performed by: INTERNAL MEDICINE

## 2024-02-08 PROCEDURE — G0463 HOSPITAL OUTPT CLINIC VISIT: HCPCS | Performed by: INTERNAL MEDICINE

## 2024-02-08 ASSESSMENT — PAIN SCALES - GENERAL: PAINLEVEL: NO PAIN (0)

## 2024-02-08 NOTE — LETTER
2/8/2024         RE: Lavonne Tidwell  [address]  [facility] [address]        Dear Colleague,    Thank you for referring your patient, Lavonne Tidwell, to the [facility]. Please see a copy of my visit note below.    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    Impression:  No evidence of progressive stage IV B cell marginal zone lymphoma  Chronic rhinitis  Mild, nonprogressive macrocytosis    Recommendation, plan, instructions:  Recommend no intervention at this time  Follow-up with Estela Barrett NP in 4 months with CBC CMP, reticulocyte count and LDH before appointment    Time with patient including review, documentation, history, examination, coordination of care and counseling was 20 minutes.    History of present illness:  In June, 2023, the patient presented with a 6-month history of a sore throat, productive cough, chronic sinusitis with CT imaging revealing intrathoracic lymphadenopathy associated with night sweats.  She was seen at the [facility] by Dr. Robby Peña.    A 6/16/2023 left axillary lymph node biopsy revealed a low-grade , MYD88 negative, CD5/CD10 negative B-cell malignant lymphoma consistent with a marginal zone lymphoma.  Bone marrow aspirate and biopsy revealed a hypercellular marrow involved by marginal zone lymphoma of at least 80% with lambda light chain restriction.  Serum protein electrophoresis confirmed a 0.1 g/dL lambda monoclonal protein.  She also had a mild macrocytic anemia with negative B12, folate, TSH, reticulocyte count and liver function studies.  PET imaging confirmed lymphadenopathy above and below the diaphragm.    When evaluated by Dr. Peña on 7/20/2023, the  absence of constitutional symptoms led him to recommend observation without intervention.  10/23/2023 follow-up with Estela Barrett NP was similarly unremarkable with her history, examination, and CBC/CMP.    In retrospect, her night sweats are improved.  Her chief complaints are that of fatigue, sleepiness and hand arthritis.  She is also seeing Dr. Cardozo regarding her allergic rhinitis.  Her 2/5/2024 CBC, CMP and LDH are unremarkable including an actual slightly improved hemoglobin up to 11.8.    Past medical history:  Past Medical History:   Diagnosis Date     Arthritis 2012    knes and hips     Cancer (H)      Essential hypertension 05/05/2021     History of blood transfusion 1961    Miscarriage     Ovarian cysts 1974    s/p BSO        Family history:  I have reviewed this patient's family history and updated it with pertinent information if needed.  Family History   Problem Relation Age of Onset     Cancer Mother         Ovarian     Other Cancer Mother         Ovarian     Cerebrovascular Disease Father      Heart Disease Father      C.A.D. Father      Coronary Artery Disease Father      Cancer Maternal Grandmother      Other Cancer Maternal Grandmother         Ovarian/Bone     Cerebrovascular Disease Maternal Grandfather      Colon Cancer Maternal Grandfather      Diabetes Other         Great Grandmother     Breast Cancer No family hx of          Medications:  Current Outpatient Medications   Medication     Acetaminophen (TYLENOL PO)     allopurinol (ZYLOPRIM) 100 MG tablet     famotidine (PEPCID) 40 MG tablet     ferrous sulfate (FEROSUL) 325 (65 Fe) MG tablet     fluticasone (FLONASE) 50 MCG/ACT nasal spray     Garlic 1000 MG CAPS     ipratropium (ATROVENT) 0.03 % nasal spray     loratadine (CLARITIN) 10 MG tablet     mupirocin (BACTROBAN) 2 % external ointment     PSYLLIUM PO     senna-docusate (SENOKOT-S/PERICOLACE) 8.6-50 MG tablet     Bioflavonoid Products (GRAPE SEED PO)     LANsoprazole (PREVACID) 15 MG   "capsule     Multiple Minerals-Vitamins (CITRACAL MAXIMUM PLUS) TABS     Multiple Vitamins-Minerals (CENTRUM SILVER) per tablet     Vitamin D, Cholecalciferol, 25 MCG (1000 UT) CAPS     No current facility-administered medications for this visit.       Allergies:  Allergies   Allergen Reactions     Codeine GI Disturbance       Review of systems:  Except as noted in the note above, the patient denies headaches, diplopia, hearing loss or dizziness; dyspnea, cough, hemoptysis, pleurisy; chest pain/pressure, palpitations, lightheadedness; anorexia, nausea, vomiting, abdominal pain, diarrhea, constipation, melena or rectal bleeding; dysuria, frequency,  blood in the urine; vaginal bleeding or discharge; fever, chills, sweats, hot flashes; tingling, numbness, loss of balance; insomnia, depression, anxiety.    Physical examination:  /57 (BP Location: Right arm, Patient Position: Sitting, Cuff Size: Adult Regular)   Pulse 74   Temp 97.8  F (36.6  C) (Tympanic)   Resp 16   Ht 1.651 m (5' 5\")   Wt 78 kg (172 lb)   SpO2 97%   BMI 28.62 kg/m      Multiple 6-7 mm right posterior cervical lymph nodes noted.  There are several 5-6 mm left supraclavicular lymph nodes noted.  At least two or three 16 x 20 mm left axillary lymph nodes are noted.  Of note, I do not palpate any right axillary adenopathy as described in her 2023 PET scan.    The patient is alert and oriented. Throat and oral mucosa are normal-appearing. Thyroid gland is not enlarged. Adenopathy is absent in the groin; Lungs are clear to auscultation and percussion without rales, wheezes or rubs; heart rhythm is regular, heart sounds are without murmurs or gallops; the abdomen is soft and flat without hepatosplenomegaly, masses, ascites or tenderness.; extremities and skin reveal no unusual skin lesions, joint swelling or edema;        Enio Haywood MD    Oncology Rooming Note    February 8, 2024 10:30 AM   Lvaonne Tidwell is a 83 year old female who " "presents for:    Chief Complaint   Patient presents with     Oncology Clinic Visit     Low grade lymphoma - Provider visit only     Initial Vitals: /57 (BP Location: Right arm, Patient Position: Sitting, Cuff Size: Adult Regular)   Pulse 74   Temp 97.8  F (36.6  C) (Tympanic)   Resp 16   Ht 1.651 m (5' 5\")   Wt 78 kg (172 lb)   SpO2 97%   BMI 28.62 kg/m   Estimated body mass index is 28.62 kg/m  as calculated from the following:    Height as of this encounter: 1.651 m (5' 5\").    Weight as of this encounter: 78 kg (172 lb). Body surface area is 1.89 meters squared.  No Pain (0) Comment: Data Unavailable   No LMP recorded. Patient has had a hysterectomy.  Allergies reviewed: Yes  Medications reviewed: Yes    Medications: Medication refills not needed today.  Pharmacy name entered into [facility] [employer]:    [facility], [place] - [address] [place] [address] PHARMACY [address][id number] [place], [place] - [address] [facility]    Frailty Screening:   Is the patient here for a new oncology consult visit in cancer care? 2. No      Clinical concerns:  None      Nery Donaldson CMA                Again, thank you for allowing me to participate in the care of your patient.        Sincerely,        Enio Haywood MD  "

## 2024-02-08 NOTE — PROGRESS NOTES
"Oncology Rooming Note    February 8, 2024 10:30 AM   Lavonne Tidwell is a 83 year old female who presents for:    Chief Complaint   Patient presents with    Oncology Clinic Visit     Low grade lymphoma - Provider visit only     Initial Vitals: /57 (BP Location: Right arm, Patient Position: Sitting, Cuff Size: Adult Regular)   Pulse 74   Temp 97.8  F (36.6  C) (Tympanic)   Resp 16   Ht 1.651 m (5' 5\")   Wt 78 kg (172 lb)   SpO2 97%   BMI 28.62 kg/m   Estimated body mass index is 28.62 kg/m  as calculated from the following:    Height as of this encounter: 1.651 m (5' 5\").    Weight as of this encounter: 78 kg (172 lb). Body surface area is 1.89 meters squared.  No Pain (0) Comment: Data Unavailable   No LMP recorded. Patient has had a hysterectomy.  Allergies reviewed: Yes  Medications reviewed: Yes    Medications: Medication refills not needed today.  Pharmacy name entered into [facility]:    [facility] [place], [place] - [address] [place] [address] PHARMACY [address][id number] [place], [place] - [address] [facility]    Frailty Screening:   Is the patient here for a new oncology consult visit in cancer care? 2. No      Clinical concerns:  None      Nery Donaldson CMA              "

## 2024-02-15 ENCOUNTER — OFFICE VISIT (OUTPATIENT)
Dept: ALLERGY | Facility: CLINIC | Age: 84
End: 2024-02-15
Attending: ALLERGY & IMMUNOLOGY
Payer: COMMERCIAL

## 2024-02-15 VITALS
SYSTOLIC BLOOD PRESSURE: 102 MMHG | OXYGEN SATURATION: 99 % | HEART RATE: 86 BPM | WEIGHT: 172 LBS | BODY MASS INDEX: 28.62 KG/M2 | DIASTOLIC BLOOD PRESSURE: 65 MMHG

## 2024-02-15 DIAGNOSIS — R05.3 CHRONIC COUGH: ICD-10-CM

## 2024-02-15 DIAGNOSIS — K21.9 GASTROESOPHAGEAL REFLUX DISEASE WITHOUT ESOPHAGITIS: Chronic | ICD-10-CM

## 2024-02-15 DIAGNOSIS — R09.89 THROAT CLEARING: ICD-10-CM

## 2024-02-15 PROCEDURE — 99213 OFFICE O/P EST LOW 20 MIN: CPT | Performed by: ALLERGY & IMMUNOLOGY

## 2024-02-15 RX ORDER — FLUTICASONE PROPIONATE 50 MCG
1 SPRAY, SUSPENSION (ML) NASAL 2 TIMES DAILY
Qty: 16 G | Refills: 3 | Status: SHIPPED | OUTPATIENT
Start: 2024-02-15

## 2024-02-15 RX ORDER — IPRATROPIUM BROMIDE 21 UG/1
1-2 SPRAY, METERED NASAL 3 TIMES DAILY PRN
Qty: 30 ML | Refills: 1 | Status: SHIPPED | OUTPATIENT
Start: 2024-02-15

## 2024-02-15 RX ORDER — MECOBALAMIN 5000 MCG
15 TABLET,DISINTEGRATING ORAL DAILY
Qty: 90 CAPSULE | Refills: 3 | Status: SHIPPED | OUTPATIENT
Start: 2024-02-15

## 2024-02-15 ASSESSMENT — ENCOUNTER SYMPTOMS
VOMITING: 0
SORE THROAT: 1
SEIZURES: 0
BACK PAIN: 0
FEVER: 0
DYSURIA: 0
HEMATURIA: 0
PALPITATIONS: 0
CHILLS: 0
ABDOMINAL PAIN: 0
EYE DISCHARGE: 1
COLOR CHANGE: 0
ARTHRALGIAS: 0
COUGH: 1
EYE PAIN: 0

## 2024-02-15 NOTE — LETTER
2/15/2024         RE: Lavonne Tidwell  [address] [facility] [address]        Dear Colleague,    Thank you for referring your patient, Lavonne Tidwell, to the [facility]. Please see a copy of my visit note below.    SUBJECTIVE:                                                                   Lavonne Tidwell presents today to our Allergy Clinic at [facility] for a follow up visit. She is a 83 year old female with a history of chronic rhinitis..  Persistent throat clearing, associated with cough, clear rhinorrhea, postnasal drainage, and sore throat since December 2022.  During the workup, the patient was diagnosed with B-cell non-Hodgkin lymphoma.  Switching her ACE inhibitor to an ARB did not seem to be helpful.  A combination of famotidine and lansoprazole provided and approximately 50% improvement in symptoms.  Serum IgE for the regional aeroallergen panel performed in October 2023 was negative.    The patient states that she takes famotidine twice daily.  She stopped lansoprazole, although it was helpful in the past.  She has been using ipratropium bromide nasal spray, but for some reason, she stopped intranasal fluticasone. She presents with symptoms of rhinorrhea, postnasal drainage, and newly reported nasal congestion, alongside a persistent dry cough accompanied by throat clearing. The cessation of intranasal fluticasone possibly correlating with a worsening of her nasal congestion, particularly noted as severe at night, to the point of impairing nasal breathing.    Sometimes nasal congestion can be so bad, especially at night, that she has difficulty breathing through her nose.      Patient Active Problem List   Diagnosis     Injury of sigmoid colon     Esophageal reflux     Menopausal syndrome (hot flashes)     Urinary incontinence     Atrophic vaginitis     Hyperlipidemia LDL goal <130     Advanced directives, counseling/discussion      Onychomycosis     Senile nuclear sclerosis     Status post total left knee replacement     Status post total right knee replacement     Primary localized osteoarthrosis, lower leg     Tubulovillous adenoma polyp of colon     Essential hypertension     Primary osteoarthritis involving multiple joints     Chronic bilateral low back pain without sciatica     Chronic cough       Past Medical History:   Diagnosis Date     Arthritis 2012    knes and hips     Cancer (H)      Essential hypertension 05/05/2021     History of blood transfusion 1961    Miscarriage     Ovarian cysts 1974    s/p BSO      Problem (# of Occurrences) Relation (Name,Age of Onset)    Cancer (2) Mother (Ana): Ovarian, Maternal Grandmother (Mikala)    Diabetes (1) Other (Agnes Valdez): Great Grandmother    Heart Disease (1) Father ([place])    Cerebrovascular Disease (2) Father ([place]), Maternal Grandfather (Ede Chambers)    C.A.D. (1) Father ([place])    Other Cancer (2) Mother (Ana): Ovarian, Maternal Grandmother (Mikala): Ovarian/Bone    Coronary Artery Disease (1) Father ([place])    Colon Cancer (1) Maternal Grandfather (Ede Chambers)           Negative family history of: Breast Cancer          Past Surgical History:   Procedure Laterality Date     ABDOMEN SURGERY  1973 & 1974    Ovarian cyst/Hysterectomy     APPENDECTOMY  1952     ARTHROPLASTY KNEE Left 1/6/2016    Procedure: ARTHROPLASTY KNEE;  Surgeon: Wilfredo Thompson MD;  Location: [facility] OR     ARTHROPLASTY KNEE Right 2/23/2017    Procedure: ARTHROPLASTY KNEE;  Surgeon: Wilfredo Thompson MD;  Location: [facility] OR     BIOPSY      colonoscopy/polyps     COLONOSCOPY      every 10 years     GENITOURINARY SURGERY  2008    bladder     HYSTERECTOMY, CHELSY  1974     ORTHOPEDIC SURGERY  2007 & 2009    [place]  both feet     PHACOEMULSIFICATION WITH STANDARD INTRAOCULAR LENS IMPLANT Left 8/20/2015    Procedure: PHACOEMULSIFICATION WITH STANDARD INTRAOCULAR LENS IMPLANT;  Surgeon: Fernando Jeffrey MD;   Location: [facility] OR     PHACOEMULSIFICATION WITH STANDARD INTRAOCULAR LENS IMPLANT Right 2015    Procedure: PHACOEMULSIFICATION WITH STANDARD INTRAOCULAR LENS IMPLANT;  Surgeon: Fernando Jeffrey MD;  Location: [facility] OR     SURGICAL HISTORY OF -   1998    Colonoscopy     SURGICAL HISTORY OF -       Bilateral salpingoopherectomy     SURGICAL HISTORY OF -   3/09    TOT Midurethral sling     Social History     Socioeconomic History     Marital status:      Spouse name: None     Number of children: None     Years of education: None     Highest education level: None   Tobacco Use     Smoking status: Former     Packs/day: 0.50     Years: 5.00     Additional pack years: 0.00     Total pack years: 2.50     Types: Cigarettes     Start date: 1968     Quit date: 2/15/1972     Years since quittin.0     Smokeless tobacco: Never     Tobacco comments:     stopped in her 20's   Vaping Use     Vaping Use: Never used   Substance and Sexual Activity     Alcohol use: Yes     Comment: Occasional     Drug use: No     Sexual activity: Not Currently   Other Topics Concern     Parent/sibling w/ CABG, MI or angioplasty before 65F 55M? Yes   Social History Narrative    02/15/24        ENVIRONMENTAL HISTORY: [place] The home is heated with a forced air. They does have central air conditioning. The patient's bedroom is furnished with carpeting in bedroom and fabric window coverings.  Pets inside the house include 0. There no history of cockroach or mice infestation. There is/are 0 smokers in the house.  The house does not have a damp basement.            Review of Systems   Constitutional:  Negative for chills and fever.   HENT:  Positive for congestion, postnasal drip and sore throat. Negative for ear pain.    Eyes:  Positive for discharge. Negative for pain and visual disturbance.        Runny eyes   Respiratory:  Positive for cough.    Cardiovascular:  Negative for  chest pain and palpitations.   Gastrointestinal:  Negative for abdominal pain and vomiting.   Genitourinary:  Negative for dysuria and hematuria.   Musculoskeletal:  Negative for arthralgias and back pain.   Skin:  Negative for color change and rash.   Neurological:  Negative for seizures and syncope.   All other systems reviewed and are negative.          Current Outpatient Medications:      Acetaminophen (TYLENOL PO), Take 1,000 mg by mouth every 8 hours as needed for other (pain), Disp: , Rfl:      allopurinol (ZYLOPRIM) 100 MG tablet, Take 1 tablet (100 mg) by mouth daily, Disp: 90 tablet, Rfl: 3     famotidine (PEPCID) 40 MG tablet, Take 1 tablet (40 mg) by mouth 2 times daily, Disp: 180 tablet, Rfl: 3     ferrous sulfate (FEROSUL) 325 (65 Fe) MG tablet, Take 1 tablet (325 mg) by mouth Every Mon, Wed, Fri Morning, Disp: 45 tablet, Rfl: 3     fluticasone (FLONASE) 50 MCG/ACT nasal spray, Spray 1 spray into both nostrils 2 times daily, Disp: 16 g, Rfl: 3     ipratropium (ATROVENT) 0.03 % nasal spray, Spray 1-2 sprays into both nostrils 3 times daily as needed for rhinitis (runny nose, postnasal drainage), Disp: 30 mL, Rfl: 1     LANsoprazole (PREVACID) 15 MG DR capsule, Take 1 capsule (15 mg) by mouth daily, Disp: 90 capsule, Rfl: 3     loratadine (CLARITIN) 10 MG tablet, Take 10 mg by mouth daily, Disp: , Rfl:      mupirocin (BACTROBAN) 2 % external ointment, Apply topically 3 times daily Apply to nose as needed, Disp: 30 g, Rfl: 3     PSYLLIUM PO, Take 1 capsule by mouth every morning 3 capsules at bedtime, Disp: , Rfl:      senna-docusate (SENOKOT-S/PERICOLACE) 8.6-50 MG tablet, Take 2 tablets by mouth 2 times daily, Disp: , Rfl:      Bioflavonoid Products (GRAPE SEED PO), Take 1 capsule by mouth daily (Patient not taking: Reported on 12/18/2023), Disp: , Rfl:      Garlic 1000 MG CAPS, Take 1 capsule by mouth daily (Patient not taking: Reported on 2/15/2024), Disp: , Rfl:      Multiple Minerals-Vitamins  (CITRACAL MAXIMUM PLUS) TABS, Take 2 tablets by mouth 2 times daily (Patient not taking: Reported on 12/18/2023), Disp: , Rfl:      Multiple Vitamins-Minerals (CENTRUM SILVER) per tablet, Take 1 tablet by mouth daily (Patient not taking: Reported on 12/18/2023), Disp: , Rfl:      Vitamin D, Cholecalciferol, 25 MCG (1000 UT) CAPS, Take 1 capsule by mouth daily (Patient not taking: Reported on 12/18/2023), Disp: , Rfl:   Immunization History   Administered Date(s) Administered     COVID-19 Bivalent 12+ (Pfizer) 11/22/2022     COVID-19 MONOVALENT 12+ (Pfizer) 02/04/2021, 02/25/2021, 10/11/2021     COVID-19 Monovalent 12+ (Pfizer 2022) 04/20/2022     HepB 04/07/1992, 05/11/1992, 10/14/1992     Influenza (H1N1) 12/28/2009     Influenza (High Dose) 3 valent vaccine 12/03/2013, 12/12/2014, 09/25/2015, 11/03/2016, 09/15/2017, 10/17/2018, 10/10/2019     Influenza (IIV3) PF 11/12/2002, 11/10/2006, 11/02/2007, 12/02/2008, 12/28/2009, 01/07/2011, 12/02/2011, 12/07/2012     Influenza Vaccine 65+ (Fluzone HD) 10/13/2020, 10/07/2021, 11/22/2022, 09/13/2023     Mantoux Tuberculin Skin Test 01/09/2016, 02/26/2017     Pneumo Conj 13-V (2010&after) 05/19/2015     Pneumococcal 23 valent 10/21/2005     TD,PF 7+ (Tenivac) 12/02/2008     TDAP Vaccine (Adacel) 04/08/2019     Zoster recombinant adjuvanted (SHINGRIX) 04/08/2019, 08/01/2019     Zoster vaccine, live 11/03/2016     Allergies   Allergen Reactions     Codeine GI Disturbance     OBJECTIVE:                                                                 /65 (BP Location: Left arm, Patient Position: Chair, Cuff Size: Adult Regular)   Pulse 86   Wt 78 kg (172 lb)   SpO2 99%   BMI 28.62 kg/m          Physical Exam  Vitals and nursing note reviewed.   Constitutional:       General: She is not in acute distress.     Appearance: She is not ill-appearing, toxic-appearing or diaphoretic.   HENT:      Head: Normocephalic and atraumatic.      Right Ear: Tympanic membrane, ear canal  and external ear normal.      Left Ear: Tympanic membrane, ear canal and external ear normal.      Nose: No congestion or rhinorrhea.      Right Turbinates: Not enlarged, swollen or pale.      Left Turbinates: Not enlarged, swollen or pale.      Mouth/Throat:      Lips: Pink.      Mouth: Mucous membranes are moist.      Pharynx: Oropharynx is clear. No pharyngeal swelling, oropharyngeal exudate, posterior oropharyngeal erythema or uvula swelling.   Eyes:      General:         Right eye: No discharge.         Left eye: No discharge.      Conjunctiva/sclera: Conjunctivae normal.   Cardiovascular:      Rate and Rhythm: Normal rate and regular rhythm.      Heart sounds: Normal heart sounds.   Pulmonary:      Effort: Pulmonary effort is normal. No respiratory distress.      Breath sounds: Normal breath sounds and air entry. No stridor, decreased air movement or transmitted upper airway sounds. No decreased breath sounds, wheezing, rhonchi or rales.   Neurological:      Mental Status: She is alert and oriented to person, place, and time.   Psychiatric:         Mood and Affect: Mood normal.         Behavior: Behavior normal.             ASSESSMENT/PLAN:    Chronic cough  Gastroesophageal reflux disease without esophagitis  Throat clearing    The patient's testing for environmental or seasonal allergies returned negative results. Previously, a combination therapy of lansoprazole and famotidine has been partially effective in alleviating her cough.     - Restart lansoprazole 15 mg by mouth once daily in the morning and famotidine 40 mg by mouth nightly.  - Restart intranasal fluticasone 1 spray in each nostril twice daily.  - Continue with ipratropium bromide 2 sprays in each nostril twice daily as needed.  If the patient continues having symptoms, I recommend reevaluation by ENT and consideration for Clarifix.    - ipratropium (ATROVENT) 0.03 % nasal spray  Dispense: 30 mL; Refill: 1  - fluticasone (FLONASE) 50 MCG/ACT  nasal spray  Dispense: 16 g; Refill: 3  - LANsoprazole (PREVACID) 15 MG DR capsule  Dispense: 90 capsule; Refill: 3      Follow up as needed.    Thank you for allowing us to participate in the care of this patient. Please feel free to contact us if there are any questions or concerns about the patient.    Disclaimer: This note consists of symbols derived from keyboarding, dictation and/or voice recognition software. As a result, there may be errors in the script that have gone undetected. Please consider this when interpreting information found in this chart.    Barron Cardozo MD, FAAAAI, FACAAI  Allergy, Asthma and Immunology     [facility]        Again, thank you for allowing me to participate in the care of your patient.        Sincerely,        Barron Cardozo MD

## 2024-02-15 NOTE — PATIENT INSTRUCTIONS
Restart Flonase 1 spray in each nostril twice daily.  Continue with Atrovent nasal spray 2 sprays in each nostril up to 3 times a day as needed.    Use famotidine 40 mg by mouth at bedtime.   Restart Prevacid (lansoprazole) 15 mg by mouth once daily in the morning.     If you continue having nasal symptoms, discuss them with ENT. Consider Clarifix procedure. Dr. Ly and Dr. Khan can do that at [place].         Prescription Assistance  If you need assistance with your prescriptions (cost, coverage, etc) please contact: [place] [facility] [phone]           If labs have been ordered/completed, please allow 7-14 business days for final interpretation of results to be sent on My Chart, phone or mail. Some lab results can take up to 28 days for results.         Allergy Staff Appt Hours Shot Hours Locations    Physician      Barron Cardozo MD         Support Staff      Bonita Cherry MA     Tuesday:   [place] :  [place]: :         :  [place] 7-3     [place]        Tuesday: :20        Wednesday: :20      : :10        Tuesday: :10        Thursday: 7-3:10     [place]       Tues & Wed: :10       Thurs: 12-4:10       Fri:             [facility]  [address] [place] [address]  Appt Line: [phone]        [facility]  [address] [place] [address]  Appt Line: [phone]     Pulmonary Function Scheduling:  [place]: [phone]  [place]: [phone]  [phone]

## 2024-02-15 NOTE — PROGRESS NOTES
SUBJECTIVE:                                                                   Lavonne Tidwell presents today to our Allergy Clinic at [facility] for a follow up visit. She is a 83 year old female with a history of chronic rhinitis..  Persistent throat clearing, associated with cough, clear rhinorrhea, postnasal drainage, and sore throat since December 2022.  During the workup, the patient was diagnosed with B-cell non-Hodgkin lymphoma.  Switching her ACE inhibitor to an ARB did not seem to be helpful.  A combination of famotidine and lansoprazole provided and approximately 50% improvement in symptoms.  Serum IgE for the regional aeroallergen panel performed in October 2023 was negative.    The patient states that she takes famotidine twice daily.  She stopped lansoprazole, although it was helpful in the past.  She has been using ipratropium bromide nasal spray, but for some reason, she stopped intranasal fluticasone. She presents with symptoms of rhinorrhea, postnasal drainage, and newly reported nasal congestion, alongside a persistent dry cough accompanied by throat clearing. The cessation of intranasal fluticasone possibly correlating with a worsening of her nasal congestion, particularly noted as severe at night, to the point of impairing nasal breathing.    Sometimes nasal congestion can be so bad, especially at night, that she has difficulty breathing through her nose.      Patient Active Problem List   Diagnosis    Injury of sigmoid colon    Esophageal reflux    Menopausal syndrome (hot flashes)    Urinary incontinence    Atrophic vaginitis    Hyperlipidemia LDL goal <130    Advanced directives, counseling/discussion    Onychomycosis    Senile nuclear sclerosis    Status post total left knee replacement    Status post total right knee replacement    Primary localized osteoarthrosis, lower leg    Tubulovillous adenoma polyp of colon    Essential hypertension    Primary  osteoarthritis involving multiple joints    Chronic bilateral low back pain without sciatica    Chronic cough       Past Medical History:   Diagnosis Date    Arthritis 2012    knes and hips    Cancer (H)     Essential hypertension 05/05/2021    History of blood transfusion 1961    Miscarriage    Ovarian cysts 1974    s/p BSO      Problem (# of Occurrences) Relation (Name,Age of Onset)    Cancer (2) Mother (Ana): Ovarian, Maternal Grandmother (Mikala)    Diabetes (1) Other (Agnes Valdez): Great Grandmother    Heart Disease (1) Father ([place])    Cerebrovascular Disease (2) Father ([place]), Maternal Grandfather (Ede Chambers)    C.A.D. (1) Father ([place])    Other Cancer (2) Mother (Ana): Ovarian, Maternal Grandmother (Mikala): Ovarian/Bone    Coronary Artery Disease (1) Father ([place])    Colon Cancer (1) Maternal Grandfather (Ede Chambers)           Negative family history of: Breast Cancer          Past Surgical History:   Procedure Laterality Date    ABDOMEN SURGERY  1973 & 1974    Ovarian cyst/Hysterectomy    APPENDECTOMY  1952    ARTHROPLASTY KNEE Left 1/6/2016    Procedure: ARTHROPLASTY KNEE;  Surgeon: Wilfredo Thompson MD;  Location: [facility]    ARTHROPLASTY KNEE Right 2/23/2017    Procedure: ARTHROPLASTY KNEE;  Surgeon: Wilfredo Thompson MD;  Location: [facility]    BIOPSY      colonoscopy/polyps    COLONOSCOPY      every 10 years    GENITOURINARY SURGERY  2008    bladder    HYSTERECTOMY, CHELSY  1974    ORTHOPEDIC SURGERY  2007 & 2009    [place]  both feet    PHACOEMULSIFICATION WITH STANDARD INTRAOCULAR LENS IMPLANT Left 8/20/2015    Procedure: PHACOEMULSIFICATION WITH STANDARD INTRAOCULAR LENS IMPLANT;  Surgeon: Fernando Jeffrey MD;  Location: [facility]    PHACOEMULSIFICATION WITH STANDARD INTRAOCULAR LENS IMPLANT Right 9/17/2015    Procedure: PHACOEMULSIFICATION WITH STANDARD INTRAOCULAR LENS IMPLANT;  Surgeon: Fernando Jeffrey MD;  Location: [facility]    SURGICAL HISTORY OF -   07/30/1998    Colonoscopy     SURGICAL HISTORY OF -       Bilateral salpingoopherectomy    SURGICAL HISTORY OF -   3/09    TOT Midurethral sling     Social History     Socioeconomic History    Marital status:      Spouse name: None    Number of children: None    Years of education: None    Highest education level: None   Tobacco Use    Smoking status: Former     Packs/day: 0.50     Years: 5.00     Additional pack years: 0.00     Total pack years: 2.50     Types: Cigarettes     Start date: 1968     Quit date: 2/15/1972     Years since quittin.0    Smokeless tobacco: Never    Tobacco comments:     stopped in her 20's   Vaping Use    Vaping Use: Never used   Substance and Sexual Activity    Alcohol use: Yes     Comment: Occasional    Drug use: No    Sexual activity: Not Currently   Other Topics Concern    Parent/sibling w/ CABG, MI or angioplasty before 65F 55M? Yes   Social History Narrative    02/15/24        ENVIRONMENTAL HISTORY: [place] The home is heated with a forced air. They does have central air conditioning. The patient's bedroom is furnished with carpeting in bedroom and fabric window coverings.  Pets inside the house include 0. There no history of cockroach or mice infestation. There is/are 0 smokers in the house.  The house does not have a damp basement.            Review of Systems   Constitutional:  Negative for chills and fever.   HENT:  Positive for congestion, postnasal drip and sore throat. Negative for ear pain.    Eyes:  Positive for discharge. Negative for pain and visual disturbance.        Runny eyes   Respiratory:  Positive for cough.    Cardiovascular:  Negative for chest pain and palpitations.   Gastrointestinal:  Negative for abdominal pain and vomiting.   Genitourinary:  Negative for dysuria and hematuria.   Musculoskeletal:  Negative for arthralgias and back pain.   Skin:  Negative for color change and rash.   Neurological:  Negative for seizures and  syncope.   All other systems reviewed and are negative.          Current Outpatient Medications:     Acetaminophen (TYLENOL PO), Take 1,000 mg by mouth every 8 hours as needed for other (pain), Disp: , Rfl:     allopurinol (ZYLOPRIM) 100 MG tablet, Take 1 tablet (100 mg) by mouth daily, Disp: 90 tablet, Rfl: 3    famotidine (PEPCID) 40 MG tablet, Take 1 tablet (40 mg) by mouth 2 times daily, Disp: 180 tablet, Rfl: 3    ferrous sulfate (FEROSUL) 325 (65 Fe) MG tablet, Take 1 tablet (325 mg) by mouth Every Mon, Wed, Fri Morning, Disp: 45 tablet, Rfl: 3    fluticasone (FLONASE) 50 MCG/ACT nasal spray, Spray 1 spray into both nostrils 2 times daily, Disp: 16 g, Rfl: 3    ipratropium (ATROVENT) 0.03 % nasal spray, Spray 1-2 sprays into both nostrils 3 times daily as needed for rhinitis (runny nose, postnasal drainage), Disp: 30 mL, Rfl: 1    LANsoprazole (PREVACID) 15 MG DR capsule, Take 1 capsule (15 mg) by mouth daily, Disp: 90 capsule, Rfl: 3    loratadine (CLARITIN) 10 MG tablet, Take 10 mg by mouth daily, Disp: , Rfl:     mupirocin (BACTROBAN) 2 % external ointment, Apply topically 3 times daily Apply to nose as needed, Disp: 30 g, Rfl: 3    PSYLLIUM PO, Take 1 capsule by mouth every morning 3 capsules at bedtime, Disp: , Rfl:     senna-docusate (SENOKOT-S/PERICOLACE) 8.6-50 MG tablet, Take 2 tablets by mouth 2 times daily, Disp: , Rfl:     Bioflavonoid Products (GRAPE SEED PO), Take 1 capsule by mouth daily (Patient not taking: Reported on 12/18/2023), Disp: , Rfl:     Garlic 1000 MG CAPS, Take 1 capsule by mouth daily (Patient not taking: Reported on 2/15/2024), Disp: , Rfl:     Multiple Minerals-Vitamins (CITRACAL MAXIMUM PLUS) TABS, Take 2 tablets by mouth 2 times daily (Patient not taking: Reported on 12/18/2023), Disp: , Rfl:     Multiple Vitamins-Minerals (CENTRUM SILVER) per tablet, Take 1 tablet by mouth daily (Patient not taking: Reported on 12/18/2023), Disp: , Rfl:     Vitamin D, Cholecalciferol, 25 MCG  (1000 UT) CAPS, Take 1 capsule by mouth daily (Patient not taking: Reported on 12/18/2023), Disp: , Rfl:   Immunization History   Administered Date(s) Administered    COVID-19 Bivalent 12+ (Pfizer) 11/22/2022    COVID-19 MONOVALENT 12+ (Pfizer) 02/04/2021, 02/25/2021, 10/11/2021    COVID-19 Monovalent 12+ (Pfizer 2022) 04/20/2022    HepB 04/07/1992, 05/11/1992, 10/14/1992    Influenza (H1N1) 12/28/2009    Influenza (High Dose) 3 valent vaccine 12/03/2013, 12/12/2014, 09/25/2015, 11/03/2016, 09/15/2017, 10/17/2018, 10/10/2019    Influenza (IIV3) PF 11/12/2002, 11/10/2006, 11/02/2007, 12/02/2008, 12/28/2009, 01/07/2011, 12/02/2011, 12/07/2012    Influenza Vaccine 65+ (Fluzone HD) 10/13/2020, 10/07/2021, 11/22/2022, 09/13/2023    Mantoux Tuberculin Skin Test 01/09/2016, 02/26/2017    Pneumo Conj 13-V (2010&after) 05/19/2015    Pneumococcal 23 valent 10/21/2005    TD,PF 7+ (Tenivac) 12/02/2008    TDAP Vaccine (Adacel) 04/08/2019    Zoster recombinant adjuvanted (SHINGRIX) 04/08/2019, 08/01/2019    Zoster vaccine, live 11/03/2016     Allergies   Allergen Reactions    Codeine GI Disturbance     OBJECTIVE:                                                                 /65 (BP Location: Left arm, Patient Position: Chair, Cuff Size: Adult Regular)   Pulse 86   Wt 78 kg (172 lb)   SpO2 99%   BMI 28.62 kg/m          Physical Exam  Vitals and nursing note reviewed.   Constitutional:       General: She is not in acute distress.     Appearance: She is not ill-appearing, toxic-appearing or diaphoretic.   HENT:      Head: Normocephalic and atraumatic.      Right Ear: Tympanic membrane, ear canal and external ear normal.      Left Ear: Tympanic membrane, ear canal and external ear normal.      Nose: No congestion or rhinorrhea.      Right Turbinates: Not enlarged, swollen or pale.      Left Turbinates: Not enlarged, swollen or pale.      Mouth/Throat:      Lips: Pink.      Mouth: Mucous membranes are moist.      Pharynx:  Oropharynx is clear. No pharyngeal swelling, oropharyngeal exudate, posterior oropharyngeal erythema or uvula swelling.   Eyes:      General:         Right eye: No discharge.         Left eye: No discharge.      Conjunctiva/sclera: Conjunctivae normal.   Cardiovascular:      Rate and Rhythm: Normal rate and regular rhythm.      Heart sounds: Normal heart sounds.   Pulmonary:      Effort: Pulmonary effort is normal. No respiratory distress.      Breath sounds: Normal breath sounds and air entry. No stridor, decreased air movement or transmitted upper airway sounds. No decreased breath sounds, wheezing, rhonchi or rales.   Neurological:      Mental Status: She is alert and oriented to person, place, and time.   Psychiatric:         Mood and Affect: Mood normal.         Behavior: Behavior normal.             ASSESSMENT/PLAN:    Chronic cough  Gastroesophageal reflux disease without esophagitis  Throat clearing    The patient's testing for environmental or seasonal allergies returned negative results. Previously, a combination therapy of lansoprazole and famotidine has been partially effective in alleviating her cough.     - Restart lansoprazole 15 mg by mouth once daily in the morning and famotidine 40 mg by mouth nightly.  - Restart intranasal fluticasone 1 spray in each nostril twice daily.  - Continue with ipratropium bromide 2 sprays in each nostril twice daily as needed.  If the patient continues having symptoms, I recommend reevaluation by ENT and consideration for Clarifix.    - ipratropium (ATROVENT) 0.03 % nasal spray  Dispense: 30 mL; Refill: 1  - fluticasone (FLONASE) 50 MCG/ACT nasal spray  Dispense: 16 g; Refill: 3  - LANsoprazole (PREVACID) 15 MG DR capsule  Dispense: 90 capsule; Refill: 3      Follow up as needed.    Thank you for allowing us to participate in the care of this patient. Please feel free to contact us if there are any questions or concerns about the patient.    Disclaimer: This note  consists of symbols derived from keyboarding, dictation and/or voice recognition software. As a result, there may be errors in the script that have gone undetected. Please consider this when interpreting information found in this chart.    Barron Cardozo MD, FAAAAI, FACAAI  Allergy, Asthma and Immunology     [facility]

## 2024-03-13 ENCOUNTER — OFFICE VISIT (OUTPATIENT)
Dept: URGENT CARE | Facility: URGENT CARE | Age: 84
End: 2024-03-13
Payer: COMMERCIAL

## 2024-03-13 VITALS
WEIGHT: 173.6 LBS | TEMPERATURE: 97.9 F | OXYGEN SATURATION: 98 % | SYSTOLIC BLOOD PRESSURE: 143 MMHG | BODY MASS INDEX: 28.89 KG/M2 | HEART RATE: 71 BPM | DIASTOLIC BLOOD PRESSURE: 70 MMHG
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
## 2024-03-13 DIAGNOSIS — S00.12XA TRAUMATIC HEMATOMA OF LEFT EYEBROW, INITIAL ENCOUNTER: ICD-10-CM

## 2024-03-13 PROCEDURE — 99214 OFFICE O/P EST MOD 30 MIN: CPT

## 2024-03-13 NOTE — PROGRESS NOTES
URGENT CARE  Assessment & Plan   Assessment:   Lavonne Tidwell is a 83 year old female who's clinical presentation today is consistent with:   1. Fall, initial encounter  2. Traumatic hematoma of left eyebrow, initial encounter  Plan:  Patient is well appearing, has no alarm signs such as: vomiting, slurred speech, pupil changes, visual changes or mentation changes that would suggest a more serious injury related to their head trauma, patient is low likelihood of clinically significant head injury, specifically intracranial hemorrhage or thrombosis; additionally patient has no orientation or memory issues, and no cervical spine tenderness, no focal neurological deficits noted  and thus, will treat patient at this time for a mild post-concussive syndrome, post blunt head trauma supportively. This will include encouraging: Good sleep hygiene and rest, Avoid excessive stress, and Limiting intake of caffeine, alcohol, and nicotine  However I did discuss with patient and her daughter that a significant head injury from their blunt head trauma cannot be fully ruled out without advanced imaging, which we do not have in urgent care; and if they would prefer objective confirmation they will need to present to the ED for a higher level of care along with advanced imaging, they state they will monitor the patient's symptoms over the next few days and go to ED if any worsening symptoms present, return precautions given    30 minutes spent by me (on the date of the encounter) doing chart review, history, physical exam, documentation, diagnostic testing, education/counseling and further activities per the note   No alarm signs or symptoms present   Differential Diagnoses for this patient's chief complaint that I considered include:  Intercranial hemorrhage or hematoma, Brain contusion, cortical contusion, TBI, skull fracture, seizure      Patient is} agreeable to treatment plan and state they will follow-up if symptoms do not  improve and/or if symptoms worsen   see patient's AVS 'monitor for' section for specific patient instructions given and discussed regarding what to watch for and when to follow up    [facility]      ______________________________________________________________________      Subjective     HPI: Lavonne Tidwell is a 83 year old  female who presents today for evaluation the following concerns:   Patient presents today endorsing they sustained a fall while stepping of a curb, a few hours ago today   Patient states they landed on their left side of their face/head and felt pain.  Patient localizes their pain to the left eye brow / side of their face     Patient reports bruising and some swelling   Patient denies any numbness or tingling  Patient denies any loss of consciousness during the fall.  Patient denies any changes to vision.  Patient endorses mild pain.  Patient denies any: nausea, fatigue, balance issues, memory issues  Patient denies any Vomiting, slurred speech, visual changes, mentation changes, orientation, memory, concentration and balance issues   Patient denies any cervical spine tenderness, patient denies any new signs of deteriorating neuro function or any focal neurological deficits. Patient denies any open areas to the head, patient states skin is intact.    Review of Systems:  Pertinent review of systems as reflected in HPI, otherwise negative.     Objective    Physical Exam:  Vitals:    03/13/24 1625   BP: (!) 143/70   Pulse: 71   Temp: 97.9  F (36.6  C)   TempSrc: Tympanic   SpO2: 98%   Weight: 78.7 kg (173 lb 9.6 oz)      General:   alert and oriented, no acute distress, non ill-appearing   Vital signs reviewed: afebrile and normotensive     Psy/mental status: pleasant   SKIN: intact, bruising noted   HEENT: Neck can flex, rotate without symptoms  no  battles, racoons, hemotympanum, but some moderate bruising  noted to left eyebrown   Jessie,  extra ocular motor intact without} discomfort  Tracking normal,  reads well, convergence normal  Neuro: Cranial nerves II - XII grossly intact;   upper extremity bilateral strength and sensation equal +5, lower extremity bilateral strength and sensation equal +5, Involuntary movements negative.   No ataxia or tremors noted.  Reflexes intact   No Vomiting, slurred speech, visual changes, mentation changes, orientation, memory, concentration and balance issues noted, no Scalp or skull abnormalities or cervical spine tenderness, no signs of deteriorating neuro function, no any focal neurological deficits  LUNG: normal work of breathing, good respiratory effort without retractions, good air  movement, non labored, inspection reveals normal chest expansion w/ inspiration     ______________________________________________________________________    I explained my diagnostic considerations and recommendations to the patient, who voiced understanding and agreement with the treatment plan.   All questions were answered.   We discussed potential side effects, risks and benefits of any prescribed or recommended therapies, as well as expectations for response to treatments.  Please see AVS for any patient instructions & handouts given.   Patient was advised to contact the Nurse Care Line, their Primary Care provider, Urgent Care, or the Emergency Department if there are new or worsening symptoms, or call 911 for emergencies.

## 2024-05-06 ENCOUNTER — TELEPHONE (OUTPATIENT)
Dept: PALLIATIVE MEDICINE | Facility: CLINIC | Age: 84
End: 2024-05-06
Payer: COMMERCIAL

## 2024-05-06 DIAGNOSIS — M48.061 SPINAL STENOSIS OF LUMBAR REGION WITHOUT NEUROGENIC CLAUDICATION: ICD-10-CM

## 2024-05-06 DIAGNOSIS — M54.16 LUMBAR RADICULOPATHY: Primary | ICD-10-CM

## 2024-05-06 NOTE — TELEPHONE ENCOUNTER
[facility] Call Center    Phone Message    May a detailed message be left on voicemail: yes     Reason for Call: Other: Patient called in and stated she would like to get another injection by [facility]. Patient would like a call back to discuss.      Action Taken: Message routed to:  Other: Pain    Travel Screening: Not Applicable

## 2024-05-14 ENCOUNTER — TELEPHONE (OUTPATIENT)
Dept: PALLIATIVE MEDICINE | Facility: CLINIC | Age: 84
End: 2024-05-14
Payer: COMMERCIAL

## 2024-05-14 NOTE — TELEPHONE ENCOUNTER
"Screening Questions for Radiology Injections:    Injection to be done at which interventional clinic site? [facility] [place] [facility] [place]    If choosing [facility] for location, please inform patient:  \"[facility] is a Hospital based clinic. Before your visit, you should check with your insurance about how it covers the charges for facility services in a hospital-based clinic.     Procedure ordered by Dr. Kingston     Procedure ordered? caudal epidural steroid injection   ? Transforaminal Cervical TARYN - Send to [facility]) - No [facility] providers perform this procedure    What insurance would patient like us to bill for this procedure? [employer]    IF SCHEDULING IN [place] PA[facility] OR SPINE PLEASE SCHEDULE AT LEAST 7-10 BUSINESS DAYS OUT SO A PA CAN BE OBTAINED    Worker's comp or MVA (motor vehicle accident) -Any injection DO NOT SCHEDULE and route to Kkoi Sullivan.      [employer] insurance - For SI joint injections, DO NOT SCHEDULE and route to Lisa Garcia.       ALL BCBS, [employer] and [employer] - DO NOT SCHEDULE and route to Lisa Del Castilloy    MEDICA- ALL INJECTIONS- route to Lisa Garcia    Is patient scheduled at [facility]? NO   If YES, route every encounter to [facility]INE [facility] CARE NAVIGATION POOL [[phone]]    Is an  needed? No     Patient has a  home? (Review Grid) YES: Informed     Any chance of pregnancy? NO   If YES, do NOT schedule and route to RN pool  - Dr. Alcantara route to Tautm Abdul and PM&R Nurse  [[id number]]      Is patient actively being treated for cancer or immunocompromised? No  If YES, do NOT schedule and route to RN pool/ Dr. Alcantara's Team    Does the patient have a bleeding or clotting disorder? No     If YES, okay to schedule AND route to RN nurse / Dr. Alcantara's Team     (For any patients with platelet count <100, RN must forward to provider)    Is patient taking any Blood Thinners OR Antiplatelet medication?  No   If hold needed, do " NOT schedule, route to RN pool/ Dr. Alcantara's Team  ? Examples:   o Blood Thinners: (Coumadin, Warfarin, Jantoven, Pradaxa, Xarelto, Eliquis, Edoxaban, Enoxaparin, Lovenox, Heparin, Arixtra, Fondaparinux or Fragmin)  o Antiplatelet Medications: (Plavix, Brilinta or Effient)     Is patient taking any aspirin products (includes Excedrin and Fiorinal)? No     If yes route to RN pool/ Dr. Alcantara's Team - Do not schedule      Any allergies to contrast dye, iodine, shellfish, or numbing and steroid medications? No  ? If YES, schedule and add allergy information to appointment notes AND route to the RN pool/ Dr. Alcantara's Team  ? If TARYN and Contrast Dye / Iodine Allergy? DO NOT SCHEDULE, route to RN pool/ Dr. Frasers Team  ? Allergies: Codeine     Does patient have an active infection or treated for one within the past week? No  ? Is patient currently taking any antibiotics or steroid medications?  No     For patients on chronic, preventative, or prophylactic antibiotics, procedures may be scheduled.     For patients on antibiotics for active or recent infection, schedule 4 days after completed.    For patients on steroid medications, schedule 4 days after completed.     Has the patient had a flu shot or any other vaccinations within the past 7 days? No  If yes, explain that for the vaccine to work best they need to:     ? wait 1 week before and 1 week after getting any Vaccine  ? wait 1 week before and 2 weeks after getting any Covid Vaccine   ? If patient has concerns about the timing, send to RN pool/ Dr. Alcantara's Team    Does patient have an MRI/CT?  YES: 10/05/22 Include Date and Check Procedure Scheduling Grid to see if required.    Was the MRI/CT done within the last 3 years?  Yes     If no route to RN Pool/ Dr. Frasers Team    If yes, where was the MRI/CT done?  [facility]     Refer to PACS Transmissions list for approved external locations and route to RN Pool High Priority/ Dr. Alcantara's Team    If MRI was not  done at approved external location do NOT schedule and route to RN pool/ Dr. Alcantara's Team      If patient has an imaging disc, the injection MAY be scheduled but patient must bring disc to appt or appt will be cancelled.    Is patient able to transfer to a procedure table with minimal or no assistance? Yes   ? If no, do NOT schedule and route to RN Pool/ Dr. Alcantara's Team    Procedure Specific Instructions:  ? If celiac plexus block, informed patient NPO for 6 hours and that it is okay to take medications with sips of water, especially blood pressure medications Not Applicable       ? If this is for a cervical procedure, informed patient that aspirin needs to be held for 6 days.   Not Applicable    ? Sedation, If Sedation is ordered for any procedure, patient must be NPO for 6 hours prior to procedure Not Applicable    ? If IV needed:    Do not schedule procedures requiring IV placement in the first appointment of the day or first appointment after lunch. Do NOT schedule at 0745, 0815 or 1245.       Instructed patient to arrive 30 minutes early for IV start if required. (Check Procedure Scheduling Grid)  Not Applicable    Reminders:  ? If you are started on any steroids or antibiotics between now and your appointment, you must contact us because the procedure may need to be cancelled.  Yes    ? As a reminder, receiving steroids can decrease your body's ability to fight infection.   Would you still like to move forward with scheduling the injection?  Yes    ? IV Sedation is not provided for procedures. If oral anti-anxiety medication is needed, the patient should request this from their referring provider.    ? Instruct patient to arrive as directed prior to the scheduled appointment time:  If IV needed 30 minutes before appointment time       For patients 85 or older we recommend having an adult stay w/ them for the remainder of the day.       If the patient is Diabetic, remind them to bring their  glucometer.      Does the patient have any questions?  NO  Anat Campos  [facility]

## 2024-05-22 ENCOUNTER — TELEPHONE (OUTPATIENT)
Dept: PHARMACY | Facility: OTHER | Age: 84
End: 2024-05-22
Payer: COMMERCIAL

## 2024-05-22 NOTE — TELEPHONE ENCOUNTER
MTM Recruitment: [facility]     Referral outreach attempt #1 on May 22, 2024      Outcome: Visit scheduled on Monday June 17th at 10:00am in-person. Patient will bring in all her medication/supplement bottles.    Belkis Melendez PharmD, [facility]  Medication Therapy Management Pharmacist

## 2024-05-27 ENCOUNTER — APPOINTMENT (OUTPATIENT)
Dept: GENERAL RADIOLOGY | Facility: CLINIC | Age: 84
End: 2024-05-27
Attending: NURSE PRACTITIONER
Payer: COMMERCIAL

## 2024-05-27 ENCOUNTER — APPOINTMENT (OUTPATIENT)
Dept: CT IMAGING | Facility: CLINIC | Age: 84
End: 2024-05-27
Attending: NURSE PRACTITIONER
Payer: COMMERCIAL

## 2024-05-27 ENCOUNTER — HOSPITAL ENCOUNTER (OUTPATIENT)
Facility: CLINIC | Age: 84
Setting detail: OBSERVATION
Discharge: NURSING, REHAB OR LONG-TERM CARE | End: 2024-05-29
Attending: NURSE PRACTITIONER | Admitting: INTERNAL MEDICINE
Payer: COMMERCIAL
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
## 2024-05-27 DIAGNOSIS — S32.592A CLOSED FRACTURE OF RAMUS OF LEFT PUBIS, INITIAL ENCOUNTER (H): ICD-10-CM

## 2024-05-27 DIAGNOSIS — S32.009A CLOSED FRACTURE OF TRANSVERSE PROCESS OF LUMBAR VERTEBRA, INITIAL ENCOUNTER (H): ICD-10-CM

## 2024-05-27 PROBLEM — M15.0 PRIMARY OSTEOARTHRITIS INVOLVING MULTIPLE JOINTS: Status: ACTIVE | Noted: 2021-09-03

## 2024-05-27 PROBLEM — M1A.9XX0 CHRONIC GOUT: Status: ACTIVE | Noted: 2024-05-27

## 2024-05-27 PROBLEM — G89.29 CHRONIC BILATERAL LOW BACK PAIN WITHOUT SCIATICA: Status: ACTIVE | Noted: 2022-10-11

## 2024-05-27 PROBLEM — M54.50 CHRONIC BILATERAL LOW BACK PAIN WITHOUT SCIATICA: Status: ACTIVE | Noted: 2022-10-11

## 2024-05-27 PROBLEM — R05.3 CHRONIC COUGH: Status: ACTIVE | Noted: 2023-09-14

## 2024-05-27 LAB
ANION GAP SERPL CALCULATED.3IONS-SCNC: 14 MMOL/L (ref 7–15)
BASOPHILS # BLD AUTO: 0 10E3/UL (ref 0–0.2)
BASOPHILS NFR BLD AUTO: 0 %
BUN SERPL-MCNC: 15.7 MG/DL (ref 8–23)
CALCIUM SERPL-MCNC: 9.7 MG/DL (ref 8.8–10.2)
CHLORIDE SERPL-SCNC: 102 MMOL/L (ref 98–107)
CREAT SERPL-MCNC: 0.66 MG/DL (ref 0.51–0.95)
DEPRECATED HCO3 PLAS-SCNC: 24 MMOL/L (ref 22–29)
EGFRCR SERPLBLD CKD-EPI 2021: 86 ML/MIN/1.73M2
EOSINOPHIL # BLD AUTO: 0.1 10E3/UL (ref 0–0.7)
EOSINOPHIL NFR BLD AUTO: 2 %
ERYTHROCYTE [DISTWIDTH] IN BLOOD BY AUTOMATED COUNT: 13.7 % (ref 10–15)
GLUCOSE SERPL-MCNC: 103 MG/DL (ref 70–99)
HCT VFR BLD AUTO: 36.3 % (ref 35–47)
HGB BLD-MCNC: 11.9 G/DL (ref 11.7–15.7)
IMM GRANULOCYTES # BLD: 0 10E3/UL
IMM GRANULOCYTES NFR BLD: 1 %
LYMPHOCYTES # BLD AUTO: 2.3 10E3/UL (ref 0.8–5.3)
LYMPHOCYTES NFR BLD AUTO: 39 %
MCH RBC QN AUTO: 33.3 PG (ref 26.5–33)
MCHC RBC AUTO-ENTMCNC: 32.8 G/DL (ref 31.5–36.5)
MCV RBC AUTO: 102 FL (ref 78–100)
MONOCYTES # BLD AUTO: 0.1 10E3/UL (ref 0–1.3)
MONOCYTES NFR BLD AUTO: 1 %
NEUTROPHILS # BLD AUTO: 3.4 10E3/UL (ref 1.6–8.3)
NEUTROPHILS NFR BLD AUTO: 57 %
NRBC # BLD AUTO: 0 10E3/UL
NRBC BLD AUTO-RTO: 0 /100
PLATELET # BLD AUTO: 209 10E3/UL (ref 150–450)
POTASSIUM SERPL-SCNC: 4.3 MMOL/L (ref 3.4–5.3)
RBC # BLD AUTO: 3.57 10E6/UL (ref 3.8–5.2)
SODIUM SERPL-SCNC: 140 MMOL/L (ref 135–145)
WBC # BLD AUTO: 6 10E3/UL (ref 4–11)

## 2024-05-27 PROCEDURE — 73140 X-RAY EXAM OF FINGER(S): CPT | Mod: LT

## 2024-05-27 PROCEDURE — 99223 1ST HOSP IP/OBS HIGH 75: CPT | Performed by: PHYSICIAN ASSISTANT

## 2024-05-27 PROCEDURE — 73560 X-RAY EXAM OF KNEE 1 OR 2: CPT | Mod: LT

## 2024-05-27 PROCEDURE — 99285 EMERGENCY DEPT VISIT HI MDM: CPT | Mod: 25 | Performed by: EMERGENCY MEDICINE

## 2024-05-27 PROCEDURE — 72192 CT PELVIS W/O DYE: CPT

## 2024-05-27 PROCEDURE — 80048 BASIC METABOLIC PNL TOTAL CA: CPT | Performed by: NURSE PRACTITIONER

## 2024-05-27 PROCEDURE — 250N000011 HC RX IP 250 OP 636: Performed by: PHYSICIAN ASSISTANT

## 2024-05-27 PROCEDURE — G0378 HOSPITAL OBSERVATION PER HR: HCPCS

## 2024-05-27 PROCEDURE — 250N000013 HC RX MED GY IP 250 OP 250 PS 637: Performed by: PHYSICIAN ASSISTANT

## 2024-05-27 PROCEDURE — 36415 COLL VENOUS BLD VENIPUNCTURE: CPT | Performed by: NURSE PRACTITIONER

## 2024-05-27 PROCEDURE — 99285 EMERGENCY DEPT VISIT HI MDM: CPT | Mod: 25

## 2024-05-27 PROCEDURE — 99207 PR NO BILLABLE SERVICE THIS VISIT: CPT | Performed by: INTERNAL MEDICINE

## 2024-05-27 PROCEDURE — 96372 THER/PROPH/DIAG INJ SC/IM: CPT | Performed by: PHYSICIAN ASSISTANT

## 2024-05-27 PROCEDURE — 73502 X-RAY EXAM HIP UNI 2-3 VIEWS: CPT

## 2024-05-27 PROCEDURE — 85004 AUTOMATED DIFF WBC COUNT: CPT | Performed by: NURSE PRACTITIONER

## 2024-05-27 RX ORDER — ACETAMINOPHEN 500 MG
1000 TABLET ORAL
Status: DISCONTINUED | OUTPATIENT
Start: 2024-05-27 | End: 2024-05-29 | Stop reason: HOSPADM

## 2024-05-27 RX ORDER — FAMOTIDINE 20 MG/1
40 TABLET, FILM COATED ORAL 2 TIMES DAILY
Status: DISCONTINUED | OUTPATIENT
Start: 2024-05-27 | End: 2024-05-29 | Stop reason: HOSPADM

## 2024-05-27 RX ORDER — MECOBALAMIN 5000 MCG
15 TABLET,DISINTEGRATING ORAL DAILY
Status: DISCONTINUED | OUTPATIENT
Start: 2024-05-28 | End: 2024-05-27 | Stop reason: ALTCHOICE

## 2024-05-27 RX ORDER — ONDANSETRON 4 MG/1
4 TABLET, ORALLY DISINTEGRATING ORAL EVERY 6 HOURS PRN
Status: DISCONTINUED | OUTPATIENT
Start: 2024-05-27 | End: 2024-05-29 | Stop reason: HOSPADM

## 2024-05-27 RX ORDER — ENOXAPARIN SODIUM 100 MG/ML
40 INJECTION SUBCUTANEOUS EVERY 24 HOURS
Status: DISCONTINUED | OUTPATIENT
Start: 2024-05-27 | End: 2024-05-28

## 2024-05-27 RX ORDER — LORATADINE 10 MG/1
10 TABLET ORAL DAILY
Status: DISCONTINUED | OUTPATIENT
Start: 2024-05-28 | End: 2024-05-29 | Stop reason: HOSPADM

## 2024-05-27 RX ORDER — ALLOPURINOL 100 MG/1
100 TABLET ORAL DAILY
Status: DISCONTINUED | OUTPATIENT
Start: 2024-05-28 | End: 2024-05-29 | Stop reason: HOSPADM

## 2024-05-27 RX ORDER — NALOXONE HYDROCHLORIDE 0.4 MG/ML
0.2 INJECTION, SOLUTION INTRAMUSCULAR; INTRAVENOUS; SUBCUTANEOUS
Status: DISCONTINUED | OUTPATIENT
Start: 2024-05-27 | End: 2024-05-29 | Stop reason: HOSPADM

## 2024-05-27 RX ORDER — PROCHLORPERAZINE 25 MG
12.5 SUPPOSITORY, RECTAL RECTAL EVERY 12 HOURS PRN
Status: DISCONTINUED | OUTPATIENT
Start: 2024-05-27 | End: 2024-05-29 | Stop reason: HOSPADM

## 2024-05-27 RX ORDER — HYDRALAZINE HYDROCHLORIDE 20 MG/ML
5 INJECTION INTRAMUSCULAR; INTRAVENOUS EVERY 6 HOURS PRN
Status: DISCONTINUED | OUTPATIENT
Start: 2024-05-27 | End: 2024-05-29 | Stop reason: HOSPADM

## 2024-05-27 RX ORDER — CALCIUM CARBONATE 500 MG/1
1000 TABLET, CHEWABLE ORAL 4 TIMES DAILY PRN
Status: DISCONTINUED | OUTPATIENT
Start: 2024-05-27 | End: 2024-05-29 | Stop reason: HOSPADM

## 2024-05-27 RX ORDER — NALOXONE HYDROCHLORIDE 0.4 MG/ML
0.4 INJECTION, SOLUTION INTRAMUSCULAR; INTRAVENOUS; SUBCUTANEOUS
Status: DISCONTINUED | OUTPATIENT
Start: 2024-05-27 | End: 2024-05-29 | Stop reason: HOSPADM

## 2024-05-27 RX ORDER — AMOXICILLIN 250 MG
2 CAPSULE ORAL DAILY
Status: DISCONTINUED | OUTPATIENT
Start: 2024-05-28 | End: 2024-05-29 | Stop reason: HOSPADM

## 2024-05-27 RX ORDER — OXYCODONE HYDROCHLORIDE 5 MG/1
5 TABLET ORAL EVERY 4 HOURS PRN
Status: DISCONTINUED | OUTPATIENT
Start: 2024-05-27 | End: 2024-05-29 | Stop reason: HOSPADM

## 2024-05-27 RX ORDER — PROCHLORPERAZINE MALEATE 5 MG
5 TABLET ORAL EVERY 6 HOURS PRN
Status: DISCONTINUED | OUTPATIENT
Start: 2024-05-27 | End: 2024-05-29 | Stop reason: HOSPADM

## 2024-05-27 RX ORDER — ACETAMINOPHEN 325 MG/1
975 TABLET ORAL 3 TIMES DAILY
Status: DISCONTINUED | OUTPATIENT
Start: 2024-05-27 | End: 2024-05-29 | Stop reason: HOSPADM

## 2024-05-27 RX ORDER — ONDANSETRON 2 MG/ML
4 INJECTION INTRAMUSCULAR; INTRAVENOUS EVERY 6 HOURS PRN
Status: DISCONTINUED | OUTPATIENT
Start: 2024-05-27 | End: 2024-05-29 | Stop reason: HOSPADM

## 2024-05-27 RX ORDER — PANTOPRAZOLE SODIUM 20 MG/1
20 TABLET, DELAYED RELEASE ORAL
Status: DISCONTINUED | OUTPATIENT
Start: 2024-05-28 | End: 2024-05-29 | Stop reason: HOSPADM

## 2024-05-27 RX ADMIN — FAMOTIDINE 40 MG: 20 TABLET, FILM COATED ORAL at 20:38

## 2024-05-27 RX ADMIN — ACETAMINOPHEN 975 MG: 325 TABLET, FILM COATED ORAL at 20:38

## 2024-05-27 RX ADMIN — ENOXAPARIN SODIUM 40 MG: 40 INJECTION SUBCUTANEOUS at 18:42

## 2024-05-27 ASSESSMENT — ACTIVITIES OF DAILY LIVING (ADL)
THE_FOLLOWING_AIDS_WERE_PROVIDED;: PATIENT DECLINED OFFER OF AUXILIARY AIDS
HEARING_MANAGEMENT: HEARING AIDS
TOILETING: 1-->ASSISTANCE (EQUIPMENT/PERSON) NEEDED (NOT DEVELOPMENTALLY APPROPRIATE)
EQUIPMENT_CURRENTLY_USED_AT_HOME: CANE, STRAIGHT
ADLS_ACUITY_SCORE: 39
ADLS_ACUITY_SCORE: 38
TOILETING_ASSISTANCE: TOILETING DIFFICULTY, ASSISTANCE 1 PERSON
ADLS_ACUITY_SCORE: 39
HEARING_DIFFICULTY_OR_DEAF: YES
ADLS_ACUITY_SCORE: 39
DRESSING/BATHING: DRESSING DIFFICULTY, ASSISTANCE 1 PERSON
FALL_HISTORY_WITHIN_LAST_SIX_MONTHS: YES
DOING_ERRANDS_INDEPENDENTLY_DIFFICULTY: NO
WALKING_OR_CLIMBING_STAIRS_DIFFICULTY: YES
CONCENTRATING,_REMEMBERING_OR_MAKING_DECISIONS_DIFFICULTY: NO
CHANGE_IN_FUNCTIONAL_STATUS_SINCE_ONSET_OF_CURRENT_ILLNESS/INJURY: YES
VISION_MANAGEMENT: GLASSES
DIFFICULTY_EATING/SWALLOWING: NO
ADLS_ACUITY_SCORE: 42
DIFFICULTY_COMMUNICATING: NO
ADLS_ACUITY_SCORE: 42
ADLS_ACUITY_SCORE: 42
ADLS_ACUITY_SCORE: 39
ADLS_ACUITY_SCORE: 42
TOILETING_ISSUES: YES
DESCRIBE_HEARING_LOSS: BILATERAL HEARING LOSS
DRESSING/BATHING_DIFFICULTY: YES
TOILETING: 1-->ASSISTANCE (EQUIPMENT/PERSON) NEEDED
WERE_AUXILIARY_AIDS_OFFERED?: YES
WEAR_GLASSES_OR_BLIND: YES
ADLS_ACUITY_SCORE: 39
WALKING_OR_CLIMBING_STAIRS: STAIR CLIMBING DIFFICULTY, ASSISTANCE 1 PERSON
NUMBER_OF_TIMES_PATIENT_HAS_FALLEN_WITHIN_LAST_SIX_MONTHS: 3
USE_OF_HEARING_ASSISTIVE_DEVICES: BILATERAL HEARING AIDS

## 2024-05-27 ASSESSMENT — COLUMBIA-SUICIDE SEVERITY RATING SCALE - C-SSRS
1. IN THE PAST MONTH, HAVE YOU WISHED YOU WERE DEAD OR WISHED YOU COULD GO TO SLEEP AND NOT WAKE UP?: NO
2. HAVE YOU ACTUALLY HAD ANY THOUGHTS OF KILLING YOURSELF IN THE PAST MONTH?: NO
6. HAVE YOU EVER DONE ANYTHING, STARTED TO DO ANYTHING, OR PREPARED TO DO ANYTHING TO END YOUR LIFE?: NO

## 2024-05-27 NOTE — PLAN OF CARE
Skin affirmation note    Admitting nurse completed full skin assessment, Harry score and Harry interventions. This writer agrees with the initial skin assessment findings.

## 2024-05-27 NOTE — H&P
"[facility]    History and Physical - Hospitalist Service       Date of Admission:  5/27/2024    Assessment & Plan      Lavonne Tidwell is a 83 year old female admitted on 5/27/2024. She has a past medical history significant for hypertension, GERD, chronic cough due to rhinitis / post nasal drainage, and history of gout who presented to the emergency department for evaluation of left hip pain and inability to ambulate after a mechanical fall, was found to have a pubic rami fracture.     Closed fracture of ramus of left pubis, initial encounter  Inability to ambulate due to pain  Left hip and groin pain after a mechanical fall (see below), pain is limiting patient's ability to bear weight / ambulate.     Pelvic x-ray demonstrates - \"Comminuted, mildly displaced left parasymphyseal pubic fracture extends towards the central left superior pubic ramus. Anatomic alignment left hip. No acute displaced left hip fracture is identified. Mild bilateral hip osteoarthritis. Degenerative change lower lumbar spine and both SI joints. Degenerative change at the symphysis pubis.\"    CT pelvis demonstrates - \"1.  Acute minimally displaced comminuted left parasymphyseal fracture, extending into the superior pubic ramus. 2.  Small nondisplaced fracture L5 transverse process is suspected. No definitive fracture to the sacrum. 3.  Bone demineralization.\"    Patient lives alone independently in a town home, is unable to ambulate.  Case discussed between emergency department and on-call ortho.  - Ortho consult, no surgical intervention indicated  - Weight bear as tolerated  - PT/OT evaluations  - Care Transitions consult, may need placement  - Analgesia: scheduled acetaminophen, prn oxycodone available  - Ortho recommended 6 weeks of VTE prophylaxis but did not give specific treatment recommendation - will start Lovenox 40 mg daily     Fall, initial encounter  Mechanical fall at home, patient tripped on a " floor transition point, landed on left hip. No head trauma or loss of consciousness.   Left hip / groin hurt (see above), also with some left knee and hand pain, although x-ray of knee and hand are unremarkable.   - See above, may need placement    Closed fracture of transverse process of lumbar vertebra, initial encounter  Chronic bilateral low back pain without sciatica  Patient does have chronic low back pain and gets occasional steroid injections (is due again next week). However, there was an incidental finding of a L5 transverse process fracture on pelvic CT.   - Ortho consulted, analgesia as noted above    Urinary incontinence  Suprapubic discomfort  Prior history of stress incontinence, was on Ditropan for a period of time, but ultimately improved after trans obturator tape sling surgery.   Is feeling increased bladder irritation / suprapubic discomfort after pubic rami fracture, possibly bladder is irritating and putting pressure on the injured pubis?  No dysuria, is making adequate urine so low suspicion for retention.   - Pain control noted above  - Could do a trial of Ditropan and/or pyridium if not improving    Essential hypertension  Noted on problem list, but is not treated with any pharmacotherapy. Blood pressure slightly elevated on admission.   - Prn hydralazine for SBP > 180    Esophageal reflux  Managed prior to admission with Prevacid 15 mg daily and Pepcid 40 mg bid, continue.    Chronic cough  Post nasal drainage due to chronic rhinitis  Has a chronic cough due to rhinitis / post nasal drainage. Managed prior to admission with Flonase, Claritin, and prn Atrovent.   - Continue Claritin  - Flonase and Atrovent not ordered due to obs status    Primary osteoarthritis involving multiple joints  Chronic and unchanged. Uses acetaminophen for pain management.     Chronic gout  No evidence of acute gout flare on admission. Managed prior to admission with allopurinol 100 mg daily, continue.       "  Observation Goals: -diagnostic tests and consults completed and resulted, -vital signs normal or at patient baseline, -adequate pain control on oral analgesics, -safe disposition plan has been identified, Nurse to notify provider when observation goals have been met and patient is ready for discharge.  Diet: Regular Diet Adult    DVT Prophylaxis: Enoxaparin (Lovenox) SQ  Preciado Catheter: Not present  Lines: None     Cardiac Monitoring: None  Code Status:  DNI - discussed at time of admission     Clinically Significant Risk Factors Present on Admission                  # Hypertension: Noted on problem list      # Overweight: Estimated body mass index is 28.32 kg/m  as calculated from the following:    Height as of this encounter: 1.664 m (5' 5.5\").    Weight as of this encounter: 78.4 kg (172 lb 13.5 oz).              Disposition Plan     Medically Ready for Discharge: Anticipated Tomorrow         The patient's care was discussed with the Attending Physician, Dr. Deepak Adorno, Patient, and Patient's Family.    Judy Son PA-C  Hospitalist Service  [facility]  Securely message with [employer] (more info)  Text page via [facility] Paging/Directory     ______________________________________________________________________    Chief Complaint   \"I tripped and fell, and now I can't bear weight on the left.\"    History is obtained from the patient and her family, review of EMR, and emergency department sign out from Jhoan Richardson CNP.     History of Present Illness   Lavonne Tidwell is a 83 year old female with a past medical history significant for hypertension, GERD, chronic cough due to rhinitis / post nasal drainage, and history of gout who presented to the emergency department for evaluation of left hip pain and inability to ambulate after a mechanical fall, was found to have a pubic rami fracture.     Patient lives alone in a private town home.   She was getting ready to go out to lunch " "with family who came to pick her up.  As she was getting ready to walk out the door, her foot \"got stuck\" on the transition point where the carpet turns to vinyl alana. She lost her balance and fell, landing on her left hip.  She also hit her left hand and knee. No head trauma or loss of consciousness.  She was unable to get up on her own, needed to be helped up by 2 family members.  She was immediately unable to bear weight or ambulate with her left leg, causing pain in the left lateral hip and left groin.     EMS was called and advised evaluation in the emergency department.  Work-up revealed a left pubic rami fracture as well as L5 transverse process fracture.   Patient is unable to ambulate or bear weight and therefore needs admission for therapy evaluation and possible placement.     She denies any preceding / associated dizziness, lightheadedness, palpitations, chest pain, or syncope associated with the fall.  She has some suprapubic discomfort and bladder irritation, feels like her bladder is putting pressure on her pelvic injury. She denies dysuria.     Review of systems  Patient has poor sleep at baseline, unchanged.  Has lower extremity edema that is chronic but progressively worse lately; she is supposed to wear compression socks but has difficulty getting them on.  No acute respiratory changes, but does have a chronic cough attributed to post nasal drainage.  Has occasional constipation, occasional black stools that she contributes to iron supplement.   Has known lymphoma being managed conservatively, has upcoming oncology appointment in a few weeks.      Past Medical History    Past Medical History:   Diagnosis Date    Arthritis 2012    knes and hips    Cancer (H)     Essential hypertension 05/05/2021    History of blood transfusion 1961    Miscarriage    Ovarian cysts 1974    s/p BSO       Past Surgical History   Past Surgical History:   Procedure Laterality Date    ABDOMEN SURGERY  1973 & 1974    " Ovarian cyst/Hysterectomy    APPENDECTOMY  1952    ARTHROPLASTY KNEE Left 1/6/2016    Procedure: ARTHROPLASTY KNEE;  Surgeon: Wilfredo Thompson MD;  Location: [facility] OR    ARTHROPLASTY KNEE Right 2/23/2017    Procedure: ARTHROPLASTY KNEE;  Surgeon: Wilfredo Thompson MD;  Location: [facility] OR    BIOPSY      colonoscopy/polyps    COLONOSCOPY      every 10 years    GENITOURINARY SURGERY  2008    bladder    HYSTERECTOMY, CHELSY  1974    ORTHOPEDIC SURGERY  2007 & 2009    [place]  both feet    PHACOEMULSIFICATION WITH STANDARD INTRAOCULAR LENS IMPLANT Left 8/20/2015    Procedure: PHACOEMULSIFICATION WITH STANDARD INTRAOCULAR LENS IMPLANT;  Surgeon: Fernando Jeffrey MD;  Location: [facility] OR    PHACOEMULSIFICATION WITH STANDARD INTRAOCULAR LENS IMPLANT Right 9/17/2015    Procedure: PHACOEMULSIFICATION WITH STANDARD INTRAOCULAR LENS IMPLANT;  Surgeon: Fernando Jeffrey MD;  Location: [facility] OR    SURGICAL HISTORY OF -   07/30/1998    Colonoscopy    SURGICAL HISTORY OF -   1974    Bilateral salpingoopherectomy    SURGICAL HISTORY OF -   3/09    TOT Midurethral sling       Prior to Admission Medications   Prior to Admission Medications   Prescriptions Last Dose Informant Patient Reported? Taking?   Acetaminophen (TYLENOL PO) 5/27/2024 at am Self Yes Yes   Sig: Take 1,000 mg by mouth every 8 hours as needed for other (pain)   Bioflavonoid Products (GRAPE SEED PO) Past Week Self Yes Yes   Sig: Take 1 capsule by mouth daily   Garlic 1000 MG CAPS Past Week Self Yes Yes   Sig: Take 1 capsule by mouth daily   LANsoprazole (PREVACID) 15 MG DR capsule 5/27/2024 at am Self No Yes   Sig: Take 1 capsule (15 mg) by mouth daily   Multiple Vitamins-Minerals (CENTRUM SILVER) per tablet Past Week Self Yes Yes   Sig: Take 1 tablet by mouth daily   PSYLLIUM PO Unknown Self Yes Yes   Sig: Take 1 capsule by mouth every morning 3 capsules at bedtime   Vitamin D, Cholecalciferol, 25 MCG (1000 UT) CAPS Past Week Self Yes Yes   Sig: Take 1 capsule by mouth daily    allopurinol (ZYLOPRIM) 100 MG tablet 2024 at am Self No Yes   Sig: Take 1 tablet (100 mg) by mouth daily   famotidine (PEPCID) 40 MG tablet 2024 at am Self No Yes   Sig: Take 1 tablet (40 mg) by mouth 2 times daily   ferrous sulfate (FEROSUL) 325 (65 Fe) MG tablet Past Week Self No Yes   Sig: Take 1 tablet (325 mg) by mouth Every Mon, Wed, Fri Morning   fluticasone (FLONASE) 50 MCG/ACT nasal spray 2024 Self No Yes   Sig: Spray 1 spray into both nostrils 2 times daily   ipratropium (ATROVENT) 0.03 % nasal spray Unknown at prn Self No Yes   Sig: Spray 1-2 sprays into both nostrils 3 times daily as needed for rhinitis (runny nose, postnasal drainage)   loratadine (CLARITIN) 10 MG tablet 2024 at am Self Yes Yes   Sig: Take 10 mg by mouth daily   mupirocin (BACTROBAN) 2 % external ointment Unknown at prn Self No Yes   Sig: Apply topically 3 times daily Apply to nose as needed   senna-docusate (SENOKOT-S/PERICOLACE) 8.6-50 MG tablet Past Week Self Yes Yes   Sig: Take 2 tablets by mouth daily      Facility-Administered Medications: None        Review of Systems    The 10 point Review of Systems is negative other than noted in the HPI or here.     Social History   I have reviewed this patient's social history and updated it with pertinent information if needed.  Social History     Tobacco Use    Smoking status: Former     Current packs/day: 0.00     Average packs/day: 0.5 packs/day for 5.0 years (2.5 ttl pk-yrs)     Types: Cigarettes     Start date: 1968     Quit date: 2/15/1972     Years since quittin.3    Smokeless tobacco: Never    Tobacco comments:     stopped in her 20's   Vaping Use    Vaping status: Never Used   Substance Use Topics    Alcohol use: Yes     Comment: Occasional    Drug use: No         Family History   I have reviewed this patient's family history and updated it with pertinent information if needed.  Family History   Problem Relation Age of Onset    Cancer Mother          Ovarian    Other Cancer Mother         Ovarian    Cerebrovascular Disease Father     Heart Disease Father     C.A.D. Father     Coronary Artery Disease Father     Cancer Maternal Grandmother     Other Cancer Maternal Grandmother         Ovarian/Bone    Cerebrovascular Disease Maternal Grandfather     Colon Cancer Maternal Grandfather     Diabetes Other         Great Grandmother    Breast Cancer No family hx of         Physical Exam   Vital Signs: Temp: 98.8  F (37.1  C) Temp src: Oral BP: (!) 167/73 Pulse: 96   Resp: 16 SpO2: 92 % O2 Device: None (Room air)    Weight: 172 lbs 13.45 oz    Constitutional: Alert, oriented, cooperative. No apparent distress, appears nontoxic. Speaking in full coherent sentences.     Eyes: Eyes are clear, pupils are reactive. No scleral icterus.    HEENT: Oropharynx is clear and moist, no lesions. Normocephalic, no evidence of cranial trauma.     Cardiovascular: Regular rhythm and rate, normal S1 and S2. No murmur, rubs, or gallops. Peripheral pulses intact bilaterally. Bilateral non-pitting lower extremity edema.    Respiratory: Non-labored breathing on room air. Fine bibasilar crackles present, no wheezes or rhonchi.    GI: Soft, non-distended. Some mild discomfort with palpation of her suprapubic area, otherwise non-tender, no rebound or guarding. No hepatosplenomegaly or masses appreciated. Normal bowel sounds.     Musculoskeletal: Without obvious deformity. Range of motion of left lower extremity not assessed. Normal muscle bulk and tone. Distal CMS intact.      Skin: Warm and dry, no rashes or ecchymoses. No mottling of skin.      Neurologic: Patient moves all extremities. Gross strength and sensation are equal bilaterally.    Genitourinary: Deferred      Medical Decision Making       80 MINUTES SPENT BY ME on the date of service doing chart review, history, exam, documentation & further activities per the note.  MANAGEMENT DISCUSSED with the following over the past 24 hours:   Deepak Adorno   NOTE(S)/MEDICAL RECORDS REVIEWED over the past 24 hours: emergency department notes  Tests ORDERED & REVIEWED in the past 24 hours:  - BMP  - CBC  Tests personally interpreted in the past 24 hours:  - Pelvic CT showing left pubic rami fracture       Data     I have personally reviewed the following data over the past 24 hrs:    6.0  \   11.9   / 209     140 102 15.7 /  103 (H)   4.3 24 0.66 \       Imaging results reviewed over the past 24 hrs:   Recent Results (from the past 24 hour(s))   Pelvis XR w/ unilateral hip left    Narrative    EXAM: XR PELVIS AND HIP LEFT 1 VIEW  LOCATION: [facility]  DATE: 5/27/2024    INDICATION: Fall. Left hip pain.     COMPARISON: None.      Impression    IMPRESSION: Comminuted, mildly displaced left parasymphyseal pubic fracture extends towards the central left superior pubic ramus. Anatomic alignment left hip. No acute displaced left hip fracture is identified. Mild bilateral hip osteoarthritis.   Degenerative change lower lumbar spine and both SI joints. Degenerative change at the symphysis pubis.     Fingers XR, 2-3 views, left    Narrative    EXAM: XR FINGER LEFT G/E 2 VIEWS  LOCATION: [facility]  DATE: 5/27/2024    INDICATION: Fall. Thumb pain.    COMPARISON: None.      Impression    IMPRESSION: Anatomic alignment left thumb. No acute displaced left thumb fracture identified. Moderate thumb carpometacarpal joint osteoarthritis. Mild thumb MCP and IP joint osteoarthritis. Chronic ossicle is seen along the dorsum of the thumb proximal   phalangeal neck. No significant left thumb soft tissue swelling.     XR Knee Left 1/2 Views    Narrative    EXAM: XR KNEE LEFT 1/2 VIEWS  LOCATION: [facility]  DATE: 5/27/2024    INDICATION: Fall. Knee pain.    COMPARISON: None.      Impression    IMPRESSION: Postop left total knee arthroplasty. Patellar resurfacing. The components are in the expected  position. No acute displaced periprosthetic fracture. Nothing for component loosening. No sizable left knee joint effusion.   CT Pelvis Bone wo Contrast    Narrative    EXAM: CT PELVIS BONE WO CONTRAST  LOCATION: [facility]  DATE: 5/27/2024    INDICATION: Fall and pain.    COMPARISON: Same day radiograph.    TECHNIQUE: CT scan of the pelvis was performed without intravenous contrast. Multiplanar reformats were obtained. Dose reduction techniques were used.    CONTRAST: None.    FINDINGS:  PELVIS ORGANS: There is no free fluid in the pelvis. Prior hysterectomy. The bladder is distended. Colonic diverticulosis without evidence of diverticulitis. Atherosclerotic vascular calcifications.    MUSCULOSKELETAL: Acute minimally displaced and impacted left parasymphyseal fracture with associated cortical lucency and buckling, extending into the superior pubic ramus.    Probable acute nondisplaced fracture through the left L5 transverse process. There is mild to moderate arthrosis involving the bilateral SI joints without a definitive sacral fracture by CT. Bones are demineralized.    There are mild degenerative changes of both hips. No dislocation. Advanced degenerative changes lower lumbar spine.    There is no large soft tissue hematoma about the pelvis or left hip.      Impression    IMPRESSION:  1.  Acute minimally displaced comminuted left parasymphyseal fracture, extending into the superior pubic ramus.    2.  Small nondisplaced fracture L5 transverse process is suspected. No definitive fracture to the sacrum.    3.  Bone demineralization.

## 2024-05-27 NOTE — PROGRESS NOTES
"[facility] [place] ADMISSION NOTE    Patient admitted to room 2305 at approximately 1745 via cart from emergency room. Patient was accompanied by son and daughter.     Verbal SBAR report received from STEW Simeon prior to patient arrival.     Patient trasferred to bed via air enoch. Patient alert and oriented X 3. Pain is controlled without any medications.  . Admission vital signs: Blood pressure (!) 165/74, pulse 85, temperature 98.2  F (36.8  C), temperature source Oral, resp. rate 14, height 1.664 m (5' 5.5\"), weight 78.4 kg (172 lb 13.5 oz), SpO2 96%, not currently breastfeeding. Patient was oriented to plan of care, call light, bed controls, tv, telephone, bathroom, and visiting hours.     Risk Assessment    The following safety risks were identified during admission: fall. Yellow risk band applied: YES.     Skin Initial Assessment    This writer admitted this patient and completed a full skin assessment and Harry score in the Adult PCS flowsheet. Appropriate interventions initiated as needed.     Secondary skin check completed by STEW Montague.         Education    Patient has a [place] to Observation order: Yes  Observation education completed and documented: Yes      Jazmin Jeffrey RN      "

## 2024-05-27 NOTE — ED NOTES
Pt denies any need for pain medication at this moment, instructed Pt to call if she changes her mind, call light within reach, family at bedside.

## 2024-05-27 NOTE — MEDICATION SCRIBE - ADMISSION MEDICATION HISTORY
Medication Scribe Admission Medication History    Admission medication history is complete. The information provided in this note is only as accurate as the sources available at the time of the update.    Information Source(s): Patient via in-person    Pertinent Information: Patient has not taken vitamins/supplements in about a week    Changes made to PTA medication list:  Added: None  Deleted: None  Changed: None    Allergies reviewed with patient and updates made in EHR: yes    Medication History Completed By: Tatum Martini 5/27/2024 4:41 PM    PTA Med List   Medication Sig Last Dose    Acetaminophen (TYLENOL PO) Take 1,000 mg by mouth every 8 hours as needed for other (pain) 5/27/2024 at am    allopurinol (ZYLOPRIM) 100 MG tablet Take 1 tablet (100 mg) by mouth daily 5/27/2024 at am    Bioflavonoid Products (GRAPE SEED PO) Take 1 capsule by mouth daily Past Week    famotidine (PEPCID) 40 MG tablet Take 1 tablet (40 mg) by mouth 2 times daily 5/27/2024 at am    ferrous sulfate (FEROSUL) 325 (65 Fe) MG tablet Take 1 tablet (325 mg) by mouth Every Mon, Wed, Fri Morning Past Week    fluticasone (FLONASE) 50 MCG/ACT nasal spray Spray 1 spray into both nostrils 2 times daily 5/26/2024    Garlic 1000 MG CAPS Take 1 capsule by mouth daily Past Week    ipratropium (ATROVENT) 0.03 % nasal spray Spray 1-2 sprays into both nostrils 3 times daily as needed for rhinitis (runny nose, postnasal drainage) Unknown at prn    LANsoprazole (PREVACID) 15 MG DR capsule Take 1 capsule (15 mg) by mouth daily 5/27/2024 at am    loratadine (CLARITIN) 10 MG tablet Take 10 mg by mouth daily 5/27/2024 at am    Multiple Vitamins-Minerals (CENTRUM SILVER) per tablet Take 1 tablet by mouth daily Past Week    mupirocin (BACTROBAN) 2 % external ointment Apply topically 3 times daily Apply to nose as needed Unknown at prn    PSYLLIUM PO Take 1 capsule by mouth every morning 3 capsules at bedtime Unknown    senna-docusate (SENOKOT-S/PERICOLACE)  8.6-50 MG tablet Take 2 tablets by mouth daily Past Week    Vitamin D, Cholecalciferol, 25 MCG (1000 UT) CAPS Take 1 capsule by mouth daily Past Week

## 2024-05-27 NOTE — ED PROVIDER NOTES
History     Chief Complaint   Patient presents with    Fall     Pt fell at home at approx 11:05, pt fell on hard alana, no LOC no blood thinners, complaining of left hip and left arm pain, pt typically walks independently      HPI  Lavonne Tidwell is a 83 year old female who presents for evaluation of left hip pain after a fall at home.  Patient was getting ready to go for lunch with her family at about 11 AM.  She had her purse and 1 hand and when she went to go outside she lost her footing and fell on her left side on a hardwood floor.  She landed onto her left hip and left arm.  Did not hit her head.  No loss of consciousness.  She is not dizzy or lightheaded prior to the fall.  She was able to sit up but could not get completely up on her own or bear weight on her left leg.  She was able to get the door open and pounded on her window to get her family's attention that she had fallen.  They assisted her up and brought her here to the emergency department to be evaluated.  Patient complains of pain in the left thumb, left hip, left groin and left knee pain.  Denies neck pain or back pain.  She is not on blood thinner medication.    Left knee arthroplasty in 2016.  Right knee arthroplasty in 2017.  Allergies:  Allergies   Allergen Reactions    Codeine GI Disturbance       Problem List:    Patient Active Problem List    Diagnosis Date Noted    Chronic cough 09/14/2023     Priority: Medium    Chronic bilateral low back pain without sciatica 10/11/2022     Priority: Medium    Primary osteoarthritis involving multiple joints 09/03/2021     Priority: Medium    Essential hypertension 05/05/2021     Priority: Medium    Tubulovillous adenoma polyp of colon 05/30/2019     Priority: Medium     Return in 3 years for the next colonoscopy      Status post total right knee replacement 02/23/2017     Priority: Medium    Primary localized osteoarthrosis, lower leg 02/23/2017     Priority: Medium    Status post total left  knee replacement 01/06/2016     Priority: Medium    Senile nuclear sclerosis 08/18/2015     Priority: Medium    Onychomycosis 01/30/2015     Priority: Medium    Advanced directives, counseling/discussion 02/10/2012     Priority: Medium     Advance Directive Problem List Overview:   Name Relationship Phone    Primary Health Care Agent            Alternative Health Care Agent          Discussed advance care planning with patient; information given to patient to review. 2/10/2012         Hyperlipidemia LDL goal <130 10/31/2010     Priority: Medium    Urinary incontinence 12/31/2008     Priority: Medium     On Ditropan for 2 years--modest improvement, initially; s/p TOT--good improvement in control      Atrophic vaginitis 12/31/2008     Priority: Medium    Menopausal syndrome (hot flashes) 12/02/2008     Priority: Medium     On Premarin--decreasing dose for 4 years--now takes twice a week      Esophageal reflux 11/10/2006     Priority: Medium    Injury of sigmoid colon 09/13/2005     Priority: Medium     Sigmoid polyp  Problem list name updated by automated process. Provider to review          Past Medical History:    Past Medical History:   Diagnosis Date    Arthritis 2012    Cancer (H)     Essential hypertension 05/05/2021    History of blood transfusion 1961    Ovarian cysts 1974       Past Surgical History:    Past Surgical History:   Procedure Laterality Date    ABDOMEN SURGERY  1973 & 1974    Ovarian cyst/Hysterectomy    APPENDECTOMY  1952    ARTHROPLASTY KNEE Left 1/6/2016    Procedure: ARTHROPLASTY KNEE;  Surgeon: Wilfredo Thompson MD;  Location: [facility]    ARTHROPLASTY KNEE Right 2/23/2017    Procedure: ARTHROPLASTY KNEE;  Surgeon: Wilfredo Thompson MD;  Location: [facility]    BIOPSY      colonoscopy/polyps    COLONOSCOPY      every 10 years    GENITOURINARY SURGERY  2008    bladder    HYSTERECTOMY, CHELSY  1974    ORTHOPEDIC SURGERY  2007 & 2009    [place]  both feet    PHACOEMULSIFICATION WITH STANDARD INTRAOCULAR  LENS IMPLANT Left 2015    Procedure: PHACOEMULSIFICATION WITH STANDARD INTRAOCULAR LENS IMPLANT;  Surgeon: Fernando Jeffrey MD;  Location: [facility]    PHACOEMULSIFICATION WITH STANDARD INTRAOCULAR LENS IMPLANT Right 2015    Procedure: PHACOEMULSIFICATION WITH STANDARD INTRAOCULAR LENS IMPLANT;  Surgeon: Fernando Jeffrey MD;  Location: [facility]    SURGICAL HISTORY OF -   1998    Colonoscopy    SURGICAL HISTORY OF 1974    Bilateral salpingoopherectomy    SURGICAL HISTORY OF -   3/09    TOT Midurethral sling       Family History:    Family History   Problem Relation Age of Onset    Cancer Mother         Ovarian    Other Cancer Mother         Ovarian    Cerebrovascular Disease Father     Heart Disease Father     C.A.D. Father     Coronary Artery Disease Father     Cancer Maternal Grandmother     Other Cancer Maternal Grandmother         Ovarian/Bone    Cerebrovascular Disease Maternal Grandfather     Colon Cancer Maternal Grandfather     Diabetes Other         Great Grandmother    Breast Cancer No family hx of        Social History:  Marital Status:   [5]  Social History     Tobacco Use    Smoking status: Former     Current packs/day: 0.00     Average packs/day: 0.5 packs/day for 5.0 years (2.5 ttl pk-yrs)     Types: Cigarettes     Start date: 1968     Quit date: 2/15/1972     Years since quittin.3    Smokeless tobacco: Never    Tobacco comments:     stopped in her 20's   Vaping Use    Vaping status: Never Used   Substance Use Topics    Alcohol use: Yes     Comment: Occasional    Drug use: No        Medications:    Acetaminophen (TYLENOL PO)  allopurinol (ZYLOPRIM) 100 MG tablet  Bioflavonoid Products (GRAPE SEED PO)  famotidine (PEPCID) 40 MG tablet  ferrous sulfate (FEROSUL) 325 (65 Fe) MG tablet  fluticasone (FLONASE) 50 MCG/ACT nasal spray  Garlic 1000 MG CAPS  ipratropium (ATROVENT) 0.03 % nasal spray  LANsoprazole (PREVACID) 15 MG DR capsule  loratadine (CLARITIN) 10 MG  tablet  Multiple Minerals-Vitamins (CITRACAL MAXIMUM PLUS) TABS  Multiple Vitamins-Minerals (CENTRUM SILVER) per tablet  mupirocin (BACTROBAN) 2 % external ointment  PSYLLIUM PO  senna-docusate (SENOKOT-S/PERICOLACE) 8.6-50 MG tablet  Vitamin D, Cholecalciferol, 25 MCG (1000 UT) CAPS          Review of Systems  As mentioned above in the history present illness. All other systems were reviewed and are negative.    Physical Exam   BP: (!) 168/62  Pulse: 76  Temp: 98.2  F (36.8  C)  Resp: 14  Weight: 77.1 kg (170 lb)  SpO2: 96 %      Physical Exam  Constitutional:       General: She is in acute distress.      Appearance: She is well-developed. She is not ill-appearing.      Comments: Arrives in wheelchair with family.  Assist of 2 staff to get her into bed.   HENT:      Head: Normocephalic and atraumatic.      Right Ear: External ear normal.      Left Ear: External ear normal.      Nose: Nose normal.      Mouth/Throat:      Mouth: Mucous membranes are moist.   Eyes:      Conjunctiva/sclera: Conjunctivae normal.   Cardiovascular:      Rate and Rhythm: Normal rate and regular rhythm.      Heart sounds: Normal heart sounds. No murmur heard.  Pulmonary:      Effort: Pulmonary effort is normal. No respiratory distress.      Breath sounds: Normal breath sounds.   Abdominal:      General: Bowel sounds are normal. There is no distension.      Tenderness: There is no abdominal tenderness.   Musculoskeletal:      Right shoulder: Normal.      Left shoulder: Normal.      Right elbow: Normal.      Left elbow: Normal.      Right wrist: Normal.      Left wrist: Normal.      Left hand: Tenderness (left thumb) present.      Cervical back: Normal.      Thoracic back: Normal.      Lumbar back: Normal.      Right hip: No tenderness. Normal range of motion.      Left hip: Tenderness present. Decreased range of motion.      Right upper leg: Normal.      Left upper leg: Tenderness (groin and lateral thigh) present. No swelling.      Right  knee: Normal.      Left knee: No swelling or erythema. Normal range of motion. Tenderness (diffuse) present.        Legs:    Skin:     General: Skin is warm and dry.      Findings: No rash.   Neurological:      General: No focal deficit present.      Mental Status: She is alert and oriented to person, place, and time.         ED Course     ED Course as of 05/27/24 1609   Mon May 27, 2024   1549 I spoke with on-call orthopedics, Dr. Nolan (with   [facility]). He recommends WBAT, often will have patient's on 6 weeks of DVT prophylaxis. If she is admitted they will also round on her while in the hospital.     Procedures         Results for orders placed or performed during the hospital encounter of 05/27/24 (from the past 24 hour(s))   Pelvis XR w/ unilateral hip left    Narrative    EXAM: XR PELVIS AND HIP LEFT 1 VIEW  LOCATION: [facility]  DATE: 5/27/2024    INDICATION: Fall. Left hip pain.     COMPARISON: None.      Impression    IMPRESSION: Comminuted, mildly displaced left parasymphyseal pubic fracture extends towards the central left superior pubic ramus. Anatomic alignment left hip. No acute displaced left hip fracture is identified. Mild bilateral hip osteoarthritis.   Degenerative change lower lumbar spine and both SI joints. Degenerative change at the symphysis pubis.     Fingers XR, 2-3 views, left    Narrative    EXAM: XR FINGER LEFT G/E 2 VIEWS  LOCATION: [facility]  DATE: 5/27/2024    INDICATION: Fall. Thumb pain.    COMPARISON: None.      Impression    IMPRESSION: Anatomic alignment left thumb. No acute displaced left thumb fracture identified. Moderate thumb carpometacarpal joint osteoarthritis. Mild thumb MCP and IP joint osteoarthritis. Chronic ossicle is seen along the dorsum of the thumb proximal   phalangeal neck. No significant left thumb soft tissue swelling.     XR Knee Left 1/2 Views    Narrative    EXAM: XR KNEE LEFT 1/2 VIEWS  LOCATION:   [facility]  DATE: 5/27/2024    INDICATION: Fall. Knee pain.    COMPARISON: None.      Impression    IMPRESSION: Postop left total knee arthroplasty. Patellar resurfacing. The components are in the expected position. No acute displaced periprosthetic fracture. Nothing for component loosening. No sizable left knee joint effusion.   CT Pelvis Bone wo Contrast    Narrative    EXAM: CT PELVIS BONE WO CONTRAST  LOCATION: [facility]  DATE: 5/27/2024    INDICATION: Fall and pain.    COMPARISON: Same day radiograph.    TECHNIQUE: CT scan of the pelvis was performed without intravenous contrast. Multiplanar reformats were obtained. Dose reduction techniques were used.    CONTRAST: None.    FINDINGS:  PELVIS ORGANS: There is no free fluid in the pelvis. Prior hysterectomy. The bladder is distended. Colonic diverticulosis without evidence of diverticulitis. Atherosclerotic vascular calcifications.    MUSCULOSKELETAL: Acute minimally displaced and impacted left parasymphyseal fracture with associated cortical lucency and buckling, extending into the superior pubic ramus.    Probable acute nondisplaced fracture through the left L5 transverse process. There is mild to moderate arthrosis involving the bilateral SI joints without a definitive sacral fracture by CT. Bones are demineralized.    There are mild degenerative changes of both hips. No dislocation. Advanced degenerative changes lower lumbar spine.    There is no large soft tissue hematoma about the pelvis or left hip.      Impression    IMPRESSION:  1.  Acute minimally displaced comminuted left parasymphyseal fracture, extending into the superior pubic ramus.    2.  Small nondisplaced fracture L5 transverse process is suspected. No definitive fracture to the sacrum.    3.  Bone demineralization.       Medications   acetaminophen (TYLENOL) tablet 1,000 mg (has no administration in time range)       Assessments & Plan  (with Medical Decision Making)     83 year old female with history of Htn who presents for evaluation after falling today at home. She fell at 11am landing on hard wood floor. She did not hit head. No LOC.  Complains of left hip, left groin, left knee, and left thumb pain. She reports history of chronic low back pain, but does not have any neck or back tenderness on exam. No extremity swelling or deformity. Faint ecchymosis to lateral left hip.  Left groin and lateral hip tenderness. Left knee diffuse tenderness, but no swelling.   XR left thumb, XR left knee, XR left hip/pelvis and CT pelvis as noted above. Reveals acute minimally displaced comminuted left parasymphyseal fracture, extending into the superior pubic ramus.  Small nondisplaced fracture L5 transverse process is suspected.  No other definitive fractures are noted.  Patient is not having much pain at rest, but was unable to get up or ambulate due to the pain in her left hip.    Consulted with the on-call orthopedic service, Dr. Nolan with [facility].  He recommends weightbearing as tolerated.  If she is admitted to the hospital they will round on her tomorrow.  He states often these patients will be put on 6 weeks of DVT prophylaxis.    Patient will need admission and rehab due to her inability to ambulate related to her pubic fracture. I spoke with the on-call hospitalist, KAREL Johnson, who accepts patient for admission/observation status.  She requested basic lab orders (CBC and BMP) and this has been ordered.  Patient in agreement with plan.        New Prescriptions    No medications on file       Final diagnoses:   Closed fracture of ramus of left pubis, initial encounter (H)   Closed fracture of transverse process of lumbar vertebra, initial encounter (H) - L5   Fall, initial encounter       5/27/2024   [facility] EMERGENCY DEPT       Debra, Winnie Shea, ENA CNP  05/27/24 6902

## 2024-05-27 NOTE — ED TRIAGE NOTES
Pt fell at home at approx 11:05, pt fell on hard alana, no LOC no blood thinners, complaining of left hip and left arm pain, pt typically walks independently      Triage Assessment (Adult)       Row Name 05/27/24 1226          Triage Assessment    Airway WDL WDL        Respiratory WDL    Respiratory WDL WDL        Skin Circulation/Temperature WDL    Skin Circulation/Temperature WDL WDL        Cardiac WDL    Cardiac WDL WDL        Peripheral/Neurovascular WDL    Peripheral Neurovascular WDL WDL        Cognitive/Neuro/Behavioral WDL    Cognitive/Neuro/Behavioral WDL WDL

## 2024-05-27 NOTE — ED NOTES
[facility]   Admission Handoff    The patient is Lavonne Tidwell, 83 year old who arrived in the ED by CAR from home with a complaint of Fall (Pt fell at home at approx 11:05, pt fell on hard alana, no LOC no blood thinners, complaining of left hip and left arm pain, pt typically walks independently )  . The patient's current symptoms are new and during this time the symptoms have decreased. In the ED, patient was diagnosed with   Final diagnoses:   Closed fracture of ramus of left pubis, initial encounter (H)   Closed fracture of transverse process of lumbar vertebra, initial encounter (H) - L5   Fall, initial encounter         Needed?: No    Allergies:    Allergies   Allergen Reactions    Codeine GI Disturbance       Past Medical Hx:   Past Medical History:   Diagnosis Date    Arthritis 2012    knes and hips    Cancer (H)     Essential hypertension 05/05/2021    History of blood transfusion 1961    Miscarriage    Ovarian cysts 1974    s/p BSO       Initial vitals were: BP: (!) 168/62  Pulse: 76  Temp: 98.2  F (36.8  C)  Resp: 14  Weight: 77.1 kg (170 lb)  SpO2: 96 %   Recent vital Signs: BP (!) 165/74   Pulse 85   Temp 98.2  F (36.8  C) (Oral)   Resp 14   Wt 77.1 kg (170 lb)   SpO2 96%   BMI 28.29 kg/m      Elimination Status: Continent: Yes, due to pain from fall she has a pure wick in place.     Activity Level: 2 assist    Fall Status: Reason for falls risk:  Mobility and Reason for falls risk: Elimination  nonskid shoes/slippers when out of bed, patient and family education, and assistive device/personal items within reach    Baseline Mental status: WDL  Current Mental Status changes: at basesline    Infection present or suspected this encounter: no  Sepsis suspected: No    Isolation type: NA    Bariatric equipment needed?: No    In the ED these meds were given:   Medications   acetaminophen (TYLENOL) tablet 1,000 mg (has no administration in time range)        Drips running?  No    Home pump  No    Current LDAs: Peripheral IV: Site No IV access     Results:   Labs/Imaging  Ordered and Resulted from Time of ED Arrival Up to the Time of Departure from the ED  Results for orders placed or performed during the hospital encounter of 05/27/24 (from the past 24 hour(s))   Pelvis XR w/ unilateral hip left    Narrative    EXAM: XR PELVIS AND HIP LEFT 1 VIEW  LOCATION: [facility]  DATE: 5/27/2024    INDICATION: Fall. Left hip pain.     COMPARISON: None.      Impression    IMPRESSION: Comminuted, mildly displaced left parasymphyseal pubic fracture extends towards the central left superior pubic ramus. Anatomic alignment left hip. No acute displaced left hip fracture is identified. Mild bilateral hip osteoarthritis.   Degenerative change lower lumbar spine and both SI joints. Degenerative change at the symphysis pubis.     Fingers XR, 2-3 views, left    Narrative    EXAM: XR FINGER LEFT G/E 2 VIEWS  LOCATION: [facility]  DATE: 5/27/2024    INDICATION: Fall. Thumb pain.    COMPARISON: None.      Impression    IMPRESSION: Anatomic alignment left thumb. No acute displaced left thumb fracture identified. Moderate thumb carpometacarpal joint osteoarthritis. Mild thumb MCP and IP joint osteoarthritis. Chronic ossicle is seen along the dorsum of the thumb proximal   phalangeal neck. No significant left thumb soft tissue swelling.     XR Knee Left 1/2 Views    Narrative    EXAM: XR KNEE LEFT 1/2 VIEWS  LOCATION: [facility]  DATE: 5/27/2024    INDICATION: Fall. Knee pain.    COMPARISON: None.      Impression    IMPRESSION: Postop left total knee arthroplasty. Patellar resurfacing. The components are in the expected position. No acute displaced periprosthetic fracture. Nothing for component loosening. No sizable left knee joint effusion.   CT Pelvis Bone wo Contrast    Narrative    EXAM: CT PELVIS BONE WO  CONTRAST  LOCATION: [facility]  DATE: 5/27/2024    INDICATION: Fall and pain.    COMPARISON: Same day radiograph.    TECHNIQUE: CT scan of the pelvis was performed without intravenous contrast. Multiplanar reformats were obtained. Dose reduction techniques were used.    CONTRAST: None.    FINDINGS:  PELVIS ORGANS: There is no free fluid in the pelvis. Prior hysterectomy. The bladder is distended. Colonic diverticulosis without evidence of diverticulitis. Atherosclerotic vascular calcifications.    MUSCULOSKELETAL: Acute minimally displaced and impacted left parasymphyseal fracture with associated cortical lucency and buckling, extending into the superior pubic ramus.    Probable acute nondisplaced fracture through the left L5 transverse process. There is mild to moderate arthrosis involving the bilateral SI joints without a definitive sacral fracture by CT. Bones are demineralized.    There are mild degenerative changes of both hips. No dislocation. Advanced degenerative changes lower lumbar spine.    There is no large soft tissue hematoma about the pelvis or left hip.      Impression    IMPRESSION:  1.  Acute minimally displaced comminuted left parasymphyseal fracture, extending into the superior pubic ramus.    2.  Small nondisplaced fracture L5 transverse process is suspected. No definitive fracture to the sacrum.    3.  Bone demineralization.   CBC with platelets differential    Narrative    The following orders were created for panel order CBC with platelets differential.  Procedure                               Abnormality         Status                     ---------                               -----------         ------                     CBC with platelets and d...[[id number]]  Abnormal            Final result                 Please view results for these tests on the individual orders.   CBC with platelets and differential   Result Value Ref Range    WBC Count 6.0 4.0 - 11.0  10e3/uL    RBC Count 3.57 (L) 3.80 - 5.20 10e6/uL    Hemoglobin 11.9 11.7 - 15.7 g/dL    Hematocrit 36.3 35.0 - 47.0 %     (H) 78 - 100 fL    MCH 33.3 (H) 26.5 - 33.0 pg    MCHC 32.8 31.5 - 36.5 g/dL    RDW 13.7 10.0 - 15.0 %    Platelet Count 209 150 - 450 10e3/uL    % Neutrophils 57 %    % Lymphocytes 39 %    % Monocytes 1 %    % Eosinophils 2 %    % Basophils 0 %    % Immature Granulocytes 1 %    NRBCs per 100 WBC 0 <1 /100    Absolute Neutrophils 3.4 1.6 - 8.3 10e3/uL    Absolute Lymphocytes 2.3 0.8 - 5.3 10e3/uL    Absolute Monocytes 0.1 0.0 - 1.3 10e3/uL    Absolute Eosinophils 0.1 0.0 - 0.7 10e3/uL    Absolute Basophils 0.0 0.0 - 0.2 10e3/uL    Absolute Immature Granulocytes 0.0 <=0.4 10e3/uL    Absolute NRBCs 0.0 10e3/uL       For the majority of the shift this patient's behavior was Green     Cardiac Rhythm: Normal Sinus and N/A  Pt needs tele? No  Skin/wound Issues: None    Code Status: Full Code    Pain control: good    Nausea control: pt had none    Abnormal labs/tests/findings requiring intervention: See imaging    Patient tested for COVID 19 prior to admission: NO     OBS brochure/video discussed/provided to patient/family: Yes     Family present during ED course? Yes     Family Comments/Social Situation comments: NA    Tasks needing completion: None    Swapna Meraz RN

## 2024-05-28 ENCOUNTER — APPOINTMENT (OUTPATIENT)
Dept: OCCUPATIONAL THERAPY | Facility: CLINIC | Age: 84
End: 2024-05-28
Attending: PHYSICIAN ASSISTANT
Payer: COMMERCIAL

## 2024-05-28 ENCOUNTER — APPOINTMENT (OUTPATIENT)
Dept: PHYSICAL THERAPY | Facility: CLINIC | Age: 84
End: 2024-05-28
Payer: COMMERCIAL

## 2024-05-28 PROCEDURE — 97535 SELF CARE MNGMENT TRAINING: CPT | Mod: GO | Performed by: OCCUPATIONAL THERAPIST

## 2024-05-28 PROCEDURE — 97165 OT EVAL LOW COMPLEX 30 MIN: CPT | Mod: GO | Performed by: OCCUPATIONAL THERAPIST

## 2024-05-28 PROCEDURE — 99232 SBSQ HOSP IP/OBS MODERATE 35: CPT | Performed by: STUDENT IN AN ORGANIZED HEALTH CARE EDUCATION/TRAINING PROGRAM

## 2024-05-28 PROCEDURE — G0378 HOSPITAL OBSERVATION PER HR: HCPCS

## 2024-05-28 PROCEDURE — 97161 PT EVAL LOW COMPLEX 20 MIN: CPT | Mod: GP

## 2024-05-28 PROCEDURE — 250N000013 HC RX MED GY IP 250 OP 250 PS 637: Performed by: STUDENT IN AN ORGANIZED HEALTH CARE EDUCATION/TRAINING PROGRAM

## 2024-05-28 PROCEDURE — 97530 THERAPEUTIC ACTIVITIES: CPT | Mod: GP

## 2024-05-28 PROCEDURE — 250N000013 HC RX MED GY IP 250 OP 250 PS 637: Performed by: PHYSICIAN ASSISTANT

## 2024-05-28 RX ORDER — ASPIRIN 81 MG/1
81 TABLET, CHEWABLE ORAL 2 TIMES DAILY
Status: DISCONTINUED | OUTPATIENT
Start: 2024-05-28 | End: 2024-05-29 | Stop reason: HOSPADM

## 2024-05-28 RX ADMIN — ACETAMINOPHEN 975 MG: 325 TABLET, FILM COATED ORAL at 13:54

## 2024-05-28 RX ADMIN — ASPIRIN 81 MG CHEWABLE TABLET 81 MG: 81 TABLET CHEWABLE at 20:21

## 2024-05-28 RX ADMIN — LORATADINE 10 MG: 10 TABLET ORAL at 07:41

## 2024-05-28 RX ADMIN — SENNOSIDES AND DOCUSATE SODIUM 2 TABLET: 50; 8.6 TABLET ORAL at 07:41

## 2024-05-28 RX ADMIN — ALLOPURINOL 100 MG: 100 TABLET ORAL at 07:41

## 2024-05-28 RX ADMIN — ACETAMINOPHEN 975 MG: 325 TABLET, FILM COATED ORAL at 20:20

## 2024-05-28 RX ADMIN — ACETAMINOPHEN 975 MG: 325 TABLET, FILM COATED ORAL at 07:41

## 2024-05-28 RX ADMIN — FAMOTIDINE 40 MG: 20 TABLET, FILM COATED ORAL at 07:41

## 2024-05-28 RX ADMIN — PANTOPRAZOLE SODIUM 20 MG: 20 TABLET, DELAYED RELEASE ORAL at 05:58

## 2024-05-28 RX ADMIN — FAMOTIDINE 40 MG: 20 TABLET, FILM COATED ORAL at 20:21

## 2024-05-28 ASSESSMENT — ACTIVITIES OF DAILY LIVING (ADL)
ADLS_ACUITY_SCORE: 46
ADLS_ACUITY_SCORE: 45
ADLS_ACUITY_SCORE: 46
ADLS_ACUITY_SCORE: 46
ADLS_ACUITY_SCORE: 45
ADLS_ACUITY_SCORE: 43
ADLS_ACUITY_SCORE: 45
ADLS_ACUITY_SCORE: 43
ADLS_ACUITY_SCORE: 46
ADLS_ACUITY_SCORE: 46
ADLS_ACUITY_SCORE: 43
ADLS_ACUITY_SCORE: 43
ADLS_ACUITY_SCORE: 46
ADLS_ACUITY_SCORE: 45
ADLS_ACUITY_SCORE: 43
ADLS_ACUITY_SCORE: 43
ADLS_ACUITY_SCORE: 37
ADLS_ACUITY_SCORE: 37
ADLS_ACUITY_SCORE: 45
ADLS_ACUITY_SCORE: 45
ADLS_ACUITY_SCORE: 46
ADLS_ACUITY_SCORE: 43
DEPENDENT_IADLS:: INDEPENDENT
PREVIOUS_RESPONSIBILITIES: MEAL PREP;HOUSEKEEPING;LAUNDRY;SHOPPING;MEDICATION MANAGEMENT;DRIVING
ADLS_ACUITY_SCORE: 46

## 2024-05-28 NOTE — PROGRESS NOTES
05/28/24 1100   Appointment Info   Signing Clinician's Name / Credentials (OT) Ramo Ying OTR/L   Living Environment   People in Home alone   Current Living Arrangements house   Home Accessibility stairs to enter home   Number of Stairs, Main Entrance 1   Stair Railings, Main Entrance railings safe and in good condition   Transportation Anticipated car, drives self;family or friend will provide   Living Environment Comments Pt reports living on own, all needs on one level   Self-Care   Usual Activity Tolerance good   Current Activity Tolerance fair   Equipment Currently Used at Home cane, straight;shower chair;walker, standard   Fall history within last six months yes   Number of times patient has fallen within last six months 3   Activity/Exercise/Self-Care Comment indep wtih mobility, has cane and 2WW from previous knee replacements, indep with ADLs/IADL's. has local kids who are able to assist if needed. walk in shower with built in shower bench.   Instrumental Activities of Daily Living (IADL)   Previous Responsibilities meal prep;housekeeping;laundry;shopping;medication management;driving   IADL Comments Pt reports independent with IADL, has family support available if needed   General Information   Onset of Illness/Injury or Date of Surgery 05/27/24   Referring Physician Clif Raya   Additional Occupational Profile Info/Pertinent History of Current Problem Lavonne Tidwell is a 83 year old female admitted on 5/27/2024. She has a past medical history significant for hypertension, GERD, chronic cough due to rhinitis / post nasal drainage, and history of gout who presented to the emergency department for evaluation of left hip pain and inability to ambulate after a mechanical fall, was found to have a pubic rami fracture.   Cognitive Status Examination   Orientation Status orientation to person, place and time   Visual Perception   Visual Impairment/Limitations WFL   Pain Assessment   Patient  Currently in Pain Yes, see Vital Sign flowsheet   Range of Motion Comprehensive   General Range of Motion no range of motion deficits identified;bilateral upper extremity ROM WFL   Strength Comprehensive (MMT)   General Manual Muscle Testing (MMT) Assessment no strength deficits identified   Coordination   Upper Extremity Coordination No deficits were identified   Bed Mobility   Bed Mobility   (increased assistance required)   Transfers   Transfer Comments SBA with 2WW to stand   Activities of Daily Living   Additional Documentation   (CGA for toilet transfer and standing at sink for g/h, anticipate requiring min to mod A for LBD)   Clinical Impression   Criteria for Skilled Therapeutic Interventions Met (OT) Yes, treatment indicated   OT Diagnosis decreased ADL independence   OT Problem List-Impairments impacting ADL problems related to;activity tolerance impaired;strength;pain   Assessment of Occupational Performance 3-5 Performance Deficits   Identified Performance Deficits dressing, bathing, toileting, grooming   Planned Therapy Interventions (OT) ADL retraining;IADL retraining;transfer training;strengthening;home program guidelines;progressive activity/exercise   Clinical Decision Making Complexity (OT) problem focused assessment/low complexity   Risk & Benefits of therapy have been explained evaluation/treatment results reviewed;patient   Clinical Impression Comments Pt presents with increased pain due to fracture resulting in decreased independence with ADL.  Pt would benefit from skilled OT services to increase safety and independence with ADL.   OT Total Evaluation Time   OT Eval, Low Complexity Minutes (89451) 8   OT Goals   Therapy Frequency (OT) 5 times/week   OT Predicted Duration/Target Date for Goal Attainment 06/07/24   OT Goals Hygiene/Grooming;Lower Body Dressing;Lower Body Bathing;Toilet Transfer/Toileting   OT: Hygiene/Grooming modified independent;while standing   OT: Lower Body Dressing  Modified independent;including set-up/clothing retrieval   OT: Lower Body Bathing Modified independent   OT: Toilet Transfer/Toileting Modified independent;cleaning and garment management;toilet transfer   Self-Care/Home Management   Self-Care/Home Mgmt/ADL, Compensatory, Meal Prep Minutes (87510) 25   Symptoms Noted During/After Treatment (Meal Preparation/Planning Training) fatigue;increased pain   Treatment Detail/Skilled Intervention Pt seated in chair upon arrival, therapist educated Pt on role of OT during inpatient stay.  Pt standing with close CGA and 2WW.  Pt ambulated to bathroom and completed toileting task with CGA for transfer with walker and grab bars for support.  Pt then ambulating to sink and standing at sink while completing g/h tasks with close CGA.  Therapist providing education on compensatory strategies for ADL to minimize pain including education on transfers.  pt returning to chair and left with all needs in place.   OT Discharge Planning   OT Plan toilet transfer, standing ADL endurance, lower body dressing   OT Discharge Recommendation (DC Rec) Transitional Care Facility   OT Rationale for DC Rec Pt would benefit from continued skilled OT services to increase safety and independence with ADL, requiring CGA to min A for mobility with walker   OT Brief overview of current status CGA   Total Session Time   Timed Code Treatment Minutes 25   Total Session Time (sum of timed and untimed services) 33                                                                                     [facility]      OUTPATIENT OCCUPATIONAL THERAPY  EVALUATION  PLAN OF TREATMENT FOR OUTPATIENT REHABILITATION  (COMPLETE FOR INITIAL CLAIMS ONLY)  Patient's Last Name, First Name, M.I.  YOB: 1940  Lavonne Tidwell                          Provider's Name  [facility] Medical Record No.  [id number]                               Onset Date:   05/27/24   Start of Care Date:   5/28/24     Type:     ___PT   _X_OT   ___SLP Medical Diagnosis:   fracture of left pubis ramus                        OT Diagnosis:  decreased ADL independence   Visits from SOC:  1   _________________________________________________________________________________  Plan of Treatment/Functional Goals    Planned Interventions: ADL retraining, IADL retraining, transfer training, strengthening, home program guidelines, progressive activity/exercise   Goals: See Occupational Therapy Goals on Care Plan in [facility] electronic health record.    Therapy Frequency: 5 times/week  Predicted Duration of Therapy Intervention: 06/07/24  _________________________________________________________________________________    I CERTIFY THE NEED FOR THESE SERVICES FURNISHED UNDER        THIS PLAN OF TREATMENT AND WHILE UNDER MY CARE     (Physician attestation of this document indicates review and certification of the therapy plan).               ,      Referring Physician: Clif Raya            Initial Assessment        See Occupational Therapy evaluation dated   in Epic electronic health record.

## 2024-05-28 NOTE — PLAN OF CARE
Problem: Hip Fracture Medical Management  Goal: Optimal Functional Performance  Outcome: Not Progressing  Intervention: Promote Optimal Functional Status  Recent Flowsheet Documentation  Taken 5/27/2024 2347 by Kailey Sullivan, RN  Activity Management: activity adjusted per tolerance     Problem: Hip Fracture Medical Management  Goal: Baseline Cognitive Function Maintained  Outcome: Progressing  Goal: Pain Control and Function  Outcome: Progressing  Intervention: Manage Acute Orthopaedic-Related Pain  Recent Flowsheet Documentation  Taken 5/27/2024 2037 by Kailey Sullivan, RN  Pain Management Interventions: medication (see MAR)  Goal: Effective Urinary Elimination  Outcome: Progressing   Goal Outcome Evaluation:  Patient alert and oriented x 4. Pain controlled with tylenol. VSS on room air, afebrile. CMS intact. Voids spontaneously, using purwik over night. Declined to get out of bed, she is weight bearing as tolerated.     Plan: PT/OT 5/28/24, TCU placement

## 2024-05-28 NOTE — CONSULTS
[facility] Consultation    Consultation - [facility]  Level of consult: One-time consult to assist in determining a diagnosis and to recommend an appropriate treatment plan    Lavonne Tidwell, KAMINI 1940, MRN [id number]     Admitting Dx: Fall, initial encounter [W19.XXXA]  Closed fracture of transverse process of lumbar vertebra, initial encounter (H) [S32.009A]  Closed fracture of ramus of left pubis, initial encounter (H) [S32.592A]     PCP: Elvira Kowalski, [phone]     Code status:  Special Code     Extended Emergency Contact Information  Primary Emergency Contact: MAXIMILIANODINAH/DERIC  Address: [address] [place] [id number] [facility]  Home Phone: [phone]  Work Phone: [phone]  Mobile Phone: [phone]  Relation: Son  Secondary Emergency Contact: PAMELA TIDWELL  Address: [address] [address] [place] [address] [facility]  Home Phone: [phone]  Work Phone: none  Mobile Phone: [phone]  Relation: Daughter     Assessment:  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   WBAT with walker support, pain control.   Recommend ASA 81mg BID x 6 weeks for DVT prophylaxis.   Follow up outpatient with a hip specialist/ in 2-3 weeks for a recheck.   PT/OT consults inpatient.   Discussed with the pt who expressed understanding.   Orthopedically stable. Will sign off. Please consult again if further orthopedic needs arise.    Thank you for including [facility] in the care of Lavonne Tidwell. It has been a pleasure participating in her care.      Salinas Zelaya PA-C  [facility]    Principal Problem:    Closed fracture of ramus of left pubis, initial encounter (H)  Active Problems:    Esophageal reflux    Urinary incontinence    Essential  hypertension    Primary osteoarthritis involving multiple joints    Chronic bilateral low back pain without sciatica    Chronic cough    Fall, initial encounter    Closed fracture of transverse process of lumbar vertebra, initial encounter (H)    Chronic gout       Chief Complaint  Left groin pain      HPI  The patient is seen in orthopedic consultation at the request of Rocio Richardson CNP.  The patient is a 83 year old female with moderate pain of the left  hip. The patient has a history of bilateral TKAs by  of [facility], HTN, GERD, gout and chronic low back pain. Her back pain limits her ability to walk distances and causes her to walk with a shuffling gait; it is difficult to lift her feet up. On 5/27, she was walking in her house and her foot stuck where the carpet turns to vinyl, causing her to lose her balance and fall on her left side. She was unable to get up on her own but was helped up by 2 family members; she had left groin pain and was unable to weight bear after so EMS was called. She was brought to the [facility] ED where imaging showed left pubic symphyseal and superior rami fractures. She has been admitted for further treatment. Currently she reports that she is feeling less pain today and has minimal soreness in the left groin at rest. She denies numbness or tingling in the legs, denies pain elsewhere.   History is obtained from the patient and electronic health record     Past Medical History  Past Medical History:   Diagnosis Date    Arthritis 2012    knes and hips    Cancer (H)     Essential hypertension 05/05/2021    History of blood transfusion 1961    Miscarriage    Ovarian cysts 1974    s/p BSO       Surgical History  Past Surgical History:   Procedure Laterality Date    ABDOMEN SURGERY  1973 & 1974    Ovarian cyst/Hysterectomy    APPENDECTOMY  1952    ARTHROPLASTY KNEE Left 1/6/2016    Procedure: ARTHROPLASTY KNEE;  Surgeon: Wilfredo Thompson MD;  Location: [facility] OR    ARTHROPLASTY  KNEE Right 2017    Procedure: ARTHROPLASTY KNEE;  Surgeon: Wilfredo Thompson MD;  Location: [facility] OR    BIOPSY      colonoscopy/polyps    COLONOSCOPY      every 10 years    GENITOURINARY SURGERY      bladder    HYSTERECTOMY, CHELSY  1974    ORTHOPEDIC SURGERY   &     [place]  both feet    PHACOEMULSIFICATION WITH STANDARD INTRAOCULAR LENS IMPLANT Left 2015    Procedure: PHACOEMULSIFICATION WITH STANDARD INTRAOCULAR LENS IMPLANT;  Surgeon: Fernando Jeffrey MD;  Location: [facility] OR    PHACOEMULSIFICATION WITH STANDARD INTRAOCULAR LENS IMPLANT Right 2015    Procedure: PHACOEMULSIFICATION WITH STANDARD INTRAOCULAR LENS IMPLANT;  Surgeon: Fernando Jeffrey MD;  Location: [facility] OR    SURGICAL HISTORY OF -   1998    Colonoscopy    SURGICAL HISTORY OF -       Bilateral salpingoopherectomy    SURGICAL HISTORY OF -   3/09    TOT Midurethral sling        Social History  Social History     Socioeconomic History    Marital status:      Spouse name: Not on file    Number of children: Not on file    Years of education: Not on file    Highest education level: Not on file   Occupational History    Not on file   Tobacco Use    Smoking status: Former     Current packs/day: 0.00     Average packs/day: 0.5 packs/day for 5.0 years (2.5 ttl pk-yrs)     Types: Cigarettes     Start date: 1968     Quit date: 2/15/1972     Years since quittin.3    Smokeless tobacco: Never    Tobacco comments:     stopped in her 20's   Vaping Use    Vaping status: Never Used   Substance and Sexual Activity    Alcohol use: Yes     Comment: Occasional    Drug use: No    Sexual activity: Not Currently   Other Topics Concern    Parent/sibling w/ CABG, MI or angioplasty before 65F 55M? Yes   Social History Narrative    02/15/24        ENVIRONMENTAL HISTORY: [place] The home is heated with a forced air. They does have central air conditioning. The patient's bedroom is  furnished with carpeting in bedroom and fabric window coverings.  Pets inside the house include 0. There no history of cockroach or mice infestation. There is/are 0 smokers in the house.  The house does not have a damp basement.      Social Determinants of Health     Financial Resource Strain: Not on file   Food Insecurity: Not on file   Transportation Needs: Not on file   Physical Activity: Not on file   Stress: Not on file   Social Connections: Not on file   Interpersonal Safety: Not on file   Housing Stability: Not on file       Family History  Family History   Problem Relation Age of Onset    Cancer Mother         Ovarian    Other Cancer Mother         Ovarian    Cerebrovascular Disease Father     Heart Disease Father     C.A.D. Father     Coronary Artery Disease Father     Cancer Maternal Grandmother     Other Cancer Maternal Grandmother         Ovarian/Bone    Cerebrovascular Disease Maternal Grandfather     Colon Cancer Maternal Grandfather     Diabetes Other         Great Grandmother    Breast Cancer No family hx of         Allergies:  Codeine      Current Medications:  Current Facility-Administered Medications   Medication Dose Route Frequency Provider Last Rate Last Admin    acetaminophen (TYLENOL) tablet 1,000 mg  1,000 mg Oral Once PRN Winnie Richardson APRN CNP        acetaminophen (TYLENOL) tablet 975 mg  975 mg Oral TID Judy Son PA-C   975 mg at 05/28/24 0741    allopurinol (ZYLOPRIM) tablet 100 mg  100 mg Oral Daily Judy Son PA-C   100 mg at 05/28/24 0741    calcium carbonate (TUMS) chewable tablet 1,000 mg  1,000 mg Oral 4x Daily PRN Winnie Richardson APRN CNP        enoxaparin ANTICOAGULANT (LOVENOX) injection 40 mg  40 mg Subcutaneous Q24H Judy Son PA-C   40 mg at 05/27/24 1842    famotidine (PEPCID) tablet 40 mg  40 mg Oral BID Judy Son PA-C   40 mg at 05/28/24 0741    hydrALAZINE (APRESOLINE) injection 5 mg  5 mg Intravenous Q6H PRN  Judy Son PA-C        loratadine (CLARITIN) tablet 10 mg  10 mg Oral Daily Judy Son PA-C   10 mg at 05/28/24 0741    naloxone (NARCAN) injection 0.2 mg  0.2 mg Intravenous Q2 Min PRN Deepak Adorno MD        Or    naloxone (NARCAN) injection 0.4 mg  0.4 mg Intravenous Q2 Min PRN Deepak Adorno MD        Or    naloxone (NARCAN) injection 0.2 mg  0.2 mg Intramuscular Q2 Min PRN Deepak Adorno MD        Or    naloxone (NARCAN) injection 0.4 mg  0.4 mg Intramuscular Q2 Min PRN Deepak Adorno MD        ondansetron (ZOFRAN ODT) ODT tab 4 mg  4 mg Oral Q6H PRN Judy Son PA-C        Or    ondansetron (ZOFRAN) injection 4 mg  4 mg Intravenous Q6H PRN Judy Son PA-C        oxyCODONE (ROXICODONE) tablet 5 mg  5 mg Oral Q4H PRN Judy Son PA-C        oxyCODONE IR (ROXICODONE) half-tab 2.5 mg  2.5 mg Oral Q4H PRN Judy Son PA-C        pantoprazole (PROTONIX) EC tablet 20 mg  20 mg Oral QAM AC Judy Son PA-C   20 mg at 05/28/24 0558    prochlorperazine (COMPAZINE) injection 5 mg  5 mg Intravenous Q6H PRN Judy Son PA-C        Or    prochlorperazine (COMPAZINE) tablet 5 mg  5 mg Oral Q6H PRN Judy Son PA-C        Or    prochlorperazine (COMPAZINE) suppository 12.5 mg  12.5 mg Rectal Q12H PRN Judy Son PA-C        senna-docusate (SENOKOT-S/PERICOLACE) 8.6-50 MG per tablet 2 tablet  2 tablet Oral Daily Judy Son PA-C   2 tablet at 05/28/24 0741       Review of Systems:  H&P    Physical Exam:  Temp:  [98.1  F (36.7  C)-99.7  F (37.6  C)] 98.1  F (36.7  C)  Pulse:  [76-96] 87  Resp:  [14-16] 16  BP: (138-177)/(61-74) 138/68  SpO2:  [92 %-96 %] 93 %    General: On examination, the patient is resting comfortably, NAD, awake, and alert and oriented to person, place, time, and general circumstances  SKIN: Ecchymosis noted at the lateral thigh.   Pulses: Bilateral feet are warm and  well perfused   Sensation: intact and equal bilaterally to the distal lower extremities.  Tenderness: Mild TTP over the lateral thigh in the area of ecchymosis.  ROM: FROM of the bilateral toes and ankles. Logroll of the left hip is pain-free.      Pertinent Labs  Lab Results: personally reviewed.  Lab Results   Component Value Date    WBC 6.0 05/27/2024    HGB 11.9 05/27/2024    HCT 36.3 05/27/2024     (H) 05/27/2024     05/27/2024       Pertinent Radiology  Radiology Results: images and radiology report reviewed  Recent Results (from the past 24 hour(s))   Pelvis XR w/ unilateral hip left    Narrative    EXAM: XR PELVIS AND HIP LEFT 1 VIEW  LOCATION: [facility]  DATE: 5/27/2024    INDICATION: Fall. Left hip pain.     COMPARISON: None.      Impression    IMPRESSION: Comminuted, mildly displaced left parasymphyseal pubic fracture extends towards the central left superior pubic ramus. Anatomic alignment left hip. No acute displaced left hip fracture is identified. Mild bilateral hip osteoarthritis.   Degenerative change lower lumbar spine and both SI joints. Degenerative change at the symphysis pubis.     Fingers XR, 2-3 views, left    Narrative    EXAM: XR FINGER LEFT G/E 2 VIEWS  LOCATION: [facility]  DATE: 5/27/2024    INDICATION: Fall. Thumb pain.    COMPARISON: None.      Impression    IMPRESSION: Anatomic alignment left thumb. No acute displaced left thumb fracture identified. Moderate thumb carpometacarpal joint osteoarthritis. Mild thumb MCP and IP joint osteoarthritis. Chronic ossicle is seen along the dorsum of the thumb proximal   phalangeal neck. No significant left thumb soft tissue swelling.     XR Knee Left 1/2 Views    Narrative    EXAM: XR KNEE LEFT 1/2 VIEWS  LOCATION: [facility]  DATE: 5/27/2024    INDICATION: Fall. Knee pain.    COMPARISON: None.      Impression    IMPRESSION: Postop left total knee  arthroplasty. Patellar resurfacing. The components are in the expected position. No acute displaced periprosthetic fracture. Nothing for component loosening. No sizable left knee joint effusion.   CT Pelvis Bone wo Contrast    Narrative    EXAM: CT PELVIS BONE WO CONTRAST  LOCATION: [facility]  DATE: 5/27/2024    INDICATION: Fall and pain.    COMPARISON: Same day radiograph.    TECHNIQUE: CT scan of the pelvis was performed without intravenous contrast. Multiplanar reformats were obtained. Dose reduction techniques were used.    CONTRAST: None.    FINDINGS:  PELVIS ORGANS: There is no free fluid in the pelvis. Prior hysterectomy. The bladder is distended. Colonic diverticulosis without evidence of diverticulitis. Atherosclerotic vascular calcifications.    MUSCULOSKELETAL: Acute minimally displaced and impacted left parasymphyseal fracture with associated cortical lucency and buckling, extending into the superior pubic ramus.    Probable acute nondisplaced fracture through the left L5 transverse process. There is mild to moderate arthrosis involving the bilateral SI joints without a definitive sacral fracture by CT. Bones are demineralized.    There are mild degenerative changes of both hips. No dislocation. Advanced degenerative changes lower lumbar spine.    There is no large soft tissue hematoma about the pelvis or left hip.      Impression    IMPRESSION:  1.  Acute minimally displaced comminuted left parasymphyseal fracture, extending into the superior pubic ramus.    2.  Small nondisplaced fracture L5 transverse process is suspected. No definitive fracture to the sacrum.    3.  Bone demineralization.       Attestation:  I have reviewed today's vital signs, notes, medications, labs and imaging.     Salinas Zelaya PA-C

## 2024-05-28 NOTE — CONSULTS
Care Management Initial Consult    General Information  Assessment completed with: Patient-Lavonne & LIZETHenrik  Type of CM/SW Visit: Initial Assessment    Primary Care Provider verified and updated as needed: Yes   Readmission within the last 30 days: no previous admission in last 30 days      Reason for Consult: discharge planning  Advance Care Planning:     Not present     Communication Assessment  Patient's communication style: spoken language (English or Bilingual)    Hearing Difficulty or Deaf: yes   Wear Glasses or Blind: yes    Cognitive  Cognitive/Neuro/Behavioral: WDL                      Living Environment:   People in home: alone     Current living Arrangements: house      Able to return to prior arrangements: yes       Family/Social Support:  Care provided by: self  Provides care for: no one  Marital Status:   Children          Description of Support System: Supportive, Involved    Support Assessment: Adequate family and caregiver support, Adequate social supports    Current Resources:   Patient receiving home care services: No  Community Resources: None  Equipment currently used at home: cane, straight, shower chair, walker, standard  Supplies currently used at home: None    Employment/Financial:  Employment Status: retired        Financial Concerns: none   Referral to Financial Worker: No     Does the patient's insurance plan have a 3 day qualifying hospital stay waiver?  Yes     Which insurance plan 3 day waiver is available? Alternative insurance waiver    Will the waiver be used for post-acute placement? Yes    Lifestyle & Psychosocial Needs:  Social Determinants of Health     Food Insecurity: Not on file   Depression: Not at risk (9/13/2023)    PHQ-2     PHQ-2 Score: 0   Housing Stability: Not on file   Tobacco Use: Medium Risk (2/15/2024)    Patient History     Smoking Tobacco Use: Former     Smokeless Tobacco Use: Never     Passive Exposure: Not on file   Financial Resource Strain: Not on  file   Alcohol Use: Not on file   Transportation Needs: Not on file   Physical Activity: Not on file   Interpersonal Safety: Not on file   Stress: Not on file   Social Connections: Not on file   Health Literacy: Not on file     Functional Status:  Prior to admission patient needed assistance:   Dependent ADLs:: Ambulation-cane  Dependent IADLs:: Independent    Mental Health Status:  Mental Health Status: No Current Concerns       Chemical Dependency Status:  Chemical Dependency Status: No Current Concerns           Values/Beliefs:  Spiritual, Cultural Beliefs, Amish Practices, Values that affect care:               Additional Information:  Care Management received referral for discharge planning.    MEGHNA reviewed EMR, attended IDT rounds, spoke with medical care team, and then met pt & Maeve HINDS, at bedside to introduce self/role, perform assessment, and discuss resources.    Per discussion with pt/family, prior to hospitalization pt living in own housing and maintaing fairly independent lifestyle.  Pt states she uses a cane for mobility & continues to drive & do her own, I/ADLS.     Therapy team currently recommending TCU cares at discharge.    SW discussed discharge planning, and provided education regarding Medicare.gov; how pt's insurance will cover services upon discharge, , and offered choice of agency for cares.      Per discussion with pt and/or family, a referral was sent to the following TCU facilities for cares:  Service Provider Request Status Selected Services Address Phone Fax Patient Preferred   [facility] [place]   Selected; needs [place] [facility] [id number] [facility] [id number] [phone] --      [facility]  Declined  Alternative agency selected N/A [address] [place] [id number] [phone] --   [facility]  Declined  Alternative agency selected N/A [address] [facility] [id number]  [phone] --     Transportation discussed & family hopes to transport the pt once bed available tomorrow.    Care Management team to follow for discharge plans.    CHERYL Valenzuela

## 2024-05-28 NOTE — PLAN OF CARE
Problem: Adult Inpatient Plan of Care  Goal: Plan of Care Review  Description: The Plan of Care Review/Shift note should be completed every shift.  The Outcome Evaluation is a brief statement about your assessment that the patient is improving, declining, or no change.  This information will be displayed automatically on your shift  note.  Outcome: Progressing  Flowsheets (Taken 5/28/2024 1418)  Outcome Evaluation: Patient is alert and orientated x4. Up with A1, gaitbelt, and walker. Pain to left hip that has been managed by Tylenol and ice. CMS intact. Plan to go to [facility] tomorrow.  Plan of Care Reviewed With: patient  Overall Patient Progress: improving     Problem: Adult Inpatient Plan of Care  Goal: Optimal Comfort and Wellbeing  Intervention: Monitor Pain and Promote Comfort  Recent Flowsheet Documentation  Taken 5/28/2024 1418 by Desirae Patel RN  Pain Management Interventions:   medication (see MAR)   cold applied   Goal Outcome Evaluation:      Plan of Care Reviewed With: patient    Overall Patient Progress: improvingOverall Patient Progress: improving    Outcome Evaluation: Patient is alert and orientated x4. Up with A1, gaitbelt, and walker. Pain to left hip that has been managed by Tylenol and ice. CMS intact. Plan to go to [facility] tomorrow.

## 2024-05-28 NOTE — PROGRESS NOTES
05/28/24 0850   Appointment Info   Signing Clinician's Name / Credentials (PT) Melav Aldana, PT   Quick Adds   Quick Adds Certification   Living Environment   People in Home alone   Current Living Arrangements house  ([facility])   Home Accessibility stairs to enter home   Number of Stairs, Main Entrance 1   Stair Railings, Main Entrance railings safe and in good condition   Living Environment Comments all needs met on main level   Self-Care   Equipment Currently Used at Home cane, straight;shower chair;walker, standard   Fall history within last six months yes   Number of times patient has fallen within last six months 3   Activity/Exercise/Self-Care Comment indep [facility] mobility, has cane and 2WW from previous knee replacements, indep with ADLs/IADL's. has local kids who are able to assist if needed. walk in shower with built in shower bench.   General Information   Onset of Illness/Injury or Date of Surgery 05/27/24   Referring Physician Judy Son PA-C   Patient/Family Therapy Goals Statement (PT) pt agreeable to PT, open to TCU or return home pending progress   Pertinent History of Current Problem (include personal factors and/or comorbidities that impact the POC) 83 y.o. female admitted after fall with L hip pain, found to have L pubic rami fracture, OK to WBAT. incidental L5 compresstion fracture   Existing Precautions/Restrictions fall   Weight-Bearing Status - LLE weight-bearing as tolerated   Weight-Bearing Status - RLE weight-bearing as tolerated   Cognition   Affect/Mental Status (Cognition) WFL   Pain Assessment   Patient Currently in Pain Yes, see Vital Sign flowsheet  (4/10 with activity)   Range of Motion (ROM)   Range of Motion ROM deficits secondary to pain   Strength (Manual Muscle Testing)   Strength Comments able to complete short amb distance with SBA x1, strength not formally assessed due to pain from fracture   Bed Mobility   Comment, (Bed Mobility) supine>sit with Jus for RLE    Transfers   Comment, (Transfers) sit>stand from EOB with 2WW and SBA   Gait/Stairs (Locomotion)   Comment, (Gait/Stairs) amb few steps bed>chair with 2WW and CGA   Clinical Impression   Criteria for Skilled Therapeutic Intervention Yes, treatment indicated   PT Diagnosis (PT) impaired functional mobility   Influenced by the following impairments pain, LE weakness   Functional limitations due to impairments impaired bed mobility, transfers, gait   Clinical Presentation (PT Evaluation Complexity) stable   Clinical Presentation Rationale clinical reasoning   Clinical Decision Making (Complexity) low complexity   Planned Therapy Interventions (PT) bed mobility training;cryotherapy;gait training;home exercise program;ROM (range of motion);stair training;strengthening;transfer training;progressive activity/exercise;risk factor education;home program guidelines   Risk & Benefits of therapy have been explained evaluation/treatment results reviewed;care plan/treatment goals reviewed;risks/benefits reviewed;current/potential barriers reviewed;participants voiced agreement with care plan;participants included;patient   PT Total Evaluation Time   PT Eval, Low Complexity Minutes (46407) 15   Therapy Certification   Start of care date 05/28/24   Certification date from 05/28/24   Certification date to 06/04/24   Medical Diagnosis L pubic rami fracture   Physical Therapy Goals   PT Frequency Daily   PT Predicted Duration/Target Date for Goal Attainment 06/04/24   PT Goals Bed Mobility;Transfers;Gait;Stairs   PT: Bed Mobility Supervision/stand-by assist;Supine to/from sit   PT: Transfers Supervision/stand-by assist;Bed to/from chair;Assistive device   PT: Gait Supervision/stand-by assist;Standard walker;25 feet   PT: Stairs Minimal assist;1 stair   Interventions   Interventions Quick Adds Therapeutic Activity   Therapeutic Activity   Therapeutic Activities: dynamic activities to improve functional performance Minutes (36527) 10    Symptoms Noted During/After Treatment Increased pain   Treatment Detail/Skilled Intervention edu on role of PT to assess mobility and assist in safe discharge plan, see above for mobility assessment, cues for 2WW use with pivot transfer to chair, pain with weight shifting onto LLE but is able to clear foot with gait and take steps to chair today, pt states is improvemenet from yesterday. pt states unable to complete more mobility due to pain rated as 4/10 and fatigue from first time out of bed. discussed current rec for TCU given pain and reduced mobility, pending clinical progression and length of stay, pt may progress to home with family support and home care PT if able to ambulate household distance. pt in agreement. remains in chair at end of session, chair alarm on and call light in reach.   PT Discharge Planning   PT Plan daily (Tues)- transfers/gait with 2WW pending pain management, LE strengthening   PT Discharge Recommendation (DC Rec) Transitional Care Facility;home with assist;home with home care physical therapy   PT Rationale for DC Rec rec TCU today as pt only able to tolerate bed>chair with 2WW and CGA due to pain and fatigue, pending progress and pain management pt may progress to home with home care PT and support from family, needs to be able to amb household distances   PT Brief overview of current status supine>sit with Jus for RLE, sit>stand with CGA and 2WW, amb few steps bed>chair with 2WW and CGA   PT Equipment Needed at Discharge   (pt states has walker, cane, shower bench)   Total Session Time   Timed Code Treatment Minutes 10   Total Session Time (sum of timed and untimed services) 25   [facility]  OUTPATIENT PHYSICAL THERAPY EVALUATION  PLAN OF TREATMENT FOR OUTPATIENT REHABILITATION  (COMPLETE FOR INITIAL CLAIMS ONLY)  Patient's Last Name, First Name, M.I.  YOB: 1940  Lavonne Tidwell                        Provider's Name  [facility]  [facility] Medical Record No.  [id number]                             Onset Date:  05/27/24   Start of Care Date:  05/28/24   Type:     _X_PT   ___OT   ___SLP Medical Diagnosis:  L pubic rami fracture              PT Diagnosis:  impaired functional mobility Visits from SOC:  1     See note for plan of treatment, functional goals and certification details    I CERTIFY THE NEED FOR THESE SERVICES FURNISHED UNDER        THIS PLAN OF TREATMENT AND WHILE UNDER MY CARE     (Physician co-signature of this document indicates review and certification of the therapy plan).

## 2024-05-28 NOTE — PROGRESS NOTES
"[facility]    Medicine Progress Note - Hospitalist Service    Date of Admission:  5/27/2024    Assessment & Plan      Lavonne Tidwell is a 83 year old female admitted on 5/27/2024. She has a past medical history significant for hypertension, GERD, chronic cough due to rhinitis / post nasal drainage, and history of gout who presented to the emergency department for evaluation of left hip pain and inability to ambulate after a mechanical fall, was found to have a pubic rami fracture. No current plan for surgical intervention and working with therapies. Plan for discharge to TCU pending insurance authorization.     # Closed fracture of ramus of left pubis, initial encounter  # Impaired ambulation due to pain   Left hip and groin pain after a mechanical fall, pain is limiting patient's ability to bear weight / ambulate.  Pelvic x-ray demonstrating comminuted mildly displaced left parasymphyseal pubic fracture.     CT pelvis demonstrates - \"1.  Acute minimally displaced comminuted left parasymphyseal fracture, extending into the superior pubic ramus. 2.  Small nondisplaced fracture L5 transverse process is suspected. No definitive fracture to the sacrum. 3.  Bone demineralization.\"    Patient lives alone independently in a town home.  Tentative plan for discharge to TCU pending insurance authorization  -Orthopedics consulted, no current plan for surgical intervention, appreciate recommendations, signed off  -Weightbearing as tolerated with walker support  -ASA 81 twice daily for 6 weeks for DVT prophylaxis  -Follow-up with Dr. Thompson in 2 to 3 weeks  -PT and OT recommending TCU  - Analgesia: scheduled acetaminophen, prn oxycodone available    # Fall, initial encounter  Mechanical fall at home, patient tripped on a floor transition point, landed on left hip. No head trauma or loss of consciousness. Left hip / groin hurt (see above), also with some left knee and hand pain, although x-ray of " knee and hand are unremarkable.   - Plan for TCU pending prior authorization     # Closed fracture of transverse process of lumbar vertebra, initial encounter  # Chronic bilateral low back pain without sciatica  Patient does have chronic low back pain and gets occasional steroid injections (is due again next week). However, there was an incidental finding of a L5 transverse process fracture on pelvic CT.   - Analgesia as noted above  - Encouraged follow up with pain clinic     # Urinary incontinence  # Suprapubic discomfort  Prior history of stress incontinence, was on Ditropan for a period of time, but ultimately improved after trans obturator tape sling surgery. Is feeling increased bladder irritation / suprapubic discomfort after pubic rami fracture, possibly bladder is irritating and putting pressure on the injured pubis?  No dysuria, is making adequate urine so low suspicion for retention. Improved back to baseline on the following day.   - Pain control noted above    # Essential hypertension  Noted on problem list, but is not treated with any pharmacotherapy. Blood pressure slightly elevated on admission.   - Prn hydralazine for SBP > 180    # Esophageal reflux  Managed prior to admission with Prevacid 15 mg daily and Pepcid 40 mg bid, continued.    # Chronic cough  # Post nasal drainage due to chronic rhinitis  Has a chronic cough due to rhinitis / post nasal drainage. Managed prior to admission with Flonase, Claritin, and prn Atrovent.   - Continued Claritin  - Flonase and Atrovent not ordered due to obs status    # Primary osteoarthritis involving multiple joints  Chronic and unchanged. Uses acetaminophen for pain management.     # Chronic gout  No evidence of acute gout flare on admission. Managed prior to admission with allopurinol 100 mg daily, continued.        Observation Goals: -diagnostic tests and consults completed and resulted, -vital signs normal or at patient baseline, -adequate pain control on  "oral analgesics, -safe disposition plan has been identified, Nurse to notify provider when observation goals have been met and patient is ready for discharge.  Diet: Regular Diet Adult    DVT Prophylaxis: ASA 81 twice daily for 6 weeks per orthopedics  Preciado Catheter: Not present  Lines: None     Cardiac Monitoring: None  Code Status:  DNI in setting of cardiac arrest     Clinically Significant Risk Factors Present on Admission                  # Hypertension: Noted on problem list      # Overweight: Estimated body mass index is 28.25 kg/m  as calculated from the following:    Height as of this encounter: 1.664 m (5' 5.5\").    Weight as of this encounter: 78.2 kg (172 lb 6.4 oz).              Disposition Plan     Medically Ready for Discharge: Ready Now       Clif Raya MD  Hospitalist Service  [facility]  Securely message with [place] (more info)  Text page via [employer] Paging/Directory   ______________________________________________________________________    Interval History   Admitted yesterday, no acute events since admission. States that the pain in her hip has imporved from yesterday but is still bothersome. She denies having any chest pain, palpitations abdominal pain, fevers, or chills.     Physical Exam   Vital Signs: Temp: 97.9  F (36.6  C) Temp src: Oral BP: 125/51 Pulse: 78   Resp: 16 SpO2: 94 % O2 Device: None (Room air)    Weight: 172 lbs 6.4 oz    General Appearance: Awake and alert, sitting up in the bed, no acute distress  Respiratory: Reading easily on room air, lungs clear to auscultation bilaterally  Cardiovascular: Regular rate and rhythm, extremities warm and well-perfused, no edema in bilateral lower extremities  GI: Abdomen soft, nontender, nondistended  Skin: No rashes or lesions  Other: Appropriate mood and affect, no focal deficits appreciated.  Lower extremities are well-perfused without deficits    Medical Decision Making       45 MINUTES SPENT BY ME on " the date of service doing chart review, history, exam, documentation & further activities per the note.      Data     I have personally reviewed the following data over the past 24 hrs:    6.0  \   11.9   / 209     140 102 15.7 /  103 (H)   4.3 24 0.66 \       Imaging results reviewed over the past 24 hrs:   Recent Results (from the past 24 hour(s))   XR Knee Left 1/2 Views    Narrative    EXAM: XR KNEE LEFT 1/2 VIEWS  LOCATION: [facility]  DATE: 5/27/2024    INDICATION: Fall. Knee pain.    COMPARISON: None.      Impression    IMPRESSION: Postop left total knee arthroplasty. Patellar resurfacing. The components are in the expected position. No acute displaced periprosthetic fracture. Nothing for component loosening. No sizable left knee joint effusion.   CT Pelvis Bone wo Contrast    Narrative    EXAM: CT PELVIS BONE WO CONTRAST  LOCATION: [facility]  DATE: 5/27/2024    INDICATION: Fall and pain.    COMPARISON: Same day radiograph.    TECHNIQUE: CT scan of the pelvis was performed without intravenous contrast. Multiplanar reformats were obtained. Dose reduction techniques were used.    CONTRAST: None.    FINDINGS:  PELVIS ORGANS: There is no free fluid in the pelvis. Prior hysterectomy. The bladder is distended. Colonic diverticulosis without evidence of diverticulitis. Atherosclerotic vascular calcifications.    MUSCULOSKELETAL: Acute minimally displaced and impacted left parasymphyseal fracture with associated cortical lucency and buckling, extending into the superior pubic ramus.    Probable acute nondisplaced fracture through the left L5 transverse process. There is mild to moderate arthrosis involving the bilateral SI joints without a definitive sacral fracture by CT. Bones are demineralized.    There are mild degenerative changes of both hips. No dislocation. Advanced degenerative changes lower lumbar spine.    There is no large soft tissue hematoma about  the pelvis or left hip.      Impression    IMPRESSION:  1.  Acute minimally displaced comminuted left parasymphyseal fracture, extending into the superior pubic ramus.    2.  Small nondisplaced fracture L5 transverse process is suspected. No definitive fracture to the sacrum.    3.  Bone demineralization.

## 2024-05-29 ENCOUNTER — DOCUMENTATION ONLY (OUTPATIENT)
Dept: GERIATRICS | Facility: CLINIC | Age: 84
End: 2024-05-29
Payer: COMMERCIAL

## 2024-05-29 ENCOUNTER — LAB REQUISITION (OUTPATIENT)
Dept: LAB | Facility: CLINIC | Age: 84
End: 2024-05-29

## 2024-05-29 ENCOUNTER — APPOINTMENT (OUTPATIENT)
Dept: OCCUPATIONAL THERAPY | Facility: CLINIC | Age: 84
End: 2024-05-29
Payer: COMMERCIAL

## 2024-05-29 VITALS
SYSTOLIC BLOOD PRESSURE: 107 MMHG | DIASTOLIC BLOOD PRESSURE: 52 MMHG | RESPIRATION RATE: 18 BRPM | HEART RATE: 89 BPM | BODY MASS INDEX: 27.07 KG/M2 | TEMPERATURE: 99.2 F | HEIGHT: 66 IN | OXYGEN SATURATION: 93 % | WEIGHT: 168.43 LBS

## 2024-05-29 DIAGNOSIS — R76.12 NONSPECIFIC REACTION TO CELL MEDIATED IMMUNITY MEASUREMENT OF GAMMA INTERFERON ANTIGEN RESPONSE WITHOUT ACTIVE TUBERCULOSIS: ICD-10-CM

## 2024-05-29 PROCEDURE — G0378 HOSPITAL OBSERVATION PER HR: HCPCS

## 2024-05-29 PROCEDURE — 250N000013 HC RX MED GY IP 250 OP 250 PS 637: Performed by: PHYSICIAN ASSISTANT

## 2024-05-29 PROCEDURE — 99239 HOSP IP/OBS DSCHRG MGMT >30: CPT | Performed by: STUDENT IN AN ORGANIZED HEALTH CARE EDUCATION/TRAINING PROGRAM

## 2024-05-29 PROCEDURE — 97530 THERAPEUTIC ACTIVITIES: CPT | Mod: GO

## 2024-05-29 PROCEDURE — 250N000013 HC RX MED GY IP 250 OP 250 PS 637: Performed by: STUDENT IN AN ORGANIZED HEALTH CARE EDUCATION/TRAINING PROGRAM

## 2024-05-29 RX ORDER — OXYCODONE HYDROCHLORIDE 5 MG/1
5 TABLET ORAL EVERY 4 HOURS PRN
Qty: 20 TABLET | Refills: 0 | Status: SHIPPED | DISCHARGE
Start: 2024-05-29 | End: 2024-05-29

## 2024-05-29 RX ORDER — ASPIRIN 81 MG/1
81 TABLET, CHEWABLE ORAL 2 TIMES DAILY
DISCHARGE
Start: 2024-05-29 | End: 2024-07-09

## 2024-05-29 RX ORDER — OXYCODONE HYDROCHLORIDE 5 MG/1
5 TABLET ORAL EVERY 4 HOURS PRN
Qty: 20 TABLET | Refills: 0 | Status: SHIPPED | OUTPATIENT
Start: 2024-05-29 | End: 2024-06-06

## 2024-05-29 RX ADMIN — LORATADINE 10 MG: 10 TABLET ORAL at 08:15

## 2024-05-29 RX ADMIN — PANTOPRAZOLE SODIUM 20 MG: 20 TABLET, DELAYED RELEASE ORAL at 06:25

## 2024-05-29 RX ADMIN — ACETAMINOPHEN 975 MG: 325 TABLET, FILM COATED ORAL at 08:14

## 2024-05-29 RX ADMIN — SENNOSIDES AND DOCUSATE SODIUM 2 TABLET: 50; 8.6 TABLET ORAL at 08:14

## 2024-05-29 RX ADMIN — ALLOPURINOL 100 MG: 100 TABLET ORAL at 08:15

## 2024-05-29 RX ADMIN — FAMOTIDINE 40 MG: 20 TABLET, FILM COATED ORAL at 08:15

## 2024-05-29 RX ADMIN — ASPIRIN 81 MG CHEWABLE TABLET 81 MG: 81 TABLET CHEWABLE at 08:14

## 2024-05-29 ASSESSMENT — ACTIVITIES OF DAILY LIVING (ADL)
ADLS_ACUITY_SCORE: 37

## 2024-05-29 NOTE — PLAN OF CARE
Problem: Adult Inpatient Plan of Care  Goal: Optimal Comfort and Wellbeing  Outcome: Progressing  Goal: Readiness for Transition of Care  Outcome: Progressing  Flowsheets (Taken 5/29/2024 0904)  Transportation Anticipated:   car, drives self   family or friend will provide  Concerns to be Addressed: discharge planning  Intervention: Mutually Develop Transition Plan  Recent Flowsheet Documentation  Taken 5/29/2024 0904 by Jazmin Jeffrey RN  Transportation Anticipated:   car, drives self   family or friend will provide  Concerns to be Addressed: discharge planning     Problem: Hip Fracture Medical Management  Goal: Pain Control and Function  Outcome: Progressing   Goal Outcome Evaluation:      Plan of Care Reviewed With: patient    Overall Patient Progress: improvingOverall Patient Progress: improving    Patient reported controlled pain with scheduled tylenol, up to the chair with assist x 1 + walker, able to make needs known, [facility], family will transport

## 2024-05-29 NOTE — PLAN OF CARE
Problem: Adult Inpatient Plan of Care  Goal: Plan of Care Review  Description: The Plan of Care Review/Shift note should be completed every shift.  The Outcome Evaluation is a brief statement about your assessment that the patient is improving, declining, or no change.  This information will be displayed automatically on your shift  note.  Flowsheets (Taken 5/29/2024 0555)  Outcome Evaluation: Patient alert and oriented, up with SBA with walker.  Ambulates well to the bathroom.  PIV saline locked, plan is to discharge to [facility] today with family transport arranged.  Declined additional pain medications, only scheduled tylenol given for pain relief.  Plan of Care Reviewed With: patient  Overall Patient Progress: improving  Goal: Absence of Hospital-Acquired Illness or Injury  Intervention: Identify and Manage Fall Risk  Recent Flowsheet Documentation  Taken 5/29/2024 0005 by Florence Quintero RN  Safety Promotion/Fall Prevention:   clutter free environment maintained   lighting adjusted   nonskid shoes/slippers when out of bed   Goal Outcome Evaluation:      Plan of Care Reviewed With: patient    Overall Patient Progress: improvingOverall Patient Progress: improving    Outcome Evaluation: Patient alert and oriented, up with SBA with walker.  Ambulates well to the bathroom.  PIV saline locked, plan is to discharge to [facility] with family transport arranged.  Declined additional pain medications, only scheduled tylenol given for pain relief.

## 2024-05-29 NOTE — DISCHARGE SUMMARY
"[facility]  Hospitalist Discharge Summary      Date of Admission:  5/27/2024  Date of Discharge:  5/29/2024  Discharging Provider: Clif Raya MD  Discharge Service: Hospitalist Service    Discharge Diagnoses   # Closed fracture of ramus of left pubis, initial encounter  # Impaired ambulation due to pain   # Fall, initial encounter  # Closed fracture of transverse process of lumbar vertebra, initial encounter  # Chronic bilateral low back pain without sciatica  # Urinary incontinence  # Suprapubic discomfort  # Essential hypertension  # Esophageal reflux  # Chronic cough  # Post nasal drainage due to chronic rhinitis  # Primary osteoarthritis involving multiple joints  # Chronic gout    Clinically Significant Risk Factors     # Overweight: Estimated body mass index is 27.6 kg/m  as calculated from the following:    Height as of this encounter: 1.664 m (5' 5.5\").    Weight as of this encounter: 76.4 kg (168 lb 6.9 oz).       Follow-ups Needed After Discharge   Follow-up Appointments     Follow Up and recommended labs and tests      Follow up with a hip specialist/Dr.Thomas Thompson for your left pubic   rami fracture in 2-3 weeks. Call [facility] scheduling at   [phone] to make an appointment.        Follow Up and recommended labs and tests      Follow up with Nursing home physician.            Discharge Disposition   Discharged to home  Condition at discharge: Stable    Hospital Course   Lavonne Tidwell is a 83 year old female admitted on 5/27/2024. She has a past medical history significant for hypertension, GERD, chronic cough due to rhinitis / post nasal drainage, and history of gout who presented to the emergency department for evaluation of left hip pain and inability to ambulate after a mechanical fall, was found to have a pubic rami fracture. No current plan for surgical intervention and working with therapies.  Discharging to TCU for additional " "assistance in therapies.    # Closed fracture of ramus of left pubis, initial encounter  # Impaired ambulation due to pain   Left hip and groin pain after a mechanical fall, pain is limiting patient's ability to bear weight / ambulate.  Pelvic x-ray demonstrating comminuted mildly displaced left parasymphyseal pubic fracture.     CT pelvis demonstrates - \"1.  Acute minimally displaced comminuted left parasymphyseal fracture, extending into the superior pubic ramus. 2.  Small nondisplaced fracture L5 transverse process is suspected. No definitive fracture to the sacrum. 3.  Bone demineralization.\"    Patient lives alone independently in a town home.  Discharging to TCU for additional assistance in therapies.  -Weightbearing as tolerated with walker support  -ASA 81 twice daily for 6 weeks for DVT prophylaxis  -Follow-up with orthopedics outpatient  -PT and OT continue to TCU  -Acetaminophen and oxycodone for pain, discharging with 20x 5 mg oxycodone tablets  - Analgesia: scheduled acetaminophen, prn oxycodone available    # Fall, initial encounter  Mechanical fall at home, patient tripped on a floor transition point, landed on left hip. No head trauma or loss of consciousness. Left hip / groin hurt (see above), also with some left knee and hand pain, although x-ray of knee and hand are unremarkable.   - Discharging to TCU    # Closed fracture of transverse process of lumbar vertebra, initial encounter  # Chronic bilateral low back pain without sciatica  Patient does have chronic low back pain and gets occasional steroid injections (is due again next week). However, there was an incidental finding of a L5 transverse process fracture on pelvic CT.   - Analgesia as noted above  - Encouraged follow up with pain clinic as scheduled     # Urinary incontinence  # Suprapubic discomfort  Prior history of stress incontinence, was on Ditropan for a period of time, but ultimately improved after trans obturator tape sling " surgery. Is feeling increased bladder irritation / suprapubic discomfort after pubic rami fracture, possibly bladder is irritating and putting pressure on the injured pubis?  No dysuria, is making adequate urine so low suspicion for retention. Improved back to baseline on the following day.     # Essential hypertension  Noted on problem list, but is not treated with any pharmacotherapy. Blood pressure slightly elevated on admission.     # Esophageal reflux  Managed prior to admission with Prevacid 15 mg daily and Pepcid 40 mg bid, continued.    # Chronic cough  # Post nasal drainage due to chronic rhinitis  Has a chronic cough due to rhinitis / post nasal drainage. Managed prior to admission with Flonase, Claritin, and prn Atrovent.  Continued.     # Primary osteoarthritis involving multiple joints  Chronic and unchanged. Uses acetaminophen for pain management.     # Chronic gout  No evidence of acute gout flare on admission. Managed prior to admission with allopurinol 100 mg daily, continued.     Consultations This Hospital Stay   ORTHOPEDIC SURGERY IP CONSULT  CARE MANAGEMENT / SOCIAL WORK IP CONSULT  PHYSICAL THERAPY ADULT IP CONSULT  OCCUPATIONAL THERAPY ADULT IP CONSULT  PHYSICAL THERAPY ADULT IP CONSULT  OCCUPATIONAL THERAPY ADULT IP CONSULT    Code Status   Special Code    Time Spent on this Encounter   I, Clif Raya MD, personally saw the patient today and spent greater than 30 minutes discharging this patient.       Clif Raya MD  [facility] SURGICAL  [address] [address]  Phone: [phone]  Fax: [phone]  ______________________________________________________________________    Physical Exam   Vital Signs: Temp: 99.2  F (37.3  C) Temp src: Oral BP: 107/52 Pulse: 89   Resp: 18 SpO2: 93 % O2 Device: None (Room air)    Weight: 168 lbs 6.9 oz    General Appearance:  Awake and alert, sitting up in the bed, no acute distress  Respiratory:  Reading easily on room air, lungs clear to auscultation bilaterally  Cardiovascular: Regular rate and rhythm, extremities warm and well-perfused, no edema in bilateral lower extremities  GI: Abdomen soft, nontender, nondistended  Skin: No rashes or lesions  Other:  Appropriate mood and affect, no focal deficits appreciated. Lower extremities are well-perfused without deficits       Primary Care Physician   Elvira Summers Rehder    Discharge Orders      Follow Up and recommended labs and tests    Follow up with a hip specialist/Dr.Thomas Thompson for your left pubic rami fracture in 2-3 weeks. Call [facility] scheduling at [phone] to make an appointment.     Activity - Up with assistive device     Weight bearing status    WBAT     General info for SNF    Length of Stay Estimate: Short Term Care: Estimated # of Days <30  Condition at Discharge: Improving  Level of care:skilled   Rehabilitation Potential: Excellent  Admission H&P remains valid and up-to-date: Yes  Recent Chemotherapy: N/A  Use Nursing Home Standing Orders: Yes     Follow Up and recommended labs and tests    Follow up with Nursing home physician.     Reason for your hospital stay    You were hospitalized after a fall and pelvic fracture that was managed nonoperatively.  You are being discharged to a skilled facility to continue therapies.     Activity - Up ad mitchel     Physical Therapy Adult Consult    Evaluate and treat as clinically indicated.    Reason: Pelvic fracture     Occupational Therapy Adult Consult    Evaluate and treat as clinically indicated.    Reason: Pelvic fracture     Diet    Follow this diet upon discharge: Orders Placed This Encounter      Regular Diet Adult       Significant Results and Procedures   Results for orders placed or performed during the hospital encounter of 05/27/24   Pelvis XR w/ unilateral hip left    Narrative    EXAM: XR PELVIS AND HIP LEFT 1 VIEW  LOCATION: [facility]  DATE: 5/27/2024    INDICATION: Fall. Left hip pain.     COMPARISON: None.      Impression    IMPRESSION: Comminuted, mildly displaced left parasymphyseal pubic fracture extends towards the central left superior pubic ramus. Anatomic alignment left hip. No acute displaced left hip fracture is identified. Mild bilateral hip osteoarthritis.   Degenerative change lower lumbar spine and both SI joints. Degenerative change at the symphysis pubis.     Fingers XR, 2-3 views, left    Narrative    EXAM: XR FINGER LEFT G/E 2 VIEWS  LOCATION: [facility]  DATE: 5/27/2024    INDICATION: Fall. Thumb pain.    COMPARISON: None.      Impression    IMPRESSION: Anatomic alignment left thumb. No acute displaced left thumb fracture identified. Moderate thumb carpometacarpal joint osteoarthritis. Mild thumb MCP and IP joint osteoarthritis. Chronic ossicle is seen along the dorsum of the thumb proximal   phalangeal neck. No significant left thumb soft tissue swelling.     XR Knee Left 1/2 Views    Narrative    EXAM: XR KNEE LEFT 1/2 VIEWS  LOCATION: [facility]  DATE: 5/27/2024    INDICATION: Fall. Knee pain.    COMPARISON: None.      Impression    IMPRESSION: Postop left total knee arthroplasty. Patellar resurfacing. The components are in the expected position. No acute displaced periprosthetic fracture. Nothing for component loosening. No sizable left knee joint effusion.   CT Pelvis Bone wo Contrast    Narrative    EXAM: CT PELVIS BONE WO CONTRAST  LOCATION: [facility]  DATE: 5/27/2024    INDICATION: Fall and pain.    COMPARISON: Same day radiograph.    TECHNIQUE: CT scan of the pelvis was performed without intravenous contrast. Multiplanar reformats were obtained. Dose reduction techniques were used.    CONTRAST: None.    FINDINGS:  PELVIS ORGANS: There is no free fluid in the pelvis. Prior hysterectomy. The bladder is distended. Colonic  diverticulosis without evidence of diverticulitis. Atherosclerotic vascular calcifications.    MUSCULOSKELETAL: Acute minimally displaced and impacted left parasymphyseal fracture with associated cortical lucency and buckling, extending into the superior pubic ramus.    Probable acute nondisplaced fracture through the left L5 transverse process. There is mild to moderate arthrosis involving the bilateral SI joints without a definitive sacral fracture by CT. Bones are demineralized.    There are mild degenerative changes of both hips. No dislocation. Advanced degenerative changes lower lumbar spine.    There is no large soft tissue hematoma about the pelvis or left hip.      Impression    IMPRESSION:  1.  Acute minimally displaced comminuted left parasymphyseal fracture, extending into the superior pubic ramus.    2.  Small nondisplaced fracture L5 transverse process is suspected. No definitive fracture to the sacrum.    3.  Bone demineralization.       Discharge Medications   Current Discharge Medication List        START taking these medications    Details   aspirin (ASA) 81 MG chewable tablet Take 1 tablet (81 mg) by mouth 2 times daily for 41 days    Associated Diagnoses: Closed fracture of transverse process of lumbar vertebra, initial encounter (H)      oxyCODONE (ROXICODONE) 5 MG tablet Take 1 tablet (5 mg) by mouth every 4 hours as needed for severe pain (IF pain not managed with non-pharmacological and non-opioid interventions)  Qty: 20 tablet, Refills: 0    Associated Diagnoses: Closed fracture of transverse process of lumbar vertebra, initial encounter (H)           CONTINUE these medications which have NOT CHANGED    Details   Acetaminophen (TYLENOL PO) Take 1,000 mg by mouth every 8 hours as needed for other (pain)      allopurinol (ZYLOPRIM) 100 MG tablet Take 1 tablet (100 mg) by mouth daily  Qty: 90 tablet, Refills: 3    Associated Diagnoses: Chronic gout of left wrist, unspecified cause       Bioflavonoid Products (GRAPE SEED PO) Take 1 capsule by mouth daily      famotidine (PEPCID) 40 MG tablet Take 1 tablet (40 mg) by mouth 2 times daily  Qty: 180 tablet, Refills: 3    Associated Diagnoses: Chronic cough; Throat clearing; Post-nasal drainage      ferrous sulfate (FEROSUL) 325 (65 Fe) MG tablet Take 1 tablet (325 mg) by mouth Every Mon, Wed, Fri Morning  Qty: 45 tablet, Refills: 3    Associated Diagnoses: Iron deficiency anemia, unspecified iron deficiency anemia type      fluticasone (FLONASE) 50 MCG/ACT nasal spray Spray 1 spray into both nostrils 2 times daily  Qty: 16 g, Refills: 3    Associated Diagnoses: Chronic cough      Garlic 1000 MG CAPS Take 1 capsule by mouth daily      ipratropium (ATROVENT) 0.03 % nasal spray Spray 1-2 sprays into both nostrils 3 times daily as needed for rhinitis (runny nose, postnasal drainage)  Qty: 30 mL, Refills: 1    Associated Diagnoses: Chronic cough      LANsoprazole (PREVACID) 15 MG DR capsule Take 1 capsule (15 mg) by mouth daily  Qty: 90 capsule, Refills: 3    Associated Diagnoses: Gastroesophageal reflux disease without esophagitis      loratadine (CLARITIN) 10 MG tablet Take 10 mg by mouth daily      Multiple Vitamins-Minerals (CENTRUM SILVER) per tablet Take 1 tablet by mouth daily      mupirocin (BACTROBAN) 2 % external ointment Apply topically 3 times daily Apply to nose as needed  Qty: 30 g, Refills: 3    Associated Diagnoses: Chronic otitis media with effusion, bilateral; Mixed conductive and sensorineural hearing loss of both ears; History of placement of ear tubes; Retained myringotomy tube in left ear      PSYLLIUM PO Take 1 capsule by mouth every morning 3 capsules at bedtime      senna-docusate (SENOKOT-S/PERICOLACE) 8.6-50 MG tablet Take 2 tablets by mouth daily      Vitamin D, Cholecalciferol, 25 MCG (1000 UT) CAPS Take 1 capsule by mouth daily           Allergies   Allergies   Allergen Reactions    Codeine GI Disturbance

## 2024-05-29 NOTE — PROGRESS NOTES
[facility] DISCHARGE NOTE    Patient discharged to [facility] at ~1245 pm via wheel chair. Accompanied by daughter-in-law and staff. Discharge instructions reviewed with patient and daughter, opportunity offered to ask questions.  [facility] was called and given full report. Prescriptions sent to TCU. All belongings sent with patient.    Jazmin Jeffrey RN

## 2024-05-29 NOTE — PROGRESS NOTES
Care Management Discharge Note    Discharge Date: 05/29/2024       Discharge Disposition: Transitional Care; [facility] (Phone: [phone] Fax: [phone])    Discharge Services: None    Discharge DME: None    Discharge Transportation: family or friend will provide    Private pay costs discussed: Not applicable    Does the patient's insurance plan have a 3 day qualifying hospital stay waiver?  Yes     Which insurance plan 3 day waiver is available? Alternative insurance waiver    Will the waiver be used for post-acute placement? Yes    PAS Confirmation Code: [id number]  Patient/family educated on Medicare website which has current facility and service quality ratings: yes    Education Provided on the Discharge Plan: Yes  Persons Notified of Discharge Plans: Patient & TCU admissions  Patient/Family in Agreement with the Plan: yes    Handoff Referral Completed: Yes    Additional Information:  Per IDT rounds, MD states that pt is medically stable for discharge today.    PT/OT recommends TCU cares & pt/family are in agreement.    Pt is accepted for cares at [facility] (Phone: [phone] Fax: [phone]).    Transportation discussed and family to transport today around 1pm.      CHERYL Valenzuela

## 2024-05-29 NOTE — PLAN OF CARE
Physical Therapy Discharge Summary    Reason for therapy discharge:    Discharged to transitional care facility.    Progress towards therapy goal(s). See goals on Care Plan in [facility] electronic health record for goal details.  Goals partially met.  Barriers to achieving goals:   discharge from facility.    Therapy recommendation(s):    Continued therapy is recommended.  Rationale/Recommendations:  Recommend continued PT at TCU to maximize safe and indep mobility prior to return home.

## 2024-05-29 NOTE — PLAN OF CARE
Occupational Therapy Discharge Summary    Reason for therapy discharge:    Discharged to transitional care facility.    Progress towards therapy goal(s). See goals on Care Plan in [facility] electronic health record for goal details.  Goals partially met.  Barriers to achieving goals:   discharge from facility.    Therapy recommendation(s):    Continued therapy is recommended.  Rationale/Recommendations:  Recommend continued OT at TCU to improve ADL independence and safety prior to return home.

## 2024-05-29 NOTE — PROGRESS NOTES
[facility] GERIATRICS  INITIAL VISIT NOTE      PRIMARY CARE PROVIDER AND CLINIC: Elvira Kowalski [address]  / [facility] [address]    [facility] Medical Record Number: [id number]  Place of Service where encounter took place: [facility] ([facility]) [[id number]]    Chief Complaint   Patient presents with    Hospital F/U     [facility] 5/27/2024 - 5/29/2024     HPI:    Lavonne Tidwell is a 83 year old (1940) female was admitted to the above facility from [facility]. Hospital stay 5/27/24 through 5/29/24 where they were admitted for pubic fracture. Now admitted to this facility for rehab, medical management, and nursing care.      History obtained from: facility chart records, facility staff, patient report, and [facility] chart review.      Brief Hospital Course: PMH of HTN, GERD, chronic cough, gout, chronic low back pain, who presented with left hip pain after a fall. CT showed left presymphyseal fracture, suspected nondisplaced L5 transverse process fracture. She was started on PRN oxycodone for pain with adequate management of pain. Other chronic issues stable. When medically stable was discharged to TCU for further rehab and medical management.     TCU Course: Seen today sitting up before lunch. She reports she is feeling well. Has been having some some pain in her left hip/groin, worse when she first stands up but does get better when she walks. She does not want to wake oxycodone, would like it discontinued. Says she bar arthritis in her hips, does have a abnormal gait because of this. Denies any chest pain or SOB. Appetite is good. She is having bowel movements.     CODE STATUS/ADVANCE DIRECTIVES: CPR/Full code     ALLERGIES:  Allergies   Allergen Reactions    Codeine GI Disturbance     PAST MEDICAL HISTORY:   Past Medical History:   Diagnosis Date    Arthritis 2012    knes and hips    Cancer (H)     Essential hypertension 05/05/2021     History of blood transfusion 1961    Miscarriage    Ovarian cysts 1974    s/p BSO     PAST SURGICAL HISTORY:   Past Surgical History:   Procedure Laterality Date    ABDOMEN SURGERY  1973 & 1974    Ovarian cyst/Hysterectomy    APPENDECTOMY  1952    ARTHROPLASTY KNEE Left 1/6/2016    Procedure: ARTHROPLASTY KNEE;  Surgeon: Wilfredo Thompson MD;  Location: [facility]    ARTHROPLASTY KNEE Right 2/23/2017    Procedure: ARTHROPLASTY KNEE;  Surgeon: Wilfredo Thompson MD;  Location: [facility]    BIOPSY      colonoscopy/polyps    COLONOSCOPY      every 10 years    GENITOURINARY SURGERY  2008    bladder    HYSTERECTOMY, CHELSY  1974    ORTHOPEDIC SURGERY  2007 & 2009    [place]  both feet    PHACOEMULSIFICATION WITH STANDARD INTRAOCULAR LENS IMPLANT Left 8/20/2015    Procedure: PHACOEMULSIFICATION WITH STANDARD INTRAOCULAR LENS IMPLANT;  Surgeon: Fernando Jeffrey MD;  Location: [facility]    PHACOEMULSIFICATION WITH STANDARD INTRAOCULAR LENS IMPLANT Right 9/17/2015    Procedure: PHACOEMULSIFICATION WITH STANDARD INTRAOCULAR LENS IMPLANT;  Surgeon: Fernando Jeffrey MD;  Location: [facility]    SURGICAL HISTORY OF -   07/30/1998    Colonoscopy    SURGICAL HISTORY OF -   1974    Bilateral salpingoopherectomy    SURGICAL HISTORY OF -   3/09    TOT Midurethral sling     FAMILY HISTORY:   Family History   Problem Relation Age of Onset    Cancer Mother         Ovarian    Other Cancer Mother         Ovarian    Cerebrovascular Disease Father     Heart Disease Father     C.A.D. Father     Coronary Artery Disease Father     Cancer Maternal Grandmother     Other Cancer Maternal Grandmother         Ovarian/Bone    Cerebrovascular Disease Maternal Grandfather     Colon Cancer Maternal Grandfather     Diabetes Other         Great Grandmother    Breast Cancer No family hx of          SOCIAL HISTORY:   Patient's living condition: lives alone    MEDICATIONS  Post Discharge Medication Reconciliation Status: discharge medications reconciled and changed, per  note/orders.  Current Outpatient Medications   Medication Sig Dispense Refill    Acetaminophen (TYLENOL PO) Take 1,000 mg by mouth every 8 hours as needed for other (pain)      allopurinol (ZYLOPRIM) 100 MG tablet Take 1 tablet (100 mg) by mouth daily 90 tablet 3    aspirin (ASA) 81 MG chewable tablet Take 1 tablet (81 mg) by mouth 2 times daily for 41 days      Bioflavonoid Products (GRAPE SEED PO) Take 1 capsule by mouth daily      famotidine (PEPCID) 40 MG tablet Take 1 tablet (40 mg) by mouth 2 times daily 180 tablet 3    ferrous sulfate (FEROSUL) 325 (65 Fe) MG tablet Take 1 tablet (325 mg) by mouth Every Mon, Wed, Fri Morning 45 tablet 3    fluticasone (FLONASE) 50 MCG/ACT nasal spray Spray 1 spray into both nostrils 2 times daily 16 g 3    Garlic 1000 MG CAPS Take 1 capsule by mouth daily      ipratropium (ATROVENT) 0.03 % nasal spray Spray 1-2 sprays into both nostrils 3 times daily as needed for rhinitis (runny nose, postnasal drainage) 30 mL 1    LANsoprazole (PREVACID) 15 MG DR capsule Take 1 capsule (15 mg) by mouth daily 90 capsule 3    loratadine (CLARITIN) 10 MG tablet Take 10 mg by mouth daily      Multiple Vitamins-Minerals (CENTRUM SILVER) per tablet Take 1 tablet by mouth daily      mupirocin (BACTROBAN) 2 % external ointment Apply topically 3 times daily Apply to nose as needed 30 g 3    oxyCODONE (ROXICODONE) 5 MG tablet Take 1 tablet (5 mg) by mouth every 4 hours as needed for severe pain (IF pain not managed with non-pharmacological and non-opioid interventions) 20 tablet 0    PSYLLIUM PO Take 1 capsule by mouth every morning 3 capsules at bedtime      senna-docusate (SENOKOT-S/PERICOLACE) 8.6-50 MG tablet Take 2 tablets by mouth daily      Vitamin D, Cholecalciferol, 25 MCG (1000 UT) CAPS Take 1 capsule by mouth daily       ROS:  10 point ROS neg other than the symptoms noted above in the HPI.      PHYSICAL EXAM:  /64   Pulse 78   Temp 98.3  F (36.8  C)   Resp 16   Ht 1.651 m (5'  "5\")   Wt 74.8 kg (165 lb)   BMI 27.46 kg/m    Physical Exam  Cardiovascular:      Rate and Rhythm: Normal rate and regular rhythm.      Heart sounds: Normal heart sounds.   Pulmonary:      Effort: Pulmonary effort is normal.      Breath sounds: Normal breath sounds.   Abdominal:      General: Bowel sounds are normal.      Palpations: Abdomen is soft.   Musculoskeletal:      Right lower leg: Edema present.      Left lower leg: Edema present.   Neurological:      Mental Status: She is alert.   Psychiatric:         Mood and Affect: Mood normal.         Thought Content: Thought content normal.          LABORATORY/IMAGING DATA:  Reviewed as per [facility] and/or [facility]    ASSESSMENT/PLAN:  Closed fracture of ramus of left pubis with routine healing, subsequent encounter  Other closed fracture of fifth lumbar vertebra with routine healing, subsequent encounter  Chronic bilateral low back pain without sciatica  Primary osteoarthritis involving multiple joints  Physical deconditioning  Secondary to mechanical falls, pain controlled.  Discussed with patient, nursing staff, therapy  - discontinue oxycodone per patient request  - continue APAP, change from PRN to TID  - DVT pps with ASA BID x 6 weeks  - Biofreeze QID to left hip  - continue vitamin D; recommended. Follow-up with PCP for osteoporosis work-up    Essential hypertension  -120 since TCU admission  - monitor, stable off medications.     Gastroesophageal reflux disease without esophagitis  - continue famotidine, lansoprazole     Chronic gout without tophus, unspecified cause, unspecified site  - continue allopurinol    Slow transit constipation  In the setting of limited mobility  - continue psyllium BID, senna-s daily     Iron deficiency anemia, unspecified iron deficiency anemia type  - continue ferrous sulfate  - check Hgb PRN    Marginal zone B-cell lymphoma (H)  Follows with oncology, currently monitoring with routine labs  - follow-up with oncology "     Orders:   Change APAP to 1000mg TID  Discontinue oxycodone  Biofreeze QID to left hip     Total time spent with patient visit at the skilled nursing facility was 45 min including patient visit and review of past records. Total time spent reviewing records from hospitalization within my organization, oncology note from within organization, review of TCU facility records, medication reconciliation, discussion of plan of care with patient, nursing staff and therapy as stated above as well as time spent on documentation.      Electronically signed by:  ENA Clemons CNP

## 2024-05-30 ENCOUNTER — TRANSITIONAL CARE UNIT VISIT (OUTPATIENT)
Dept: GERIATRICS | Facility: CLINIC | Age: 84
End: 2024-05-30
Payer: COMMERCIAL

## 2024-05-30 ENCOUNTER — PATIENT OUTREACH (OUTPATIENT)
Dept: CARE COORDINATION | Facility: CLINIC | Age: 84
End: 2024-05-30

## 2024-05-30 VITALS
RESPIRATION RATE: 16 BRPM | DIASTOLIC BLOOD PRESSURE: 64 MMHG | TEMPERATURE: 98.3 F | HEIGHT: 65 IN | HEART RATE: 78 BPM | WEIGHT: 165 LBS | BODY MASS INDEX: 27.49 KG/M2 | SYSTOLIC BLOOD PRESSURE: 117 MMHG

## 2024-05-30 DIAGNOSIS — E78.5 HYPERLIPIDEMIA LDL GOAL <130: ICD-10-CM

## 2024-05-30 DIAGNOSIS — C85.80 MARGINAL ZONE B-CELL LYMPHOMA (H): ICD-10-CM

## 2024-05-30 DIAGNOSIS — K21.9 GASTROESOPHAGEAL REFLUX DISEASE WITHOUT ESOPHAGITIS: Chronic | ICD-10-CM

## 2024-05-30 DIAGNOSIS — K59.01 SLOW TRANSIT CONSTIPATION: ICD-10-CM

## 2024-05-30 DIAGNOSIS — D50.9 IRON DEFICIENCY ANEMIA, UNSPECIFIED IRON DEFICIENCY ANEMIA TYPE: ICD-10-CM

## 2024-05-30 DIAGNOSIS — G89.29 CHRONIC BILATERAL LOW BACK PAIN WITHOUT SCIATICA: ICD-10-CM

## 2024-05-30 DIAGNOSIS — I10 ESSENTIAL HYPERTENSION: ICD-10-CM

## 2024-05-30 DIAGNOSIS — M54.50 CHRONIC BILATERAL LOW BACK PAIN WITHOUT SCIATICA: ICD-10-CM

## 2024-05-30 DIAGNOSIS — S32.058D OTHER CLOSED FRACTURE OF FIFTH LUMBAR VERTEBRA WITH ROUTINE HEALING, SUBSEQUENT ENCOUNTER: ICD-10-CM

## 2024-05-30 DIAGNOSIS — M1A.9XX0 CHRONIC GOUT WITHOUT TOPHUS, UNSPECIFIED CAUSE, UNSPECIFIED SITE: ICD-10-CM

## 2024-05-30 DIAGNOSIS — M15.0 PRIMARY OSTEOARTHRITIS INVOLVING MULTIPLE JOINTS: ICD-10-CM

## 2024-05-30 DIAGNOSIS — S32.592D CLOSED FRACTURE OF RAMUS OF LEFT PUBIS WITH ROUTINE HEALING, SUBSEQUENT ENCOUNTER: Primary | ICD-10-CM

## 2024-05-30 DIAGNOSIS — R53.81 PHYSICAL DECONDITIONING: ICD-10-CM

## 2024-05-30 PROCEDURE — 36415 COLL VENOUS BLD VENIPUNCTURE: CPT | Performed by: NURSE PRACTITIONER

## 2024-05-30 PROCEDURE — 99310 SBSQ NF CARE HIGH MDM 45: CPT | Performed by: NURSE PRACTITIONER

## 2024-05-30 PROCEDURE — P9604 ONE-WAY ALLOW PRORATED TRIP: HCPCS | Performed by: NURSE PRACTITIONER

## 2024-05-30 PROCEDURE — 86481 TB AG RESPONSE T-CELL SUSP: CPT | Performed by: NURSE PRACTITIONER

## 2024-05-30 ASSESSMENT — ACTIVITIES OF DAILY LIVING (ADL): DEPENDENT_IADLS:: INDEPENDENT

## 2024-05-30 NOTE — LETTER
5/30/2024        RE: Lavonne Tidwell  [address] [facility] [address]        [facility] GERIATRICS  INITIAL VISIT NOTE      PRIMARY CARE PROVIDER AND CLINIC: Elvira Kowalski [address]  / [facility] [address]    [facility] Medical Record Number: [id number]  [facility][id number]]    Chief Complaint   Patient presents with     Hospital F/U     [facility] 5/27/2024 - 5/29/2024     HPI:    Lavonne Tidwell is a 83 year old (1940) female was admitted to the above facility from [facility]. Hospital stay 5/27/24 through 5/29/24 where they were admitted for pubic fracture. Now admitted to this facility for rehab, medical management, and nursing care.      History obtained from: facility chart records, facility staff, patient report, and [facility] chart review.      Brief Hospital Course: PMH of HTN, GERD, chronic cough, gout, chronic low back pain, who presented with left hip pain after a fall. CT showed left presymphyseal fracture, suspected nondisplaced L5 transverse process fracture. She was started on PRN oxycodone for pain with adequate management of pain. Other chronic issues stable. When medically stable was discharged to TCU for further rehab and medical management.     TCU Course: Seen today sitting up before lunch. She reports she is feeling well. Has been having some some pain in her left hip/groin, worse when she first stands up but does get better when she walks. She does not want to wake oxycodone, would like it discontinued. Says she bar arthritis in her hips, does have a abnormal gait because of this. Denies any chest pain or SOB. Appetite is good. She is having bowel movements.     CODE STATUS/ADVANCE DIRECTIVES: CPR/Full code     ALLERGIES:  Allergies   Allergen Reactions     Codeine GI Disturbance     PAST MEDICAL HISTORY:   Past Medical History:   Diagnosis  Date     Arthritis 2012    knes and hips     Cancer (H)      Essential hypertension 05/05/2021     History of blood transfusion 1961    Miscarriage     Ovarian cysts 1974    s/p BSO     PAST SURGICAL HISTORY:   Past Surgical History:   Procedure Laterality Date     ABDOMEN SURGERY  1973 & 1974    Ovarian cyst/Hysterectomy     APPENDECTOMY  1952     ARTHROPLASTY KNEE Left 1/6/2016    Procedure: ARTHROPLASTY KNEE;  Surgeon: Wilfredo Thompson MD;  Location: [facility]     ARTHROPLASTY KNEE Right 2/23/2017    Procedure: ARTHROPLASTY KNEE;  Surgeon: Wilfredo Thompson MD;  Location: [facility]     BIOPSY      colonoscopy/polyps     COLONOSCOPY      every 10 years     GENITOURINARY SURGERY  2008    bladder     HYSTERECTOMY, CHELSY  1974     ORTHOPEDIC SURGERY  2007 & 2009    [place]  both feet     PHACOEMULSIFICATION WITH STANDARD INTRAOCULAR LENS IMPLANT Left 8/20/2015    Procedure: PHACOEMULSIFICATION WITH STANDARD INTRAOCULAR LENS IMPLANT;  Surgeon: Fernando Jeffrey MD;  Location: [facility]     PHACOEMULSIFICATION WITH STANDARD INTRAOCULAR LENS IMPLANT Right 9/17/2015    Procedure: PHACOEMULSIFICATION WITH STANDARD INTRAOCULAR LENS IMPLANT;  Surgeon: Fernando Jeffrey MD;  Location: [facility]     SURGICAL HISTORY OF -   07/30/1998    Colonoscopy     SURGICAL HISTORY OF -   1974    Bilateral salpingoopherectomy     SURGICAL HISTORY OF -   3/09    TOT Midurethral sling     FAMILY HISTORY:   Family History   Problem Relation Age of Onset     Cancer Mother         Ovarian     Other Cancer Mother         Ovarian     Cerebrovascular Disease Father      Heart Disease Father      C.A.D. Father      Coronary Artery Disease Father      Cancer Maternal Grandmother      Other Cancer Maternal Grandmother         Ovarian/Bone     Cerebrovascular Disease Maternal Grandfather      Colon Cancer Maternal Grandfather      Diabetes Other         Great Grandmother     Breast Cancer No family hx of          SOCIAL HISTORY:   Patient's living condition:  lives alone    MEDICATIONS  Post Discharge Medication Reconciliation Status: discharge medications reconciled and changed, per note/orders.  Current Outpatient Medications   Medication Sig Dispense Refill     Acetaminophen (TYLENOL PO) Take 1,000 mg by mouth every 8 hours as needed for other (pain)       allopurinol (ZYLOPRIM) 100 MG tablet Take 1 tablet (100 mg) by mouth daily 90 tablet 3     aspirin (ASA) 81 MG chewable tablet Take 1 tablet (81 mg) by mouth 2 times daily for 41 days       Bioflavonoid Products (GRAPE SEED PO) Take 1 capsule by mouth daily       famotidine (PEPCID) 40 MG tablet Take 1 tablet (40 mg) by mouth 2 times daily 180 tablet 3     ferrous sulfate (FEROSUL) 325 (65 Fe) MG tablet Take 1 tablet (325 mg) by mouth Every Mon, Wed, Fri Morning 45 tablet 3     fluticasone (FLONASE) 50 MCG/ACT nasal spray Spray 1 spray into both nostrils 2 times daily 16 g 3     Garlic 1000 MG CAPS Take 1 capsule by mouth daily       ipratropium (ATROVENT) 0.03 % nasal spray Spray 1-2 sprays into both nostrils 3 times daily as needed for rhinitis (runny nose, postnasal drainage) 30 mL 1     LANsoprazole (PREVACID) 15 MG DR capsule Take 1 capsule (15 mg) by mouth daily 90 capsule 3     loratadine (CLARITIN) 10 MG tablet Take 10 mg by mouth daily       Multiple Vitamins-Minerals (CENTRUM SILVER) per tablet Take 1 tablet by mouth daily       mupirocin (BACTROBAN) 2 % external ointment Apply topically 3 times daily Apply to nose as needed 30 g 3     oxyCODONE (ROXICODONE) 5 MG tablet Take 1 tablet (5 mg) by mouth every 4 hours as needed for severe pain (IF pain not managed with non-pharmacological and non-opioid interventions) 20 tablet 0     PSYLLIUM PO Take 1 capsule by mouth every morning 3 capsules at bedtime       senna-docusate (SENOKOT-S/PERICOLACE) 8.6-50 MG tablet Take 2 tablets by mouth daily       Vitamin D, Cholecalciferol, 25 MCG (1000 UT) CAPS Take 1 capsule by mouth daily       ROS:  10 point ROS neg  "other than the symptoms noted above in the HPI.      PHYSICAL EXAM:  /64   Pulse 78   Temp 98.3  F (36.8  C)   Resp 16   Ht 1.651 m (5' 5\")   Wt 74.8 kg (165 lb)   BMI 27.46 kg/m    Physical Exam  Cardiovascular:      Rate and Rhythm: Normal rate and regular rhythm.      Heart sounds: Normal heart sounds.   Pulmonary:      Effort: Pulmonary effort is normal.      Breath sounds: Normal breath sounds.   Abdominal:      General: Bowel sounds are normal.      Palpations: Abdomen is soft.   Musculoskeletal:      Right lower leg: Edema present.      Left lower leg: Edema present.   Neurological:      Mental Status: She is alert.   Psychiatric:         Mood and Affect: Mood normal.         Thought Content: Thought content normal.          LABORATORY/IMAGING DATA:  Reviewed as per [facility] and/or [facility]    ASSESSMENT/PLAN:  Closed fracture of ramus of left pubis with routine healing, subsequent encounter  Other closed fracture of fifth lumbar vertebra with routine healing, subsequent encounter  Chronic bilateral low back pain without sciatica  Primary osteoarthritis involving multiple joints  Physical deconditioning  Secondary to mechanical falls, pain controlled.  Discussed with patient, nursing staff, therapy  - discontinue oxycodone per patient request  - continue APAP, change from PRN to TID  - DVT pps with ASA BID x 6 weeks  - Biofreeze QID to left hip  - continue vitamin D; recommended. Follow-up with PCP for osteoporosis work-up    Essential hypertension  -120 since TCU admission  - monitor, stable off medications.     Gastroesophageal reflux disease without esophagitis  - continue famotidine, lansoprazole     Chronic gout without tophus, unspecified cause, unspecified site  - continue allopurinol    Slow transit constipation  In the setting of limited mobility  - continue psyllium BID, senna-s daily     Iron deficiency anemia, unspecified iron deficiency anemia type  - continue ferrous " sulfate  - check Hgb PRN    Marginal zone B-cell lymphoma (H)  Follows with oncology, currently monitoring with routine labs  - follow-up with oncology     Orders:   Change APAP to 1000mg TID  Discontinue oxycodone  Biofreeze QID to left hip     Total time spent with patient visit at the skilled nursing facility was 45 min including patient visit and review of past records. Total time spent reviewing records from hospitalization within my organization, oncology note from within organization, review of TCU facility records, medication reconciliation, discussion of plan of care with patient, nursing staff and therapy as stated above as well as time spent on documentation.      Electronically signed by:  ENA Clemons CNP       Sincerely,        ENA Clemons CNP

## 2024-05-30 NOTE — PROGRESS NOTES
Clinic Care Coordination Contact  Care Coordination Transition Communication    Clinical Data:   [facility]  Hospitalist Discharge Summary       Date of Admission:  5/27/2024  Date of Discharge:  5/29/2024  Discharging Provider: Clif Raya MD  Discharge Service: Hospitalist Service        Discharge Diagnoses  # Closed fracture of ramus of left pubis, initial encounter  # Impaired ambulation due to pain   # Fall, initial encounter  # Closed fracture of transverse process of lumbar vertebra, initial encounter  # Chronic bilateral low back pain without sciatica  # Urinary incontinence  # Suprapubic discomfort  # Essential hypertension  # Esophageal reflux  # Chronic cough  # Post nasal drainage due to chronic rhinitis  # Primary osteoarthritis involving multiple joints  # Chronic gout    Assessment: Patient has transitioned to TCU/ARU for short term rehabilitation:    Facility Name: [facility]   Transition Communication:  Notified facility of Primary Care- Care Coordination support via TCU hand-in e-mail.    Plan: Care Coordinator will await notification from facility staff informing of patient's discharge plans/needs. Care Coordinator will review chart and outreach to facility staff every 4 weeks and as needed.     [facility]   Leatha Guaman RN, Care Coordinator   [facility]   E-mail [email or web address]   [phone]

## 2024-05-31 LAB
QUANTIFERON MITOGEN: 10 IU/ML
QUANTIFERON NIL TUBE: 0.09 IU/ML
QUANTIFERON TB1 TUBE: 0.1 IU/ML
QUANTIFERON TB2 TUBE: 0.09

## 2024-06-01 LAB
GAMMA INTERFERON BACKGROUND BLD IA-ACNC: 0.09 IU/ML
M TB IFN-G BLD-IMP: NEGATIVE
M TB IFN-G CD4+ BCKGRND COR BLD-ACNC: 9.91 IU/ML
MITOGEN IGNF BCKGRD COR BLD-ACNC: 0 IU/ML
MITOGEN IGNF BCKGRD COR BLD-ACNC: 0.01 IU/ML

## 2024-06-03 ENCOUNTER — DOCUMENTATION ONLY (OUTPATIENT)
Dept: OTHER | Facility: CLINIC | Age: 84
End: 2024-06-03

## 2024-06-06 ENCOUNTER — LAB REQUISITION (OUTPATIENT)
Dept: LAB | Facility: CLINIC | Age: 84
End: 2024-06-06

## 2024-06-06 ENCOUNTER — TRANSITIONAL CARE UNIT VISIT (OUTPATIENT)
Dept: GERIATRICS | Facility: CLINIC | Age: 84
End: 2024-06-06
Payer: COMMERCIAL

## 2024-06-06 VITALS
SYSTOLIC BLOOD PRESSURE: 116 MMHG | TEMPERATURE: 98.1 F | WEIGHT: 167.8 LBS | BODY MASS INDEX: 27.96 KG/M2 | HEIGHT: 65 IN | DIASTOLIC BLOOD PRESSURE: 68 MMHG | HEART RATE: 71 BPM | RESPIRATION RATE: 18 BRPM

## 2024-06-06 DIAGNOSIS — R35.0 URINARY FREQUENCY: ICD-10-CM

## 2024-06-06 DIAGNOSIS — R53.81 PHYSICAL DECONDITIONING: ICD-10-CM

## 2024-06-06 DIAGNOSIS — S32.592D CLOSED FRACTURE OF RAMUS OF LEFT PUBIS WITH ROUTINE HEALING, SUBSEQUENT ENCOUNTER: Primary | ICD-10-CM

## 2024-06-06 DIAGNOSIS — N39.42 INCONTINENCE WITHOUT SENSORY AWARENESS: ICD-10-CM

## 2024-06-06 DIAGNOSIS — G89.29 CHRONIC BILATERAL LOW BACK PAIN WITHOUT SCIATICA: ICD-10-CM

## 2024-06-06 DIAGNOSIS — I10 ESSENTIAL HYPERTENSION: ICD-10-CM

## 2024-06-06 DIAGNOSIS — S32.058D OTHER CLOSED FRACTURE OF FIFTH LUMBAR VERTEBRA WITH ROUTINE HEALING, SUBSEQUENT ENCOUNTER: ICD-10-CM

## 2024-06-06 DIAGNOSIS — M15.0 PRIMARY OSTEOARTHRITIS INVOLVING MULTIPLE JOINTS: ICD-10-CM

## 2024-06-06 DIAGNOSIS — M54.50 CHRONIC BILATERAL LOW BACK PAIN WITHOUT SCIATICA: ICD-10-CM

## 2024-06-06 DIAGNOSIS — K59.01 SLOW TRANSIT CONSTIPATION: ICD-10-CM

## 2024-06-06 LAB
ALBUMIN UR-MCNC: NEGATIVE MG/DL
APPEARANCE UR: CLEAR
BILIRUB UR QL STRIP: NEGATIVE
COLOR UR AUTO: YELLOW
GLUCOSE UR STRIP-MCNC: NEGATIVE MG/DL
HGB UR QL STRIP: NEGATIVE
KETONES UR STRIP-MCNC: NEGATIVE MG/DL
LEUKOCYTE ESTERASE UR QL STRIP: NEGATIVE
NITRATE UR QL: NEGATIVE
PH UR STRIP: 5 [PH] (ref 5–7)
SP GR UR STRIP: 1.02 (ref 1–1.03)
UROBILINOGEN UR STRIP-MCNC: NORMAL MG/DL

## 2024-06-06 PROCEDURE — 81003 URINALYSIS AUTO W/O SCOPE: CPT | Performed by: NURSE PRACTITIONER

## 2024-06-06 PROCEDURE — 87086 URINE CULTURE/COLONY COUNT: CPT | Performed by: NURSE PRACTITIONER

## 2024-06-06 PROCEDURE — 87186 SC STD MICRODIL/AGAR DIL: CPT | Performed by: NURSE PRACTITIONER

## 2024-06-06 PROCEDURE — 99309 SBSQ NF CARE MODERATE MDM 30: CPT | Performed by: NURSE PRACTITIONER

## 2024-06-06 NOTE — PROGRESS NOTES
[facility] GERIATRICS  ACUTE/EPISODIC VISIT    [facility] Medical Record Number:  [id number]  Place of Service where encounter took place:  [facility] ([facility]) [[id number]]    Chief Complaint   Patient presents with    RECHECK       HPI:    Lavonne Tidwell is a 83 year old  (1940), who is being seen today for an episodic care visit.  HPI information obtained from: facility chart records, facility staff, patient report, and [facility] chart review.    Today's concern is: Recent hospitalization after fall with pelvic fracture, L5 transverse process fracture. She is feeling better, still has some pain with walking, but is it better and able to better bear weight on left leg and gait is improved. She is working on advancing to 4WW today. Reports some mild SOB with activity, but think is due to her inactive lifestyle prior to hospitalization. She is having bowel movements. Reports she is having some urinary frequency, bladder pressure, increased incontinence. She is concerned that she is going to get at UTI.       ALLERGIES:    Allergies   Allergen Reactions    Codeine GI Disturbance        MEDICATIONS:  Post Discharge Medication Reconciliation Status: medication reconcilation previously completed during another office visit.     Current Outpatient Medications   Medication Sig Dispense Refill    Acetaminophen (TYLENOL PO) Take 1,000 mg by mouth every 8 hours      allopurinol (ZYLOPRIM) 100 MG tablet Take 1 tablet (100 mg) by mouth daily 90 tablet 3    aspirin (ASA) 81 MG chewable tablet Take 1 tablet (81 mg) by mouth 2 times daily for 41 days      Bioflavonoid Products (GRAPE SEED PO) Take 1 capsule by mouth daily      famotidine (PEPCID) 40 MG tablet Take 1 tablet (40 mg) by mouth 2 times daily 180 tablet 3    ferrous sulfate (FEROSUL) 325 (65 Fe) MG tablet Take 1 tablet (325 mg) by mouth Every Mon, Wed, Fri Morning 45 tablet 3    fluticasone (FLONASE) 50 MCG/ACT nasal spray Spray  "1 spray into both nostrils 2 times daily 16 g 3    Garlic 1000 MG CAPS Take 1 capsule by mouth daily      ipratropium (ATROVENT) 0.03 % nasal spray Spray 1-2 sprays into both nostrils 3 times daily as needed for rhinitis (runny nose, postnasal drainage) 30 mL 1    LANsoprazole (PREVACID) 15 MG DR capsule Take 1 capsule (15 mg) by mouth daily 90 capsule 3    loratadine (CLARITIN) 10 MG tablet Take 10 mg by mouth daily      Menthol, Topical Analgesic, (BIOFREEZE) 4 % GEL Externally apply topically 4 times daily      Multiple Vitamins-Minerals (CENTRUM SILVER) per tablet Take 1 tablet by mouth daily      mupirocin (BACTROBAN) 2 % external ointment Apply topically 3 times daily Apply to nose as needed 30 g 3    oxyCODONE (ROXICODONE) 5 MG tablet Take 1 tablet (5 mg) by mouth every 4 hours as needed for severe pain (IF pain not managed with non-pharmacological and non-opioid interventions) 20 tablet 0    PSYLLIUM PO Take 1 capsule by mouth every morning 3 capsules at bedtime      senna-docusate (SENOKOT-S/PERICOLACE) 8.6-50 MG tablet Take 2 tablets by mouth daily      Vitamin D, Cholecalciferol, 25 MCG (1000 UT) CAPS Take 1 capsule by mouth daily       Medications reviewed:  Medications reconciled to facility chart and changes were made to reflect current medications as identified as above med list. Below are the changes that were made:   Medications stopped since last EPIC medication reconciliation:   There are no discontinued medications.    Medications started since last [facility] medication reconciliation:  No orders of the defined types were placed in this encounter.        REVIEW OF SYSTEMS:  4 point ROS neg other than the symptoms noted above in the HPI.      PHYSICAL EXAM:  /68   Pulse 71   Temp 98.1  F (36.7  C)   Resp 18   Ht 1.651 m (5' 5\")   Wt 76.1 kg (167 lb 12.8 oz)   BMI 27.92 kg/m    Physical Exam  Cardiovascular:      Rate and Rhythm: Normal rate and regular rhythm.      Heart sounds: Normal " heart sounds.   Pulmonary:      Effort: Pulmonary effort is normal.      Breath sounds: Normal breath sounds.   Abdominal:      General: Bowel sounds are normal.      Palpations: Abdomen is soft.   Musculoskeletal:      Comments: Decreased ROM to left hip   Neurological:      Mental Status: She is alert and oriented to person, place, and time.   Psychiatric:         Mood and Affect: Mood normal.         Thought Content: Thought content normal.           ASSESSMENT / PLAN:  Closed fracture of ramus of left pubis with routine healing, subsequent encounter  Other closed fracture of fifth lumbar vertebra with routine healing, subsequent encounter  Chronic bilateral low back pain without sciatica  Primary osteoarthritis involving multiple joints  Physical deconditioning  Secondary to mechanical falls, pain controlled.  Making progress with therapy, likely ready for discharge next week. Oxycodone discontinued per patient request   - continue APAP TID  - DVT ppx with ASA BID x 6 weeks  - Biofreeze QID to left hip  - continue vitamin D; recommended. Follow-up with PCP for osteoporosis work-up  - PT/OT    Essential hypertension  -120s  - stable off medication  - monitor    Slow transit constipation  + BM  - continue psyllium BID, senna-s 2 tab daily     Urinary frequency  - send UA/UC  Update: UA returned and was negative      Orders:  Send UA/UC DX urinary frequency    Electronically signed by  ENA Clemons CNP

## 2024-06-06 NOTE — LETTER
6/6/2024      Lavonne Tidwell  [address] [facility] [address]        [facility] GERIATRICS  ACUTE/EPISODIC VISIT    [facility] Medical Record Number:  [id number]  [facility] where encounter took place[facility] [[id number]]    Chief Complaint   Patient presents with     RECHECK       HPI:    Lavonne Tidwell is a 83 year old  (1940), who is being seen today for an episodic care visit.  HPI information obtained from: facility chart records, facility staff, patient report, and [facility] chart review.    Today's concern is: Recent hospitalization after fall with pelvic fracture, L5 transverse process fracture. She is feeling better, still has some pain with walking, but is it better and able to better bear weight on left leg and gait is improved. She is working on advancing to 4WW today. Reports some mild SOB with activity, but think is due to her inactive lifestyle prior to hospitalization. She is having bowel movements. Reports she is having some urinary frequency, bladder pressure, increased incontinence. She is concerned that she is going to get at UTI.       ALLERGIES:    Allergies   Allergen Reactions     Codeine GI Disturbance        MEDICATIONS:  Post Discharge Medication Reconciliation Status: medication reconcilation previously completed during another office visit.     Current Outpatient Medications   Medication Sig Dispense Refill     Acetaminophen (TYLENOL PO) Take 1,000 mg by mouth every 8 hours       allopurinol (ZYLOPRIM) 100 MG tablet Take 1 tablet (100 mg) by mouth daily 90 tablet 3     aspirin (ASA) 81 MG chewable tablet Take 1 tablet (81 mg) by mouth 2 times daily for 41 days       Bioflavonoid Products (GRAPE SEED PO) Take 1 capsule by mouth daily       famotidine (PEPCID) 40 MG tablet Take 1 tablet (40 mg) by mouth 2 times daily 180 tablet 3     ferrous sulfate (FEROSUL) 325 (65 Fe) MG tablet Take 1 tablet (325 mg) by mouth  "Every Mon, Wed, Fri Morning 45 tablet 3     fluticasone (FLONASE) 50 MCG/ACT nasal spray Spray 1 spray into both nostrils 2 times daily 16 g 3     Garlic 1000 MG CAPS Take 1 capsule by mouth daily       ipratropium (ATROVENT) 0.03 % nasal spray Spray 1-2 sprays into both nostrils 3 times daily as needed for rhinitis (runny nose, postnasal drainage) 30 mL 1     LANsoprazole (PREVACID) 15 MG DR capsule Take 1 capsule (15 mg) by mouth daily 90 capsule 3     loratadine (CLARITIN) 10 MG tablet Take 10 mg by mouth daily       Menthol, Topical Analgesic, (BIOFREEZE) 4 % GEL Externally apply topically 4 times daily       Multiple Vitamins-Minerals (CENTRUM SILVER) per tablet Take 1 tablet by mouth daily       mupirocin (BACTROBAN) 2 % external ointment Apply topically 3 times daily Apply to nose as needed 30 g 3     oxyCODONE (ROXICODONE) 5 MG tablet Take 1 tablet (5 mg) by mouth every 4 hours as needed for severe pain (IF pain not managed with non-pharmacological and non-opioid interventions) 20 tablet 0     PSYLLIUM PO Take 1 capsule by mouth every morning 3 capsules at bedtime       senna-docusate (SENOKOT-S/PERICOLACE) 8.6-50 MG tablet Take 2 tablets by mouth daily       Vitamin D, Cholecalciferol, 25 MCG (1000 UT) CAPS Take 1 capsule by mouth daily       Medications reviewed:  Medications reconciled to facility chart and changes were made to reflect current medications as identified as above med list. Below are the changes that were made:   Medications stopped since last EPIC medication reconciliation:   There are no discontinued medications.    Medications started since last [facility] medication reconciliation:  No orders of the defined types were placed in this encounter.        REVIEW OF SYSTEMS:  4 point ROS neg other than the symptoms noted above in the HPI.      PHYSICAL EXAM:  /68   Pulse 71   Temp 98.1  F (36.7  C)   Resp 18   Ht 1.651 m (5' 5\")   Wt 76.1 kg (167 lb 12.8 oz)   BMI 27.92 kg/m    Physical " Exam  Cardiovascular:      Rate and Rhythm: Normal rate and regular rhythm.      Heart sounds: Normal heart sounds.   Pulmonary:      Effort: Pulmonary effort is normal.      Breath sounds: Normal breath sounds.   Abdominal:      General: Bowel sounds are normal.      Palpations: Abdomen is soft.   Musculoskeletal:      Comments: Decreased ROM to left hip   Neurological:      Mental Status: She is alert and oriented to person, place, and time.   Psychiatric:         Mood and Affect: Mood normal.         Thought Content: Thought content normal.           ASSESSMENT / PLAN:  Closed fracture of ramus of left pubis with routine healing, subsequent encounter  Other closed fracture of fifth lumbar vertebra with routine healing, subsequent encounter  Chronic bilateral low back pain without sciatica  Primary osteoarthritis involving multiple joints  Physical deconditioning  Secondary to mechanical falls, pain controlled.  Making progress with therapy, likely ready for discharge next week. Oxycodone discontinued per patient request   - continue APAP TID  - DVT ppx with ASA BID x 6 weeks  - Biofreeze QID to left hip  - continue vitamin D; recommended. Follow-up with PCP for osteoporosis work-up  - PT/OT    Essential hypertension  -120s  - stable off medication  - monitor    Slow transit constipation  + BM  - continue psyllium BID, senna-s 2 tab daily     Urinary frequency  - send UA/UC  Update: UA returned and was negative      Orders:  Send UA/UC DX urinary frequency    Electronically signed by  ENA Clemons CNP            Sincerely,        ENA Clemons CNP

## 2024-06-08 LAB
BACTERIA UR CULT: ABNORMAL
BACTERIA UR CULT: ABNORMAL

## 2024-06-08 NOTE — PROGRESS NOTES
[facility] GERIATRICS DISCHARGE SUMMARY  PATIENT'S NAME: Lavonne Tidwell  YOB: 1940  MEDICAL RECORD NUMBER:  [id number]  Place of Service where encounter took place:  [facility] ([facility]) [[id number]]    PRIMARY CARE PROVIDER AND CLINIC RESPONSIBLE AFTER TRANSFER:   Elvira Kowalski MD, [address] [place] [address] / [facility] [id number]    [facility] Provider     Transferring providers: ENA Zamora CNP; Yue oHgan MD   Recent Hospitalization/ED:  [facility] stay 5/27/24 to 5/29/24.  Date of SNF Admission: May 29, 2024  Date of SNF (anticipated) Discharge: June 12, 2024  Discharged to: previous independent home  Cognitive Scores: SLUMS: 28/30  Physical Function: Ambulating 240 ft with SBA  DME: [facility]    CODE STATUS/ADVANCE DIRECTIVES DISCUSSION:  Full Code   ALLERGIES: Codeine    NURSING FACILITY COURSE   Medication Changes/Rationale:   Discontinued oxycodone per patient request  Biofreeze to left hip for pain     Summary of nursing facility stay:      Brief Hospital Course: PMH of HTN, GERD, chronic cough, gout, chronic low back pain, who presented with left hip pain after a fall. CT showed left presymphyseal fracture, suspected nondisplaced L5 transverse process fracture. She was started on PRN oxycodone for pain with adequate management of pain. Other chronic issues stable. When medically stable was discharged to TCU for further rehab and medical management.    Closed fracture of ramus of left pubis with routine healing, subsequent encounter  Other closed fracture of fifth lumbar vertebra with routine healing, subsequent encounter  Chronic bilateral low back pain without sciatica  Primary osteoarthritis involving multiple joints  Physical deconditioning  Secondary to mechanical falls, pain controlled. Oxycodone discontinued per patient request as she did not want to use. Made progress with therapy.   - Home Care Referral  - continue APAP  TID. Wean off as able   - DVT ppx with ASA BID x 6 weeks  - Biofreeze QID to left hip  - continue vitamin D; recommended. Follow-up with PCP for osteoporosis work-up     Slow transit constipation  Chronic, + BM in TCU  - continue psyllium BID, senna-s daily     Essential hypertension  -120, HR 70-80  - stable off medication     Urinary frequency  Had increased frequency and incontinence. UA negative, symptoms improved while cultures pending. UC did grow 10-50K of 2 strains of e.coli, but as symptoms improved and she feels near baseline opted not to treat. Discussed s/s of UTI and when to reach out to PCP.     Gastroesophageal reflux disease without esophagitis  No concerns in TCU  - continue famotidine, lansoprazole    Chronic gout without tophus, unspecified cause, unspecified site  - continue allopurinol     Iron deficiency anemia, unspecified iron deficiency anemia type  Hgb stable   - continue ferrous sulfate     Marginal zone B-cell lymphoma (H)  Follows with oncology, currently monitoring with routine labs  - follow-up with oncology as scheduled      Discharge Medications:  MED REC REQUIRED  Post Medication Reconciliation Status: medication reconcilation previously completed during another office visit       Current Outpatient Medications   Medication Sig Dispense Refill    Acetaminophen (TYLENOL PO) Take 1,000 mg by mouth every 8 hours      allopurinol (ZYLOPRIM) 100 MG tablet Take 1 tablet (100 mg) by mouth daily 90 tablet 3    aspirin (ASA) 81 MG chewable tablet Take 1 tablet (81 mg) by mouth 2 times daily for 41 days      Bioflavonoid Products (GRAPE SEED PO) Take 1 capsule by mouth daily      famotidine (PEPCID) 40 MG tablet Take 1 tablet (40 mg) by mouth 2 times daily 180 tablet 3    ferrous sulfate (FEROSUL) 325 (65 Fe) MG tablet Take 1 tablet (325 mg) by mouth Every Mon, Wed, Fri Morning 45 tablet 3    fluticasone (FLONASE) 50 MCG/ACT nasal spray Spray 1 spray into both nostrils 2 times daily 16  "g 3    Garlic 1000 MG CAPS Take 1 capsule by mouth daily      ipratropium (ATROVENT) 0.03 % nasal spray Spray 1-2 sprays into both nostrils 3 times daily as needed for rhinitis (runny nose, postnasal drainage) 30 mL 1    LANsoprazole (PREVACID) 15 MG DR capsule Take 1 capsule (15 mg) by mouth daily 90 capsule 3    loratadine (CLARITIN) 10 MG tablet Take 10 mg by mouth daily      Menthol, Topical Analgesic, (BIOFREEZE) 4 % GEL Externally apply topically 4 times daily      Multiple Vitamins-Minerals (CENTRUM SILVER) per tablet Take 1 tablet by mouth daily      mupirocin (BACTROBAN) 2 % external ointment Apply topically 3 times daily Apply to nose as needed 30 g 3    PSYLLIUM PO Take 1 capsule by mouth every morning 3 capsules at bedtime      senna-docusate (SENOKOT-S/PERICOLACE) 8.6-50 MG tablet Take 2 tablets by mouth daily      Vitamin D, Cholecalciferol, 25 MCG (1000 UT) CAPS Take 1 capsule by mouth daily            Controlled medications:   not applicable/none     Past Medical History:   Past Medical History:   Diagnosis Date    Arthritis 2012    knes and hips    Cancer (H)     Essential hypertension 05/05/2021    History of blood transfusion 1961    Miscarriage    Ovarian cysts 1974    s/p BSO     Physical Exam:   Vitals: /69   Pulse 71   Temp 97.5  F (36.4  C)   Resp 16   Ht 1.651 m (5' 5\")   Wt 76.1 kg (167 lb 12.8 oz)   SpO2 99%   BMI 27.92 kg/m    BMI: Body mass index is 27.92 kg/m .  GENERAL APPEARANCE:  Alert, in no distress, cooperative,   RESP:  lungs clear to auscultation , no respiratory distress   CV:  regular rate and rhythm, no murmur, rub, or gallop, 2+  edema bilat ankles  ABDOMEN:  bowel sounds normal,   M/S:  decreased ROM to left hip  NEURO:   Cn 2-12 grossly intact,   PSYCH:  oriented X 3, affect and mood normal         SNF labs: Recent labs in [facility] reviewed by me today.     DISCHARGE PLAN:  Follow up labs: No labs orders/due  Medical Follow Up:      Follow up with primary care " provider in 2 weeks  Follow up with specialist Ortho on 6/12 as scheduled   Current [place] scheduled appointments:  Appointments in Next Year        Jun 17, 2024 10:00 AM  MTM New with Beliks Melendez RPH  [facility] ([facility] ) [phone]     Jun 25, 2024 11:15 AM  (Arrive by 11:00 AM)  MA SCREENING BILATERAL W/ HENRIQUE with NBMA1  [facility] ([facility] ) [phone]     Jun 27, 2024 10:30 AM  (Arrive by 10:15 AM)  Return Visit with Chapin Ly MD  [facility] ([facility] ) [phone]     Jun 27, 2024 10:45 AM  LAB with NB LAB  [facility] ([facility] ) [phone]     Jun 28, 2024  9:45 AM  (Arrive by 9:30 AM)  Pain Center 30 with Hamzah Kingston MD, BEPAIN1  [facility] ([facility] ) [phone]     Jul 08, 2024  1:45 PM  Return Patient with Estela Barrett NP  [facility] ([facility] ) [phone]     Sep 24, 2024 10:00 AM  (Arrive by 9:40 AM)  MEDICARE ANNUAL WELLNESS VISIT with Elvira Kowalski MD  [facility] ([facility] ) [phone]           Discharge Services: Home Care:  Occupational Therapy, Physical Therapy, Registered Nurse, and Home Health Aide  Discharge Instructions Verbalized to Patient at Discharge:   Weight bearing restrictions:  Weight bearing as tolerated.     TOTAL DISCHARGE TIME:   Greater than 30 minutes  Electronically signed by:  ENA Clemons CNP     Home care Face to Face documentation done in [facility] attached to Home care orders for [facility].

## 2024-06-10 ENCOUNTER — DISCHARGE SUMMARY NURSING HOME (OUTPATIENT)
Dept: GERIATRICS | Facility: CLINIC | Age: 84
End: 2024-06-10
Payer: COMMERCIAL

## 2024-06-10 VITALS
HEIGHT: 65 IN | BODY MASS INDEX: 27.96 KG/M2 | DIASTOLIC BLOOD PRESSURE: 69 MMHG | WEIGHT: 167.8 LBS | SYSTOLIC BLOOD PRESSURE: 126 MMHG | TEMPERATURE: 97.5 F | RESPIRATION RATE: 16 BRPM | OXYGEN SATURATION: 99 % | HEART RATE: 71 BPM

## 2024-06-10 DIAGNOSIS — K21.9 GASTROESOPHAGEAL REFLUX DISEASE WITHOUT ESOPHAGITIS: ICD-10-CM

## 2024-06-10 DIAGNOSIS — I10 ESSENTIAL HYPERTENSION: ICD-10-CM

## 2024-06-10 DIAGNOSIS — D50.9 IRON DEFICIENCY ANEMIA, UNSPECIFIED IRON DEFICIENCY ANEMIA TYPE: ICD-10-CM

## 2024-06-10 DIAGNOSIS — C85.80 MARGINAL ZONE B-CELL LYMPHOMA (H): ICD-10-CM

## 2024-06-10 DIAGNOSIS — M54.50 CHRONIC BILATERAL LOW BACK PAIN WITHOUT SCIATICA: ICD-10-CM

## 2024-06-10 DIAGNOSIS — S32.058D OTHER CLOSED FRACTURE OF FIFTH LUMBAR VERTEBRA WITH ROUTINE HEALING, SUBSEQUENT ENCOUNTER: ICD-10-CM

## 2024-06-10 DIAGNOSIS — S32.592D CLOSED FRACTURE OF RAMUS OF LEFT PUBIS WITH ROUTINE HEALING, SUBSEQUENT ENCOUNTER: Primary | ICD-10-CM

## 2024-06-10 DIAGNOSIS — M15.0 PRIMARY OSTEOARTHRITIS INVOLVING MULTIPLE JOINTS: ICD-10-CM

## 2024-06-10 DIAGNOSIS — K59.01 SLOW TRANSIT CONSTIPATION: ICD-10-CM

## 2024-06-10 DIAGNOSIS — M1A.9XX0 CHRONIC GOUT WITHOUT TOPHUS, UNSPECIFIED CAUSE, UNSPECIFIED SITE: ICD-10-CM

## 2024-06-10 DIAGNOSIS — R53.81 PHYSICAL DECONDITIONING: ICD-10-CM

## 2024-06-10 DIAGNOSIS — R35.0 URINARY FREQUENCY: ICD-10-CM

## 2024-06-10 DIAGNOSIS — G89.29 CHRONIC BILATERAL LOW BACK PAIN WITHOUT SCIATICA: ICD-10-CM

## 2024-06-10 PROCEDURE — 99316 NF DSCHRG MGMT 30 MIN+: CPT | Performed by: NURSE PRACTITIONER

## 2024-06-10 NOTE — LETTER
6/10/2024      Lavonne Tidwell  [address] [facility] [place] [address]        [facility] GERIATRICS DISCHARGE SUMMARY  PATIENT'S NAME: Lavonne Tidwell  YOB: 1940  MEDICAL RECORD NUMBER:  [id number]  Place of Service where encounter took place:  [facility] (SNF) [[id number]]    PRIMARY CARE PROVIDER AND CLINIC RESPONSIBLE AFTER TRANSFER:   Elvira Kowalski MD, [address]  / [place] [address]    [facility] Provider     Transferring providers: ENA Zamora CNP; Yue Hogan MD   Recent Hospitalization/ED:  [facility] stay 5/27/24 to 5/29/24.  Date of SNF Admission: May 29, 2024  Date of SNF (anticipated) Discharge: June 12, 2024  Discharged to: previous independent home  Cognitive Scores: SLUMS: 28/30  Physical Function: Ambulating 240 ft with SBA  DME: Walker    CODE STATUS/ADVANCE DIRECTIVES DISCUSSION:  Full Code   ALLERGIES: Codeine    NURSING FACILITY COURSE   Medication Changes/Rationale:   Discontinued oxycodone per patient request  Biofreeze to left hip for pain     Summary of nursing facility stay:      Brief Hospital Course: PMH of HTN, GERD, chronic cough, gout, chronic low back pain, who presented with left hip pain after a fall. CT showed left presymphyseal fracture, suspected nondisplaced L5 transverse process fracture. She was started on PRN oxycodone for pain with adequate management of pain. Other chronic issues stable. When medically stable was discharged to TCU for further rehab and medical management.    Closed fracture of ramus of left pubis with routine healing, subsequent encounter  Other closed fracture of fifth lumbar vertebra with routine healing, subsequent encounter  Chronic bilateral low back pain without sciatica  Primary osteoarthritis involving multiple joints  Physical deconditioning  Secondary to mechanical falls, pain controlled. Oxycodone discontinued per patient request as she did not  want to use. Made progress with therapy.   - Home Care Referral  - continue APAP TID. Wean off as able   - DVT ppx with ASA BID x 6 weeks  - Biofreeze QID to left hip  - continue vitamin D; recommended. Follow-up with PCP for osteoporosis work-up     Slow transit constipation  Chronic, + BM in TCU  - continue psyllium BID, senna-s daily     Essential hypertension  -120, HR 70-80  - stable off medication     Urinary frequency  Had increased frequency and incontinence. UA negative, symptoms improved while cultures pending. UC did grow 10-50K of 2 strains of e.coli, but as symptoms improved and she feels near baseline opted not to treat. Discussed s/s of UTI and when to reach out to PCP.     Gastroesophageal reflux disease without esophagitis  No concerns in TCU  - continue famotidine, lansoprazole    Chronic gout without tophus, unspecified cause, unspecified site  - continue allopurinol     Iron deficiency anemia, unspecified iron deficiency anemia type  Hgb stable   - continue ferrous sulfate     Marginal zone B-cell lymphoma (H)  Follows with oncology, currently monitoring with routine labs  - follow-up with oncology as scheduled      Discharge Medications:  MED REC REQUIRED  Post Medication Reconciliation Status: medication reconcilation previously completed during another office visit       Current Outpatient Medications   Medication Sig Dispense Refill     Acetaminophen (TYLENOL PO) Take 1,000 mg by mouth every 8 hours       allopurinol (ZYLOPRIM) 100 MG tablet Take 1 tablet (100 mg) by mouth daily 90 tablet 3     aspirin (ASA) 81 MG chewable tablet Take 1 tablet (81 mg) by mouth 2 times daily for 41 days       Bioflavonoid Products (GRAPE SEED PO) Take 1 capsule by mouth daily       famotidine (PEPCID) 40 MG tablet Take 1 tablet (40 mg) by mouth 2 times daily 180 tablet 3     ferrous sulfate (FEROSUL) 325 (65 Fe) MG tablet Take 1 tablet (325 mg) by mouth Every Mon, Wed, Fri Morning 45 tablet 3      "fluticasone (FLONASE) 50 MCG/ACT nasal spray Spray 1 spray into both nostrils 2 times daily 16 g 3     Garlic 1000 MG CAPS Take 1 capsule by mouth daily       ipratropium (ATROVENT) 0.03 % nasal spray Spray 1-2 sprays into both nostrils 3 times daily as needed for rhinitis (runny nose, postnasal drainage) 30 mL 1     LANsoprazole (PREVACID) 15 MG DR capsule Take 1 capsule (15 mg) by mouth daily 90 capsule 3     loratadine (CLARITIN) 10 MG tablet Take 10 mg by mouth daily       Menthol, Topical Analgesic, (BIOFREEZE) 4 % GEL Externally apply topically 4 times daily       Multiple Vitamins-Minerals (CENTRUM SILVER) per tablet Take 1 tablet by mouth daily       mupirocin (BACTROBAN) 2 % external ointment Apply topically 3 times daily Apply to nose as needed 30 g 3     PSYLLIUM PO Take 1 capsule by mouth every morning 3 capsules at bedtime       senna-docusate (SENOKOT-S/PERICOLACE) 8.6-50 MG tablet Take 2 tablets by mouth daily       Vitamin D, Cholecalciferol, 25 MCG (1000 UT) CAPS Take 1 capsule by mouth daily            Controlled medications:   not applicable/none     Past Medical History:   Past Medical History:   Diagnosis Date     Arthritis 2012    knes and hips     Cancer (H)      Essential hypertension 05/05/2021     History of blood transfusion 1961    Miscarriage     Ovarian cysts 1974    s/p BSO     Physical Exam:   Vitals: /69   Pulse 71   Temp 97.5  F (36.4  C)   Resp 16   Ht 1.651 m (5' 5\")   Wt 76.1 kg (167 lb 12.8 oz)   SpO2 99%   BMI 27.92 kg/m    BMI: Body mass index is 27.92 kg/m .  GENERAL APPEARANCE:  Alert, in no distress, cooperative,   RESP:  lungs clear to auscultation , no respiratory distress   CV:  regular rate and rhythm, no murmur, rub, or gallop, 2+  edema bilat ankles  ABDOMEN:  bowel sounds normal,   M/S:  decreased ROM to left hip  NEURO:   Cn 2-12 grossly intact,   PSYCH:  oriented X 3, affect and mood normal         SNF labs: Recent labs in [facility] reviewed by me today. "     DISCHARGE PLAN:  Follow up labs: No labs orders/due  Medical Follow Up:      Follow up with primary care provider in 2 weeks  Follow up with specialist Ortho on 6/12 as scheduled   Current [place] scheduled appointments:  Appointments in Next Year        Jun 17, 2024 10:00 AM  MTM New with Belkis Melendez [facility] ([facility] ) [phone]     Jun 25, 2024 11:15 AM  (Arrive by 11:00 AM)  MA SCREENING BILATERAL W/ HENRIQUE with NBMA1  [facility] ([facility] ) [phone]     Jun 27, 2024 10:30 AM  (Arrive by 10:15 AM)  Return Visit with Chapin Ly MD  [facility] ([facility] ) [phone]     Jun 27, 2024 10:45 AM  LAB with NB LAB  [facility] ([facility] ) [phone]     Jun 28, 2024  9:45 AM  (Arrive by 9:30 AM)  Pain Center 30 with Hamzah Kingston MD, BEPAIN1  [facility] ([facility] ) [phone]     Jul 08, 2024  1:45 PM  Return Patient with Estela Barrett NP  [facility] [place] ([facility] ) [phone]     Sep 24, 2024 10:00 AM  (Arrive by 9:40 AM)  MEDICARE ANNUAL WELLNESS VISIT with Elvira Kowalski MD  [facility] ([facility] ) [phone]           Discharge Services: Home Care:  Occupational Therapy, Physical Therapy, Registered Nurse, and Home Health Aide  Discharge Instructions Verbalized to Patient at Discharge:   Weight bearing restrictions:  Weight bearing as tolerated.     TOTAL DISCHARGE TIME:   Greater than 30 minutes  Electronically signed by:  ENA Clemons CNP     Home care Face to Face documentation done in EPIC attached to Home care orders for [facility].                 Sincerely,        ENA Clemons CNP

## 2024-06-12 ENCOUNTER — TRANSFERRED RECORDS (OUTPATIENT)
Dept: HEALTH INFORMATION MANAGEMENT | Facility: CLINIC | Age: 84
End: 2024-06-12
Payer: COMMERCIAL

## 2024-06-13 ENCOUNTER — PATIENT OUTREACH (OUTPATIENT)
Dept: CARE COORDINATION | Facility: CLINIC | Age: 84
End: 2024-06-13
Payer: COMMERCIAL

## 2024-06-13 SDOH — HEALTH STABILITY: PHYSICAL HEALTH: ON AVERAGE, HOW MANY MINUTES DO YOU ENGAGE IN EXERCISE AT THIS LEVEL?: 0 MIN

## 2024-06-13 SDOH — HEALTH STABILITY: PHYSICAL HEALTH: ON AVERAGE, HOW MANY DAYS PER WEEK DO YOU ENGAGE IN MODERATE TO STRENUOUS EXERCISE (LIKE A BRISK WALK)?: 0 DAYS

## 2024-06-13 ASSESSMENT — SOCIAL DETERMINANTS OF HEALTH (SDOH): IN A TYPICAL WEEK, HOW MANY TIMES DO YOU TALK ON THE PHONE WITH FAMILY, FRIENDS, OR NEIGHBORS?: THREE TIMES A WEEK

## 2024-06-13 ASSESSMENT — ACTIVITIES OF DAILY LIVING (ADL): DEPENDENT_IADLS:: TRANSPORTATION

## 2024-06-13 NOTE — LETTER
[facility] CARE COORDINATION  [address] [facility] [address]     June 13, 2024    Lavonne Tidwell  [address] [facility] [address]      Dear Lavonne,    I am a clinic care coordinator who works with Elvira Kowalski MD with the [facility]. I wanted to thank you for spending the time to talk with me.  Below is a description of clinic care coordination and how I can further assist you.       The clinic care coordination team is made up of a registered nurse, , financial resource worker and community health worker who understand the health care system. The goal of clinic care coordination is to help you manage your health and improve access to the health care system. Our team works alongside your provider to assist you in determining your health and social needs. We can help you obtain health care and community resources, providing you with necessary information and education. We can work with you through any barriers and develop a care plan that helps coordinate and strengthen the communication between you and your care team.  Our services are voluntary and are offered without charge to you personally.    Please feel free to contact me with any questions or concerns regarding care coordination and what we can offer.      We are focused on providing you with the highest-quality healthcare experience possible.    Sincerely,     [facility]   Leatha Guaman RN, Care Coordinator   [facility]'s   E-mail [email or web address]   [phone]     Enclosed: I have enclosed a copy of the Patient Centered Plan of Care. This has helpful information and goals that we have talked about. Please keep this in an easy to access place to use as needed.

## 2024-06-13 NOTE — LETTER
[facility]  Patient Centered Plan of Care  About Me:        Patient Name:  Lavonne Viera    YOB: 1940  Age:         83 year old   [place] MRN:    [id number] Telephone Information:  Home Phone [phone]   Mobile [phone]       Address:  [address] [address] Email address:  [email or web address]      Emergency Contact(s)    Name Relationship Lgl Grd Work Phone Home Phone Mobile Phone   1. DINAH VIERA Son No  [phone]   2. PAMELA VIERA Daughter No   [phone]           Primary language:  English     needed? No   [facility] [employer]:  [phone] op. 1  Other communication barriers:Glasses    Preferred Method of Communication:  Dejan  Current living arrangement: I live in a private home    Mobility Status/ Medical Equipment: Independent w/Device        Health Maintenance  Health Maintenance Reviewed:   Health Maintenance Due   Topic Date Due    RSV VACCINE (Pregnancy & 60+) (1 - 1-dose 60+ series) Never done    LIPID  05/05/2022    COVID-19 Vaccine (6 - 2023-24 season) 09/01/2023    PHQ-2 (once per calendar year)  01/01/2024    Medicare Annual [place] Pharmacist Visit (once per calendar year)  01/01/2024          My Access Plan  Medical Emergency 911   Primary Clinic Line [facility] - [phone]   24 Hour Appointment Line [phone] or  [phone]) (toll-free)   24 Hour Nurse Line [phone] (toll-free)   Preferred Urgent Care [facility], [phone]     Preferred Hospital [place], [place]  [phone]     Preferred Pharmacy [facility] - [address] [place] [address]     Behavioral Health Crisis Line The National Suicide Prevention Lifeline at 1-758.161.8137 or Text/Call 438           My Care Team Members  Patient Care Team         Relationship Specialty Notifications Start End    Rehder, Elvira Summers,  MD PCP - General Family Practice  1/27/12     Phone: [phone] Fax: [phone]         [address] [place] [address]    Rob Harris MD Assigned Surgical Provider   10/23/20     Phone: [phone] Fax: [phone]         [address] [facility] [address]    Elvira Kowalski MD Assigned PCP   5/27/21     Phone: [phone] Fax: [phone]         [address] [place] [address]    Soledad Lopes PA-C Assigned Rheumatology Provider   3/4/23     Phone: [phone] Fax: [phone]         [address] [facility] [address]    Belkis Melendez [facility] Pharmacist Pharmacist  6/12/23     Phone: [phone] Fax: [phone]         [address] [place] [address]    Belkis Melendez [facility] Assigned MTM Pharmacist   6/17/23     Phone: [phone] Fax: [phone]         [address] [place] [address]    Yong Quiles [facility] Audiologist Audiology  10/12/23     Phone: [phone] Fax: [phone]         [address] [facility] [address]    Estela Barrett NP Assigned Cancer Care Provider   10/28/23     Phone: [phone] Fax: [phone]         [address] [place] [address]    Barron Cardozo MD Assigned Allergy Provider   10/28/23     Phone: [phone] Fax: [phone]         [address] [place] [address]    Micheline Almodovar APRN CNP Nurse Practitioner Family Medicine All results 5/29/24     Phone: [phone] Fax: [phone]         [address] [facility] [address]    (Fgs), [facility] [place] [facility]    5/29/24     Phone: [phone] Fax: [phone] [address] [facility] [address]    Leatha Guaman, RN Lead Care Coordinator Primary Care - CC Admissions 5/30/24     Phone: [phone]                     My Care Plans  Self Management and Treatment Plan    Care Plan  Care Plan: General       Problem: HP GENERAL PROBLEM       Goal: My pelvic fracture will be healed and my strength will be  increased  in the next 1-2 months       Start Date: 6/13/2024 Expected End Date: 8/13/2024    This Visit's Progress: 30%    Priority: High    Note:     Barriers: Deconditioned   Strengths: Motivated   Patient expressed understanding of goal: Yes  Action steps to achieve this goal:  1. I will use my walker for safety   2. I will start home care services for physical therapy   3. I will use Tylenol and Biofreeze as directed   4. I will keep my future appointment with the Orthopedic provider in 4 weeks                                Action Plans on File:                       Advance Care Plans/Directives:   Advanced Care Plan/Directives on file:   Yes    Status of Document(s): No data recorded  Advanced Care Plan/Directives Type:   POLST           My Medical and Care Information  Problem List   Patient Active Problem List   Diagnosis    Injury of sigmoid colon    Esophageal reflux    Menopausal syndrome (hot flashes)    Urinary incontinence    Atrophic vaginitis    Hyperlipidemia LDL goal <130    Advanced directives, counseling/discussion    Onychomycosis    Senile nuclear sclerosis    Status post total left knee replacement    Status post total right knee replacement    Primary localized osteoarthrosis, lower leg    Tubulovillous adenoma polyp of colon    Essential hypertension    Primary osteoarthritis involving multiple joints    Chronic bilateral low back pain without sciatica    Chronic cough    Fall, initial encounter    Closed fracture of transverse process of lumbar vertebra, initial encounter (H)    Closed fracture of ramus of left pubis, initial encounter (H)    Chronic gout      Current Medications and Allergies:    Current Outpatient Medications   Medication Sig Dispense Refill    Acetaminophen (TYLENOL PO) Take 1,000 mg by mouth every 8 hours      allopurinol (ZYLOPRIM) 100 MG tablet Take 1 tablet (100 mg) by mouth daily 90 tablet 3    aspirin (ASA) 81 MG chewable tablet Take 1 tablet (81 mg) by mouth 2  times daily for 41 days      Bioflavonoid Products (GRAPE SEED PO) Take 1 capsule by mouth daily      famotidine (PEPCID) 40 MG tablet Take 1 tablet (40 mg) by mouth 2 times daily 180 tablet 3    ferrous sulfate (FEROSUL) 325 (65 Fe) MG tablet Take 1 tablet (325 mg) by mouth Every Mon, Wed, Fri Morning 45 tablet 3    fluticasone (FLONASE) 50 MCG/ACT nasal spray Spray 1 spray into both nostrils 2 times daily 16 g 3    Garlic 1000 MG CAPS Take 1 capsule by mouth daily      ipratropium (ATROVENT) 0.03 % nasal spray Spray 1-2 sprays into both nostrils 3 times daily as needed for rhinitis (runny nose, postnasal drainage) 30 mL 1    LANsoprazole (PREVACID) 15 MG DR capsule Take 1 capsule (15 mg) by mouth daily 90 capsule 3    loratadine (CLARITIN) 10 MG tablet Take 10 mg by mouth daily      Menthol, Topical Analgesic, (BIOFREEZE) 4 % GEL Externally apply topically 4 times daily      Multiple Vitamins-Minerals (CENTRUM SILVER) per tablet Take 1 tablet by mouth daily      mupirocin (BACTROBAN) 2 % external ointment Apply topically 3 times daily Apply to nose as needed 30 g 3    PSYLLIUM PO Take 1 capsule by mouth every morning 3 capsules at bedtime      senna-docusate (SENOKOT-S/PERICOLACE) 8.6-50 MG tablet Take 2 tablets by mouth daily      Vitamin D, Cholecalciferol, 25 MCG (1000 UT) CAPS Take 1 capsule by mouth daily       No current facility-administered medications for this visit.        Care Coordination Start Date: 5/30/2024   Frequency of Care Coordination: weekly, more frequently as needed     Form Last Updated: 06/13/2024

## 2024-06-13 NOTE — PROGRESS NOTES
Clinic Care Coordination Contact  Clinic Care Coordination Contact  OUTREACH    Referral Information:  Referral Source: IP Handoff    Primary Diagnosis: Orthopedic    Chief Complaint   Patient presents with    Clinic Care Coordination - Post Hospital     Clinic Care Coordination RN         Universal Utilization:   Hospitalist Discharge Summary       Date of Admission:  5/27/2024  Date of Discharge:  5/29/2024  Discharging Provider: Clif Raya MD  Discharge Service: Hospitalist Service        Discharge Diagnoses  # Closed fracture of ramus of left pubis, initial encounter  # Impaired ambulation due to pain   # Fall, initial encounter  # Closed fracture of transverse process of lumbar vertebra, initial encounter  # Chronic bilateral low back pain without sciatica  # Urinary incontinence  # Suprapubic discomfort  # Essential hypertension  # Esophageal reflux  # Chronic cough  # Post nasal drainage due to chronic rhinitis  # Primary osteoarthritis involving multiple joints  # Chronic gout     Assessment: Patient has transitioned to TCU/ARU for short term rehabilitation:     [facility] 6/12/2024   Clinic Utilization  Difficulty keeping appointments:: No  Compliance Concerns: No  No-Show Concerns: No  No PCP office visit in Past Year: No  Utilization      No Show Count (past year)  0             ED Visits  1             Hospital Admissions  1                    Current as of: 6/10/2024  5:16 PM                Clinical Concerns:  Current Medical Concerns:  Patient reports some discomfort in buttocks with ambulation and no pain while she is sitting.  Patient states the Tylenol and Biofreeze manages the pain   .  Just saw her Orthopedic provider yesterday and will follow up in 4 weeks.  Patient will call the clinic and make a future hospital follow up with PCP.  Patient awaits a call from the home care service   Current Behavioral Concerns: No    Education Provided to patient:  FERMÍN  introductory letter and care plan sent via My Chart   Pain  Pain (GOAL):: No  Health Maintenance Reviewed: Not assessed  Clinical Pathway: None    Medication Management:  Medication review status: Medications reviewed.  Changes noted per patient report.       Functional Status:  Dependent ADLs:: Ambulation-cane, Ambulation-walker  Dependent IADLs:: Transportation  Mobility Status: Independent w/Device  Fallen 2 or more times in the past year?: (!) Yes  Any fall with injury in the past year?: Yes    Living Situation:  Current living arrangement:: I live in a private home    Lifestyle & Psychosocial Needs:    Social Determinants of Health     Food Insecurity: Not on file   Depression: Not at risk (9/13/2023)    PHQ-2     PHQ-2 Score: 0   Housing Stability: Low Risk  (6/13/2024)    Housing Stability     Do you have housing? : Yes     Are you worried about losing your housing?: No   Tobacco Use: Medium Risk (2/15/2024)    Patient History     Smoking Tobacco Use: Former     Smokeless Tobacco Use: Never     Passive Exposure: Not on file   Financial Resource Strain: Not on file   Alcohol Use: Not on file   Transportation Needs: Low Risk  (6/13/2024)    Transportation Needs     Within the past 12 months, has lack of transportation kept you from medical appointments, getting your medicines, non-medical meetings or appointments, work, or from getting things that you need?: No   Physical Activity: Inactive (6/13/2024)    Exercise Vital Sign     Days of Exercise per Week: 0 days     Minutes of Exercise per Session: 0 min   Interpersonal Safety: Not on file   Stress: Not on file   Social Connections: Unknown (6/13/2024)    Social Connection and Isolation Panel [NHANES]     Frequency of Communication with Friends and Family: Three times a week     Frequency of Social Gatherings with Friends and Family: Not on file     Attends Baptism Services: Not on file     Active Member of Clubs or Organizations: Not on file     Attends Club  or Organization Meetings: Not on file     Marital Status:    Health Literacy: Not on file     Diet:: Regular  Inadequate nutrition (GOAL):: No  Tube Feeding: No  Inadequate activity/exercise (GOAL):: Yes  Significant changes in sleep pattern (GOAL): No  Transportation means:: Family     Nondenominational or spiritual beliefs that impact treatment:: No  Mental health DX:: No  Mental health management concern (GOAL):: No  Chemical Dependency Status: No Current Concerns  Informal Support system:: Children             Resources and Interventions:  Current Resources:   Skilled Home Care Services: Skilled Nursing, Home Health Aid, Physical Therapy, Occupational Therapy  Community Resources: Home Care  Supplies Currently Used at Home: None  Equipment Currently Used at Home: cane, straight, shower chair, walker, standard  Employment Status: retired         Advance Care Plan/Directive  Advanced Care Plans/Directives on file:: Yes  Type Advanced Care Plans/Directives: POLST    Referrals Placed: None         Care Plan:  Care Plan: General       Problem: HP GENERAL PROBLEM       Goal: My pelvic fracture will be healed and my strength will be increased  in the next 1-2 months       Start Date: 6/13/2024 Expected End Date: 8/13/2024    This Visit's Progress: 30%    Priority: High    Note:     Barriers: Deconditioned   Strengths: Motivated   Patient expressed understanding of goal: Yes  Action steps to achieve this goal:  1. I will use my walker for safety   2. I will start home care services for physical therapy   3. I will use Tylenol and Biofreeze as directed   4. I will keep my future appointment with the Orthopedic provider in 4 weeks                                Patient/Caregiver understanding: Expresses good understanding of discharge instructions     Outreach Frequency: weekly, more frequently as needed  Future Appointments                In 4 days Belkis Melendez [facility]    In  1 week NBMA1 [facility]    In 2 weeks Chapin Ly MD [facility]    In 2 weeks [facility]    In 2 weeks Hamzah Kingston MD; BEPAIN1 [facility], [facility]    In 3 weeks Estela Barrett, NATHAN [facility]    In 3 months Elvira Kowalski MD [facility]            Plan: ml RN will follow up in 3-5 business days     [facility]   Leatha Guaman RN, Care Coordinator   [facility]'s   E-mail [email or web address]   [phone]

## 2024-06-17 ENCOUNTER — TELEPHONE (OUTPATIENT)
Dept: FAMILY MEDICINE | Facility: CLINIC | Age: 84
End: 2024-06-17
Payer: COMMERCIAL

## 2024-06-17 NOTE — TELEPHONE ENCOUNTER
Patient calling. She was told that she needs to be seen in person for insurance to cover Homecare services. Patient is wondering if she can be seen in person on 6/19 at 5PM by Dr. Kowalski. Patient is currently scheduled then for a phone visit.    Patient states she needs to be seen with in 14 days from 6/15 for homecare services to start.     Can patient be in person on 6/19 at 5pm?

## 2024-06-17 NOTE — TELEPHONE ENCOUNTER
Reason for Call: Request for an order or referral:    Order or referral being requested: Home Health Aid 1x wk for 5wks  Ot & Pt eval & Treat  Skill Nursing 1xwk for 3wks , then e/o for 3 visits.    Date needed: as soon as possible    Has the patient been seen by the PCP for this problem? YES    Additional comments: Also wanted to know if it is ok for patient to be taking the supplements that she saw pt taking that was not on her recorded medication list.    Vit E 100mg daily  Calcium w/D3 600mg-25micro 1 pill 2x daily  Zinc 50mg daily.      Phone number Patient can be reached at:  Other phone number: [phone]     Best Time:  any    Can we leave a detailed message on this number?  YES    Call taken on 6/17/2024 at 5:08 PM by Izabella De Luna

## 2024-06-18 NOTE — TELEPHONE ENCOUNTER
Lily RN with [facility] notified of ok for the requested supplements and ok for the home care orders per Dr. Garrett Kowalski.    RN states they are working on pain management, falls prevention and disease education as patient had a recent fall with pelvic FX.    Julie Behrendt RN

## 2024-06-18 NOTE — TELEPHONE ENCOUNTER
Dr. Kowalski,    Please advise if ok for patient to be taking the following supplements.    Vit E 100mg daily  Calcium w/D3 600mg-25micro 1 pill 2x daily  Zinc 50mg daily.    Thank you  Dilip DIAZ RN  [facility]

## 2024-06-19 ENCOUNTER — OFFICE VISIT (OUTPATIENT)
Dept: FAMILY MEDICINE | Facility: CLINIC | Age: 84
End: 2024-06-19
Payer: COMMERCIAL

## 2024-06-19 VITALS
BODY MASS INDEX: 28.29 KG/M2 | TEMPERATURE: 98.5 F | HEART RATE: 86 BPM | DIASTOLIC BLOOD PRESSURE: 60 MMHG | RESPIRATION RATE: 16 BRPM | OXYGEN SATURATION: 96 % | SYSTOLIC BLOOD PRESSURE: 104 MMHG | WEIGHT: 170 LBS

## 2024-06-19 DIAGNOSIS — S32.592A CLOSED FRACTURE OF RAMUS OF LEFT PUBIS, INITIAL ENCOUNTER (H): Primary | ICD-10-CM

## 2024-06-19 DIAGNOSIS — M1A.0320 CHRONIC GOUT OF LEFT WRIST, UNSPECIFIED CAUSE: ICD-10-CM

## 2024-06-19 DIAGNOSIS — E78.5 HYPERLIPIDEMIA LDL GOAL <130: ICD-10-CM

## 2024-06-19 DIAGNOSIS — C85.80 MARGINAL ZONE LYMPHOMA (H): ICD-10-CM

## 2024-06-19 DIAGNOSIS — Z79.899 MEDICATION MANAGEMENT: ICD-10-CM

## 2024-06-19 DIAGNOSIS — N30.01 ACUTE CYSTITIS WITH HEMATURIA: ICD-10-CM

## 2024-06-19 DIAGNOSIS — S32.009A CLOSED FRACTURE OF TRANSVERSE PROCESS OF LUMBAR VERTEBRA, INITIAL ENCOUNTER (H): ICD-10-CM

## 2024-06-19 LAB
ALBUMIN SERPL BCG-MCNC: 4 G/DL (ref 3.5–5.2)
ALBUMIN UR-MCNC: 30 MG/DL
ALP SERPL-CCNC: 249 U/L (ref 40–150)
ALT SERPL W P-5'-P-CCNC: 20 U/L (ref 0–50)
ANION GAP SERPL CALCULATED.3IONS-SCNC: 13 MMOL/L (ref 7–15)
APPEARANCE UR: CLEAR
AST SERPL W P-5'-P-CCNC: 29 U/L (ref 0–45)
BACTERIA #/AREA URNS HPF: ABNORMAL /HPF
BASOPHILS # BLD AUTO: 0 10E3/UL (ref 0–0.2)
BASOPHILS NFR BLD AUTO: 0 %
BILIRUB SERPL-MCNC: 0.5 MG/DL
BILIRUB UR QL STRIP: ABNORMAL
BUN SERPL-MCNC: 22.3 MG/DL (ref 8–23)
CALCIUM SERPL-MCNC: 9.6 MG/DL (ref 8.8–10.2)
CHLORIDE SERPL-SCNC: 102 MMOL/L (ref 98–107)
CHOLEST SERPL-MCNC: 162 MG/DL
COLOR UR AUTO: YELLOW
CREAT SERPL-MCNC: 0.8 MG/DL (ref 0.51–0.95)
DEPRECATED HCO3 PLAS-SCNC: 25 MMOL/L (ref 22–29)
EGFRCR SERPLBLD CKD-EPI 2021: 72 ML/MIN/1.73M2
EOSINOPHIL # BLD AUTO: 0.1 10E3/UL (ref 0–0.7)
EOSINOPHIL NFR BLD AUTO: 2 %
ERYTHROCYTE [DISTWIDTH] IN BLOOD BY AUTOMATED COUNT: 13.3 % (ref 10–15)
FASTING STATUS PATIENT QL REPORTED: NO
FASTING STATUS PATIENT QL REPORTED: NO
GLUCOSE SERPL-MCNC: 117 MG/DL (ref 70–99)
GLUCOSE UR STRIP-MCNC: NEGATIVE MG/DL
HCT VFR BLD AUTO: 36.7 % (ref 35–47)
HDLC SERPL-MCNC: 40 MG/DL
HGB BLD-MCNC: 11.6 G/DL (ref 11.7–15.7)
HGB UR QL STRIP: ABNORMAL
IMM GRANULOCYTES # BLD: 0 10E3/UL
IMM GRANULOCYTES NFR BLD: 1 %
KETONES UR STRIP-MCNC: 15 MG/DL
LDH SERPL L TO P-CCNC: 200 U/L (ref 0–250)
LDLC SERPL CALC-MCNC: 91 MG/DL
LEUKOCYTE ESTERASE UR QL STRIP: ABNORMAL
LYMPHOCYTES # BLD AUTO: 4 10E3/UL (ref 0.8–5.3)
LYMPHOCYTES NFR BLD AUTO: 53 %
MCH RBC QN AUTO: 32.9 PG (ref 26.5–33)
MCHC RBC AUTO-ENTMCNC: 31.6 G/DL (ref 31.5–36.5)
MCV RBC AUTO: 104 FL (ref 78–100)
MONOCYTES # BLD AUTO: 0.1 10E3/UL (ref 0–1.3)
MONOCYTES NFR BLD AUTO: 2 %
NEUTROPHILS # BLD AUTO: 3.2 10E3/UL (ref 1.6–8.3)
NEUTROPHILS NFR BLD AUTO: 43 %
NITRATE UR QL: POSITIVE
NONHDLC SERPL-MCNC: 122 MG/DL
NRBC # BLD AUTO: 0 10E3/UL
NRBC BLD AUTO-RTO: 0 /100
PH UR STRIP: 5 [PH] (ref 5–7)
PLATELET # BLD AUTO: 315 10E3/UL (ref 150–450)
POTASSIUM SERPL-SCNC: 4.6 MMOL/L (ref 3.4–5.3)
PROT SERPL-MCNC: 7.8 G/DL (ref 6.4–8.3)
RBC # BLD AUTO: 3.53 10E6/UL (ref 3.8–5.2)
RBC #/AREA URNS AUTO: ABNORMAL /HPF
RETICS # AUTO: 0.04 10E6/UL (ref 0.03–0.1)
RETICS/RBC NFR AUTO: 1.3 % (ref 0.5–2)
SODIUM SERPL-SCNC: 140 MMOL/L (ref 135–145)
SP GR UR STRIP: 1.02 (ref 1–1.03)
SQUAMOUS #/AREA URNS AUTO: ABNORMAL /LPF
TRIGL SERPL-MCNC: 155 MG/DL
URATE SERPL-MCNC: 5.1 MG/DL (ref 2.4–5.7)
UROBILINOGEN UR STRIP-ACNC: 0.2 E.U./DL
WBC # BLD AUTO: 7.6 10E3/UL (ref 4–11)
WBC #/AREA URNS AUTO: ABNORMAL /HPF

## 2024-06-19 PROCEDURE — 85045 AUTOMATED RETICULOCYTE COUNT: CPT | Performed by: FAMILY MEDICINE

## 2024-06-19 PROCEDURE — 85025 COMPLETE CBC W/AUTO DIFF WBC: CPT | Performed by: FAMILY MEDICINE

## 2024-06-19 PROCEDURE — 87186 SC STD MICRODIL/AGAR DIL: CPT | Performed by: FAMILY MEDICINE

## 2024-06-19 PROCEDURE — 36415 COLL VENOUS BLD VENIPUNCTURE: CPT | Performed by: FAMILY MEDICINE

## 2024-06-19 PROCEDURE — 80053 COMPREHEN METABOLIC PANEL: CPT | Performed by: FAMILY MEDICINE

## 2024-06-19 PROCEDURE — 84550 ASSAY OF BLOOD/URIC ACID: CPT | Performed by: FAMILY MEDICINE

## 2024-06-19 PROCEDURE — 83615 LACTATE (LD) (LDH) ENZYME: CPT | Performed by: FAMILY MEDICINE

## 2024-06-19 PROCEDURE — 99495 TRANSJ CARE MGMT MOD F2F 14D: CPT | Performed by: FAMILY MEDICINE

## 2024-06-19 PROCEDURE — 80061 LIPID PANEL: CPT | Performed by: FAMILY MEDICINE

## 2024-06-19 PROCEDURE — 87086 URINE CULTURE/COLONY COUNT: CPT | Performed by: FAMILY MEDICINE

## 2024-06-19 PROCEDURE — 81001 URINALYSIS AUTO W/SCOPE: CPT | Performed by: FAMILY MEDICINE

## 2024-06-19 RX ORDER — SULFAMETHOXAZOLE/TRIMETHOPRIM 800-160 MG
1 TABLET ORAL 2 TIMES DAILY
Qty: 14 TABLET | Refills: 0 | Status: SHIPPED | OUTPATIENT
Start: 2024-06-19 | End: 2024-09-24 | Stop reason: ALTCHOICE

## 2024-06-19 RX ORDER — ALLOPURINOL 100 MG/1
100 TABLET ORAL DAILY
Qty: 90 TABLET | Refills: 3 | Status: SHIPPED | OUTPATIENT
Start: 2024-06-19

## 2024-06-19 ASSESSMENT — PAIN SCALES - GENERAL: PAINLEVEL: MODERATE PAIN (5)

## 2024-06-19 NOTE — NURSING NOTE
"Chief Complaint   Patient presents with    Hospital F/U     TCU follow up     /60   Pulse 86   Temp 98.5  F (36.9  C) (Tympanic)   Resp 16   Wt 77.1 kg (170 lb)   SpO2 96%   BMI 28.29 kg/m   Estimated body mass index is 28.29 kg/m  as calculated from the following:    Height as of 6/10/24: 1.651 m (5' 5\").    Weight as of this encounter: 77.1 kg (170 lb).  Patient presents to the clinic using [employer] Maintenance that is potentially due pending provider review:    Health Maintenance Due   Topic Date Due    RSV VACCINE (Pregnancy & 60+) (1 - 1-dose 60+ series) Never done    LIPID  05/05/2022    COVID-19 Vaccine (6 - 2023-24 season) 09/01/2023    Medicare Annual MTM Pharmacist Visit (once per calendar year)  01/01/2024                  "

## 2024-06-19 NOTE — PROGRESS NOTES
"  Assessment & Plan     Closed fracture of ramus of left pubis, initial encounter (H)  Improved   Working with PT and OT   Using walker     Closed fracture of transverse process of lumbar vertebra, initial encounter (H)  As above     Marginal zone lymphoma (H)  Per oncology  - CBC with Platelets & Differential  - Comprehensive metabolic panel  - Lactate Dehydrogenase  - Reticulocyte count    Acute cystitis with hematuria  UA positive   Rx and consider ditropan if frequency continues after infection treated   - sulfamethoxazole-trimethoprim (BACTRIM DS) 800-160 MG tablet; Take 1 tablet by mouth 2 times daily    Medication management    - Med Therapy Management Referral    Chronic gout of left wrist, unspecified cause  Adjust meds as indicated by above labs.   - allopurinol (ZYLOPRIM) 100 MG tablet; Take 1 tablet (100 mg) by mouth daily  - Uric acid; Future  - Uric acid    Hyperlipidemia LDL goal <130    - Lipid panel reflex to direct LDL Non-fasting; Future  - Lipid panel reflex to direct LDL Non-fasting        MED REC REQUIRED  Post Medication Reconciliation Status: discharge medications reconciled and changed, per note/orders  BMI  Estimated body mass index is 28.29 kg/m  as calculated from the following:    Height as of 6/10/24: 1.651 m (5' 5\").    Weight as of this encounter: 77.1 kg (170 lb).             Kirsty Banerjee is a 83 year old, presenting for the following health issues:  Hospital F/U (TCU follow up)        6/19/2024     9:34 AM   Additional Questions   Roomed by Nicki DIAZ   Accompanied by Daughter in law         6/19/2024     9:34 AM   Patient Reported Additional Medications   Patient reports taking the following new medications .     HPI       Hospital Follow-up Visit:    Hospital/Nursing Home/IP Rehab Facility: [facility]  Date of Admission: 5/29/24  Date of Discharge: 6/12/24  Reason(s) for Admission:   Closed fracture of ramus of left pubis with routine healing, subsequent " encounter  Other closed fracture of fifth lumbar vertebra with routine healing, subsequent encounter  Chronic bilateral low back pain without sciatica  Primary osteoarthritis involving multiple joints  Physical deconditioning  Was the patient in the ICU or did the patient experience delirium during hospitalization?  No  Do you have any other stressors you would like to discuss with your provider? No    Problems taking medications regularly:  None  Medication changes since discharge: None  Problems adhering to non-medication therapy:  None    Summary of hospitalization:  [facility] discharge summary reviewed  Diagnostic Tests/Treatments reviewed.  Follow up needed: none  Other Healthcare Providers Involved in Patient s Care:         Homecare  Update since discharge: improved.         Plan of care communicated with patient and family           Hyperlipidemia Follow-Up    Are you regularly taking any medication or supplement to lower your cholesterol?   Yes- statin  Are you having muscle aches or other side effects that you think could be caused by your cholesterol lowering medication?  No      Genitourinary - Female  Onset/Duration: months  Description:   Painful urination (Dysuria): No           Frequency: YES  Blood in urine (Hematuria): No  Delay in urine (Hesitency): YES  Intensity: moderate  Progression of Symptoms:  worsening  Accompanying Signs & Symptoms:  Fever/chills: No  Flank pain: No  Nausea and vomiting: No  Vaginal symptoms: none  Abdominal/Pelvic Pain: No  History:   History of frequent UTI s: No  History of kidney stones: No  Sexually Active: No  Possibility of pregnancy: No  Precipitating or alleviating factors: None  Therapies tried and outcome:  none       Review of Systems  Constitutional, HEENT, cardiovascular, pulmonary, gi and gu systems are negative, except as otherwise noted.      Objective    /60   Pulse 86   Temp 98.5  F (36.9  C) (Tympanic)   Resp 16   Wt 77.1 kg  (170 lb)   SpO2 96%   BMI 28.29 kg/m    Body mass index is 28.29 kg/m .  Physical Exam   GENERAL: alert and no distress  MS: no gross musculoskeletal defects noted, no edema  PSYCH: mentation appears normal, affect normal/bright            Signed Electronically by: Elvira Kowalski MD

## 2024-06-20 LAB — BACTERIA UR CULT: ABNORMAL

## 2024-06-21 ENCOUNTER — PATIENT OUTREACH (OUTPATIENT)
Dept: CARE COORDINATION | Facility: CLINIC | Age: 84
End: 2024-06-21
Payer: COMMERCIAL

## 2024-06-21 ASSESSMENT — ACTIVITIES OF DAILY LIVING (ADL): DEPENDENT_IADLS:: INDEPENDENT

## 2024-06-21 NOTE — PROGRESS NOTES
Clinic Care Coordination Contact  Follow Up Progress Note     Universal Utilization:   Hospitalist Discharge Summary       Date of Admission:  5/27/2024  Date of Discharge:  5/29/2024  Discharging Provider: Clif Raya MD  Discharge Service: Hospitalist Service        Discharge Diagnoses  # Closed fracture of ramus of left pubis, initial encounter  # Impaired ambulation due to pain   # Fall, initial encounter  # Closed fracture of transverse process of lumbar vertebra, initial encounter  # Chronic bilateral low back pain without sciatica  # Urinary incontinence  # Suprapubic discomfort  # Essential hypertension  # Esophageal reflux  # Chronic cough  # Post nasal drainage due to chronic rhinitis  # Primary osteoarthritis involving multiple joints  # Chronic gout     Assessment: Patient has transitioned to TCU/ARU for short term rehabilitation:     [facility] 6/12/2024      Assessment: Patient reports she has had a A visit for a shower and awaits a call to set up physical therapy.   Rainy weather affects her arthritis and so is using Biofreeze today on both knees     Care Gaps:    Health Maintenance Due   Topic Date Due    RSV VACCINE (Pregnancy & 60+) (1 - 1-dose 60+ series) Never done    COVID-19 Vaccine (6 - 2023-24 season) 09/01/2023    Medicare Annual MTM Pharmacist Visit (once per calendar year)  01/01/2024       Not discussed     Care Plans  Care Plan: General       Problem: HP GENERAL PROBLEM       Goal: My pelvic fracture will be healed and my strength will be increased  in the next 1-2 months       Start Date: 6/13/2024 Expected End Date: 8/13/2024    This Visit's Progress: 70% Recent Progress: 30%    Priority: High    Note:     Barriers: Deconditioned   Strengths: Motivated   Patient expressed understanding of goal: Yes  Action steps to achieve this goal:  1. I will use my walker for safety   2. I will start home care services for physical therapy   3. I will use  Tylenol and Biofreeze as directed   4. I will keep my future appointment with the Orthopedic provider in 4 weeks                                Intervention/Education provided during outreach: Not discussed      Outreach Frequency: weekly, more frequently as needed        Plan:     Patient will continue  home care services   CC RN will follow up in 3-5 business days     [facility]   Leatha Guaman RN, Care Coordinator   [facility]'s   E-mail [email or web address]   [phone]

## 2024-06-25 ENCOUNTER — TELEPHONE (OUTPATIENT)
Dept: FAMILY MEDICINE | Facility: CLINIC | Age: 84
End: 2024-06-25

## 2024-06-25 DIAGNOSIS — Z53.9 DIAGNOSIS NOT YET DEFINED: Primary | ICD-10-CM

## 2024-06-25 PROCEDURE — 99207 PR MD CERTIFICATION HHA PATIENT: CPT | Performed by: FAMILY MEDICINE

## 2024-06-25 PROCEDURE — G0180 MD CERTIFICATION HHA PATIENT: HCPCS | Performed by: FAMILY MEDICINE

## 2024-06-25 NOTE — TELEPHONE ENCOUNTER
Home Care is calling regarding an established patient with [facility].       Requesting orders from: Elvira Kowalski  Provider is following patient: Yes  Is this a 60-day recertification request?  Yes    Orders Requested    Occupational Therapy  Request for recertification   Frequency:  cont OT 1 x a week for 5 weeks for safety with adls, strengthening and falls preventions    Confirmed ok to leave a detailed message with call back.  Contact information confirmed and updated as needed.    Kelsy Cedillo RN

## 2024-06-27 DIAGNOSIS — R35.0 URINARY FREQUENCY: ICD-10-CM

## 2024-06-27 DIAGNOSIS — R30.0 DYSURIA: Primary | ICD-10-CM

## 2024-06-27 RX ORDER — TOLTERODINE 2 MG/1
2 CAPSULE, EXTENDED RELEASE ORAL DAILY
Qty: 30 CAPSULE | Refills: 1 | Status: SHIPPED | OUTPATIENT
Start: 2024-06-27 | End: 2024-08-30

## 2024-06-27 NOTE — TELEPHONE ENCOUNTER
Please have her repeat a urine culture only just to be sure.  Then can start the medication   Can send detrol la 2mg to the pharmacy for her to have on hand when the UC is back

## 2024-06-27 NOTE — TELEPHONE ENCOUNTER
Sharon calling to ask if she is supposed to recheck UA after current treatment or if she is okay to start the medication that was discussed at her appointment for frequent urination? She says she does not have any pain with urination but the frequency, especially at night, continues.

## 2024-06-27 NOTE — TELEPHONE ENCOUNTER
When I go to sign the order it says Detrol LA 2 mg non formulary, okay to pick a different Rx?       Pt notified of plan and will not start Rx until after UC tomorrow

## 2024-06-28 ENCOUNTER — TELEPHONE (OUTPATIENT)
Dept: FAMILY MEDICINE | Facility: CLINIC | Age: 84
End: 2024-06-28

## 2024-06-28 ENCOUNTER — RADIOLOGY INJECTION OFFICE VISIT (OUTPATIENT)
Dept: PALLIATIVE MEDICINE | Facility: CLINIC | Age: 84
End: 2024-06-28
Attending: PAIN MEDICINE
Payer: COMMERCIAL

## 2024-06-28 ENCOUNTER — LAB (OUTPATIENT)
Dept: LAB | Facility: CLINIC | Age: 84
End: 2024-06-28
Payer: COMMERCIAL

## 2024-06-28 VITALS — OXYGEN SATURATION: 96 % | SYSTOLIC BLOOD PRESSURE: 136 MMHG | DIASTOLIC BLOOD PRESSURE: 70 MMHG | HEART RATE: 79 BPM

## 2024-06-28 DIAGNOSIS — M54.16 LUMBAR RADICULOPATHY: ICD-10-CM

## 2024-06-28 DIAGNOSIS — R30.0 DYSURIA: ICD-10-CM

## 2024-06-28 DIAGNOSIS — M48.061 SPINAL STENOSIS OF LUMBAR REGION WITHOUT NEUROGENIC CLAUDICATION: ICD-10-CM

## 2024-06-28 PROCEDURE — 87086 URINE CULTURE/COLONY COUNT: CPT

## 2024-06-28 PROCEDURE — 62323 NJX INTERLAMINAR LMBR/SAC: CPT | Performed by: PAIN MEDICINE

## 2024-06-28 RX ADMIN — TRIAMCINOLONE ACETONIDE 40 MG: 40 INJECTION, SUSPENSION INTRA-ARTICULAR; INTRAMUSCULAR at 21:25

## 2024-06-28 ASSESSMENT — PAIN SCALES - GENERAL
PAINLEVEL: MILD PAIN (3)
PAINLEVEL: MODERATE PAIN (4)

## 2024-06-28 NOTE — NURSING NOTE
Pre-procedure Intake  If YES to any questions or NO to having a   Please complete laminated checklist and leave on the computer keyboard for Provider, verbally inform provider if able.    For SCS Trial, RFA's or any sedation procedure:  Have you been fasting? NA  If yes, for how long? NA    Are you taking any any blood thinners such as Coumadin, Warfarin, Jantoven, Pradaxa Xarelto, Eliquis, Edoxaban, Enoxaparin, Lovenox, Heparin, Arixtra, Fondaparinux, or Fragmin? OR Antiplatelet medication such as Plavix, Brilinta, or Effient?   No   If yes, when did you take your last dose? NA    Do you take aspirin?  No  If cervical procedure, have you held aspirin for 6 days?   NA    Do you have any allergies to contrast dye, iodine, steroid and/or numbing medications?  NO    Are you currently taking antibiotics or have an active infection?  NO    Have you had a fever/elevated temperature within the past week? NO    Are you currently taking oral steroids? NO    Do you have a ? Yes    Are you pregnant or breastfeeding?  NO    Have you received the COVID-19 vaccine? Yes  If yes, was it your 1st, 2nd or only dose needed? NA  Date of most recent vaccine: 04/20/22    Notify provider and RNs if systolic BP >170, diastolic BP >100, P >100 or O2 sats < 90%    Socorro Whitaker MA

## 2024-06-28 NOTE — TELEPHONE ENCOUNTER
[facility] care needs  approval to move PT eval to next week due to pts pain clinic appt today  Kelsy Cedillo RN on 6/28/2024 at 9:04 AM

## 2024-06-28 NOTE — PATIENT INSTRUCTIONS
[facility]   Procedure Discharge Instructions    Today you saw:    Dr. Hamzah Kingston,         You had a(n):   Caudal epidural steroid injection    Medications used for caudal TARYN: Lidocaine, Omnipaque, Kenalog, Bupivicaine, normal saline          Be cautious with walking. Numbness and/or weakness in the lower extremities may occur for up to 6-8 hours after the procedure due to effect of the local anesthetic  Do not drive for 6 hours. The effect of the local anesthetic could slow your reflexes.   You may resume your regular activities after 24 hours  Avoid strenuous activity for the first 24 hours  You may shower, however avoid swimming, tub baths or hot tubs for 24 hours following your procedure  You may have a mild to moderate increase in pain for several days following the injection.  It may take up to 14 days for the steroid medication to start working although you may feel the effect as early as a few days after the procedure.     You may use ice packs for 10-15 minutes, 3 to 4 times a day at the injection site for comfort  Do not use heat to painful areas for 6 to 8 hours. This will give the local anesthetic time to wear off and prevent you from accidentally burning your skin.   Unless you have been directed to avoid the use of anti-inflammatory medications (NSAIDS), you may use medications such as ibuprofen, Aleve or Tylenol for pain control if needed.   If you have diabetes, check your blood sugar more frequently than usual as your blood sugar may be higher than normal for 10-14 days following a steroid injection. Contact your doctor who manages your diabetes if your blood sugar is higher than usual  Possible side effects of steroids that you may experience include flushing, elevated blood pressure, increased appetite, mild headaches and restlessness.  All of these symptoms will get better with time.  If you experience any of the following, call the Pain Clinic during work  hours (Mon-Friday 8-4:30 pm) at [phone] or the Provider Line after hours at [phone]:  -Fever over 100 degree F  -Swelling, bleeding, redness, drainage, warmth at the injection site  -Progressive weakness or numbness in your legs  -Loss of bowel or bladder function  -Unusual headache not relieved by Tylenol or other pain reliever  -Unusual new onset of pain that is not improving

## 2024-06-28 NOTE — NURSING NOTE
Discharge Information    IV Discontiued Time:  NA    Amount of Fluid Infused:  NA    Discharge Criteria = When patient returns to baseline or as per MD order    Consciousness:  Pt is fully awake    Circulation:  BP +/- 20% of pre-procedure level    Respiration:  Patient is able to breathe deeply    O2 Sat:  Patient is able to maintain O2 Sat >92% on room air    Activity:  Moves 4 extremities on command    Ambulation:  Patient is able to stand and walk or stand and pivot into wheelchair    Dressing:  Clean/dry or No Dressing    Notes:   Discharge instructions and AVS given to patient    Patient meets criteria for discharge?  YES    Admitted to PCU?  No    Responsible adult present to accompany patient home?  Yes    Signature/Title:    Jeanette Zapata RN  RN Care Coordinator  [facility]

## 2024-06-29 LAB — BACTERIA UR CULT: NO GROWTH

## 2024-06-30 RX ORDER — TRIAMCINOLONE ACETONIDE 40 MG/ML
40 INJECTION, SUSPENSION INTRA-ARTICULAR; INTRAMUSCULAR ONCE
Status: COMPLETED | OUTPATIENT
Start: 2024-06-28 | End: 2024-06-28

## 2024-07-01 ENCOUNTER — TELEPHONE (OUTPATIENT)
Dept: FAMILY MEDICINE | Facility: CLINIC | Age: 84
End: 2024-07-01
Payer: COMMERCIAL

## 2024-07-01 NOTE — PROGRESS NOTES
Pre procedure Diagnosis: Lumbar radiculopathy,   Post procedure Diagnosis: Same  Procedure performed: caudal epidural steroid injection  Anesthesia: none  Complications: none  Operators: Hamzah Kingston MD     Indications:   Lavonne Tidwell is a 83 year old female.  They have a history of lbp.  Exam shows neg slump and they have tried conservative treatment including meds/pt.    MRI rev  Options/alternatives, benefits and risks were discussed with the patient including bleeding, infection, tissue trauma, numbness, weakness, paralysis, spinal cord injury, radiation exposure, headache, reaction to medications including seizure, and effects on the bladder from local anesthetic.  Questions were answered to her satisfaction and she agrees to proceed. Voluntary informed consent was obtained and signed.     Vitals were reviewed: Yes  /70   Pulse 79   SpO2 96%   Allergies were reviewed:  Yes   Medications were reviewed:  Yes   Pre-procedure pain score: 4/10  Post-procedure pain score 3/10    Procedure:  After obtaining signed informed consent, patient was brought into the procedure suite and was placed in a prone position on the procedure table.   A Pause for the Cause was performed.  Patient was prepped and draped in the usual sterile fashion.     The sacral hiatus and cornua were palpated.  Under lateral fluoroscopic guidance sacral hiatus was identified.  3 ml of lidocaine 1% was then injected at the needle entry point to anesthetize the skin. Then a 17 gauge 3.5 inch Tuohy needle was inserted into sacral hiatus utilizing fluoroscopic guidance.  Aspiration was negative for blood or CSF.  Needle placement was verified by injecting Omnipaque 300 1 ml utilizing real time fluoroscopy that showed good needle placement and adequate spread in the lower sacral epidural space without intravascular or intrathecal uptake.  Then, an epidural catheter was advanced through the Tuohy needle into the epidural space without  resistance.  Aspiration was negative.  Catheter placement was verified by injecting Omnipaque 300 1 ml under real time fluoroscopyThen, after repeated negative aspiration, a combination of Kenalog 40 mg, 2 ml of  0.25% Bupivicaine, diluted with 3 ml of normal saline for a total injectate volume of 6 ml was injected in a slow and incremental fashion and the needle was withdrawn. Omnipaque wasted 9.  The injection site was cleaned and sterile dressing applied.     The patient tolerated the procedure well without complications and was taken to the recovery area for continued observation.  They were subsequently discharged to home in good condition after meeting discharge criteria.      Images were saved to PACS.    Post-procedure pain score: 3/10  Follow-up includes:     -f/u with referring provider    Hamzah Kingston MD  [facility]

## 2024-07-01 NOTE — TELEPHONE ENCOUNTER
Airway  Performed by: Sukumar Wilson MD  Authorized by: Sukumar Wilson MD     Final Airway Type:  Endotracheal airway  Final Endotracheal Airway*:  ETT  ETT Size (mm)*:  8.0  Cuff*:  Regular  Technique Used for Successful ETT Placement:  Direct laryngoscopy  Devices/Methods Used in Placement*:  Mask  Intubation Procedure*:  Preoxygenation, ETCO2, Atraumatic, Dentition Unchanged and Phaynx Clear (ENDORAGARD)  Insertion Site:  Oral  Blade Type*:  Galdamez  Blade Size*:  2  Placement Verified by: auscultation and capnometry    Glottic View*:  1 - full view of glottis  Attempts*:  1   Patient Identified, Procedure confirmed, Emergency equipment available and Safety protocols followed  Location:  OR  Urgency:  Elective  Difficult Airway: No    Indications for Airway Management:  Anesthesia  Sedation Level:  Anesthetized  Mask Difficulty Assessment:  1 - vent by mask  Performed By:  Anesthesiologist  Anesthesiologist:  Sukumar Wilson MD         Lisa Jason, PT, [facility], [phone], is calling regarding an established patient with [facility].     Requesting orders from: Elvira Kowalski  Provider is following patient: Yes  Is this a 60-day recertification request?  No    Orders Requested    Physical Therapy  Request for initial certification (first set of orders)   Frequency:  1x/wk for 4 wks  1x/month for 1 months    Working on strengthening, mobility, and balance s/p pelvic fracture.    Information was gathered and will be sent to provider for review.      RN will contact Home Care with information after provider review.  Confirmed ok to leave a detailed message with call back.  Contact information confirmed and updated as needed.    Marycarmen Tuttle RN

## 2024-07-01 NOTE — TELEPHONE ENCOUNTER
Left Message with Lisa that Dr. Kowalski approved of PT requested.    Nichol Burrows RN on 7/1/2024 at 5:25 PM

## 2024-07-10 ENCOUNTER — TRANSFERRED RECORDS (OUTPATIENT)
Dept: HEALTH INFORMATION MANAGEMENT | Facility: CLINIC | Age: 84
End: 2024-07-10
Payer: COMMERCIAL

## 2024-07-12 ENCOUNTER — TELEPHONE (OUTPATIENT)
Dept: FAMILY MEDICINE | Facility: CLINIC | Age: 84
End: 2024-07-12

## 2024-07-12 DIAGNOSIS — R35.0 URINARY FREQUENCY: Primary | ICD-10-CM

## 2024-07-12 NOTE — TELEPHONE ENCOUNTER
"S-(situation): Spoke with Lavonne regarding her message.     B-(background): Pelvic fracture 05/27/24. Saw Dr Thompson 07/10/24, patient reports he said she was \"healed up\".     A-(assessment):   1.) Lavonne says she is not getting up as much during the night to urinate so she is getting more sleep. Sometimes her pad is not too wet, other times it is soaked. She did recently complete antibiotics for UTI and does not feel feverish like she did when she had the infection.     2.) Lavonne continues to get home physical therapy for strengthening. Dr Thompson gave her a list of things to work on with therapy which she gave to home care. Once discharged from home care, if Lavonne or home care think she needs more therapy, she will request outpt PT. Lavonne feels she is strong enough to drive and mentally sharp.      R-(recommendations): Lavonne notified Dr Kowalski is not in clinic until Tuesday and will wait for a response.   Grazyna VILLAVICENCIO RN    "

## 2024-07-12 NOTE — TELEPHONE ENCOUNTER
"  General Call      Reason for Call:  Update    What are your questions or concerns:  Pt calling for 2 reasons:    Pt was started on tolterodine ER (DETROL LA) 2 MG 24 hr capsule on 6/27 and was advised to call back to let Dr. Kowalski know how she was doing on the medication. Pt stated she \"thinks it's a little better at night, but she doesn't get up quite as often and finds her pads are still soaked by morning. And during the day when she is sitting for longer periods of time (10 or more minutes) and goes to stands up she can feel the urine running out. Feeling frustrated over it.\"    Pt is also wondering when she can drive again. Feels she is able to drive just fine, and wondering if Dr. Kowalski can assist with this?        [place] or would you prefer to receive a phone call?:   Patient would prefer a phone call   Okay to leave a detailed message?: Yes at Home number on file [phone] (home)            "

## 2024-07-15 NOTE — TELEPHONE ENCOUNTER
Is she taking the detrol in the am or pm   She should be taking in the pm     Are bowels moving normall? That can affect the Urine    Would also recommend we check a UA just to be sure    As long as she is able to get in and out of the car with no pain and she is taking no pain medication she can start to drive in town only

## 2024-07-16 ENCOUNTER — LAB REQUISITION (OUTPATIENT)
Dept: LAB | Facility: CLINIC | Age: 84
End: 2024-07-16
Payer: COMMERCIAL

## 2024-07-16 DIAGNOSIS — N95.2 POSTMENOPAUSAL ATROPHIC VAGINITIS: ICD-10-CM

## 2024-07-16 LAB
ALBUMIN UR-MCNC: NEGATIVE MG/DL
APPEARANCE UR: CLEAR
BILIRUB UR QL STRIP: NEGATIVE
COLOR UR AUTO: YELLOW
GLUCOSE UR STRIP-MCNC: NEGATIVE MG/DL
HGB UR QL STRIP: ABNORMAL
KETONES UR STRIP-MCNC: NEGATIVE MG/DL
LEUKOCYTE ESTERASE UR QL STRIP: NEGATIVE
MUCOUS THREADS #/AREA URNS LPF: PRESENT /LPF
NITRATE UR QL: NEGATIVE
PH UR STRIP: 5 [PH] (ref 5–7)
RBC URINE: 3 /HPF
SP GR UR STRIP: 1.03 (ref 1–1.03)
SQUAMOUS EPITHELIAL: <1 /HPF
UROBILINOGEN UR STRIP-MCNC: NORMAL MG/DL
WBC URINE: 1 /HPF

## 2024-07-16 PROCEDURE — 87086 URINE CULTURE/COLONY COUNT: CPT | Mod: ORL | Performed by: FAMILY MEDICINE

## 2024-07-16 PROCEDURE — 81001 URINALYSIS AUTO W/SCOPE: CPT | Mod: ORL | Performed by: FAMILY MEDICINE

## 2024-07-16 NOTE — TELEPHONE ENCOUNTER
[facility] Care nurse Kennedi calling while with Lavonne to follow up on her message from last week. Relayed Dr Kowalski's message. Lavonne is taking Detrol in the PM. It is helping her symptoms at night. She does not have to get up as much. During the day though, when Lavonne stands, the urine flows out of her. No fever, foul or dark urine. No new or worsening low back pain. Bowels are normal for her, has chronic constipation.   Lavonne was thrilled with being able to drive in town because she is not having pain and not on pain meds.   Verbal order given for repeat UA. Kennedi will collect and bring in today.  Grazyna VILLAVICENCIO RN

## 2024-07-16 NOTE — TELEPHONE ENCOUNTER
Attempted to call patient, phone rang several times then received a continuous busy signal. Will recall later.     Julie Behrendt RN

## 2024-07-17 LAB — BACTERIA UR CULT: NORMAL

## 2024-07-18 ENCOUNTER — TELEPHONE (OUTPATIENT)
Dept: FAMILY MEDICINE | Facility: CLINIC | Age: 84
End: 2024-07-18
Payer: COMMERCIAL

## 2024-07-18 NOTE — TELEPHONE ENCOUNTER
Home Care is calling regarding an established patient with [facility].       Requesting orders from: Elvira Kowalski  Provider is following patient: Yes  Is this a 60-day recertification request?  No    Orders Requested    Occupational Therapy continuation  1 x every other week for 4 week safety with ADLS and falls prevention         Information was gathered and will be sent to provider for review.  RN will contact Home Care with information after provider review.  Confirmed ok to leave a detailed message with call back.  Contact information confirmed and updated as needed.    Muriel Berman RN

## 2024-07-19 ENCOUNTER — PATIENT OUTREACH (OUTPATIENT)
Dept: CARE COORDINATION | Facility: CLINIC | Age: 84
End: 2024-07-19

## 2024-07-19 ENCOUNTER — ONCOLOGY VISIT (OUTPATIENT)
Dept: ONCOLOGY | Facility: CLINIC | Age: 84
End: 2024-07-19
Attending: INTERNAL MEDICINE
Payer: COMMERCIAL

## 2024-07-19 DIAGNOSIS — R74.8 ALKALINE PHOSPHATASE ELEVATION: ICD-10-CM

## 2024-07-19 DIAGNOSIS — C85.80 MARGINAL ZONE LYMPHOMA (H): Primary | ICD-10-CM

## 2024-07-19 DIAGNOSIS — D47.2 MGUS (MONOCLONAL GAMMOPATHY OF UNKNOWN SIGNIFICANCE): ICD-10-CM

## 2024-07-19 PROCEDURE — 99214 OFFICE O/P EST MOD 30 MIN: CPT | Performed by: NURSE PRACTITIONER

## 2024-07-19 PROCEDURE — G2211 COMPLEX E/M VISIT ADD ON: HCPCS | Performed by: NURSE PRACTITIONER

## 2024-07-19 PROCEDURE — G0463 HOSPITAL OUTPT CLINIC VISIT: HCPCS | Performed by: NURSE PRACTITIONER

## 2024-07-19 ASSESSMENT — ACTIVITIES OF DAILY LIVING (ADL): DEPENDENT_IADLS:: TRANSPORTATION

## 2024-07-19 NOTE — LETTER
7/19/2024      Lavonne Tidwell  [address] [facility] [address]      Dear Colleague,    Thank you for referring your patient, Lavonne Tidwell, to the [facility]. Please see a copy of my visit note below.    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   Appears low grade and without B symptoms nor cytopenias, no indication to treat so under observation.     She remains overall stable, no new B symptoms.  Hot flashes/night sweats are rare and have been long-standing x years.     Labwork: Stable hgb 11.6, , normal WBC/platelets. LDH WNL. Creatinine WNL. Alk phos elevated 249 (from 140), rest LFTs WNL    In regards to the alk phos elevation.  She's not been on any new medications.  Had pubic and L5 fractures in late May following a fall, could be related to this. CT pelvis showed no malignancy. PET scan 7/2023 showed no bone lesions. This pain is improving and no new pain.     No new abd pain and other LFTs/bili normal so I think less likely biliary/hepatic  in nature.    Plan:  -Return in 3 months with labs, exam with Dr. Friedell. If alk phos remains elevated or new symtpoms, would consider updating CT scan to exclude lymphoma progression.  -Will likely follow 3 times/year through this year and if stable at 2 yrs, elongate to every 6 months    MGUS, lambda LC  Follow MGUS labs every 6-12 months. Last checked 6/2023.    Plan:  -Update labs with next return in 3 mths. Collect labs a week prior to visit    Macrocytic anemia  Secondary to lymphoma bone marrow involvement.   TSH, B12 and folate WNL. Retic WNL.     Hgb is stable  (stable over last  few years). MCV stable.     Plan:  -Follow.    Post-traumatic pelvic and L5 fracture  Right sciatica  Suffered mild fractures after a fall in late May. These are healing and pain improving. Newer onset RLE sciatica x a few days, from favoring her right side. She's working with PT.    Plan:  -Continue PT  -Work with PCP/Ortho as needed    ______________________________________________________________________________    History of Present Illness/ Interval History    Ms. Lavonne Tidwell  is a 83 year old diagnosed with low grade lymphoma 1 yr ago, on observation.     Occasional night sweats one night/month chronic since off estrogen replacement x 10 yrs (no recent change). Had 5 lb weight loss after a fracture in late May, but regaining this back.  No abd pain/bloating, nausea, masses/nodes, fevers. Mild chronic fatigue .    Chronic recurrent UTIs.    Chronic low back with lumbar radiculopathy/bilateral hip/pelvis arthritic pain with ambulation.     Fell in late May, resulting in acute minimally displaced left parasymphyseal fracture and small L5 compression fracture per pelvic CT. Got steroid injections with some benefit and overall, pain improving. Has noted newer right sciatica this past week. Working with home PT.  No other new sites pain.       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 Saw ENT for chronic sinusitis. CT chest showed lymphadenopathy (mildly prominent axillary, mediastinal and upper abd). +night sweats,no other B symptoms.  -No cytopenias.   -PET: avid LN above and below diaphragm  -Serum immunofixation/SPEP: 0.1 m spike. Possible very small monoclonal free immunoglobulin light chain of  lambda chain type   -6/30/23 L axillary LN biopsy: low-grade CD5 neg, CD10 neg B-cell non-Hodkin's lymphoma. Neg MYD88 mutation  (differentials: lymphoplasmacytic lymphoma or marginal zone)  -Bone marrow biopsy: hypercellular marrow involved by marginal zone lymphoma (at least 80%), lambda LC restricted monoclonal B-cell population.     Treatment:  Observation      Physical Exam    GENERAL: Alert and oriented to time place and person. Hard of hearing. In no distress. Daughter-in-law present.  HEAD: Atraumatic and normocephalic. No alopecia.  EYES: DK, EOMI. No erythema. No icterus.  LYMPH NODES: No palpable supraclavicular, cervical, axillary or inguinal lymphadenopathy.  CHEST: clear to auscultation bilaterally. Resonant to percussion throughout bilaterally. Symmetrical breath movements bilaterally.  CVS: RRR  ABDOMEN: Soft. Not tender. Not distended. No palpable hepatomegaly or splenomegaly. No other mass palpable. Bowel sounds present.  EXTREMITIES: Warm. No peripheral edema.  SKIN: no rash, or bruising or purpura.   NEURO: No gross deficit noted. Non-antalgic gait.      Lab Results    Recent Results (from the past 168 hour(s))   UA Macroscopic with reflex to Microscopic and Culture    Specimen: Urine, Midstream   Result Value Ref Range    Color Urine Yellow Colorless, Straw, Light Yellow, Yellow    Appearance Urine Clear Clear    Glucose Urine Negative Negative mg/dL    Bilirubin Urine Negative Negative    Ketones Urine Negative Negative mg/dL    Specific Gravity Urine 1.026 1.003 - 1.035    Blood Urine Small (A) Negative    pH Urine 5.0 5.0 - 7.0    Protein Albumin Urine Negative Negative mg/dL    Urobilinogen Urine Normal Normal, 2.0 mg/dL    Nitrite Urine Negative Negative    Leukocyte Esterase Urine Negative Negative    Mucus Urine Present (A) None Seen /LPF    RBC Urine 3 (H) <=2 /HPF    WBC Urine 1 <=5 /HPF    Squamous Epithelials Urine <1 <=1 /HPF   Urine Culture    Specimen: Urine, Midstream   Result Value Ref Range    Culture <10,000 CFU/mL Urogenital neida      6/19/2024 CBC, CMP and LDH reviewed    Imaging    PAIN Caudal  Epidural Injection    Result Date: 6/30/2024  Pre procedure Diagnosis: Lumbar radiculopathy, Post procedure Diagnosis: Same Procedure performed: caudal epidural steroid injection Anesthesia: none Complications: none Operators: Hamzah Kingston MD Indications: Lavonne Tidwell is a 83 year old female.  They have a history of lbp.  Exam shows neg slump and they have tried conservative treatment including meds/pt. MRI rev Options/alternatives, benefits and risks were discussed with the patient including bleeding, infection, tissue trauma, numbness, weakness, paralysis, spinal cord injury, radiation exposure, headache, reaction to medications including seizure, and effects on the bladder from local anesthetic.  Questions were answered to her satisfaction and she agrees to proceed. Voluntary informed consent was obtained and signed. Vitals were reviewed: Yes /70   Pulse 79   SpO2 96% Allergies were reviewed:  Yes Medications were reviewed:  Yes Pre-procedure pain score: 4/10 Post-procedure pain score 3/10 Procedure: After obtaining signed informed consent, patient was brought into the procedure suite and was placed in a prone position on the procedure table.   A Pause for the Cause was performed.  Patient was prepped and draped in the usual sterile fashion. The sacral hiatus and cornua were palpated.  Under lateral fluoroscopic guidance sacral hiatus was identified.  3 ml of lidocaine 1% was then injected at the needle entry point to anesthetize the skin. Then a 17 gauge 3.5 inch Tuohy needle was inserted into sacral hiatus utilizing fluoroscopic guidance.  Aspiration was negative for blood or CSF.  Needle placement was verified by injecting Omnipaque 300 1 ml utilizing real time fluoroscopy that showed good needle placement and adequate spread in the lower sacral epidural space without intravascular or intrathecal uptake.  Then, an epidural catheter was advanced through the Tuohy needle into the epidural space  "without resistance.  Aspiration was negative.  Catheter placement was verified by injecting Omnipaque 300 1 ml under real time fluoroscopyThen, after repeated negative aspiration, a combination of Kenalog 40 mg, 2 ml of  0.25% Bupivicaine, diluted with 3 ml of normal saline for a total injectate volume of 6 ml was injected in a slow and incremental fashion and the needle was withdrawn. Omnipaque wasted 9.  The injection site was cleaned and sterile dressing applied. The patient tolerated the procedure well without complications and was taken to the recovery area for continued observation.  They were subsequently discharged to home in good condition after meeting discharge criteria.  Images were saved to PACS. Post-procedure pain score: 3/10 Follow-up includes: -f/u with referring provider Hamzah Kingston MD [facility]      Billing  Total time 35 minutes, to include face to face visit, review of EMR, ordering, documentation and coordination of care on date of service.    complexity modifier for longitudinal care.       Signed by: Estela Barrett NP      Oncology Rooming Note    July 19, 2024 1:14 PM   Lavonne Tidwell is a 83 year old female who presents for:    Chief Complaint   Patient presents with     Oncology Clinic Visit     Marginal zone lymphoma - provider visit only     Initial Vitals: There were no vitals taken for this visit. Estimated body mass index is 28.29 kg/m  as calculated from the following:    Height as of 6/10/24: 1.651 m (5' 5\").    Weight as of 6/19/24: 77.1 kg (170 lb). There is no height or weight on file to calculate BSA.  Data Unavailable Comment: Data Unavailable   No LMP recorded. Patient has had a hysterectomy.  Allergies reviewed: Yes  Medications reviewed: Yes    Medications: Medication refills not needed today.  Pharmacy name entered into [facility]:    [facility] [place] - [address] [place] [address] PHARMACY - " [address]    Frailty Screening:   Is the patient here for a new oncology consult visit in cancer care? 2. No      Clinical concerns: None today.       Steph Cuenca MA                Again, thank you for allowing me to participate in the care of your patient.        Sincerely,        Estela Barrett, NP

## 2024-07-19 NOTE — LETTER
[facility] COORDINATION  [address] [address]     July 19, 2024    Lavonne Tidwell  [address] [place] [id number]    Dear Lavonne,  Your Care Team congratulates you on your journey to maintain wellness. This document will help guide you on your journey to maintain a healthy lifestyle.  You can use this to help you overcome any barriers you may encounter.  If you should have any questions or concerns, you can contact the members of your Care Team or contact your Primary Care Clinic for assistance.     Health Maintenance  Health Maintenance Reviewed: Due/Overdue   Health Maintenance Due   Topic Date Due    RSV VACCINE (Pregnancy & 60+) (1 - 1-dose 60+ series) Never done    COLORECTAL CANCER SCREENING  05/29/2022    COVID-19 Vaccine (6 - 2023-24 season) 09/01/2023    Medicare Annual MTM Pharmacist Visit (once per calendar year)  01/01/2024        My Access Plan  Medical Emergency 911   Primary Clinic Line [facility] - [phone]   24 Hour Appointment Line [phone] or  [phone]) (toll-free)   24 Hour Nurse Line [phone] (toll-free)   Preferred Urgent Care [facility], [phone]   Preferred Hospital [facility], [place]  [phone]   Preferred Pharmacy [facility] [address] [place] [address]     Behavioral Health Crisis Line The National Suicide Prevention Lifeline at 1-942.345.7571 or 911     My Care Team Members  Patient Care Team         Relationship Specialty Notifications Start End    Elvira Kowalski MD PCP - General Family Practice  1/27/12     Phone: [phone] Fax: [phone] [address] [place] [address]    Rob Harris MD Assigned Surgical Provider   10/23/20     Phone: [phone] Fax: [phone] [address] [facility] [address]    Elvira Kowalski MD Assigned PCP   5/27/21     Phone:  [phone] Fax: [phone]         [address] [place] [address]    Soledad Lopes PA-C Assigned Rheumatology Provider   3/4/23     Phone: [phone] Fax: [phone]         [address] [facility] [address]    Belkis Melendez, [facility] Pharmacist Pharmacist  6/12/23     Phone: [phone] Fax: [phone]         [address] [place] [address]    Belkis Melendez, [facility] Assigned MTM Pharmacist   6/17/23     Phone: [phone] Fax: [phone]         [address] [place] [address]    Yong Quiles, [facility] Audiologist Audiology  10/12/23     Phone: [phone] Fax: [phone]         [facility] [address]    Estela Barrett NP Assigned Cancer Care Provider   10/28/23     Phone: [phone] Fax: [phone]         [address] [place] [address]    Barron Cardozo MD Assigned Allergy Provider   10/28/23     Phone: [phone] Fax: [phone]         [address] [place] [address]    Leatha Guaman RN Lead Care Coordinator Primary Care - CC Admissions 5/30/24 7/19/24    Phone: [phone]                   Advance Care Plans/Directives Type:   Type Advanced Care Plans/Directives: POLST  Thank you for providing us with your Advance Directive. We will keep this on file and recommend that it be updated every 2 years, if your health situation changes, if there is a death of one of your health care agents, and/or if there are changes in your marital status.    It has been your Clinic Care Team's pleasure to work with you on accomplishing your goals.    Regards,  [facility]   Leatha Guaman RN, Care Coordinator   [facility]'s   E-mail [email or web address]   [phone]   Your Clinic Care Team

## 2024-07-19 NOTE — PROGRESS NOTES
Clinic Care Coordination Contact  Follow Up Progress Note     Universal Utilization:   Hospitalist Discharge Summary       Date of Admission:  5/27/2024  Date of Discharge:  5/29/2024  Discharging Provider: Clif Raya MD  Discharge Service: Hospitalist Service        Discharge Diagnoses  # Closed fracture of ramus of left pubis, initial encounter  # Impaired ambulation due to pain   # Fall, initial encounter  # Closed fracture of transverse process of lumbar vertebra, initial encounter  # Chronic bilateral low back pain without sciatica  # Urinary incontinence  # Suprapubic discomfort  # Essential hypertension  # Esophageal reflux  # Chronic cough  # Post nasal drainage due to chronic rhinitis  # Primary osteoarthritis involving multiple joints  # Chronic gout     Assessment: Patient reports she has 2 more home care PT sessions and then she will be discharged .  Patient reports she is much stronger and no further pain and is healed     Care Gaps:    Health Maintenance Due   Topic Date Due    RSV VACCINE (Pregnancy & 60+) (1 - 1-dose 60+ series) Never done    COLORECTAL CANCER SCREENING  05/29/2022    COVID-19 Vaccine (6 - 2023-24 season) 09/01/2023    Medicare Annual MTM Pharmacist Visit (once per calendar year)  01/01/2024       Patient will discuss at the Medicare Annual visit with PCP scheduled       Intervention/Education provided during outreach: kimi RN is available for any future needs that arise               Plan:   No unmet needs, no future care coordination is needed at this time   Graduation letter sent via My Chart     [facility]   Leatha Guaman RN, Care Coordinator   [facility]'s   E-mail [email or web address]   [phone]

## 2024-07-19 NOTE — PROGRESS NOTES
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 Estimated body mass index is 28.29 kg/m  as calculated from the following:    Height as of 6/10/24: 1.651 m (5' 5\").    Weight as of 6/19/24: 77.1 kg (170 lb). There is no height or weight on file to calculate BSA.  Data Unavailable Comment: Data Unavailable   No LMP recorded. Patient has had a hysterectomy.  Allergies reviewed: Yes  Medications reviewed: Yes    Medications: Medication refills not needed today.  Pharmacy name entered into [facility]:    [facility] [place] - [place], [place] - [address] [place] [address] PHARMACY - [place], [place] - [address] [place] [address]    Frailty Screening:   Is the patient here for a new oncology consult visit in cancer care? 2. No      Clinical concerns: None today.       Steph Cuenca MA              "

## 2024-07-19 NOTE — PROGRESS NOTES
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   Appears low grade and without B symptoms nor cytopenias, no indication to treat so under observation.     She remains overall stable, no new B symptoms.  Hot flashes/night sweats are rare and have been long-standing x years.     Labwork: Stable hgb 11.6, , normal WBC/platelets. LDH WNL. Creatinine WNL. Alk phos elevated 249 (from 140), rest LFTs WNL    In regards to the alk phos elevation.  She's not been on any new medications.  Had pubic and L5 fractures in late May following a fall, could be related to this. CT pelvis showed no malignancy. PET scan 7/2023 showed no bone lesions. This pain is improving and no new pain.     No new abd pain and other LFTs/bili normal so I think less likely biliary/hepatic  in nature.    Plan:  -Return in 3 months with labs, exam with Dr. Friedell. If alk phos remains elevated or new symtpoms, would consider updating CT scan to exclude lymphoma progression.  -Will likely follow 3 times/year through this year and if stable at 2 yrs, elongate to every 6 months    MGUS, lambda LC  Follow MGUS labs every 6-12 months. Last checked 6/2023.    Plan:  -Update labs with next return in 3 mths. Collect labs a week prior to visit    Macrocytic anemia  Secondary to lymphoma bone marrow involvement.   TSH, B12 and folate WNL. Retic WNL.     Hgb is stable  (stable over last few years). MCV stable.     Plan:  -Follow.    Post-traumatic pelvic and L5 fracture  Right sciatica  Suffered mild fractures after a fall in late May. These are healing and pain improving. Newer onset RLE sciatica x a few days, from favoring her  right side. She's working with PT.    Plan:  -Continue PT  -Work with PCP/Ortho as needed    ______________________________________________________________________________    History of Present Illness/ Interval History    Ms. Lavonne Tidwell  is a 83 year old diagnosed with low grade lymphoma 1 yr ago, on observation.     Occasional night sweats one night/month chronic since off estrogen replacement x 10 yrs (no recent change). Had 5 lb weight loss after a fracture in late May, but regaining this back.  No abd pain/bloating, nausea, masses/nodes, fevers. Mild chronic fatigue .    Chronic recurrent UTIs.    Chronic low back with lumbar radiculopathy/bilateral hip/pelvis arthritic pain with ambulation.     Fell in late May, resulting in acute minimally displaced left parasymphyseal fracture and small L5 compression fracture per pelvic CT. Got steroid injections with some benefit and overall, pain improving. Has noted newer right sciatica this past week. Working with home PT.  No other new sites pain.       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 Saw ENT for chronic sinusitis. CT chest showed lymphadenopathy (mildly prominent axillary, mediastinal and upper abd). +night sweats,no other B symptoms.  -No cytopenias.   -PET: avid LN above and below diaphragm  -Serum immunofixation/SPEP: 0.1 m spike. Possible very small monoclonal free immunoglobulin light chain of  lambda chain type   -6/30/23 L axillary LN biopsy: low-grade CD5 neg, CD10 neg B-cell non-Hodkin's lymphoma. Neg MYD88 mutation (differentials: lymphoplasmacytic lymphoma or marginal zone)  -Bone marrow biopsy: hypercellular marrow involved by marginal zone lymphoma (at least 80%), lambda LC restricted monoclonal B-cell population.     Treatment:  Observation      Physical  Exam    GENERAL: Alert and oriented to time place and person. Hard of hearing. In no distress. Daughter-in-law present.  HEAD: Atraumatic and normocephalic. No alopecia.  EYES: DK, EOMI. No erythema. No icterus.  LYMPH NODES: No palpable supraclavicular, cervical, axillary or inguinal lymphadenopathy.  CHEST: clear to auscultation bilaterally. Resonant to percussion throughout bilaterally. Symmetrical breath movements bilaterally.  CVS: RRR  ABDOMEN: Soft. Not tender. Not distended. No palpable hepatomegaly or splenomegaly. No other mass palpable. Bowel sounds present.  EXTREMITIES: Warm. No peripheral edema.  SKIN: no rash, or bruising or purpura.   NEURO: No gross deficit noted. Non-antalgic gait.      Lab Results    Recent Results (from the past 168 hour(s))   UA Macroscopic with reflex to Microscopic and Culture    Specimen: Urine, Midstream   Result Value Ref Range    Color Urine Yellow Colorless, Straw, Light Yellow, Yellow    Appearance Urine Clear Clear    Glucose Urine Negative Negative mg/dL    Bilirubin Urine Negative Negative    Ketones Urine Negative Negative mg/dL    Specific Gravity Urine 1.026 1.003 - 1.035    Blood Urine Small (A) Negative    pH Urine 5.0 5.0 - 7.0    Protein Albumin Urine Negative Negative mg/dL    Urobilinogen Urine Normal Normal, 2.0 mg/dL    Nitrite Urine Negative Negative    Leukocyte Esterase Urine Negative Negative    Mucus Urine Present (A) None Seen /LPF    RBC Urine 3 (H) <=2 /HPF    WBC Urine 1 <=5 /HPF    Squamous Epithelials Urine <1 <=1 /HPF   Urine Culture    Specimen: Urine, Midstream   Result Value Ref Range    Culture <10,000 CFU/mL Urogenital neida      6/19/2024 CBC, CMP and LDH reviewed    Imaging    PAIN Caudal Epidural Injection    Result Date: 6/30/2024  Pre procedure Diagnosis: Lumbar radiculopathy, Post procedure Diagnosis: Same Procedure performed: caudal epidural steroid injection Anesthesia: none Complications: none Operators: Hamzah Kingston MD  Indications: Lavonne Tidwell is a 83 year old female.  They have a history of lbp.  Exam shows neg slump and they have tried conservative treatment including meds/pt. MRI rev Options/alternatives, benefits and risks were discussed with the patient including bleeding, infection, tissue trauma, numbness, weakness, paralysis, spinal cord injury, radiation exposure, headache, reaction to medications including seizure, and effects on the bladder from local anesthetic.  Questions were answered to her satisfaction and she agrees to proceed. Voluntary informed consent was obtained and signed. Vitals were reviewed: Yes /70   Pulse 79   SpO2 96% Allergies were reviewed:  Yes Medications were reviewed:  Yes Pre-procedure pain score: 4/10 Post-procedure pain score 3/10 Procedure: After obtaining signed informed consent, patient was brought into the procedure suite and was placed in a prone position on the procedure table.   A Pause for the Cause was performed.  Patient was prepped and draped in the usual sterile fashion. The sacral hiatus and cornua were palpated.  Under lateral fluoroscopic guidance sacral hiatus was identified.  3 ml of lidocaine 1% was then injected at the needle entry point to anesthetize the skin. Then a 17 gauge 3.5 inch Tuohy needle was inserted into sacral hiatus utilizing fluoroscopic guidance.  Aspiration was negative for blood or CSF.  Needle placement was verified by injecting Omnipaque 300 1 ml utilizing real time fluoroscopy that showed good needle placement and adequate spread in the lower sacral epidural space without intravascular or intrathecal uptake.  Then, an epidural catheter was advanced through the Tuohy needle into the epidural space without resistance.  Aspiration was negative.  Catheter placement was verified by injecting Omnipaque 300 1 ml under real time fluoroscopyThen, after repeated negative aspiration, a combination of Kenalog 40 mg, 2 ml of  0.25% Bupivicaine, diluted  with 3 ml of normal saline for a total injectate volume of 6 ml was injected in a slow and incremental fashion and the needle was withdrawn. Omnipaque wasted 9.  The injection site was cleaned and sterile dressing applied. The patient tolerated the procedure well without complications and was taken to the recovery area for continued observation.  They were subsequently discharged to home in good condition after meeting discharge criteria.  Images were saved to PACS. Post-procedure pain score: 3/10 Follow-up includes: -f/u with referring provider Hamzah Kingston MD [facility]      Billing  Total time 35 minutes, to include face to face visit, review of EMR, ordering, documentation and coordination of care on date of service.    complexity modifier for longitudinal care.       Signed by: Estela Barrett NP

## 2024-07-23 ENCOUNTER — MEDICAL CORRESPONDENCE (OUTPATIENT)
Dept: HEALTH INFORMATION MANAGEMENT | Facility: CLINIC | Age: 84
End: 2024-07-23
Payer: COMMERCIAL

## 2024-07-29 ENCOUNTER — TELEPHONE (OUTPATIENT)
Dept: FAMILY MEDICINE | Facility: CLINIC | Age: 84
End: 2024-07-29
Payer: COMMERCIAL

## 2024-07-29 NOTE — TELEPHONE ENCOUNTER
Lisa from [facility] needs verbal orders to Add 1 PT visit next week due to SI pain. Thank you!    Claudia Galvan RN

## 2024-08-07 ENCOUNTER — ANCILLARY PROCEDURE (OUTPATIENT)
Dept: GENERAL RADIOLOGY | Facility: CLINIC | Age: 84
End: 2024-08-07
Attending: FAMILY MEDICINE
Payer: COMMERCIAL

## 2024-08-07 ENCOUNTER — OFFICE VISIT (OUTPATIENT)
Dept: FAMILY MEDICINE | Facility: CLINIC | Age: 84
End: 2024-08-07
Payer: COMMERCIAL

## 2024-08-07 VITALS
SYSTOLIC BLOOD PRESSURE: 112 MMHG | TEMPERATURE: 97.4 F | HEIGHT: 65 IN | BODY MASS INDEX: 28.32 KG/M2 | WEIGHT: 170 LBS | RESPIRATION RATE: 18 BRPM | OXYGEN SATURATION: 97 % | HEART RATE: 86 BPM | DIASTOLIC BLOOD PRESSURE: 72 MMHG

## 2024-08-07 DIAGNOSIS — M54.50 ACUTE RIGHT-SIDED LOW BACK PAIN WITHOUT SCIATICA: ICD-10-CM

## 2024-08-07 DIAGNOSIS — S32.592A CLOSED FRACTURE OF RAMUS OF LEFT PUBIS, INITIAL ENCOUNTER (H): ICD-10-CM

## 2024-08-07 DIAGNOSIS — M54.50 ACUTE RIGHT-SIDED LOW BACK PAIN WITHOUT SCIATICA: Primary | ICD-10-CM

## 2024-08-07 DIAGNOSIS — S32.009A CLOSED FRACTURE OF TRANSVERSE PROCESS OF LUMBAR VERTEBRA, INITIAL ENCOUNTER (H): ICD-10-CM

## 2024-08-07 PROCEDURE — G2211 COMPLEX E/M VISIT ADD ON: HCPCS | Performed by: FAMILY MEDICINE

## 2024-08-07 PROCEDURE — 99213 OFFICE O/P EST LOW 20 MIN: CPT | Performed by: FAMILY MEDICINE

## 2024-08-07 PROCEDURE — 72170 X-RAY EXAM OF PELVIS: CPT | Mod: TC | Performed by: RADIOLOGY

## 2024-08-07 ASSESSMENT — PAIN SCALES - GENERAL: PAINLEVEL: EXTREME PAIN (8)

## 2024-08-07 NOTE — PROGRESS NOTES
"  Assessment & Plan     Acute right-sided low back pain without sciatica  This is likely some SI irritation from her pelvic fracture recovery and change in mechanics   Ice alternating with heat for pain      20 minutes ice, 20 minutes heat, 20 minutes ice 3 times daily   - XR Pelvis 1/2 Views; Future    Closed fracture of ramus of left pubis, initial encounter (H)  Routine healing   Continue PT     Closed fracture of transverse process of lumbar vertebra, initial encounter (H)  Routine healing   Continue PT       The longitudinal plan of care for the diagnosis(es)/condition(s) as documented were addressed during this visit. Due to the added complexity in care, I will continue to support Lavonne in the subsequent management and with ongoing continuity of care.      BMI  Estimated body mass index is 28.29 kg/m  as calculated from the following:    Height as of this encounter: 1.651 m (5' 5\").    Weight as of this encounter: 77.1 kg (170 lb).             Subjective   Lavonne is a 84 year old, presenting for the following health issues:  Back Pain        8/7/2024     8:42 AM   Additional Questions   Roomed by kindra   Accompanied by self     History of Present Illness       Back Pain:  She presents for follow up of back pain. Patient's back pain is a new problem.    Original cause of back pain: not sure  First noticed back pain: 1-4 weeks ago  Patient feels back pain: constantlyLocation of back pain:  Right lower back and right buttock  Description of back pain: other  Back pain spreads: nowhere    Since patient first noticed back pain, pain is: unchanged  Does back pain interfere with her job:  Not applicable  On a scale of 1-10 (10 being the worst), patient describes pain as:  8  What makes back pain worse: standing and other   Acupuncture: not tried  Acetaminophen: not helpful  Activity or exercise: not helpful  Chiropractor:  Helpful  Cold: not helpful  Heat: not helpful  Massage: not tried  Muscle relaxants: not " "tried  NSAIDS: not tried  Opioids: not tried  Physical Therapy: not helpful  Rest: not helpful  Steroid Injection: not tried  Stretching: not helpful  Surgery: not tried  TENS unit: not tried  Topical pain relievers: not helpful  Other healthcare providers patient is seeing for back pain: Physical Therapist    CKD: She uses over the counter pain medication, including equat arthritis, three times daily.    Reason for visit:  Recheck    She eats 2-3 servings of fruits and vegetables daily.She consumes 0 sweetened beverage(s) daily.She exercises with enough effort to increase her heart rate 9 or less minutes per day.  She exercises with enough effort to increase her heart rate 3 or less days per week.   She is taking medications regularly.                 Review of Systems  Constitutional, HEENT, cardiovascular, pulmonary, gi and gu systems are negative, except as otherwise noted.      Objective    /72   Pulse 86   Temp 97.4  F (36.3  C) (Oral)   Resp 18   Ht 1.651 m (5' 5\")   Wt 77.1 kg (170 lb)   SpO2 97%   BMI 28.29 kg/m    Body mass index is 28.29 kg/m .  Physical Exam   GENERAL: alert and no distress  MS: no gross musculoskeletal defects noted, no edema  ORTHO: Lumber/Thoracic Spine Exam: Tender:  right SI joint  Non-tender:    Range of Motion:  intact grossly   Strength:  grossly intact   Special tests:  negative straight leg raises    Hip Exam: Hip ROM full  PSYCH: mentation appears normal, affect normal/bright    Xray - Reviewed and interpreted by me.  Xray is reviewed independently by Elvira Kowalski MD and negative.         Signed Electronically by: Elvira Kowalski MD    "

## 2024-08-12 ENCOUNTER — TELEPHONE (OUTPATIENT)
Dept: FAMILY MEDICINE | Facility: CLINIC | Age: 84
End: 2024-08-12
Payer: COMMERCIAL

## 2024-08-12 NOTE — TELEPHONE ENCOUNTER
Home Care is calling regarding an established patient with [facility].     Requesting orders from: Elvira Kowalski  Provider is following patient: Yes  Is this a 60-day recertification request?  Yes    Orders Requested    Physical Therapy  Request for recertification   Frequency:  1x/wk for 2 wks  Then 1 time every other week for 3 visits      Information was gathered and will be sent to provider for review.  RN will contact Home Care with information after provider review.  Confirmed ok to leave a detailed message with call back.  Contact information confirmed and updated as needed.    Nichol Burrows RN

## 2024-08-12 NOTE — TELEPHONE ENCOUNTER
Home Health Care    Reason for call:  Shannon calling for Re Cert orders    Orders are needed for this patient.  PT: 1 weekly visit for 2 weeks, then 1 visit every other week for 3 total visits    Pt Provider: Dr. Kowalski     Phone Number Homecare Nurse can be reached at: [phone]

## 2024-08-15 ENCOUNTER — TELEPHONE (OUTPATIENT)
Dept: FAMILY MEDICINE | Facility: CLINIC | Age: 84
End: 2024-08-15
Payer: COMMERCIAL

## 2024-08-15 NOTE — TELEPHONE ENCOUNTER
Sara from [facility] calling for orders.   Re certification request, OT 1X week for 2 weeks  1 X week every other week for 4 weeks  For ADL's and LE edema    Verbal okay for orders given.     Grazyna Summers RN on 8/15/2024 at 9:10 AM

## 2024-08-28 ENCOUNTER — TELEPHONE (OUTPATIENT)
Dept: FAMILY MEDICINE | Facility: CLINIC | Age: 84
End: 2024-08-28
Payer: COMMERCIAL

## 2024-08-28 NOTE — TELEPHONE ENCOUNTER
Home Care is calling regarding an established patient with [facility].     Requesting orders from: Elvira Kowalski  Provider is following patient: Yes  Is this a 60-day recertification request?  No    Orders Requested    Physical Therapy  Request for delay in care, service is not able to be provided within same scheduled day.   Due to scheduling conflicts.    Confirmed ok to leave a detailed message with call back.  Contact information confirmed and updated as needed.    Nichol Burrows RN

## 2024-08-29 DIAGNOSIS — R35.0 URINARY FREQUENCY: ICD-10-CM

## 2024-08-30 RX ORDER — TOLTERODINE 2 MG/1
2 CAPSULE, EXTENDED RELEASE ORAL DAILY
Qty: 30 CAPSULE | Refills: 1 | Status: SHIPPED | OUTPATIENT
Start: 2024-08-30

## 2024-08-30 NOTE — TELEPHONE ENCOUNTER
Pending Prescriptions:                       Disp   Refills    tolterodine ER (DETROL LA) 2 MG 24 hr caps*30 cap*1        Sig: Take 1 capsule (2 mg) by mouth daily    Routing refill request to provider for review/approval because:  Requested Prescriptions   Pending Prescriptions Disp Refills    tolterodine ER (DETROL LA) 2 MG 24 hr capsule [Pharmacy Med Name: TOLTERODINE TARTRATE ER 2MG CP24] 30 capsule 1     Sig: Take 1 capsule (2 mg) by mouth daily       Muscarinic Antagonists (Urinary Incontinence Agents) Failed - 8/29/2024  3:22 PM        Failed - Medication indicated for associated diagnosis     Medication is associated with one or more of the following diagnoses:  Bladder pain  Disorder of the GI tract  Hyperhidrosis  Neurogenic bladder  Neurogenic detrusor overactivity  Overactive Bladder  Urge incontinence  Urgent desire to urinate          Passed - Patient does not have a diagnosis of glaucoma on the problem list     If glaucoma diagnosis is new, refer refill to physician.          Passed - Medication is active on med list        Passed - Recent (12 mo) or future (90 days) visit within the authorizing provider's specialty     The patient must have completed an in-person or virtual visit within the past 12 months or has a future visit scheduled within the next 90 days with the authorizing provider s specialty.  Urgent care and e-visits do not quality as an office visit for this protocol.          Passed - Patient is 18 years of age or older           Victoria Adkins RN  [facility]

## 2024-09-17 ENCOUNTER — TELEPHONE (OUTPATIENT)
Dept: FAMILY MEDICINE | Facility: CLINIC | Age: 84
End: 2024-09-17

## 2024-09-17 NOTE — TELEPHONE ENCOUNTER
Dr Dex Gilmore OT, [facility] would like you to know that patient was discharged 1 visit early as patient was doing well.

## 2024-09-24 ENCOUNTER — OFFICE VISIT (OUTPATIENT)
Dept: FAMILY MEDICINE | Facility: CLINIC | Age: 84
End: 2024-09-24
Attending: FAMILY MEDICINE
Payer: COMMERCIAL

## 2024-09-24 VITALS
SYSTOLIC BLOOD PRESSURE: 124 MMHG | WEIGHT: 167 LBS | RESPIRATION RATE: 14 BRPM | HEART RATE: 70 BPM | BODY MASS INDEX: 29.59 KG/M2 | OXYGEN SATURATION: 98 % | DIASTOLIC BLOOD PRESSURE: 78 MMHG | HEIGHT: 63 IN

## 2024-09-24 DIAGNOSIS — R09.89 THROAT CLEARING: ICD-10-CM

## 2024-09-24 DIAGNOSIS — D12.6 TUBULOVILLOUS ADENOMA POLYP OF COLON: ICD-10-CM

## 2024-09-24 DIAGNOSIS — R05.3 CHRONIC COUGH: Primary | ICD-10-CM

## 2024-09-24 DIAGNOSIS — Z00.00 ENCOUNTER FOR MEDICARE ANNUAL WELLNESS EXAM: ICD-10-CM

## 2024-09-24 DIAGNOSIS — R09.82 POST-NASAL DRAINAGE: ICD-10-CM

## 2024-09-24 PROCEDURE — 90662 IIV NO PRSV INCREASED AG IM: CPT | Performed by: FAMILY MEDICINE

## 2024-09-24 PROCEDURE — 99214 OFFICE O/P EST MOD 30 MIN: CPT | Mod: 25 | Performed by: FAMILY MEDICINE

## 2024-09-24 PROCEDURE — G0008 ADMIN INFLUENZA VIRUS VAC: HCPCS | Performed by: FAMILY MEDICINE

## 2024-09-24 PROCEDURE — G0439 PPPS, SUBSEQ VISIT: HCPCS | Performed by: FAMILY MEDICINE

## 2024-09-24 RX ORDER — FAMOTIDINE 40 MG/1
40 TABLET, FILM COATED ORAL 2 TIMES DAILY
Qty: 180 TABLET | Refills: 3 | Status: SHIPPED | OUTPATIENT
Start: 2024-09-24

## 2024-09-24 ASSESSMENT — PAIN SCALES - GENERAL: PAINLEVEL: NO PAIN (0)

## 2024-09-24 NOTE — PROGRESS NOTES
"Preventive Care Visit  [facility]  Elvira Kowalski MD, Family Medicine  Sep 24, 2024      Assessment & Plan     Chronic cough  No change   Referral back to ENT   - famotidine (PEPCID) 40 MG tablet; Take 1 tablet (40 mg) by mouth 2 times daily.    Throat clearing  As above  - famotidine (PEPCID) 40 MG tablet; Take 1 tablet (40 mg) by mouth 2 times daily.    Post-nasal drainage  As above   - famotidine (PEPCID) 40 MG tablet; Take 1 tablet (40 mg) by mouth 2 times daily.    Tubulovillous adenoma polyp of colon    - Colonoscopy Screening  Referral; Future    Encounter for Medicare annual wellness exam      Patient has been advised of split billing requirements and indicates understanding: Yes        BMI  Estimated body mass index is 29.35 kg/m  as calculated from the following:    Height as of this encounter: 1.607 m (5' 3.25\").    Weight as of this encounter: 75.8 kg (167 lb).       Counseling  Appropriate preventive services were addressed with this patient via screening, questionnaire, or discussion as appropriate for fall prevention, nutrition, physical activity, Tobacco-use cessation, social engagement, weight loss and cognition.  Checklist reviewing preventive services available has been given to the patient.  Reviewed patient's diet, addressing concerns and/or questions.   Discussed possible causes of fatigue. The patient was provided with written information regarding signs of hearing loss.   Information on urinary incontinence and treatment options given to patient.           Kirsty Banerjee is a 84 year old, presenting for the following:  Wellness Visit        9/24/2024     9:51 AM   Additional Questions   Roomed by Nicki DIAZ   Accompanied by Self         9/24/2024     9:51 AM   Patient Reported Additional Medications   Patient reports taking the following new medications .         Health Care Directive  Patient has a Health Care Directive on " file      HPI      Throat clearing and hoarse voice   Still bothering her   On pepcid   Lots of phleghm in her thoat   She is really not happy with how she feels with this   Would like ENT to recheck         9/22/2024   General Health   How would you rate your overall physical health? Good   Feel stress (tense, anxious, or unable to sleep) Not at all            9/22/2024   Nutrition   Diet: Regular (no restrictions)            9/22/2024   Exercise   Days per week of moderate/strenous exercise 0 days   Average minutes spent exercising at this level 20 min      (!) EXERCISE CONCERN      9/22/2024   Social Factors   Worry food won't last until get money to buy more No   Food not last or not have enough money for food? No   Do you have housing? (Housing is defined as stable permanent housing and does not include staying ouside in a car, in a tent, in an abandoned building, in an overnight shelter, or couch-surfing.) Yes   Are you worried about losing your housing? No   Lack of transportation? No   Unable to get utilities (heat,electricity)? No            9/24/2024   Fall Risk   Gait Speed Test (Document in seconds) 6   Gait Speed Test Interpretation Greater than 5.01 seconds - ABNORMAL             9/22/2024   Activities of Daily Living- Home Safety   Needs help with the following daily activites None of the above   Safety concerns in the home None of the above            9/22/2024   Dental   Dentist two times every year? Yes            9/22/2024   Hearing Screening   Hearing concerns? (!) I NEED TO ASK PEOPLE TO SPEAK UP OR REPEAT THEMSELVES.    (!) TROUBLE UNDERSTANDING SOFT OR WHISPERED SPEECH.    (!) TROUBLE UNDERSTANDING SPEECH ON THE TELEPHONE       Multiple values from one day are sorted in reverse-chronological order         9/22/2024   Driving Risk Screening   Patient/family members have concerns about driving No            9/22/2024   General Alertness/Fatigue Screening   Have you been more tired than usual  lately? (!) YES            2024   Urinary Incontinence Screening   Bothered by leaking urine in past 6 months Yes                 Today's PHQ-2 Score:       2024     3:47 PM   PHQ-2 (  [employer])   Q1: Little interest or pleasure in doing things 0   Q2: Feeling down, depressed or hopeless 1   PHQ-2 Score 1   Q1: Little interest or pleasure in doing things Not at all   Q2: Feeling down, depressed or hopeless Several days   PHQ-2 Score 1         2024   Substance Use   Alcohol more than 3/day or more than 7/wk No   Do you have a current opioid prescription? No   How severe/bad is pain from 1 to 10? 0/10 (No Pain)   Do you use any other substances recreationally? No        Social History     Tobacco Use    Smoking status: Former     Current packs/day: 0.00     Average packs/day: 0.5 packs/day for 5.0 years (2.5 ttl pk-yrs)     Types: Cigarettes     Start date: 1968     Quit date: 2/15/1972     Years since quittin.6    Smokeless tobacco: Never    Tobacco comments:     stopped in her 20's   Vaping Use    Vaping status: Never Used   Substance Use Topics    Alcohol use: Yes     Comment: Occasional    Drug use: No           2023   LAST FHS-7 RESULTS   1st degree relative breast or ovarian cancer No   Any relative bilateral breast cancer No   Any male have breast cancer No   Any ONE woman have BOTH breast AND ovarian cancer No   Any woman with breast cancer before 50yrs No   2 or more relatives with breast AND/OR ovarian cancer No   2 or more relatives with breast AND/OR bowel cancer No           Mammogram Screening - Mammography discussed and declined              Reviewed and updated as needed this visit by Provider   Tobacco  Allergies  Meds  Problems  Med Hx  Surg Hx  Fam Hx              Current providers sharing in care for this patient include:  Patient Care Team:  Elvira Kowalski MD as PCP - General (Family Practice)  Rob Harris MD as Assigned Surgical Provider  Dex  "Elvira Summers MD as Assigned PCP  Soledad Lopes PA-C as Assigned Rheumatology Provider  Belkis Melendez RPH as Pharmacist (Pharmacist)  Belkis Melendez RPH as Assigned MTM Pharmacist  Yong Quiles AuD as Audiologist (Audiology)  Estela Barrett NP as Assigned Cancer Care Provider  Barron Cardozo MD as Assigned Allergy Provider    The following health maintenance items are reviewed in Epic and correct as of today:  Health Maintenance   Topic Date Due    RSV VACCINE (1 - 1-dose 75+ series) Never done    COLORECTAL CANCER SCREENING  05/29/2022    Medicare Annual MTM Pharmacist Visit (once per calendar year)  01/01/2024    COVID-19 Vaccine (6 - 2024-25 season) 09/01/2024    BMP  06/19/2025    LIPID  06/19/2025    MEDICARE ANNUAL WELLNESS VISIT  09/24/2025    ANNUAL REVIEW OF HM ORDERS  09/24/2025    FALL RISK ASSESSMENT  09/24/2025    DTAP/TDAP/TD IMMUNIZATION (2 - Td or Tdap) 04/08/2029    ADVANCE CARE PLANNING  06/03/2029    DEXA  03/28/2038    PHQ-2 (once per calendar year)  Completed    INFLUENZA VACCINE  Completed    Pneumococcal Vaccine: 65+ Years  Completed    ZOSTER IMMUNIZATION  Completed    HPV IMMUNIZATION  Aged Out    MENINGITIS IMMUNIZATION  Aged Out    RSV MONOCLONAL ANTIBODY  Aged Out    MAMMO SCREENING  Discontinued         Review of Systems  Constitutional, HEENT, cardiovascular, pulmonary, gi and gu systems are negative, except as otherwise noted.     Objective    Exam  /78   Pulse 70   Resp 14   Ht 1.607 m (5' 3.25\")   Wt 75.8 kg (167 lb)   SpO2 98%   BMI 29.35 kg/m     Estimated body mass index is 29.35 kg/m  as calculated from the following:    Height as of this encounter: 1.607 m (5' 3.25\").    Weight as of this encounter: 75.8 kg (167 lb).    Physical Exam  GENERAL: alert and no distress  EYES: Eyes grossly normal to inspection, PERRL and conjunctivae and sclerae normal  HENT: ear canals and TM's normal, nose and mouth without ulcers or lesions  NECK: no " adenopathy, no asymmetry, masses, or scars  RESP: lungs clear to auscultation - no rales, rhonchi or wheezes  CV: regular rate and rhythm, normal S1 S2, no S3 or S4, no murmur, click or rub, no peripheral edema  ABDOMEN: soft, nontender, no hepatosplenomegaly, no masses and bowel sounds normal  MS: no gross musculoskeletal defects noted, no edema  SKIN: no suspicious lesions or rashes  NEURO: Normal strength and tone, mentation intact and speech normal  PSYCH: mentation appears normal, affect normal/bright        9/24/2024   Mini Cog   Clock Draw Score 2 Normal   3 Item Recall 3 objects recalled   Mini Cog Total Score 5                 Signed Electronically by: Elvira Kowalski MD

## 2024-09-24 NOTE — PATIENT INSTRUCTIONS
Patient Education   Preventive Care Advice   This is general advice given by our system to help you stay healthy. However, your care team may have specific advice just for you. Please talk to your care team about your preventive care needs.  Nutrition  Eat 5 or more servings of fruits and vegetables each day.  Try wheat bread, brown rice and whole grain pasta (instead of white bread, rice, and pasta).  Get enough calcium and vitamin D. Check the label on foods and aim for 100% of the RDA (recommended daily allowance).  Lifestyle  Exercise at least 150 minutes each week  (30 minutes a day, 5 days a week).  Do muscle strengthening activities 2 days a week. These help control your weight and prevent disease.  No smoking.  Wear sunscreen to prevent skin cancer.  Have a dental exam and cleaning every 6 months.  Yearly exams  See your health care team every year to talk about:  Any changes in your health.  Any medicines your care team has prescribed.  Preventive care, family planning, and ways to prevent chronic diseases.  Shots (vaccines)   HPV shots (up to age 26), if you've never had them before.  Hepatitis B shots (up to age 59), if you've never had them before.  COVID-19 shot: Get this shot when it's due.  Flu shot: Get a flu shot every year.  Tetanus shot: Get a tetanus shot every 10 years.  Pneumococcal, hepatitis A, and RSV shots: Ask your care team if you need these based on your risk.  Shingles shot (for age 50 and up)  General health tests  Diabetes screening:  Starting at age 35, Get screened for diabetes at least every 3 years.  If you are younger than age 35, ask your care team if you should be screened for diabetes.  Cholesterol test: At age 39, start having a cholesterol test every 5 years, or more often if advised.  Bone density scan (DEXA): At age 50, ask your care team if you should have this scan for osteoporosis (brittle bones).  Hepatitis C: Get tested at least once in your life.  STIs (sexually  transmitted infections)  Before age 24: Ask your care team if you should be screened for STIs.  After age 24: Get screened for STIs if you're at risk. You are at risk for STIs (including HIV) if:  You are sexually active with more than one person.  You don't use condoms every time.  You or a partner was diagnosed with a sexually transmitted infection.  If you are at risk for HIV, ask about PrEP medicine to prevent HIV.  Get tested for HIV at least once in your life, whether you are at risk for HIV or not.  Cancer screening tests  Cervical cancer screening: If you have a cervix, begin getting regular cervical cancer screening tests starting at age 21.  Breast cancer scan (mammogram): If you've ever had breasts, begin having regular mammograms starting at age 40. This is a scan to check for breast cancer.  Colon cancer screening: It is important to start screening for colon cancer at age 45.  Have a colonoscopy test every 10 years (or more often if you're at risk) Or, ask your provider about stool tests like a FIT test every year or Cologuard test every 3 years.  To learn more about your testing options, visit:   .  For help making a decision, visit:   https://bit.ly/rs03796.  Prostate cancer screening test: If you have a prostate, ask your care team if a prostate cancer screening test (PSA) at age 55 is right for you.  Lung cancer screening: If you are a current or former smoker ages 50 to 80, ask your care team if ongoing lung cancer screenings are right for you.  For informational purposes only. Not to replace the advice of your health care provider. Copyright   2023 [facility]. All rights reserved. Clinically reviewed by the [facility]. MyCheck 406789 - REV 01/24.  Preventing Falls: Care Instructions  Injuries and health problems such as trouble walking or poor eyesight can increase your risk of falling. So can some medicines. But there are things you can do to help  "prevent falls. You can exercise to get stronger. You can also arrange your home to make it safer.    Talk to your doctor about the medicines you take. Ask if any of them increase the risk of falls and whether they can be changed or stopped.   Try to exercise regularly. It can help improve your strength and balance. This can help lower your risk of falling.     Practice fall safety and prevention.    Wear low-heeled shoes that fit well and give your feet good support. Talk to your doctor if you have foot problems that make this hard.  Carry a cellphone or wear a medical alert device that you can use to call for help.  Use stepladders instead of chairs to reach high objects. Don't climb if you're at risk for falls. Ask for help, if needed.  Wear the correct eyeglasses, if you need them.    Make your home safer.    Remove rugs, cords, clutter, and furniture from walkways.  Keep your house well lit. Use night-lights in hallways and bathrooms.  Install and use sturdy handrails on stairways.  Wear nonskid footwear, even inside. Don't walk barefoot or in socks without shoes.    Be safe outside.    Use handrails, curb cuts, and ramps whenever possible.  Keep your hands free by using a shoulder bag or backpack.  Try to walk in well-lit areas. Watch out for uneven ground, changes in pavement, and debris.  Be careful in the winter. Walk on the grass or gravel when sidewalks are slippery. Use de-icer on steps and walkways. Add non-slip devices to shoes.    Put grab bars and nonskid mats in your shower or tub and near the toilet. Try to use a shower chair or bath bench when bathing.   Get into a tub or shower by putting in your weaker leg first. Get out with your strong side first. Have a phone or medical alert device in the bathroom with you.   Where can you learn more?  Go to https://www.[employer].net/patiented  Enter G117 in the search box to learn more about \"Preventing Falls: Care Instructions.\"  Current as of: July 17, " 2023               Content Version: 14.0    0551-3683 [employer].   Care instructions adapted under license by your healthcare professional. If you have questions about a medical condition or this instruction, always ask your healthcare professional. [employer] disclaims any warranty or liability for your use of this information.      Hearing Loss: Care Instructions  Overview     Hearing loss is a sudden or slow decrease in how well you hear. It can range from slight to profound. Permanent hearing loss can occur with aging. It also can happen when you are exposed long-term to loud noise. Examples include listening to loud music, riding motorcycles, or being around other loud machines.  Hearing loss can affect your work and home life. It can make you feel lonely or depressed. You may feel that you have lost your independence. But hearing aids and other devices can help you hear better and feel connected to others.  Follow-up care is a key part of your treatment and safety. Be sure to make and go to all appointments, and call your doctor if you are having problems. It's also a good idea to know your test results and keep a list of the medicines you take.  How can you care for yourself at home?  Avoid loud noises whenever possible. This helps keep your hearing from getting worse.  Always wear hearing protection around loud noises.  Wear a hearing aid as directed.  A professional can help you pick a hearing aid that will work best for you.  You can also get hearing aids over the counter for mild to moderate hearing loss.  Have hearing tests as your doctor suggests. They can show whether your hearing has changed. Your hearing aid may need to be adjusted.  Use other devices as needed. These may include:  Telephone amplifiers and hearing aids that can connect to a television, stereo, radio, or microphone.  Devices that use lights or vibrations. These alert you to the doorbell, a ringing  "telephone, or a baby monitor.  Television closed-captioning. This shows the words at the bottom of the screen. Most new TVs can do this.  TTY (text telephone). This lets you type messages back and forth on the telephone instead of talking or listening. These devices are also called TDD. When messages are typed on the keyboard, they are sent over the phone line to a receiving TTY. The message is shown on a monitor.  Use text messaging, social media, and email if it is hard for you to communicate by telephone.  Try to learn a listening technique called speechreading. It is not lipreading. You pay attention to people's gestures, expressions, posture, and tone of voice. These clues can help you understand what a person is saying. Face the person you are talking to, and have them face you. Make sure the lighting is good. You need to see the other person's face clearly.  Think about counseling if you need help to adjust to your hearing loss.  When should you call for help?  Watch closely for changes in your health, and be sure to contact your doctor if:    You think your hearing is getting worse.     You have new symptoms, such as dizziness or nausea.   Where can you learn more?  Go to https://www.[employer].net/patiented  Enter R798 in the search box to learn more about \"Hearing Loss: Care Instructions.\"  Current as of: September 27, 2023               Content Version: 14.0    1477-3818 [employer].   Care instructions adapted under license by your healthcare professional. If you have questions about a medical condition or this instruction, always ask your healthcare professional. [employer] disclaims any warranty or liability for your use of this information.      Learning About Sleeping Well  What does sleeping well mean?     Sleeping well means getting enough sleep to feel good and stay healthy. How much sleep is enough varies among people.  The number of hours you sleep and how you feel " when you wake up are both important. If you do not feel refreshed, you probably need more sleep. Another sign of not getting enough sleep is feeling tired during the day.  Experts recommend that adults get at least 7 or more hours of sleep per day. Children and older adults need more sleep.  Why is getting enough sleep important?  Getting enough quality sleep is a basic part of good health. When your sleep suffers, your physical health, mood, and your thoughts can suffer too. You may find yourself feeling more grumpy or stressed. Not getting enough sleep also can lead to serious problems, including injury, accidents, anxiety, and depression.  What might cause poor sleeping?  Many things can cause sleep problems, including:  Changes to your sleep schedule.  Stress. Stress can be caused by fear about a single event, such as giving a speech. Or you may have ongoing stress, such as worry about work or school.  Depression, anxiety, and other mental or emotional conditions.  Changes in your sleep habits or surroundings. This includes changes that happen where you sleep, such as noise, light, or sleeping in a different bed. It also includes changes in your sleep pattern, such as having jet lag or working a late shift.  Health problems, such as pain, breathing problems, and restless legs syndrome.  Lack of regular exercise.  Using alcohol, nicotine, or caffeine before bed.  How can you help yourself?  Here are some tips that may help you sleep more soundly and wake up feeling more refreshed.  Your sleeping area   Use your bedroom only for sleeping and sex. A bit of light reading may help you fall asleep. But if it doesn't, do your reading elsewhere in the house. Try not to use your TV, computer, smartphone, or tablet while you are in bed.  Be sure your bed is big enough to stretch out comfortably, especially if you have a sleep partner.  Keep your bedroom quiet, dark, and cool. Use curtains, blinds, or a sleep mask to block  "out light. To block out noise, use earplugs, soothing music, or a \"white noise\" machine.  Your evening and bedtime routine   Create a relaxing bedtime routine. You might want to take a warm shower or bath, or listen to soothing music.  Go to bed at the same time every night. And get up at the same time every morning, even if you feel tired.  What to avoid   Limit caffeine (coffee, tea, caffeinated sodas) during the day, and don't have any for at least 6 hours before bedtime.  Avoid drinking alcohol before bedtime. Alcohol can cause you to wake up more often during the night.  Try not to smoke or use tobacco, especially in the evening. Nicotine can keep you awake.  Limit naps during the day, especially close to bedtime.  Avoid lying in bed awake for too long. If you can't fall asleep or if you wake up in the middle of the night and can't get back to sleep within about 20 minutes, get out of bed and go to another room until you feel sleepy.  Avoid taking medicine right before bed that may keep you awake or make you feel hyper or energized. Your doctor can tell you if your medicine may do this and if you can take it earlier in the day.  If you can't sleep   Imagine yourself in a peaceful, pleasant scene. Focus on the details and feelings of being in a place that is relaxing.  Get up and do a quiet or boring activity until you feel sleepy.  Avoid drinking any liquids before going to bed to help prevent waking up often to use the bathroom.  Where can you learn more?  Go to https://www.[facility].net/patiented  Enter J942 in the search box to learn more about \"Learning About Sleeping Well.\"  Current as of: July 10, 2023  Content Version: 14.1 2006-2024 [employer].   Care instructions adapted under license by your healthcare professional. If you have questions about a medical condition or this instruction, always ask your healthcare professional. [employer] disclaims any warranty or liability " for your use of this information.    Bladder Training: Care Instructions  Your Care Instructions     Bladder training is used to treat urge incontinence and stress incontinence. Urge incontinence means that the need to urinate comes on so fast that you can't get to a toilet in time. Stress incontinence means that you leak urine because of pressure on your bladder. For example, it may happen when you laugh, cough, or lift something heavy.  Bladder training can increase how long you can wait before you have to urinate. It can also help your bladder hold more urine. And it can give you better control over the urge to urinate.  It is important to remember that bladder training takes a few weeks to a few months to make a difference. You may not see results right away, but don't give up.  Follow-up care is a key part of your treatment and safety. Be sure to make and go to all appointments, and call your doctor if you are having problems. It's also a good idea to know your test results and keep a list of the medicines you take.  How can you care for yourself at home?  Work with your doctor to come up with a bladder training program that is right for you. You may use one or more of the following methods.  Delayed urination  In the beginning, try to keep from urinating for 5 minutes after you first feel the need to go.  While you wait, take deep, slow breaths to relax. Kegel exercises can also help you delay the need to go to the bathroom.  After some practice, when you can easily wait 5 minutes to urinate, try to wait 10 minutes before you urinate.  Slowly increase the waiting period until you are able to control when you have to urinate.  Scheduled urination  Empty your bladder when you first wake up in the morning.  Schedule times throughout the day when you will urinate.  Start by going to the bathroom every hour, even if you don't need to go.  Slowly increase the time between trips to the bathroom.  When you have found  "a schedule that works well for you, keep doing it.  If you wake up during the night and have to urinate, do it. Apply your schedule to waking hours only.  Kegel exercises  These tighten and strengthen pelvic muscles, which can help you control the flow of urine. (If doing these exercises causes pain, stop doing them and talk with your doctor.) To do Kegel exercises:  Squeeze your muscles as if you were trying not to pass gas. Or squeeze your muscles as if you were stopping the flow of urine. Your belly, legs, and buttocks shouldn't move.  Hold the squeeze for 3 seconds, then relax for 5 to 10 seconds.  Start with 3 seconds, then add 1 second each week until you are able to squeeze for 10 seconds.  Repeat the exercise 10 times a session. Do 3 to 8 sessions a day.  When should you call for help?  Watch closely for changes in your health, and be sure to contact your doctor if:    Your incontinence is getting worse.     You do not get better as expected.   Where can you learn more?  Go to https://www.[employer].net/patiented  Enter V684 in the search box to learn more about \"Bladder Training: Care Instructions.\"  Current as of: November 15, 2023               Content Version: 14.0    9737-0176 [employer].   Care instructions adapted under license by your healthcare professional. If you have questions about a medical condition or this instruction, always ask your healthcare professional. [employer], [employer] disclaims any warranty or liability for your use of this information.         "

## 2024-09-24 NOTE — NURSING NOTE
"Chief Complaint   Patient presents with    Wellness Visit     BP (!) 150/80   Pulse 70   Resp 14   Ht 1.607 m (5' 3.25\")   Wt 75.8 kg (167 lb)   SpO2 98%   BMI 29.35 kg/m   Estimated body mass index is 29.35 kg/m  as calculated from the following:    Height as of this encounter: 1.607 m (5' 3.25\").    Weight as of this encounter: 75.8 kg (167 lb).  Patient presents to the clinic using [facility]      Health Maintenance that is potentially due pending provider review:    Health Maintenance Due   Topic Date Due    RSV VACCINE (1 - 1-dose 75+ series) Never done    COLORECTAL CANCER SCREENING  05/29/2022    Medicare Annual MTM Pharmacist Visit (once per calendar year)  01/01/2024    INFLUENZA VACCINE (1) 09/01/2024    COVID-19 Vaccine (6 - 2024-25 season) 09/01/2024    ANNUAL REVIEW OF HM ORDERS  09/13/2024    MEDICARE ANNUAL WELLNESS VISIT  09/13/2024          Will get flu shot today        "

## 2024-10-01 ENCOUNTER — OFFICE VISIT (OUTPATIENT)
Dept: FAMILY MEDICINE | Facility: CLINIC | Age: 84
End: 2024-10-01
Payer: COMMERCIAL

## 2024-10-01 ENCOUNTER — NURSE TRIAGE (OUTPATIENT)
Dept: FAMILY MEDICINE | Facility: CLINIC | Age: 84
End: 2024-10-01

## 2024-10-01 VITALS
TEMPERATURE: 98.4 F | SYSTOLIC BLOOD PRESSURE: 122 MMHG | HEIGHT: 63 IN | OXYGEN SATURATION: 99 % | HEART RATE: 67 BPM | WEIGHT: 170 LBS | BODY MASS INDEX: 30.12 KG/M2 | DIASTOLIC BLOOD PRESSURE: 74 MMHG

## 2024-10-01 DIAGNOSIS — L03.811 CELLULITIS OF HEAD EXCEPT FACE: Primary | ICD-10-CM

## 2024-10-01 PROCEDURE — 99213 OFFICE O/P EST LOW 20 MIN: CPT | Performed by: NURSE PRACTITIONER

## 2024-10-01 RX ORDER — CEPHALEXIN 500 MG/1
500 CAPSULE ORAL 2 TIMES DAILY
Qty: 10 CAPSULE | Refills: 0 | Status: SHIPPED | OUTPATIENT
Start: 2024-10-01 | End: 2024-10-06

## 2024-10-01 NOTE — PROGRESS NOTES
"  Assessment & Plan     Cellulitis of head except face  No acute distress.  Recurrent symptoms with gnat bites, plan Keflex for symptoms.  Symptomatic care and follow up discussed   - cephALEXin (KEFLEX) 500 MG capsule; Take 1 capsule (500 mg) by mouth 2 times daily for 5 days.      Subjective   Lavonne is a 84 year old, presenting for the following health issues:  Insect Bites (Bite on the left side of the scalp, noticed Sunday evening. Believes it was a knat. Started to itchy, redness, weeping and swelling.)        10/1/2024    11:20 AM   Additional Questions   Roomed by Hayley VALDEZ CMA       HPI     Drainage improved since yesterday     Review of Systems  Constitutional, HEENT, cardiovascular, pulmonary, gi and gu systems are negative, except as otherwise noted.      Objective    /74 (BP Location: Right arm, Patient Position: Chair, Cuff Size: Adult Regular)   Pulse 67   Temp 98.4  F (36.9  C) (Tympanic)   Ht 1.607 m (5' 3.25\")   Wt 77.1 kg (170 lb)   SpO2 99%   BMI 29.88 kg/m    Body mass index is 29.88 kg/m .  Physical Exam   GENERAL: alert and no distress  SKIN: left anterior scalp 1 x 2 cm sore with surrounding erythema and swelling down into left cheek   PSYCH: mentation appears normal, affect normal/bright          Signed Electronically by: ENA Arora CNP    "

## 2024-10-01 NOTE — TELEPHONE ENCOUNTER
Pt is calling and reports facial swelling with weeping, she reports she was bit by a gnat and that she gets infected. Eye is swollen . Itching Appt made in office today.  Kelsy Cedillo RN on 10/1/2024 at 8:09 AM   Reason for Disposition   Red or very tender (to touch) area, and started over 24 hours after the bite    Additional Information   Negative: Passed out (i.e., fainted, collapsed and was not responding)   Negative: Wheezing or difficulty breathing   Negative: Hoarseness, cough or tightness in the throat or chest   Negative: Swollen tongue or difficulty swallowing   Negative: Life-threatening reaction (anaphylaxis) in the past to bite from same insect and < 2 hours since bite   Negative: Sounds like a life-threatening emergency to the triager   Negative: Bee sting(s)   Negative: Spider bite(s)   Negative: Tick bite(s)   Negative: Mosquito bite(s)   Negative: Doesn't sound like an insect bite   Negative: Patient sounds very sick or weak to the triager    Protocols used: Insect Bite-A-OH

## 2024-10-15 NOTE — PROGRESS NOTES
Subjective:   Lavonne Tidwell is a 78 year old female who presents for   Chief Complaint   Patient presents with     Sinus Problem     1 week, loss of sleep, post nasal drip, congestion, some runny nose      Sinus infections about 1x/year.   Current symptoms for one week. Symptoms worse at night due to nasal drainage.   Has tried advil cold/sinus. Pressure on/off in maxillary area. No tooth pain. NO significant headaches.     Patient Active Problem List    Diagnosis Date Noted     Status post total right knee replacement 02/23/2017     Priority: Medium     Primary localized osteoarthrosis, lower leg 02/23/2017     Priority: Medium     Status post total left knee replacement 01/06/2016     Priority: Medium     Senile nuclear sclerosis 08/18/2015     Priority: Medium     Onychomycosis 01/30/2015     Priority: Medium     Advanced directives, counseling/discussion 02/10/2012     Priority: Medium     Advance Directive Problem List Overview:   Name Relationship Phone    Primary Health Care Agent            Alternative Health Care Agent          Discussed advance care planning with patient; information given to patient to review. 2/10/2012          Hyperlipidemia LDL goal <130 10/31/2010     Priority: Medium     Urinary incontinence 12/31/2008     Priority: Medium     On Ditropan for 2 years--modest improvement, initially; s/p TOT--good improvement in control       Atrophic vaginitis 12/31/2008     Priority: Medium     Menopausal syndrome (hot flashes) 12/02/2008     Priority: Medium     On Premarin--decreasing dose for 4 years--now takes twice a week       Esophageal reflux 11/10/2006     Priority: Medium     Injury of sigmoid colon 09/13/2005     Priority: Medium     Sigmoid polyp  Problem list name updated by automated process. Provider to review       Current Outpatient Medications   Medication     Acetaminophen (TYLENOL PO)     amoxicillin (AMOXIL) 500 MG capsule     Biotin 10 MG CAPS     CALCIUM + D OR     Docusate  Calcium (STOOL SOFTENER PO)     fluticasone (FLONASE) 50 MCG/ACT spray     garlic 150 MG TABS tablet     grape seed 50 MG CAPS     loratadine (CLARITIN) 10 MG tablet     methylcellulose (FIBER THERAPY) 500 MG TABS tablet     Multiple Vitamins-Minerals (CENTRUM SILVER) per tablet     ranitidine (ZANTAC) 150 MG tablet     VITAMIN E COMPLEX PO     Zinc Sulfate (ZINC 15 PO)     No current facility-administered medications for this visit.      ROS:  As above per HPI    Objective:   /78   Pulse 78   Temp 98  F (36.7  C) (Tympanic)   Resp 16   Wt 78.5 kg (173 lb)   SpO2 98%   BMI 28.79 kg/m  , Body mass index is 28.79 kg/m .  Gen:  NAD, well-nourished, sitting in chair comfortably  HEENT: EOMI, sclera anicteric, Head normocephalic, ; nares patent; moist mucous membranes, wears corrective lenses  Neck: trachea midline, no thyromegaly  CV:  Hemodynamically stable  Pulm:  no increased work of breathing , CTAB, no wheezes/rales/rhonchi   Extrem: no cyanosis, edema or clubbing  Skin: no obvious rashes or abnormalities  Psych: Euthymic, linear thoughts, normal rate of speech    Assessment & Plan:   Lavonne Tidwell, 78 year old female who presents with:    Acute sinusitis with symptoms > 10 days  > 7 days of symptoms at this time. Recommending a mild antibiotic, nasal steroid, and OTC cold medications. Stable vital signs today.   - amoxicillin (AMOXIL) 500 MG capsule  Dispense: 20 capsule; Refill: 0    Wong Napoles MD   [facility]    The use of Dragon/[employer] dictation services may have been used to construct the content in this note; any grammatical or spelling errors are non-intentional. Please contact the author of this note directly if you are in need of any clarification.    n/a

## 2024-10-19 NOTE — TELEPHONE ENCOUNTER
HPI   Chief Complaint   Patient presents with    Constipation     Has sensation to have BM but having difficulty x 1 day.  Pt denies abd pain. Last BM was  Wednesday 10/16/24         History provided by:  Patient   used: No      27-year-old male presents to the ED for medications for constipation.  Patient states that his last bowel movement was a couple days ago and normally he has a bowel movement every other day.  He has taken no medications at home for symptoms.  He denies any fevers, chills, abdominal pain, nausea, vomiting, diarrhea, urinary symptoms or any additional symptoms or complaints this time.    ROS is otherwise negative unless stated above.      Patient History   History reviewed. No pertinent past medical history.  History reviewed. No pertinent surgical history.  No family history on file.  Social History     Tobacco Use    Smoking status: Not on file    Smokeless tobacco: Not on file   Substance Use Topics    Alcohol use: Not on file    Drug use: Not on file       Physical Exam   ED Triage Vitals [10/19/24 1013]   Temperature Heart Rate Respirations BP   37.2 °C (99 °F) 94 16 113/76      Pulse Ox Temp Source Heart Rate Source Patient Position   98 % Temporal Monitor Sitting      BP Location FiO2 (%)     Right arm --       Physical Exam    VS: As documented in the triage note and EMR flowsheet from this visit were reviewed.    GEN: NAD, nontoxic, well-appearing, ambulates without difficulty  HEENT: Airway patent  CARD: RRR, nontender chest, no crepitus deformities, no JVD, no murmurs rubs or gallops ; No edema noted.  Positive pulses bilaterally throughout.  Capillary refill less than 3 seconds.  No abnormal redness, warmth, tenderness or swelling noted to bilateral lower extremities.  PULMONARY: Clear all lung fields. Moving air well, Nonlabored, no accessory muscle use, able to speak complete sentences  ABDOMEN: Abdomen soft, non-distended, no rebound, no guarding. Bowel  Patient called requesting injection. 08/07/23 Consult note below:    - Physical Therapy: continue  - Further procedures recommended:               - bilateral L5-S1 TRANSFORAMINAL EPIDURAL STEROID INJECTION                - If no improved would bilateral L3,4,5 MEDIAL BRANCH BLOCK/RFA  - Follow up: for procedure       08/31/23: Procedure performed: lumbar transforaminal epidural steroid injection at bilateral l5-S1 Reported no relief  12/14/23: Procedure performed: caudal epidural steroid injection.Reports to me today that she had about 2 months of about 50% pain relief after the last TARYN    Bilateral LBP she describes it as going hip to hip.   She feels the pain does effect the BLLE a lot but not below the knee. She describes is as a pressure and fatigue more than pain or numbness. She finds she has to sit down a lot to rest.   I did ask her to do a facet loading maneuver and she reported this did not effect her pain.    Pended repeat Caudal for review.     JORGE KingN, RN  Care Coordinator  [facility] [place]                 sounds normal in all 4 quadrants. No tenderness to palpation.  No CVA tenderness.  No masses or organomegaly noted.  No evidence of peritonitis. Negative Brito's and McBurney's point tenderness.    : deferred  SKIN: Skin normal color for race, warm, dry and intact. No evidence of trauma. No rash noted.  NEURO: Alert and oriented x 3  LYMPH: No adenopathy or splenomegaly. No cervical, supraclavicular or inguinal lymphadenopathy.    ED Course & MDM   Diagnoses as of 10/19/24 1031   Constipation, unspecified constipation type                 No data recorded     [place] Coma Scale Score: 15 (10/19/24 1014 : Catherine Machado RN)                           Medical Decision Making    Upon assessment patient is a healthy nontoxic-appearing male in no apparent distress.  He is ambulating independently without difficulty or dyspnea and tolerating p.o.  He has a benign abdominal exam.  No indication for workup or medications at this time.  He remained hemodynamically stable throughout my ED course of care.  Findings and plan were discussed with patient is agreeable for plan and acknowledged understanding.  Educated on a high-fiber diet and to maintain proper rest and hydration.    EKG Interpretation: N/A    Differential Diagnoses Considered: constipation, anxiety    Chronic Medical Conditions Significantly Affecting Care:none    External Records Reviewed: I reviewed recent and relevant outside records including: PCP notes, prior discharge summary, previous radiologic studies, HIE    Independent Interpretation of Studies:  I independently interpreted: none    Escalation of Care:  Appropriate for outpatient management with primary care provider in the next week as needed for reevaluation and long-term management.  Return precautions discussed and patient verbalized understanding. All questions and concerns were addressed.    Social Determinants of Health Significantly Affecting Care:  None    Prescription Drug Consideration:  Colace PO & MiraLAX p.o. for constipation as needed    Diagnostic testing considered: Considered imaging and laboratory studies but patient has a benign abdominal exam and has no additional complaints, hemodynamically stable and tolerating p.o.  No indication for excessive workup or radiation at this time.    Discussion of Management with Other Providers:   I discussed the patient/results with: none    *Please note that portions of this note may have been completed with a voice recognition program.  Efforts were made to edit the dictations but occasionally, words are mis-transcribed.        Procedure  Procedures     Katelynn Banks, RAYA-CNP  10/19/24 1045

## 2024-10-23 ENCOUNTER — LAB (OUTPATIENT)
Dept: LAB | Facility: CLINIC | Age: 84
End: 2024-10-23
Payer: COMMERCIAL

## 2024-10-23 DIAGNOSIS — C85.80 MARGINAL ZONE LYMPHOMA (H): ICD-10-CM

## 2024-10-23 DIAGNOSIS — D47.2 MGUS (MONOCLONAL GAMMOPATHY OF UNKNOWN SIGNIFICANCE): ICD-10-CM

## 2024-10-23 LAB
ALBUMIN SERPL BCG-MCNC: 4.2 G/DL (ref 3.5–5.2)
ALP SERPL-CCNC: 194 U/L (ref 40–150)
ALT SERPL W P-5'-P-CCNC: 12 U/L (ref 0–50)
ANION GAP SERPL CALCULATED.3IONS-SCNC: 8 MMOL/L (ref 7–15)
AST SERPL W P-5'-P-CCNC: 25 U/L (ref 0–45)
BASOPHILS # BLD AUTO: 0 10E3/UL (ref 0–0.2)
BASOPHILS NFR BLD AUTO: 0 %
BILIRUB SERPL-MCNC: 0.5 MG/DL
BUN SERPL-MCNC: 25.5 MG/DL (ref 8–23)
CALCIUM SERPL-MCNC: 9.6 MG/DL (ref 8.8–10.4)
CHLORIDE SERPL-SCNC: 102 MMOL/L (ref 98–107)
CREAT SERPL-MCNC: 0.93 MG/DL (ref 0.51–0.95)
EGFRCR SERPLBLD CKD-EPI 2021: 60 ML/MIN/1.73M2
EOSINOPHIL # BLD AUTO: 0.2 10E3/UL (ref 0–0.7)
EOSINOPHIL NFR BLD AUTO: 2 %
ERYTHROCYTE [DISTWIDTH] IN BLOOD BY AUTOMATED COUNT: 13.9 % (ref 10–15)
GLUCOSE SERPL-MCNC: 91 MG/DL (ref 70–99)
HCO3 SERPL-SCNC: 29 MMOL/L (ref 22–29)
HCT VFR BLD AUTO: 36.5 % (ref 35–47)
HGB BLD-MCNC: 11.5 G/DL (ref 11.7–15.7)
IMM GRANULOCYTES # BLD: 0 10E3/UL
IMM GRANULOCYTES NFR BLD: 0 %
LDH SERPL L TO P-CCNC: 179 U/L (ref 0–250)
LYMPHOCYTES # BLD AUTO: 4.9 10E3/UL (ref 0.8–5.3)
LYMPHOCYTES NFR BLD AUTO: 58 %
MCH RBC QN AUTO: 33 PG (ref 26.5–33)
MCHC RBC AUTO-ENTMCNC: 31.5 G/DL (ref 31.5–36.5)
MCV RBC AUTO: 105 FL (ref 78–100)
MONOCYTES # BLD AUTO: 0.2 10E3/UL (ref 0–1.3)
MONOCYTES NFR BLD AUTO: 2 %
NEUTROPHILS # BLD AUTO: 3.1 10E3/UL (ref 1.6–8.3)
NEUTROPHILS NFR BLD AUTO: 37 %
NRBC # BLD AUTO: 0 10E3/UL
NRBC BLD AUTO-RTO: 0 /100
PLATELET # BLD AUTO: 297 10E3/UL (ref 150–450)
POTASSIUM SERPL-SCNC: 5.6 MMOL/L (ref 3.4–5.3)
PROT SERPL-MCNC: 7.9 G/DL (ref 6.4–8.3)
RBC # BLD AUTO: 3.49 10E6/UL (ref 3.8–5.2)
SODIUM SERPL-SCNC: 139 MMOL/L (ref 135–145)
TOTAL PROTEIN SERUM FOR ELP: 7.5 G/DL (ref 6.4–8.3)
WBC # BLD AUTO: 8.4 10E3/UL (ref 4–11)

## 2024-10-23 PROCEDURE — 82784 ASSAY IGA/IGD/IGG/IGM EACH: CPT

## 2024-10-23 PROCEDURE — 85025 COMPLETE CBC W/AUTO DIFF WBC: CPT

## 2024-10-23 PROCEDURE — 84165 PROTEIN E-PHORESIS SERUM: CPT | Performed by: PATHOLOGY

## 2024-10-23 PROCEDURE — 36415 COLL VENOUS BLD VENIPUNCTURE: CPT

## 2024-10-23 PROCEDURE — 83615 LACTATE (LD) (LDH) ENZYME: CPT

## 2024-10-23 PROCEDURE — 84155 ASSAY OF PROTEIN SERUM: CPT | Mod: 59

## 2024-10-23 PROCEDURE — 83521 IG LIGHT CHAINS FREE EACH: CPT

## 2024-10-23 PROCEDURE — 80053 COMPREHEN METABOLIC PANEL: CPT

## 2024-10-24 LAB
ALBUMIN SERPL ELPH-MCNC: 4.1 G/DL (ref 3.7–5.1)
ALPHA1 GLOB SERPL ELPH-MCNC: 0.2 G/DL (ref 0.2–0.4)
ALPHA2 GLOB SERPL ELPH-MCNC: 0.7 G/DL (ref 0.5–0.9)
B-GLOBULIN SERPL ELPH-MCNC: 1 G/DL (ref 0.6–1)
GAMMA GLOB SERPL ELPH-MCNC: 1.4 G/DL (ref 0.7–1.6)
IGA SERPL-MCNC: 378 MG/DL (ref 84–499)
IGG SERPL-MCNC: 1437 MG/DL (ref 610–1616)
IGM SERPL-MCNC: 46 MG/DL (ref 35–242)
KAPPA LC FREE SER-MCNC: 5.25 MG/DL (ref 0.33–1.94)
KAPPA LC FREE/LAMBDA FREE SER NEPH: 1.31 {RATIO} (ref 0.26–1.65)
LAMBDA LC FREE SERPL-MCNC: 4.01 MG/DL (ref 0.57–2.63)
LOCATION OF TASK: ABNORMAL
M PROTEIN SERPL ELPH-MCNC: 0.1 G/DL
PROT PATTERN SERPL ELPH-IMP: ABNORMAL

## 2024-10-30 ENCOUNTER — ONCOLOGY VISIT (OUTPATIENT)
Dept: ONCOLOGY | Facility: CLINIC | Age: 84
End: 2024-10-30
Attending: INTERNAL MEDICINE
Payer: COMMERCIAL

## 2024-10-30 VITALS
HEART RATE: 79 BPM | SYSTOLIC BLOOD PRESSURE: 134 MMHG | TEMPERATURE: 98 F | BODY MASS INDEX: 29.95 KG/M2 | RESPIRATION RATE: 16 BRPM | OXYGEN SATURATION: 97 % | WEIGHT: 169 LBS | HEIGHT: 63 IN | DIASTOLIC BLOOD PRESSURE: 62 MMHG

## 2024-10-30 DIAGNOSIS — C85.80 MARGINAL ZONE LYMPHOMA (H): Primary | ICD-10-CM

## 2024-10-30 PROCEDURE — 99214 OFFICE O/P EST MOD 30 MIN: CPT | Performed by: INTERNAL MEDICINE

## 2024-10-30 PROCEDURE — G2211 COMPLEX E/M VISIT ADD ON: HCPCS | Performed by: INTERNAL MEDICINE

## 2024-10-30 PROCEDURE — G0463 HOSPITAL OUTPT CLINIC VISIT: HCPCS | Performed by: INTERNAL MEDICINE

## 2024-10-30 ASSESSMENT — PAIN SCALES - GENERAL: PAINLEVEL_OUTOF10: NO PAIN (0)

## 2024-10-30 NOTE — LETTER
"10/30/2024      Lavonne Tidwell  [address] [facility] [id number]      Dear Colleague,    Thank you for referring your patient, Lavonne Tidwell, to the [facility]. Please see a copy of my visit note below.    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    Weight as of this encounter: 76.7 kg (169 lb). Body surface area is 1.85 meters squared.  No Pain (0) Comment: Data Unavailable   No LMP recorded. Patient has had a hysterectomy.  Allergies reviewed: Yes  Medications reviewed: Yes    Medications: Medication refills not needed today.  Pharmacy name entered into [employer]:    [facility] [place], [place] - [address] [place] [address] PHARMACY - [place], [place] - [address] [place] [address]    Frailty Screening:   Is the patient here for a new oncology consult visit in cancer care? 2. No      Clinical concerns:  None today.       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   Appears low grade and without B symptoms nor cytopenias, no indication to treat so under observation.     She " remains overall stable, no new B symptoms.  Hot flashes/night sweats are rare and have been long-standing x years.     Labwork: Stable hgb 11.5, , normal WBC/platelets. LDH WNL. Creatinine WNL. Alk phos elevated 194    In regards to the alk phos elevation.  She's not been on any new medications.  Had pubic and L5 fractures in late May following a fall, could be related to this. CT pelvis showed no malignancy. PET scan 7/2023 showed no bone lesions. This pain is improving and no new pain.     No new abd pain and other LFTs/bili normal so I think less likely biliary/hepatic  in nature.    Plan:  -Return in 3 months with labs, . If alk phos remains elevated or new symtpoms, would consider updating CT scan to exclude lymphoma progression.  (Alk Phos is stable  10/23/2024 194)  -Will likely follow 4 times/year indefinitely     MGUS, lambda LC  Follow MGUS labs every 3 months.  Lambda monocytic B cells compose 48% of the marrow.      Plan:  -Update labs with next return in 3 mths. Collect labs a week prior to visit    Macrocytic anemia  Secondary to lymphoma bone marrow involvement.   TSH, B12 and folate WNL. Retic WNL.     Hgb is stable  (stable over last few years). MCV stable.     Plan:  -Follow.    Post-traumatic pelvic and L5 fracture      ______________________________________________________________________________    History of Present Illness/ Interval History    Ms. Lavonne Tidwell  is a 84 year woman old diagnosed with low grade lymphoma 1 yr ago, on observation.     Occasional night sweats one night/month chronic since off estrogen replacement x 10 yrs (no recent change). Had 5 lb weight loss after a fracture in late May, but regaining this back.  No abd pain/bloating, nausea, masses/nodes, fevers. Mild chronic fatigue .    Chronic recurrent UTIs.    Chronic low back with lumbar radiculopathy/bilateral hip/pelvis arthritic pain with ambulation.     Fell in late May, resulting in acute minimally  "displaced left parasymphyseal fracture and small L5 compression fracture per pelvic CT. . Presently noes some dyspnea on exertion.  No fever chills or sweats and weight is steady.  Lives alone.  Drove herself to the office.  Drinks an occasional becca and coke.      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 Saw ENT for chronic sinusitis. CT chest showed lymphadenopathy (mildly prominent axillary, mediastinal and upper abd). +night sweats,no other B symptoms.  -No cytopenias.   -PET: avid LN above and below diaphragm  -Serum immunofixation/SPEP: 0.1 m spike. Possible very small monoclonal free immunoglobulin light chain of  lambda chain type   -6/30/23 L axillary LN biopsy: low-grade CD5 neg, CD10 neg B-cell non-Hodkin's lymphoma. Neg MYD88 mutation (differentials: lymphoplasmacytic lymphoma or marginal zone)  -Bone marrow biopsy: hypercellular marrow involved by marginal zone lymphoma (at least 80%), lambda LC restricted monoclonal B-cell population.     Treatment:  Observation      Physical Exam    /62 (BP Location: Right arm, Patient Position: Sitting, Cuff Size: Adult Regular)   Pulse 79   Temp 98  F (36.7  C) (Tympanic)   Resp 16   Ht 1.607 m (5' 3.25\")   Wt 76.7 kg (169 lb)   SpO2 97%   BMI 29.70 kg/m        GENERAL: Alert and oriented to time place and person. Hard of hearing. In no distress. Daughter-in-law present.  HEAD: Atraumatic and normocephalic. No alopecia.  EYES: DK, EOMI. No erythema. No icterus.  LYMPH NODES: No palpable supraclavicular, cervical, axillary or inguinal lymphadenopathy.  CHEST: clear to auscultation bilaterally. Resonant to percussion throughout bilaterally. Symmetrical breath movements bilaterally.  CVS: RRR  ABDOMEN: Soft. Not tender. Not distended. No palpable hepatomegaly or " splenomegaly. No other mass palpable. Bowel sounds present.  EXTREMITIES: Warm. Slight ankle swelling bilateral  SKIN: no rash, or bruising or purpura.   NEURO: No gross deficit noted. Non-antalgic gait.      Lab Results    Recent Results (from the past 720 hours)   Immunoglobulins A G and M    Collection Time: 10/23/24  9:08 AM   Result Value Ref Range    Immunoglobulin G 1,437 610 - 1,616 mg/dL    Immunoglobulin A 378 84 - 499 mg/dL    Immunoglobulin M 46 35 - 242 mg/dL   Kappa and lambda light chain    Collection Time: 10/23/24  9:08 AM   Result Value Ref Range    Kappa Free Light Chains 5.25 (H) 0.33 - 1.94 mg/dL    Lambda Free Light Chains 4.01 (H) 0.57 - 2.63 mg/dL    Kappa /Lambda Ratio 1.31 0.26 - 1.65   Lactate Dehydrogenase    Collection Time: 10/23/24  9:08 AM   Result Value Ref Range    Lactate Dehydrogenase 179 0 - 250 U/L   Comprehensive metabolic panel (BMP + Alb, Alk Phos, ALT, AST, Total. Bili, TP)    Collection Time: 10/23/24  9:08 AM   Result Value Ref Range    Sodium 139 135 - 145 mmol/L    Potassium 5.6 (H) 3.4 - 5.3 mmol/L    Carbon Dioxide (CO2) 29 22 - 29 mmol/L    Anion Gap 8 7 - 15 mmol/L    Urea Nitrogen 25.5 (H) 8.0 - 23.0 mg/dL    Creatinine 0.93 0.51 - 0.95 mg/dL    GFR Estimate 60 (L) >60 mL/min/1.73m2    Calcium 9.6 8.8 - 10.4 mg/dL    Chloride 102 98 - 107 mmol/L    Glucose 91 70 - 99 mg/dL    Alkaline Phosphatase 194 (H) 40 - 150 U/L    AST 25 0 - 45 U/L    ALT 12 0 - 50 U/L    Protein Total 7.9 6.4 - 8.3 g/dL    Albumin 4.2 3.5 - 5.2 g/dL    Bilirubin Total 0.5 <=1.2 mg/dL   Total Protein, Serum for ELP    Collection Time: 10/23/24  9:08 AM   Result Value Ref Range    Total Protein Serum for ELP 7.5 6.4 - 8.3 g/dL   Protein Electrophoresis, Serum    Collection Time: 10/23/24  9:08 AM   Result Value Ref Range    Albumin 4.1 3.7 - 5.1 g/dL    Alpha 1 0.2 0.2 - 0.4 g/dL    Alpha 2 0.7 0.5 - 0.9 g/dL    Beta Globulin 1.0 0.6 - 1.0 g/dL    Gamma Globulin 1.4 0.7 - 1.6 g/dL    Monoclonal  Peak 0.1 (H) <=0.0 g/dL    ELP Interpretation       Possible very small monoclonal protein (less than 0.1 g/dL) seen in the gamma fraction which could be the monoclonal free light chain of lambda chain type previously characterized in our laboratory on 6/30/2023. Consider a urine for immunofixation which is generally better for detection of  light chain secreting myelomas, especially if myeloma is a serious clinical consideration. Pathologic significance requires clinical correlation. BREE Yao M.D., Ph.D., Pathologist ()    Signout Location if Remote [facility]    CBC with platelets and differential    Collection Time: 10/23/24  9:08 AM   Result Value Ref Range    WBC Count 8.4 4.0 - 11.0 10e3/uL    RBC Count 3.49 (L) 3.80 - 5.20 10e6/uL    Hemoglobin 11.5 (L) 11.7 - 15.7 g/dL    Hematocrit 36.5 35.0 - 47.0 %     (H) 78 - 100 fL    MCH 33.0 26.5 - 33.0 pg    MCHC 31.5 31.5 - 36.5 g/dL    RDW 13.9 10.0 - 15.0 %    Platelet Count 297 150 - 450 10e3/uL    % Neutrophils 37 %    % Lymphocytes 58 %    % Monocytes 2 %    % Eosinophils 2 %    % Basophils 0 %    % Immature Granulocytes 0 %    NRBCs per 100 WBC 0 <1 /100    Absolute Neutrophils 3.1 1.6 - 8.3 10e3/uL    Absolute Lymphocytes 4.9 0.8 - 5.3 10e3/uL    Absolute Monocytes 0.2 0.0 - 1.3 10e3/uL    Absolute Eosinophils 0.2 0.0 - 0.7 10e3/uL    Absolute Basophils 0.0 0.0 - 0.2 10e3/uL    Absolute Immature Granulocytes 0.0 <=0.4 10e3/uL    Absolute NRBCs 0.0 10e3/uL           I spent 31 minutes on the patient's visit today.  This included preparation for the visit, face-to-face time with the patient and documentation following the visit.  It did not include teaching or procedure time.    Signed by: Peter E. Friedell, MD        Again, thank you for allowing me to participate in the care of your patient.        Sincerely,        Peter E. Friedell, MD

## 2024-10-30 NOTE — PROGRESS NOTES
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    Weight as of this encounter: 76.7 kg (169 lb). Body surface area is 1.85 meters squared.  No Pain (0) Comment: Data Unavailable   No LMP recorded. Patient has had a hysterectomy.  Allergies reviewed: Yes  Medications reviewed: Yes    Medications: Medication refills not needed today.  Pharmacy name entered into [facility]:    [facility] [place], [place] - [address] [place] [address] [facility] - [place], [place] - [address] [place] [address]    Frailty Screening:   Is the patient here for a new oncology consult visit in cancer care? 2. No      Clinical concerns:  None today.       Nery Donaldson CMA            "

## 2024-10-30 NOTE — PROGRESS NOTES
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   Appears low grade and without B symptoms nor cytopenias, no indication to treat so under observation.     She remains overall stable, no new B symptoms.  Hot flashes/night sweats are rare and have been long-standing x years.     Labwork: Stable hgb 11.5, , normal WBC/platelets. LDH WNL. Creatinine WNL. Alk phos elevated 194    In regards to the alk phos elevation.  She's not been on any new medications.  Had pubic and L5 fractures in late May following a fall, could be related to this. CT pelvis showed no malignancy. PET scan 7/2023 showed no bone lesions. This pain is improving and no new pain.     No new abd pain and other LFTs/bili normal so I think less likely biliary/hepatic  in nature.    Plan:  -Return in 3 months with labs, . If alk phos remains elevated or new symtpoms, would consider updating CT scan to exclude lymphoma progression.  (Alk Phos is stable  10/23/2024 194)  -Will likely follow 4 times/year indefinitely     MGUS, lambda LC  Follow MGUS labs every 3 months.  Lambda monocytic B cells compose 48% of the marrow.      Plan:  -Update labs with next return in 3 mths. Collect labs a week prior to visit    Macrocytic anemia  Secondary to lymphoma bone marrow involvement.   TSH, B12 and folate WNL. Retic WNL.     Hgb is stable  (stable over last few years). MCV stable.     Plan:  -Follow.    Post-traumatic pelvic and L5 fracture      ______________________________________________________________________________    History of Present Illness/ Interval History    Ms. Lavonne Tidwell  is a 84 year woman old diagnosed with low grade lymphoma 1 yr ago, on observation.  "    Occasional night sweats one night/month chronic since off estrogen replacement x 10 yrs (no recent change). Had 5 lb weight loss after a fracture in late May, but regaining this back.  No abd pain/bloating, nausea, masses/nodes, fevers. Mild chronic fatigue .    Chronic recurrent UTIs.    Chronic low back with lumbar radiculopathy/bilateral hip/pelvis arthritic pain with ambulation.     Fell in late May, resulting in acute minimally displaced left parasymphyseal fracture and small L5 compression fracture per pelvic CT. . Presently noes some dyspnea on exertion.  No fever chills or sweats and weight is steady.  Lives alone.  Drove herself to the office.  Drinks an occasional becca and coke.      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 Saw ENT for chronic sinusitis. CT chest showed lymphadenopathy (mildly prominent axillary, mediastinal and upper abd). +night sweats,no other B symptoms.  -No cytopenias.   -PET: avid LN above and below diaphragm  -Serum immunofixation/SPEP: 0.1 m spike. Possible very small monoclonal free immunoglobulin light chain of  lambda chain type   -6/30/23 L axillary LN biopsy: low-grade CD5 neg, CD10 neg B-cell non-Hodkin's lymphoma. Neg MYD88 mutation (differentials: lymphoplasmacytic lymphoma or marginal zone)  -Bone marrow biopsy: hypercellular marrow involved by marginal zone lymphoma (at least 80%), lambda LC restricted monoclonal B-cell population.     Treatment:  Observation      Physical Exam    /62 (BP Location: Right arm, Patient Position: Sitting, Cuff Size: Adult Regular)   Pulse 79   Temp 98  F (36.7  C) (Tympanic)   Resp 16   Ht 1.607 m (5' 3.25\")   Wt 76.7 kg (169 lb)   SpO2 97%   BMI 29.70 kg/m        GENERAL: Alert and oriented to time place and person. Hard of hearing. In no " distress. Daughter-in-law present.  HEAD: Atraumatic and normocephalic. No alopecia.  EYES: DK, EOMI. No erythema. No icterus.  LYMPH NODES: No palpable supraclavicular, cervical, axillary or inguinal lymphadenopathy.  CHEST: clear to auscultation bilaterally. Resonant to percussion throughout bilaterally. Symmetrical breath movements bilaterally.  CVS: RRR  ABDOMEN: Soft. Not tender. Not distended. No palpable hepatomegaly or splenomegaly. No other mass palpable. Bowel sounds present.  EXTREMITIES: Warm. Slight ankle swelling bilateral  SKIN: no rash, or bruising or purpura.   NEURO: No gross deficit noted. Non-antalgic gait.      Lab Results    Recent Results (from the past 720 hours)   Immunoglobulins A G and M    Collection Time: 10/23/24  9:08 AM   Result Value Ref Range    Immunoglobulin G 1,437 610 - 1,616 mg/dL    Immunoglobulin A 378 84 - 499 mg/dL    Immunoglobulin M 46 35 - 242 mg/dL   Kappa and lambda light chain    Collection Time: 10/23/24  9:08 AM   Result Value Ref Range    Kappa Free Light Chains 5.25 (H) 0.33 - 1.94 mg/dL    Lambda Free Light Chains 4.01 (H) 0.57 - 2.63 mg/dL    Kappa /Lambda Ratio 1.31 0.26 - 1.65   Lactate Dehydrogenase    Collection Time: 10/23/24  9:08 AM   Result Value Ref Range    Lactate Dehydrogenase 179 0 - 250 U/L   Comprehensive metabolic panel (BMP + Alb, Alk Phos, ALT, AST, Total. Bili, TP)    Collection Time: 10/23/24  9:08 AM   Result Value Ref Range    Sodium 139 135 - 145 mmol/L    Potassium 5.6 (H) 3.4 - 5.3 mmol/L    Carbon Dioxide (CO2) 29 22 - 29 mmol/L    Anion Gap 8 7 - 15 mmol/L    Urea Nitrogen 25.5 (H) 8.0 - 23.0 mg/dL    Creatinine 0.93 0.51 - 0.95 mg/dL    GFR Estimate 60 (L) >60 mL/min/1.73m2    Calcium 9.6 8.8 - 10.4 mg/dL    Chloride 102 98 - 107 mmol/L    Glucose 91 70 - 99 mg/dL    Alkaline Phosphatase 194 (H) 40 - 150 U/L    AST 25 0 - 45 U/L    ALT 12 0 - 50 U/L    Protein Total 7.9 6.4 - 8.3 g/dL    Albumin 4.2 3.5 - 5.2 g/dL    Bilirubin  Total 0.5 <=1.2 mg/dL   Total Protein, Serum for ELP    Collection Time: 10/23/24  9:08 AM   Result Value Ref Range    Total Protein Serum for ELP 7.5 6.4 - 8.3 g/dL   Protein Electrophoresis, Serum    Collection Time: 10/23/24  9:08 AM   Result Value Ref Range    Albumin 4.1 3.7 - 5.1 g/dL    Alpha 1 0.2 0.2 - 0.4 g/dL    Alpha 2 0.7 0.5 - 0.9 g/dL    Beta Globulin 1.0 0.6 - 1.0 g/dL    Gamma Globulin 1.4 0.7 - 1.6 g/dL    Monoclonal Peak 0.1 (H) <=0.0 g/dL    ELP Interpretation       Possible very small monoclonal protein (less than 0.1 g/dL) seen in the gamma fraction which could be the monoclonal free light chain of lambda chain type previously characterized in our laboratory on 6/30/2023. Consider a urine for immunofixation which is generally better for detection of  light chain secreting myelomas, especially if myeloma is a serious clinical consideration. Pathologic significance requires clinical correlation. BREE Yao M.D., Ph.D., Pathologist ()    Signout Location if Remote [facility]    CBC with platelets and differential    Collection Time: 10/23/24  9:08 AM   Result Value Ref Range    WBC Count 8.4 4.0 - 11.0 10e3/uL    RBC Count 3.49 (L) 3.80 - 5.20 10e6/uL    Hemoglobin 11.5 (L) 11.7 - 15.7 g/dL    Hematocrit 36.5 35.0 - 47.0 %     (H) 78 - 100 fL    MCH 33.0 26.5 - 33.0 pg    MCHC 31.5 31.5 - 36.5 g/dL    RDW 13.9 10.0 - 15.0 %    Platelet Count 297 150 - 450 10e3/uL    % Neutrophils 37 %    % Lymphocytes 58 %    % Monocytes 2 %    % Eosinophils 2 %    % Basophils 0 %    % Immature Granulocytes 0 %    NRBCs per 100 WBC 0 <1 /100    Absolute Neutrophils 3.1 1.6 - 8.3 10e3/uL    Absolute Lymphocytes 4.9 0.8 - 5.3 10e3/uL    Absolute Monocytes 0.2 0.0 - 1.3 10e3/uL    Absolute Eosinophils 0.2 0.0 - 0.7 10e3/uL    Absolute Basophils 0.0 0.0 - 0.2 10e3/uL    Absolute Immature Granulocytes 0.0 <=0.4 10e3/uL    Absolute NRBCs 0.0 10e3/uL           I spent 31 minutes on the patient's visit  today.  This included preparation for the visit, face-to-face time with the patient and documentation following the visit.  It did not include teaching or procedure time.    Signed by: Peter E. Friedell, MD

## 2024-11-04 DIAGNOSIS — R35.0 URINARY FREQUENCY: ICD-10-CM

## 2024-11-04 RX ORDER — TOLTERODINE 2 MG/1
2 CAPSULE, EXTENDED RELEASE ORAL DAILY
Qty: 30 CAPSULE | Refills: 1 | Status: SHIPPED | OUTPATIENT
Start: 2024-11-04

## 2024-11-12 ENCOUNTER — OFFICE VISIT (OUTPATIENT)
Dept: FAMILY MEDICINE | Facility: CLINIC | Age: 84
End: 2024-11-12
Payer: COMMERCIAL

## 2024-11-12 VITALS
SYSTOLIC BLOOD PRESSURE: 134 MMHG | HEART RATE: 82 BPM | WEIGHT: 169 LBS | BODY MASS INDEX: 29.7 KG/M2 | DIASTOLIC BLOOD PRESSURE: 74 MMHG | OXYGEN SATURATION: 97 % | RESPIRATION RATE: 14 BRPM

## 2024-11-12 DIAGNOSIS — M48.061 SPINAL STENOSIS OF LUMBAR REGION WITHOUT NEUROGENIC CLAUDICATION: ICD-10-CM

## 2024-11-12 DIAGNOSIS — Z79.899 MEDICATION MANAGEMENT: ICD-10-CM

## 2024-11-12 DIAGNOSIS — J01.00 ACUTE MAXILLARY SINUSITIS, RECURRENCE NOT SPECIFIED: ICD-10-CM

## 2024-11-12 DIAGNOSIS — R05.3 CHRONIC COUGH: Primary | ICD-10-CM

## 2024-11-12 PROCEDURE — G2211 COMPLEX E/M VISIT ADD ON: HCPCS | Performed by: FAMILY MEDICINE

## 2024-11-12 PROCEDURE — 99214 OFFICE O/P EST MOD 30 MIN: CPT | Performed by: FAMILY MEDICINE

## 2024-11-12 RX ORDER — PANTOPRAZOLE SODIUM 40 MG/1
40 TABLET, DELAYED RELEASE ORAL DAILY
Qty: 90 TABLET | Refills: 3 | Status: SHIPPED | OUTPATIENT
Start: 2024-11-12

## 2024-11-12 RX ORDER — IPRATROPIUM BROMIDE 21 UG/1
1-2 SPRAY, METERED NASAL 3 TIMES DAILY PRN
Qty: 30 ML | Refills: 1 | Status: SHIPPED | OUTPATIENT
Start: 2024-11-12

## 2024-11-12 RX ORDER — CEFUROXIME AXETIL 500 MG/1
500 TABLET ORAL 2 TIMES DAILY
Qty: 14 TABLET | Refills: 0 | Status: SHIPPED | OUTPATIENT
Start: 2024-11-12

## 2024-11-12 ASSESSMENT — ENCOUNTER SYMPTOMS: BACK PAIN: 1

## 2024-11-12 ASSESSMENT — PAIN SCALES - GENERAL: PAINLEVEL_OUTOF10: NO PAIN (0)

## 2024-11-12 NOTE — NURSING NOTE
"Chief Complaint   Patient presents with    Back Pain     Wt 76.7 kg (169 lb)   BMI 29.70 kg/m   Estimated body mass index is 29.7 kg/m  as calculated from the following:    Height as of 10/30/24: 1.607 m (5' 3.25\").    Weight as of this encounter: 76.7 kg (169 lb).  Patient presents to the clinic using Cane      Health Maintenance that is potentially due pending provider review:    Health Maintenance Due   Topic Date Due    RSV VACCINE (1 - 1-dose 75+ series) Never done    COLORECTAL CANCER SCREENING  05/29/2022    Medicare Annual MTM Pharmacist Visit (once per calendar year)  01/01/2024    COVID-19 Vaccine (6 - 2024-25 season) 09/01/2024                  "

## 2024-11-12 NOTE — PROGRESS NOTES
"  Assessment & Plan     Chronic cough  Continue current therapy   - ipratropium (ATROVENT) 0.03 % nasal spray; Spray 1-2 sprays into both nostrils 3 times daily as needed for rhinitis (runny nose, postnasal drainage).  - pantoprazole (PROTONIX) 40 MG EC tablet; Take 1 tablet (40 mg) by mouth daily.    Acute maxillary sinusitis, recurrence not specified  Rx as below   - cefuroxime (CEFTIN) 500 MG tablet; Take 1 tablet (500 mg) by mouth 2 times daily.    Spinal stenosis of lumbar region without neurogenic claudication  Would like reinjection   - Pain Management  Referral; Future        The longitudinal plan of care for the diagnosis(es)/condition(s) as documented were addressed during this visit. Due to the added complexity in care, I will continue to support Lavonne in the subsequent management and with ongoing continuity of care.      BMI  Estimated body mass index is 29.7 kg/m  as calculated from the following:    Height as of 10/30/24: 1.607 m (5' 3.25\").    Weight as of this encounter: 76.7 kg (169 lb).             Subjective   Lavonne is a 84 year old, presenting for the following health issues:  Back Pain        11/12/2024    10:23 AM   Additional Questions   Roomed by Nicki avendano   Accompanied by Self         11/12/2024    10:23 AM   Patient Reported Additional Medications   Patient reports taking the following new medications .     History of Present Illness       Back Pain:  She presents for follow up of back pain. Patient's back pain is a recurring problem.  Location of back pain:  Right middle of back  Description of back pain: dull ache  Back pain spreads: right buttocks and left buttocks    Since patient first noticed back pain, pain is: unchanged  Does back pain interfere with her job:  Not applicable       CKD: She uses over the counter pain medication, including Arthritist 6 hour and extra strength Tylenol, three times daily.    She eats 2-3 servings of fruits and vegetables daily.She consumes 2 " sweetened beverage(s) daily.She exercises with enough effort to increase her heart rate 9 or less minutes per day.  She exercises with enough effort to increase her heart rate 3 or less days per week.   She is taking medications regularly.         Congestion Sinus   Green yellow discharge now   More phlegm than normal   Cough is worse as well   Pressure in the face             Review of Systems  Constitutional, HEENT, cardiovascular, pulmonary, gi and gu systems are negative, except as otherwise noted.      Objective    BP (!) 144/70   Pulse 82   Resp 14   Wt 76.7 kg (169 lb)   SpO2 97%   BMI 29.70 kg/m    Body mass index is 29.7 kg/m .  Physical Exam   GENERAL: alert and no distress  HENT: normal cephalic/atraumatic, ear canals and TM's normal, rhinorrhea yellow and green, oropharynx clear, and oral mucous membranes moist  NECK: no adenopathy, no asymmetry, masses, or scars  RESP: lungs clear to auscultation - no rales, rhonchi or wheezes  CV: regular rate and rhythm, normal S1 S2, no S3 or S4, no murmur, click or rub, no peripheral edema   PSYCH: mentation appears normal, affect normal/bright            Signed Electronically by: Elvira Kowalski MD

## 2024-11-13 ENCOUNTER — TELEPHONE (OUTPATIENT)
Dept: PALLIATIVE MEDICINE | Facility: CLINIC | Age: 84
End: 2024-11-13
Payer: COMMERCIAL

## 2024-11-13 DIAGNOSIS — M54.16 LUMBAR RADICULOPATHY: ICD-10-CM

## 2024-11-13 DIAGNOSIS — M47.816 SPONDYLOSIS OF LUMBAR REGION WITHOUT MYELOPATHY OR RADICULOPATHY: ICD-10-CM

## 2024-11-13 DIAGNOSIS — M48.07 NEURAL FORAMINAL STENOSIS OF LUMBOSACRAL SPINE: ICD-10-CM

## 2024-11-13 DIAGNOSIS — M48.061 SPINAL STENOSIS OF LUMBAR REGION WITHOUT NEUROGENIC CLAUDICATION: Primary | ICD-10-CM

## 2024-11-13 NOTE — TELEPHONE ENCOUNTER
Chart reviewed.   Per information on the order submitted it appears order is intended to be for a repeat Caudal epidural injection     Please note that TBD injection orders do delay patient care as they have to go through a full triage process and interventionist review and approval.      Routing prepped order for Caudal epidural steroid injection to referring provider for signature if appropriate.  If you would like order to go through full triage and review process please disregard prepped order and notify pain nurse pool.      Thank you.     Jeanette Zapata RN

## 2024-11-13 NOTE — TELEPHONE ENCOUNTER
"Injection Order (copy and paste all info under \"order questions\" section:   My clinical question is:     Reason for Referral:  Procedure Order    Procedure:  Injection to be Determined by Pain Management Specialist    Patient Scheduling Instructions:  UserTesting will call you to coordinate care as prescribed your provider. If you don t hear from a representative within 2 business days, please call [phone].    Additional Information:  pt had injection with Dr Kingston in June for spinal stenosis, this helped but pain is back would like another    Associated Diagnosis: Spinal stenosis of lumbar region without neurogenic claudication [M48.061]    Referring Provider: Elvira Kowalski MD in [facility]    Injection History (previous injections with pain, type and date): Yes, with Dr. Kingston Caudal TARYN in June 2024    After Review please route to Pain Nurse Pool for nursing to triage.    Anat Campos       [facility]  Pain Management    "

## 2024-11-18 ENCOUNTER — TELEPHONE (OUTPATIENT)
Dept: PALLIATIVE MEDICINE | Facility: CLINIC | Age: 84
End: 2024-11-18
Payer: COMMERCIAL

## 2024-11-18 DIAGNOSIS — M54.16 LUMBAR RADICULOPATHY: Primary | ICD-10-CM

## 2024-11-18 NOTE — TELEPHONE ENCOUNTER
"Screening Questions for Radiology Injections:    Injection to be done at which interventional clinic site? [facility] [place] [facility] [place]    If choosing [facility] for location, please inform patient:  \"[facility] is a Hospital based clinic. Before your visit, you should check with your insurance about how it covers the charges for facility services in a hospital-based clinic.     Procedure ordered by Elvira Kowalski MD     Procedure ordered? Repeat caudal epidural injection  Transforaminal Cervical TARYN - Send to [facility] ([facility]) - No [facility] providers perform this procedure    What insurance would patient like us to bill for this procedure? [employer]  IF SCHEDULING IN [place] PAIN OR SPINE PLEASE SCHEDULE AT LEAST 7-10 BUSINESS DAYS OUT SO A PA CAN BE OBTAINED  Worker's comp or MVA (motor vehicle accident) -Any injection DO NOT SCHEDULE and route to Koki Sullivan.    [employer] insurance - For ALL INJECTIONS DO NOT SCHEDULE and route to Lisa Garcia.     ALL BCBS, [employer] and [employer] CIGNA - DO NOT SCHEDULE and route to Lisa Garcia  MEDICA- ALL INJECTIONS- route to Lisa Garcia    Is patient scheduled at [facility]? NO    If YES, route every encounter to [facility] SPINE CENTER CARE NAVIGATION POOL [[phone]]    Is an  needed? No     Patient has a  home? (Review Grid) YES: Informed     Any chance of pregnancy? NO   If YES, do NOT schedule and route to RN pool  - Dr. Alcantara route to PM&R Nurse  [[id number]]      Is patient actively being treated for cancer or immunocompromised? No  If YES, do NOT schedule and route to RN pool/ Dr. Alcantara's Team    Does the patient have a bleeding or clotting disorder? No   If YES, okay to schedule AND route to RN nurse / Dr. Alcantara's Team   (For any patients with platelet count <100, RN must forward to provider)    Is patient taking any Blood Thinners OR Antiplatelet medication?  No   If hold needed, do NOT schedule, route to RN " garrett/ Dr. Alcantara's Team  Examples:   Blood Thinners: (Coumadin, Warfarin, Jantoven, Pradaxa, Xarelto, Eliquis, Edoxaban, Enoxaparin, Lovenox, Heparin, Arixtra, Fondaparinux or Fragmin)  Antiplatelet Medications: (Plavix, Brilinta or Effient)     Is patient taking any aspirin products (includes Excedrin and Fiorinal)? No   If yes route to RN pool/ Dr. Alcantara's Team - Do not schedule    Is patient taking any GLP-1 Antagonist (hold needed for sedation patients only) No   (semaglutide (Ozempic, Wegovy), dulaglutide (Trulicity), exenatide ER (Bydureon), tirzepatide (Mounjaro), Liraglutide (Saxenda, Victoza), semaglutide (Rybelsus), Terzepatide (Zepbound)  If YES, okay to schedule AND route to RN nurse / Dr. Alcantara's Team      Any allergies to contrast dye, iodine, shellfish, or numbing and steroid medications? No  If YES, schedule and add allergy information to appointment notes AND route to the RN pool/ Dr. Alcantara's Team  If TARYN and Contrast Dye / Iodine Allergy? DO NOT SCHEDULE, route to RN pool/ Dr. Alcantara's Team  Allergies: Codeine     Does patient have an active infection or treated for one within the past week? No  Is patient currently taking any antibiotics or steroid medications?  Yes - Pt is currently taking abx for 2 more days 11/20   For patients on chronic, preventative, or prophylactic antibiotics, procedures may be scheduled.   For patients on antibiotics for active or recent infection, schedule 4 days after completed.  For patients on steroid medications, schedule 4 days after completed.     Has the patient had a flu shot or any other vaccinations within the past 7 days? No  If yes, explain that for the vaccine to work best they need to:     wait 1 week before and 1 week after getting any Vaccine  wait 1 week before and 2 weeks after getting any Covid Vaccine   If patient has concerns about the timing, send to RN pool/ Dr. Alcantara's Team    Does patient have an MRI/CT?  YES: 10/05/22 Include Date and Check  Procedure Scheduling Grid to see if required.  Was the MRI/CT done within the last 3 years?  Yes   If no route to RN Pool/ Dr. Alcantara's Team  If yes, where was the MRI/CT done? [facility]    Refer to PACS Transmissions list for approved external locations and route to RN Pool High Priority/ Dr. Frasers Team  If MRI was not done at approved external location do NOT schedule and route to RN pool/ Dr. Frasers Team    If patient has an imaging disc, the injection MAY be scheduled but patient must bring disc to appt or appt will be cancelled.    Is patient able to transfer to a procedure table with minimal or no assistance? Yes   If no, do NOT schedule and route to RN Pool/ Dr. Alcantara's Team    Procedure Specific Instructions:  If celiac plexus block, informed patient NPO for 6 hours and that it is okay to take medications with sips of water, especially blood pressure medications Not Applicable       If this is for a cervical procedure, informed patient that aspirin needs to be held for 6 days.   Not Applicable    Sedation, If Sedation is ordered for any procedure, patient must be NPO for 6 hours prior to procedure Not Applicable    If IV needed:  Do not schedule procedures requiring IV placement in the first appointment of the day or first appointment after lunch. Do NOT schedule at 0745, 0815 or 1245.     Instructed patient to arrive 30 minutes early for IV start if required. (Check Procedure Scheduling Grid)  Not Applicable    Reminders:  If you are started on any steroids or antibiotics between now and your appointment, you must contact us because the procedure may need to be cancelled.  Yes    As a reminder, receiving steroids can decrease your body's ability to fight infection.   Would you still like to move forward with scheduling the injection?  Yes    IV Sedation is not provided for procedures. If oral anti-anxiety medication is needed, the patient should request this from their referring provider.    Instruct  patient to arrive as directed prior to the scheduled appointment time:  If IV needed 30 minutes before appointment time     For patients 85 or older we recommend having an adult stay w/ them for the remainder of the day.     If the patient is Diabetic, remind them to bring their glucometer.      Does the patient have any questions?  NO  Anat Campos  [facility]

## 2024-11-19 NOTE — TELEPHONE ENCOUNTER
6/28/24: caudal epidural steroid injection     Called pt.  He received 80% relief for 4 months from the above injection.   Pt returned end of October    Routed back to [place] for insurance..    Chikis RN-BSN  [facility]   [phone]

## 2024-11-19 NOTE — TELEPHONE ENCOUNTER
PA pending additional information - % OF RELIEF FROM PREVIOUS INJECTION, HOW LONG RELIEF LASTED AND HOW LONG PAIN HAS BEEN BACK          Lisa HERNANDEZ  Complex   [facility]

## 2024-11-20 NOTE — TELEPHONE ENCOUNTER
PA and clinicals [facility] via web portal [facility]        Pending  Tracking #[id number]      Lisa HERNANDEZ    [place] [facility]

## 2024-11-27 NOTE — TELEPHONE ENCOUNTER
PA approved.  Effective date: 11/20/24-1/31/25  PA reference #: [id number]  Pt. notified:   OKAY TO SCHEDULE CAUDAL TARYN WITH DR. KARTHIK Velasco   [place] [facility]

## 2024-12-03 ENCOUNTER — RADIOLOGY INJECTION OFFICE VISIT (OUTPATIENT)
Dept: PALLIATIVE MEDICINE | Facility: CLINIC | Age: 84
End: 2024-12-03
Attending: FAMILY MEDICINE
Payer: COMMERCIAL

## 2024-12-03 VITALS — OXYGEN SATURATION: 95 % | DIASTOLIC BLOOD PRESSURE: 68 MMHG | HEART RATE: 71 BPM | SYSTOLIC BLOOD PRESSURE: 149 MMHG

## 2024-12-03 DIAGNOSIS — M54.16 LUMBAR RADICULOPATHY: ICD-10-CM

## 2024-12-03 DIAGNOSIS — M48.061 SPINAL STENOSIS OF LUMBAR REGION WITHOUT NEUROGENIC CLAUDICATION: ICD-10-CM

## 2024-12-03 DIAGNOSIS — M47.816 SPONDYLOSIS OF LUMBAR REGION WITHOUT MYELOPATHY OR RADICULOPATHY: ICD-10-CM

## 2024-12-03 DIAGNOSIS — M48.07 NEURAL FORAMINAL STENOSIS OF LUMBOSACRAL SPINE: ICD-10-CM

## 2024-12-03 PROCEDURE — 62323 NJX INTERLAMINAR LMBR/SAC: CPT | Performed by: PAIN MEDICINE

## 2024-12-03 RX ADMIN — TRIAMCINOLONE ACETONIDE 40 MG: 40 INJECTION, SUSPENSION INTRA-ARTICULAR; INTRAMUSCULAR at 10:29

## 2024-12-03 ASSESSMENT — PAIN SCALES - GENERAL
PAINLEVEL_OUTOF10: MODERATE PAIN (5)
PAINLEVEL_OUTOF10: MILD PAIN (2)

## 2024-12-03 NOTE — PATIENT INSTRUCTIONS
[facility]   Procedure Discharge Instructions    Today you saw:    Dr. Hamzah Kingston,         You had a(n):  Caudal epidural steroid injection    Medications used for caudal TARYN: Lidocaine, Omnipaque, Kenalog, Bupivicaine, normal saline        Be cautious with walking. Numbness and/or weakness in the lower extremities may occur for up to 6-8 hours after the procedure due to effect of the local anesthetic  Do not drive for 6 hours. The effect of the local anesthetic could slow your reflexes.   You may resume your regular activities after 24 hours  Avoid strenuous activity for the first 24 hours  You may shower, however avoid swimming, tub baths or hot tubs for 24 hours following your procedure  You may have a mild to moderate increase in pain for several days following the injection.  It may take up to 14 days for the steroid medication to start working although you may feel the effect as early as a few days after the procedure.     You may use ice packs for 10-15 minutes, 3 to 4 times a day at the injection site for comfort  Do not use heat to painful areas for 6 to 8 hours. This will give the local anesthetic time to wear off and prevent you from accidentally burning your skin.   Unless you have been directed to avoid the use of anti-inflammatory medications (NSAIDS), you may use medications such as ibuprofen, Aleve or Tylenol for pain control if needed.   If you have diabetes, check your blood sugar more frequently than usual as your blood sugar may be higher than normal for 10-14 days following a steroid injection. Contact your doctor who manages your diabetes if your blood sugar is higher than usual  Possible side effects of steroids that you may experience include flushing, elevated blood pressure, increased appetite, mild headaches and restlessness.  All of these symptoms will get better with time.  If you experience any of the following, call the Pain Clinic during work hours  (Mon-Friday 8-4:30 pm) at [phone] or the Provider Line after hours at [phone]:  -Fever over 100 degree F  -Swelling, bleeding, redness, drainage, warmth at the injection site  -Progressive weakness or numbness in your legs  -Loss of bowel or bladder function  -Unusual headache not relieved by Tylenol or other pain reliever  -Unusual new onset of pain that is not improving

## 2024-12-03 NOTE — NURSING NOTE
Pre-procedure Intake  If YES to any questions or NO to having a   Please complete laminated checklist and leave on the computer keyboard for Provider, verbally inform provider if able.    For SCS Trial, RFA's or any sedation procedure:  Have you been fasting? NA  If yes, for how long?     Are you taking any any blood thinners such as Coumadin, Warfarin, Jantoven, Pradaxa Xarelto, Eliquis, Edoxaban, Enoxaparin, Lovenox, Heparin, Arixtra, Fondaparinux, or Fragmin? OR Antiplatelet medication such as Plavix, Brilinta, or Effient?   No   If yes, when did you take your last dose?     Do you take aspirin?  No  If cervical procedure, have you held aspirin for 6 days?       Is the Pt taking any GLP-1 Antagonist (hold needed for sedation patients only)  (semaglutide (Ozempic, Wegovy), dulaglutide (Trulicity), exenatide ER (Bydureon), tirzepatide (Mounjaro), Liraglutide (Saxenda, Victoza), semaglutide (Rybelsus)     No   If yes, when did you take your last dose?     Do you have any allergies to contrast dye, iodine, steroid and/or numbing medications?  NO    Are you currently taking antibiotics or have an active infection?  NO    Have you had a fever/elevated temperature within the past week? NO    Are you currently taking oral steroids? NO    Do you have a ? Yes    Are you pregnant or breastfeeding?  NO    Have you received any vaccinations in the last week? NO    Notify provider and RNs if systolic BP >170, diastolic BP >100, P >100 or O2 sats < 90%

## 2024-12-03 NOTE — NURSING NOTE
Discharge Information    IV Discontiued Time:  NA    Amount of Fluid Infused:  NA    Discharge Criteria = When patient returns to baseline or as per MD order    Consciousness:  Pt is fully awake    Circulation:  BP +/- 20% of pre-procedure level    Respiration:  Patient is able to breathe deeply    O2 Sat:  Patient is able to maintain O2 Sat >92% on room air    Activity:  Moves 4 extremities on command    Ambulation:  Patient is able to stand and walk or stand and pivot into wheelchair    Dressing:  Clean/dry or No Dressing    Notes:   Discharge instructions and AVS given to patient    Patient meets criteria for discharge?  YES    Admitted to PCU?  No    Responsible adult present to accompany patient home?  Yes    Signature/Title:    Jeanette Zapata RN  RN Care Coordinator  [facility]

## 2024-12-10 RX ORDER — TRIAMCINOLONE ACETONIDE 40 MG/ML
40 INJECTION, SUSPENSION INTRA-ARTICULAR; INTRAMUSCULAR ONCE
Status: COMPLETED | OUTPATIENT
Start: 2024-12-03 | End: 2024-12-03

## 2024-12-10 NOTE — PROGRESS NOTES
Pre procedure Diagnosis: Lumbar radiculopathy,   Post procedure Diagnosis: Same  Procedure performed: caudal epidural steroid injection  Anesthesia: none  Complications: none  Operators: Hamzah Kingston MD     Indications:   Lavonne Tidwell is a 84 year old female.  They have a history of lbp.  Exam shows neg slump and they have tried conservative treatment including meds/pt.    MRI rev  Options/alternatives, benefits and risks were discussed with the patient including bleeding, infection, tissue trauma, numbness, weakness, paralysis, spinal cord injury, radiation exposure, headache, reaction to medications including seizure, and effects on the bladder from local anesthetic.  Questions were answered to her satisfaction and she agrees to proceed. Voluntary informed consent was obtained and signed.     Vitals were reviewed: Yes  BP (!) 149/68   Pulse 71   SpO2 95%   Allergies were reviewed:  Yes   Medications were reviewed:  Yes   Pre-procedure pain score:5/10  Post-procedure pain score 2/10    Procedure:  After obtaining signed informed consent, patient was brought into the procedure suite and was placed in a prone position on the procedure table.   A Pause for the Cause was performed.  Patient was prepped and draped in the usual sterile fashion.     The sacral hiatus and cornua were palpated.  Under lateral fluoroscopic guidance sacral hiatus was identified.  3 ml of lidocaine 1% was then injected at the needle entry point to anesthetize the skin. Then a 17 gauge 3.5 inch Tuohy needle was inserted into sacral hiatus utilizing fluoroscopic guidance.  Aspiration was negative for blood or CSF.  Needle placement was verified by injecting Omnipaque 300 1 ml utilizing real time fluoroscopy that showed good needle placement and adequate spread in the lower sacral epidural space without intravascular or intrathecal uptake.  Then, an epidural catheter was advanced through the Tuohy needle into the epidural space without  resistance.  Aspiration was negative.  Catheter placement was verified by injecting Omnipaque 300 1 ml under real time fluoroscopyThen, after repeated negative aspiration, a combination of Kenalog 40 mg, 2 ml of  0.25% Bupivicaine, diluted with 3 ml of normal saline for a total injectate volume of 6 ml was injected in a slow and incremental fashion and the needle was withdrawn. Omnipaque wasted 9.  The injection site was cleaned and sterile dressing applied.     The patient tolerated the procedure well without complications and was taken to the recovery area for continued observation.  They were subsequently discharged to home in good condition after meeting discharge criteria.      Images were saved to PACS.    Post-procedure pain score: 2/10  Follow-up includes:     -f/u with referring provider    Hamzah Kingston MD  [facility]

## 2024-12-31 ENCOUNTER — OFFICE VISIT (OUTPATIENT)
Dept: FAMILY MEDICINE | Facility: CLINIC | Age: 84
End: 2024-12-31
Payer: COMMERCIAL

## 2024-12-31 VITALS
RESPIRATION RATE: 20 BRPM | HEIGHT: 64 IN | BODY MASS INDEX: 28.34 KG/M2 | SYSTOLIC BLOOD PRESSURE: 136 MMHG | DIASTOLIC BLOOD PRESSURE: 66 MMHG | TEMPERATURE: 97.6 F | HEART RATE: 86 BPM | OXYGEN SATURATION: 98 % | WEIGHT: 166 LBS

## 2024-12-31 DIAGNOSIS — Z79.899 MEDICATION MANAGEMENT: ICD-10-CM

## 2024-12-31 DIAGNOSIS — H61.21 RIGHT EAR IMPACTED CERUMEN: ICD-10-CM

## 2024-12-31 DIAGNOSIS — R09.81 NASAL CONGESTION: ICD-10-CM

## 2024-12-31 DIAGNOSIS — H92.01 RIGHT EAR PAIN: Primary | ICD-10-CM

## 2024-12-31 DIAGNOSIS — Z29.11 NEED FOR VACCINATION AGAINST RESPIRATORY SYNCYTIAL VIRUS: ICD-10-CM

## 2024-12-31 PROCEDURE — 69209 REMOVE IMPACTED EAR WAX UNI: CPT | Mod: RT

## 2024-12-31 PROCEDURE — 99213 OFFICE O/P EST LOW 20 MIN: CPT | Mod: 25

## 2024-12-31 RX ORDER — COVID-19 ANTIGEN TEST
220 KIT MISCELLANEOUS 2 TIMES DAILY WITH MEALS
COMMUNITY

## 2024-12-31 ASSESSMENT — PAIN SCALES - GENERAL: PAINLEVEL_OUTOF10: NO PAIN (0)

## 2024-12-31 NOTE — PROGRESS NOTES
Assessment & Plan     Right ear pain  Right ear impacted cerumen  Impaction with thick dark yellow cerumen. Irrigation performed with warm water and small amount hydrogen. Patient tolerated procedure well. Unable to clear canal and visualize TM. Remainder cerumen starting to soften, recommend using Debrox ear drops twice a day and return to clinic on Friday to visualize ear. Ear canal appearance with small amount erythema and irritation inferior area where cerumen was. May use mineral oil drops before bedtime to soften and moisturize. When shower use warm water in ears. No Q tips.     Small lump behind ear in area of friction between glasses and hearing aid resting behind the ear.   No signs/symptoms of infection. No skin breakdown.Apply soft barrier to skin,  soft ear cushion sleeve around the glasses behind ear.     Will assess ear and recheck on Friday 1/3/24  Watchful waiting on ear , unable to see if any signs of otitis media or effusion. Suspect viral URI and congestion leading into ear discomfort. Patient asked about antibiotics and will hold off at this time and see if symptoms improve.     - Adult ENT  Referral  - IA REMOVAL IMPACTED CERUMEN IRRIGATION/LVG UNILAT  - carbamide peroxide (DEBROX) 6.5 % otic solution  Dispense: 15 mL; Refill: 0    Nasal congestion  Use Flonase daily for congestion and inflammation  Supportive cares for congestion, may use decongestant       Medication management  Annual visit, would like referral placed to schedule appointment   - Med Therapy Management Referral    Patient verbalizes understanding and is in agreement with the plan of care. All questions and concerns addressed.         FUTURE APPOINTMENTS:  - Follow-up visit in 1/3/24 arrive 1410 and appointment at 1430  Follow up sooner symptoms worsen     Subjective   Lavonne is a 84 year old, presenting for the following health issues:  Ear Problem       12/31/2024     1:15 PM   Additional Questions   Roomed by  "Jeanette TP   Accompanied by None         12/31/2024     1:15 PM   Patient Reported Additional Medications   Patient reports taking the following new medications None     Ear Problem    History of Present Illness       Reason for visit:  Ear pain    She eats 2-3 servings of fruits and vegetables daily.She consumes 0 sweetened beverage(s) daily.She exercises with enough effort to increase her heart rate 9 or less minutes per day.  She exercises with enough effort to increase her heart rate 3 or less days per week.   She is taking medications regularly.       Acute Illness  Acute illness concerns: lump behind right ear that's causing shooting pain  Onset/Duration: 12/28/2024  Symptoms:  Fever: No  Chills/Sweats: No  Headache (location?): No  Sinus Pressure: No  Conjunctivitis:  No  Ear Pain: YES: right  Rhinorrhea: No  Congestion: YES  Sore Throat: No  Cough: YES  Wheeze: No  Decreased Appetite: YES  Nausea: No  Vomiting: No  Diarrhea: No  Dysuria/Freq.: No  Dysuria or Hematuria: No  Fatigue/Achiness: No  Sick/Strep Exposure: No  Therapies tried and outcome: None  History of chronic ear infections- Last ear infection 1 year ago.  History of ear tubes, left ear with retained tube that was placed 2 years ago per patient. Was following with ENT.     Noticed a Lump behind the right ear,feels it is causing a brief intermittent shooting pain. \"[place]\" First observed Sat 12/28/2024. Feels the lump is getting better today. No tenderness, erythema, swelling.     Nasal congestion and ear discomfort, feeling discomfort right ear > left    Wears bilateral hearing aides. Would like to see ENT to have fitted ear mold and new hearing aides.     Past medical history significant for hypertension, GERD, chronic cough due to rhinitis / post nasal drainage, and history of gout.     Reports chronic cough has causes more raspy and changes in her voice.     Patient Active Problem List    Diagnosis Date Noted    Marginal zone lymphoma (H) " 07/19/2024     Priority: Medium    MGUS (monoclonal gammopathy of unknown significance) 07/19/2024     Priority: Medium    Fall, initial encounter 05/27/2024     Priority: Medium    Closed fracture of transverse process of lumbar vertebra, initial encounter (H) 05/27/2024     Priority: Medium    Closed fracture of ramus of left pubis, initial encounter (H) 05/27/2024     Priority: Medium    Chronic gout 05/27/2024     Priority: Medium    Chronic cough 09/14/2023     Priority: Medium    Chronic bilateral low back pain without sciatica 10/11/2022     Priority: Medium    Primary osteoarthritis involving multiple joints 09/03/2021     Priority: Medium    Essential hypertension 05/05/2021     Priority: Medium    Tubulovillous adenoma polyp of colon 05/30/2019     Priority: Medium     Return in 3 years for the next colonoscopy      Status post total right knee replacement 02/23/2017     Priority: Medium    Primary localized osteoarthrosis, lower leg 02/23/2017     Priority: Medium    Status post total left knee replacement 01/06/2016     Priority: Medium    Senile nuclear sclerosis 08/18/2015     Priority: Medium    Onychomycosis 01/30/2015     Priority: Medium    Hyperlipidemia LDL goal <130 10/31/2010     Priority: Medium    Urinary incontinence 12/31/2008     Priority: Medium     On Ditropan for 2 years--modest improvement, initially; s/p TOT--good improvement in control      Atrophic vaginitis 12/31/2008     Priority: Medium    Menopausal syndrome (hot flashes) 12/02/2008     Priority: Medium     On Premarin--decreasing dose for 4 years--now takes twice a week      Esophageal reflux 11/10/2006     Priority: Medium    Injury of sigmoid colon 09/13/2005     Priority: Medium     Sigmoid polyp  Problem list name updated by automated process. Provider to review        Review of Systems  See HPI  Constitutional, HEENT, cardiovascular, pulmonary, gi and gu systems are negative, except as otherwise noted.      Objective    BP  "136/66 (BP Location: Right arm, Cuff Size: Adult Regular)   Pulse 86   Temp 97.6  F (36.4  C) (Tympanic)   Resp 20   Ht 1.626 m (5' 4\")   Wt 75.3 kg (166 lb)   LMP 01/01/1974   SpO2 98%   BMI 28.49 kg/m    Body mass index is 28.49 kg/m .  Physical Exam   GENERAL: alert and no distress  HENT: head normocephalic and atraumatic  Eyes grossly normal to inspection,conjunctivae and sclerae normal. Left ear canal clear with mild amount of cerumen , left blue ear tube posterior-inferior quadrant of ear. TM normal, no effusion. Right ear canal with impacted cerumen. Irrigation performed, unable to clear canal. Impacted cerumen starting to become loosened, inferior ear canal skin with irritation. No bleeding or drainage. Unable to visualize TM.  No tenderness when palpating the mastoid or tragal areas bilaterally. Tenderness to palpation behind the right ear, small lump noted with no erythema, warmth.   Nose congestion and rhinorrhea present.  Mucous membranes moist. Oropharynx is clear. Uvula midline. Posterior oropharyngeal erythema and postnasal drip present. Tonsils: no tonsillar exudate or tonsillar abscesses.Sinus cavities nontender.   NECK: no adenopathy, no asymmetry, masses, or scars  RESP: lungs clear to auscultation - no rales, rhonchi or wheezes  CV: regular rate and rhythm, normal S1 S2, no S3 or S4, no murmur, click or rub, no peripheral edema  MS: no gross musculoskeletal defects noted, no edema  SKIN: no suspicious lesions or rashes  NEURO: Normal strength and tone, mentation intact and speech normal  PSYCH: mentation appears normal, affect normal/bright            Signed Electronically by: Caitlin Santos, APRN, CNP, DNP       "

## 2025-01-02 DIAGNOSIS — R35.0 URINARY FREQUENCY: ICD-10-CM

## 2025-01-02 RX ORDER — TOLTERODINE 2 MG/1
2 CAPSULE, EXTENDED RELEASE ORAL DAILY
Qty: 30 CAPSULE | Refills: 1 | Status: SHIPPED | OUTPATIENT
Start: 2025-01-02

## 2025-01-13 ENCOUNTER — TELEPHONE (OUTPATIENT)
Dept: FAMILY MEDICINE | Facility: CLINIC | Age: 85
End: 2025-01-13
Payer: COMMERCIAL

## 2025-01-13 NOTE — TELEPHONE ENCOUNTER
"\"  Thank you for forwarding me Lavonne's message.  If she is not having shortness of breath, fevers, chest pain, fatigue, wheezing we could try cough suppressant such as dextromethorphan or guaifenesin. I see she did also see Dr. Kowalski in September for chronic cough and phlegm.    Continue a nasal steroid flonase daily.  I can send cough suppressant if needed, or she can get this over the count.  Continue taking Pepcid.  If this is not helping or any of the above symptoms then return to clinic for further evaluation. We can also do a chest xray if needed.    Let me know if any questions or concerns.    Sincerely,    Caitlin Santos, APRN, CNP, DNP   \"    Advised patient above message. She \"was really hoping to get something to actually get me relief.\" Advised to try the cough medication, continue to stay adequately hydrated. Scheduled another appointment per patient request. Advised if she has difficulty breathing, shortness of breath, wheezing, chest pain she should be evaluated sooner. She verbalizes understanding.    Nichol Burrows RN on 1/13/2025 at 2:43 PM    "

## 2025-01-13 NOTE — TELEPHONE ENCOUNTER
"Patient calling to let you know that that her nasal drainage has continued with a yellow/dark yellow color after last OV 1/3/25. She also endorses cough due to nasal drainage. She completed the amoxicillin and states that the benzonatate did not work for cough. Denies fever, shortness of breathe, or difficulty breathing. She reports talking on the phone is difficult due to her frequent non-productive cough. She states she is feeling the same as her 1/3/25 visit but \"maybe a little worse.\" She states that ear pain has resolved.     1/3/25 OV Note: \"Acute effusion of right ear  No signs of AOM, sinus and nasal congestion present with thick yellow secretions. Suspecting sinusitis with radiating pain and effusion to the right ear.   Continue using decongestant and nasal steroid spray.   ENT referral recurrent sinusitis, chronic ear infections, ear tube in left ear, also would like to have fitted ear mold and new hearing aides.      Right ear impacted cerumen  Tolerated irrigation . Able to remove cerumen. Continue to use mineral oil drops, warm water and debrox as needed to prevent buildup.      Acute non-recurrent maxillary sinusitis  Symptoms for 7 days now with no improvement, worsening congestion and developing cough from postnasal drainage. Will treat with antibiotic. Discussed medication use and side effects.  Continue supportive cares.   - amoxicillin-clavulanate (AUGMENTIN) 875-125 MG tablet  Dispense: 14 tablet; Refill: 0     Acute cough  Lung sounds clear on exam. Vital signs stable. Denies shortness of breath , wheezing. Continue supportive cares. Suspect related to postnasal drainage. See AVS handouts. Discussed medication use and side effects.   - benzonatate (TESSALON) 100 MG capsule  Dispense: 30 capsule; Refill: 0\"      She is wondering if she can get something else without coming in. Advised she may need another visit to be re-evaluated.     Preferred Pharmacy: [facility].    Please " Advise.  Nichol Burrows RN on 1/13/2025 at 1:48 PM

## 2025-01-16 ENCOUNTER — ANCILLARY PROCEDURE (OUTPATIENT)
Dept: GENERAL RADIOLOGY | Facility: CLINIC | Age: 85
End: 2025-01-16
Payer: COMMERCIAL

## 2025-01-16 ENCOUNTER — OFFICE VISIT (OUTPATIENT)
Dept: FAMILY MEDICINE | Facility: CLINIC | Age: 85
End: 2025-01-16
Payer: COMMERCIAL

## 2025-01-16 VITALS
SYSTOLIC BLOOD PRESSURE: 124 MMHG | HEIGHT: 64 IN | DIASTOLIC BLOOD PRESSURE: 64 MMHG | TEMPERATURE: 98.8 F | HEART RATE: 81 BPM | OXYGEN SATURATION: 96 % | WEIGHT: 164 LBS | RESPIRATION RATE: 16 BRPM | BODY MASS INDEX: 28 KG/M2

## 2025-01-16 DIAGNOSIS — R60.0 LEG EDEMA: ICD-10-CM

## 2025-01-16 DIAGNOSIS — C85.80 MARGINAL ZONE LYMPHOMA (H): ICD-10-CM

## 2025-01-16 DIAGNOSIS — J32.9 RECURRENT SINUSITIS: ICD-10-CM

## 2025-01-16 DIAGNOSIS — R05.3 CHRONIC COUGH: ICD-10-CM

## 2025-01-16 DIAGNOSIS — R09.81 NASAL CONGESTION: ICD-10-CM

## 2025-01-16 DIAGNOSIS — R09.89 CHEST CONGESTION: Primary | ICD-10-CM

## 2025-01-16 DIAGNOSIS — R05.2 SUBACUTE COUGH: ICD-10-CM

## 2025-01-16 PROBLEM — I10 ESSENTIAL (PRIMARY) HYPERTENSION: Status: ACTIVE | Noted: 2021-05-05

## 2025-01-16 PROBLEM — S32.592D OTHER SPECIFIED FRACTURE OF LEFT PUBIS, SUBSEQUENT ENCOUNTER FOR FRACTURE WITH ROUTINE HEALING: Status: ACTIVE | Noted: 2024-05-29

## 2025-01-16 PROBLEM — S32.009D: Status: ACTIVE | Noted: 2024-05-29

## 2025-01-16 PROBLEM — S32.592A CLOSED FRACTURE OF RAMUS OF LEFT PUBIS, INITIAL ENCOUNTER (H): Status: RESOLVED | Noted: 2024-05-27 | Resolved: 2025-01-16

## 2025-01-16 PROBLEM — M54.50 LOW BACK PAIN, UNSPECIFIED: Status: ACTIVE | Noted: 2022-10-11

## 2025-01-16 PROBLEM — M54.50 LOW BACK PAIN, UNSPECIFIED: Status: RESOLVED | Noted: 2022-10-11 | Resolved: 2025-01-16
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
## 2025-01-16 PROBLEM — M1A.9XX0 CHRONIC GOUT, UNSPECIFIED, WITHOUT TOPHUS (TOPHI): Status: ACTIVE | Noted: 2024-05-27

## 2025-01-16 PROBLEM — M15.9 POLYOSTEOARTHRITIS, UNSPECIFIED: Status: ACTIVE | Noted: 2021-09-03

## 2025-01-16 LAB
ALBUMIN SERPL BCG-MCNC: 3.6 G/DL (ref 3.5–5.2)
ALP SERPL-CCNC: 282 U/L (ref 40–150)
ALT SERPL W P-5'-P-CCNC: 16 U/L (ref 0–50)
ANION GAP SERPL CALCULATED.3IONS-SCNC: 14 MMOL/L (ref 7–15)
AST SERPL W P-5'-P-CCNC: 26 U/L (ref 0–45)
BILIRUB SERPL-MCNC: 0.4 MG/DL
BUN SERPL-MCNC: 17.9 MG/DL (ref 8–23)
CALCIUM SERPL-MCNC: 9.2 MG/DL (ref 8.8–10.4)
CHLORIDE SERPL-SCNC: 98 MMOL/L (ref 98–107)
CREAT SERPL-MCNC: 0.7 MG/DL (ref 0.51–0.95)
EGFRCR SERPLBLD CKD-EPI 2021: 84 ML/MIN/1.73M2
ERYTHROCYTE [DISTWIDTH] IN BLOOD BY AUTOMATED COUNT: 13.7 % (ref 10–15)
FLUAV RNA SPEC QL NAA+PROBE: NEGATIVE
FLUBV RNA RESP QL NAA+PROBE: NEGATIVE
GLUCOSE SERPL-MCNC: 99 MG/DL (ref 70–99)
HCO3 SERPL-SCNC: 26 MMOL/L (ref 22–29)
HCT VFR BLD AUTO: 33 % (ref 35–47)
HGB BLD-MCNC: 10.7 G/DL (ref 11.7–15.7)
LDH SERPL L TO P-CCNC: 172 U/L (ref 0–250)
MCH RBC QN AUTO: 33.9 PG (ref 26.5–33)
MCHC RBC AUTO-ENTMCNC: 32.4 G/DL (ref 31.5–36.5)
MCV RBC AUTO: 104 FL (ref 78–100)
PLATELET # BLD AUTO: 364 10E3/UL (ref 150–450)
POTASSIUM SERPL-SCNC: 4.5 MMOL/L (ref 3.4–5.3)
PROT SERPL-MCNC: 7.1 G/DL (ref 6.4–8.3)
RBC # BLD AUTO: 3.16 10E6/UL (ref 3.8–5.2)
RSV RNA SPEC NAA+PROBE: NEGATIVE
SARS-COV-2 RNA RESP QL NAA+PROBE: POSITIVE
SODIUM SERPL-SCNC: 138 MMOL/L (ref 135–145)
TOTAL PROTEIN SERUM FOR ELP: 6.9 G/DL (ref 6.4–8.3)
WBC # BLD AUTO: 9.3 10E3/UL (ref 4–11)

## 2025-01-16 RX ORDER — BENZONATATE 100 MG/1
100 CAPSULE ORAL 3 TIMES DAILY PRN
Qty: 30 CAPSULE | Refills: 0 | Status: SHIPPED | OUTPATIENT
Start: 2025-01-16

## 2025-01-16 RX ORDER — GUAIFENESIN 600 MG/1
1200 TABLET, EXTENDED RELEASE ORAL 2 TIMES DAILY
Qty: 30 TABLET | Refills: 0 | Status: SHIPPED | OUTPATIENT
Start: 2025-01-16

## 2025-01-16 ASSESSMENT — PAIN SCALES - GENERAL: PAINLEVEL_OUTOF10: NO PAIN (0)

## 2025-01-16 NOTE — PROGRESS NOTES
Assessment & Plan     Chest congestion  Chronic cough  History of a chronic cough with more recent URI and sinusitis- initial symptoms starting 12/28/24.   Completed Augmentin.   Worsening cough and chest congestion. She would like to rule out COVID and influenza.    - Influenza A/B, RSV and SARS-CoV2 PCR (COVID-19)  - XR Chest 2 Views: preliminary results per my eye show no fluid in lungs, no signs pneumonia or atelectasis.   - guaiFENesin (MUCINEX) 600 MG 12 hr tablet  Dispense: 30 tablet; Refill: 0  - benzonatate (TESSALON) 100 MG capsule  Dispense: 30 capsule; Refill: 0  -Take deep breaths and cough,good pulmonary hygiene avoid atelectasis.   -Elevate head of bed at night avoid postnasal drainage.   -Continue antacids as well.       Nasal congestion  Recurrent sinusitis  Contributing to post nasal drainage and cough.  Take antihistamine daily   May continue using flonase daily - instructed proper technique avoid spraying directly at septum, light mist. Use every other day with decongestant nasal spray to avoid irritation.   Saline mist nasal spray.   Humidifier.   Encouraged to call and schedule appointment with ENT for follow up .       Leg edema  Blood pressure stable.   - XR Chest 2 Views No fluid in lungs on CXR.   Considered possible diuretic for chest congestion, cough, edema however lungs were clear, her weight is stable with some weight loss, no other signs of fluid overload.   Reports having chronic leg swelling and compression stockings at home. Swelling improved this winter and has returned more recently.   Encouraged to wear compression stocking and elevate legs. Reduce any salt in diet. Stay well hydrated.   If no improvement follow up.       Marginal zone lymphoma (H)  Would like to complete CBC with differential today and there are pended orders for labs prior to oncology appointment in 2 weeks.   Would like to obtain labs today.  Update oncology team with continued chronic cough, recurrent  sinusitis symptoms and feelings chest congestion as well.   The following labs are pended for visit on 1/30/2025:   - CBC with Platelets & Differential  - Comprehensive metabolic panel  - Kappa and lambda light chain  - Immunoglobulins A G and M  - Protein electrophoresis  - Lactate Dehydrogenase    Patient verbalizes understanding and is in agreement with the plan of care. All questions and concerns addressed.   I      FUTURE APPOINTMENTS:   - Follow-up visit with oncology team  -Schedule appointment with ENT  Follow up as needed if symptoms worsen or do not improve.       Kirsty Banerjee is a 84 year old, presenting for the following health issues:  URI (Painful cough and sinus drainage)        1/16/2025     1:12 PM   Additional Questions   Roomed by Jeanette DUGGAN   Accompanied by None         1/16/2025     1:12 PM   Patient Reported Additional Medications   Patient reports taking the following new medications None     History of Present Illness       Reason for visit:  I was given prescriptions, I took all of them and didnt help    She eats 0-1 servings of fruits and vegetables daily.She consumes 2 sweetened beverage(s) daily.She exercises with enough effort to increase her heart rate 9 or less minutes per day.  She exercises with enough effort to increase her heart rate 3 or less days per week.   She is taking medications regularly.      Acute Illness  Acute illness concerns: painful cough, and sinus drainage  Onset/Duration: 1-2 weeks ago  Symptoms:  Fever: No  Chills/Sweats: No  Headache (location?): No  Sinus Pressure: YES  Conjunctivitis:  No  Ear Pain: no  Rhinorrhea: No  Congestion: YES  Sore Throat: YES  Cough: YES-non-productive  Wheeze: No  Decreased Appetite: YES  Nausea: No  Vomiting: No  Diarrhea: No  Dysuria/Freq.: No  Dysuria or Hematuria: No  Fatigue/Achiness: No  Sick/Strep Exposure: No  Therapies tried and outcome: Nyquil, mucinex    Ongoing cough  Nasal congestion  Was seen in clinic on 12/31/24  right ear pain, nasal congestion that started on 12/28/24, irrigated right ear impaction. Supportive cares for nasal congestion and follow up if no improvement.     In clinic 1/3/25- no improvement, sinus congestion for > 7 days with thick yellow secretions, radiating pain to the right ear, and treated for right maxillary sinus infection with Augmentin on 1/3/25.     Completed antibiotics, reports sinus and ear pain improved. Continues to have nasal and sinus congestion, frequent blowing nose and feeling of postnasal drip.     Cough getting worse , she states it is worse during day when talking -can trigger coughing episode. Reports talking on the phone has caused coughing episode. Feeling chest congestion, denies productive sputum Occasionally waking up at night coughing, overall able to sleep well.      Has tried suppressants including tessalon, Nyquil   Lately feeling some shortness of breath with activity, denies shortness of breath at rest, orthopnea, denies any chest pain, night sweats, palpations, fevers, body aches, chills, fatigue.   Denies dizziness, lightheaded feeling.     Reports decreased appetite. No nausea/vomiting, abdominal discomfort, no diarrhea or changes in bowel movements.     Using Nasal steroid occasionally ,was noticing blood in secretions from steroid and decongestant nasal spray.     Pepcid and protonix -GERD and chronic cough.    Has an ENT referral recurrent sinusitis, chronic ear infections, ear tube in left ear, also would like to have fitted ear mold and new hearing aides.   Sinus CT on 3/13/2023-normal. Rightward nasal septal deviation.     Lately reports having bilateral leg swelling.   Summer is worse  Has compression stockings, unable to get on. Once the weather got cold swelling improved.   Started to swell again now over the past month     Baseline weight around 170-173lbs,   Was 166lbs in December and 164 lbs today.    Blood pressures stable.     Diagnosed with low grade B  "cell lymphoma. Following with oncology and has appointment coming up in 2 weeks.     Chest CT performed 6/9/2023 due to chronic cough, throat clearing, post nasal drainage: abnormal lymph nodes, axillary/mediastinal lymphadenopathy. Scattered irregular nodules in posterior left upper lobe suggestive of infectious or inflammatory etiology. Benign nodules.     Biopsy left axillary lymph node 6/16/2023 showed Low-grade CD5 negative CD10 negative B-cell non-Hodgkin's lymphoma       Review of Systems  Constitutional, HEENT, cardiovascular, pulmonary, gi and gu systems are negative, except as otherwise noted.      Objective    /64   Pulse 81   Temp 98.8  F (37.1  C) (Tympanic)   Resp 16   Ht 1.613 m (5' 3.5\")   Wt 74.4 kg (164 lb)   LMP 01/01/1974   SpO2 96%   BMI 28.60 kg/m    Body mass index is 28.6 kg/m .  Physical Exam   GENERAL: alert and no distress  EYES: Eyes grossly normal to inspection, and conjunctivae and sclerae normal  HENT: Right ear canal clear, TM normal, Left ear canal clear,  ear tube posterior-inferior quadrant. No sinus tenderness. nose with rhinorrhea. Oropharynx is clear. Uvula midline. Posterior oropharyngeal clear.  Tonsils: no tonsillar exudate or tonsillar abscesses and mouth without ulcers or lesions  NECK: no adenopathy, no asymmetry, masses, or scars  RESP: Respirations regular, non labored. Lungs clear to auscultation with diminished posterior bilateral bases - no rales, rhonchi or wheezes  CV: regular rate and rhythm, normal S1 S2, no S3 or S4, no murmur, click or rub, 1-2+ bilateral lower extremity edema and ankle edema.   ABDOMEN: soft, nontender, no hepatosplenomegaly, no masses and bowel sounds normal  SKIN: no suspicious lesions or rashes  NEURO: Normal strength and tone, mentation intact and speech normal  PSYCH: mentation appears normal, affect normal/bright    Xray - Reviewed and interpreted by me in clinic. Per my eye-  Lung fields clear with no opacity or signs of " fluid in lungs, bases clear no atelectasis. No cardiomegaly. Radiologist for final interpretation and review.         Signed Electronically by:ENA Pike, CNP, DNP

## 2025-01-16 NOTE — PATIENT INSTRUCTIONS
Mucinex twice a day for congestion and mucous  Flonase nasal spray once a day   Elevate your legs, wear compression stockings, reduce salt in your diet.     Take deep breaths and cough.   Elevate your pillows or head of your bed at night     Let your oncology team know about cough  We will do your labs today instead of next week.

## 2025-01-20 ENCOUNTER — TELEPHONE (OUTPATIENT)
Dept: FAMILY MEDICINE | Facility: CLINIC | Age: 85
End: 2025-01-20
Payer: COMMERCIAL

## 2025-01-20 LAB
BASOPHILS # BLD AUTO: 0 10E3/UL (ref 0–0.2)
BASOPHILS NFR BLD AUTO: 0 %
EOSINOPHIL # BLD AUTO: 0.2 10E3/UL (ref 0–0.7)
EOSINOPHIL NFR BLD AUTO: 2 %
ERYTHROCYTE [DISTWIDTH] IN BLOOD BY AUTOMATED COUNT: 13.7 % (ref 10–15)
HCT VFR BLD AUTO: 33 % (ref 35–47)
HGB BLD-MCNC: 10.7 G/DL (ref 11.7–15.7)
IMM GRANULOCYTES # BLD: 0 10E3/UL
IMM GRANULOCYTES NFR BLD: 0 %
LYMPHOCYTES # BLD AUTO: 6.3 10E3/UL (ref 0.8–5.3)
LYMPHOCYTES NFR BLD AUTO: 68 %
MCH RBC QN AUTO: 33.9 PG (ref 26.5–33)
MCHC RBC AUTO-ENTMCNC: 32.4 G/DL (ref 31.5–36.5)
MCV RBC AUTO: 104 FL (ref 78–100)
MONOCYTES # BLD AUTO: 0.1 10E3/UL (ref 0–1.3)
MONOCYTES NFR BLD AUTO: 1 %
NEUTROPHILS # BLD AUTO: 2.7 10E3/UL (ref 1.6–8.3)
NEUTROPHILS NFR BLD AUTO: 29 %
NRBC # BLD AUTO: 0 10E3/UL
NRBC BLD AUTO-RTO: 0 /100
PATH REV: NORMAL
PLAT MORPH BLD: NORMAL
PLATELET # BLD AUTO: 364 10E3/UL (ref 150–450)
RBC # BLD AUTO: 3.16 10E6/UL (ref 3.8–5.2)
RBC MORPH BLD: NORMAL
WBC # BLD AUTO: 9.3 10E3/UL (ref 4–11)

## 2025-01-20 NOTE — TELEPHONE ENCOUNTER
Spoke with Lavonne, symptoms have been ongoing for quite some time. ok to be around others as long as she has been afebrile and symptoms improving for at least 24 hours. Advised to wear a mask around others since she is still coughing. She did reach out to her friend's son who approves of her visit and she will wear a mask.   Grazyna HILL RN

## 2025-01-20 NOTE — TELEPHONE ENCOUNTER
General Call      Reason for Call:  question    What are your questions or concerns:  Pt calling - was diagnosed with covid on 1/16, pt stated she has had symptoms for awhile before she tested positive. Pt is wondering if she is still contagious? Stated that her best friend is not doing well and wants to go over and say her goodbyes, but doesn't want to get everyone else sick. Pt still has a hoarse voice, some coughing, and some congestion. Pt doesn't think she is running a fever, but does not have a thermometer to check. Would like to speak with a nurse.    Could we send this information to you in [facility] or would you prefer to receive a phone call?:   Patient would prefer a phone call   Okay to leave a detailed message?: Yes at Home number on file [phone] (home)    Dinorah Tello Patient

## 2025-01-31 PROBLEM — C85.80 MARGINAL ZONE B-CELL LYMPHOMA (H): Status: ACTIVE | Noted: 2024-07-19

## 2025-03-11 ENCOUNTER — TELEPHONE (OUTPATIENT)
Dept: FAMILY MEDICINE | Facility: CLINIC | Age: 85
End: 2025-03-11
Payer: COMMERCIAL

## 2025-03-11 ENCOUNTER — OFFICE VISIT (OUTPATIENT)
Dept: URGENT CARE | Facility: URGENT CARE | Age: 85
End: 2025-03-11
Payer: COMMERCIAL

## 2025-03-11 VITALS
BODY MASS INDEX: 28.77 KG/M2 | TEMPERATURE: 98.2 F | RESPIRATION RATE: 18 BRPM | WEIGHT: 165 LBS | OXYGEN SATURATION: 99 % | HEART RATE: 83 BPM | DIASTOLIC BLOOD PRESSURE: 77 MMHG | SYSTOLIC BLOOD PRESSURE: 170 MMHG

## 2025-03-11 DIAGNOSIS — H10.31 ACUTE CONJUNCTIVITIS OF RIGHT EYE, UNSPECIFIED ACUTE CONJUNCTIVITIS TYPE: Primary | ICD-10-CM

## 2025-03-11 PROCEDURE — 99213 OFFICE O/P EST LOW 20 MIN: CPT | Performed by: PHYSICIAN ASSISTANT

## 2025-03-11 PROCEDURE — 3078F DIAST BP <80 MM HG: CPT | Performed by: PHYSICIAN ASSISTANT

## 2025-03-11 PROCEDURE — 3077F SYST BP >= 140 MM HG: CPT | Performed by: PHYSICIAN ASSISTANT

## 2025-03-11 RX ORDER — POLYMYXIN B SULFATE AND TRIMETHOPRIM 1; 10000 MG/ML; [USP'U]/ML
1 SOLUTION OPHTHALMIC EVERY 4 HOURS
Qty: 10 ML | Refills: 0 | Status: SHIPPED | OUTPATIENT
Start: 2025-03-11 | End: 2025-03-18

## 2025-03-11 NOTE — TELEPHONE ENCOUNTER
Lavonne states that she has had an ongoing throat/coughing issue for the past 2 years and cannot get into ENT for another month which she states is just fine. But now has a right eye that is red inside and gets some crust in the corner of if and is worried about pink eye and seeing her grand kids. States it ruiz a lot as well. Advised to be seen in UC as we do not have openings today. She agrees with this plan.    Claudia Galvan RN

## 2025-03-11 NOTE — PROGRESS NOTES
"  Assessment & Plan     Acute conjunctivitis of right eye, unspecified acute conjunctivitis type  This is likely viral.  Can try over the counter Visine A drops as needed to help with the redness. Wash hands frequently and avoid touching eyes and face. Gave written Rx for polymyxin B/trimethoprim ophthalmic drops to use if purulent drainage develops.      - polymixin b-trimethoprim (POLYTRIM) 98908-7.1 UNIT/ML-% ophthalmic solution; Place 1 drop into both eyes every 4 hours for 7 days.          BMI  Estimated body mass index is 28.77 kg/m  as calculated from the following:    Height as of 1/31/25: 1.613 m (5' 3.5\").    Weight as of this encounter: 74.8 kg (165 lb).             Return in about 1 week (around 3/18/2025), or if symptoms worsen or fail to improve.        Subjective   Chief Complaint   Patient presents with    Eye Problem     Right eye is red and watering, \"it started a few days ago\".       HPI      Eye problem     Onset of symptoms was 2 day(s) ago.  Course of illness is same.    Severity mild  Current and Associated symptoms: right eye redness and watering   Treatment measures tried include None tried.  Predisposing factors include None.                      Objective    BP (!) 170/77   Pulse 83   Temp 98.2  F (36.8  C) (Tympanic)   Resp 18   Wt 74.8 kg (165 lb)   LMP 01/01/1974   SpO2 99%   BMI 28.77 kg/m    Body mass index is 28.77 kg/m .    Physical Exam  Constitutional:       Appearance: She is well-developed.   HENT:      Head: Normocephalic.      Right Ear: Tympanic membrane and ear canal normal.      Left Ear: Tympanic membrane and ear canal normal.   Eyes:      Conjunctiva/sclera:      Right eye: Right conjunctiva is injected. No exudate.     Left eye: Left conjunctiva is not injected. No exudate.  Cardiovascular:      Rate and Rhythm: Normal rate.      Heart sounds: Normal heart sounds.   Pulmonary:      Effort: Pulmonary effort is normal.      Breath sounds: Normal breath sounds. "   Skin:     General: Skin is warm and dry.      Findings: No rash.   Psychiatric:         Behavior: Behavior normal.                    Signed Electronically by: Katie Jacobs PA-C

## 2025-04-09 DIAGNOSIS — R35.0 URINARY FREQUENCY: ICD-10-CM

## 2025-04-09 RX ORDER — TOLTERODINE 2 MG/1
2 CAPSULE, EXTENDED RELEASE ORAL DAILY
Qty: 30 CAPSULE | Refills: 2 | Status: SHIPPED | OUTPATIENT
Start: 2025-04-09

## 2025-04-09 NOTE — TELEPHONE ENCOUNTER
Requested Prescriptions   Pending Prescriptions Disp Refills    tolterodine ER (DETROL LA) 2 MG 24 hr capsule [Pharmacy Med Name: TOLTERODINE TARTRATE ER 2MG CP24] 30 capsule 2     Sig: Take 1 capsule (2 mg) by mouth daily.       Muscarinic Antagonists (Urinary Incontinence Agents) Failed - 4/9/2025  1:38 PM        Failed - Medication indicated for associated diagnosis     Medication is associated with one or more of the following diagnoses:  Bladder pain  Disorder of the GI tract  Hyperhidrosis  Neurogenic bladder  Neurogenic detrusor overactivity  Overactive Bladder  Urge incontinence  Urgent desire to urinate  Incontinence  Spasm of urinary bladder          Passed - Patient does not have a diagnosis of glaucoma on the problem list     If glaucoma diagnosis is new, refer refill to physician.          Passed - Medication is active on med list and the sig matches. RN to manually verify dose and sig if red X/fail.     If the protocol passes (green check), you do not need to verify med dose and sig.    A prescription matches if they are the same clinical intention.    For Example: once daily and every morning are the same.    The protocol can not identify upper and lower case letters as matching and will fail.     For Example: Take 1 tablet (50 mg) by mouth daily     TAKE 1 TABLET (50 MG) BY MOUTH DAILY    For all fails (red x), verify dose and sig.    If the refill does match what is on file, the RN can still proceed to approve the refill request.       If they do not match, route to the appropriate provider.             Passed - Recent (12 mo) or future (90 days) visit within the authorizing provider's specialty     The patient must have completed an in-person or virtual visit within the past 12 months or has a future visit scheduled within the next 90 days with the authorizing provider s specialty.  Urgent care and e-visits do not qualify as an office visit for this protocol.          Passed - Patient is 18 years  of age or older          Julie Behrendt RN

## 2025-04-20 NOTE — PROGRESS NOTES
Chief Complaint   Patient presents with    Ear Problem     Hearing aids have been helping     Throat Problem     For about 1-2 years, pt c/o a lot drainage that causes her to cough and now her voice is changing      History of Present Illness  Lavonne Tidwell is a 83 year old female who presents today for follow-up. She has a history of left otitis media with effusion and underwent previous T-tube placement in November 2022.  Her tubes subsequently extruded and her ears and hearing were normal.  She again developed upper respiratory illness over the summer 2023 and was seen for follow-up evaluation on 11/1/2023.  At that time, she had significant bilateral middle ear effusion with significant granulation in the right ear making it quite difficult to place an ear tube.  I was able to successfully get ear tubes in both ears in the office.  I placed her on a longer course of Ciprodex drops for the right ear.  She was seen for follow-up on 12/18/2023.  At that time, the right ear had extruded and the eardrum healed.  The left ear tube was in good placement.    The patient underwent an audiogram performed 12/18/2023.  My review of the audiogram shows mild to moderate to moderately severe to severe mixed hearing loss in the right ear and mild sloping to moderate sloping to moderately severe mixed hearing loss in the left ear.  Pure-tone average is 35 dB on the right and 48 dB on the left.  Speech reception threshold is 40 dB on the right and 45 dB on the left.  The patient had 96% word recognition on the right and 84% word recognition on the left.  The patient had a slight negative pressure type C tympanogram on the right and a large volume type B tympanogram on the left. The patient returns today for follow-up.    From an ear symptom standpoint, the patient reports that overall her ears been doing better since getting new hearing aids.  She was having some right ear discomfort this past winter without any drainage or  signs of infection.  At her last visit, her left ear tube was still in place.  She is not have any problems on the left. She denies any current otalgia or otorrhea.  No bloody otorrhea.  No dizziness or vertigo.  Aside from her ear tubes, no previous ear surgery.     She does continue to struggle with significant postnasal drainage, nasal congestion, chronic cough.  Over the past several weeks, she has had more purulent drainage from her nose.  She is not currently using any nasal irrigations.  She is also struggled with hoarseness.  She has lost about 15 pounds over the past few months unintentionally.  She denies any real dysphagia odynophagia.  No hemoptysis.  No neck lump/bump/swelling.    Past Medical History  Patient Active Problem List   Diagnosis    Injury of sigmoid colon    Esophageal reflux    Menopausal syndrome (hot flashes)    Urinary incontinence    Atrophic vaginitis    Hyperlipidemia LDL goal <130    Onychomycosis    Senile nuclear sclerosis    Status post total left knee replacement    Status post total right knee replacement    Primary localized osteoarthrosis, lower leg    Tubulovillous adenoma polyp of colon    Primary osteoarthritis involving multiple joints    Chronic bilateral low back pain without sciatica    Chronic cough    Fall, initial encounter    Closed fracture of transverse process of lumbar vertebra, initial encounter (H)    Marginal zone B-cell lymphoma (H)    MGUS (monoclonal gammopathy of unknown significance)    Other specified fracture of left pubis, subsequent encounter for fracture with routine healing    Unspecified fracture of unspecified lumbar vertebra, subsequent encounter for fracture with routine healing    Chronic gout, unspecified, without tophus (tophi)    Essential (primary) hypertension    Polyosteoarthritis, unspecified     Current Medications    Current Outpatient Medications:     Acetaminophen (TYLENOL PO), Take 1,000 mg by mouth every 8 hours as needed for  mild pain., Disp: , Rfl:     allopurinol (ZYLOPRIM) 100 MG tablet, Take 1 tablet (100 mg) by mouth daily, Disp: 90 tablet, Rfl: 3    benzonatate (TESSALON) 100 MG capsule, Take 1 capsule (100 mg) by mouth 3 times daily as needed for cough., Disp: 30 capsule, Rfl: 0    famotidine (PEPCID) 40 MG tablet, Take 1 tablet (40 mg) by mouth 2 times daily., Disp: 180 tablet, Rfl: 3    fluticasone (FLONASE) 50 MCG/ACT nasal spray, Spray 1 spray into both nostrils 2 times daily, Disp: 16 g, Rfl: 3    ipratropium (ATROVENT) 0.03 % nasal spray, Spray 1-2 sprays into both nostrils 3 times daily as needed for rhinitis (runny nose, postnasal drainage)., Disp: 30 mL, Rfl: 1    loratadine (CLARITIN) 10 MG tablet, Take 10 mg by mouth daily, Disp: , Rfl:     Menthol, Topical Analgesic, (BIOFREEZE) 4 % GEL, Externally apply topically 4 times daily, Disp: , Rfl:     mupirocin (BACTROBAN) 2 % external ointment, APPLY TOPICALLY TO NOSE THREE TIMES A DAY AS NEEDED, Disp: 30 g, Rfl: 3    naproxen sodium 220 MG capsule, Take 220 mg by mouth 2 times daily (with meals)., Disp: , Rfl:     pantoprazole (PROTONIX) 40 MG EC tablet, Take 1 tablet (40 mg) by mouth daily., Disp: 90 tablet, Rfl: 3    senna-docusate (SENOKOT-S/PERICOLACE) 8.6-50 MG tablet, Take 2 tablets by mouth daily, Disp: , Rfl:     sulfamethoxazole-trimethoprim (BACTRIM DS) 800-160 MG tablet, Take 1 tablet by mouth 2 times daily for 21 days., Disp: 20 tablet, Rfl: 0    tolterodine ER (DETROL LA) 2 MG 24 hr capsule, TAKE 1 CAPSULE (2 MG) BY MOUTH DAILY., Disp: 30 capsule, Rfl: 2    Current Facility-Administered Medications:     iohexol (OMNIPAQUE) 300 mg/mL injection 1 mL, 1 mL, Intravenous, Once, Hamzah Kingston MD    triamcinolone (KENALOG-40) injection 60 mg, 60 mg, Intramuscular, Once,     Allergies  Allergies   Allergen Reactions    Codeine GI Disturbance       Social History  Social History     Socioeconomic History    Marital status:    Tobacco Use    Smoking  status: Former     Packs/day: 0.50     Years: 5.00     Pack years: 2.50     Types: Cigarettes     Start date: 1968     Quit date: 2/15/1972     Years since quittin.4    Smokeless tobacco: Never    Tobacco comments:     stopped in her 20's   Substance and Sexual Activity    Alcohol use: Yes     Alcohol/week: 0.0 standard drinks of alcohol     Comment: Occasional    Drug use: No    Sexual activity: Not Currently   Other Topics Concern    Parent/sibling w/ CABG, MI or angioplasty before 65F 55M? Yes       Family History  Family History   Problem Relation Age of Onset    Cancer Mother         Ovarian    Other Cancer Mother         Ovarian    Cerebrovascular Disease Father     Heart Disease Father     C.A.D. Father     Coronary Artery Disease Father     Cancer Maternal Grandmother     Other Cancer Maternal Grandmother         Ovarian/Bone    Cerebrovascular Disease Maternal Grandfather     Colon Cancer Maternal Grandfather     Diabetes Other         Great Grandmother    Breast Cancer No family hx of        Review of Systems  As per HPI and PMHx, otherwise 10 system review including the head and neck, constitutional, eyes, respiratory, GI, skin, neurologic, lymphatic, endocrine, and allergy systems is negative.    Physical Exam  LMP 1974   GENERAL: Patient is a pleasant, cooperative 83 year old female in no acute distress.  HEAD: Normocephalic, atraumatic.  Hair and scalp are normal.  EYES: Pupils are equal, round, reactive to light and accommodation.  Extraocular movements are intact.  The sclera nonicteric without injection.  The extraocular structures are normal.  EARS:See below.  NOSE: Nares are patent.  Nasal mucosa is boggy and inflamed with sticky, inflammatory mucus.  Patient has significantly dry nasal skin and some redness.  There is some mucopurulent debris anteriorly.   NECK: Supple, trachea is midline.  There no palpable cervical lymphadenopathy or masses bilaterally.  Palpation of the  bilateral parotid and submandibular areas reveal no masses.  No thyromegaly.    NEUROLOGIC: Cranial nerves II through XII are grossly intact.  Voice is strong.  Patient is House-Brackmann I/VI bilaterally.  CARDIOVASCULAR: Extremities are warm and well-perfused.  No significant peripheral edema.  RESPIRATORY: Patient has nonlabored breathing without cough, wheeze, stridor.  PSYCHIATRIC: Patient is alert and oriented.  Mood and affect appear normal.  SKIN: Warm and dry.  No scalp, face, or neck lesions noted.    Physical Exam and Procedure    EARS: Normal shape and symmetry.  No tenderness when palpating the mastoid or tragal areas bilaterally.  The ears were then examined under the otic binocular microscope to perform cerumen removal.  Otoscopic exam on the right reveals a minimal amount of cerumen.  The right tympanic membrane is intact.  The tympanic membrane on the right is quite opaque it is difficult to tell if she has any middle ear effusion.  No deep retraction, granulation, drainage, or evidence of cholesteatoma.    Attention was turned to the left ear.  Otoscopic exam on the left reveals a clear canal.  There is an extruded ventilation tube encased in cerumen in the ear canal impacted down to the tympanic membrane.  The cerumen and extruded tube are removed with an alligator forceps.  The left tympanic membrane is intact with some moderate retraction and obvious serous middle ear effusion.  No granulation or active drainage.  No evidence of cholesteatoma.      Procedure: Flexible Laryngoscopy  Indication: Indication: Postnasal drainage, nasal congestion, cough    To best visualize the upper airway anatomy and due to the chief complaint and HPI, I proceeded with flexible fiberoptic laryngoscopy examination. The bilateral nasal cavities were anesthetized and decongested. The bilateral nasal cavities were then examined using flexible fiberoptic nasal endoscope. The right nasal cavity was without masses or  polyps.  There is mucopurulent debris throughout the right nasal cavity.  The right middle turbinate and middle meatus was normal in appearance. The sphenoethmoid recess, inferior meatus, superior meatus, and frontal sinus outflow areas were clear. The left nasal cavity was without masses or polyps.  There is mucopurulent debris throughout the left nasal cavity.  The left middle turbinate and middle meatus was normal in appearance. The sphenoethmoid recess, inferior meatus, superior meatus, and frontal sinus outflow areas were clear. The sinonasal mucosa appeared normal. The nasal septum is essentially midline. The inferior turbinates were moderately hypertrophied. The nasopharynx had a normal appearance with normal Eustachian tube openings and fossa of Rosenmuller bilaterally.  There is mucopurulent debris in the nasopharynx covering eustachian tube openings.  Minimal adenoid tissue. The base of tongue, vallecula, epiglottis, aryepiglottic folds, arytenoids, and piriform sinuses were without mass or lesion. The bilateral true vocal folds were symmetrically mobile without nodules or masses. The visualized portions of the infraglottic and subglottic airway are unremarkable. The scope was removed. The patient tolerated the procedure well.    Assessment and Plan     ICD-10-CM    1. Chronic otitis media with effusion, bilateral  H65.493 Remove Cerumen     triamcinolone (KENALOG-40) injection 60 mg     sulfamethoxazole-trimethoprim (BACTRIM DS) 800-160 MG tablet      2. Mixed conductive and sensorineural hearing loss of both ears  H90.6 Remove Cerumen     triamcinolone (KENALOG-40) injection 60 mg     sulfamethoxazole-trimethoprim (BACTRIM DS) 800-160 MG tablet      3. History of placement of ear tubes  Z96.22 Remove Cerumen     triamcinolone (KENALOG-40) injection 60 mg     sulfamethoxazole-trimethoprim (BACTRIM DS) 800-160 MG tablet      4. Chronic cough  R05.3 Remove Cerumen     triamcinolone (KENALOG-40) injection 60  mg     sulfamethoxazole-trimethoprim (BACTRIM DS) 800-160 MG tablet     LARYNGOSCOPY FLEX FIBEROPTIC, DIAGNOSTIC      5. Acute sinusitis with symptoms > 10 days  J01.90 Remove Cerumen     triamcinolone (KENALOG-40) injection 60 mg     sulfamethoxazole-trimethoprim (BACTRIM DS) 800-160 MG tablet     LARYNGOSCOPY FLEX FIBEROPTIC, DIAGNOSTIC        It was my pleasure seeing Lavonne Tidwell today in clinic.  Unfortunately, the left ear tube is extruded and she appears to have middle ear effusion again on the left.  I think I like to perform an audiogram prior to replacing the tube in either side to see how much conductive hearing loss is present.  She has no signs of otitis media on examination.    Her endoscopic nasal exam today shows significant purulent debris in the bilateral nasal cavities and the nasopharynx.  We discussed restarting nasal saline irrigations.  She will receive the 60 mg injection of Kenalog to help with inflammation.  Will place her on a 21-day course of Bactrim.  I like to see her back next week for recheck and audiometric assessment.     Rob Harris MD  Department of Otolaryngology-Head and Neck Surgery  [facility]

## 2025-04-21 ENCOUNTER — LAB (OUTPATIENT)
Dept: LAB | Facility: CLINIC | Age: 85
End: 2025-04-21
Attending: NURSE PRACTITIONER
Payer: COMMERCIAL

## 2025-04-21 DIAGNOSIS — C85.80 MARGINAL ZONE B-CELL LYMPHOMA (H): ICD-10-CM

## 2025-04-21 LAB
ALBUMIN SERPL BCG-MCNC: 4.1 G/DL (ref 3.5–5.2)
ALP SERPL-CCNC: 204 U/L (ref 40–150)
ALT SERPL W P-5'-P-CCNC: 9 U/L (ref 0–50)
ANION GAP SERPL CALCULATED.3IONS-SCNC: 11 MMOL/L (ref 7–15)
AST SERPL W P-5'-P-CCNC: 24 U/L (ref 0–45)
BASOPHILS # BLD AUTO: 0.1 10E3/UL (ref 0–0.2)
BASOPHILS NFR BLD AUTO: 1 %
BILIRUB SERPL-MCNC: 0.8 MG/DL
BUN SERPL-MCNC: 16.8 MG/DL (ref 8–23)
CALCIUM SERPL-MCNC: 9.8 MG/DL (ref 8.8–10.4)
CHLORIDE SERPL-SCNC: 101 MMOL/L (ref 98–107)
CREAT SERPL-MCNC: 0.85 MG/DL (ref 0.51–0.95)
EGFRCR SERPLBLD CKD-EPI 2021: 67 ML/MIN/1.73M2
EOSINOPHIL # BLD AUTO: 0.1 10E3/UL (ref 0–0.7)
EOSINOPHIL NFR BLD AUTO: 1 %
ERYTHROCYTE [DISTWIDTH] IN BLOOD BY AUTOMATED COUNT: 14.1 % (ref 10–15)
GLUCOSE SERPL-MCNC: 91 MG/DL (ref 70–99)
HCO3 SERPL-SCNC: 28 MMOL/L (ref 22–29)
HCT VFR BLD AUTO: 36.9 % (ref 35–47)
HGB BLD-MCNC: 11.3 G/DL (ref 11.7–15.7)
IMM GRANULOCYTES # BLD: 0 10E3/UL
IMM GRANULOCYTES NFR BLD: 0 %
LDH SERPL L TO P-CCNC: 166 U/L (ref 0–250)
LYMPHOCYTES # BLD AUTO: 5.9 10E3/UL (ref 0.8–5.3)
LYMPHOCYTES NFR BLD AUTO: 58 %
MCH RBC QN AUTO: 32.6 PG (ref 26.5–33)
MCHC RBC AUTO-ENTMCNC: 30.6 G/DL (ref 31.5–36.5)
MCV RBC AUTO: 106 FL (ref 78–100)
MONOCYTES # BLD AUTO: 0.2 10E3/UL (ref 0–1.3)
MONOCYTES NFR BLD AUTO: 2 %
NEUTROPHILS # BLD AUTO: 4 10E3/UL (ref 1.6–8.3)
NEUTROPHILS NFR BLD AUTO: 39 %
NRBC # BLD AUTO: 0 10E3/UL
NRBC BLD AUTO-RTO: 0 /100
PLAT MORPH BLD: NORMAL
PLATELET # BLD AUTO: 325 10E3/UL (ref 150–450)
POTASSIUM SERPL-SCNC: 4.9 MMOL/L (ref 3.4–5.3)
PROT SERPL-MCNC: 8.1 G/DL (ref 6.4–8.3)
RBC # BLD AUTO: 3.47 10E6/UL (ref 3.8–5.2)
RBC MORPH BLD: NORMAL
SODIUM SERPL-SCNC: 140 MMOL/L (ref 135–145)
WBC # BLD AUTO: 10.3 10E3/UL (ref 4–11)

## 2025-04-21 PROCEDURE — 83615 LACTATE (LD) (LDH) ENZYME: CPT

## 2025-04-21 PROCEDURE — 36415 COLL VENOUS BLD VENIPUNCTURE: CPT

## 2025-04-21 PROCEDURE — 85025 COMPLETE CBC W/AUTO DIFF WBC: CPT

## 2025-04-21 PROCEDURE — 80053 COMPREHEN METABOLIC PANEL: CPT

## 2025-04-22 ENCOUNTER — OFFICE VISIT (OUTPATIENT)
Dept: OTOLARYNGOLOGY | Facility: CLINIC | Age: 85
End: 2025-04-22
Payer: COMMERCIAL

## 2025-04-22 DIAGNOSIS — H90.6 MIXED CONDUCTIVE AND SENSORINEURAL HEARING LOSS OF BOTH EARS: ICD-10-CM

## 2025-04-22 DIAGNOSIS — J01.90 ACUTE SINUSITIS WITH SYMPTOMS > 10 DAYS: ICD-10-CM

## 2025-04-22 DIAGNOSIS — Z96.22 HISTORY OF PLACEMENT OF EAR TUBES: ICD-10-CM

## 2025-04-22 DIAGNOSIS — R05.3 CHRONIC COUGH: ICD-10-CM

## 2025-04-22 DIAGNOSIS — H65.493 CHRONIC OTITIS MEDIA WITH EFFUSION, BILATERAL: Primary | ICD-10-CM

## 2025-04-22 PROCEDURE — 31575 DIAGNOSTIC LARYNGOSCOPY: CPT | Performed by: OTOLARYNGOLOGY

## 2025-04-22 PROCEDURE — 99214 OFFICE O/P EST MOD 30 MIN: CPT | Mod: 25 | Performed by: OTOLARYNGOLOGY

## 2025-04-22 RX ORDER — TRIAMCINOLONE ACETONIDE 40 MG/ML
60 INJECTION, SUSPENSION INTRA-ARTICULAR; INTRAMUSCULAR ONCE
Status: COMPLETED | OUTPATIENT
Start: 2025-04-22 | End: 2025-04-22

## 2025-04-22 RX ORDER — IPRATROPIUM BROMIDE 21 UG/1
SPRAY, METERED NASAL
Qty: 30 ML | Refills: 1 | Status: SHIPPED | OUTPATIENT
Start: 2025-04-22

## 2025-04-22 RX ORDER — SULFAMETHOXAZOLE AND TRIMETHOPRIM 800; 160 MG/1; MG/1
1 TABLET ORAL 2 TIMES DAILY
Qty: 20 TABLET | Refills: 0 | Status: SHIPPED | OUTPATIENT
Start: 2025-04-22 | End: 2025-05-13

## 2025-04-22 RX ADMIN — TRIAMCINOLONE ACETONIDE 60 MG: 40 INJECTION, SUSPENSION INTRA-ARTICULAR; INTRAMUSCULAR at 13:35

## 2025-04-22 NOTE — PATIENT INSTRUCTIONS
NASAL SALINE IRRIGATION INSTRUCTIONS    You will be starting nasal saline irrigations and will need to obtain the following:      - NeilMed Sinus Rinse 8 oz Kit  - Distilled or filtered water   - Normal saline salt packets    Place filtered or distilled water into the NeilMed bottle up to the fill line (DO NOT USE TAP OR WELL WATER).  Place the pre-made salt packet in the 8 oz of saline.  Shake the bottle to suspend into solution.  Lean head forward over a sink or a basin.  Rinse each side of the nose with one-half of the bottle (each squeeze is about one-half of the bottle). Rinse the nose daily.     If you use topical nasal sprays, apply following irrigation.    Video example: [email or web address]/[email or web address]

## 2025-04-22 NOTE — LETTER
4/22/2025      Lavonne Tidwell  [address] [facility] [id number]      Dear Colleague,    Thank you for referring your patient, Lavonne Tidwell, to the [facility]. Please see a copy of my visit note below.    Chief Complaint   Patient presents with     Ear Problem     Hearing aids have been helping      Throat Problem     For about 1-2 years, pt c/o a lot drainage that causes her to cough and now her voice is changing      History of Present Illness  Lavonne Tidwell is a 83 year old female who presents today for follow-up. She has a history of left otitis media with effusion and underwent previous T-tube placement in November 2022.  Her tubes subsequently extruded and her ears and hearing were normal.  She again developed upper respiratory illness over the summer 2023 and was seen for follow-up evaluation on 11/1/2023.  At that time, she had significant bilateral middle ear effusion with significant granulation in the right ear making it quite difficult to place an ear tube.  I was able to successfully get ear tubes in both ears in the office.  I placed her on a longer course of Ciprodex drops for the right ear.  She was seen for follow-up on 12/18/2023.  At that time, the right ear had extruded and the eardrum healed.  The left ear tube was in good placement.    The patient underwent an audiogram performed 12/18/2023.  My review of the audiogram shows mild to moderate to moderately severe to severe mixed hearing loss in the right ear and mild sloping to moderate sloping to moderately severe mixed hearing loss in the left ear.  Pure-tone average is 35 dB on the right and 48 dB on the left.  Speech reception threshold is 40 dB on the right and 45 dB on the left.  The patient had 96% word recognition on the right and 84% word recognition on the left.  The patient had a slight negative pressure type C tympanogram on the right and a large volume type B tympanogram on the left. The  patient returns today for follow-up.    From an ear symptom standpoint, the patient reports that overall her ears been doing better since getting new hearing aids.  She was having some right ear discomfort this past winter without any drainage or signs of infection.  At her last visit, her left ear tube was still in place.  She is not have any problems on the left. She denies any current otalgia or otorrhea.  No bloody otorrhea.  No dizziness or vertigo.  Aside from her ear tubes, no previous ear surgery.     She does continue to struggle with significant postnasal drainage, nasal congestion, chronic cough.  Over the past several weeks, she has had more purulent drainage from her nose.  She is not currently using any nasal irrigations.  She is also struggled with hoarseness.  She has lost about 15 pounds over the past few months unintentionally.  She denies any real dysphagia odynophagia.  No hemoptysis.  No neck lump/bump/swelling.    Past Medical History  Patient Active Problem List   Diagnosis     Injury of sigmoid colon     Esophageal reflux     Menopausal syndrome (hot flashes)     Urinary incontinence     Atrophic vaginitis     Hyperlipidemia LDL goal <130     Onychomycosis     Senile nuclear sclerosis     Status post total left knee replacement     Status post total right knee replacement     Primary localized osteoarthrosis, lower leg     Tubulovillous adenoma polyp of colon     Primary osteoarthritis involving multiple joints     Chronic bilateral low back pain without sciatica     Chronic cough     Fall, initial encounter     Closed fracture of transverse process of lumbar vertebra, initial encounter (H)     Marginal zone B-cell lymphoma (H)     MGUS (monoclonal gammopathy of unknown significance)     Other specified fracture of left pubis, subsequent encounter for fracture with routine healing     Unspecified fracture of unspecified lumbar vertebra, subsequent encounter for fracture with routine healing      Chronic gout, unspecified, without tophus (tophi)     Essential (primary) hypertension     Polyosteoarthritis, unspecified     Current Medications    Current Outpatient Medications:      Acetaminophen (TYLENOL PO), Take 1,000 mg by mouth every 8 hours as needed for mild pain., Disp: , Rfl:      allopurinol (ZYLOPRIM) 100 MG tablet, Take 1 tablet (100 mg) by mouth daily, Disp: 90 tablet, Rfl: 3     benzonatate (TESSALON) 100 MG capsule, Take 1 capsule (100 mg) by mouth 3 times daily as needed for cough., Disp: 30 capsule, Rfl: 0     famotidine (PEPCID) 40 MG tablet, Take 1 tablet (40 mg) by mouth 2 times daily., Disp: 180 tablet, Rfl: 3     fluticasone (FLONASE) 50 MCG/ACT nasal spray, Spray 1 spray into both nostrils 2 times daily, Disp: 16 g, Rfl: 3     ipratropium (ATROVENT) 0.03 % nasal spray, Spray 1-2 sprays into both nostrils 3 times daily as needed for rhinitis (runny nose, postnasal drainage)., Disp: 30 mL, Rfl: 1     loratadine (CLARITIN) 10 MG tablet, Take 10 mg by mouth daily, Disp: , Rfl:      Menthol, Topical Analgesic, (BIOFREEZE) 4 % GEL, Externally apply topically 4 times daily, Disp: , Rfl:      mupirocin (BACTROBAN) 2 % external ointment, APPLY TOPICALLY TO NOSE THREE TIMES A DAY AS NEEDED, Disp: 30 g, Rfl: 3     naproxen sodium 220 MG capsule, Take 220 mg by mouth 2 times daily (with meals)., Disp: , Rfl:      pantoprazole (PROTONIX) 40 MG EC tablet, Take 1 tablet (40 mg) by mouth daily., Disp: 90 tablet, Rfl: 3     senna-docusate (SENOKOT-S/PERICOLACE) 8.6-50 MG tablet, Take 2 tablets by mouth daily, Disp: , Rfl:      sulfamethoxazole-trimethoprim (BACTRIM DS) 800-160 MG tablet, Take 1 tablet by mouth 2 times daily for 21 days., Disp: 20 tablet, Rfl: 0     tolterodine ER (DETROL LA) 2 MG 24 hr capsule, TAKE 1 CAPSULE (2 MG) BY MOUTH DAILY., Disp: 30 capsule, Rfl: 2    Current Facility-Administered Medications:      iohexol (OMNIPAQUE) 300 mg/mL injection 1 mL, 1 mL, Intravenous, Once,  Hamzah Kingston MD     triamcinolone (KENALOG-40) injection 60 mg, 60 mg, Intramuscular, Once,     Allergies  Allergies   Allergen Reactions     Codeine GI Disturbance       Social History  Social History     Socioeconomic History     Marital status:    Tobacco Use     Smoking status: Former     Packs/day: 0.50     Years: 5.00     Pack years: 2.50     Types: Cigarettes     Start date: 1968     Quit date: 2/15/1972     Years since quittin.4     Smokeless tobacco: Never     Tobacco comments:     stopped in her 20's   Substance and Sexual Activity     Alcohol use: Yes     Alcohol/week: 0.0 standard drinks of alcohol     Comment: Occasional     Drug use: No     Sexual activity: Not Currently   Other Topics Concern     Parent/sibling w/ CABG, MI or angioplasty before 65F 55M? Yes       Family History  Family History   Problem Relation Age of Onset     Cancer Mother         Ovarian     Other Cancer Mother         Ovarian     Cerebrovascular Disease Father      Heart Disease Father      C.A.D. Father      Coronary Artery Disease Father      Cancer Maternal Grandmother      Other Cancer Maternal Grandmother         Ovarian/Bone     Cerebrovascular Disease Maternal Grandfather      Colon Cancer Maternal Grandfather      Diabetes Other         Great Grandmother     Breast Cancer No family hx of        Review of Systems  As per HPI and PMHx, otherwise 10 system review including the head and neck, constitutional, eyes, respiratory, GI, skin, neurologic, lymphatic, endocrine, and allergy systems is negative.    Physical Exam  LMP 1974   GENERAL: Patient is a pleasant, cooperative 83 year old female in no acute distress.  HEAD: Normocephalic, atraumatic.  Hair and scalp are normal.  EYES: Pupils are equal, round, reactive to light and accommodation.  Extraocular movements are intact.  The sclera nonicteric without injection.  The extraocular structures are normal.  EARS:See below.  NOSE: Nares  are patent.  Nasal mucosa is boggy and inflamed with sticky, inflammatory mucus.  Patient has significantly dry nasal skin and some redness.  There is some mucopurulent debris anteriorly.   NECK: Supple, trachea is midline.  There no palpable cervical lymphadenopathy or masses bilaterally.  Palpation of the bilateral parotid and submandibular areas reveal no masses.  No thyromegaly.    NEUROLOGIC: Cranial nerves II through XII are grossly intact.  Voice is strong.  Patient is House-Brackmann I/VI bilaterally.  CARDIOVASCULAR: Extremities are warm and well-perfused.  No significant peripheral edema.  RESPIRATORY: Patient has nonlabored breathing without cough, wheeze, stridor.  PSYCHIATRIC: Patient is alert and oriented.  Mood and affect appear normal.  SKIN: Warm and dry.  No scalp, face, or neck lesions noted.    Physical Exam and Procedure    EARS: Normal shape and symmetry.  No tenderness when palpating the mastoid or tragal areas bilaterally.  The ears were then examined under the otic binocular microscope to perform cerumen removal.  Otoscopic exam on the right reveals a minimal amount of cerumen.  The right tympanic membrane is intact.  The tympanic membrane on the right is quite opaque it is difficult to tell if she has any middle ear effusion.  No deep retraction, granulation, drainage, or evidence of cholesteatoma.    Attention was turned to the left ear.  Otoscopic exam on the left reveals a clear canal.  There is an extruded ventilation tube encased in cerumen in the ear canal impacted down to the tympanic membrane.  The cerumen and extruded tube are removed with an alligator forceps.  The left tympanic membrane is intact with some moderate retraction and obvious serous middle ear effusion.  No granulation or active drainage.  No evidence of cholesteatoma.      Procedure: Flexible Laryngoscopy  Indication: Indication: Postnasal drainage, nasal congestion, cough    To best visualize the upper airway  anatomy and due to the chief complaint and HPI, I proceeded with flexible fiberoptic laryngoscopy examination. The bilateral nasal cavities were anesthetized and decongested. The bilateral nasal cavities were then examined using flexible fiberoptic nasal endoscope. The right nasal cavity was without masses or polyps.  There is mucopurulent debris throughout the right nasal cavity.  The right middle turbinate and middle meatus was normal in appearance. The sphenoethmoid recess, inferior meatus, superior meatus, and frontal sinus outflow areas were clear. The left nasal cavity was without masses or polyps.  There is mucopurulent debris throughout the left nasal cavity.  The left middle turbinate and middle meatus was normal in appearance. The sphenoethmoid recess, inferior meatus, superior meatus, and frontal sinus outflow areas were clear. The sinonasal mucosa appeared normal. The nasal septum is essentially midline. The inferior turbinates were moderately hypertrophied. The nasopharynx had a normal appearance with normal Eustachian tube openings and fossa of Rosenmuller bilaterally.  There is mucopurulent debris in the nasopharynx covering eustachian tube openings.  Minimal adenoid tissue. The base of tongue, vallecula, epiglottis, aryepiglottic folds, arytenoids, and piriform sinuses were without mass or lesion. The bilateral true vocal folds were symmetrically mobile without nodules or masses. The visualized portions of the infraglottic and subglottic airway are unremarkable. The scope was removed. The patient tolerated the procedure well.    Assessment and Plan     ICD-10-CM    1. Chronic otitis media with effusion, bilateral  H65.493 Remove Cerumen     triamcinolone (KENALOG-40) injection 60 mg     sulfamethoxazole-trimethoprim (BACTRIM DS) 800-160 MG tablet      2. Mixed conductive and sensorineural hearing loss of both ears  H90.6 Remove Cerumen     triamcinolone (KENALOG-40) injection 60 mg      sulfamethoxazole-trimethoprim (BACTRIM DS) 800-160 MG tablet      3. History of placement of ear tubes  Z96.22 Remove Cerumen     triamcinolone (KENALOG-40) injection 60 mg     sulfamethoxazole-trimethoprim (BACTRIM DS) 800-160 MG tablet      4. Chronic cough  R05.3 Remove Cerumen     triamcinolone (KENALOG-40) injection 60 mg     sulfamethoxazole-trimethoprim (BACTRIM DS) 800-160 MG tablet     LARYNGOSCOPY FLEX FIBEROPTIC, DIAGNOSTIC      5. Acute sinusitis with symptoms > 10 days  J01.90 Remove Cerumen     triamcinolone (KENALOG-40) injection 60 mg     sulfamethoxazole-trimethoprim (BACTRIM DS) 800-160 MG tablet     LARYNGOSCOPY FLEX FIBEROPTIC, DIAGNOSTIC        It was my pleasure seeing Lavonne Tidwell today in clinic.  Unfortunately, the left ear tube is extruded and she appears to have middle ear effusion again on the left.  I think I like to perform an audiogram prior to replacing the tube in either side to see how much conductive hearing loss is present.  She has no signs of otitis media on examination.    Her endoscopic nasal exam today shows significant purulent debris in the bilateral nasal cavities and the nasopharynx.  We discussed restarting nasal saline irrigations.  She will receive the 60 mg injection of Kenalog to help with inflammation.  Will place her on a 21-day course of Bactrim.  I like to see her back next week for recheck and audiometric assessment.     Rob Harris MD  Department of Otolaryngology-Head and Neck Surgery  [facility]       Again, thank you for allowing me to participate in the care of your patient.        Sincerely,        Rob Harris MD    Electronically signed

## 2025-04-23 ENCOUNTER — HOSPITAL ENCOUNTER (OUTPATIENT)
Dept: CT IMAGING | Facility: CLINIC | Age: 85
Discharge: HOME | End: 2025-04-23
Attending: NURSE PRACTITIONER
Payer: COMMERCIAL

## 2025-04-23 DIAGNOSIS — C85.80 MARGINAL ZONE B-CELL LYMPHOMA (H): ICD-10-CM

## 2025-04-23 LAB — RADIOLOGIST FLAGS: NORMAL

## 2025-04-23 PROCEDURE — 250N000011 HC RX IP 250 OP 636: Performed by: RADIOLOGY

## 2025-04-23 PROCEDURE — 250N000009 HC RX 250: Performed by: RADIOLOGY

## 2025-04-23 PROCEDURE — 71260 CT THORAX DX C+: CPT

## 2025-04-23 RX ORDER — IOPAMIDOL 755 MG/ML
81 INJECTION, SOLUTION INTRAVASCULAR ONCE
Status: COMPLETED | OUTPATIENT
Start: 2025-04-23 | End: 2025-04-23

## 2025-04-23 RX ADMIN — SODIUM CHLORIDE 60 ML: 9 INJECTION, SOLUTION INTRAVENOUS at 12:40

## 2025-04-23 RX ADMIN — IOPAMIDOL 81 ML: 755 INJECTION, SOLUTION INTRAVENOUS at 12:40

## 2025-04-30 ENCOUNTER — ONCOLOGY VISIT (OUTPATIENT)
Dept: ONCOLOGY | Facility: CLINIC | Age: 85
End: 2025-04-30
Attending: INTERNAL MEDICINE
Payer: COMMERCIAL

## 2025-04-30 VITALS
HEART RATE: 75 BPM | OXYGEN SATURATION: 97 % | SYSTOLIC BLOOD PRESSURE: 146 MMHG | RESPIRATION RATE: 16 BRPM | HEIGHT: 64 IN | DIASTOLIC BLOOD PRESSURE: 61 MMHG | TEMPERATURE: 97.8 F | WEIGHT: 158 LBS | BODY MASS INDEX: 26.98 KG/M2

## 2025-04-30 DIAGNOSIS — C85.80 MARGINAL ZONE B-CELL LYMPHOMA (H): Primary | ICD-10-CM

## 2025-04-30 DIAGNOSIS — D47.2 MGUS (MONOCLONAL GAMMOPATHY OF UNKNOWN SIGNIFICANCE): ICD-10-CM

## 2025-04-30 PROCEDURE — 99215 OFFICE O/P EST HI 40 MIN: CPT | Performed by: INTERNAL MEDICINE

## 2025-04-30 PROCEDURE — G2211 COMPLEX E/M VISIT ADD ON: HCPCS | Performed by: INTERNAL MEDICINE

## 2025-04-30 PROCEDURE — G0463 HOSPITAL OUTPT CLINIC VISIT: HCPCS | Performed by: INTERNAL MEDICINE

## 2025-04-30 ASSESSMENT — PAIN SCALES - GENERAL: PAINLEVEL_OUTOF10: NO PAIN (0)

## 2025-04-30 NOTE — PROGRESS NOTES
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   Appears low grade and without B symptoms nor cytopenias, no indication to treat so under observation.     She remains overall stable.  Hot flashes/night sweats are rare and have been long-standing x years. Chronic fatigue, a bit worse.     Labwork: Stable hgb 10.7, , normal WBC/platelets. LDH WNL. Creatinine WNL.  Alk phos has been persistently elevated since 6/2024, a bit moreso now (282, compared to 194-249). Rest LFT/bili WNL.     She had a fall with pelvic fracture 9 mths ago. No new bone pain.   No new abd pain.    Plan:  -Continue observation since asymptomatic.   -Return in 3 months with labs + CT given persistently elevated alk phos to rule out progression.  Also losing weight advised  to try to eat more.  She lives alone daughter in law will bring over more food.      MGUS, lambda LC  Lambda monocytic B cells compose 48% of the marrow.    M peak stable 0.1; mild/stable elevation of both Kappa + lambda LC with normal ratio; immunoglobulins WNL.     Plan:  -Repeat labs every 6 mths (ordered)    Macrocytic anemia  Secondary to lymphoma bone marrow involvement.   TSH, B12 and folate WNL. Retic WNL.     Hgb is stable  (stable over last few years). MCV stable.     Plan:  -Follow    Chronic sinusitis  Has struggled with this for a few years. Eval in ENT. Treating symptomatically.   Recent Covid infection acutely worsened symptoms.     Her IgG is normal, so I don't think she has significant immunosuppression from her lymphoma, but may have to some degree.    Plan:  -Continue mgmt with ENT/PCP  She is on antibiotics now.      6. New finding right kidney on CT  renal ultrasound  "ordered      ______________________________________________________________________________    History of Present Illness/ Interval History    Ms. Lavonne Tidwell  is a 84 year woman old diagnosed with low grade lymphoma 1.5 yr ago, on observation.     Shehad covid last winter sinus congestion persists.   Chronic sinusitis/rhinorrhea. Chronic hoarse voice, related to reflux.   Chronic recurrent UTIs + urinary incontinence.    Occasional night sweats one night/month chronic since off estrogen replacement x 10 yrs (no recent change).  Chronic fatigue, notes this is a bit worse.   Stable weight.   No abd pain/bloating, nausea, masses/nodes, fevers.     Chronic low back with lumbar radiculopathy/bilateral hip/pelvis arthritic pain with ambulation.  No acute pain.        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 Saw ENT for chronic sinusitis. CT chest showed lymphadenopathy (mildly prominent axillary, mediastinal and upper abd). +night sweats,no other B symptoms.  -No cytopenias.   -PET: avid LN above and below diaphragm  -Serum immunofixation/SPEP: 0.1 m spike. Possible very small monoclonal free immunoglobulin light chain of  lambda chain type   -6/30/23 L axillary LN biopsy: low-grade CD5 neg, CD10 neg B-cell non-Hodkin's lymphoma. Neg MYD88 mutation (differentials: lymphoplasmacytic lymphoma or marginal zone)  -Bone marrow biopsy: hypercellular marrow involved by marginal zone lymphoma (at least 80%), lambda LC restricted monoclonal B-cell population.     Treatment:  Observation      Physical Exam    BP (!) 146/61 (BP Location: Right arm, Patient Position: Sitting, Cuff Size: Adult Regular)   Pulse 75   Temp 97.8  F (36.6  C) (Tympanic)   Resp 16   Ht 1.613 m (5' 3.5\")   Wt 71.7 kg (158 lb)   LMP 01/01/1974   SpO2 97%   BMI " 27.55 kg/m        GENERAL: Alert and oriented to time place and person. Hard of hearing. In no distress. Alone.   HEAD: Atraumatic and normocephalic. No alopecia.  EYES: DK, EOMI. No erythema. No icterus.  LYMPH NODES: No palpable supraclavicular, cervical, axillary or inguinal lymphadenopathy.  CHEST: clear to auscultation bilaterally. Resonant to percussion throughout bilaterally. Symmetrical breath movements bilaterally.  CVS: RRR  ABDOMEN: Soft. Not tender. Not distended. No palpable hepatomegaly or splenomegaly. No other mass palpable. Bowel sounds present.  EXTREMITIES: Warm. Slight ankle swelling bilateral  SKIN: no rash, or bruising or purpura.   NEURO: No gross deficit noted. Non-antalgic gait.      Lab Results    Recent Results (from the past 720 hours)   Lactate Dehydrogenase    Collection Time: 04/21/25 10:37 AM   Result Value Ref Range    Lactate Dehydrogenase 166 0 - 250 U/L   Comprehensive metabolic panel (BMP + Alb, Alk Phos, ALT, AST, Total. Bili, TP)    Collection Time: 04/21/25 10:37 AM   Result Value Ref Range    Sodium 140 135 - 145 mmol/L    Potassium 4.9 3.4 - 5.3 mmol/L    Carbon Dioxide (CO2) 28 22 - 29 mmol/L    Anion Gap 11 7 - 15 mmol/L    Urea Nitrogen 16.8 8.0 - 23.0 mg/dL    Creatinine 0.85 0.51 - 0.95 mg/dL    GFR Estimate 67 >60 mL/min/1.73m2    Calcium 9.8 8.8 - 10.4 mg/dL    Chloride 101 98 - 107 mmol/L    Glucose 91 70 - 99 mg/dL    Alkaline Phosphatase 204 (H) 40 - 150 U/L    AST 24 0 - 45 U/L    ALT 9 0 - 50 U/L    Protein Total 8.1 6.4 - 8.3 g/dL    Albumin 4.1 3.5 - 5.2 g/dL    Bilirubin Total 0.8 <=1.2 mg/dL   CBC with platelets and differential    Collection Time: 04/21/25 10:37 AM   Result Value Ref Range    WBC Count 10.3 4.0 - 11.0 10e3/uL    RBC Count 3.47 (L) 3.80 - 5.20 10e6/uL    Hemoglobin 11.3 (L) 11.7 - 15.7 g/dL    Hematocrit 36.9 35.0 - 47.0 %     (H) 78 - 100 fL    MCH 32.6 26.5 - 33.0 pg    MCHC 30.6 (L) 31.5 - 36.5 g/dL    RDW 14.1 10.0 - 15.0 %     Platelet Count 325 150 - 450 10e3/uL    % Neutrophils 39 %    % Lymphocytes 58 %    % Monocytes 2 %    % Eosinophils 1 %    % Basophils 1 %    % Immature Granulocytes 0 %    NRBCs per 100 WBC 0 <1 /100    Absolute Neutrophils 4.0 1.6 - 8.3 10e3/uL    Absolute Lymphocytes 5.9 (H) 0.8 - 5.3 10e3/uL    Absolute Monocytes 0.2 0.0 - 1.3 10e3/uL    Absolute Eosinophils 0.1 0.0 - 0.7 10e3/uL    Absolute Basophils 0.1 0.0 - 0.2 10e3/uL    Absolute Immature Granulocytes 0.0 <=0.4 10e3/uL    Absolute NRBCs 0.0 10e3/uL   RBC and Platelet Morphology    Collection Time: 04/21/25 10:37 AM   Result Value Ref Range    RBC Morphology Confirmed RBC Indices     Platelet Assessment  Automated Count Confirmed. Platelet morphology is normal.     Automated Count Confirmed. Platelet morphology is normal.   CT Chest/Abdomen/Pelvis w Contrast    Collection Time: 04/23/25  1:24 PM   Result Value Ref Range    Radiologist flags Lung nodule and renal cyst        ..  I spent 46 minutes on the patient's visit today.  This included preparation for the visit, face-to-face time with the patient and documentation following the visit.  It did not include teaching or procedure time.    Signed by: Peter E. Friedell, MD

## 2025-04-30 NOTE — LETTER
"4/30/2025      Lavonne Tidwell  [address] [place] [phone]      Dear Colleague,    Thank you for referring your patient, Lavonne Tidwell, to the [facility] [place]. Please see a copy of my visit note below.    Oncology Rooming Note    April 30, 2025 1:37 PM   Lavonne Tidwell is a 84 year old female who presents for:    Chief Complaint   Patient presents with     Oncology Clinic Visit     for Low-grade lymphoma - Provider visit only     Initial Vitals: BP (!) 146/61 (BP Location: Right arm, Patient Position: Sitting, Cuff Size: Adult Regular)   Pulse 75   Temp 97.8  F (36.6  C) (Tympanic)   Resp 16   Ht 1.613 m (5' 3.5\")   Wt 71.7 kg (158 lb)   LMP 01/01/1974   SpO2 97%   BMI 27.55 kg/m   Estimated body mass index is 27.55 kg/m  as calculated from the following:    Height as of this encounter: 1.613 m (5' 3.5\").    Weight as of this encounter: 71.7 kg (158 lb). Body surface area is 1.79 meters squared.  No Pain (0) Comment: Data Unavailable   Patient's last menstrual period was 01/01/1974.  Allergies reviewed: Yes  Medications reviewed: Yes    Medications: Medication refills not needed today.  Pharmacy name entered into [facility]: [facility] [place], [place] - [address] [place] [address]    Frailty Screening:   Is the patient here for a new oncology consult visit in cancer care? 2. No    PHQ9:  Did this patient require a PHQ9?: No      Clinical concerns:  None today      Nery Donaldson, 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   Appears low grade and without B symptoms nor cytopenias, no indication to treat so under observation.     She remains overall " stable.  Hot flashes/night sweats are rare and have been long-standing x years. Chronic fatigue, a bit worse.     Labwork: Stable hgb 10.7, , normal WBC/platelets. LDH WNL. Creatinine WNL.  Alk phos has been persistently elevated since 6/2024, a bit moreso now (282, compared to 194-249). Rest LFT/bili WNL.     She had a fall with pelvic fracture 9 mths ago. No new bone pain.   No new abd pain.    Plan:  -Continue observation since asymptomatic.   -Return in 3 months with labs + CT given persistently elevated alk phos to rule out progression.  Also losing weight advised  to try to eat more.  She lives alone daughter in law will bring over more food.      MGUS, lambda LC  Lambda monocytic B cells compose 48% of the marrow.    M peak stable 0.1; mild/stable elevation of both Kappa + lambda LC with normal ratio; immunoglobulins WNL.     Plan:  -Repeat labs every 6 mths (ordered)    Macrocytic anemia  Secondary to lymphoma bone marrow involvement.   TSH, B12 and folate WNL. Retic WNL.     Hgb is stable  (stable over last few years). MCV stable.     Plan:  -Follow    Chronic sinusitis  Has struggled with this for a few years. Eval in ENT. Treating symptomatically.   Recent Covid infection acutely worsened symptoms.     Her IgG is normal, so I don't think she has significant immunosuppression from her lymphoma, but may have to some degree.    Plan:  -Continue mgmt with ENT/PCP  She is on antibiotics now.      6. New finding right kidney on CT  renal ultrasound ordered      ______________________________________________________________________________    History of Present Illness/ Interval History    Ms. Lavonne Tidwell  is a 84 year woman old diagnosed with low grade lymphoma 1.5 yr ago, on observation.     Shehad covid last winter sinus congestion persists.   Chronic sinusitis/rhinorrhea. Chronic hoarse voice, related to reflux.   Chronic recurrent UTIs + urinary incontinence.    Occasional night sweats one  "night/month chronic since off estrogen replacement x 10 yrs (no recent change).  Chronic fatigue, notes this is a bit worse.   Stable weight.   No abd pain/bloating, nausea, masses/nodes, fevers.     Chronic low back with lumbar radiculopathy/bilateral hip/pelvis arthritic pain with ambulation.  No acute pain.        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 Saw ENT for chronic sinusitis. CT chest showed lymphadenopathy (mildly prominent axillary, mediastinal and upper abd). +night sweats,no other B symptoms.  -No cytopenias.   -PET: avid LN above and below diaphragm  -Serum immunofixation/SPEP: 0.1 m spike. Possible very small monoclonal free immunoglobulin light chain of  lambda chain type   -6/30/23 L axillary LN biopsy: low-grade CD5 neg, CD10 neg B-cell non-Hodkin's lymphoma. Neg MYD88 mutation (differentials: lymphoplasmacytic lymphoma or marginal zone)  -Bone marrow biopsy: hypercellular marrow involved by marginal zone lymphoma (at least 80%), lambda LC restricted monoclonal B-cell population.     Treatment:  Observation      Physical Exam    BP (!) 146/61 (BP Location: Right arm, Patient Position: Sitting, Cuff Size: Adult Regular)   Pulse 75   Temp 97.8  F (36.6  C) (Tympanic)   Resp 16   Ht 1.613 m (5' 3.5\")   Wt 71.7 kg (158 lb)   LMP 01/01/1974   SpO2 97%   BMI 27.55 kg/m        GENERAL: Alert and oriented to time place and person. Hard of hearing. In no distress. Alone.   HEAD: Atraumatic and normocephalic. No alopecia.  EYES: DK, EOMI. No erythema. No icterus.  LYMPH NODES: No palpable supraclavicular, cervical, axillary or inguinal lymphadenopathy.  CHEST: clear to auscultation bilaterally. Resonant to percussion throughout bilaterally. Symmetrical breath movements bilaterally.  CVS: RRR  ABDOMEN: Soft. Not " tender. Not distended. No palpable hepatomegaly or splenomegaly. No other mass palpable. Bowel sounds present.  EXTREMITIES: Warm. Slight ankle swelling bilateral  SKIN: no rash, or bruising or purpura.   NEURO: No gross deficit noted. Non-antalgic gait.      Lab Results    Recent Results (from the past 720 hours)   Lactate Dehydrogenase    Collection Time: 04/21/25 10:37 AM   Result Value Ref Range    Lactate Dehydrogenase 166 0 - 250 U/L   Comprehensive metabolic panel (BMP + Alb, Alk Phos, ALT, AST, Total. Bili, TP)    Collection Time: 04/21/25 10:37 AM   Result Value Ref Range    Sodium 140 135 - 145 mmol/L    Potassium 4.9 3.4 - 5.3 mmol/L    Carbon Dioxide (CO2) 28 22 - 29 mmol/L    Anion Gap 11 7 - 15 mmol/L    Urea Nitrogen 16.8 8.0 - 23.0 mg/dL    Creatinine 0.85 0.51 - 0.95 mg/dL    GFR Estimate 67 >60 mL/min/1.73m2    Calcium 9.8 8.8 - 10.4 mg/dL    Chloride 101 98 - 107 mmol/L    Glucose 91 70 - 99 mg/dL    Alkaline Phosphatase 204 (H) 40 - 150 U/L    AST 24 0 - 45 U/L    ALT 9 0 - 50 U/L    Protein Total 8.1 6.4 - 8.3 g/dL    Albumin 4.1 3.5 - 5.2 g/dL    Bilirubin Total 0.8 <=1.2 mg/dL   CBC with platelets and differential    Collection Time: 04/21/25 10:37 AM   Result Value Ref Range    WBC Count 10.3 4.0 - 11.0 10e3/uL    RBC Count 3.47 (L) 3.80 - 5.20 10e6/uL    Hemoglobin 11.3 (L) 11.7 - 15.7 g/dL    Hematocrit 36.9 35.0 - 47.0 %     (H) 78 - 100 fL    MCH 32.6 26.5 - 33.0 pg    MCHC 30.6 (L) 31.5 - 36.5 g/dL    RDW 14.1 10.0 - 15.0 %    Platelet Count 325 150 - 450 10e3/uL    % Neutrophils 39 %    % Lymphocytes 58 %    % Monocytes 2 %    % Eosinophils 1 %    % Basophils 1 %    % Immature Granulocytes 0 %    NRBCs per 100 WBC 0 <1 /100    Absolute Neutrophils 4.0 1.6 - 8.3 10e3/uL    Absolute Lymphocytes 5.9 (H) 0.8 - 5.3 10e3/uL    Absolute Monocytes 0.2 0.0 - 1.3 10e3/uL    Absolute Eosinophils 0.1 0.0 - 0.7 10e3/uL    Absolute Basophils 0.1 0.0 - 0.2 10e3/uL    Absolute Immature  Granulocytes 0.0 <=0.4 10e3/uL    Absolute NRBCs 0.0 10e3/uL   RBC and Platelet Morphology    Collection Time: 04/21/25 10:37 AM   Result Value Ref Range    RBC Morphology Confirmed RBC Indices     Platelet Assessment  Automated Count Confirmed. Platelet morphology is normal.     Automated Count Confirmed. Platelet morphology is normal.   CT Chest/Abdomen/Pelvis w Contrast    Collection Time: 04/23/25  1:24 PM   Result Value Ref Range    Radiologist flags Lung nodule and renal cyst        ..  I spent 46 minutes on the patient's visit today.  This included preparation for the visit, face-to-face time with the patient and documentation following the visit.  It did not include teaching or procedure time.    Signed by: Peter E. Friedell, MD        Again, thank you for allowing me to participate in the care of your patient.        Sincerely,        Peter E. Friedell, MD    Electronically signed

## 2025-04-30 NOTE — PROGRESS NOTES
"Oncology Rooming Note    April 30, 2025 1:37 PM   Lavonne Tidwell is a 84 year old female who presents for:    Chief Complaint   Patient presents with    Oncology Clinic Visit     for Low-grade lymphoma - Provider visit only     Initial Vitals: BP (!) 146/61 (BP Location: Right arm, Patient Position: Sitting, Cuff Size: Adult Regular)   Pulse 75   Temp 97.8  F (36.6  C) (Tympanic)   Resp 16   Ht 1.613 m (5' 3.5\")   Wt 71.7 kg (158 lb)   LMP 01/01/1974   SpO2 97%   BMI 27.55 kg/m   Estimated body mass index is 27.55 kg/m  as calculated from the following:    Height as of this encounter: 1.613 m (5' 3.5\").    Weight as of this encounter: 71.7 kg (158 lb). Body surface area is 1.79 meters squared.  No Pain (0) Comment: Data Unavailable   Patient's last menstrual period was 01/01/1974.  Allergies reviewed: Yes  Medications reviewed: Yes    Medications: Medication refills not needed today.  Pharmacy name entered into [facility]: [facility] [place], [place] - [address] [place] [address]    Frailty Screening:   Is the patient here for a new oncology consult visit in cancer care? 2. No    PHQ9:  Did this patient require a PHQ9?: No      Clinical concerns:  None today      Nery Donaldson CMA            "

## 2025-05-07 ENCOUNTER — HOSPITAL ENCOUNTER (OUTPATIENT)
Facility: CLINIC | Age: 85
Setting detail: OBSERVATION
End: 2025-05-07
Attending: EMERGENCY MEDICINE | Admitting: INTERNAL MEDICINE
Payer: COMMERCIAL

## 2025-05-07 ENCOUNTER — APPOINTMENT (OUTPATIENT)
Dept: GENERAL RADIOLOGY | Facility: CLINIC | Age: 85
End: 2025-05-07
Attending: EMERGENCY MEDICINE
Payer: COMMERCIAL

## 2025-05-07 DIAGNOSIS — R05.3 CHRONIC COUGH: ICD-10-CM

## 2025-05-07 DIAGNOSIS — R09.82 POST-NASAL DRAINAGE: ICD-10-CM

## 2025-05-07 DIAGNOSIS — R19.7 DIARRHEA, UNSPECIFIED TYPE: ICD-10-CM

## 2025-05-07 DIAGNOSIS — R53.1 GENERALIZED WEAKNESS: ICD-10-CM

## 2025-05-07 DIAGNOSIS — K59.00 CONSTIPATION, UNSPECIFIED CONSTIPATION TYPE: Primary | ICD-10-CM

## 2025-05-07 DIAGNOSIS — M25.561 ACUTE PAIN OF RIGHT KNEE: ICD-10-CM

## 2025-05-07 DIAGNOSIS — Z87.39 HISTORY OF GOUT: ICD-10-CM

## 2025-05-07 DIAGNOSIS — R09.89 THROAT CLEARING: ICD-10-CM

## 2025-05-07 PROCEDURE — 87637 SARSCOV2&INF A&B&RSV AMP PRB: CPT | Performed by: EMERGENCY MEDICINE

## 2025-05-07 PROCEDURE — 73560 X-RAY EXAM OF KNEE 1 OR 2: CPT | Mod: RT

## 2025-05-07 PROCEDURE — 84443 ASSAY THYROID STIM HORMONE: CPT | Performed by: EMERGENCY MEDICINE

## 2025-05-07 PROCEDURE — 99285 EMERGENCY DEPT VISIT HI MDM: CPT | Mod: 25 | Performed by: EMERGENCY MEDICINE

## 2025-05-07 PROCEDURE — 250N000013 HC RX MED GY IP 250 OP 250 PS 637: Performed by: EMERGENCY MEDICINE

## 2025-05-07 PROCEDURE — 99285 EMERGENCY DEPT VISIT HI MDM: CPT | Mod: 25

## 2025-05-07 PROCEDURE — 84550 ASSAY OF BLOOD/URIC ACID: CPT | Performed by: EMERGENCY MEDICINE

## 2025-05-07 PROCEDURE — 36415 COLL VENOUS BLD VENIPUNCTURE: CPT | Performed by: EMERGENCY MEDICINE

## 2025-05-07 PROCEDURE — 80051 ELECTROLYTE PANEL: CPT | Performed by: EMERGENCY MEDICINE

## 2025-05-07 PROCEDURE — 86140 C-REACTIVE PROTEIN: CPT | Performed by: EMERGENCY MEDICINE

## 2025-05-07 PROCEDURE — 85004 AUTOMATED DIFF WBC COUNT: CPT | Performed by: EMERGENCY MEDICINE

## 2025-05-07 RX ORDER — IBUPROFEN 600 MG/1
600 TABLET, FILM COATED ORAL EVERY 6 HOURS PRN
Status: DISCONTINUED | OUTPATIENT
Start: 2025-05-07 | End: 2025-05-08

## 2025-05-07 RX ORDER — OXYCODONE AND ACETAMINOPHEN 5; 325 MG/1; MG/1
1 TABLET ORAL ONCE
Refills: 0 | Status: DISCONTINUED | OUTPATIENT
Start: 2025-05-07 | End: 2025-05-07

## 2025-05-07 RX ORDER — ACETAMINOPHEN 325 MG/1
975 TABLET ORAL ONCE
Status: COMPLETED | OUTPATIENT
Start: 2025-05-07 | End: 2025-05-07

## 2025-05-07 RX ADMIN — IBUPROFEN 600 MG: 600 TABLET ORAL at 23:44

## 2025-05-07 RX ADMIN — ACETAMINOPHEN 975 MG: 325 TABLET, FILM COATED ORAL at 19:56

## 2025-05-07 ASSESSMENT — ACTIVITIES OF DAILY LIVING (ADL): ADLS_ACUITY_SCORE: 59

## 2025-05-08 ENCOUNTER — APPOINTMENT (OUTPATIENT)
Dept: OCCUPATIONAL THERAPY | Facility: CLINIC | Age: 85
End: 2025-05-08
Attending: INTERNAL MEDICINE
Payer: COMMERCIAL

## 2025-05-08 ENCOUNTER — APPOINTMENT (OUTPATIENT)
Dept: PHYSICAL THERAPY | Facility: CLINIC | Age: 85
End: 2025-05-08
Attending: INTERNAL MEDICINE
Payer: COMMERCIAL

## 2025-05-08 VITALS
HEART RATE: 77 BPM | RESPIRATION RATE: 16 BRPM | OXYGEN SATURATION: 95 % | HEIGHT: 64 IN | BODY MASS INDEX: 26.8 KG/M2 | DIASTOLIC BLOOD PRESSURE: 58 MMHG | TEMPERATURE: 97.8 F | WEIGHT: 157 LBS | SYSTOLIC BLOOD PRESSURE: 129 MMHG

## 2025-05-08 PROBLEM — R53.1 GENERALIZED WEAKNESS: Status: ACTIVE | Noted: 2025-05-08

## 2025-05-08 PROBLEM — H73.20 MYRINGITIS: Status: ACTIVE | Noted: 2025-05-08

## 2025-05-08 PROBLEM — R09.82 POST-NASAL DRAINAGE: Status: ACTIVE | Noted: 2025-05-08

## 2025-05-08 PROBLEM — M15.0 PRIMARY OSTEOARTHRITIS INVOLVING MULTIPLE JOINTS: Status: ACTIVE | Noted: 2021-09-03

## 2025-05-08 PROBLEM — R19.7 DIARRHEA, UNSPECIFIED TYPE: Status: ACTIVE | Noted: 2025-05-08

## 2025-05-08 PROBLEM — M54.50 CHRONIC BILATERAL LOW BACK PAIN WITHOUT SCIATICA: Status: ACTIVE | Noted: 2022-10-11

## 2025-05-08 PROBLEM — Z87.39 HISTORY OF GOUT: Status: ACTIVE | Noted: 2025-05-08

## 2025-05-08 PROBLEM — D47.2 MGUS (MONOCLONAL GAMMOPATHY OF UNKNOWN SIGNIFICANCE): Status: ACTIVE | Noted: 2024-07-19

## 2025-05-08 PROBLEM — G89.29 CHRONIC BILATERAL LOW BACK PAIN WITHOUT SCIATICA: Status: ACTIVE | Noted: 2022-10-11

## 2025-05-08 PROBLEM — M25.561 ACUTE PAIN OF RIGHT KNEE: Status: ACTIVE | Noted: 2025-05-08

## 2025-05-08 PROBLEM — R05.3 CHRONIC COUGH: Status: ACTIVE | Noted: 2023-09-14

## 2025-05-08 LAB
ALBUMIN SERPL BCG-MCNC: 3.9 G/DL (ref 3.5–5.2)
ALBUMIN UR-MCNC: NEGATIVE MG/DL
ALP SERPL-CCNC: 156 U/L (ref 40–150)
ALT SERPL W P-5'-P-CCNC: 14 U/L (ref 0–50)
ANION GAP SERPL CALCULATED.3IONS-SCNC: 14 MMOL/L (ref 7–15)
APPEARANCE UR: CLEAR
AST SERPL W P-5'-P-CCNC: 24 U/L (ref 0–45)
BASOPHILS # BLD AUTO: 0 10E3/UL (ref 0–0.2)
BASOPHILS NFR BLD AUTO: 0 %
BILIRUB SERPL-MCNC: 0.9 MG/DL
BILIRUB UR QL STRIP: NEGATIVE
BUN SERPL-MCNC: 19.6 MG/DL (ref 8–23)
CALCIUM SERPL-MCNC: 9.2 MG/DL (ref 8.8–10.4)
CHLORIDE SERPL-SCNC: 97 MMOL/L (ref 98–107)
COLOR UR AUTO: ABNORMAL
CREAT SERPL-MCNC: 0.89 MG/DL (ref 0.51–0.95)
CRP SERPL-MCNC: 148.68 MG/L
EGFRCR SERPLBLD CKD-EPI 2021: 63 ML/MIN/1.73M2
EOSINOPHIL # BLD AUTO: 0 10E3/UL (ref 0–0.7)
EOSINOPHIL NFR BLD AUTO: 0 %
ERYTHROCYTE [DISTWIDTH] IN BLOOD BY AUTOMATED COUNT: 14 % (ref 10–15)
FLUAV RNA SPEC QL NAA+PROBE: NEGATIVE
FLUBV RNA RESP QL NAA+PROBE: NEGATIVE
GLUCOSE SERPL-MCNC: 126 MG/DL (ref 70–99)
GLUCOSE UR STRIP-MCNC: NEGATIVE MG/DL
HCO3 SERPL-SCNC: 20 MMOL/L (ref 22–29)
HCT VFR BLD AUTO: 35.3 % (ref 35–47)
HGB BLD-MCNC: 11.3 G/DL (ref 11.7–15.7)
HGB UR QL STRIP: ABNORMAL
IMM GRANULOCYTES # BLD: 0 10E3/UL
IMM GRANULOCYTES NFR BLD: 0 %
KETONES UR STRIP-MCNC: NEGATIVE MG/DL
LEUKOCYTE ESTERASE UR QL STRIP: NEGATIVE
LYMPHOCYTES # BLD AUTO: 7.2 10E3/UL (ref 0.8–5.3)
LYMPHOCYTES NFR BLD AUTO: 63 %
MCH RBC QN AUTO: 33.1 PG (ref 26.5–33)
MCHC RBC AUTO-ENTMCNC: 32 G/DL (ref 31.5–36.5)
MCV RBC AUTO: 104 FL (ref 78–100)
MONOCYTES # BLD AUTO: 0.2 10E3/UL (ref 0–1.3)
MONOCYTES NFR BLD AUTO: 1 %
MUCOUS THREADS #/AREA URNS LPF: PRESENT /LPF
NEUTROPHILS # BLD AUTO: 4.1 10E3/UL (ref 1.6–8.3)
NEUTROPHILS NFR BLD AUTO: 36 %
NITRATE UR QL: NEGATIVE
NRBC # BLD AUTO: 0 10E3/UL
NRBC BLD AUTO-RTO: 0 /100
PH UR STRIP: 5.5 [PH] (ref 5–7)
PLAT MORPH BLD: NORMAL
PLATELET # BLD AUTO: 230 10E3/UL (ref 150–450)
POTASSIUM SERPL-SCNC: 4.6 MMOL/L (ref 3.4–5.3)
PROT SERPL-MCNC: 7.5 G/DL (ref 6.4–8.3)
RBC # BLD AUTO: 3.41 10E6/UL (ref 3.8–5.2)
RBC MORPH BLD: NORMAL
RBC URINE: 1 /HPF
RSV RNA SPEC NAA+PROBE: NEGATIVE
SARS-COV-2 RNA RESP QL NAA+PROBE: NEGATIVE
SODIUM SERPL-SCNC: 131 MMOL/L (ref 135–145)
SP GR UR STRIP: 1.01 (ref 1–1.03)
SQUAMOUS EPITHELIAL: <1 /HPF
TSH SERPL DL<=0.005 MIU/L-ACNC: 2.84 UIU/ML (ref 0.3–4.2)
URATE SERPL-MCNC: 4 MG/DL (ref 2.4–5.7)
UROBILINOGEN UR STRIP-MCNC: NORMAL MG/DL
WBC # BLD AUTO: 11.5 10E3/UL (ref 4–11)
WBC URINE: 1 /HPF

## 2025-05-08 PROCEDURE — 250N000013 HC RX MED GY IP 250 OP 250 PS 637: Performed by: PHYSICIAN ASSISTANT

## 2025-05-08 PROCEDURE — 97165 OT EVAL LOW COMPLEX 30 MIN: CPT | Mod: GO

## 2025-05-08 PROCEDURE — 81001 URINALYSIS AUTO W/SCOPE: CPT | Performed by: EMERGENCY MEDICINE

## 2025-05-08 PROCEDURE — G0378 HOSPITAL OBSERVATION PER HR: HCPCS

## 2025-05-08 PROCEDURE — 99223 1ST HOSP IP/OBS HIGH 75: CPT | Performed by: PHYSICIAN ASSISTANT

## 2025-05-08 PROCEDURE — 97161 PT EVAL LOW COMPLEX 20 MIN: CPT | Mod: GP

## 2025-05-08 PROCEDURE — 97535 SELF CARE MNGMENT TRAINING: CPT | Mod: GO

## 2025-05-08 PROCEDURE — 97530 THERAPEUTIC ACTIVITIES: CPT | Mod: GP

## 2025-05-08 PROCEDURE — 250N000013 HC RX MED GY IP 250 OP 250 PS 637: Performed by: INTERNAL MEDICINE

## 2025-05-08 PROCEDURE — 250N000012 HC RX MED GY IP 250 OP 636 PS 637: Performed by: EMERGENCY MEDICINE

## 2025-05-08 RX ORDER — ONDANSETRON 2 MG/ML
4 INJECTION INTRAMUSCULAR; INTRAVENOUS EVERY 6 HOURS PRN
Status: ACTIVE | OUTPATIENT
Start: 2025-05-08

## 2025-05-08 RX ORDER — AMOXICILLIN 250 MG
2 CAPSULE ORAL 2 TIMES DAILY PRN
OUTPATIENT
Start: 2025-05-08

## 2025-05-08 RX ORDER — BENZONATATE 100 MG/1
100 CAPSULE ORAL 3 TIMES DAILY PRN
Status: ACTIVE | OUTPATIENT
Start: 2025-05-08

## 2025-05-08 RX ORDER — AMOXICILLIN 250 MG
1 CAPSULE ORAL 2 TIMES DAILY PRN
OUTPATIENT
Start: 2025-05-08

## 2025-05-08 RX ORDER — AMOXICILLIN 250 MG
2 CAPSULE ORAL DAILY
Status: DISPENSED | OUTPATIENT
Start: 2025-05-08

## 2025-05-08 RX ORDER — OXYCODONE HYDROCHLORIDE 5 MG/1
5 TABLET ORAL EVERY 4 HOURS PRN
Refills: 0 | OUTPATIENT
Start: 2025-05-08

## 2025-05-08 RX ORDER — LIDOCAINE 40 MG/G
CREAM TOPICAL
Status: ACTIVE | OUTPATIENT
Start: 2025-05-08

## 2025-05-08 RX ORDER — ACETAMINOPHEN 325 MG/1
325 TABLET ORAL EVERY 4 HOURS PRN
Status: DISPENSED | OUTPATIENT
Start: 2025-05-08

## 2025-05-08 RX ORDER — ALLOPURINOL 100 MG/1
100 TABLET ORAL DAILY
OUTPATIENT
Start: 2025-05-08

## 2025-05-08 RX ORDER — ONDANSETRON 4 MG/1
4 TABLET, ORALLY DISINTEGRATING ORAL EVERY 6 HOURS PRN
OUTPATIENT
Start: 2025-05-08

## 2025-05-08 RX ORDER — DOCUSATE SODIUM 100 MG/1
100 CAPSULE, LIQUID FILLED ORAL 2 TIMES DAILY
OUTPATIENT
Start: 2025-05-08

## 2025-05-08 RX ORDER — ONDANSETRON 4 MG/1
4 TABLET, ORALLY DISINTEGRATING ORAL EVERY 6 HOURS PRN
Status: ACTIVE | OUTPATIENT
Start: 2025-05-08

## 2025-05-08 RX ORDER — ONDANSETRON 2 MG/ML
4 INJECTION INTRAMUSCULAR; INTRAVENOUS EVERY 6 HOURS PRN
OUTPATIENT
Start: 2025-05-08

## 2025-05-08 RX ORDER — CIPROFLOXACIN AND DEXAMETHASONE 3; 1 MG/ML; MG/ML
4 SUSPENSION/ DROPS AURICULAR (OTIC) 2 TIMES DAILY
Status: DISPENSED | OUTPATIENT
Start: 2025-05-08

## 2025-05-08 RX ORDER — ACETAMINOPHEN 325 MG/1
650 TABLET ORAL EVERY 6 HOURS
Status: DISCONTINUED | OUTPATIENT
Start: 2025-05-08 | End: 2025-05-08

## 2025-05-08 RX ORDER — TOLTERODINE 2 MG/1
2 CAPSULE, EXTENDED RELEASE ORAL EVERY EVENING
Status: DISPENSED | OUTPATIENT
Start: 2025-05-08

## 2025-05-08 RX ORDER — CALCIUM CARBONATE 500 MG/1
1000 TABLET, CHEWABLE ORAL 4 TIMES DAILY PRN
Status: ACTIVE | OUTPATIENT
Start: 2025-05-08

## 2025-05-08 RX ORDER — PREDNISONE 20 MG/1
40 TABLET ORAL ONCE
Status: COMPLETED | OUTPATIENT
Start: 2025-05-08 | End: 2025-05-08

## 2025-05-08 RX ORDER — NAPROXEN 250 MG/1
250 TABLET ORAL 2 TIMES DAILY PRN
Status: DISPENSED | OUTPATIENT
Start: 2025-05-08

## 2025-05-08 RX ORDER — ACETAMINOPHEN 325 MG/1
975 TABLET ORAL EVERY 8 HOURS PRN
OUTPATIENT
Start: 2025-05-08

## 2025-05-08 RX ORDER — TOLTERODINE 2 MG/1
2 CAPSULE, EXTENDED RELEASE ORAL DAILY
OUTPATIENT
Start: 2025-05-08

## 2025-05-08 RX ORDER — PROCHLORPERAZINE MALEATE 5 MG/1
5 TABLET ORAL EVERY 6 HOURS PRN
OUTPATIENT
Start: 2025-05-08

## 2025-05-08 RX ORDER — PANTOPRAZOLE SODIUM 40 MG/1
40 TABLET, DELAYED RELEASE ORAL
Status: DISPENSED | OUTPATIENT
Start: 2025-05-08

## 2025-05-08 RX ORDER — AMOXICILLIN 250 MG
2 CAPSULE ORAL DAILY
OUTPATIENT
Start: 2025-05-08

## 2025-05-08 RX ORDER — ALLOPURINOL 100 MG/1
100 TABLET ORAL DAILY
Status: DISPENSED | OUTPATIENT
Start: 2025-05-08

## 2025-05-08 RX ORDER — PANTOPRAZOLE SODIUM 40 MG/1
40 TABLET, DELAYED RELEASE ORAL DAILY
OUTPATIENT
Start: 2025-05-08

## 2025-05-08 RX ORDER — FAMOTIDINE 20 MG/1
40 TABLET, FILM COATED ORAL 2 TIMES DAILY
OUTPATIENT
Start: 2025-05-08

## 2025-05-08 RX ORDER — SULFAMETHOXAZOLE AND TRIMETHOPRIM 800; 160 MG/1; MG/1
1 TABLET ORAL 2 TIMES DAILY
Status: DISPENSED | OUTPATIENT
Start: 2025-05-08

## 2025-05-08 RX ORDER — ACETAMINOPHEN 500 MG
1000 TABLET ORAL EVERY 8 HOURS PRN
Status: DISCONTINUED | OUTPATIENT
Start: 2025-05-08 | End: 2025-05-08

## 2025-05-08 RX ORDER — FLUTICASONE PROPIONATE 50 MCG
1 SPRAY, SUSPENSION (ML) NASAL 2 TIMES DAILY
OUTPATIENT
Start: 2025-05-08

## 2025-05-08 RX ORDER — ACETAMINOPHEN 325 MG/1
650 TABLET ORAL EVERY 6 HOURS
Status: DISPENSED | OUTPATIENT
Start: 2025-05-08

## 2025-05-08 RX ORDER — FAMOTIDINE 20 MG/1
20 TABLET, FILM COATED ORAL 2 TIMES DAILY
Status: DISPENSED | OUTPATIENT
Start: 2025-05-08

## 2025-05-08 RX ORDER — PROCHLORPERAZINE MALEATE 5 MG/1
5 TABLET ORAL EVERY 6 HOURS PRN
Status: ACTIVE | OUTPATIENT
Start: 2025-05-08

## 2025-05-08 RX ADMIN — CIPROFLOXACIN AND DEXAMETHASONE 4 DROP: 3; 1 SUSPENSION/ DROPS AURICULAR (OTIC) at 21:23

## 2025-05-08 RX ADMIN — SENNOSIDES AND DOCUSATE SODIUM 1 TABLET: 50; 8.6 TABLET ORAL at 09:03

## 2025-05-08 RX ADMIN — NAPROXEN 250 MG: 250 TABLET ORAL at 09:59

## 2025-05-08 RX ADMIN — DICLOFENAC SODIUM 2 G: 10 GEL TOPICAL at 17:50

## 2025-05-08 RX ADMIN — DICLOFENAC SODIUM 2 G: 10 GEL TOPICAL at 21:24

## 2025-05-08 RX ADMIN — PREDNISONE 40 MG: 20 TABLET ORAL at 02:28

## 2025-05-08 RX ADMIN — FAMOTIDINE 20 MG: 20 TABLET, FILM COATED ORAL at 09:03

## 2025-05-08 RX ADMIN — TOLTERODINE TARTRATE 2 MG: 2 CAPSULE, EXTENDED RELEASE ORAL at 17:50

## 2025-05-08 RX ADMIN — CIPROFLOXACIN AND DEXAMETHASONE 4 DROP: 3; 1 SUSPENSION/ DROPS AURICULAR (OTIC) at 09:22

## 2025-05-08 RX ADMIN — FAMOTIDINE 20 MG: 20 TABLET, FILM COATED ORAL at 21:24

## 2025-05-08 RX ADMIN — DICLOFENAC SODIUM 2 G: 10 GEL TOPICAL at 13:11

## 2025-05-08 RX ADMIN — PANTOPRAZOLE SODIUM 40 MG: 40 TABLET, DELAYED RELEASE ORAL at 09:03

## 2025-05-08 RX ADMIN — ACETAMINOPHEN 650 MG: 325 TABLET, FILM COATED ORAL at 16:15

## 2025-05-08 RX ADMIN — DICLOFENAC SODIUM 2 G: 10 GEL TOPICAL at 09:59

## 2025-05-08 RX ADMIN — ALLOPURINOL 100 MG: 100 TABLET ORAL at 09:03

## 2025-05-08 RX ADMIN — ACETAMINOPHEN 650 MG: 325 TABLET, FILM COATED ORAL at 21:24

## 2025-05-08 RX ADMIN — ACETAMINOPHEN 650 MG: 325 TABLET, FILM COATED ORAL at 10:01

## 2025-05-08 RX ADMIN — SULFAMETHOXAZOLE AND TRIMETHOPRIM 1 TABLET: 800; 160 TABLET ORAL at 21:24

## 2025-05-08 RX ADMIN — SULFAMETHOXAZOLE AND TRIMETHOPRIM 1 TABLET: 800; 160 TABLET ORAL at 09:22

## 2025-05-08 ASSESSMENT — ACTIVITIES OF DAILY LIVING (ADL)
ADLS_ACUITY_SCORE: 58
ADLS_ACUITY_SCORE: 59
ADLS_ACUITY_SCORE: 58
ADLS_ACUITY_SCORE: 59
ADLS_ACUITY_SCORE: 58
ADLS_ACUITY_SCORE: 58
ADLS_ACUITY_SCORE: 56
ADLS_ACUITY_SCORE: 58
ADLS_ACUITY_SCORE: 59
ADLS_ACUITY_SCORE: 58
DEPENDENT_IADLS:: TRANSPORTATION
ADLS_ACUITY_SCORE: 59
ADLS_ACUITY_SCORE: 58

## 2025-05-08 NOTE — PROGRESS NOTES
05/08/25 1053   Appointment Info   Signing Clinician's Name / Credentials (PT) Melva Aldana, PT   Quick Adds   Quick Adds Certification   Living Environment   People in Home alone   Current Living Arrangements house   Home Accessibility no concerns   Living Environment Comments twinhome, no stairs   Self-Care   Equipment Currently Used at Home walker, rolling   Fall history within last six months no  (1 fall 1 year ago)   Activity/Exercise/Self-Care Comment pt typically indep with mobility wihtout device, started using 4WW due to weakness and falls. indep with ADL's/IADL's.   General Information   Onset of Illness/Injury or Date of Surgery 05/07/25   Referring Physician Judy Son PA-C   Patient/Family Therapy Goals Statement (PT) goal is TCU   Pertinent History of Current Problem (include personal factors and/or comorbidities that impact the POC) Lavonne Tidwell is a 84 year old female admitted on 5/7/2025. She has a past medical history significant for B-cell lymphoma, MGUS, hypertension, chronic gout, dyslipidemia, GERD, urinary incontinence, and osteoarthritis who presented to the emergency department for evaluation of generalized weakness and acute on chronic knee pain in the setting of recent nausea, vomiting, diarrhea, now has difficulty ambulating causing an unsafe situation returning home.   Existing Precautions/Restrictions no known precautions/restrictions   Cognition   Follows Commands (Cognition) WFL   Pain Assessment   Patient Currently in Pain Yes, see Vital Sign flowsheet  (knee pain, improved since admission)   Range of Motion (ROM)   Range of Motion ROM deficits secondary to pain   Strength (Manual Muscle Testing)   Strength (Manual Muscle Testing) Deficits observed during functional mobility   Strength Comments general LE weakness from reduced mobility   Bed Mobility   Comment, (Bed Mobility) supine<>sit with SBA   Transfers   Comment, (Transfers) sit>stand from EOB with 2WW and CGA,  Interval History: Patient responded to verbal stimuli spoke with nursing staff at bedside    Oncology Treatment Plan:   [No treatment plan]    Medications:  Continuous Infusions:   norepinephrine bitartrate-D5W       Scheduled Meds:   acetaZOLAMIDE (DIAMOX) IVPB  250 mg Intravenous Once    albumin human 25%  12.5 g Intravenous BID    chlorhexidine  15 mL Mouth/Throat BID    famotidine (PF)  20 mg Intravenous Daily    folic acid-vit B6-vit B12 2.5-25-2 mg  1 tablet Oral Daily    meropenem (MERREM) IVPB  500 mg Intravenous Q12H    metOLazone  5 mg Oral Daily    metronidazole  500 mg Intravenous Q8H    multivitamin  1 tablet Oral Daily    rifAXIMin  550 mg Oral BID    thiamine  100 mg Oral Daily    [START ON 4/15/2018] vancomycin (VANCOCIN) IVPB  1,000 mg Intravenous Q48H    vancomycin  250 mg Oral Q6H    vitamin D  5,000 Units Oral Daily     PRN Meds:sodium chloride, acetaminophen, albuterol-ipratropium 2.5mg-0.5mg/3mL, bisacodyl, dextrose 50%, fentaNYL, glucagon (human recombinant), insulin aspart U-100, lorazepam, ondansetron, pneumoc 13-bobby conj-dip cr(PF), sodium chloride 0.9%     Review of Systems   Unable to perform ROS: Intubated     Objective:     Vital Signs (Most Recent):  Temp: 100.2 °F (37.9 °C) (04/14/18 0305)  Pulse: 110 (04/14/18 0730)  Resp: 18 (04/14/18 0730)  BP: 107/63 (04/14/18 0730)  SpO2: 99 % (04/14/18 0730) Vital Signs (24h Range):  Temp:  [99.5 °F (37.5 °C)-102.5 °F (39.2 °C)] 100.2 °F (37.9 °C)  Pulse:  [] 110  Resp:  [9-30] 18  SpO2:  [58 %-100 %] 99 %  BP: ()/(37-82) 107/63     Weight: 126 kg (277 lb 12.5 oz)  Body mass index is 44.83 kg/m².  Body surface area is 2.42 meters squared.      Intake/Output Summary (Last 24 hours) at 04/14/18 0846  Last data filed at 04/14/18 0841   Gross per 24 hour   Intake             2137 ml   Output             3095 ml   Net             -958 ml       Physical Exam   Constitutional: She appears well-nourished.   HENT:   Head:  Normocephalic.   Cardiovascular: Normal rate.    Pulmonary/Chest: She is in respiratory distress.       Significant Labs:   BMP:   Recent Labs  Lab 04/13/18  1244 04/13/18 1949 04/13/18  1950 04/14/18  0414   GLU 72 71  --  68*    144  --  145   K 3.4* 3.1*  --  3.5   CL 97 98  --  100   CO2 31* 30*  --  28   BUN 7 7  --  8   CREATININE 2.2* 2.4*  --  2.9*   CALCIUM 9.0 9.2  --  9.2   MG 1.9  --  1.7 2.0   , CBC:   Recent Labs  Lab 04/13/18 0440 04/14/18  0414   WBC 21.20* 16.29*   HGB 9.8* 9.3*   HCT 30.2* 29.1*   * 162    and CMP:   Recent Labs  Lab 04/13/18 0440 04/13/18 1244 04/13/18 1949 04/14/18  0414     144 144 144 145   K 3.2*  3.2* 3.4* 3.1* 3.5   CL 97  97 97 98 100   CO2 32*  32* 31* 30* 28   GLU 96  96 72 71 68*   BUN 6  6 7 7 8   CREATININE 2.2*  2.2* 2.2* 2.4* 2.9*   CALCIUM 8.9  8.9 9.0 9.2 9.2   PROT 5.7*  --   --  5.6*   ALBUMIN 2.0* 2.1* 2.0* 2.0*   BILITOT 9.8*  --   --  9.8*   ALKPHOS 144*  --   --  125   AST 77*  --   --  83*   ALT 29  --   --  25   ANIONGAP 15  15 16 16 17*   EGFRNONAA 31*  31* 31* 28* 22*       Diagnostic Results:  I have reviewed and interpreted all pertinent imaging results/findings within the past 24 hours.   good standing balance   Gait/Stairs (Locomotion)   [place] Level (Gait) contact guard   Assistive Device (Gait) walker, front-wheeled   Distance in Feet (Gait) 50   Pattern (Gait) step-through   Deviations/Abnormal Patterns (Gait) antalgic;gait speed decreased;weight shifting decreased   Clinical Impression   Criteria for Skilled Therapeutic Intervention Yes, treatment indicated   PT Diagnosis (PT) impaired functional mobility   Influenced by the following impairments knee pain, LE weakness, reduced activity tolerance   Functional limitations due to impairments impaired transfers, gait   Clinical Presentation (PT Evaluation Complexity) stable   Clinical Presentation Rationale clinical reasoning   Clinical Decision Making (Complexity) low complexity   Planned Therapy Interventions (PT) balance training;gait training;home exercise program;patient/family education;strengthening;transfer training;progressive activity/exercise;risk factor education;home program guidelines   Risk & Benefits of therapy have been explained evaluation/treatment results reviewed;care plan/treatment goals reviewed;risks/benefits reviewed;current/potential barriers reviewed;participants voiced agreement with care plan;participants included;patient;daughter   PT Total Evaluation Time   PT Eval, Low Complexity Minutes (98002) 13   Therapy Certification   Start of care date 05/08/25   Certification date from 05/08/25   Certification date to 05/15/25   Medical Diagnosis R knee pain   Physical Therapy Goals   PT Frequency 5x/week   PT Predicted Duration/Target Date for Goal Attainment 05/15/25   PT Goals Bed Mobility;Transfers;Gait   PT: Bed Mobility Independent;Supine to/from sit   PT: Transfers Modified independent;Bed to/from chair;Assistive device   PT: Gait Modified independent;Rolling walker;150 feet   Interventions   Interventions Quick Adds Therapeutic Activity   Therapeutic Activity   Therapeutic Activities: dynamic activities to  improve functional performance Minutes (38373) 10   Symptoms Noted During/After Treatment Increased pain   Treatment Detail/Skilled Intervention edu on role of IP PT to assess mobility and assist in safe discharge plan. cues for 2WW use due to pain and weakness, pt in agreement. edu on general progression of activity to continue to improve pain and strength. discussed rec for TCU given weakness and knee pain, pt and family in agreement with this plan. remains in bed, bed alarm on and call light in reach.   PT Discharge Planning   PT Plan Thurs 1/5; gait with 4WW (bring), LE strengthening, monitor R knee pain   PT Discharge Recommendation (DC Rec) Transitional Care Facility   PT Rationale for DC Rec rec TCU today as pt requires Ax1 for household distance ambulation, limited in distance due to knee pain, pt typically indep with mobility, ADL's, and IADL's so unsafe to return home   PT Brief overview of current status supine<>sit with SBA, sit>stand with CGA, amb 50 feet with 2WW and CGA   PT Total Distance Amb During Session (feet) 50   PT Equipment Needed at Discharge   (has 4WW)   Physical Therapy Time and Intention   Timed Code Treatment Minutes 10   Total Session Time (sum of timed and untimed services) 23   [facility]                                                                                   OUTPATIENT PHYSICAL THERAPY    PLAN OF TREATMENT FOR OUTPATIENT REHABILITATION   Patient's Last Name, First Name, Lavonne Galvan YOB: 1940   Provider's Name   [facility]   Medical Record No.  [id number]     Onset Date: 05/07/25 Start of Care Date: 05/08/25     Medical Diagnosis:  R knee pain               PT Diagnosis:  impaired functional mobility Certification Dates:  From: 05/08/25  To: 05/15/25       See note for plan of treatment, functional goals, and certification details.    I CERTIFY THE NEED FOR THESE SERVICES FURNISHED  UNDER        THIS PLAN OF TREATMENT AND WHILE UNDER MY CARE (Physician co-signature of this document indicates review and certification of the therapy plan).

## 2025-05-08 NOTE — ED NOTES
"[facility]   Admission Handoff    The patient is Lavonne Tidwell, 84 year old who arrived in the ED by AMBULANCE from home with a complaint of Knee Pain (Acute on chronic. Coming from home, A&Ox4. Triage appropriate per EMS)  . The patient's current symptoms are new and during this time the symptoms have decreased. In the ED, patient was diagnosed with   Final diagnoses:   Acute pain of right knee - With small prepatellar effusion   History of gout   Generalized weakness   Diarrhea, unspecified type         Needed?: No    Allergies:    Allergies   Allergen Reactions    Codeine GI Disturbance       Past Medical Hx:   Past Medical History:   Diagnosis Date    Arthritis 2012    knes and hips    Cancer (H)     Closed fracture of ramus of left pubis, initial encounter (H) 05/27/2024    Essential hypertension 05/05/2021    History of blood transfusion 1961    Miscarriage    Low back pain, unspecified 10/11/2022    Ovarian cysts 1974    s/p BSO    Unspecified fall, subsequent encounter 05/29/2024       Initial vitals were: BP: 139/58  Pulse: 88  Temp: 98.4  F (36.9  C)  Resp: 18  Height: 161.3 cm (5' 3.5\")  Weight: 71.2 kg (157 lb)  SpO2: 100 %   Recent vital Signs: /68   Pulse 88   Temp 98.4  F (36.9  C) (Oral)   Resp 18   Ht 1.613 m (5' 3.5\")   Wt 71.2 kg (157 lb)   LMP 01/01/1974   SpO2 95%   BMI 27.38 kg/m      Elimination Status: Continent: Yes     Activity Level: SBA w/ walker    Fall Status: Reason for falls risk:  Mobility  nonskid shoes/slippers when out of bed, patient and family education, assistive device/personal items within reach, and activity supervised    Baseline Mental status: WDL  Current Mental Status changes: at basesline    Infection present or suspected this encounter: no  Sepsis suspected: No    Isolation type: none    Bariatric equipment needed?: No    In the ED these meds were given:   Medications   ibuprofen (ADVIL/MOTRIN) tablet 600 mg (600 " mg Oral $Given 5/7/25 6485)   acetaminophen (TYLENOL) tablet 975 mg (975 mg Oral $Given 5/7/25 8576)   predniSONE (DELTASONE) tablet 40 mg (40 mg Oral $Given 5/8/25 0228)       Drips running?  No    Home pump  No    Current LDAs: Peripheral IV: Site right AC; Gauge 20  none     Results:   Labs/Imaging  Ordered and Resulted from Time of ED Arrival Up to the Time of Departure from the ED  Results for orders placed or performed during the hospital encounter of 05/07/25 (from the past 24 hours)   XR Knee Right 1/2 Views    Narrative    EXAM: XR KNEE RIGHT 1/2 VIEWS  LOCATION: [facility]  DATE: 5/7/2025    INDICATION: Acute on chronic pain.  COMPARISON: X-ray right knee 2 views 2/23/2017 at 1428 hours.      Impression    IMPRESSION: Postoperative changes of right TKA including patellar resurfacing. Hardware is well-seated with good alignment. No fracture, dislocation or calcified loose bodies. Small suprapatellar effusion. Resolved postoperative soft tissue gas and   surgical drain seen previously.   CBC with platelets differential    Narrative    The following orders were created for panel order CBC with platelets differential.  Procedure                               Abnormality         Status                     ---------                               -----------         ------                     CBC with platelets and ...[[id number]]  Abnormal            Final result               RBC and Platelet Morpho...[[id number]]                      Final result                 Please view results for these tests on the individual orders.   Comprehensive metabolic panel   Result Value Ref Range    Sodium 131 (L) 135 - 145 mmol/L    Potassium 4.6 3.4 - 5.3 mmol/L    Carbon Dioxide (CO2) 20 (L) 22 - 29 mmol/L    Anion Gap 14 7 - 15 mmol/L    Urea Nitrogen 19.6 8.0 - 23.0 mg/dL    Creatinine 0.89 0.51 - 0.95 mg/dL    GFR Estimate 63 >60 mL/min/1.73m2    Calcium 9.2 8.8 - 10.4 mg/dL    Chloride 97 (L)  98 - 107 mmol/L    Glucose 126 (H) 70 - 99 mg/dL    Alkaline Phosphatase 156 (H) 40 - 150 U/L    AST 24 0 - 45 U/L    ALT 14 0 - 50 U/L    Protein Total 7.5 6.4 - 8.3 g/dL    Albumin 3.9 3.5 - 5.2 g/dL    Bilirubin Total 0.9 <=1.2 mg/dL   TSH with free T4 reflex   Result Value Ref Range    TSH 2.84 0.30 - 4.20 uIU/mL   CBC with platelets and differential   Result Value Ref Range    WBC Count 11.5 (H) 4.0 - 11.0 10e3/uL    RBC Count 3.41 (L) 3.80 - 5.20 10e6/uL    Hemoglobin 11.3 (L) 11.7 - 15.7 g/dL    Hematocrit 35.3 35.0 - 47.0 %     (H) 78 - 100 fL    MCH 33.1 (H) 26.5 - 33.0 pg    MCHC 32.0 31.5 - 36.5 g/dL    RDW 14.0 10.0 - 15.0 %    Platelet Count 230 150 - 450 10e3/uL    % Neutrophils 36 %    % Lymphocytes 63 %    % Monocytes 1 %    % Eosinophils 0 %    % Basophils 0 %    % Immature Granulocytes 0 %    NRBCs per 100 WBC 0 <1 /100    Absolute Neutrophils 4.1 1.6 - 8.3 10e3/uL    Absolute Lymphocytes 7.2 (H) 0.8 - 5.3 10e3/uL    Absolute Monocytes 0.2 0.0 - 1.3 10e3/uL    Absolute Eosinophils 0.0 0.0 - 0.7 10e3/uL    Absolute Basophils 0.0 0.0 - 0.2 10e3/uL    Absolute Immature Granulocytes 0.0 <=0.4 10e3/uL    Absolute NRBCs 0.0 10e3/uL   RBC and Platelet Morphology   Result Value Ref Range    RBC Morphology Confirmed RBC Indices     Platelet Assessment  Automated Count Confirmed. Platelet morphology is normal.     Automated Count Confirmed. Platelet morphology is normal.   CRP Inflammation   Result Value Ref Range    CRP Inflammation 148.68 (H) <5.00 mg/L   Uric acid   Result Value Ref Range    Uric Acid 4.0 2.4 - 5.7 mg/dL   Influenza A/B, RSV and SARS-CoV2 PCR (COVID-19) Nose    Specimen: Nose; Swab   Result Value Ref Range    Influenza A PCR Negative Negative    Influenza B PCR Negative Negative    RSV PCR Negative Negative    SARS CoV2 PCR Negative Negative    Narrative    Testing was performed using the Xpert Xpress CoV2/Flu/RSV Assay on the [employer] Instrument. This test should be  ordered for the detection of SARS-CoV2, influenza, and RSV viruses in individuals with signs and symptoms of respiratory tract infection. This test is for in vitro diagnostic use under the [place] FDA for laboratories certified under CLIA to perform high or moderate complexity testing. This test has been [place] FDA cleared. A negative result does not rule out the presence of PCR inhibitors in the specimen or target RNA in concentration below the limit of detection for the assay. If only one viral target is positive but coinfection with multiple targets is suspected, the sample should be re-tested with another FDA cleared, approved, or authorized test, if coninfection would change clinical management. This test was validated by the [facility]. These laboratories are certified under the Clinical Laboratory Improvement Amendments of 1988 (CLIA-88) as qualified to perfom high complexity laboratory testing.   UA with Microscopic reflex to Culture    Specimen: Urine, Midstream   Result Value Ref Range    Color Urine Light Yellow Colorless, Straw, Light Yellow, Yellow    Appearance Urine Clear Clear    Glucose Urine Negative Negative mg/dL    Bilirubin Urine Negative Negative    Ketones Urine Negative Negative mg/dL    Specific Gravity Urine 1.010 1.003 - 1.035    Blood Urine Trace (A) Negative    pH Urine 5.5 5.0 - 7.0    Protein Albumin Urine Negative Negative mg/dL    Urobilinogen Urine Normal Normal mg/dL    Nitrite Urine Negative Negative    Leukocyte Esterase Urine Negative Negative    Mucus Urine Present (A) None Seen /LPF    RBC Urine 1 <=2 /HPF    WBC Urine 1 <=5 /HPF    Squamous Epithelials Urine <1 <=1 /HPF    Narrative    Urine Culture not indicated       For the majority of the shift this patient's behavior was Green     Cardiac Rhythm: Normal Sinus  Pt needs tele? No  Skin/wound Issues: None    Code Status: Full Code    Pain control: fair    Nausea control: good    Abnormal labs/tests/findings  requiring intervention: none    Patient tested for COVID 19 prior to admission: YES     OBS brochure/video discussed/provided to patient/family: Yes     Family present during ED course? Yes     Family Comments/Social Situation comments: none    Tasks needing completion: None    Mart Whitaker RN

## 2025-05-08 NOTE — CONSULTS
Care Management Initial Consult    General Information  Assessment completed with: Patient,    Type of CM/SW Visit: Initial Assessment    Primary Care Provider verified and updated as needed: Yes   Readmission within the last 30 days: no previous admission in last 30 days   Reason for Consult: discharge planning  Advance Care Planning: Advance Care Planning Reviewed: present on chart        Communication Assessment  Patient's communication style: spoken language (English or Bilingual)    Hearing Difficulty or Deaf: yes      Cognitive  Cognitive/Neuro/Behavioral: WDL                      Living Environment:   People in home: alone     Current living Arrangements: house      Able to return to prior arrangements: other (see comments) (TCU prior to return home)     Family/Social Support:  Care provided by: self  Provides care for: no one  Marital Status:   Support system: Children          Description of Support System: Supportive, Involved    Support Assessment: Adequate family and caregiver support, Adequate social supports    Current Resources:   Patient receiving home care services: No  Community Resources: None  Equipment currently used at home: walker, rolling  Supplies currently used at home: None    Employment/Financial:  Employment Status: retired     Financial Concerns: none   Does the patient's insurance plan have a 3 day qualifying hospital stay waiver?  Yes     Which insurance plan 3 day waiver is available? Alternative insurance waiver    Will the waiver be used for post-acute placement? Undetermined at this time    Lifestyle & Psychosocial Needs:  Social Drivers of Health     Food Insecurity: Low Risk  (5/8/2025)    Food Insecurity     Within the past 12 months, did you worry that your food would run out before you got money to buy more?: No     Within the past 12 months, did the food you bought just not last and you didn t have money to get more?: No   Depression: Not at risk (1/3/2025)    PHQ-2      PHQ-2 Score: 0   Housing Stability: Low Risk  (5/8/2025)    Housing Stability     Do you have housing? : Yes     Are you worried about losing your housing?: No   Tobacco Use: Medium Risk (5/7/2025)    Patient History     Smoking Tobacco Use: Former     Smokeless Tobacco Use: Never     Passive Exposure: Never   Financial Resource Strain: Low Risk  (5/8/2025)    Financial Resource Strain     Within the past 12 months, have you or your family members you live with been unable to get utilities (heat, electricity) when it was really needed?: No   Alcohol Use: Not on file   Transportation Needs: Low Risk  (5/8/2025)    Transportation Needs     Within the past 12 months, has lack of transportation kept you from medical appointments, getting your medicines, non-medical meetings or appointments, work, or from getting things that you need?: No   Physical Activity: Inactive (9/22/2024)    Exercise Vital Sign     Days of Exercise per Week: 0 days     Minutes of Exercise per Session: 20 min   Interpersonal Safety: Low Risk  (3/28/2025)    Interpersonal Safety     Do you feel physically and emotionally safe where you currently live?: Yes     Within the past 12 months, have you been hit, slapped, kicked or otherwise physically hurt by someone?: No     Within the past 12 months, have you been humiliated or emotionally abused in other ways by your partner or ex-partner?: No   Stress: No Stress Concern Present (9/22/2024)    [employer] - Occupational Stress Questionnaire     Feeling of Stress : Not at all   Social Connections: Unknown (6/13/2024)    Social Connection and Isolation Panel [NHANES]     Frequency of Communication with Friends and Family: Three times a week     Frequency of Social Gatherings with Friends and Family: Not on file     Attends Protestant Services: Not on file     Active Member of Clubs or Organizations: Not on file     Attends Club or Organization Meetings: Not on file      Marital Status:    Health Literacy: Not on file     Functional Status:  Prior to admission patient needed assistance:   Dependent ADLs:: Ambulation-cane, Ambulation-walker  Dependent IADLs:: Transportation     Mental Health Status:  Mental Health Status: No Current Concerns       Chemical Dependency Status:  Chemical Dependency Status: No Current Concerns           Values/Beliefs:  Spiritual, Cultural Beliefs, Restoration Practices, Values that affect care: no             Discussed  Partnership in Safe Discharge Planning  document with patient/family: Yes    Additional Information:    Referral received for discharge planning.     Met with patient at bedside and introduced self/role. Patient reports that she lives alone in a private home in the community. Patient is independent with ADLs at baseline.     Discussed therapy recommendations for TCU. Patient is in agreement and would like a referral sent to [facility] (Phone: [phone] Fax: [phone]).    Patient has been accepted by [facility] for [facility]CU care tomorrow. Patient is in agreement with plan and will discuss transport with family.     PLAN: [facility] on 5/9    KATIUSKA QUIGLEY RN

## 2025-05-08 NOTE — H&P
"[facility]    History and Physical - Hospitalist Service       Date of Admission:  5/7/2025    Assessment & Plan      Lavonne Tidwell is a 84 year old female admitted on 5/7/2025. She has a past medical history significant for B-cell lymphoma, MGUS, hypertension, chronic gout, dyslipidemia, GERD, urinary incontinence, and osteoarthritis who presented to the emergency department for evaluation of generalized weakness and acute on chronic knee pain in the setting of recent nausea, vomiting, diarrhea, now has difficulty ambulating causing an unsafe situation returning home.    Generalized weakness  Patient lives alone in private residence. Experiencing acute generalized weakness in the setting of recent GI symptoms (see below) and knee pain, preventing her from completing ADLs at home. UA and COVID / influenza / RSV PCR negative. Suspect weakness is due to recent GI illness (symptoms now resolved, but likely still recovering) and knee pain.  - PT/OT evaluations  - Care Management consult, may need TCU  - Address knee pain and GI illness below    Acute on chronic knee pain, right > left   Has chronic arthritis pain in bilateral knees and elsewhere, with acute worsening of knee pain on 5/7, right more painful than left. No known trauma. Afebrile, minimal leukocytosis (WBC 11.5). Uric acid WNL (4.0). CRP elevated (148).     X-ray right knee - \"Postoperative changes of right TKA including patellar resurfacing. Hardware is well-seated with good alignment. No fracture, dislocation or calcified loose bodies. Small suprapatellar effusion. Resolved postoperative soft tissue gas and surgical drain seen previously.\"    On exam, right knee is minimally tender to palpation, left knee non-tender. There is a small, palpable peripatellar effusion on right knee. No erythema or warmth. Slight pain evoked with active and passive range of motion of left knee (specifically flexion), non pain with range of " motion on left.     Pain is preventing patient from ambulating. The emergency department was concerned for possible gout and she received 40 mg prednisone, but clinically the overall picture is not highly suggestive of a gout flare. Differential most suggestive of osteoarthritis exacerbation or bursitis, less likely to be gout, septic joint, Baker's cyst, or DVT.  - Analgesia: scheduled acetaminophen 650 mg q6h, scheduled voltaren gel qid, plus additional acetaminophen 325 mg q4h prn, and naproxen 220 mg bid prn. No additional steroids, patient intolerant of narcotics.   - No indication for ortho consult at this time     Recent nausea, vomiting, and diarrhea - resolved  Patient had about 4-5 episodes of loose watery diarrhea and nausea with non-bloody emesis x 1 on 5/6; symptoms had resolved by 5/7, although patient with subsequent fatigue and weakness. No known ill contacts, no recent travel, no suspicion of contaminated food. Occurs in the setting of recent Bactrim use, which may have caused GI upset? Unclear cause for GI symptoms, suspect possible viral enteritis or antibiotic related.   - Symptomatic cares    Chronic cough  Myringitis   Post nasal drainage  Has been following with ENT Dr. Rob Harris for 2 year history of PND, chronic cough, and hearing difficulties. Recently had tympanostomy tubes placed on 5/2/25. Currently taking Bactrim DS bid, Ciprodex drops daily (right ear), and recently had Kenalog injection.   - Continue home Bactrim and Ciprodex    Chronic gout, unspecified, without tophus (tophi)  Known history of gout managed prior to admission with allopurinol 100 mg daily. As noted above, does not appear to have acute gout flare at time of admission, but did receive 40 mg prednisone while in the emergency department.   - Continue allopurinol  - See above regarding management of knee pain     Primary osteoarthritis involving multiple joints  Chronic bilateral low back pain without sciatica  Known  history of arthritis managed prior to admission with acetaminophen 1g q8h prn and naproxen 220 mg bid prn.   - See above regarding analgesia in the hospital    Essential (primary) hypertension  Blood pressure stable. Noted on problem list, but not on any pharmacotherapy prior to admission.  - Monitor    Hyperlipidemia LDL goal <130  Noted on problem list, but not on any pharmacotherapy prior to admission.  - No intervention     Esophageal reflux  Managed prior to admission with Protonix 40 mg daily, continue.     Urinary incontinence  Managed prior to admission with Detrol LA 2 mg q pm, continue.     Marginal zone B-cell lymphoma  MGUS (monoclonal gammopathy of unknown significance)  Follows with heme/oncology, last visit with Estela Barrett NP on 1/31/25. Has been overall stable, gets frequent labs and CT's for surveillance. Has an upcoming renal ultrasound scheduled for 5/9 at 12:30 pm.   - May need to cancel / reschedule renal ultrasound if not discharged by morning of 5/9   - Continue with outpatient heme/oncology management         Observation Goals: -diagnostic tests and consults completed and resulted, -vital signs normal or at patient baseline, -adequate pain control on oral analgesics, -safe disposition plan has been identified, Nurse to notify provider when observation goals have been met and patient is ready for discharge.  Diet: Regular Diet Adult    DVT Prophylaxis: Ambulate every shift (with assist)   Preciado Catheter: Not present  Lines: None     Cardiac Monitoring: None  Code Status:  Special code - DNI - trial of CPR acceptable, but discontinue if not improving and intubation is required.  Discussed with patient at time of admission     Clinically Significant Risk Factors Present on Admission         # Hyponatremia: Lowest Na = 131 mmol/L in last 2 days, will monitor as appropriate  # Hypochloremia: Lowest Cl = 97 mmol/L in last 2 days, will monitor as appropriate          # Hypertension: Noted on problem  "list           # Overweight: Estimated body mass index is 27.38 kg/m  as calculated from the following:    Height as of this encounter: 1.613 m (5' 3.5\").    Weight as of this encounter: 71.2 kg (157 lb).       # Financial/Environmental Concerns:           Disposition Plan     Medically Ready for Discharge: Anticipated Tomorrow         The patient's care was discussed with the Attending Physician, Dr. Capo Jennings, and Patient.    Judy Son PA-C  Hospitalist Service  [facility]  Securely message with [employer] (more info)  Text page via [facility] Paging/Directory     ______________________________________________________________________    Chief Complaint   \"I had some diarrhea 2 days ago, and yesterday my knees hurt and I sat in my chair and I just couldn't get up.\"    History is obtained from the patient, review of EMR, and emergency department documentation.    History of Present Illness   Lavonne Tidwell is a 84 year old female with a past medical history significant for B-cell lymphoma, MGUS, hypertension, chronic gout, dyslipidemia, GERD, urinary incontinence, and osteoarthritis who presented to the emergency department for evaluation of generalized weakness and acute on chronic knee pain in the setting of recent nausea, vomiting, diarrhea, now has difficulty ambulating causing an unsafe situation returning home.    Patient lives alone in a private residence, states that her family is always concerned about her falling.  Two days ago (5/6) patient developed acute abdominal cramping followed by 4-5 episodes of loose watery diarrhea, nausea, and non bloody emesis x 1.  Her GI symptoms have since resolved, but she woke up yesterday feeling very fatigued and weak.  She also had bilateral knee pain, right worse than left.  Knee pain is not new, but worse than baseline.  She forced herself to get up and take a shower and eat a small breakfast, after which she sat down in her " recliner and was unable to get up due to both weakness and knee pain.  When she attempted to get up she felt dyspneic, and also was somewhat lightheaded and very shaky.  Because of the pain and the weakness she was brought to the emergency department, where workup was fairly unrevealing but she is unable to safely discharge home at this time due to difficulty ambulating.    She denies any recent falls or injury in general, but specifically to her knee.  No recent fever but did feel chilled with the GI illness.  She has had a decreased appetite for the past few months with some weight loss, but has had significantly decreased appetite since onset of her GI illness.  She gets frequent leg cramping, which was worse than usual yesterday.  She is seeing ENT for chronic cough and postnasal drainage, recently had tympanostomy tubes placed and was started on Bactrim and otic Ciprodex drops.  The remainder review of systems is negative.      Past Medical History    Past Medical History:   Diagnosis Date    Arthritis 2012    knes and hips    Cancer (H)     Closed fracture of ramus of left pubis, initial encounter (H) 05/27/2024    Essential hypertension 05/05/2021    History of blood transfusion 1961    Miscarriage    Low back pain, unspecified 10/11/2022    Ovarian cysts 1974    s/p BSO    Unspecified fall, subsequent encounter 05/29/2024       Past Surgical History   Past Surgical History:   Procedure Laterality Date    ABDOMEN SURGERY  1973 & 1974    Ovarian cyst/Hysterectomy    APPENDECTOMY  1952    ARTHROPLASTY KNEE Left 1/6/2016    Procedure: ARTHROPLASTY KNEE;  Surgeon: Wilfredo Thompson MD;  Location: [facility] OR    ARTHROPLASTY KNEE Right 2/23/2017    Procedure: ARTHROPLASTY KNEE;  Surgeon: Wilfredo Thompson MD;  Location: [facility] OR    BIOPSY      colonoscopy/polyps    COLONOSCOPY      every 10 years    GENITOURINARY SURGERY  2008    bladder    HYSTERECTOMY, CHELSY  1974    ORTHOPEDIC SURGERY  2007 & 2009    [place]  both feet     PHACOEMULSIFICATION WITH STANDARD INTRAOCULAR LENS IMPLANT Left 8/20/2015    Procedure: PHACOEMULSIFICATION WITH STANDARD INTRAOCULAR LENS IMPLANT;  Surgeon: Fernando Jeffrey MD;  Location: [facility] OR    PHACOEMULSIFICATION WITH STANDARD INTRAOCULAR LENS IMPLANT Right 9/17/2015    Procedure: PHACOEMULSIFICATION WITH STANDARD INTRAOCULAR LENS IMPLANT;  Surgeon: Fernando Jeffrey MD;  Location: [facility] OR    SURGICAL HISTORY OF -   07/30/1998    Colonoscopy    SURGICAL HISTORY OF -   1974    Bilateral salpingoopherectomy    SURGICAL HISTORY OF -   3/09    TOT Midurethral sling       Prior to Admission Medications   Prior to Admission Medications   Prescriptions Last Dose Informant Patient Reported? Taking?   Acetaminophen (TYLENOL PO) 5/7/2025 Evening Self Yes Yes   Sig: Take 1,000 mg by mouth every 8 hours as needed for mild pain.   Menthol, Topical Analgesic, (BIOFREEZE) 4 % GEL 5/7/2025 Noon  Yes Yes   Sig: Externally apply topically 4 times daily   allopurinol (ZYLOPRIM) 100 MG tablet 5/7/2025 Morning  No Yes   Sig: Take 1 tablet (100 mg) by mouth daily   benzonatate (TESSALON) 100 MG capsule More than a month  No Yes   Sig: Take 1 capsule (100 mg) by mouth 3 times daily as needed for cough.   ciprofloxacin-dexAMETHasone (CIPRODEX) 0.3-0.1 % otic suspension 5/7/2025 Morning  No Yes   Sig: Place 4 drops into the right ear 2 times daily for 7 days.   famotidine (PEPCID) 40 MG tablet 5/7/2025 Morning  No Yes   Sig: Take 1 tablet (40 mg) by mouth 2 times daily.   fluticasone (FLONASE) 50 MCG/ACT nasal spray Past Week Self No Yes   Sig: Spray 1 spray into both nostrils 2 times daily   ipratropium (ATROVENT) 0.03 % nasal spray 5/7/2025 Evening  No Yes   Sig: SPRAY ONE TO TWO SPRAYS INTO BOTH NOSTRILS THREE TIMES DAILY AS NEEDED FOR RHINITIS (RUNNY NOSE, POSTNASAL DRAINAGE).   loratadine (CLARITIN) 10 MG tablet More than a month Self Yes Yes   Sig: Take 10 mg by mouth daily   mupirocin (BACTROBAN) 2 % external ointment  2025 Morning  No Yes   Sig: APPLY TOPICALLY TO NOSE THREE TIMES A DAY AS NEEDED   naproxen sodium 220 MG capsule Unknown  Yes No   Sig: Take 220 mg by mouth 2 times daily (with meals).   pantoprazole (PROTONIX) 40 MG EC tablet 2025 Morning  No Yes   Sig: Take 1 tablet (40 mg) by mouth daily.   senna-docusate (SENOKOT-S/PERICOLACE) 8.6-50 MG tablet More than a month Self Yes Yes   Sig: Take 2 tablets by mouth daily   sulfamethoxazole-trimethoprim (BACTRIM DS) 800-160 MG tablet Past Week  No Yes   Sig: Take 1 tablet by mouth 2 times daily for 21 days.   tolterodine ER (DETROL LA) 2 MG 24 hr capsule 2025 Evening  No Yes   Sig: TAKE 1 CAPSULE (2 MG) BY MOUTH DAILY.      Facility-Administered Medications Last Administration Doses Remaining   iohexol (OMNIPAQUE) 300 mg/mL injection 1 mL None recorded 1           Review of Systems    The 10 point Review of Systems is negative other than noted in the HPI or here.     Social History   I have reviewed this patient's social history and updated it with pertinent information if needed.  Social History     Tobacco Use    Smoking status: Former     Current packs/day: 0.00     Average packs/day: 0.5 packs/day for 5.0 years (2.5 ttl pk-yrs)     Types: Cigarettes     Start date: 1968     Quit date: 2/15/1972     Years since quittin.2     Passive exposure: Never    Smokeless tobacco: Never    Tobacco comments:     stopped in her 20's   Vaping Use    Vaping status: Never Used   Substance Use Topics    Alcohol use: Yes     Comment: Occasional    Drug use: No         Family History   I have reviewed this patient's family history and updated it with pertinent information if needed.  Family History   Problem Relation Age of Onset    Cancer Mother         Ovarian    Other Cancer Mother         Ovarian    Cerebrovascular Disease Father     Heart Disease Father     C.A.D. Father     Coronary Artery Disease Father     Cancer Maternal Grandmother     Other Cancer Maternal  Grandmother         Ovarian/Bone    Cerebrovascular Disease Maternal Grandfather     Colon Cancer Maternal Grandfather     Diabetes Other         Great Grandmother    Breast Cancer No family hx of         Physical Exam   Vital Signs: Temp: 97.3  F (36.3  C) Temp src: Oral BP: 117/50 Pulse: 75   Resp: 16 SpO2: 94 % O2 Device: None (Room air)    Weight: 157 lbs 0 oz    Constitutional: Alert, oriented, cooperative. No apparent distress, appears nontoxic. Speaking in full coherent sentences.     Eyes: Eyes are clear, pupils are reactive. No scleral icterus.    HEENT: Oropharynx is clear and moist, no lesions. Normocephalic, no evidence of cranial trauma.      Cardiovascular: Regular rhythm and rate, normal S1 and S2. No murmur, rubs, or gallops. Peripheral pulses intact bilaterally. Trace bilateral lower extremity edema.    Respiratory: Non-labored breathing on room air. Lung sounds are clear to auscultation bilaterally without wheezes, rhonchi, or crackles.    GI: Soft, non-distended. Non-tender, no rebound or guarding. No hepatosplenomegaly or masses appreciated. Normal bowel sounds.     Musculoskeletal: Without obvious deformity. Normal muscle bulk and tone. Distal CMS intact. Pain with active and passive range of motion of right knee (specifically flexion), no pain with left knee. No pain with palpation of both knees. Palpable right lateral peripatellar effusion vs bursae, minimally tender.     Skin: Warm and dry, no rashes or ecchymoses. No mottling of skin. No erythema or warmth over knee joints.      Neurologic: Patient moves all extremities. Gross strength and sensation are equal bilaterally.    Genitourinary: Deferred      Medical Decision Making       80 MINUTES SPENT BY ME on the date of service doing chart review, history, exam, documentation & further activities per the note.  MANAGEMENT DISCUSSED with the following over the past 24 hours: Dr. Capo Jennings   NOTE(S)/MEDICAL RECORDS REVIEWED over the past 24  hours: ENT notes 4/22/25 and 5/2/25, emergency department notes 5/7/25  Tests ORDERED & REVIEWED in the past 24 hours:  - CMP  - CBC  - CRP  - LFTs  - Troponin      Data     I have personally reviewed the following data over the past 24 hrs:    11.5 (H)  \   11.3 (L)   / 230     131 (L) 97 (L) 19.6 /  126 (H)   4.6 20 (L) 0.89 \     ALT: 14 AST: 24 AP: 156 (H) TBILI: 0.9   ALB: 3.9 TOT PROTEIN: 7.5 LIPASE: N/A     TSH: 2.84 T4: N/A A1C: N/A     Procal: N/A CRP: 148.68 (H) Lactic Acid: N/A         Imaging results reviewed over the past 24 hrs:   Recent Results (from the past 24 hours)   XR Knee Right 1/2 Views    Narrative    EXAM: XR KNEE RIGHT 1/2 VIEWS  LOCATION: [facility]  DATE: 5/7/2025    INDICATION: Acute on chronic pain.  COMPARISON: X-ray right knee 2 views 2/23/2017 at 1428 hours.      Impression    IMPRESSION: Postoperative changes of right TKA including patellar resurfacing. Hardware is well-seated with good alignment. No fracture, dislocation or calcified loose bodies. Small suprapatellar effusion. Resolved postoperative soft tissue gas and   surgical drain seen previously.

## 2025-05-08 NOTE — PROGRESS NOTES
"[facility] [place] ADMISSION NOTE    Patient admitted to room 2303 at approximately 0401 via cart from emergency room. Patient was accompanied by transport tech.     Verbal SBAR report received from ED RN prior to patient arrival.     Patient ambulated to bed with one assist. Patient alert and oriented X 4. The patient is not having any pain.  . Admission vital signs: Blood pressure 121/51, pulse 75, temperature 97.5  F (36.4  C), temperature source Oral, resp. rate 18, height 1.613 m (5' 3.5\"), weight 71.2 kg (157 lb), last menstrual period 01/01/1974, SpO2 94%, not currently breastfeeding. Patient was oriented to plan of care, call light, bed controls, tv, telephone, bathroom, and visiting hours.     Risk Assessment    The following safety risks were identified during admission: fall. Yellow risk band applied: YES.     Skin Initial Assessment    This writer admitted this patient and completed a full skin assessment and Harry score in the Adult PCS flowsheet.   Photo documentation of skin problem and/or wound competed via [employer] application (located under Media):  N/A    Appropriate interventions initiated as needed.     Pt declined full assessment.     Harry Risk Assessment  Sensory Perception: 4-->no impairment  Moisture: 4-->rarely moist  Activity: 3-->walks occasionally  Mobility: 3-->slightly limited  Nutrition: 3-->adequate  Friction and Shear: 3-->no apparent problem  Harry Score: 20  Mattress: Standard gel/foam mattress (IsoFlex, Atmos Air, etc.)  Bed Frame: Standard width and length    Education    Patient has a [place] to Observation order: Yes  Observation education completed and documented: Yes      Cuong Mustafa RN      "

## 2025-05-08 NOTE — ED TRIAGE NOTES
Pt arrives via EMS with c/o R knee pain, acute on chronic but today markedly worse and unable to walk per her baseline. No trauma. Pt lives at home independently.

## 2025-05-08 NOTE — ED PROVIDER NOTES
History     Chief Complaint   Patient presents with    Knee Pain     Acute on chronic. Coming from home, A&Ox4. Triage appropriate per EMS     HPI  History per patient, her son, a daughter-in-law and review of [facility] EMR and Care Everywhere EMR.   Lavonne Tidwell is a 84 year old female with history of total right knee arthroplasty in February 2017, gout, marginal zone B-cell lymphoma, monoclonal gammopathy of unknown significance and hypertension who presents with her son and another daughter-in-law for evaluation of atraumatic right knee pain, generalized weakness and inability to ambulate with her walker and at send activities of daily living at home alone due to right knee pain and generalized weakness.  Yesterday she had diarrhea without blood or mucus, no diarrhea today.  No fever, chills or vomiting.  No abdominal pain.  No signs or symptoms of GI bleeding.  Today she has noted atraumatic severe right knee pain without right knee swelling or redness.  No known strain or trauma or precipitant of her right knee pain.  No other acute systemic signs or symptoms of illness or infection.  No diarrhea today.      Allergies:  Allergies   Allergen Reactions    Codeine GI Disturbance       Problem List:    Patient Active Problem List    Diagnosis Date Noted    Marginal zone B-cell lymphoma (H) 07/19/2024     Priority: Medium    MGUS (monoclonal gammopathy of unknown significance) 07/19/2024     Priority: Medium    Other specified fracture of left pubis, subsequent encounter for fracture with routine healing 05/29/2024     Priority: Medium    Unspecified fracture of unspecified lumbar vertebra, subsequent encounter for fracture with routine healing 05/29/2024     Priority: Medium    Fall, initial encounter 05/27/2024     Priority: Medium    Closed fracture of transverse process of lumbar vertebra, initial encounter (H) 05/27/2024     Priority: Medium    Chronic gout, unspecified, without tophus (tophi) 05/27/2024      Priority: Medium    Chronic cough 09/14/2023     Priority: Medium    Chronic bilateral low back pain without sciatica 10/11/2022     Priority: Medium    Primary osteoarthritis involving multiple joints 09/03/2021     Priority: Medium    Polyosteoarthritis, unspecified 09/03/2021     Priority: Medium    Essential (primary) hypertension 05/05/2021     Priority: Medium    Tubulovillous adenoma polyp of colon 05/30/2019     Priority: Medium     Return in 3 years for the next colonoscopy      Status post total right knee replacement 02/23/2017     Priority: Medium    Primary localized osteoarthrosis, lower leg 02/23/2017     Priority: Medium    Status post total left knee replacement 01/06/2016     Priority: Medium    Senile nuclear sclerosis 08/18/2015     Priority: Medium    Onychomycosis 01/30/2015     Priority: Medium    Hyperlipidemia LDL goal <130 10/31/2010     Priority: Medium    Urinary incontinence 12/31/2008     Priority: Medium     On Ditropan for 2 years--modest improvement, initially; s/p TOT--good improvement in control      Atrophic vaginitis 12/31/2008     Priority: Medium    Menopausal syndrome (hot flashes) 12/02/2008     Priority: Medium     On Premarin--decreasing dose for 4 years--now takes twice a week      Esophageal reflux 11/10/2006     Priority: Medium    Injury of sigmoid colon 09/13/2005     Priority: Medium     Sigmoid polyp  Problem list name updated by automated process. Provider to review          Past Medical History:    Past Medical History:   Diagnosis Date    Arthritis 2012    Cancer (H)     Closed fracture of ramus of left pubis, initial encounter (H) 05/27/2024    Essential hypertension 05/05/2021    History of blood transfusion 1961    Low back pain, unspecified 10/11/2022    Ovarian cysts 1974    Unspecified fall, subsequent encounter 05/29/2024       Past Surgical History:    Past Surgical History:   Procedure Laterality Date    ABDOMEN SURGERY  1973 & 1974    Ovarian  cyst/Hysterectomy    APPENDECTOMY      ARTHROPLASTY KNEE Left 2016    Procedure: ARTHROPLASTY KNEE;  Surgeon: Wilfredo Thompson MD;  Location: [facility] OR    ARTHROPLASTY KNEE Right 2017    Procedure: ARTHROPLASTY KNEE;  Surgeon: Wilfredo Thompson MD;  Location: [facility] OR    BIOPSY      colonoscopy/polyps    COLONOSCOPY      every 10 years    GENITOURINARY SURGERY  2008    bladder    HYSTERECTOMY, CHELSY  1974    ORTHOPEDIC SURGERY   &     [place]  both feet    PHACOEMULSIFICATION WITH STANDARD INTRAOCULAR LENS IMPLANT Left 2015    Procedure: PHACOEMULSIFICATION WITH STANDARD INTRAOCULAR LENS IMPLANT;  Surgeon: Fernando Jeffrey MD;  Location: [facility] OR    PHACOEMULSIFICATION WITH STANDARD INTRAOCULAR LENS IMPLANT Right 2015    Procedure: PHACOEMULSIFICATION WITH STANDARD INTRAOCULAR LENS IMPLANT;  Surgeon: Fernando Jeffrey MD;  Location: [facility] OR    SURGICAL HISTORY OF -   1998    Colonoscopy    SURGICAL HISTORY OF 1974    Bilateral salpingoopherectomy    SURGICAL HISTORY OF -   3/09    TOT Midurethral sling       Family History:    Family History   Problem Relation Age of Onset    Cancer Mother         Ovarian    Other Cancer Mother         Ovarian    Cerebrovascular Disease Father     Heart Disease Father     C.A.D. Father     Coronary Artery Disease Father     Cancer Maternal Grandmother     Other Cancer Maternal Grandmother         Ovarian/Bone    Cerebrovascular Disease Maternal Grandfather     Colon Cancer Maternal Grandfather     Diabetes Other         Great Grandmother    Breast Cancer No family hx of        Social History:  Marital Status:   [5]  Social History     Tobacco Use    Smoking status: Former     Current packs/day: 0.00     Average packs/day: 0.5 packs/day for 5.0 years (2.5 ttl pk-yrs)     Types: Cigarettes     Start date: 1968     Quit date: 2/15/1972     Years since quittin.2     Passive exposure: Never    Smokeless tobacco: Never    Tobacco  "comments:     stopped in her 20's   Vaping Use    Vaping status: Never Used   Substance Use Topics    Alcohol use: Yes     Comment: Occasional    Drug use: No        Medications:    Acetaminophen (TYLENOL PO)  allopurinol (ZYLOPRIM) 100 MG tablet  benzonatate (TESSALON) 100 MG capsule  ciprofloxacin-dexAMETHasone (CIPRODEX) 0.3-0.1 % otic suspension  famotidine (PEPCID) 40 MG tablet  fluticasone (FLONASE) 50 MCG/ACT nasal spray  ipratropium (ATROVENT) 0.03 % nasal spray  loratadine (CLARITIN) 10 MG tablet  Menthol, Topical Analgesic, (BIOFREEZE) 4 % GEL  mupirocin (BACTROBAN) 2 % external ointment  naproxen sodium 220 MG capsule  pantoprazole (PROTONIX) 40 MG EC tablet  senna-docusate (SENOKOT-S/PERICOLACE) 8.6-50 MG tablet  sulfamethoxazole-trimethoprim (BACTRIM DS) 800-160 MG tablet  tolterodine ER (DETROL LA) 2 MG 24 hr capsule      Review of Systems  As mentioned in the HPI, in addition focused review of systems was negative.    Physical Exam   BP: 139/58  Pulse: 88  Temp: 98.4  F (36.9  C)  Resp: 18  Height: 161.3 cm (5' 3.5\")  Weight: 71.2 kg (157 lb)  SpO2: 100 %      Physical Exam  Vitals and nursing note reviewed.   Constitutional:       General: She is not in acute distress.     Appearance: Normal appearance. She is well-developed. She is not ill-appearing, toxic-appearing or diaphoretic.   HENT:      Mouth/Throat:      Mouth: Mucous membranes are dry.   Eyes:      General: No scleral icterus.     Extraocular Movements: Extraocular movements intact.      Conjunctiva/sclera: Conjunctivae normal.   Neck:      Trachea: No tracheal deviation.   Cardiovascular:      Rate and Rhythm: Normal rate and regular rhythm.      Heart sounds: Normal heart sounds. No murmur heard.     No friction rub. No gallop.   Pulmonary:      Effort: Pulmonary effort is normal. No respiratory distress.      Breath sounds: Normal breath sounds. No wheezing or rhonchi.   Musculoskeletal:         General: Tenderness (Poorly localized " right knee joint tenderness with no erythema, warmth, swelling or effusion.  No other right lower extremity abnormality.) present. No swelling. Normal range of motion.      Right lower leg: No edema.      Left lower leg: No edema.   Skin:     General: Skin is warm and dry.      Coloration: Skin is not pale.      Findings: No erythema or rash.   Neurological:      General: No focal deficit present.      Mental Status: She is alert and oriented to person, place, and time.   Psychiatric:         Mood and Affect: Mood normal.         Behavior: Behavior normal.         ED Course        Procedures              Results for orders placed or performed during the hospital encounter of 05/07/25 (from the past 24 hours)   XR Knee Right 1/2 Views    Narrative    EXAM: XR KNEE RIGHT 1/2 VIEWS  LOCATION: [facility]  DATE: 5/7/2025    INDICATION: Acute on chronic pain.  COMPARISON: X-ray right knee 2 views 2/23/2017 at 1428 hours.      Impression    IMPRESSION: Postoperative changes of right TKA including patellar resurfacing. Hardware is well-seated with good alignment. No fracture, dislocation or calcified loose bodies. Small suprapatellar effusion. Resolved postoperative soft tissue gas and   surgical drain seen previously.   CBC with platelets differential    Narrative    The following orders were created for panel order CBC with platelets differential.  Procedure                               Abnormality         Status                     ---------                               -----------         ------                     CBC with platelets and ...[[id number]]  Abnormal            Final result               RBC and Platelet Morpho...[[id number]]                      Final result                 Please view results for these tests on the individual orders.   Comprehensive metabolic panel   Result Value Ref Range    Sodium 131 (L) 135 - 145 mmol/L    Potassium 4.6 3.4 - 5.3 mmol/L    Carbon Dioxide  (CO2) 20 (L) 22 - 29 mmol/L    Anion Gap 14 7 - 15 mmol/L    Urea Nitrogen 19.6 8.0 - 23.0 mg/dL    Creatinine 0.89 0.51 - 0.95 mg/dL    GFR Estimate 63 >60 mL/min/1.73m2    Calcium 9.2 8.8 - 10.4 mg/dL    Chloride 97 (L) 98 - 107 mmol/L    Glucose 126 (H) 70 - 99 mg/dL    Alkaline Phosphatase 156 (H) 40 - 150 U/L    AST 24 0 - 45 U/L    ALT 14 0 - 50 U/L    Protein Total 7.5 6.4 - 8.3 g/dL    Albumin 3.9 3.5 - 5.2 g/dL    Bilirubin Total 0.9 <=1.2 mg/dL   TSH with free T4 reflex   Result Value Ref Range    TSH 2.84 0.30 - 4.20 uIU/mL   CBC with platelets and differential   Result Value Ref Range    WBC Count 11.5 (H) 4.0 - 11.0 10e3/uL    RBC Count 3.41 (L) 3.80 - 5.20 10e6/uL    Hemoglobin 11.3 (L) 11.7 - 15.7 g/dL    Hematocrit 35.3 35.0 - 47.0 %     (H) 78 - 100 fL    MCH 33.1 (H) 26.5 - 33.0 pg    MCHC 32.0 31.5 - 36.5 g/dL    RDW 14.0 10.0 - 15.0 %    Platelet Count 230 150 - 450 10e3/uL    % Neutrophils 36 %    % Lymphocytes 63 %    % Monocytes 1 %    % Eosinophils 0 %    % Basophils 0 %    % Immature Granulocytes 0 %    NRBCs per 100 WBC 0 <1 /100    Absolute Neutrophils 4.1 1.6 - 8.3 10e3/uL    Absolute Lymphocytes 7.2 (H) 0.8 - 5.3 10e3/uL    Absolute Monocytes 0.2 0.0 - 1.3 10e3/uL    Absolute Eosinophils 0.0 0.0 - 0.7 10e3/uL    Absolute Basophils 0.0 0.0 - 0.2 10e3/uL    Absolute Immature Granulocytes 0.0 <=0.4 10e3/uL    Absolute NRBCs 0.0 10e3/uL   RBC and Platelet Morphology   Result Value Ref Range    RBC Morphology Confirmed RBC Indices     Platelet Assessment  Automated Count Confirmed. Platelet morphology is normal.     Automated Count Confirmed. Platelet morphology is normal.   CRP Inflammation   Result Value Ref Range    CRP Inflammation 148.68 (H) <5.00 mg/L   Uric acid   Result Value Ref Range    Uric Acid 4.0 2.4 - 5.7 mg/dL   Influenza A/B, RSV and SARS-CoV2 PCR (COVID-19) Nose    Specimen: Nose; Swab   Result Value Ref Range    Influenza A PCR Negative Negative    Influenza B PCR  Negative Negative    RSV PCR Negative Negative    SARS CoV2 PCR Negative Negative    Narrative    Testing was performed using the Xpert Xpress CoV2/Flu/RSV Assay on the [employer] GeneXpert Instrument. This test should be ordered for the detection of SARS-CoV2, influenza, and RSV viruses in individuals with signs and symptoms of respiratory tract infection. This test is for in vitro diagnostic use under the [place] FDA for laboratories certified under CLIA to perform high or moderate complexity testing. This test has been [place] FDA cleared. A negative result does not rule out the presence of PCR inhibitors in the specimen or target RNA in concentration below the limit of detection for the assay. If only one viral target is positive but coinfection with multiple targets is suspected, the sample should be re-tested with another FDA cleared, approved, or authorized test, if coninfection would change clinical management. This test was validated by the [facility]. These laboratories are certified under the Clinical Laboratory Improvement Amendments of 1988 (CLIA-88) as qualified to perfom high complexity laboratory testing.   UA with Microscopic reflex to Culture    Specimen: Urine, Midstream   Result Value Ref Range    Color Urine Light Yellow Colorless, Straw, Light Yellow, Yellow    Appearance Urine Clear Clear    Glucose Urine Negative Negative mg/dL    Bilirubin Urine Negative Negative    Ketones Urine Negative Negative mg/dL    Specific Gravity Urine 1.010 1.003 - 1.035    Blood Urine Trace (A) Negative    pH Urine 5.5 5.0 - 7.0    Protein Albumin Urine Negative Negative mg/dL    Urobilinogen Urine Normal Normal mg/dL    Nitrite Urine Negative Negative    Leukocyte Esterase Urine Negative Negative    Mucus Urine Present (A) None Seen /LPF    RBC Urine 1 <=2 /HPF    WBC Urine 1 <=5 /HPF    Squamous Epithelials Urine <1 <=1 /HPF    Narrative    Urine Culture not indicated       Medications   ibuprofen  (ADVIL/MOTRIN) tablet 600 mg (600 mg Oral $Given 5/7/25 0780)   predniSONE (DELTASONE) tablet 40 mg (has no administration in time range)   acetaminophen (TYLENOL) tablet 975 mg (975 mg Oral $Given 5/7/25 1956)     She declined oxycodone and states that she does not tolerate opiate analgesic very well.    I reviewed the results of her evaluation with her, her son and her daughter-in-law.  They are unable to adequately assist her care for her and concur that she is unsafe for discharge home alone.    2:19 AM - Dr. Olmos, telehospitalist on-call, accepted care of the patient upon admission for supportive care has Lavonne is unable to be discharged due to severe intractable right knee pain and generalized weakness rendering her unable to ambulate independently or care for activities of daily living at home where she lives alone.     Assessments & Plan (with Medical Decision Making)   84-year-old female with history of total right knee arthroplasty in February 2017 with atraumatic right knee pain and generalized weakness rendering her unable to ambulate with a walker and attend to activities of daily living at home alone.  She had diarrhea yesterday, but not today.  No right knee erythema, warmth, effusion or swelling.  Right knee plain films only remarkable for small suprapatellar effusion.  Labs remarkable for minimally elevated WBC 11.5, elevated , normal uric acid.  Right knee pain appears to be due to gout flare, and ? strain. I doubt septic joint, DVT or other emergent disease process.  I do not feel that diagnostic arthrocentesis is currently necessary an emergent basis given her total right knee arthroplasty status and hardware.  Ortho could be consulted regarding this as needed while she is admitted for supportive care, observation, PT/OT evaluation and social service consultation for disposition planning.     I have reviewed the nursing notes.    I have reviewed the findings, diagnosis, plan and need  for follow up with the patient, her son and the daughter-in-law.      New Prescriptions    No medications on file       Final diagnoses:   Acute pain of right knee - With small prepatellar effusion   History of gout   Generalized weakness   Diarrhea, unspecified type       5/7/2025   [facility] EMERGENCY DEPT       Martin Caraballo MD  05/08/25 7778

## 2025-05-08 NOTE — PLAN OF CARE
Problem: Adult Inpatient Plan of Care  Goal: Plan of Care Review  Description: The Plan of Care Review/Shift note should be completed every shift.  The Outcome Evaluation is a brief statement about your assessment that the patient is improving, declining, or no change.  This information will be displayed automatically on your shiftnote.  Outcome: Progressing     Problem: Adult Inpatient Plan of Care  Goal: Absence of Hospital-Acquired Illness or Injury  Outcome: Progressing  Intervention: Identify and Manage Fall Risk  Recent Flowsheet Documentation  Taken 5/8/2025 1526 by Marilee Pulido RN  Safety Promotion/Fall Prevention:   activity supervised   nonskid shoes/slippers when out of bed  Taken 5/8/2025 0900 by Marilee Pulido RN  Safety Promotion/Fall Prevention:   activity supervised   nonskid shoes/slippers when out of bed  Intervention: Prevent Skin Injury  Recent Flowsheet Documentation  Taken 5/8/2025 1526 by Marilee Pulido RN  Body Position: position changed independently  Taken 5/8/2025 0900 by Marilee Pulido RN  Body Position: position changed independently     Problem: Adult Inpatient Plan of Care  Goal: Optimal Comfort and Wellbeing  Outcome: Progressing  Intervention: Monitor Pain and Promote Comfort  Recent Flowsheet Documentation  Taken 5/8/2025 1000 by Marilee Pulido RN  Pain Management Interventions: medication (see MAR)     Problem: Adult Inpatient Plan of Care  Goal: Readiness for Transition of Care  Outcome: Progressing     Goal Outcome Evaluation:  Patient is alert and oriented, calls appropriately.  Reports right knee pain is a lot better than when she came in.  Patient receiving scheduled tylenol and voltaren gel to knee, as needed naproxen given x 1 this morning.  Patient is ambulating with walker and assist of one.  Planning for discharge to TCU.

## 2025-05-08 NOTE — PROGRESS NOTES
05/08/25 1052   Appointment Info   Signing Clinician's Name / Credentials (OT) Reanna Bedoya, OTR/L   Quick Adds   Quick Adds Certification   Living Environment   People in Home alone   Current Living Arrangements   ([facility]   Home Accessibility no concerns   Self-Care   Usual Activity Tolerance good   Current Activity Tolerance fair   Equipment Currently Used at Home walker, rolling   Fall history within last six months no   Activity/Exercise/Self-Care Comment at baseline: independent with ADLs and related mobility using 4WW. Always carries a phone on her. walk-in shower with suction cup grab bars.   Instrumental Activities of Daily Living (IADL)   Previous Responsibilities housekeeping;medication management;shopping;laundry   General Information   Onset of Illness/Injury or Date of Surgery 05/07/25   Referring Physician Judy Son PA-C   Patient/Family Therapy Goal Statement (OT) to go to TCU   Additional Occupational Profile Info/Pertinent History of Current Problem Lavonne Tidwell is a 84 year old female admitted on 5/7/2025. She has a past medical history significant for B-cell lymphoma, MGUS, hypertension, chronic gout, dyslipidemia, GERD, urinary incontinence, and osteoarthritis who presented to the emergency department for evaluation of generalized weakness and acute on chronic knee pain in the setting of recent nausea, vomiting, diarrhea, now has difficulty ambulating causing an unsafe situation returning home.   Cognitive Status Examination   Orientation Status orientation to person, place and time   Pain Assessment   Patient Currently in Pain Yes, see Vital Sign flowsheet  (in knee. Improved from admission.)   Strength Comprehensive (MMT)   Comment, General Manual Muscle Testing (MMT) Assessment B UE strength: WNL. Fatigued following toileting activity   Bed Mobility   Comment (Bed Mobility) SBA   Transfers   Transfer Comments CGA and FWW. Slight limp and decreased paced.   Balance    Balance Comments Ambulates to/from bathroom with CGA and FWW   Activities of Daily Living   BADL Assessment/Intervention upper body dressing;toileting;lower body dressing   Upper Body Dressing Assessment/Training   [place] Level (Upper Body Dressing) set up   Lower Body Dressing Assessment/Training   [place] Level (Lower Body Dressing) contact guard assist   Toileting   [place] Level (Toileting) contact guard assist   Clinical Impression   Criteria for Skilled Therapeutic Interventions Met (OT) Yes, treatment indicated   OT Diagnosis decreased independence with ADLs and related mobility   Influenced by the following impairments knee pain, generalized weakness   OT Problem List-Impairments impacting ADL problems related to;activity tolerance impaired;balance;strength;pain   Assessment of Occupational Performance 3-5 Performance Deficits   Identified Performance Deficits dressing, showering, home management tasks   Planned Therapy Interventions (OT) ADL retraining;strengthening;progressive activity/exercise   Clinical Decision Making Complexity (OT) problem focused assessment/low complexity   Risk & Benefits of therapy have been explained patient;participants included;participants voiced agreement with care plan;current/potential barriers reviewed;risks/benefits reviewed;care plan/treatment goals reviewed;evaluation/treatment results reviewed   OT Total Evaluation Time   OT Eval Low Complexity Minutes (92637) 10   Therapy Certification   Start of Care Date 05/08/25   Certification date from 05/08/25   Certification date to 05/15/25   Medical Diagnosis weakness. Knee pain   OT Goals   Therapy Frequency (OT) 5 times/week   OT Predicted Duration/Target Date for Goal Attainment 05/15/25   OT Goals Lower Body Dressing;Hygiene/Grooming;Toilet Transfer/Toileting   OT: Hygiene/Grooming modified independent   OT: Lower Body Dressing Supervision/stand-by assist   OT: Toilet Transfer/Toileting Modified  independent   Interventions   Interventions Quick Adds Self-Care/Home Management   Self-Care/Home Management   Self-Care/Home Mgmt/ADL, Compensatory, Meal Prep Minutes (27471) 10   Symptoms Noted During/After Treatment (Meal Preparation/Planning Training) fatigue   Treatment Detail/Skilled Intervention Cues/education to line self up to toilet prior to sitting. Following evaluation educated pt/family on POC and current discharge recommendations. Pt/family with questions regarding TCU- answered questions as appropriate. deferred some to CM. Pt left supine with all needs within reach and bed alarm on.   OT Discharge Planning   OT Plan POC Thurs 1/5; standing g/h, lower body dressing gathering clothes   OT Discharge Recommendation (DC Rec) Transitional Care Facility   OT Rationale for DC Rec TCU to increase independence with ADLs and related mobility   OT Brief overview of current status CGA and FWW. Fatigues easily   OT Total Distance Amb During Session (feet) 20   Total Session Time   Timed Code Treatment Minutes 10   Total Session Time (sum of timed and untimed services) 20     [facility]                                                                                   OUTPATIENT OCCUPATIONAL THERAPY    PLAN OF TREATMENT FOR OUTPATIENT REHABILITATION   Patient's Last Name, First Name, Lavonne Galvan YOB: 1940   Provider's Name   [facility]   Medical Record No.  [id number]     Onset Date: 05/07/25 Start of Care Date: 05/08/25     Medical Diagnosis:  weakness. Knee pain               OT Diagnosis:  decreased independence with ADLs and related mobility Certification Dates:  From: 05/08/25  To: 05/15/25     See note for plan of treatment, functional goals, and certification details.    I CERTIFY THE NEED FOR THESE SERVICES FURNISHED UNDER        THIS PLAN OF TREATMENT AND WHILE UNDER MY CARE (Physician co-signature of this document  indicates review and certification of the therapy plan).

## 2025-05-09 ENCOUNTER — HOSPITAL ENCOUNTER (OUTPATIENT)
Dept: ULTRASOUND IMAGING | Facility: CLINIC | Age: 85
Setting detail: OBSERVATION
Discharge: HOME | End: 2025-05-09
Attending: INTERNAL MEDICINE
Payer: COMMERCIAL

## 2025-05-09 VITALS
SYSTOLIC BLOOD PRESSURE: 134 MMHG | HEART RATE: 71 BPM | OXYGEN SATURATION: 98 % | RESPIRATION RATE: 18 BRPM | DIASTOLIC BLOOD PRESSURE: 59 MMHG | BODY MASS INDEX: 26.57 KG/M2 | HEIGHT: 64 IN | WEIGHT: 155.65 LBS | TEMPERATURE: 97.6 F

## 2025-05-09 DIAGNOSIS — C85.80 MARGINAL ZONE B-CELL LYMPHOMA (H): ICD-10-CM

## 2025-05-09 LAB
ANION GAP SERPL CALCULATED.3IONS-SCNC: 12 MMOL/L (ref 7–15)
BUN SERPL-MCNC: 24.6 MG/DL (ref 8–23)
CALCIUM SERPL-MCNC: 9.1 MG/DL (ref 8.8–10.4)
CHLORIDE SERPL-SCNC: 100 MMOL/L (ref 98–107)
CREAT SERPL-MCNC: 0.8 MG/DL (ref 0.51–0.95)
CRP SERPL-MCNC: 159.05 MG/L
EGFRCR SERPLBLD CKD-EPI 2021: 72 ML/MIN/1.73M2
ERYTHROCYTE [DISTWIDTH] IN BLOOD BY AUTOMATED COUNT: 13.8 % (ref 10–15)
GLUCOSE SERPL-MCNC: 93 MG/DL (ref 70–99)
HCO3 SERPL-SCNC: 21 MMOL/L (ref 22–29)
HCT VFR BLD AUTO: 32.4 % (ref 35–47)
HGB BLD-MCNC: 10.4 G/DL (ref 11.7–15.7)
MCH RBC QN AUTO: 33.8 PG (ref 26.5–33)
MCHC RBC AUTO-ENTMCNC: 32.1 G/DL (ref 31.5–36.5)
MCV RBC AUTO: 105 FL (ref 78–100)
PLATELET # BLD AUTO: 228 10E3/UL (ref 150–450)
POTASSIUM SERPL-SCNC: 4.4 MMOL/L (ref 3.4–5.3)
RBC # BLD AUTO: 3.08 10E6/UL (ref 3.8–5.2)
SODIUM SERPL-SCNC: 133 MMOL/L (ref 135–145)
WBC # BLD AUTO: 11.4 10E3/UL (ref 4–11)

## 2025-05-09 PROCEDURE — G0378 HOSPITAL OBSERVATION PER HR: HCPCS

## 2025-05-09 PROCEDURE — 250N000013 HC RX MED GY IP 250 OP 250 PS 637: Performed by: INTERNAL MEDICINE

## 2025-05-09 PROCEDURE — 250N000013 HC RX MED GY IP 250 OP 250 PS 637: Performed by: PHYSICIAN ASSISTANT

## 2025-05-09 PROCEDURE — 86140 C-REACTIVE PROTEIN: CPT | Performed by: PHYSICIAN ASSISTANT

## 2025-05-09 PROCEDURE — 36415 COLL VENOUS BLD VENIPUNCTURE: CPT | Performed by: PHYSICIAN ASSISTANT

## 2025-05-09 PROCEDURE — 76775 US EXAM ABDO BACK WALL LIM: CPT

## 2025-05-09 PROCEDURE — 80048 BASIC METABOLIC PNL TOTAL CA: CPT | Performed by: PHYSICIAN ASSISTANT

## 2025-05-09 PROCEDURE — 99238 HOSP IP/OBS DSCHRG MGMT 30/<: CPT | Performed by: INTERNAL MEDICINE

## 2025-05-09 PROCEDURE — 85027 COMPLETE CBC AUTOMATED: CPT | Performed by: PHYSICIAN ASSISTANT

## 2025-05-09 RX ORDER — AMOXICILLIN 250 MG
2 CAPSULE ORAL DAILY
DISCHARGE
Start: 2025-05-09 | End: 2025-05-12

## 2025-05-09 RX ORDER — FAMOTIDINE 20 MG/1
20 TABLET, FILM COATED ORAL DAILY
Status: DISCONTINUED | OUTPATIENT
Start: 2025-05-10 | End: 2025-05-09 | Stop reason: HOSPADM

## 2025-05-09 RX ADMIN — ALLOPURINOL 100 MG: 100 TABLET ORAL at 09:02

## 2025-05-09 RX ADMIN — PANTOPRAZOLE SODIUM 40 MG: 40 TABLET, DELAYED RELEASE ORAL at 06:28

## 2025-05-09 RX ADMIN — DICLOFENAC SODIUM 2 G: 10 GEL TOPICAL at 09:02

## 2025-05-09 RX ADMIN — CIPROFLOXACIN AND DEXAMETHASONE 4 DROP: 3; 1 SUSPENSION/ DROPS AURICULAR (OTIC) at 09:02

## 2025-05-09 RX ADMIN — FAMOTIDINE 20 MG: 20 TABLET, FILM COATED ORAL at 09:02

## 2025-05-09 RX ADMIN — ACETAMINOPHEN 650 MG: 325 TABLET, FILM COATED ORAL at 11:11

## 2025-05-09 RX ADMIN — ACETAMINOPHEN 650 MG: 325 TABLET, FILM COATED ORAL at 03:36

## 2025-05-09 ASSESSMENT — ACTIVITIES OF DAILY LIVING (ADL)
ADLS_ACUITY_SCORE: 62
ADLS_ACUITY_SCORE: 58
ADLS_ACUITY_SCORE: 62
ADLS_ACUITY_SCORE: 58

## 2025-05-09 NOTE — PROGRESS NOTES
[facility] DISCHARGE NOTE    Patient discharged to transitional care unit at 11:51 AM via wheel chair. Accompanied by son and staff. [facility]  Discharge instructions reviewed with patient and son, opportunity offered to ask questions. Prescriptions sent with patient to fill . All belongings sent with patient.    Marilee Pulido RN

## 2025-05-09 NOTE — PROGRESS NOTES
Care Management Discharge Note    Discharge Date: 05/09/2025  Discharge Disposition: Transitional Care  Discharge Services:    Discharge DME: None  Discharge Transportation: family or friend will provide    Private pay costs discussed: Not applicable    Does the patient's insurance plan have a 3 day qualifying hospital stay waiver?  Yes   Which insurance plan 3 day waiver is available? Alternative insurance waiver  Will the waiver be used for post-acute placement? Yes    PAS Confirmation Code: [id number]  Patient/family educated on Medicare website which has current facility and service quality ratings: yes    Education Provided on the Discharge Plan: Yes  Persons Notified of Discharge Plans: patient, Soldead w/Janis  Patient/Family in Agreement with the Plan: yes    Handoff Referral Completed: Yes, [facility] PCP: Internal handoff referral completed    Additional Information:    Per IDT rounds, patient is medically stable for discharge today.     [facility] (Phone: [phone] Fax: [phone]). Family will transport around 1pm. Confirmed discharge plan with Soledad in admissions at [facility].     KATIUSKA QUIGLEY RN

## 2025-05-09 NOTE — PLAN OF CARE
Problem: Adult Inpatient Plan of Care  Goal: Absence of Hospital-Acquired Illness or Injury  Intervention: Identify and Manage Fall Risk  Recent Flowsheet Documentation  Taken 5/9/2025 0300 by Cuong Mustafa, RN  Safety Promotion/Fall Prevention: activity supervised  Goal: Optimal Comfort and Wellbeing  Outcome: Progressing     Problem: Delirium  Goal: Improved Behavioral Control  Outcome: Progressing   Goal Outcome Evaluation:  Pt is alert and orientated, able to make her needs known. SBA with a walker when ambulating. [facility]. Pt stated her knee feels significantly better than it did when she arrived to the hospital.

## 2025-05-09 NOTE — DISCHARGE SUMMARY
[facility]  Hospitalist Discharge Summary       Date of Admission:  5/7/2025  Date of Discharge:  May 9, 2025  Discharging Provider: Capo Jennings MD      Discharge Diagnoses     Generalized weakness  Acute on chronic knee pain, right > left   Recent nausea, vomiting, and diarrhea - resolved  Chronic cough  Myringitis   Post nasal drainage  Chronic gout, unspecified, without tophus (tophi)  Primary osteoarthritis involving multiple joints  Chronic bilateral low back pain without sciatica  Essential (primary) hypertension  Hyperlipidemia LDL goal <130  Esophageal reflux  Urinary incontinence  Marginal zone B-cell lymphoma  MGUS (monoclonal gammopathy of unknown significance)  Hyponatremia  Hypochloremia  Hypertension  Anemia  Overweight    Follow-ups Needed After Discharge   Follow-up Appointments       Follow Up and recommended labs and tests      Follow up with care home physician.  The following labs/tests are recommended: CMP and CBC with differential.              Discharge Disposition   Discharged to short-term care [facility]    Hospital Course   Lavonne Tidwell is a 84 year old female admitted on 5/7/2025. She has a past medical history significant for B-cell lymphoma, MGUS, hypertension, chronic gout, dyslipidemia, GERD, urinary incontinence, and osteoarthritis who presented to the emergency department for evaluation of generalized weakness and acute on chronic knee pain in the setting of recent nausea, vomiting, diarrhea, now has difficulty ambulating causing an unsafe situation returning home.     Generalized weakness  Patient lives alone in private residence. Experiencing acute generalized weakness in the setting of recent GI symptoms (see below) and knee pain, preventing her from completing ADLs at home. UA and COVID / influenza / RSV PCR negative. Suspect weakness is due to recent GI illness (symptoms now resolved, but likely still recovering) and knee pain.  - PT/OT  "evaluations recommending TCU  - Care Management consult     Acute on chronic knee pain, right > left   Has chronic arthritis pain in bilateral knees and elsewhere, with acute worsening of knee pain on 5/7, right more painful than left. No known trauma. Afebrile, minimal leukocytosis (WBC 11.5). Uric acid WNL (4.0). CRP elevated (148).      X-ray right knee - \"Postoperative changes of right TKA including patellar resurfacing. Hardware is well-seated with good alignment. No fracture, dislocation or calcified loose bodies. Small suprapatellar effusion. Resolved postoperative soft tissue gas and surgical drain seen previously.\"     On exam, right knee is minimally tender to palpation, left knee non-tender. There is a small, palpable peripatellar effusion on right knee. No erythema or warmth. Slight pain evoked with active and passive range of motion of left knee (specifically flexion), non pain with range of motion on left.      Pain is preventing patient from ambulating. The emergency department was concerned for possible gout and she received 40 mg prednisone, but clinically the overall picture is not highly suggestive of a gout flare. Differential most suggestive of osteoarthritis exacerbation or bursitis, less likely to be gout, septic joint, Baker's cyst, or DVT.  - Analgesia: scheduled acetaminophen 650 mg q6h, scheduled voltaren gel qid, plus additional acetaminophen 325 mg q4h prn, and naproxen 220 mg bid prn. No additional steroids, patient intolerant of narcotics.   - No indication for ortho consult at this time      Recent nausea, vomiting, and diarrhea - resolved  Patient had about 4-5 episodes of loose watery diarrhea and nausea with non-bloody emesis x 1 on 5/6; symptoms had resolved by 5/7, although patient with subsequent fatigue and weakness. No known ill contacts, no recent travel, no suspicion of contaminated food. Occurs in the setting of recent Bactrim use, which may have caused GI upset? Unclear " cause for GI symptoms, suspect possible viral enteritis or antibiotic related.   - Resolved     Chronic cough  Myringitis   Post nasal drainage  Has been following with ENT Dr. Rob Harris for 2 year history of PND, chronic cough, and hearing difficulties. Recently had tympanostomy tubes placed on 5/2/25. Currently taking Bactrim DS bid, Ciprodex drops daily (right ear), and recently had Kenalog injection.   - Continue home Bactrim and Ciprodex     Chronic gout, unspecified, without tophus (tophi)  Known history of gout managed prior to admission with allopurinol 100 mg daily. As noted above, does not appear to have acute gout flare at time of admission, but did receive 40 mg prednisone while in the emergency department.   - Continue allopurinol  - Start voltaren gel     Primary osteoarthritis involving multiple joints  Chronic bilateral low back pain without sciatica  Known history of arthritis managed prior to admission with acetaminophen 1g q8h prn and naproxen 220 mg bid prn.   - See above regarding analgesia in the hospital     Essential (primary) hypertension  Blood pressure stable. Noted on problem list, but not on any pharmacotherapy prior to admission.  - Monitor     Hyperlipidemia LDL goal <130  Noted on problem list, but not on any pharmacotherapy prior to admission.  - No intervention      Esophageal reflux  Managed prior to admission with Protonix 40 mg daily, continue.      Urinary incontinence  Managed prior to admission with Detrol LA 2 mg q pm, continue.      Marginal zone B-cell lymphoma  MGUS (monoclonal gammopathy of unknown significance)  Follows with heme/oncology, last visit with Estela Barrett NP on 1/31/25. Has been overall stable, gets frequent labs and CT's for surveillance. Has an upcoming renal ultrasound scheduled for 5/9 at 12:30 pm.   - May need to cancel / reschedule renal ultrasound if not discharged by morning of 5/9   - Continue with outpatient heme/oncology management      Clinically  "Significant Risk Factors Present on Admission       # Hyponatremia: Lowest Na = 131 mmol/L in last 2 days, will monitor as appropriate  # Hypochloremia: Lowest Cl = 97 mmol/L in last 2 days, will monitor as appropriate          # Hypertension: Noted on problem list      # Anemia: based on hgb <11       # Overweight: Estimated body mass index is 27.14 kg/m  as calculated from the following:    Height as of this encounter: 1.613 m (5' 3.5\").    Weight as of this encounter: 70.6 kg (155 lb 10.3 oz).       # Financial/Environmental Concerns: none       Consultations This Hospital Stay Code Status Special Code    Physical Exam   Vital Signs: Temp: 97.6  F (36.4  C) Temp src: Oral BP: 134/59 Pulse: 71   Resp: 18 SpO2: 98 % O2 Device: None (Room air)    Weight: 155 lbs 10.32 oz    Gen: Well nourished, elderly female, resting comfortably in bed, no acute distress  Head: atraumatic normocephalic; sclera non-injected, anicterric; oral mucosa moist, no lesions, no exudates  Neck: supple without spinal abnormality  Chest: clear to auscultation bilaterally, no wheezes, no rhonchi, no rales.  Cardiovascular: regular rate and rhythm, no gallops or rubs, no murmurs, no edema  Abdomen: bowel sounds normal, no hepatosplenomegaly, no masses, non-tender, non-distended, no guarding, no rebound tenderness  Musculoskeletal: normal muscle mass, normal muscle tone    25 MINUTES SPENT BY ME on the date of service doing chart review, history, exam, documentation & further activities per the note.     Capo Jennings MD  [facility]  ______________________________________________________________________    Primary Care Physician   Elvira Summers Rehder    Discharge Orders      Primary Care - Care Coordination Referral      General info for SNF    Length of Stay Estimate: Short Term Care: Estimated # of Days <30  Condition at Discharge: Improving  Level of care:skilled   Rehabilitation Potential: Good  Admission " H&P remains valid and up-to-date: Yes  Recent Chemotherapy: N/A  Use Nursing Home Standing Orders: Yes     Mantoux instructions    Give two-step Mantoux (PPD) Per Facility Policy Yes     Reason for your hospital stay    This is an 84 year old female admitted with inability to walk due to acute right knee pain.     Follow Up and recommended labs and tests    Follow up with long term physician.  The following labs/tests are recommended: CMP and CBC with differential.     Activity - Up with nursing assistance     Physical Therapy Adult Consult    Evaluate and treat as clinically indicated.    Reason:  Physical deconditioning     Occupational Therapy Adult Consult    Evaluate and treat as clinically indicated.    Reason:  Physical deconditioning     Fall precautions     Diet    Follow this diet upon discharge: Orders Placed This Encounter      Regular Diet Adult         Significant Results and Procedures     Discharge Medications   Current Discharge Medication List        START taking these medications    Details   diclofenac (VOLTAREN) 1 % topical gel Apply 2 g topically 4 times daily.    Associated Diagnoses: Acute pain of right knee           CONTINUE these medications which have CHANGED    Details   senna-docusate (SENOKOT-S/PERICOLACE) 8.6-50 MG tablet Take 2 tablets by mouth daily.    Associated Diagnoses: Constipation, unspecified constipation type           CONTINUE these medications which have NOT CHANGED    Details   Acetaminophen (TYLENOL PO) Take 1,000 mg by mouth every 8 hours as needed for mild pain.      allopurinol (ZYLOPRIM) 100 MG tablet Take 1 tablet (100 mg) by mouth daily  Qty: 90 tablet, Refills: 3    Associated Diagnoses: Chronic gout of left wrist, unspecified cause      benzonatate (TESSALON) 100 MG capsule Take 1 capsule (100 mg) by mouth 3 times daily as needed for cough.  Qty: 30 capsule, Refills: 0    Associated Diagnoses: Chest congestion; Chronic cough      famotidine (PEPCID) 40 MG  tablet Take 1 tablet (40 mg) by mouth 2 times daily.  Qty: 180 tablet, Refills: 3    Associated Diagnoses: Chronic cough; Throat clearing; Post-nasal drainage      fluticasone (FLONASE) 50 MCG/ACT nasal spray Spray 1 spray into both nostrils 2 times daily  Qty: 16 g, Refills: 3    Associated Diagnoses: Chronic cough      ipratropium (ATROVENT) 0.03 % nasal spray SPRAY ONE TO TWO SPRAYS INTO BOTH NOSTRILS THREE TIMES DAILY AS NEEDED FOR RHINITIS (RUNNY NOSE, POSTNASAL DRAINAGE).  Qty: 30 mL, Refills: 1    Associated Diagnoses: Chronic cough      loratadine (CLARITIN) 10 MG tablet Take 10 mg by mouth daily      Menthol, Topical Analgesic, (BIOFREEZE) 4 % GEL Externally apply topically 4 times daily      pantoprazole (PROTONIX) 40 MG EC tablet Take 1 tablet (40 mg) by mouth daily.  Qty: 90 tablet, Refills: 3    Associated Diagnoses: Chronic cough      tolterodine ER (DETROL LA) 2 MG 24 hr capsule TAKE 1 CAPSULE (2 MG) BY MOUTH DAILY.  Qty: 30 capsule, Refills: 2    Associated Diagnoses: Urinary frequency      naproxen sodium 220 MG capsule Take 220 mg by mouth 2 times daily (with meals).           STOP taking these medications       ciprofloxacin-dexAMETHasone (CIPRODEX) 0.3-0.1 % otic suspension Comments:   Reason for Stopping:         mupirocin (BACTROBAN) 2 % external ointment Comments:   Reason for Stopping:         sulfamethoxazole-trimethoprim (BACTRIM DS) 800-160 MG tablet Comments:   Reason for Stopping:             Allergies   Allergies   Allergen Reactions    Codeine GI Disturbance

## 2025-05-09 NOTE — PROGRESS NOTES
Occupational Therapy Discharge Summary    Reason for therapy discharge:    Discharged to transitional care facility.    Progress towards therapy goal(s). See goals on Care Plan in [facility] electronic health record for goal details.  Goals partially met.  Barriers to achieving goals:   discharge from facility.    Therapy recommendation(s):    Continued therapy is recommended.  Rationale/Recommendations:  TCU to increase independence with ADLs and related mobility.

## 2025-05-09 NOTE — PROGRESS NOTES
[facility] GERIATRICS  INITIAL VISIT NOTE      PRIMARY CARE PROVIDER AND CLINIC: Elvira Kowalski [address]  / [facility] [address]    [facility] Medical Record Number: [id number]  Place of Service where encounter took place[facility] [[id number]]    Chief Complaint   Patient presents with    Hospital F/U     [facility] 5/7/2025 - 5/9/2025      HPI:    Lavonne Tidwell is a 84 year old (1940) female was admitted to the above facility from [facility]. Hospital stay 5/7/25 through 5/9/25 where they were admitted for knee pain. Now admitted to this facility for rehab, medical management, and nursing care.      History obtained from: facility chart records, facility staff, patient report, and [facility] chart review.      Brief Hospital Course: PMH of HTN, GERD, chronic cough, gout, chronic low back pain, gout, HLD, GERD, and recent tympanostomy tubes on 5/2/25 who presented with worsening knee pain, right greater than left, unable to ambulate. Concern for gout so given prednisone in the ED. After admission felt more likely to be arthritis. Started on APAP, naproxen, Voltaren gel as does not tolerate opioids. Did have diarrhea, resolved. Due to ongoing gait difficulty TCU recommended When medically stable was discharged to TCU for further rehab and medical management.       TCU Course: See today in room. She is not currently having any pain in her knee. No further diarrhea either. Thinks diarrhea may have been due to the antibiotics she was for her ears. The diarrhea made her feel very weak and she could not get up. Stools are not quite back to normal but they are getting close. Appetite is ok. She does still have some SOB with activity, thinks it is because she is still weaker than normal. She feel therapy is helping.     CODE STATUS/ADVANCE DIRECTIVES: DNR / DNI    ALLERGIES:  Allergies   Allergen Reactions    Codeine GI Disturbance      PAST MEDICAL HISTORY:   Past Medical History:   Diagnosis Date    Arthritis 2012    knes and hips    Cancer (H)     Closed fracture of ramus of left pubis, initial encounter (H) 05/27/2024    Essential hypertension 05/05/2021    History of blood transfusion 1961    Miscarriage    Low back pain, unspecified 10/11/2022    Ovarian cysts 1974    s/p BSO    Unspecified fall, subsequent encounter 05/29/2024     PAST SURGICAL HISTORY:   Past Surgical History:   Procedure Laterality Date    ABDOMEN SURGERY  1973 & 1974    Ovarian cyst/Hysterectomy    APPENDECTOMY  1952    ARTHROPLASTY KNEE Left 1/6/2016    Procedure: ARTHROPLASTY KNEE;  Surgeon: Wilfredo Thompson MD;  Location: [facility] OR    ARTHROPLASTY KNEE Right 2/23/2017    Procedure: ARTHROPLASTY KNEE;  Surgeon: Wilfredo Thompson MD;  Location: [facility] OR    BIOPSY      colonoscopy/polyps    COLONOSCOPY      every 10 years    GENITOURINARY SURGERY  2008    bladder    HYSTERECTOMY, CHELSY  1974    ORTHOPEDIC SURGERY  2007 & 2009    [place]  both feet    PHACOEMULSIFICATION WITH STANDARD INTRAOCULAR LENS IMPLANT Left 8/20/2015    Procedure: PHACOEMULSIFICATION WITH STANDARD INTRAOCULAR LENS IMPLANT;  Surgeon: Fernando Jeffrey MD;  Location: [facility] OR    PHACOEMULSIFICATION WITH STANDARD INTRAOCULAR LENS IMPLANT Right 9/17/2015    Procedure: PHACOEMULSIFICATION WITH STANDARD INTRAOCULAR LENS IMPLANT;  Surgeon: Fernando Jeffrey MD;  Location: [facility] OR    SURGICAL HISTORY OF -   07/30/1998    Colonoscopy    SURGICAL HISTORY OF -   1974    Bilateral salpingoopherectomy    SURGICAL HISTORY OF -   3/09    TOT Midurethral sling     FAMILY HISTORY:   Family History   Problem Relation Age of Onset    Cancer Mother         Ovarian    Other Cancer Mother         Ovarian    Cerebrovascular Disease Father     Heart Disease Father     C.A.D. Father     Coronary Artery Disease Father     Cancer Maternal Grandmother     Other Cancer Maternal Grandmother         Ovarian/Bone    Cerebrovascular  "Disease Maternal Grandfather     Colon Cancer Maternal Grandfather     Diabetes Other         Great Grandmother    Breast Cancer No family hx of          SOCIAL HISTORY:   Patient's living condition: lives alone    MEDICATIONS  Post Discharge Medication Reconciliation Status: medication reconcilation previously completed during another office visit.  Current Outpatient Medications   Medication Sig Dispense Refill    Acetaminophen (TYLENOL PO) Take 1,000 mg by mouth every 8 hours as needed for mild pain.      allopurinol (ZYLOPRIM) 100 MG tablet Take 1 tablet (100 mg) by mouth daily 90 tablet 3    benzonatate (TESSALON) 100 MG capsule Take 1 capsule (100 mg) by mouth 3 times daily as needed for cough. 30 capsule 0    diclofenac (VOLTAREN) 1 % topical gel Apply 2 g topically 4 times daily.      famotidine (PEPCID) 40 MG tablet Take 1 tablet (40 mg) by mouth 2 times daily. 180 tablet 3    fluticasone (FLONASE) 50 MCG/ACT nasal spray Spray 1 spray into both nostrils 2 times daily 16 g 3    ipratropium (ATROVENT) 0.03 % nasal spray SPRAY ONE TO TWO SPRAYS INTO BOTH NOSTRILS THREE TIMES DAILY AS NEEDED FOR RHINITIS (RUNNY NOSE, POSTNASAL DRAINAGE). 30 mL 1    loratadine (CLARITIN) 10 MG tablet Take 10 mg by mouth daily      Menthol, Topical Analgesic, (BIOFREEZE) 4 % GEL Externally apply topically 4 times daily      naproxen sodium 220 MG capsule Take 220 mg by mouth 2 times daily (with meals).      pantoprazole (PROTONIX) 40 MG EC tablet Take 1 tablet (40 mg) by mouth daily. 90 tablet 3    senna-docusate (SENOKOT-S/PERICOLACE) 8.6-50 MG tablet Take 2 tablets by mouth daily.      tolterodine ER (DETROL LA) 2 MG 24 hr capsule TAKE 1 CAPSULE (2 MG) BY MOUTH DAILY. 30 capsule 2     ROS:  10 point ROS neg other than the symptoms noted above in the HPI.      PHYSICAL EXAM:  BP (!) 147/77   Pulse 87   Temp 97.6  F (36.4  C)   Resp 16   Ht 1.613 m (5' 3.5\")   Wt 68 kg (150 lb)   LMP 01/01/1974   SpO2 95%   BMI 26.15 " kg/m    Physical Exam  Cardiovascular:      Rate and Rhythm: Normal rate and regular rhythm.      Heart sounds: Normal heart sounds.   Pulmonary:      Effort: Pulmonary effort is normal.      Breath sounds: Normal breath sounds.   Abdominal:      General: Bowel sounds are normal.      Palpations: Abdomen is soft.   Musculoskeletal:         General: Normal range of motion.   Neurological:      Mental Status: She is alert.   Psychiatric:         Mood and Affect: Mood normal.         Thought Content: Thought content normal.          LABORATORY/IMAGING DATA:  Reviewed as per [facility] and/or [facility]    ASSESSMENT/PLAN:  Acute pain of right knee  Primary osteoarthritis involving multiple joints  Physical deconditioning  Pain improved, making progress with therapy.  Discussed with patient, nursing staff.   - analgesia with APAP Er BID and regular Tylenol bedtime, Voltaren gel QID, increase from 2gm to 4gm, Naproxen PRN   - PT/OT  - monitor and adjsut     Diarrhea  Resolving but stools not yet back to baseline  - change senna-s to PRN per patient request    Essential hypertension  -130,   - monitor, stable off medications     Gastroesophageal reflux disease without esophagitis  - continue Pantoprazole     Chronic gout without tophus, unspecified cause, unspecified site  - continue allopurinol     Iron deficiency anemia, unspecified iron deficiency anemia type  Hgb stable 10-11  - check CBC    Marginal zone B-cell lymphoma (H)  Follows with oncology, had renal US on 5/9  - defer review of ultrasound results to oncology  - follow-up with oncology    Urinary frequency  - continue Detrol LA        Orders:   Change senna-s to 2 tab daily pRN  Increased voltaren gel 4gm to knee  Discontinue tessalon  Discontinue current APAP  APAP ER 1300mg BID   Tylenol 650mg at bedtime     Total time spent with patient visit at the skilled nursing facility was 45 min including patient visit and review of past records; includes time  spent reviewing records from hospitalization within my organization including labs, imaging reports and provider/consult notes, review of TCU facility records, medication reconciliation, discussion of plan of care with patient, nursing staff and therapy as stated above as well as time spent on documentation.      Electronically signed by:  ENA Clemons CNP

## 2025-05-09 NOTE — PLAN OF CARE
Physical Therapy Discharge Summary    Reason for therapy discharge:    Discharged to transitional care facility.    Progress towards therapy goal(s). See goals on Care Plan in [facility] electronic health record for goal details.  Goals partially met.  Barriers to achieving goals:   discharge from facility.    Therapy recommendation(s):    Continued therapy is recommended.  Rationale/Recommendations:  Recommend continued PT at TCU to maximize safe and indep mobility prior to return home.

## 2025-05-12 ENCOUNTER — PATIENT OUTREACH (OUTPATIENT)
Dept: CARE COORDINATION | Facility: CLINIC | Age: 85
End: 2025-05-12

## 2025-05-12 ENCOUNTER — TRANSITIONAL CARE UNIT VISIT (OUTPATIENT)
Dept: GERIATRICS | Facility: CLINIC | Age: 85
End: 2025-05-12
Payer: COMMERCIAL

## 2025-05-12 VITALS
BODY MASS INDEX: 25.61 KG/M2 | HEIGHT: 64 IN | TEMPERATURE: 97.6 F | SYSTOLIC BLOOD PRESSURE: 147 MMHG | WEIGHT: 150 LBS | OXYGEN SATURATION: 95 % | DIASTOLIC BLOOD PRESSURE: 77 MMHG | RESPIRATION RATE: 16 BRPM | HEART RATE: 87 BPM

## 2025-05-12 DIAGNOSIS — R53.81 PHYSICAL DECONDITIONING: ICD-10-CM

## 2025-05-12 DIAGNOSIS — C85.80 MARGINAL ZONE B-CELL LYMPHOMA (H): ICD-10-CM

## 2025-05-12 DIAGNOSIS — K59.00 CONSTIPATION, UNSPECIFIED CONSTIPATION TYPE: ICD-10-CM

## 2025-05-12 DIAGNOSIS — D50.9 IRON DEFICIENCY ANEMIA, UNSPECIFIED IRON DEFICIENCY ANEMIA TYPE: ICD-10-CM

## 2025-05-12 DIAGNOSIS — K21.9 GASTROESOPHAGEAL REFLUX DISEASE WITHOUT ESOPHAGITIS: ICD-10-CM

## 2025-05-12 DIAGNOSIS — R19.7 DIARRHEA, UNSPECIFIED TYPE: ICD-10-CM

## 2025-05-12 DIAGNOSIS — M25.561 ACUTE PAIN OF RIGHT KNEE: Primary | ICD-10-CM

## 2025-05-12 DIAGNOSIS — M1A.9XX0 CHRONIC GOUT WITHOUT TOPHUS, UNSPECIFIED CAUSE, UNSPECIFIED SITE: ICD-10-CM

## 2025-05-12 DIAGNOSIS — M25.561 ACUTE PAIN OF RIGHT KNEE: ICD-10-CM

## 2025-05-12 DIAGNOSIS — I10 ESSENTIAL HYPERTENSION: ICD-10-CM

## 2025-05-12 DIAGNOSIS — M15.0 PRIMARY OSTEOARTHRITIS INVOLVING MULTIPLE JOINTS: ICD-10-CM

## 2025-05-12 DIAGNOSIS — R35.0 URINARY FREQUENCY: ICD-10-CM

## 2025-05-12 PROCEDURE — 99310 SBSQ NF CARE HIGH MDM 45: CPT | Performed by: NURSE PRACTITIONER

## 2025-05-12 RX ORDER — ACETAMINOPHEN 325 MG/1
650 TABLET ORAL AT BEDTIME
COMMUNITY
Start: 2025-05-12

## 2025-05-12 RX ORDER — SENNOSIDES 8.6 MG
1300 CAPSULE ORAL 2 TIMES DAILY
COMMUNITY
Start: 2025-05-12

## 2025-05-12 RX ORDER — AMOXICILLIN 250 MG
2 CAPSULE ORAL DAILY PRN
COMMUNITY
Start: 2025-05-12

## 2025-05-12 ASSESSMENT — ACTIVITIES OF DAILY LIVING (ADL): DEPENDENT_IADLS:: TRANSPORTATION

## 2025-05-12 NOTE — LETTER
5/12/2025      Lavonne Tidwell  [address] [facility] [address]        [facility] GERIATRICS  INITIAL VISIT NOTE      PRIMARY CARE PROVIDER AND CLINIC: Elvira Kowalski [id number]  / [facility] [id number]    [facility] Medical Record Number: [id number]  Place of [facility] where encounter took place[facility] [[id number]]    Chief Complaint   Patient presents with     Hospital F/U     [facility] 5/7/2025 - 5/9/2025      HPI:    Lavonne Tidwell is a 84 year old (1940) female was admitted to the above facility from [facility]. Hospital stay 5/7/25 through 5/9/25 where they were admitted for knee pain. Now admitted to this facility for rehab, medical management, and nursing care.      History obtained from: facility chart records, facility staff, patient report, and [facility] chart review.      Brief Hospital Course: PMH of HTN, GERD, chronic cough, gout, chronic low back pain, gout, HLD, GERD, and recent tympanostomy tubes on 5/2/25 who presented with worsening knee pain, right greater than left, unable to ambulate. Concern for gout so given prednisone in the ED. After admission felt more likely to be arthritis. Started on APAP, naproxen, Voltaren gel as does not tolerate opioids. Did have diarrhea, resolved. Due to ongoing gait difficulty TCU recommended When medically stable was discharged to TCU for further rehab and medical management.       TCU Course: See today in room. She is not currently having any pain in her knee. No further diarrhea either. Thinks diarrhea may have been due to the antibiotics she was for her ears. The diarrhea made her feel very weak and she could not get up. Stools are not quite back to normal but they are getting close. Appetite is ok. She does still have some SOB with activity, thinks it is because she is still weaker than normal. She feel therapy is helping.     CODE STATUS/ADVANCE  DIRECTIVES: DNR / DNI    ALLERGIES:  Allergies   Allergen Reactions     Codeine GI Disturbance     PAST MEDICAL HISTORY:   Past Medical History:   Diagnosis Date     Arthritis 2012    knes and hips     Cancer (H)      Closed fracture of ramus of left pubis, initial encounter (H) 05/27/2024     Essential hypertension 05/05/2021     History of blood transfusion 1961    Miscarriage     Low back pain, unspecified 10/11/2022     Ovarian cysts 1974    s/p BSO     Unspecified fall, subsequent encounter 05/29/2024     PAST SURGICAL HISTORY:   Past Surgical History:   Procedure Laterality Date     ABDOMEN SURGERY  1973 & 1974    Ovarian cyst/Hysterectomy     APPENDECTOMY  1952     ARTHROPLASTY KNEE Left 1/6/2016    Procedure: ARTHROPLASTY KNEE;  Surgeon: Wilfredo Thompson MD;  Location: [facility] OR     ARTHROPLASTY KNEE Right 2/23/2017    Procedure: ARTHROPLASTY KNEE;  Surgeon: Wilfredo Thompson MD;  Location: [facility] OR     BIOPSY      colonoscopy/polyps     COLONOSCOPY      every 10 years     GENITOURINARY SURGERY  2008    bladder     HYSTERECTOMY, CHELSY  1974     ORTHOPEDIC SURGERY  2007 & 2009    [place]  both feet     PHACOEMULSIFICATION WITH STANDARD INTRAOCULAR LENS IMPLANT Left 8/20/2015    Procedure: PHACOEMULSIFICATION WITH STANDARD INTRAOCULAR LENS IMPLANT;  Surgeon: Fernando Jeffrey MD;  Location: [facility] OR     PHACOEMULSIFICATION WITH STANDARD INTRAOCULAR LENS IMPLANT Right 9/17/2015    Procedure: PHACOEMULSIFICATION WITH STANDARD INTRAOCULAR LENS IMPLANT;  Surgeon: Fernando Jeffrey MD;  Location: [facility] OR     SURGICAL HISTORY OF -   07/30/1998    Colonoscopy     SURGICAL HISTORY OF -   1974    Bilateral salpingoopherectomy     SURGICAL HISTORY OF -   3/09    TOT Midurethral sling     FAMILY HISTORY:   Family History   Problem Relation Age of Onset     Cancer Mother         Ovarian     Other Cancer Mother         Ovarian     Cerebrovascular Disease Father      Heart Disease Father      C.A.D. Father      Coronary Artery  Disease Father      Cancer Maternal Grandmother      Other Cancer Maternal Grandmother         Ovarian/Bone     Cerebrovascular Disease Maternal Grandfather      Colon Cancer Maternal Grandfather      Diabetes Other         Great Grandmother     Breast Cancer No family hx of          SOCIAL HISTORY:   Patient's living condition: lives alone    MEDICATIONS  Post Discharge Medication Reconciliation Status: medication reconcilation previously completed during another office visit.  Current Outpatient Medications   Medication Sig Dispense Refill     Acetaminophen (TYLENOL PO) Take 1,000 mg by mouth every 8 hours as needed for mild pain.       allopurinol (ZYLOPRIM) 100 MG tablet Take 1 tablet (100 mg) by mouth daily 90 tablet 3     benzonatate (TESSALON) 100 MG capsule Take 1 capsule (100 mg) by mouth 3 times daily as needed for cough. 30 capsule 0     diclofenac (VOLTAREN) 1 % topical gel Apply 2 g topically 4 times daily.       famotidine (PEPCID) 40 MG tablet Take 1 tablet (40 mg) by mouth 2 times daily. 180 tablet 3     fluticasone (FLONASE) 50 MCG/ACT nasal spray Spray 1 spray into both nostrils 2 times daily 16 g 3     ipratropium (ATROVENT) 0.03 % nasal spray SPRAY ONE TO TWO SPRAYS INTO BOTH NOSTRILS THREE TIMES DAILY AS NEEDED FOR RHINITIS (RUNNY NOSE, POSTNASAL DRAINAGE). 30 mL 1     loratadine (CLARITIN) 10 MG tablet Take 10 mg by mouth daily       Menthol, Topical Analgesic, (BIOFREEZE) 4 % GEL Externally apply topically 4 times daily       naproxen sodium 220 MG capsule Take 220 mg by mouth 2 times daily (with meals).       pantoprazole (PROTONIX) 40 MG EC tablet Take 1 tablet (40 mg) by mouth daily. 90 tablet 3     senna-docusate (SENOKOT-S/PERICOLACE) 8.6-50 MG tablet Take 2 tablets by mouth daily.       tolterodine ER (DETROL LA) 2 MG 24 hr capsule TAKE 1 CAPSULE (2 MG) BY MOUTH DAILY. 30 capsule 2     ROS:  10 point ROS neg other than the symptoms noted above in the HPI.      PHYSICAL EXAM:  BP (!)  "147/77   Pulse 87   Temp 97.6  F (36.4  C)   Resp 16   Ht 1.613 m (5' 3.5\")   Wt 68 kg (150 lb)   LMP 01/01/1974   SpO2 95%   BMI 26.15 kg/m    Physical Exam  Cardiovascular:      Rate and Rhythm: Normal rate and regular rhythm.      Heart sounds: Normal heart sounds.   Pulmonary:      Effort: Pulmonary effort is normal.      Breath sounds: Normal breath sounds.   Abdominal:      General: Bowel sounds are normal.      Palpations: Abdomen is soft.   Musculoskeletal:         General: Normal range of motion.   Neurological:      Mental Status: She is alert.   Psychiatric:         Mood and Affect: Mood normal.         Thought Content: Thought content normal.          LABORATORY/IMAGING DATA:  Reviewed as per [employer] and/or [employer]    ASSESSMENT/PLAN:  Acute pain of right knee  Primary osteoarthritis involving multiple joints  Physical deconditioning  Pain improved, making progress with therapy.  Discussed with patient, nursing staff.   - analgesia with APAP Er BID and regular Tylenol bedtime, Voltaren gel QID, increase from 2gm to 4gm, Naproxen PRN   - PT/OT  - monitor and adjsut     Diarrhea  Resolving but stools not yet back to baseline  - change senna-s to PRN per patient request    Essential hypertension  -130,   - monitor, stable off medications     Gastroesophageal reflux disease without esophagitis  - continue Pantoprazole     Chronic gout without tophus, unspecified cause, unspecified site  - continue allopurinol     Iron deficiency anemia, unspecified iron deficiency anemia type  Hgb stable 10-11  - check CBC    Marginal zone B-cell lymphoma (H)  Follows with oncology, had renal US on 5/9  - defer review of ultrasound results to oncology  - follow-up with oncology    Urinary frequency  - continue Detrol LA        Orders:   Change senna-s to 2 tab daily pRN  Increased voltaren gel 4gm to knee  Discontinue tessalon  Discontinue current APAP  APAP ER 1300mg BID   Tylenol 650mg at bedtime "     Total time spent with patient visit at the skilled nursing facility was 45 min including patient visit and review of past records; includes time spent reviewing records from hospitalization within my organization including labs, imaging reports and provider/consult notes, review of TCU facility records, medication reconciliation, discussion of plan of care with patient, nursing staff and therapy as stated above as well as time spent on documentation.      Electronically signed by:  ENA Clemons CNP       Sincerely,        ENA Clemons CNP    Electronically signed

## 2025-05-12 NOTE — PROGRESS NOTES
Clinic Care Coordination Contact  Care Coordination Transition Communication    Clinical Data:     [facility]  Hospitalist Discharge Summary       Date of Admission:  5/7/2025  Date of Discharge:  May 9, 2025  Discharging Provider: Capo Jennings MD        Discharge Diagnoses  Generalized weakness  Acute on chronic knee pain, right > left   Recent nausea, vomiting, and diarrhea - resolved  Chronic cough  Myringitis   Post nasal drainage  Chronic gout, unspecified, without tophus (tophi)  Primary osteoarthritis involving multiple joints  Chronic bilateral low back pain without sciatica  Essential (primary) hypertension  Hyperlipidemia LDL goal <130  Esophageal reflux  Urinary incontinence  Marginal zone B-cell lymphoma  MGUS (monoclonal gammopathy of unknown significance)  Hyponatremia  Hypochloremia  Hypertension  Anemia  Overweight   .     Assessment: Patient has transitioned to TCU/ARU for short term rehabilitation:    Facility Name: [facility] TCU   Transition Communication:  Notified facility of Primary Care- Care Coordination support via TCU hand-in e-mail.    Plan: Care Coordinator will await notification from facility staff informing of patient's discharge plans/needs. Care Coordinator will review chart and outreach to facility staff every 4 weeks and as needed.     [facility]   Leatha Guaman RN, Care Coordinator   [facility]'s   E-mail [email or web address]   [phone]

## 2025-05-14 ENCOUNTER — LAB REQUISITION (OUTPATIENT)
Dept: LAB | Facility: CLINIC | Age: 85
End: 2025-05-14

## 2025-05-14 ENCOUNTER — DOCUMENTATION ONLY (OUTPATIENT)
Dept: OTHER | Facility: CLINIC | Age: 85
End: 2025-05-14
Payer: COMMERCIAL

## 2025-05-14 DIAGNOSIS — C85.80 OTHER SPECIFIED TYPES OF NON-HODGKIN LYMPHOMA, UNSPECIFIED SITE (H): ICD-10-CM

## 2025-05-14 DIAGNOSIS — R76.12 NONSPECIFIC REACTION TO CELL MEDIATED IMMUNITY MEASUREMENT OF GAMMA INTERFERON ANTIGEN RESPONSE WITHOUT ACTIVE TUBERCULOSIS: ICD-10-CM

## 2025-05-14 NOTE — PROGRESS NOTES
[facility] GERIATRICS DISCHARGE SUMMARY  Patient Name: Lavonne Tidwell  YOB: 1940  [place] Medical Record Number: [id number]  Place of Service Where Encounter Took Place: [facility] ([facility]) [[id number]]    PRIMARY CARE PROVIDER AND CLINIC RESPONSIBLE AFTER TRANSFER: Elvira Kowalski MD, [address] [place] [address] / [facility] [address]; [facility] Provider     Transferring providers: ENA Zamora CNP; Yue Hogan MD  Recent Hospitalization/ED: [facility] stay 5/7/25 to 5/9/25.  Date of [facility] Admission: May 09, 2025  Date of [facility] (anticipated) Discharge: May 17, 2025  Discharged to: previous independent home  Cognitive Scores: NA  Physical Function: Ambulating 250 ft with mod Ind  DME: Walker    CODE STATUS/ADVANCE DIRECTIVES DISCUSSION: No CPR- Do NOT Intubate   ALLERGIES: Codeine    NURSING FACILITY COURSE:  Medication Changes/Rationale:   Started on scheduled APAP Er as was home regimen.   Discontinued tessalon as not used  Changed senna-s to PRN per patient request as had loose stools on admission     Summary of nursing facility stay:   Brief Hospital Course: PMH of HTN, GERD, chronic cough, gout, chronic low back pain, gout, HLD, GERD, and recent tympanostomy tubes on 5/2/25 who presented with worsening knee pain, right greater than left, unable to ambulate. Concern for gout so given prednisone in the ED. After admission felt more likely to be arthritis. Started on APAP, naproxen, Voltaren gel as does not tolerate opioids. Did have diarrhea, resolved. Due to ongoing gait difficulty TCU recommended When medically stable was discharged to TCU for further rehab and medical management.     Acute pain of right knee  Primary osteoarthritis involving multiple joints  Physical deconditioning  Significantly improved, no further pain. Made progress with therapy.   - Home Care Referral  - analgesia with APAP ER BID and regular Tylenol bedtime,  Voltaren gel QID, increased from 2gm to 4gm, Naproxen PRN (educated to limit use given side effect panel)  - Home Care Referral    Diarrhea, unspecified type  Resolved, changed laxatives to PRN per patient request  - senna-s 2 tab daily PRN     Essential hypertension  -130  - stable of medications    Gastroesophageal reflux disease without esophagitis  - continue pantoprazole    Chronic gout without tophus, unspecified cause, unspecified site  Given prednisone x1 in ED for possible gout, no concerns in TCU  - continue allopurinol    Iron deficiency anemia, unspecified iron deficiency anemia type  Hgb stable 10-11    Marginal zone B-cell lymphoma (H)  Follows with oncology, had renal US on 5/9, did show cysts  - follow-up with oncology    Urinary frequency  Taking Detrol LA, but report ongoing urinary incontinence, worse at night  - continue Detrol LA,   - follow-up with PCP to discuss further option for management       Discharge Medications:  MED REC REQUIRED  Post Medication Reconciliation Status: medication reconcilation previously completed during another office visit    Current Outpatient Medications   Medication Sig Dispense Refill    acetaminophen (TYLENOL) 325 MG tablet Take 2 tablets (650 mg) by mouth at bedtime.      acetaminophen (TYLENOL) 650 MG CR tablet Take 2 tablets (1,300 mg) by mouth 2 times daily.      allopurinol (ZYLOPRIM) 100 MG tablet Take 1 tablet (100 mg) by mouth daily 90 tablet 3    diclofenac (VOLTAREN) 1 % topical gel Apply 4 g topically 4 times daily.      famotidine (PEPCID) 40 MG tablet Take 1 tablet (40 mg) by mouth 2 times daily. 180 tablet 3    fluticasone (FLONASE) 50 MCG/ACT nasal spray Spray 1 spray into both nostrils 2 times daily 16 g 3    ipratropium (ATROVENT) 0.03 % nasal spray SPRAY ONE TO TWO SPRAYS INTO BOTH NOSTRILS THREE TIMES DAILY AS NEEDED FOR RHINITIS (RUNNY NOSE, POSTNASAL DRAINAGE). 30 mL 1    loratadine (CLARITIN) 10 MG tablet Take 10 mg by mouth daily    "   Menthol, Topical Analgesic, (BIOFREEZE) 4 % GEL Externally apply topically 4 times daily      naproxen sodium 220 MG capsule Take 220 mg by mouth 2 times daily (with meals).      pantoprazole (PROTONIX) 40 MG EC tablet Take 1 tablet (40 mg) by mouth daily. 90 tablet 3    senna-docusate (SENOKOT-S/PERICOLACE) 8.6-50 MG tablet Take 2 tablets by mouth daily as needed for constipation.      tolterodine ER (DETROL LA) 2 MG 24 hr capsule TAKE 1 CAPSULE (2 MG) BY MOUTH DAILY. 30 capsule 2     Controlled medications:   not applicable/none     Past Medical History:   Past Medical History:   Diagnosis Date    Arthritis 2012    knes and hips    Cancer (H)     Closed fracture of ramus of left pubis, initial encounter (H) 05/27/2024    Essential hypertension 05/05/2021    History of blood transfusion 1961    Miscarriage    Low back pain, unspecified 10/11/2022    Ovarian cysts 1974    s/p BSO    Unspecified fall, subsequent encounter 05/29/2024     Physical Exam:   Vitals: /77   Pulse 83   Temp 97.2  F (36.2  C)   Resp 18   Ht 1.613 m (5' 3.5\")   Wt 68 kg (150 lb)   LMP 01/01/1974   SpO2 95%   BMI 26.15 kg/m    BMI: Body mass index is 26.15 kg/m .  GENERAL APPEARANCE:  Alert, in no distress, cooperative,   RESP:  lungs clear to auscultation , no respiratory distress   CV:  regular rate and rhythm, no murmur, rub, or gallop, no edema  ABDOMEN:  bowel sounds normal,   M/S:   no gross joint deformities   NEURO:   Cn 2-12 grossly intact,   PSYCH:  oriented X 3, affect and mood normal       SNF Labs: Recent labs in [facility] reviewed by me today.     DISCHARGE PLAN:  Follow up labs: No labs orders/due  Medical Follow Up:   Follow up with primary care provider in 2 weeks  [facility] scheduled appointments: None.   Discharge Services: Home Care: Physical Therapy, Occupational Therapy, Registered Nurse, Home Health Aide. From: [facility].  Discharge Instructions Verbalized to Patient at Discharge:   Use " naproxen sparingly    TOTAL DISCHARGE TIME: Greater than 30 minutes  Electronically signed by:  ENA Clemons CNP

## 2025-05-15 ENCOUNTER — DISCHARGE SUMMARY NURSING HOME (OUTPATIENT)
Dept: GERIATRICS | Facility: CLINIC | Age: 85
End: 2025-05-15
Payer: COMMERCIAL

## 2025-05-15 VITALS
BODY MASS INDEX: 25.61 KG/M2 | WEIGHT: 150 LBS | RESPIRATION RATE: 18 BRPM | HEIGHT: 64 IN | SYSTOLIC BLOOD PRESSURE: 131 MMHG | TEMPERATURE: 97.2 F | DIASTOLIC BLOOD PRESSURE: 77 MMHG | OXYGEN SATURATION: 95 % | HEART RATE: 83 BPM

## 2025-05-15 DIAGNOSIS — I10 ESSENTIAL HYPERTENSION: ICD-10-CM

## 2025-05-15 DIAGNOSIS — C85.80 MARGINAL ZONE B-CELL LYMPHOMA (H): ICD-10-CM

## 2025-05-15 DIAGNOSIS — K21.9 GASTROESOPHAGEAL REFLUX DISEASE WITHOUT ESOPHAGITIS: ICD-10-CM

## 2025-05-15 DIAGNOSIS — R53.81 PHYSICAL DECONDITIONING: ICD-10-CM

## 2025-05-15 DIAGNOSIS — R35.0 URINARY FREQUENCY: ICD-10-CM

## 2025-05-15 DIAGNOSIS — M1A.9XX0 CHRONIC GOUT WITHOUT TOPHUS, UNSPECIFIED CAUSE, UNSPECIFIED SITE: ICD-10-CM

## 2025-05-15 DIAGNOSIS — R19.7 DIARRHEA, UNSPECIFIED TYPE: ICD-10-CM

## 2025-05-15 DIAGNOSIS — M25.561 ACUTE PAIN OF RIGHT KNEE: Primary | ICD-10-CM

## 2025-05-15 DIAGNOSIS — M15.0 PRIMARY OSTEOARTHRITIS INVOLVING MULTIPLE JOINTS: ICD-10-CM

## 2025-05-15 DIAGNOSIS — D50.9 IRON DEFICIENCY ANEMIA, UNSPECIFIED IRON DEFICIENCY ANEMIA TYPE: ICD-10-CM

## 2025-05-15 LAB
ANION GAP SERPL CALCULATED.3IONS-SCNC: 14 MMOL/L (ref 7–15)
BASOPHILS # BLD AUTO: 0 10E3/UL (ref 0–0.2)
BASOPHILS NFR BLD AUTO: 0 %
BUN SERPL-MCNC: 28.1 MG/DL (ref 8–23)
CALCIUM SERPL-MCNC: 9.5 MG/DL (ref 8.8–10.4)
CHLORIDE SERPL-SCNC: 103 MMOL/L (ref 98–107)
CREAT SERPL-MCNC: 0.76 MG/DL (ref 0.51–0.95)
EGFRCR SERPLBLD CKD-EPI 2021: 76 ML/MIN/1.73M2
EOSINOPHIL # BLD AUTO: 0.1 10E3/UL (ref 0–0.7)
EOSINOPHIL NFR BLD AUTO: 1 %
ERYTHROCYTE [DISTWIDTH] IN BLOOD BY AUTOMATED COUNT: 13.2 % (ref 10–15)
GLUCOSE SERPL-MCNC: 97 MG/DL (ref 70–99)
HCO3 SERPL-SCNC: 25 MMOL/L (ref 22–29)
HCT VFR BLD AUTO: 34.7 % (ref 35–47)
HGB BLD-MCNC: 10.7 G/DL (ref 11.7–15.7)
IMM GRANULOCYTES # BLD: 0 10E3/UL
IMM GRANULOCYTES NFR BLD: 0 %
LYMPHOCYTES # BLD AUTO: 3.8 10E3/UL (ref 0.8–5.3)
LYMPHOCYTES NFR BLD AUTO: 59 %
MCH RBC QN AUTO: 32.8 PG (ref 26.5–33)
MCHC RBC AUTO-ENTMCNC: 30.8 G/DL (ref 31.5–36.5)
MCV RBC AUTO: 106 FL (ref 78–100)
MONOCYTES # BLD AUTO: 0.1 10E3/UL (ref 0–1.3)
MONOCYTES NFR BLD AUTO: 1 %
NEUTROPHILS # BLD AUTO: 2.5 10E3/UL (ref 1.6–8.3)
NEUTROPHILS NFR BLD AUTO: 39 %
NRBC # BLD AUTO: 0 10E3/UL
NRBC BLD AUTO-RTO: 0 /100
PLATELET # BLD AUTO: 257 10E3/UL (ref 150–450)
POTASSIUM SERPL-SCNC: 4.2 MMOL/L (ref 3.4–5.3)
RBC # BLD AUTO: 3.26 10E6/UL (ref 3.8–5.2)
SODIUM SERPL-SCNC: 142 MMOL/L (ref 135–145)
WBC # BLD AUTO: 6.5 10E3/UL (ref 4–11)

## 2025-05-15 PROCEDURE — 85025 COMPLETE CBC W/AUTO DIFF WBC: CPT | Performed by: NURSE PRACTITIONER

## 2025-05-15 PROCEDURE — 82565 ASSAY OF CREATININE: CPT | Performed by: NURSE PRACTITIONER

## 2025-05-15 PROCEDURE — 82947 ASSAY GLUCOSE BLOOD QUANT: CPT | Performed by: NURSE PRACTITIONER

## 2025-05-15 PROCEDURE — 36415 COLL VENOUS BLD VENIPUNCTURE: CPT | Performed by: NURSE PRACTITIONER

## 2025-05-15 NOTE — LETTER
5/15/2025      Lavonne Tidwell  [address] [facility] [place] [address]        [facility] GERIATRICS DISCHARGE SUMMARY  Patient Name: Lavonne Tidwell  YOB: 1940  [place] Medical Record Number: [id number]  Place of Service Where Encounter Took Place: [facility] (SNF) [[id number]]    PRIMARY CARE PROVIDER AND CLINIC RESPONSIBLE AFTER TRANSFER: Elvira Kowalski MD, [address]  / [place] [address]; [facility]     Transferring providers: ENA Zamora CNP; Yue Hogan MD  Recent Hospitalization/ED: [facility] stay 5/7/25 to 5/9/25.  Date of SNF Admission: May 09, 2025  Date of SNF (anticipated) Discharge: May 17, 2025  Discharged to: previous independent home  Cognitive Scores: NA  Physical Function: Ambulating 250 ft with mod Ind  DME: Walker    CODE STATUS/ADVANCE DIRECTIVES DISCUSSION: No CPR- Do NOT Intubate   ALLERGIES: Codeine    NURSING FACILITY COURSE:  Medication Changes/Rationale:   Started on scheduled APAP Er as was home regimen.   Discontinued tessalon as not used  Changed senna-s to PRN per patient request as had loose stools on admission     Summary of nursing facility stay:   Brief Hospital Course: PMH of HTN, GERD, chronic cough, gout, chronic low back pain, gout, HLD, GERD, and recent tympanostomy tubes on 5/2/25 who presented with worsening knee pain, right greater than left, unable to ambulate. Concern for gout so given prednisone in the ED. After admission felt more likely to be arthritis. Started on APAP, naproxen, Voltaren gel as does not tolerate opioids. Did have diarrhea, resolved. Due to ongoing gait difficulty TCU recommended When medically stable was discharged to TCU for further rehab and medical management.     Acute pain of right knee  Primary osteoarthritis involving multiple joints  Physical deconditioning  Significantly improved, no further pain. Made progress with therapy.   -  Home Care Referral  - analgesia with APAP ER BID and regular Tylenol bedtime, Voltaren gel QID, increased from 2gm to 4gm, Naproxen PRN (educated to limit use given side effect panel)  - Home Care Referral    Diarrhea, unspecified type  Resolved, changed laxatives to PRN per patient request  - senna-s 2 tab daily PRN     Essential hypertension  -130  - stable of medications    Gastroesophageal reflux disease without esophagitis  - continue pantoprazole    Chronic gout without tophus, unspecified cause, unspecified site  Given prednisone x1 in ED for possible gout, no concerns in TCU  - continue allopurinol    Iron deficiency anemia, unspecified iron deficiency anemia type  Hgb stable 10-11    Marginal zone B-cell lymphoma (H)  Follows with oncology, had renal US on 5/9, did show cysts  - follow-up with oncology    Urinary frequency  Taking Detrol LA, but report ongoing urinary incontinence, worse at night  - continue Detrol LA,   - follow-up with PCP to discuss further option for management       Discharge Medications:  MED REC REQUIRED  Post Medication Reconciliation Status: medication reconcilation previously completed during another office visit    Current Outpatient Medications   Medication Sig Dispense Refill     acetaminophen (TYLENOL) 325 MG tablet Take 2 tablets (650 mg) by mouth at bedtime.       acetaminophen (TYLENOL) 650 MG CR tablet Take 2 tablets (1,300 mg) by mouth 2 times daily.       allopurinol (ZYLOPRIM) 100 MG tablet Take 1 tablet (100 mg) by mouth daily 90 tablet 3     diclofenac (VOLTAREN) 1 % topical gel Apply 4 g topically 4 times daily.       famotidine (PEPCID) 40 MG tablet Take 1 tablet (40 mg) by mouth 2 times daily. 180 tablet 3     fluticasone (FLONASE) 50 MCG/ACT nasal spray Spray 1 spray into both nostrils 2 times daily 16 g 3     ipratropium (ATROVENT) 0.03 % nasal spray SPRAY ONE TO TWO SPRAYS INTO BOTH NOSTRILS THREE TIMES DAILY AS NEEDED FOR RHINITIS (RUNNY NOSE, POSTNASAL  "DRAINAGE). 30 mL 1     loratadine (CLARITIN) 10 MG tablet Take 10 mg by mouth daily       Menthol, Topical Analgesic, (BIOFREEZE) 4 % GEL Externally apply topically 4 times daily       naproxen sodium 220 MG capsule Take 220 mg by mouth 2 times daily (with meals).       pantoprazole (PROTONIX) 40 MG EC tablet Take 1 tablet (40 mg) by mouth daily. 90 tablet 3     senna-docusate (SENOKOT-S/PERICOLACE) 8.6-50 MG tablet Take 2 tablets by mouth daily as needed for constipation.       tolterodine ER (DETROL LA) 2 MG 24 hr capsule TAKE 1 CAPSULE (2 MG) BY MOUTH DAILY. 30 capsule 2     Controlled medications:   not applicable/none     Past Medical History:   Past Medical History:   Diagnosis Date     Arthritis 2012    knes and hips     Cancer (H)      Closed fracture of ramus of left pubis, initial encounter (H) 05/27/2024     Essential hypertension 05/05/2021     History of blood transfusion 1961    Miscarriage     Low back pain, unspecified 10/11/2022     Ovarian cysts 1974    s/p BSO     Unspecified fall, subsequent encounter 05/29/2024     Physical Exam:   Vitals: /77   Pulse 83   Temp 97.2  F (36.2  C)   Resp 18   Ht 1.613 m (5' 3.5\")   Wt 68 kg (150 lb)   LMP 01/01/1974   SpO2 95%   BMI 26.15 kg/m    BMI: Body mass index is 26.15 kg/m .  GENERAL APPEARANCE:  Alert, in no distress, cooperative,   RESP:  lungs clear to auscultation , no respiratory distress   CV:  regular rate and rhythm, no murmur, rub, or gallop, no edema  ABDOMEN:  bowel sounds normal,   M/S:   no gross joint deformities   NEURO:   Cn 2-12 grossly intact,   PSYCH:  oriented X 3, affect and mood normal       SNF Labs: Recent labs in [facility] reviewed by me today.     DISCHARGE PLAN:  Follow up labs: No labs orders/due  Medical Follow Up:   Follow up with primary care provider in 2 weeks  [facility] scheduled appointments: None.   Discharge Services: Home Care: Physical Therapy, Occupational Therapy, Registered Nurse, Home Health Aide. " From: [facility].  Discharge Instructions Verbalized to Patient at Discharge:   Use naproxen sparingly    TOTAL DISCHARGE TIME: Greater than 30 minutes  Electronically signed by:  ENA Clemons CNP        Sincerely,        ENA Clemons CNP    Electronically signed

## 2025-05-17 LAB
GAMMA INTERFERON BACKGROUND BLD IA-ACNC: 0.18 IU/ML
M TB IFN-G BLD-IMP: NEGATIVE
M TB IFN-G CD4+ BCKGRND COR BLD-ACNC: 9.82 IU/ML
MITOGEN IGNF BCKGRD COR BLD-ACNC: 0 IU/ML
MITOGEN IGNF BCKGRD COR BLD-ACNC: 0.02 IU/ML
QUANTIFERON MITOGEN: 10 IU/ML
QUANTIFERON NIL TUBE: 0.18 IU/ML
QUANTIFERON TB1 TUBE: 0.18 IU/ML
QUANTIFERON TB2 TUBE: 0.2

## 2025-05-19 ENCOUNTER — TELEPHONE (OUTPATIENT)
Dept: FAMILY MEDICINE | Facility: CLINIC | Age: 85
End: 2025-05-19
Payer: COMMERCIAL

## 2025-05-19 NOTE — TELEPHONE ENCOUNTER
Home care Early Start of Care for RN Skilled Nursing       Home Care is calling regarding an established patient with [facility].  Requesting orders from: Elvira Kowalski RN APPROVED: RN able to provide verbal orders.  Home Care will send orders for signature.  RN will close encounter.  Is this a request for a temporary pause in the home care episode?  No    Orders Requested    Skilled Nursing  Request for initial evaluation and treatment (one time)   RN gave verbal order: Yes        Phone number Home Care can be reached at: STEW Tomlinson with [facility] [phone]  Okay to leave a detailed message?: Yes      Odessa Brown RN

## 2025-05-20 ENCOUNTER — PATIENT OUTREACH (OUTPATIENT)
Dept: CARE COORDINATION | Facility: CLINIC | Age: 85
End: 2025-05-20
Payer: COMMERCIAL

## 2025-05-20 ENCOUNTER — TELEPHONE (OUTPATIENT)
Dept: FAMILY MEDICINE | Facility: CLINIC | Age: 85
End: 2025-05-20
Payer: COMMERCIAL

## 2025-05-20 ASSESSMENT — ACTIVITIES OF DAILY LIVING (ADL): DEPENDENT_IADLS:: TRANSPORTATION

## 2025-05-20 NOTE — LETTER
[facility]  Patient Centered Plan of Care  About Me:        Patient Name:  Lavonne Viera    YOB: 1940  Age:         84 year old   [facility] MRN:    [id number] Telephone Information:  Home Phone [phone]   Mobile [phone]       Address:  [address] [place] [address] Email address:  [email or web address]      Emergency Contact(s)    Name Relationship Lgl Grd Work Phone Home Phone Mobile Phone   1. PAMELA VIERA Daughter No   [phone]   2. DINAH VIERA Son No   [phone]   3. DERIC VIERA Daughter-in-Law No   [phone]           Primary language:  English     needed? No   [facility]:  [phone] op. 1  Other communication barriers:Glasses    Preferred Method of Communication:  Dejan  Current living arrangement: I live in a private home    Mobility Status/ Medical Equipment: Independent w/Device        Health Maintenance  Health Maintenance Reviewed: Not assessed      My Access Plan  Medical Emergency 911   Primary Clinic Line [facility] - [phone]   24 Hour Appointment Line [phone] or  [phone]) (toll-free)   24 Hour Nurse Line [phone] (toll-free)   Preferred Urgent Care [facility], [phone]     Preferred Hospital [address], [place]  [phone]     Preferred Pharmacy [facility] [address] [place] [address]     Behavioral Health Crisis Line The National Suicide Prevention Lifeline at 1-222.502.3810 or Text/Call 738           My Care Team Members  Patient Care Team         Relationship Specialty Notifications Start End    Rehder, Elvira Summers MD PCP - General Family Practice  1/27/12     Phone: [phone] Fax: [phone] [address] [place] [address]    Rob Harris MD Assigned Surgical Provider   10/23/20     Phone: [phone] Fax: [phone] [address] [facility]  [facility] [address]    Elvira Kowalski MD Assigned PCP   5/27/21     Phone: [phone] Fax: [phone]         [address] [place] [address]    Belkis Melendez, [facility] Pharmacist Pharmacist  6/12/23     Phone: [phone] Fax: [phone]         [address] [place] [address]    Yong Quiles [facility] Audiologist Audiology  10/12/23     Phone: [phone] Fax: [phone]         [address] [facility] [address]    Estela Barrett NP Assigned Cancer Care Provider   10/28/23     Phone: [phone] Fax: [phone]         [address] [place] [address]    Barron Cardozo MD Assigned Allergy Provider   10/28/23     Phone: [phone] Fax: [phone]         [address] [place] [address]    Micheline Almodovar APRN CNP Nurse Practitioner Family Medicine Yes. All results 5/9/25     Phone: [phone] Fax: [phone]         [address] [facility] [address]    (Fgs), [facility]    5/9/25     Phone: [phone] Fax: [phone] [address] [facility] [address]    Leatha Guaman, RN Lead Care Coordinator Primary Care - CC Admissions 5/12/25     Phone: [phone]         Марина Hanson, CHW Community Health Worker   5/20/25                 My Care Plans  Self Management and Treatment Plan    Care Plan  Care Plan: Physical Activity       Problem: Patient is inactive       Goal: Increase Physical Activity in the next 1-2 months       Start Date: 5/20/2025 Expected End Date: 8/20/2025    This Visit's Progress: 40%    Priority: High    Note:     Barriers: Deconditioned   Strengths: Motivated   Patient expressed understanding of goal: Yes  Action steps to achieve this goal:  1. I will use my walker as needed   2. I will start outpatient PT                                 Action Plans on File:                       Advance Care Plans/Directives:   Advanced Care Plan/Directives on file: Yes    Status of Document(s): No data  recorded  Advanced Care Plan/Directives Type: POLST           My Medical and Care Information  Problem List   Patient Active Problem List   Diagnosis    Injury of sigmoid colon    Esophageal reflux    Menopausal syndrome (hot flashes)    Urinary incontinence    Atrophic vaginitis    Hyperlipidemia LDL goal <130    Onychomycosis    Senile nuclear sclerosis    Status post total left knee replacement    Status post total right knee replacement    Primary localized osteoarthrosis, lower leg    Tubulovillous adenoma polyp of colon    Primary osteoarthritis involving multiple joints    Chronic bilateral low back pain without sciatica    Chronic cough    Fall, initial encounter    Closed fracture of transverse process of lumbar vertebra, initial encounter (H)    Marginal zone B-cell lymphoma (H)    MGUS (monoclonal gammopathy of unknown significance)    Other specified fracture of left pubis, subsequent encounter for fracture with routine healing    Unspecified fracture of unspecified lumbar vertebra, subsequent encounter for fracture with routine healing    Chronic gout, unspecified, without tophus (tophi)    Essential (primary) hypertension    Polyosteoarthritis, unspecified    Generalized weakness    History of gout    Acute pain of right knee    Diarrhea, unspecified type    Myringitis    Post-nasal drainage      Current Medications:    Current Outpatient Medications   Medication Sig Dispense Refill    acetaminophen (TYLENOL) 325 MG tablet Take 2 tablets (650 mg) by mouth at bedtime.      acetaminophen (TYLENOL) 650 MG CR tablet Take 2 tablets (1,300 mg) by mouth 2 times daily.      allopurinol (ZYLOPRIM) 100 MG tablet Take 1 tablet (100 mg) by mouth daily 90 tablet 3    diclofenac (VOLTAREN) 1 % topical gel Apply 4 g topically 4 times daily.      famotidine (PEPCID) 40 MG tablet Take 1 tablet (40 mg) by mouth 2 times daily. 180 tablet 3    fluticasone (FLONASE) 50 MCG/ACT nasal spray Spray 1 spray into both nostrils 2  times daily 16 g 3    ipratropium (ATROVENT) 0.03 % nasal spray SPRAY ONE TO TWO SPRAYS INTO BOTH NOSTRILS THREE TIMES DAILY AS NEEDED FOR RHINITIS (RUNNY NOSE, POSTNASAL DRAINAGE). 30 mL 1    loratadine (CLARITIN) 10 MG tablet Take 10 mg by mouth daily      Menthol, Topical Analgesic, (BIOFREEZE) 4 % GEL Externally apply topically 4 times daily      naproxen sodium 220 MG capsule Take 220 mg by mouth 2 times daily (with meals).      pantoprazole (PROTONIX) 40 MG EC tablet Take 1 tablet (40 mg) by mouth daily. 90 tablet 3    senna-docusate (SENOKOT-S/PERICOLACE) 8.6-50 MG tablet Take 2 tablets by mouth daily as needed for constipation.      tolterodine ER (DETROL LA) 2 MG 24 hr capsule TAKE 1 CAPSULE (2 MG) BY MOUTH DAILY. 30 capsule 2     Current Facility-Administered Medications   Medication Dose Route Frequency Provider Last Rate Last Admin    iohexol (OMNIPAQUE) 300 mg/mL injection 1 mL  1 mL Intravenous Once Hamzah Kingston MD            Care Coordination Start Date: 5/12/2025   Frequency of Care Coordination: 2 weeks, more frequently as needed     Form Last Updated: 05/20/2025

## 2025-05-20 NOTE — PROGRESS NOTES
Clinic Care Coordination Contact  Clinic Care Coordination Contact  OUTREACH    Referral Information:  Referral Source: IP Handoff    Primary Diagnosis: Orthopedic    Chief Complaint   Patient presents with    Clinic Care Coordination - Post Hospital     Clinic Care Coordination RN         Universal Utilization:   [facility]  Hospitalist Discharge Summary       Date of Admission:  5/7/2025  Date of Discharge:  May 9, 2025  Discharging Provider: Capo Jennings MD        Discharge Diagnoses  Generalized weakness  Acute on chronic knee pain, right > left   Recent nausea, vomiting, and diarrhea - resolved  Chronic cough  Myringitis   Post nasal drainage  Chronic gout, unspecified, without tophus (tophi)  Primary osteoarthritis involving multiple joints  Chronic bilateral low back pain without sciatica  Essential (primary) hypertension  Hyperlipidemia LDL goal <130  Esophageal reflux  Urinary incontinence  Marginal zone B-cell lymphoma  MGUS (monoclonal gammopathy of unknown significance)  Hyponatremia  Hypochloremia  Hypertension  Anemia  Overweight   .      Assessment: Patient has transitioned to TCU/ARU for short term rehabilitation:     [facility]  discharged 5/17/2025  Clinic Utilization  Difficulty keeping appointments:: No  Compliance Concerns: No  No-Show Concerns: No  No PCP office visit in Past Year: No  Utilization      No Show Count (past year)  0             ED Visits  2             Hospital Admissions  2                    Current as of: 5/20/2025 11:19 AM                Clinical Concerns:  Current Medical Concerns:  Patient had a home care RN visit today and it was suggested she should do outpatient PT because she was doing so well   Patient reports he had a cortisone injection to both knees in the hospital and was pain free throughout her TCU stay.  Suggested she might have had flash gout because both her knees have been replaced     Current  Behavioral Concerns: No    Education Provided to patient: CC Introductory letter and care plan sent via My Chart    Pain  Pain (GOAL):: No  Health Maintenance Reviewed: Not assessed  Clinical Pathway: None    Medication Management:  Medication review status:   Medications reviewed today by home care RN      Functional Status:  Dependent ADLs:: Ambulation-cane, Ambulation-walker  Dependent IADLs:: Transportation  Bed or wheelchair confined:: No  Mobility Status: Independent w/Device  Fallen 2 or more times in the past year?: (!) Yes  Any fall with injury in the past year?: Yes    Living Situation:  Current living arrangement:: I live in a private home    Lifestyle & Psychosocial Needs:    Social Drivers of Health     Food Insecurity: Low Risk  (5/8/2025)    Food Insecurity     Within the past 12 months, did you worry that your food would run out before you got money to buy more?: No     Within the past 12 months, did the food you bought just not last and you didn t have money to get more?: No   Depression: Not at risk (1/3/2025)    PHQ-2     PHQ-2 Score: 0   Housing Stability: Low Risk  (5/8/2025)    Housing Stability     Do you have housing? : Yes     Are you worried about losing your housing?: No   Tobacco Use: Medium Risk (5/7/2025)    Patient History     Smoking Tobacco Use: Former     Smokeless Tobacco Use: Never     Passive Exposure: Never   Financial Resource Strain: Low Risk  (5/8/2025)    Financial Resource Strain     Within the past 12 months, have you or your family members you live with been unable to get utilities (heat, electricity) when it was really needed?: No   Alcohol Use: Not on file   Transportation Needs: Low Risk  (5/8/2025)    Transportation Needs     Within the past 12 months, has lack of transportation kept you from medical appointments, getting your medicines, non-medical meetings or appointments, work, or from getting things that you need?: No   Physical Activity: Inactive (9/22/2024)     Exercise Vital Sign     Days of Exercise per Week: 0 days     Minutes of Exercise per Session: 20 min   Interpersonal Safety: Low Risk  (3/28/2025)    Interpersonal Safety     Do you feel physically and emotionally safe where you currently live?: Yes     Within the past 12 months, have you been hit, slapped, kicked or otherwise physically hurt by someone?: No     Within the past 12 months, have you been humiliated or emotionally abused in other ways by your partner or ex-partner?: No   Stress: No Stress Concern Present (9/22/2024)    [employer] - Occupational Stress Questionnaire     Feeling of Stress : Not at all   Social Connections: Unknown (6/13/2024)    Social Connection and Isolation Panel [NHANES]     Frequency of Communication with Friends and Family: Three times a week     Frequency of Social Gatherings with Friends and Family: Not on file     Attends Latter day Services: Not on file     Active Member of Clubs or Organizations: Not on file     Attends Club or Organization Meetings: Not on file     Marital Status:    Health Literacy: Not on file     Diet:: Regular  Inadequate nutrition (GOAL):: No  Tube Feeding: No  Inadequate activity/exercise (GOAL):: Yes  Significant changes in sleep pattern (GOAL): No  Transportation means:: Family     Latter day or spiritual beliefs that impact treatment:: No  Mental health DX:: No  Mental health management concern (GOAL):: No  Chemical Dependency Status: No Current Concerns  Informal Support system:: Children           Resources and Interventions:  Current Resources:         Supplies Currently Used at Home: None  Equipment Currently Used at Home: walker, rolling  Employment Status: retired         Advance Care Plan/Directive  Advanced Care Plans/Directives on file:: Yes  Type Advanced Care Plans/Directives: POLST    Referrals Placed: None         Care Plan:  Care Plan: Physical Activity       Problem: Patient is inactive       Goal:  Increase Physical Activity in the next 1-2 months       Start Date: 5/20/2025 Expected End Date: 8/20/2025    This Visit's Progress: 40%    Priority: High    Note:     Barriers: Deconditioned   Strengths: Motivated   Patient expressed understanding of goal: Yes  Action steps to achieve this goal:  1. I will use my walker as needed   2. I will start outpatient PT                                 Patient/Caregiver understanding: Patient expresses good understanding of discharge instructions     Outreach Frequency: 2 weeks, more frequently as needed  Future Appointments                In 3 days Leatha Duval MD [facility]    In 2 months [facility], [facility]    In 2 months [facility], [facility]    In 2 months Estela Barrett NP [facility]    In 2 months Yong Quiles AuD [facility] Audiology [facility]    In 2 months Rob Harris MD [facility]    In 4 months Elvira Kowalski MD [facility]            Plan:   Patient will keep Hospital /TCU follow up scheduled 5/23/2025  Patient will start outpatient PT per [facility] RN suggestion   CHW will follow up in 2 weeks   CC RN will review the chart every 6 weeks      [facility]   Leatha Guaman RN, Care Coordinator   [facility]'s   E-mail [email or web address]   [phone]

## 2025-05-20 NOTE — TELEPHONE ENCOUNTER
Chelle, RN with [facility] [phone] calling to inform provider that patient is not home bound and patient is able to drive to out patient physical therapy.    Patient is not eligible for home care and referral was place by TCU discharge provider.      Patient will not be admitted to Home Care.     Unsure if patient needs a new order for out patient physical therapy.   Patient has office visit scheduled 5/23/25 with Dr. Duval.    Please advise.     Odessa Brown RN on 5/20/2025 at 10:36 AM

## 2025-05-20 NOTE — LETTER
[facility] CARE COORDINATION  [address] [facility] [address]     May 20, 2025    Lavonne Tidwell  [address] [facility] [address]      Dear Lavonne,    I am a clinic care coordinator who works with Elvira Kowalski MD with the [facility]. I wanted to thank you for spending the time to talk with me.  Below is a description of clinic care coordination and how I can further assist you.       The clinic care coordination team is made up of a registered nurse, , financial resource worker and community health worker who understand the health care system. The goal of clinic care coordination is to help you manage your health and improve access to the health care system. Our team works alongside your provider to assist you in determining your health and social needs. We can help you obtain health care and community resources, providing you with necessary information and education. We can work with you through any barriers and develop a care plan that helps coordinate and strengthen the communication between you and your care team.  Our services are voluntary and are offered without charge to you personally.    Please feel free to contact me with any questions or concerns regarding care coordination and what we can offer.      We are focused on providing you with the highest-quality healthcare experience possible.    Sincerely,     [facility]   Leatha Guaman RN, Care Coordinator   [facility]'s   E-mail [email or web address]   [phone]     Additional Information: I have enclosed a copy of the Patient Centered Plan of Care. This has helpful information and goals that we have talked about. Please keep this in an easy to access place to use as needed.

## 2025-05-21 DIAGNOSIS — Z09 HOSPITAL DISCHARGE FOLLOW-UP: ICD-10-CM

## 2025-05-23 ENCOUNTER — OFFICE VISIT (OUTPATIENT)
Dept: FAMILY MEDICINE | Facility: CLINIC | Age: 85
End: 2025-05-23
Payer: COMMERCIAL

## 2025-05-23 VITALS
HEART RATE: 87 BPM | OXYGEN SATURATION: 97 % | WEIGHT: 161 LBS | HEIGHT: 64 IN | RESPIRATION RATE: 18 BRPM | DIASTOLIC BLOOD PRESSURE: 74 MMHG | BODY MASS INDEX: 27.49 KG/M2 | TEMPERATURE: 98.1 F | SYSTOLIC BLOOD PRESSURE: 137 MMHG

## 2025-05-23 DIAGNOSIS — R53.1 GENERALIZED WEAKNESS: Primary | ICD-10-CM

## 2025-05-23 PROCEDURE — 3078F DIAST BP <80 MM HG: CPT | Performed by: FAMILY MEDICINE

## 2025-05-23 PROCEDURE — 1126F AMNT PAIN NOTED NONE PRSNT: CPT | Performed by: FAMILY MEDICINE

## 2025-05-23 PROCEDURE — 99213 OFFICE O/P EST LOW 20 MIN: CPT | Performed by: FAMILY MEDICINE

## 2025-05-23 PROCEDURE — 3075F SYST BP GE 130 - 139MM HG: CPT | Performed by: FAMILY MEDICINE

## 2025-05-23 ASSESSMENT — PAIN SCALES - GENERAL: PAINLEVEL_OUTOF10: NO PAIN (0)

## 2025-05-23 NOTE — NURSING NOTE
"Chief Complaint   Patient presents with    transitional care discharge        Initial LMP 01/01/1974  Estimated body mass index is 26.15 kg/m  as calculated from the following:    Height as of 5/15/25: 1.613 m (5' 3.5\").    Weight as of 5/15/25: 68 kg (150 lb).    Patient presents to the clinic using No DME    Is there anyone who you would like to be able to receive your results? No  If yes have patient fill out MALLORIE      "

## 2025-05-23 NOTE — PROGRESS NOTES
"  Assessment & Plan     Generalized weakness  Improved  Pain of the knee has resolved  She currently has no further complaints  Bmp and cbc has been stable        MED REC REQUIRED  Post Medication Reconciliation Status:     BMI  Estimated body mass index is 28.07 kg/m  as calculated from the following:    Height as of this encounter: 1.613 m (5' 3.5\").    Weight as of this encounter: 73 kg (161 lb).             Kirsty Banerjee is a 84 year old, presenting for the following health issues:  transitional care discharge         5/23/2025     9:45 AM   Additional Questions   Roomed by Cande VALDEZ CMA     [facility]        Hospital Follow-up Visit:    Hospital/Nursing Home/IP Rehab Facility: [facility]   Most Recent Admission Date: 5/7/2025   Most Recent Admission Diagnosis: Generalized weakness - R53.1  History of gout - Z87.39  Acute pain of right knee - M25.561  Diarrhea, unspecified type - R19.7     Most Recent Discharge Date: 5/9/2025   Most Recent Discharge Diagnosis: Acute pain of right knee - M25.561  History of gout - Z87.39  Generalized weakness - R53.1  Diarrhea, unspecified type - R19.7  Chronic cough - R05.3  Throat clearing - R09.89  Post-nasal drainage - R09.82  Constipation, unspecified constipation type - K59.00   Was the patient in the ICU or did the patient experience delirium during hospitalization?  No  Do you have any other stressors you would like to discuss with your provider? No    Problems taking medications regularly:  None  Medication changes since discharge: None  Problems adhering to non-medication therapy:  None    Summary of hospitalization:  [facility] discharge summary reviewed  Diagnostic Tests/Treatments reviewed.  Follow up needed: none  Other Healthcare Providers Involved in Patient s Care:         Homecare  Update since discharge: improved.       Knee pain has resolved patient states she felt a lot better after receiving prednisone  Diarrhea has also resolved, said she " "recently had antibiotics    Got bitten by a gnat on scalp, no swelling at this time      Plan of care communicated with patient                     Objective    /74 (BP Location: Right arm, Patient Position: Sitting, Cuff Size: Adult Regular)   Pulse 87   Temp 98.1  F (36.7  C)   Resp 18   Ht 1.613 m (5' 3.5\")   Wt 73 kg (161 lb)   LMP 01/01/1974   SpO2 97%   BMI 28.07 kg/m    Body mass index is 28.07 kg/m .  Physical Exam   GENERAL: alert and no distress  NECK: no adenopathy, no asymmetry, masses, or scars  RESP: lungs clear to auscultation - no rales, rhonchi or wheezes  CV: regular rate and rhythm, normal S1 S2, no S3 or S4, no murmur, click or rub, no peripheral edema  ABDOMEN: soft, nontender, no hepatosplenomegaly, no masses and bowel sounds normal  MS: no gross musculoskeletal defects noted, no edema            Signed Electronically by: Leatha Duval MD    "

## 2025-06-03 ENCOUNTER — PATIENT OUTREACH (OUTPATIENT)
Dept: CARE COORDINATION | Facility: CLINIC | Age: 85
End: 2025-06-03
Payer: COMMERCIAL

## 2025-06-03 NOTE — PROGRESS NOTES
Clinic Care Coordination Contact  Community Health Worker Follow Up    Care Gaps:     Health Maintenance Due   Topic Date Due    RSV VACCINE (1 - 1-dose 75+ series) Never done    COLORECTAL CANCER SCREENING  05/29/2022    COVID-19 VACCINE (6 - 2024-25 season) 09/01/2024    LIPID  06/19/2025       Not discussed.    Care Plan:   Care Plan: Physical Activity       Problem: Patient is inactive       Goal: Increase Physical Activity in the next 1-2 months  Completed 6/3/2025      Start Date: 5/20/2025 Expected End Date: 8/20/2025    This Visit's Progress: 100% Recent Progress: 40%    Priority: High    Note:     Barriers: Deconditioned   Strengths: Motivated   Patient expressed understanding of goal: Yes  Action steps to achieve this goal:  1. I will use my walker as needed   2. I will start outpatient PT. Not needing to do OT.                                Intervention and Education during outreach: Patient reports she is doing great. Stated it was not recommended at hospital follow up visit to do outpatient PT like home care RN suggested. Reports she has no other needs.    CHW Plan: Send chart to CC RN to review for graduation.    Patient has completed all goals with Clinic Care Coordination.  Please review the chart and confirm if graduation is approved.    EVGENY Gaines  [phone]  [facility]

## 2025-06-04 ENCOUNTER — PATIENT OUTREACH (OUTPATIENT)
Dept: CARE COORDINATION | Facility: CLINIC | Age: 85
End: 2025-06-04
Payer: COMMERCIAL

## 2025-06-04 ASSESSMENT — ACTIVITIES OF DAILY LIVING (ADL): DEPENDENT_IADLS:: TRANSPORTATION

## 2025-06-04 NOTE — PROGRESS NOTES
Clinic Care Coordination Contact    Assessment: CHW  contacted patient for a  follow up 6/3/2025.  Patient has continued to follow the plan of care and assessment is negative for any new needs or concerns.    Enrollment status: Graduated.      Plan: No further outreaches at this time.  Patient will continue to follow the plan of care.  If new needs arise a new Care Coordination referral may be placed.  FYI to PCP    Graduation letter mailed     [facility]   Leatha Guaman RN, Care Coordinator   [facility]'s   E-mail [email or web address]   [phone]

## 2025-06-04 NOTE — LETTER
[facility] COORDINATION  [address] [address]     June 4, 2025    Lavonne Tidwell  [address] [place] [id number]    Dear Lavonne,  Your Care Team congratulates you on your journey to maintain wellness. This document will help guide you on your journey to maintain a healthy lifestyle.  You can use this to help you overcome any barriers you may encounter.  If you should have any questions or concerns, you can contact the members of your Care Team or contact your Primary Care Clinic for assistance.     Patient feedback is important to us and helps us continue to improve the way we care for patients as well as celebrate the things we do well. You may receive a short survey from [facility] [employer] on behalf of the care coordination team. We would appreciate hearing from you!    Health Maintenance  Health Maintenance Reviewed: Not assessed    My Access Plan  Medical Emergency 911   Primary Clinic Line [facility] - [phone]   24 Hour Appointment Line [phone] or  [phone]) (toll-free)   24 Hour Nurse Line [phone] (toll-free)   Preferred Urgent Care [facility], [phone]   Preferred Hospital [place], [place]  [phone]   Preferred Pharmacy [facility], [place] - [address] [place] [address]     Behavioral Health Crisis Line The National Suicide Prevention Lifeline at 1-599.691.6826 or 911     My Care Team Members  Patient Care Team         Relationship Specialty Notifications Start End    Elvira Kowalski MD PCP - General Family Practice  1/27/12     Phone: [phone] Fax: [phone]         [address] [place] [address]    Rob Harris MD Assigned Surgical Provider   10/23/20     Phone: [phone] Fax: [phone]         [facility] [address]    Elvira Kowalski MD Assigned PCP   5/27/21     Phone: [phone] Fax:  [phone]         [address] [place] [address]    Belkis Melendez [facility] Pharmacist Pharmacist  6/12/23     Phone: [phone] Fax: [phone] [address] [place] [address]    Yong Quiles AuD Audiologist Audiology  10/12/23     Phone: [phone] Fax: [phone] [address] [facility] [address]    Barron Cardozo MD Assigned Allergy Provider   10/28/23     Phone: [phone] Fax: [phone]         [address] [place] [address]    Leatha Guaman, RN Lead Care Coordinator Primary Care - CC Admissions 5/12/25 6/4/25    Phone: [phone]         Марина Hanson, W Community Health Worker   5/20/25     Friedell, Peter E, MD Assigned Cancer Care Provider   5/23/25     Phone: [phone] Fax: [phone]         [facility] [address]              Advance Care Plans/Directives Type:   Type Advanced Care Plans/Directives: POLST  Thank you for providing us with your Advance Directive. We will keep this on file and recommend that it be updated every 2 years, if your health situation changes, if there is a death of one of your health care agents, and/or if there are changes in your marital status.    It has been your Clinic Care Team's pleasure to work with you on accomplishing your goals.    Regards,  EMILY [facility]   Leatha Guaman RN, Care Coordinator   [facility]'s   E-mail [email or web address]   [phone]   Your Clinic Care Team

## 2025-07-02 ENCOUNTER — OFFICE VISIT (OUTPATIENT)
Dept: PHARMACY | Facility: CLINIC | Age: 85
End: 2025-07-02
Attending: FAMILY MEDICINE
Payer: COMMERCIAL

## 2025-07-02 VITALS
HEART RATE: 76 BPM | DIASTOLIC BLOOD PRESSURE: 67 MMHG | WEIGHT: 162.2 LBS | BODY MASS INDEX: 28.28 KG/M2 | SYSTOLIC BLOOD PRESSURE: 138 MMHG

## 2025-07-02 DIAGNOSIS — M1A.9XX0 CHRONIC GOUT WITHOUT TOPHUS, UNSPECIFIED CAUSE, UNSPECIFIED SITE: ICD-10-CM

## 2025-07-02 DIAGNOSIS — M15.0 PRIMARY OSTEOARTHRITIS INVOLVING MULTIPLE JOINTS: ICD-10-CM

## 2025-07-02 DIAGNOSIS — G47.00 INSOMNIA, UNSPECIFIED TYPE: ICD-10-CM

## 2025-07-02 DIAGNOSIS — K59.00 CONSTIPATION, UNSPECIFIED CONSTIPATION TYPE: ICD-10-CM

## 2025-07-02 DIAGNOSIS — J30.9 ALLERGIC RHINITIS, UNSPECIFIED SEASONALITY, UNSPECIFIED TRIGGER: ICD-10-CM

## 2025-07-02 DIAGNOSIS — R32 URINARY INCONTINENCE, UNSPECIFIED TYPE: ICD-10-CM

## 2025-07-02 DIAGNOSIS — K21.9 GASTROESOPHAGEAL REFLUX DISEASE WITHOUT ESOPHAGITIS: Primary | Chronic | ICD-10-CM

## 2025-07-02 PROCEDURE — 99607 MTMS BY PHARM ADDL 15 MIN: CPT | Performed by: PHARMACIST

## 2025-07-02 PROCEDURE — 3075F SYST BP GE 130 - 139MM HG: CPT | Performed by: PHARMACIST

## 2025-07-02 PROCEDURE — 99605 MTMS BY PHARM NP 15 MIN: CPT | Performed by: PHARMACIST

## 2025-07-02 PROCEDURE — 3078F DIAST BP <80 MM HG: CPT | Performed by: PHARMACIST

## 2025-07-02 RX ORDER — SENNOSIDES 8.6 MG
650 CAPSULE ORAL 2 TIMES DAILY
COMMUNITY
End: 2025-07-03

## 2025-07-02 NOTE — LETTER
"Recommended To-Do List      Prepared on: Jul 2, 2025       You can get the best results from your medications by completing the items on this \"To-Do List.\"      Bring your To-Do List when you go to your doctor. And, share it with your family or caregivers.    My To-Do List:  What we talked about: What I should do:   Fluticasone dosing    Use fluticasone nasal spray twice daily.          What we talked about: What I should do:   Famotidine refilled    Take famotidine 40mg twice daily.          What we talked about: What I should do:   Sleep suggestion    Try taking melatonin for sleep.          What we talked about: What I should do:                     "

## 2025-07-02 NOTE — PATIENT INSTRUCTIONS
"Recommendations from today's MTM visit:                                                    MTM (medication therapy management) is a service provided by a clinical pharmacist designed to help you get the most of out of your medicines.   Today we reviewed what your medicines are for, how to know if they are working, that your medicines are safe and how to make your medicine regimen as easy as possible.      Suggest trying melatonin for sleep.  Recommend using fluticasone nasal spray twice daily.  Refilled famotidine at pharmacy.    Follow-up: Return in about 1 year (around 7/2/2026) for Medication Therapy Management.    It was great speaking with you today.  I value your experience and would be very thankful for your time in providing feedback in our clinic survey. In the next few days, you may receive an email or text message from [employer] with a link to a survey related to your  clinical pharmacist.\"     To schedule another MTM appointment, please call the clinic directly or you may call the MTM scheduling line at [phone] or toll-free at [phone].     My Clinical Pharmacist's contact information:                                                      Please feel free to contact me with any questions or concerns you have.      Belkis Melendez, PharmD, BCACP  Medication Therapy Management Pharmacist  Voicemail: [phone] (Mon/Wed only)     "

## 2025-07-02 NOTE — LETTER
_  Medication List        Prepared on: Jul 2, 2025     Bring your Medication List when you go to the doctor, hospital, or   emergency room. And, share it with your family or caregivers.     Note any changes to how you take your medications.  Cross out medications when you no longer use them.    Medication How I take it Why I use it Prescriber   acetaminophen (TYLENOL) 500 MG tablet Take 2 tablets (1,000 mg) by mouth at bedtime. Pain Elvira Kowalski MD   acetaminophen (TYLENOL) 650 MG CR tablet Take 1 tablet (650 mg) by mouth 2 times daily (morning and afternoon) Pain Elvira Kowalski MD   allopurinol (ZYLOPRIM) 100 MG tablet Take 1 tablet (100 mg) by mouth daily Gout Elvira Kowalski MD   diclofenac (VOLTAREN) 1 % topical gel Apply 2-4 g topically 4 times daily as needed for moderate pain. Pain Elvira Kowalski MD   famotidine (PEPCID) 40 MG tablet Take 1 tablet (40 mg) by mouth 2 times daily. Acid reflux Elvira Kowalski MD   fluticasone (FLONASE) 50 MCG/ACT nasal spray Spray 1 spray into both nostrils 2 times daily Allergies Barron Cardozo MD   ipratropium (ATROVENT) 0.03 % nasal spray SPRAY ONE TO TWO SPRAYS INTO BOTH NOSTRILS THREE TIMES DAILY AS NEEDED FOR RHINITIS (RUNNY NOSE, POSTNASAL DRAINAGE). Allergies Elvira Kowalski MD   loratadine (CLARITIN) 10 MG tablet Take 1 tablet (10 mg) by mouth daily. Allergies Elvira Kowalski MD   MELATONIN PO Take 1 tablet by mouth nightly as needed for sleep. Sleep Elvira Kowalski MD   Menthol (Topical Analgesic) (BIOFREEZE EX) Externally apply topically daily. Pain Elvira Kowalski MD   pantoprazole (PROTONIX) 40 MG EC tablet Take 1 tablet (40 mg) by mouth daily. Acid reflux Elvira Kowalski MD   tolterodine ER (DETROL LA) 2 MG 24 hr capsule TAKE 1 CAPSULE (2 MG) BY MOUTH DAILY. Urinary frequency Elvira Kowalski MD         Add new medications, over-the-counter drugs, herbals, vitamins, or  minerals in the blank rows  below.    Medication How I take it Why I use it Prescriber                                      Allergies:      - Codeine - GI Disturbance        Side effects I have had:      Not on File        Other Information:              My notes and questions:

## 2025-07-02 NOTE — LETTER
July 3, 2025  Lavonne LENO Lorenzanalauren  [address] [facility] [address]    Dear Ms. Tidwell, [facility]     Thank you for talking with me on Jul 2, 2025 about your health and medications. As a follow-up to our conversation, I have included two documents:      Your Recommended To-Do List has steps you should take to get the best results from your medications.  Your Medication List will help you keep track of your medications and how to take them.    If you want to talk about these documents, please call Belkis Melendez RPH at phone: [phone], Monday-Friday 8-4:30pm.    I look forward to working with you and your doctors to make sure your medications work well for you.    Sincerely,  Belkis Melendez RPH  [facility] Pharmacist, [facility]

## 2025-07-02 NOTE — IP AVS SNAPSHOT
"          [facility]: [phone]                                              INTERAGENCY TRANSFER FORM - LAB / IMAGING / EKG / EMG RESULTS   2017                    Hospital Admission Date: 2017  BHUPENDRA VIERA   : 1940  Sex: Female        Attending Provider: Wilfredo Thompson MD     Allergies:  Codeine    Infection:  None   Service:  HOSPITALIST    Ht:  1.651 m (5' 5\")   Wt:  78 kg (171 lb 15.3 oz)   Admission Wt:  76.2 kg (168 lb)    BMI:  28.62 kg/m 2   BSA:  1.89 m 2            Patient PCP Information     Provider PCP Type    Elvira Kowalski MD General         Lab Results - 3 Days      Platelet count [[id number]]  Resulted: 17 0746, Result status: Final result    Ordering provider: Wilfredo Thompson MD  17 0000 Resulting lab: [facility]    Specimen Information    Type Source Collected On   Blood  17 0702          Components       Value Reference Range Flag Lab   Platelet Count 199 150 - 450 10e9/L  59            Hemoglobin [[id number]] (Abnormal)  Resulted: 17 0650, Result status: Final result    Ordering provider: Wilfredo Thompson MD  17 0000 Resulting lab: [facility]    Specimen Information    Type Source Collected On   Blood  17 0630          Components       Value Reference Range Flag Lab   Hemoglobin 10.6 11.7 - 15.7 g/dL L 59            Glucose by meter [[id number]]  Resulted: 17 0636, Result status: Final result    Ordering provider: Wilfredo Thompson MD  17 0633 Resulting lab: POINT OF CARE TEST, GLUCOSE    Specimen Information    Type Source Collected On     17 0633          Components       Value Reference Range Flag Lab   Glucose 78 70 - 99 mg/dL  170            Creatinine [[id number]]  Resulted: 17 0634, Result status: Final result    Ordering provider: Wilfredo Thompson MD  17 0001 Resulting lab: [facility]    Specimen " Information    Type Source Collected On   Blood  02/24/17 0610          Components       Value Reference Range Flag Lab   Creatinine 0.73 0.52 - 1.04 mg/dL  59   GFR Estimate 77 >60 mL/min/1.7m2  59   Comment:  Non  GFR Calc   GFR Estimate If Black -- >60 mL/min/1.7m2  59   Result:         >90   GFR Calc              Hemoglobin [[id number]] (Abnormal)  Resulted: 02/24/17 0624, Result status: Final result    Ordering provider: Wilfredo Thompson MD  02/24/17 0001 Resulting lab: [facility]    Specimen Information    Type Source Collected On   Blood  02/24/17 0610          Components       Value Reference Range Flag Lab   Hemoglobin 10.8 11.7 - 15.7 g/dL L 59            INR [[id number]]  Resulted: 02/23/17 0909, Result status: Final result    Ordering provider: Abhi Luis PA-C  02/23/17 0841 Resulting lab: [facility]    Specimen Information    Type Source Collected On   Blood  02/23/17 0855          Components       Value Reference Range Flag Lab   INR 0.96 0.86 - 1.14  59            CBC with platelets differential [[id number]]  Resulted: 02/23/17 0906, Result status: Final result    Ordering provider: Abhi Luis PA-C  02/23/17 0841 Resulting lab: [facility]    Specimen Information    Type Source Collected On   Blood  02/23/17 0855          Components       Value Reference Range Flag Lab   WBC 5.9 4.0 - 11.0 10e9/L  59   RBC Count 4.09 3.8 - 5.2 10e12/L  59   Hemoglobin 12.7 11.7 - 15.7 g/dL  59   Hematocrit 39.9 35.0 - 47.0 %  59   MCV 98 78 - 100 fl  59   MCH 31.1 26.5 - 33.0 pg  59   MCHC 31.8 31.5 - 36.5 g/dL  59   RDW 13.4 10.0 - 15.0 %  59   Platelet Count 223 150 - 450 10e9/L  59   Diff Method Automated Method   59   % Neutrophils 57.5 %  59   % Lymphocytes 34.2 %  59   % Monocytes 6.8 %  59   % Eosinophils 1.0 %  59   % Basophils 0.3 %  59   % Immature Granulocytes 0.2 %  59   Absolute Neutrophil 3.4 1.6 -  8.3 10e9/L  59   Absolute Lymphocytes 2.0 0.8 - 5.3 10e9/L  59   Absolute Monocytes 0.4 0.0 - 1.3 10e9/L  59   Absolute Eosinophils 0.1 0.0 - 0.7 10e9/L  59   Absolute Basophils 0.0 0.0 - 0.2 10e9/L  59   Abs Immature Granulocytes 0.0 0 - 0.4 10e9/L  59            Testing Performed By     Lab - Abbreviation Name Director Address Valid Date Range    59 - Unknown [facility] Unknown [address] [facility] [address] 12/31/14 1006 - Present    170 - Unknown POINT OF CARE TEST, GLUCOSE Unknown Unknown 10/31/11 1114 - Present            Unresulted Labs (24h ago through future)    Start       Ordered    02/26/17 0600  Platelet count  (enoxaparin (LOVENOX) (Weight  kg with CrCl greater than 30 mL/min is prechecked))  EVERY THREE DAYS,   Routine     Comments:  Repeat every 3 days while on VTE prophylaxis. If no result is listed, this lab has not been done the past 365 days. LATEST LAB RESULT: Platelet Count (10e9/L)       Date                     Value                 02/23/2017               223              ----------      02/23/17 1511    Unscheduled  Hemoglobin  CONDITIONAL X 2,   Routine     Comments:  Release on POD 1 and POD 2 if the morning Hgb is less than 8.0    02/23/17 1511         Imaging Results - 3 Days      XR Knee Port Right 1/2 Views [[id number]]  Resulted: 02/23/17 1434, Result status: Final result    Ordering provider: Wilfredo Thompson MD  02/23/17 1418 Performed: 02/23/17 1422 - 02/23/17 1434    Resulting lab: [facility]      Narrative:       This exam was marked as non-reportable because it will not be read by a   radiologist or a [facility] non-radiologist provider.                Testing Performed By     Lab - Abbreviation Name Director Address Valid Date Range    178 - [facility] Unknown Unknown 07/20/12 1135 - Present            Encounter-Level Documents:     There are no encounter-level documents.      Order-Level Documents:     There are no order-level documents.       23:59

## 2025-07-03 DIAGNOSIS — R05.3 CHRONIC COUGH: ICD-10-CM

## 2025-07-03 RX ORDER — PANTOPRAZOLE SODIUM 40 MG/1
TABLET, DELAYED RELEASE ORAL
Qty: 30 TABLET | Refills: 0 | OUTPATIENT
Start: 2025-07-03

## 2025-07-11 ENCOUNTER — TELEPHONE (OUTPATIENT)
Dept: FAMILY MEDICINE | Facility: CLINIC | Age: 85
End: 2025-07-11
Payer: COMMERCIAL

## 2025-07-11 NOTE — TELEPHONE ENCOUNTER
Forms/Letter Request    Type of form/letter: DMV/Handicap Parking       Do we have the form/letter: Yes: just needs the form renewed- pt found that her handicap permit expires at the end of this month    Pt states she is unsure if it can be something simply stated to be renewed, or if a entirely new form needs to be completed. If the team has any insight on this, please let her know ASAP.    When is form/letter needed by: As soon as possible/before the end of the month    How would you like the form/letter returned:     Patient Notified form requests are processed in 5-7 business days:Yes    Okay to leave a detailed message?: Yes at Home number on file [phone] (home)

## 2025-07-14 NOTE — TELEPHONE ENCOUNTER
Forms/Letter Request    Type of form/letter: DMV/Handicap Parking     Do we have the form/letter: Yes: Will place in Dr Kowalski's folder    When is form/letter needed by: ASAP before the end of this month    How would you like the form/letter returned:     Patient Notified form requests are processed in 5-7 business days: yes    Okay to leave a detailed message?: Yes at Cell number on file:    Telephone Information:   Mobile [phone]

## 2025-07-15 DIAGNOSIS — R05.3 CHRONIC COUGH: ICD-10-CM

## 2025-07-15 RX ORDER — PANTOPRAZOLE SODIUM 40 MG/1
40 TABLET, DELAYED RELEASE ORAL DAILY
Qty: 90 TABLET | Refills: 0 | Status: SHIPPED | OUTPATIENT
Start: 2025-07-15

## 2025-07-15 NOTE — TELEPHONE ENCOUNTER
Medication Question or Refill      What medication are you calling about (include dose and sig)?: pantoprazole (PROTONIX) 40 MG EC tablet     Preferred Pharmacy:   [facility] [id number] [place] [address]  Phone: [phone] Fax: [phone]      Controlled Substance Agreement on file:   CSA -- Patient Level:    CSA: None found at the patient level.       Who prescribed the medication?: Micheline Almodovar APRN CNP     Do you need a refill? Yes    Patient offered an appointment? Yes: 10/7/25 she see [facility]    Do you have any questions or concerns?  Yes: -Patient stated its so hard to get into seeing her doctor and then she ends up going to the UC or ER for issues. Patient stated her cough is still really bad       Okay to leave a detailed message?: Yes at Home number on file [phone] (home)    Tatum Garcia/ Patient

## 2025-07-17 DIAGNOSIS — R05.3 CHRONIC COUGH: ICD-10-CM

## 2025-07-17 RX ORDER — PANTOPRAZOLE SODIUM 40 MG/1
TABLET, DELAYED RELEASE ORAL
Qty: 90 TABLET | Refills: 0 | OUTPATIENT
Start: 2025-07-17

## 2025-07-21 ENCOUNTER — PATIENT OUTREACH (OUTPATIENT)
Dept: CARE COORDINATION | Facility: CLINIC | Age: 85
End: 2025-07-21
Payer: COMMERCIAL

## 2025-07-24 ENCOUNTER — LAB (OUTPATIENT)
Dept: LAB | Facility: CLINIC | Age: 85
End: 2025-07-24
Attending: INTERNAL MEDICINE
Payer: COMMERCIAL

## 2025-07-24 DIAGNOSIS — C85.80 MARGINAL ZONE B-CELL LYMPHOMA (H): ICD-10-CM

## 2025-07-24 DIAGNOSIS — D47.2 MGUS (MONOCLONAL GAMMOPATHY OF UNKNOWN SIGNIFICANCE): ICD-10-CM

## 2025-07-24 LAB
ALBUMIN SERPL BCG-MCNC: 3.9 G/DL (ref 3.5–5.2)
ALP SERPL-CCNC: 179 U/L (ref 40–150)
ALT SERPL W P-5'-P-CCNC: 12 U/L (ref 0–50)
ANION GAP SERPL CALCULATED.3IONS-SCNC: 8 MMOL/L (ref 7–15)
AST SERPL W P-5'-P-CCNC: 20 U/L (ref 0–45)
BASOPHILS # BLD AUTO: 0 10E3/UL (ref 0–0.2)
BASOPHILS NFR BLD AUTO: 0 %
BILIRUB SERPL-MCNC: 0.7 MG/DL
BUN SERPL-MCNC: 18.2 MG/DL (ref 8–23)
CALCIUM SERPL-MCNC: 9.3 MG/DL (ref 8.8–10.4)
CHLORIDE SERPL-SCNC: 102 MMOL/L (ref 98–107)
CREAT SERPL-MCNC: 0.68 MG/DL (ref 0.51–0.95)
EGFRCR SERPLBLD CKD-EPI 2021: 85 ML/MIN/1.73M2
EOSINOPHIL # BLD AUTO: 0.1 10E3/UL (ref 0–0.7)
EOSINOPHIL NFR BLD AUTO: 1 %
ERYTHROCYTE [DISTWIDTH] IN BLOOD BY AUTOMATED COUNT: 14.5 % (ref 10–15)
GLUCOSE SERPL-MCNC: 95 MG/DL (ref 70–99)
HCO3 SERPL-SCNC: 27 MMOL/L (ref 22–29)
HCT VFR BLD AUTO: 34 % (ref 35–47)
HGB BLD-MCNC: 10.6 G/DL (ref 11.7–15.7)
IMM GRANULOCYTES # BLD: 0 10E3/UL
IMM GRANULOCYTES NFR BLD: 0 %
LYMPHOCYTES # BLD AUTO: 3.8 10E3/UL (ref 0.8–5.3)
LYMPHOCYTES NFR BLD AUTO: 57 %
MCH RBC QN AUTO: 32.9 PG (ref 26.5–33)
MCHC RBC AUTO-ENTMCNC: 31.2 G/DL (ref 31.5–36.5)
MCV RBC AUTO: 106 FL (ref 78–100)
MONOCYTES # BLD AUTO: 0.1 10E3/UL (ref 0–1.3)
MONOCYTES NFR BLD AUTO: 1 %
NEUTROPHILS # BLD AUTO: 2.7 10E3/UL (ref 1.6–8.3)
NEUTROPHILS NFR BLD AUTO: 40 %
NRBC # BLD AUTO: 0 10E3/UL
NRBC BLD AUTO-RTO: 0 /100
PLATELET # BLD AUTO: 222 10E3/UL (ref 150–450)
POTASSIUM SERPL-SCNC: 4.6 MMOL/L (ref 3.4–5.3)
PROT SERPL-MCNC: 7.3 G/DL (ref 6.4–8.3)
PROT SERPL-MCNC: 7.4 G/DL (ref 6.4–8.3)
RBC # BLD AUTO: 3.22 10E6/UL (ref 3.8–5.2)
SODIUM SERPL-SCNC: 137 MMOL/L (ref 135–145)
WBC # BLD AUTO: 6.6 10E3/UL (ref 4–11)

## 2025-07-24 PROCEDURE — 84165 PROTEIN E-PHORESIS SERUM: CPT | Mod: 26 | Performed by: PATHOLOGY

## 2025-07-24 PROCEDURE — 84155 ASSAY OF PROTEIN SERUM: CPT

## 2025-07-24 PROCEDURE — 83521 IG LIGHT CHAINS FREE EACH: CPT

## 2025-07-24 PROCEDURE — 82784 ASSAY IGA/IGD/IGG/IGM EACH: CPT

## 2025-07-24 PROCEDURE — 85025 COMPLETE CBC W/AUTO DIFF WBC: CPT

## 2025-07-24 PROCEDURE — 36415 COLL VENOUS BLD VENIPUNCTURE: CPT

## 2025-07-24 PROCEDURE — 84165 PROTEIN E-PHORESIS SERUM: CPT | Mod: TC | Performed by: PATHOLOGY

## 2025-07-25 LAB
ALBUMIN SERPL ELPH-MCNC: 3.9 G/DL (ref 3.7–5.1)
ALPHA1 GLOB SERPL ELPH-MCNC: 0.2 G/DL (ref 0.2–0.4)
ALPHA2 GLOB SERPL ELPH-MCNC: 0.7 G/DL (ref 0.5–0.9)
B-GLOBULIN SERPL ELPH-MCNC: 1 G/DL (ref 0.6–1)
GAMMA GLOB SERPL ELPH-MCNC: 1.4 G/DL (ref 0.7–1.6)
IGA SERPL-MCNC: 357 MG/DL (ref 84–499)
IGG SERPL-MCNC: 1323 MG/DL (ref 610–1616)
IGM SERPL-MCNC: 43 MG/DL (ref 35–242)
KAPPA LC FREE SER-MCNC: 3.92 MG/DL (ref 0.33–1.94)
KAPPA LC FREE/LAMBDA FREE SER NEPH: 1.05 {RATIO} (ref 0.26–1.65)
LAMBDA LC FREE SERPL-MCNC: 3.74 MG/DL (ref 0.57–2.63)
LOCATION OF TASK: ABNORMAL
M PROTEIN SERPL ELPH-MCNC: 0.1 G/DL
PROT PATTERN SERPL ELPH-IMP: ABNORMAL

## 2025-07-30 ENCOUNTER — ONCOLOGY VISIT (OUTPATIENT)
Dept: ONCOLOGY | Facility: CLINIC | Age: 85
End: 2025-07-30
Attending: NURSE PRACTITIONER
Payer: COMMERCIAL

## 2025-07-30 VITALS
OXYGEN SATURATION: 97 % | BODY MASS INDEX: 27.14 KG/M2 | HEIGHT: 64 IN | RESPIRATION RATE: 16 BRPM | WEIGHT: 159 LBS | DIASTOLIC BLOOD PRESSURE: 59 MMHG | TEMPERATURE: 97.7 F | HEART RATE: 75 BPM | SYSTOLIC BLOOD PRESSURE: 144 MMHG

## 2025-07-30 DIAGNOSIS — D47.2 MGUS (MONOCLONAL GAMMOPATHY OF UNKNOWN SIGNIFICANCE): ICD-10-CM

## 2025-07-30 DIAGNOSIS — N28.89 RIGHT RENAL MASS: ICD-10-CM

## 2025-07-30 DIAGNOSIS — C85.80 MARGINAL ZONE B-CELL LYMPHOMA (H): Primary | ICD-10-CM

## 2025-07-30 PROCEDURE — G0463 HOSPITAL OUTPT CLINIC VISIT: HCPCS | Performed by: NURSE PRACTITIONER

## 2025-07-30 ASSESSMENT — PAIN SCALES - GENERAL: PAINLEVEL_OUTOF10: MODERATE PAIN (5)

## 2025-07-30 NOTE — PROGRESS NOTES
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   Appears low grade and without B symptoms nor cytopenias, no indication to treat so under observation.     She remains overall stable.    Hot flashes/night sweats are rare and have been long-standing x years. Chronic fatigue.    Labwork: Stable hgb 10.6, , normal WBC/platelets. LDH WNL. Creatinine WNL.  Alk phos has been persistently elevated since 6/2024, stable 179 (range 150s-250s); rest LFT/bili WNL.     CT cap: mild adenopathy in chest/abd/pelvis stable. No progression.     Plan:  -Continue observation since asymptomatic.   -Transition to every 6 months with labs and exam.   -CT as needed for clinical symptoms    MGUS, lambda LC  Lambda monocytic B cells compose 48% of the marrow.    M peak stable 0.1; mild/stable elevation of both Kappa + lambda LC with normal ratio; immunoglobulins WNL.     Plan:  -Repeat labs every 6 mths    Macrocytic anemia  Secondary to lymphoma bone marrow involvement.   TSH, B12 and folate WNL. Retic WNL.     Hgb is stable  (stable over last few years). MCV stable.     Plan:  -Follow    Chronic sinusitis  Chronic fatigue, arthralgias  2-3 yr hx. CRP has been notably elevated in past, ESR minimally elevated; so appears to have some chronic inflammatory issue going on. Responds notably to steroids when used in past. Symptoms could be partially related to lymphoma.     Plan:  -Consider Rheumatology eval and mgmt options. Pt will discuss with PCP  -Her IgG is normal, so I don't think she has significant immunosuppression from her lymphoma, but may have to some degree with lymphoma in general.  -Continues  mgmt with ENT for sinusitis/otitis media    R renal nodule  1.6 cm nodule noted on 4/2025 CT.  Renal US shows multiple benign cysts, but the more dominant 2.4 cm lesion has peripheral nodular focus so cancer cannot be excluded.     Plan:  -Renal MRI ordered. Will call pt with results/ recs      ______________________________________________________________________________    History of Present Illness/ Interval History    Ms. Lavonne Tidwell  is a 84 year woman old diagnosed with low grade lymphoma 2 yr ago, on observation. Returns for 3-mth visit with CT (for weight loss, elevated alk phos).     She also was found to have R renal mass and had a recent renal US.     She had Covid over this past Winter.  Chronic sinusitis/rhinorrhea and otitis media (s/p ET tubes).   Chronic hoarse voice, related to reflux and PND.   Chronic recurrent UTIs + urinary incontinence.    Hospitalized May for GI illness (diarrhea) and weakness. Concluded likely related to side effect of Bactrim she'd recently completed for acute otitis media. No recurrent GI issues.     Occasional night sweats one night/month chronic since off estrogen replacement x 10 yrs (no recent change).  Chronic fatigue, notes this is a bit worse over last few years.   Chronic arthralgias and intermittent joint swelling, with acute flares she notes she is very responsive to steroids PRN (all her symptoms, including fatigue, sinusitis, arthralgias).    Mild 10 lb weight loss over last 1-2 yrs.  No abd pain/bloating, nausea, masses/nodes, fevers.     Chronic low back with lumbar radiculopathy/bilateral hip/pelvis arthritic pain with ambulation. No acute pain.        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 Saw ENT for chronic sinusitis. CT chest showed lymphadenopathy (mildly prominent  "axillary, mediastinal and upper abd). +night sweats,no other B symptoms.  -No cytopenias.   -PET: avid LN above and below diaphragm  -Serum immunofixation/SPEP: 0.1 m spike. Possible very small monoclonal free immunoglobulin light chain of  lambda chain type   -6/30/23 L axillary LN biopsy: low-grade CD5 neg, CD10 neg B-cell non-Hodkin's lymphoma. Neg MYD88 mutation (differentials: lymphoplasmacytic lymphoma or marginal zone)  -Bone marrow biopsy: hypercellular marrow involved by marginal zone lymphoma (at least 80%), lambda LC restricted monoclonal B-cell population.     Treatment:  Observation      Physical Exam    BP (!) 144/59 (BP Location: Right arm, Patient Position: Sitting, Cuff Size: Adult Regular)   Pulse 75   Temp 97.7  F (36.5  C) (Oral)   Resp 16   Ht 1.613 m (5' 3.5\")   Wt 72.1 kg (159 lb)   LMP 01/01/1974   SpO2 97%   BMI 27.72 kg/m      GENERAL: Alert and oriented to time place and person. Hard of hearing. In no distress. Daughter in law accompanies.  HEAD: Atraumatic and normocephalic. No alopecia.  EYES: DK, EOMI. No erythema. No icterus.  LYMPH NODES: No palpable supraclavicular, cervical, axillary or inguinal lymphadenopathy.  CHEST: clear to auscultation bilaterally. Resonant to percussion throughout bilaterally. Symmetrical breath movements bilaterally.  CVS: RRR  ABDOMEN: Soft. Not tender. Not distended. No palpable hepatomegaly or splenomegaly. No other mass palpable. Bowel sounds present.  EXTREMITIES: Warm. Slight ankle swelling bilateral  SKIN: no rash, or bruising or purpura.   NEURO: No gross deficit noted. Non-antalgic gait.      Lab Results    Recent Results (from the past 720 hours)   Immunoglobulins A G and M    Collection Time: 07/24/25 10:47 AM   Result Value Ref Range    Immunoglobulin G 1,323 610 - 1,616 mg/dL    Immunoglobulin A 357 84 - 499 mg/dL    Immunoglobulin M 43 35 - 242 mg/dL   Kappa and lambda light chain    Collection Time: 07/24/25 10:47 AM   Result " Value Ref Range    Kappa Free Light Chains 3.92 (H) 0.33 - 1.94 mg/dL    Lambda Free Light Chains 3.74 (H) 0.57 - 2.63 mg/dL    Kappa /Lambda Ratio 1.05 0.26 - 1.65   Comprehensive metabolic panel    Collection Time: 07/24/25 10:47 AM   Result Value Ref Range    Sodium 137 135 - 145 mmol/L    Potassium 4.6 3.4 - 5.3 mmol/L    Carbon Dioxide (CO2) 27 22 - 29 mmol/L    Anion Gap 8 7 - 15 mmol/L    Urea Nitrogen 18.2 8.0 - 23.0 mg/dL    Creatinine 0.68 0.51 - 0.95 mg/dL    GFR Estimate 85 >60 mL/min/1.73m2    Calcium 9.3 8.8 - 10.4 mg/dL    Chloride 102 98 - 107 mmol/L    Glucose 95 70 - 99 mg/dL    Alkaline Phosphatase 179 (H) 40 - 150 U/L    AST 20 0 - 45 U/L    ALT 12 0 - 50 U/L    Protein Total 7.4 6.4 - 8.3 g/dL    Albumin 3.9 3.5 - 5.2 g/dL    Bilirubin Total 0.7 <=1.2 mg/dL   Total Protein, Serum for ELP    Collection Time: 07/24/25 10:47 AM   Result Value Ref Range    Total Protein Serum for ELP 7.3 6.4 - 8.3 g/dL   Protein Electrophoresis, Serum    Collection Time: 07/24/25 10:47 AM   Result Value Ref Range    Albumin 3.9 3.7 - 5.1 g/dL    Alpha 1 0.2 0.2 - 0.4 g/dL    Alpha 2 0.7 0.5 - 0.9 g/dL    Beta Globulin 1.0 0.6 - 1.0 g/dL    Gamma Globulin 1.4 0.7 - 1.6 g/dL    Monoclonal Peak 0.1 (H) <=0.0 g/dL    ELP Interpretation       Very small monoclonal protein (about 0.1 g/dL) seen in the gamma fraction which may be the free lambda light chain monoclonal that was previously characterized in our laboratory on 6/30/2023. Consider a urine for immunofixation which is generally better for detection of light chain secreting myeloma if myeloma is a serious clinical consideration. Pathologic significance requires clinical correlation. BREE Yao M.D., Ph.D., Pathologist ()    Signout Location if Remote [facility]    CBC with platelets and differential    Collection Time: 07/24/25 10:47 AM   Result Value Ref Range    WBC Count 6.6 4.0 - 11.0 10e3/uL    RBC Count 3.22 (L) 3.80 - 5.20 10e6/uL    Hemoglobin  10.6 (L) 11.7 - 15.7 g/dL    Hematocrit 34.0 (L) 35.0 - 47.0 %     (H) 78 - 100 fL    MCH 32.9 26.5 - 33.0 pg    MCHC 31.2 (L) 31.5 - 36.5 g/dL    RDW 14.5 10.0 - 15.0 %    Platelet Count 222 150 - 450 10e3/uL    % Neutrophils 40 %    % Lymphocytes 57 %    % Monocytes 1 %    % Eosinophils 1 %    % Basophils 0 %    % Immature Granulocytes 0 %    NRBCs per 100 WBC 0 <1 /100    Absolute Neutrophils 2.7 1.6 - 8.3 10e3/uL    Absolute Lymphocytes 3.8 0.8 - 5.3 10e3/uL    Absolute Monocytes 0.1 0.0 - 1.3 10e3/uL    Absolute Eosinophils 0.1 0.0 - 0.7 10e3/uL    Absolute Basophils 0.0 0.0 - 0.2 10e3/uL    Absolute Immature Granulocytes 0.0 <=0.4 10e3/uL    Absolute NRBCs 0.0 10e3/uL         Total time 35 minutes, to include face to face visit, review of EMR, ordering, documentation and coordination of care on date of service.    complexity modifier for longitudinal care.     Signed by: Estela Barrett NP

## 2025-07-30 NOTE — LETTER
7/30/2025      Lavonne Tidwell  [address] [facility] [id number]      Dear Colleague,    Thank you for referring your patient, Lavonne Tidwell, to the [facility]. Please see a copy of my visit note below.    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   Appears low grade and without B symptoms nor cytopenias, no indication to treat so under observation.     She remains overall stable.    Hot flashes/night sweats are rare and have been long-standing x years. Chronic fatigue.    Labwork: Stable hgb 10.6, , normal WBC/platelets. LDH WNL. Creatinine WNL.  Alk phos has been persistently elevated since 6/2024, stable 179 (range 150s-250s); rest LFT/bili WNL.     CT cap: mild adenopathy in chest/abd/pelvis stable. No progression.     Plan:  -Continue observation since asymptomatic.   -Transition to every 6 months with labs and exam.   -CT as needed for clinical symptoms    MGUS, lambda LC  Lambda monocytic B cells compose 48% of the marrow.    M peak stable 0.1; mild/stable elevation of both Kappa + lambda LC with normal ratio; immunoglobulins WNL.     Plan:  -Repeat labs every 6 mths    Macrocytic anemia  Secondary to lymphoma bone marrow involvement.   TSH, B12 and folate WNL. Retic WNL.     Hgb is stable  (stable over last few years). MCV stable.     Plan:  -Follow    Chronic sinusitis  Chronic fatigue, arthralgias  2-3 yr hx. CRP has been notably elevated in past, ESR minimally elevated; so appears to have some chronic inflammatory issue going on. Responds notably to steroids when used in past. Symptoms could be partially related to  lymphoma.     Plan:  -Consider Rheumatology eval and mgmt options. Pt will discuss with PCP  -Her IgG is normal, so I don't think she has significant immunosuppression from her lymphoma, but may have to some degree with lymphoma in general.  -Continues mgmt with ENT for sinusitis/otitis media    R renal nodule  1.6 cm nodule noted on 4/2025 CT.  Renal US shows multiple benign cysts, but the more dominant 2.4 cm lesion has peripheral nodular focus so cancer cannot be excluded.     Plan:  -Renal MRI ordered. Will call pt with results/ recs      ______________________________________________________________________________    History of Present Illness/ Interval History    Ms. Lavonne Tidwell  is a 84 year woman old diagnosed with low grade lymphoma 2 yr ago, on observation. Returns for 3-mth visit with CT (for weight loss, elevated alk phos).     She also was found to have R renal mass and had a recent renal US.     She had Covid over this past Winter.  Chronic sinusitis/rhinorrhea and otitis media (s/p ET tubes).   Chronic hoarse voice, related to reflux and PND.   Chronic recurrent UTIs + urinary incontinence.    Hospitalized May for GI illness (diarrhea) and weakness. Concluded likely related to side effect of Bactrim she'd recently completed for acute otitis media. No recurrent GI issues.     Occasional night sweats one night/month chronic since off estrogen replacement x 10 yrs (no recent change).  Chronic fatigue, notes this is a bit worse over last few years.   Chronic arthralgias and intermittent joint swelling, with acute flares she notes she is very responsive to steroids PRN (all her symptoms, including fatigue, sinusitis, arthralgias).    Mild 10 lb weight loss over last 1-2 yrs.  No abd pain/bloating, nausea, masses/nodes, fevers.     Chronic low back with lumbar radiculopathy/bilateral hip/pelvis arthritic pain with ambulation. No acute pain.        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 Saw ENT for chronic sinusitis. CT chest showed lymphadenopathy (mildly prominent axillary, mediastinal and upper abd). +night sweats,no other B symptoms.  -No cytopenias.   -PET: avid LN above and below diaphragm  -Serum immunofixation/SPEP: 0.1 m spike. Possible very small monoclonal free immunoglobulin light chain of  lambda chain type   -6/30/23 L axillary LN biopsy: low-grade CD5 neg, CD10 neg B-cell non-Hodkin's lymphoma. Neg MYD88 mutation (differentials: lymphoplasmacytic lymphoma or marginal zone)  -Bone marrow biopsy: hypercellular marrow involved by marginal zone lymphoma (at least 80%), lambda LC restricted monoclonal B-cell population.     Treatment:  Observation      Physical Exam    BP (!) 144/59 (BP Location: Right arm, Patient Position: Sitting, Cuff Size: Adult Regular)   Pulse 75   Temp 97.7  F (36.5  C) (Oral)   Resp 16   Ht 1.613 m (5' 3.5\")   Wt 72.1 kg (159 lb)   LMP 01/01/1974   SpO2 97%   BMI 27.72 kg/m      GENERAL: Alert and oriented to time place and person. Hard of hearing. In no distress. Daughter in law accompanies.  HEAD: Atraumatic and normocephalic. No alopecia.  EYES: DK, EOMI. No erythema. No icterus.  LYMPH NODES: No palpable supraclavicular, cervical, axillary or inguinal lymphadenopathy.  CHEST: clear to auscultation bilaterally. Resonant to percussion throughout bilaterally. Symmetrical breath movements bilaterally.  CVS: RRR  ABDOMEN: Soft. Not tender. Not distended. No palpable hepatomegaly or splenomegaly. No other mass palpable. Bowel sounds present.  EXTREMITIES: Warm. Slight ankle swelling bilateral  SKIN: no rash, or bruising or purpura.   NEURO: No gross deficit noted. Non-antalgic gait.      Lab Results    Recent Results (from the past 720 hours)   Immunoglobulins A G and " M    Collection Time: 07/24/25 10:47 AM   Result Value Ref Range    Immunoglobulin G 1,323 610 - 1,616 mg/dL    Immunoglobulin A 357 84 - 499 mg/dL    Immunoglobulin M 43 35 - 242 mg/dL   Kappa and lambda light chain    Collection Time: 07/24/25 10:47 AM   Result Value Ref Range    Kappa Free Light Chains 3.92 (H) 0.33 - 1.94 mg/dL    Lambda Free Light Chains 3.74 (H) 0.57 - 2.63 mg/dL    Kappa /Lambda Ratio 1.05 0.26 - 1.65   Comprehensive metabolic panel    Collection Time: 07/24/25 10:47 AM   Result Value Ref Range    Sodium 137 135 - 145 mmol/L    Potassium 4.6 3.4 - 5.3 mmol/L    Carbon Dioxide (CO2) 27 22 - 29 mmol/L    Anion Gap 8 7 - 15 mmol/L    Urea Nitrogen 18.2 8.0 - 23.0 mg/dL    Creatinine 0.68 0.51 - 0.95 mg/dL    GFR Estimate 85 >60 mL/min/1.73m2    Calcium 9.3 8.8 - 10.4 mg/dL    Chloride 102 98 - 107 mmol/L    Glucose 95 70 - 99 mg/dL    Alkaline Phosphatase 179 (H) 40 - 150 U/L    AST 20 0 - 45 U/L    ALT 12 0 - 50 U/L    Protein Total 7.4 6.4 - 8.3 g/dL    Albumin 3.9 3.5 - 5.2 g/dL    Bilirubin Total 0.7 <=1.2 mg/dL   Total Protein, Serum for ELP    Collection Time: 07/24/25 10:47 AM   Result Value Ref Range    Total Protein Serum for ELP 7.3 6.4 - 8.3 g/dL   Protein Electrophoresis, Serum    Collection Time: 07/24/25 10:47 AM   Result Value Ref Range    Albumin 3.9 3.7 - 5.1 g/dL    Alpha 1 0.2 0.2 - 0.4 g/dL    Alpha 2 0.7 0.5 - 0.9 g/dL    Beta Globulin 1.0 0.6 - 1.0 g/dL    Gamma Globulin 1.4 0.7 - 1.6 g/dL    Monoclonal Peak 0.1 (H) <=0.0 g/dL    ELP Interpretation       Very small monoclonal protein (about 0.1 g/dL) seen in the gamma fraction which may be the free lambda light chain monoclonal that was previously characterized in our laboratory on 6/30/2023. Consider a urine for immunofixation which is generally better for detection of light chain secreting myeloma if myeloma is a serious clinical consideration. Pathologic significance requires clinical correlation. BREE Yao M.D.,  "Ph.D., Pathologist ()    Signout Location if Remote [facility]    CBC with platelets and differential    Collection Time: 07/24/25 10:47 AM   Result Value Ref Range    WBC Count 6.6 4.0 - 11.0 10e3/uL    RBC Count 3.22 (L) 3.80 - 5.20 10e6/uL    Hemoglobin 10.6 (L) 11.7 - 15.7 g/dL    Hematocrit 34.0 (L) 35.0 - 47.0 %     (H) 78 - 100 fL    MCH 32.9 26.5 - 33.0 pg    MCHC 31.2 (L) 31.5 - 36.5 g/dL    RDW 14.5 10.0 - 15.0 %    Platelet Count 222 150 - 450 10e3/uL    % Neutrophils 40 %    % Lymphocytes 57 %    % Monocytes 1 %    % Eosinophils 1 %    % Basophils 0 %    % Immature Granulocytes 0 %    NRBCs per 100 WBC 0 <1 /100    Absolute Neutrophils 2.7 1.6 - 8.3 10e3/uL    Absolute Lymphocytes 3.8 0.8 - 5.3 10e3/uL    Absolute Monocytes 0.1 0.0 - 1.3 10e3/uL    Absolute Eosinophils 0.1 0.0 - 0.7 10e3/uL    Absolute Basophils 0.0 0.0 - 0.2 10e3/uL    Absolute Immature Granulocytes 0.0 <=0.4 10e3/uL    Absolute NRBCs 0.0 10e3/uL         Total time 35 minutes, to include face to face visit, review of EMR, ordering, documentation and coordination of care on date of service.    complexity modifier for longitudinal care.     Signed by: Estela Barrett NP        Oncology Rooming Note    July 30, 2025 11:19 AM   Lavonne Tidwell is a 84 year old female who presents for:    Chief Complaint   Patient presents with     Oncology Clinic Visit     MGUS (monoclonal gammopathy of unknown significance) - Provider  visit only     Initial Vitals: BP (!) 144/59 (BP Location: Right arm, Patient Position: Sitting, Cuff Size: Adult Regular)   Pulse 75   Temp 97.7  F (36.5  C) (Oral)   Resp 16   Ht 1.613 m (5' 3.5\")   Wt 72.1 kg (159 lb)   LMP 01/01/1974   SpO2 97%   BMI 27.72 kg/m   Estimated body mass index is 27.72 kg/m  as calculated from the following:    Height as of this encounter: 1.613 m (5' 3.5\").    Weight as of this encounter: 72.1 kg (159 lb). Body surface area is 1.8 meters squared.  Moderate Pain (5) " Comment: Data Unavailable   Patient's last menstrual period was 01/01/1974.  Allergies reviewed: Yes  Medications reviewed: Yes    Medications: Medication refills not needed today.  Pharmacy name entered into [facility]: [facility] [address]    PHQ9:  Did this patient require a PHQ9?: No      Clinical concerns:  None today      Nery Donaldson CMA                Again, thank you for allowing me to participate in the care of your patient.        Sincerely,        Estela Barrett NP    Electronically signed

## 2025-07-30 NOTE — PROGRESS NOTES
"Oncology Rooming Note    July 30, 2025 11:19 AM   Lavonne Tidwell is a 84 year old female who presents for:    Chief Complaint   Patient presents with    Oncology Clinic Visit     MGUS (monoclonal gammopathy of unknown significance) - Provider  visit only     Initial Vitals: BP (!) 144/59 (BP Location: Right arm, Patient Position: Sitting, Cuff Size: Adult Regular)   Pulse 75   Temp 97.7  F (36.5  C) (Oral)   Resp 16   Ht 1.613 m (5' 3.5\")   Wt 72.1 kg (159 lb)   LMP 01/01/1974   SpO2 97%   BMI 27.72 kg/m   Estimated body mass index is 27.72 kg/m  as calculated from the following:    Height as of this encounter: 1.613 m (5' 3.5\").    Weight as of this encounter: 72.1 kg (159 lb). Body surface area is 1.8 meters squared.  Moderate Pain (5) Comment: Data Unavailable   Patient's last menstrual period was 01/01/1974.  Allergies reviewed: Yes  Medications reviewed: Yes    Medications: Medication refills not needed today.  Pharmacy name entered into [facility]: [facility] [place], [place] - [address] [place] [address]    PHQ9:  Did this patient require a PHQ9?: No      Clinical concerns:  None today      Nery Donaldson CMA              "

## 2025-08-04 ENCOUNTER — PATIENT OUTREACH (OUTPATIENT)
Dept: CARE COORDINATION | Facility: CLINIC | Age: 85
End: 2025-08-04
Payer: COMMERCIAL

## 2025-08-04 DIAGNOSIS — R09.89 THROAT CLEARING: ICD-10-CM

## 2025-08-04 DIAGNOSIS — R09.82 POST-NASAL DRAINAGE: ICD-10-CM

## 2025-08-04 DIAGNOSIS — R05.3 CHRONIC COUGH: ICD-10-CM

## 2025-08-04 RX ORDER — FAMOTIDINE 40 MG/1
TABLET, FILM COATED ORAL
Qty: 60 TABLET | Refills: 0 | OUTPATIENT
Start: 2025-08-04

## 2025-08-05 DIAGNOSIS — M1A.0320 CHRONIC GOUT OF LEFT WRIST, UNSPECIFIED CAUSE: ICD-10-CM

## 2025-08-05 RX ORDER — ALLOPURINOL 100 MG/1
TABLET ORAL
Qty: 30 TABLET | Refills: 0 | OUTPATIENT
Start: 2025-08-05

## 2025-08-06 ENCOUNTER — PATIENT OUTREACH (OUTPATIENT)
Dept: CARE COORDINATION | Facility: CLINIC | Age: 85
End: 2025-08-06
Payer: COMMERCIAL

## 2025-08-06 DIAGNOSIS — M1A.0320 CHRONIC GOUT OF LEFT WRIST, UNSPECIFIED CAUSE: ICD-10-CM

## 2025-08-06 RX ORDER — ALLOPURINOL 100 MG/1
100 TABLET ORAL DAILY
Qty: 90 TABLET | Refills: 0 | Status: SHIPPED | OUTPATIENT
Start: 2025-08-06

## 2025-08-15 PROBLEM — M19.90 INFLAMMATORY ARTHRITIS: Status: ACTIVE | Noted: 2025-08-15

## 2025-08-18 ENCOUNTER — PATIENT OUTREACH (OUTPATIENT)
Dept: CARE COORDINATION | Facility: CLINIC | Age: 85
End: 2025-08-18
Payer: COMMERCIAL

## 2025-08-19 ENCOUNTER — TELEPHONE (OUTPATIENT)
Dept: FAMILY MEDICINE | Facility: CLINIC | Age: 85
End: 2025-08-19

## 2025-08-19 ENCOUNTER — OFFICE VISIT (OUTPATIENT)
Dept: FAMILY MEDICINE | Facility: CLINIC | Age: 85
End: 2025-08-19
Payer: COMMERCIAL

## 2025-08-19 VITALS
DIASTOLIC BLOOD PRESSURE: 65 MMHG | SYSTOLIC BLOOD PRESSURE: 149 MMHG | HEIGHT: 64 IN | BODY MASS INDEX: 27.14 KG/M2 | OXYGEN SATURATION: 97 % | RESPIRATION RATE: 20 BRPM | HEART RATE: 78 BPM | WEIGHT: 159 LBS

## 2025-08-19 DIAGNOSIS — J01.01 ACUTE RECURRENT MAXILLARY SINUSITIS: ICD-10-CM

## 2025-08-19 DIAGNOSIS — J06.9 VIRAL UPPER RESPIRATORY TRACT INFECTION: Primary | ICD-10-CM

## 2025-08-19 LAB — SARS-COV-2 RNA RESP QL NAA+PROBE: NEGATIVE

## 2025-08-19 PROCEDURE — 3078F DIAST BP <80 MM HG: CPT | Performed by: NURSE PRACTITIONER

## 2025-08-19 PROCEDURE — 99214 OFFICE O/P EST MOD 30 MIN: CPT | Performed by: NURSE PRACTITIONER

## 2025-08-19 PROCEDURE — 3077F SYST BP >= 140 MM HG: CPT | Performed by: NURSE PRACTITIONER

## 2025-08-19 PROCEDURE — 87635 SARS-COV-2 COVID-19 AMP PRB: CPT | Performed by: NURSE PRACTITIONER

## 2025-08-19 PROCEDURE — 1126F AMNT PAIN NOTED NONE PRSNT: CPT | Performed by: NURSE PRACTITIONER

## 2025-08-19 RX ORDER — PREDNISONE 20 MG/1
40 TABLET ORAL DAILY
Qty: 10 TABLET | Refills: 0 | Status: SHIPPED | OUTPATIENT
Start: 2025-08-19 | End: 2025-08-24

## 2025-08-19 ASSESSMENT — PAIN SCALES - GENERAL: PAINLEVEL_OUTOF10: NO PAIN (0)

## 2025-08-20 ENCOUNTER — PATIENT OUTREACH (OUTPATIENT)
Dept: CARE COORDINATION | Facility: CLINIC | Age: 85
End: 2025-08-20
Payer: COMMERCIAL

## 2025-08-26 DIAGNOSIS — R05.3 CHRONIC COUGH: ICD-10-CM

## 2025-08-26 RX ORDER — IPRATROPIUM BROMIDE 21 UG/1
SPRAY, METERED NASAL
Qty: 30 ML | Refills: 0 | OUTPATIENT
Start: 2025-08-26

## 2025-08-27 DIAGNOSIS — R05.3 CHRONIC COUGH: ICD-10-CM

## 2025-08-27 RX ORDER — IPRATROPIUM BROMIDE 21 UG/1
SPRAY, METERED NASAL
Qty: 30 ML | Refills: 1 | Status: SHIPPED | OUTPATIENT
Start: 2025-08-27

## (undated) DEVICE — BNDG COBAN 6"X5YDS STERILE

## (undated) DEVICE — GLOVE PROTEXIS W/NEU-THERA 7.5  2D73TE75

## (undated) DEVICE — SOL WATER IRRIG 1000ML BOTTLE 07139-09

## (undated) DEVICE — KIT DRAIN CLOSED WOUND SUCTION MED 400ML RESVR

## (undated) DEVICE — CATH TRAY FOLEY 16FR SILICONE 907416

## (undated) DEVICE — GOWN IMPERVIOUS SPECIALTY XLG/XLONG 32474

## (undated) DEVICE — STOCKING SLEEVE COMPRESSION CALF MED

## (undated) DEVICE — SUCTION IRR SYSTEM W/TIP INTERPULSE

## (undated) DEVICE — BONE CLEANING TIP INTERPULSE  0210-010-000

## (undated) DEVICE — SUCTION TIP FLEXI CLEAR TIP DISP K62

## (undated) DEVICE — SU PDO 1 STRATAFIX 36X36CM CTX TAPERPOINT SXPD2B405

## (undated) DEVICE — SOL NACL 0.9% IRRIG 1000ML BOTTLE 07138-09

## (undated) DEVICE — SU VICRYL 2-0 CT-1 36" UND J945H

## (undated) DEVICE — DRAPE STERI U 1015

## (undated) DEVICE — HOOD T4 PROTECTIVE STERI FACE SHIELD 400-800

## (undated) DEVICE — GLOVE PROTEXIS BLUE W/NEU-THERA 8.5  2D73EB85

## (undated) DEVICE — SYR 50ML LL W/O NDL 309653

## (undated) DEVICE — SU DERMABOND PRINEO 22CM CLR222US

## (undated) DEVICE — DRAPE SHEET REV FOLD 3/4 9349

## (undated) DEVICE — GOWN XLG DISP 9545

## (undated) DEVICE — GLOVE PROTEXIS BLUE W/NEU-THERA 7.5  2D73EB75

## (undated) DEVICE — Device

## (undated) DEVICE — BLADE SAW SAGITTAL STRK 19.5X90X1.27MM 2108-109-000S11

## (undated) DEVICE — NDL 18GA 1.5" 305196

## (undated) DEVICE — SOL NACL 0.9% IRRIG 3000ML BAG 07972-08

## (undated) DEVICE — PREP DURAPREP 26ML APL 8630

## (undated) DEVICE — SU VICRYL 1 CT-1 36" UND J947H

## (undated) DEVICE — GLOVE PROTEXIS W/NEU-THERA 8.0  2D73TE80

## (undated) DEVICE — BONE CEMENT MIXEVAC III HI VAC KIT  0206-015-000

## (undated) RX ORDER — PROPOFOL 10 MG/ML
INJECTION, EMULSION INTRAVENOUS
Status: DISPENSED
Start: 2017-02-23

## (undated) RX ORDER — HYDROMORPHONE HYDROCHLORIDE 1 MG/ML
INJECTION, SOLUTION INTRAMUSCULAR; INTRAVENOUS; SUBCUTANEOUS
Status: DISPENSED
Start: 2017-02-23

## (undated) RX ORDER — FENTANYL CITRATE 50 UG/ML
INJECTION, SOLUTION INTRAMUSCULAR; INTRAVENOUS
Status: DISPENSED
Start: 2017-02-23

## (undated) RX ORDER — ONDANSETRON 2 MG/ML
INJECTION INTRAMUSCULAR; INTRAVENOUS
Status: DISPENSED
Start: 2017-02-23

## (undated) RX ORDER — DEXAMETHASONE SODIUM PHOSPHATE 4 MG/ML
INJECTION, SOLUTION INTRA-ARTICULAR; INTRALESIONAL; INTRAMUSCULAR; INTRAVENOUS; SOFT TISSUE
Status: DISPENSED
Start: 2017-02-23